# Patient Record
Sex: MALE | Race: WHITE | Employment: UNEMPLOYED | URBAN - METROPOLITAN AREA
[De-identification: names, ages, dates, MRNs, and addresses within clinical notes are randomized per-mention and may not be internally consistent; named-entity substitution may affect disease eponyms.]

---

## 2017-01-03 ENCOUNTER — TELEPHONE (OUTPATIENT)
Dept: PSYCHIATRY | Facility: CLINIC | Age: 27
End: 2017-01-03

## 2017-01-03 DIAGNOSIS — F29 PSYCHOSIS, UNSPECIFIED PSYCHOSIS TYPE (H): Primary | ICD-10-CM

## 2017-01-03 NOTE — TELEPHONE ENCOUNTER
Medication Requested: Lithium 600 mg caps  Last Written RX Date: 11-4-16  Last Pharmacy Fill Date: 12-8-16  Last RX Quantity: 60,   # refills: 1    Last Office Visit: 11-4-16  Recommended RTC: 6-8- wks  Next Scheduled Office Visit: 1-9-17    Since last Visit: # of CX 1 ,# of NOS 0    Last Visit Recommendations for:  Medications: no change  Labs: 0    Will route to provider due to cancellation.    Kathleen M Doege RN

## 2017-01-04 RX ORDER — LITHIUM CARBONATE 600 MG/1
1200 CAPSULE ORAL AT BEDTIME
Qty: 60 CAPSULE | Refills: 0 | Status: SHIPPED
Start: 2017-01-04 | End: 2017-01-09

## 2017-01-04 NOTE — TELEPHONE ENCOUNTER
FW:        Henna Voss DO       Sent: Tue January 03, 2017  8:39 PM       To: Doege, Kathleen M, EFREM              Message               Hi please just refill for 30 days, he's usually very reliable              Henna      30 day refill sent to pharmacy.    Kathleen M Doege RN

## 2017-01-09 ENCOUNTER — OFFICE VISIT (OUTPATIENT)
Dept: PSYCHIATRY | Facility: CLINIC | Age: 27
End: 2017-01-09
Attending: PSYCHIATRY & NEUROLOGY
Payer: COMMERCIAL

## 2017-01-09 VITALS
DIASTOLIC BLOOD PRESSURE: 83 MMHG | SYSTOLIC BLOOD PRESSURE: 136 MMHG | WEIGHT: 239 LBS | BODY MASS INDEX: 31.1 KG/M2 | HEART RATE: 80 BPM

## 2017-01-09 DIAGNOSIS — F29 PSYCHOSIS, UNSPECIFIED PSYCHOSIS TYPE (H): Primary | ICD-10-CM

## 2017-01-09 PROCEDURE — 99212 OFFICE O/P EST SF 10 MIN: CPT | Mod: ZF

## 2017-01-09 RX ORDER — LITHIUM CARBONATE 600 MG/1
1200 CAPSULE ORAL AT BEDTIME
Qty: 60 CAPSULE | Refills: 1 | Status: SHIPPED
Start: 2017-01-09 | End: 2017-02-27

## 2017-01-09 RX ORDER — OLANZAPINE 5 MG/1
5 TABLET ORAL AT BEDTIME
Qty: 30 TABLET | Refills: 1 | Status: SHIPPED
Start: 2017-01-09 | End: 2017-02-27

## 2017-01-09 ASSESSMENT — ABNORMAL INVOLUNTARY MOVEMENT SCALE (AIMS)
AIMS_DISAPPEAR_SLEEPING: NO
TONGUE: 0
AIMS_PATIENT_AWARENESS: NO AWARENESS

## 2017-01-09 NOTE — NURSING NOTE
Chief Complaint   Patient presents with     Recheck Medication     schizoaffective disorder     Reviewed allergies, smoking status, and pharmacy preference  Administered abuse screening questions   Obtained weight, blood pressure and heart rate

## 2017-01-09 NOTE — PROGRESS NOTES
PSYCHIATRY CLINIC PROGRESS NOTE   The initial DIAG EVAL was 7/24/2015.  Date of most recent Transfer Evaluation is 07/08/2016.    INTERIM HISTORY                                                 PSYCH CRITICAL ITEM HISTORY includes psychosis [disorganization, ?catatonia], mutiple psychotropic trials, psych hosp [x1x at [facility], spring 2015], commitment and CD: alcohol and cannabis.  Mental health issues were first experienced 2014 and he first received mental health care at that time.     Jose Francisco Sommers is a 26 year old male who was last seen in clinic on 11/4/16 at which time no changes were made.  The patient reports good treatment adherence.  History was provided by patient who was a good historian.  Since the last visit:  -He saw [facility] and went to [employer] over the holidays.  -States that since last visit he self-tapered the Buspar and denies any side effects or emerging symptoms from discontinuing.  Discussed that therapy may be able to help with anxiety as well but isn't interested in it.  -He reported that he stopped smoking for a over a month to save up money, but has resumed.  -He is still saving up for the software for his [employer] cards     RECENT SYMPTOMS:   DEPRESSION:  weight gain /loss;  DENIES- depressed mood, anhedonia, insomnia , hypersomnia, appetite change, low energy, poor concentration /memory, excessive guilt, indecisiveness, feeling worthless, suicidal ideation , overwhelmed, excessive crying and feeling trapped  PSYCHOSIS:  none;  DENIES- hallucinations, delusions and ideas of reference  KAMI/HYPOMANIA:  none;  DENIES- elevated mood, grandiose, increased goal-directed activity, increased energy, decreased need for sleep, pressured speech (per self, others), distractible, racing thoughts, flight of ideas and excessive risk taking  EATING DISORDER: none    SUBSTANCE USE:     ALCOHOL-  never       TOBACCO- 1ppd        CAFFEINE- 1-2 cups/day of coffee                        CANNABIS- none  OTHER ILLICIT DRUGS- none     Financial Support- family or friend     Living Situation- lives with parents in [place]         Children- 0.                                Feels Safe at Home- Yes.    MEDICAL ROS:  Reports none.    Denies akathisia, dystonia, apparent TD, muscle stiffness and tremor [action or resting], polydipsia, polyuria, nocturia and weight gain, headache, fatigue, sedation, dry mouth, nausea, diarrhea and wt gain.     PSYCH and CD Critical Summary Points since July 2016 7/2016- changed lithium from 600 mg BID to 1200 mg qhs for sleep and sedation  8/2016- decreased Zyprexa from 10 mg to 5 mg qhs- due to weight gain  9/2016- decreased Buspar from 10 mg to 5 mg qhs due to dizziness  1/2017- self-discontinued Buspar    PAST PSYCH MED TRIALS                                  see EMR Problem List: Hx of psychiatric care    MEDICAL / SURGICAL HISTORY                                   CARE TEAM:          PCP- none                    Therapist- none      Patient Active Problem List   Diagnosis     Schizoaffective disorder, bipolar type (H)     Hx of psychiatric care            ALLERGY                                Sulfa drugs  MEDICATIONS                             Self-discontinued Buspar  Current Outpatient Prescriptions   Medication Sig Dispense Refill     lithium 600 MG capsule Take 2 capsules (1,200 mg) by mouth At Bedtime 60 capsule 0     busPIRone (BUSPAR) 5 MG tablet Take 1 tablet (5 mg) by mouth At Bedtime 30 tablet 1     OLANZapine (ZYPREXA) 5 MG tablet Take 1 tablet (5 mg) by mouth At Bedtime 30 tablet 1        VITALS   /83 mmHg  Pulse 80  Wt 108.41 kg (239 lb)     Wt Readings from Last 4 Encounters:   01/09/17 108.41 kg (239 lb)   11/04/16 105.688 kg (233 lb)   09/30/16 104.146 kg (229 lb 9.6 oz)   08/26/16 106.777 kg (235 lb 6.4 oz)       MENTAL STATUS EXAM                                                             Alertness: alert  and  oriented  Appearance: adequately groomed and casually groomed  Behavior/Demeanor: cooperative, pleasant and calm, with good  eye contact   Speech: normal and regular rate and rhythm  Language: intact and no problems  Psychomotor: normal or unremarkable  Mood:  description consistent with euthymia  Affect: appropriate; was congruent to mood; was congruent to content  Thought Process/Associations: unremarkable  Thought Content:  Denies suicidal ideation, violent ideation and psychotic thought  Perception:  Denies hallucinations  Insight: good  Judgment: good  Cognition:  does  appear grossly intact; formal cognitive testing was not done    LABS and DATA     AIMS:  0 per today    PHQ9 TODAY = 1   PHQ-9 SCORE 8/26/2016 9/30/2016 11/4/2016   Total Score - - -   Total Score 4 4 1     LITHIUM LABS    [level, renal, SG, TSH, WBC]  q6 mo  Recent Labs   Lab Test  09/30/16   1348  02/09/16   1300  11/11/15   1208   LITHIUM  0.6  0.5*  0.5*     Recent Labs   Lab Test  09/30/16   1348  04/28/16   1615  02/09/16   1300   CR  0.90  0.95  1.16   GFRESTIMATED  >90  Non  GFR Calc    >90  Non  GFR Calc    77   NA  140  140  139   ADONAY  9.0  9.3  8.9     Recent Labs   Lab Test  02/09/16   1247   SG  1.005     Recent Labs   Lab Test  09/30/16   1348  02/09/16   1300  11/11/15   1208   TSH  3.45  3.44  7.24*     ANTIPSYCHOTIC LABS   [glu, A1C, lipids (focus LDL), liver enzymes, WBC, ANEU, Hgb, plts]  q12mo  Recent Labs   Lab Test  09/30/16   1348  04/28/16   1615   GLC  83  99   A1C  4.6   --      Recent Labs   Lab Test  02/09/16   1300  08/04/15   1140   CHOL  101  141   TRIG  264*  283*   LDL  16  45   HDL  32*  39*     No lab results found.  Recent Labs   Lab Test  09/30/16   1348  11/11/15   1208   WBC  7.6  8.7   ANEU  4.9   --    HGB  15.0  16.0   PLT  216  230         DIAGNOSIS   Schizoaffective Disorder, Bipolar Type     ASSESSMENT                                     Pertinent Background:   See most  recent Transfer Evaluation as dated above.    TODAY:   .     1) Schizoaffective Disorder, Bipolar Type- Sleep has improved since changing lithium from twice a day to bedtime dosing only and feels he is sleeping well on Zyprexa 5 mg.  He self-discontinued Buspar since last visit due to dizziness and felt he no longer needed it.    2) Obesity- reports that he gained weight over the holidays but reports that his appetite improved since decreasing his Zyprexa dose and will continue focusing on good nutrition and exercise.  HDL and triglycerides slightly abnormal upon check in 2/2016, will recheck in 2/2017 and if still abnormal will refer to primary care        PSYCHOTROPIC DRUG INTERACTIONS:   LITHIUM and OLANZAPINE may result in weakness, dyskinesias, increased extrapyramidal symptoms, encephalopathy, and brain damage..  MANAGEMENT:  Monitoring for adverse effects and patient is aware of risks     PLAN                                                                                                       1) PSYCHOTROPIC MEDICATIONS:   - no change    2) THERAPY:  none    3) LABS NEXT DUE:  Fasting lipids and lithium labs- 2/2017  Antipsychotic labs 9/2017       RATING SCALES:  none    4) REFERRALS [CD, medical, other]:  none    5) :  none    6) RTC: 1 month    7) CRISIS NUMBERS:   Provided routinely in AVS  ONLY if a ROBERT PT: [facility] [phone] (clinic), [phone] (after hours)   CRISIS TEXT LINE: Text [id number] from anywhere, anytime, any crisis 24/7;  OR SEE www.crisistextline.org      TREATMENT RISK STATEMENT:  The risks, benefits, alternatives and potential adverse effects have been discussed and are understood by the patient/ patient's guardian. The pt understands the risks of using street drugs or alcohol.  There are no medical contraindications, the pt agrees to treatment with the ability to do so.  The patient understands to call 911 or come to the nearest ED if life threatening or  urgent symptoms present.       RESIDENT:   Henna Voss, DO    Patient not staffed in clinic.  Note will be reviewed and signed by supervisor Dr. Ventura.  I did not see this pt directly. I have reviewed the documentation, and I agree with the resident's plan of care.    Yuko Ventura

## 2017-01-11 ASSESSMENT — PATIENT HEALTH QUESTIONNAIRE - PHQ9: SUM OF ALL RESPONSES TO PHQ QUESTIONS 1-9: 1

## 2017-02-27 ENCOUNTER — OFFICE VISIT (OUTPATIENT)
Dept: PSYCHIATRY | Facility: CLINIC | Age: 27
End: 2017-02-27
Attending: PSYCHIATRY & NEUROLOGY
Payer: COMMERCIAL

## 2017-02-27 ENCOUNTER — TELEPHONE (OUTPATIENT)
Dept: PSYCHIATRY | Facility: CLINIC | Age: 27
End: 2017-02-27

## 2017-02-27 VITALS
SYSTOLIC BLOOD PRESSURE: 156 MMHG | WEIGHT: 225.2 LBS | HEART RATE: 81 BPM | DIASTOLIC BLOOD PRESSURE: 95 MMHG | BODY MASS INDEX: 29.31 KG/M2

## 2017-02-27 DIAGNOSIS — F29 PSYCHOSIS, UNSPECIFIED PSYCHOSIS TYPE (H): Primary | ICD-10-CM

## 2017-02-27 DIAGNOSIS — F25.0 SCHIZOAFFECTIVE DISORDER, BIPOLAR TYPE (H): ICD-10-CM

## 2017-02-27 DIAGNOSIS — G47.00 INSOMNIA, UNSPECIFIED TYPE: ICD-10-CM

## 2017-02-27 PROCEDURE — 99212 OFFICE O/P EST SF 10 MIN: CPT | Mod: ZF

## 2017-02-27 RX ORDER — OLANZAPINE 5 MG/1
5 TABLET ORAL AT BEDTIME
Qty: 30 TABLET | Refills: 1 | Status: SHIPPED
Start: 2017-02-27 | End: 2017-02-27

## 2017-02-27 RX ORDER — TRAZODONE HYDROCHLORIDE 50 MG/1
50-100 TABLET, FILM COATED ORAL
Qty: 30 TABLET | Refills: 1 | Status: SHIPPED
Start: 2017-02-27 | End: 2017-03-13

## 2017-02-27 RX ORDER — LITHIUM CARBONATE 600 MG/1
1200 CAPSULE ORAL AT BEDTIME
Qty: 60 CAPSULE | Refills: 1 | Status: SHIPPED
Start: 2017-02-27 | End: 2017-03-13

## 2017-02-27 RX ORDER — OLANZAPINE 5 MG/1
5-10 TABLET ORAL AT BEDTIME
Qty: 60 TABLET | Refills: 1 | Status: SHIPPED
Start: 2017-02-27 | End: 2017-03-13

## 2017-02-27 NOTE — PATIENT INSTRUCTIONS
1) Continue lithium at current dose, continue Zyprexa 5-10 mg at bedtime  2) Start trazodone  mg for sleep at bedtime  3) Get labs completed before next visit, lithium level needs to be 8-10 hours after your last dose of lithium  4) Return to clinic in 2-3 weeks

## 2017-02-27 NOTE — TELEPHONE ENCOUNTER
----- Message from Renee Eaton sent at 2/27/2017  4:47 PM CST -----  Regarding: Parent Call  Contact: [phone]  Hi Jose Francisco Palencia's mother, Ember, is returning Dr. Voss's call.  She can be reached tomorrow on her cell: [phone].    Thanks,  Renee

## 2017-02-27 NOTE — PROGRESS NOTES
"  PSYCHIATRY CLINIC PROGRESS NOTE   The initial DIAG NAA was 7/24/2015.  Date of most recent Transfer Evaluation is 07/08/2016.    INTERIM HISTORY                                                 PSYCH CRITICAL ITEM HISTORY includes psychosis [disorganization, ?catatonia], mutiple psychotropic trials, psych hosp [x1x at [facility], spring 2015], commitment and CD: alcohol and cannabis. Mental health issues were first experienced 2014 and he first received mental health care at that time.     Jose Francisco Sommers is a 26 year old male who was last seen in clinic on 1/9/17  at which time no changes were made.  The patient reports good treatment adherence.  History was provided by patient who was a good historian.  Since the last visit:  -Reports that things are \"okay\", he takes melatonin and feels its been changing his schedule.  He reports that he didn't think it was helpful in the past.    -Reports he was sleeping \"differently\" and anxiety has worsened since last visit. Doesn't attribute it to stopping buspirone.  -He has been taking an extra Zyprexa tablet and feels that has been helpful but states that he wants something for sleep and anxiety too.  -He has lost 14 lbs since last appointment one month ago because he hasn't felt hungry.     I contacted patient's mother the next day as patient did not seem like himself at our visit.  She reported that things have been off for the past 3-4 weeks and that he has been working him almost \"OCD obsessively\" and has been increasing withdrawn.  She reports that his verbal processing seems to be lagging behind and that he's been pacing lately.  She is also concerned because patient normally drives himself to his appointments but asked to be driven to his appointment and it took him a long time to be able to drive independently.    RECENT SYMPTOMS:   DEPRESSION:  depressed mood, insomnia , appetite change, weight gain /loss, low energy and poor concentration /memory;  DENIES- " excessive guilt, feeling worthless, psychomotor retardation/ agitation observed by others, suicidal ideation , overwhelmed, excessive crying, feeling trapped and feeling hopeless  PSYCHOSIS:  none;  DENIES- delusions, paranoia, ideas of reference, thought withdrawal / insertion / deletion / broadcasting, disorganized speech, poverty of content and concrete content  KAMI/HYPOMANIA:  none;  DENIES- elevated mood, grandiose, increased goal-directed activity, increased energy, decreased need for sleep, pressured speech (per self, others), distractible and racing thoughts  EATING DISORDER:  none    RECENT SUBSTANCE USE:     ALCOHOL-  never       TOBACCO- 1ppd        CAFFEINE- 1-2 cups/day of coffee                       CANNABIS- none  OTHER ILLICIT DRUGS- none     CURRENT SOCIAL HISTORY:  Financial Support- family or friend     Living Situation- lives with parents in [place]   Children- 0. Feels Safe at Home- Yes.     MEDICAL ROS:  Reports none.    Denies akathisia, dystonia, apparent TD, muscle stiffness and tremor [action or resting], polydipsia, polyuria, nocturia and weight gain, headache, fatigue, sedation, dry mouth, nausea, diarrhea and wt gain.     PSYCH and CD Critical Summary Points since July 2016 7/2016- changed lithium from 600 mg BID to 1200 mg qhs for sleep and sedation  8/2016- decreased Zyprexa from 10 mg to 5 mg qhs- due to weight gain  9/2016- decreased Buspar from 10 mg to 5 mg qhs due to dizziness  1/2017- self-discontinued Buspar    PAST PSYCH MED TRIALS                                  see EMR Problem List: Hx of psychiatric care    MEDICAL / SURGICAL HISTORY                                   CARE TEAM:          PCP- none                  Therapist- none    Patient Active Problem List   Diagnosis     Schizoaffective disorder, bipolar type (H)     Hx of psychiatric care          ALLERGY                                Sulfa drugs  MEDICATIONS                               Current  Outpatient Prescriptions   Medication Sig Dispense Refill     lithium 600 MG capsule Take 2 capsules (1,200 mg) by mouth At Bedtime 60 capsule 1     OLANZapine (ZYPREXA) 5 MG tablet Take 1 tablet (5 mg) by mouth At Bedtime 30 tablet 1        VITALS   BP (!) 156/95  Pulse 81  Wt 102.2 kg (225 lb 3.2 oz)  BMI 29.31 kg/m2   Wt Readings from Last 4 Encounters:   02/27/17 102.2 kg (225 lb 3.2 oz)   01/09/17 108.4 kg (239 lb)   11/04/16 105.7 kg (233 lb)   09/30/16 104.1 kg (229 lb 9.6 oz)     MENTAL STATUS EXAM                                                             Alertness: alert  and oriented  Appearance: adequately groomed and casually groomed  Behavior/Demeanor: cooperative, pleasant and guarded, with poor eye contact   Speech: increased latency of response  Language: intact and no problems  Psychomotor: normal or unremarkable  Mood:  depressed and anxious  Affect: guarded; was congruent to mood; was congruent to content  Thought Process/Associations: unremarkable  Thought Content:  Denies suicidal ideation, violent ideation and psychotic thought;   Perception:  Denies hallucinations   , derealization and depersonalization;   Insight: adequate  Judgment: good  Cognition:  does  appear grossly intact; formal cognitive testing was not done    LABS and DATA     LITHIUM LABS    [level, renal, SG, TSH, WBC]  q6 mo  Recent Labs   Lab Test  09/30/16   1348  02/09/16   1300  11/11/15   1208   LITHIUM  0.6  0.5*  0.5*     Recent Labs   Lab Test  09/30/16   1348  04/28/16   1615  02/09/16   1300   CR  0.90  0.95  1.16   GFRESTIMATED  >90  Non  GFR Calc    >90  Non  GFR Calc    77   NA  140  140  139   ADONAY  9.0  9.3  8.9     Recent Labs   Lab Test  02/09/16   1247   SG  1.005     Recent Labs   Lab Test  09/30/16   1348  02/09/16   1300  11/11/15   1208   TSH  3.45  3.44  7.24*     ANTIPSYCHOTIC LABS   [glu, A1C, lipids (focus LDL), liver enzymes, WBC, ANEU, Hgb, plts]  q12mo  Recent Labs    Lab Test  09/30/16   1348  04/28/16   1615   GLC  83  99   A1C  4.6   --      Recent Labs   Lab Test  02/09/16   1300  08/04/15   1140   CHOL  101  141   TRIG  264*  283*   LDL  16  45   HDL  32*  39*     Recent Labs   Lab Test  09/30/16   1348  11/11/15   1208   WBC  7.6  8.7   ANEU  4.9   --    HGB  15.0  16.0   PLT  216  230     AIMS:  0 per 1/9/2017      PHQ9 TODAY = 5  PHQ-9 SCORE 9/30/2016 11/4/2016 1/9/2017   Total Score 4 1 1     Wt Readings from Last 4 Encounters:   02/27/17 102.2 kg (225 lb 3.2 oz)   01/09/17 108.4 kg (239 lb)   11/04/16 105.7 kg (233 lb)   09/30/16 104.1 kg (229 lb 9.6 oz)       DIAGNOSIS     Schizoaffective Disorder, Bipolar Type     ASSESSMENT                                     Pertinent Background:   See most recent Transfer Evaluation as dated above..       TODAY:   1) Schizoaffective Disorder, Bipolar Type- Sleep had initially improved since changing lithium from twice a day to bedtime dosing only and feels he was sleeping well on Zyprexa 5 mg.  He self-discontinued Buspar since due to dizziness and felt he no longer needed it.  Patient seemed more withdrawn and anxious today, per his mother she has been noticing a change in his behavior recently.  Patient's mother and I will encourage patient to increase Zyprexa to 10 mg qhs (has own supply), will also obtain lithium level and 6 month lithium labs. If patient is still unable to sleep after Zyprexa 10 mg, he can try taking a trazodone. Discussed possible side effects of trazodone including priapism.     2) Obesity- reports that he gained weight over the holidays and reports that his appetite improved since decreasing his Zyprexa dose and will continue focusing on good nutrition and exercise.  However, this past month he lost 14 lbs due to poor appetite, suspect it may be related to psychiatric symptoms.  HDL and triglycerides slightly abnormal upon check in 2/2016, will recheck in 2/2017 and if still abnormal will refer to primary  care     PSYCHOTROPIC DRUG INTERACTIONS:   LITHIUM and OLANZAPINE may result in weakness, dyskinesias, increased extrapyramidal symptoms, encephalopathy, and brain damage.  MANAGEMENT: Monitoring for adverse effects and patient is aware of risks     PLAN                                                                                                       1) PSYCHOTROPIC MEDICATIONS:   - Increase Zyprexa from 5 mg to 10 mg qhs   - If he still has difficulty sleeping may take trazodone  mg qhs    2) THERAPY:  none    3) LABS NEXT DUE:  6 month lithium levels and fasting lipids 2/2017 with lithium level.  Antipsychotic labs 9/2017       RATING SCALES:  none    4) REFERRALS [CD, medical, other]:  none    5) :  none    6) RTC: 2 weeks    7) CRISIS NUMBERS:   Provided routinely in AVS  ONLY if a JOSEPHINE PT: [facility] [phone] (clinic), [phone] (after hours)   CRISIS TEXT LINE: Text [id number] from anywhere, anytime, any crisis 24/7;  OR SEE www.crisistextline.org    TREATMENT RISK STATEMENT:  The risks, benefits, alternatives and potential adverse effects have been discussed and are understood by the patient/ patient's guardian. The pt understands the risks of using street drugs or alcohol.  There are no medical contraindications, the pt agrees to treatment with the ability to do so.  The patient understands to call 911 or come to the nearest ED if life threatening or urgent symptoms present.     RESIDENT:   Henna Voss, DO    Patient not staffed in clinic.  Note will be reviewed and signed by supervisor Dr. Ventura.  I did not see this pt directly. I have reviewed the documentation, and I agree with the resident's plan of care.    Yuko Ventura

## 2017-02-27 NOTE — MR AVS SNAPSHOT
After Visit Summary   2/27/2017    Jose Francisco Sommers    MRN: [id number]           Patient Information     Date Of Birth          1990        Visit Information        Provider Department      2/27/2017 3:30 PM Henna Voss DO Psychiatry Clinic        Today's Diagnoses     Psychosis, unspecified psychosis type    -  1    Insomnia, unspecified type        Schizoaffective disorder, bipolar type (H)          Care Instructions    1) Continue lithium at current dose, continue Zyprexa 5-10 mg at bedtime  2) Start trazodone  mg for sleep at bedtime  3) Get labs completed before next visit, lithium level needs to be 8-10 hours after your last dose of lithium  4) Return to clinic in 2-3 weeks        Follow-ups after your visit        Follow-up notes from your care team     Return in about 2 weeks (around 3/13/2017).      Your next 10 appointments already scheduled     Mar 13, 2017  2:00 PM CDT   First Episode Return with Henna Voss DO   Psychiatry Clinic ([facility])    [address] [facility] [address] [phone]              Who to contact     Please call your clinic at [phone] to:    Ask questions about your health    Make or cancel appointments    Discuss your medicines    Learn about your test results    Speak to your doctor   If you have compliments or concerns about an experience at your clinic, or if you wish to file a complaint, please contact [facility] Patient Relations at [phone] or email us at [email or web address]         Additional Information About Your Visit        MyChart Information     [employer] is an electronic gateway that provides easy, online access to your medical records. With [employer], you can request a clinic appointment, read your test results, renew a prescription or communicate with your care team.     To sign up for [employer] visit the website at  [email or web address]   You will be asked to enter the access code listed below, as well as some personal information. Please follow the directions to create your username and password.     Your access code is: [email or web address]  Expires: 6/3/2017 10:50 AM     Your access code will  in 90 days. If you need help or a new code, please contact your [facility] or call [phone] for assistance.        Care EveryWhere ID     This is your Care EveryWhere ID. This could be used by other organizations to access your [place] medical records  [id number]        Your Vitals Were     Pulse BMI (Body Mass Index)                81 29.31 kg/m2           Blood Pressure from Last 3 Encounters:   17 (!) 156/95   17 136/83   16 124/83    Weight from Last 3 Encounters:   17 102.2 kg (225 lb 3.2 oz)   17 108.4 kg (239 lb)   16 105.7 kg (233 lb)                 Today's Medication Changes          These changes are accurate as of: 17 11:59 PM.  If you have any questions, ask your nurse or doctor.               Start taking these medicines.        Dose/Directions    OLANZapine 5 MG tablet   Commonly known as:  zyPREXA   Used for:  Psychosis, unspecified psychosis type   Started by:  Henna Voss DO        Dose:  5-10 mg   Take 1-2 tablets (5-10 mg) by mouth At Bedtime   Quantity:  60 tablet   Refills:  1       traZODone 50 MG tablet   Commonly known as:  DESYREL   Used for:  Insomnia, unspecified type   Started by:  Henna Voss DO        Dose:   mg   Take 1-2 tablets ( mg) by mouth nightly as needed for sleep   Quantity:  30 tablet   Refills:  1            Where to get your medicines      These medications were sent to [place] [address] [place] [facility] [address] [facility]  [address] [facility] [address]     Phone:  [phone]     lithium 600 MG capsule    OLANZapine 5 MG tablet    traZODone 50 MG tablet                 Primary Care Provider    Clinic Provider       No address on file        Thank you!     Thank you for choosing PSYCHIATRY CLINIC  for your care. Our goal is always to provide you with excellent care. Hearing back from our patients is one way we can continue to improve our services. Please take a few minutes to complete the written survey that you may receive in the mail after your visit with us. Thank you!             Your Updated Medication List - Protect others around you: Learn how to safely use, store and throw away your medicines at www.disposemymeds.org.          This list is accurate as of: 2/27/17 11:59 PM.  Always use your most recent med list.                   Brand Name Dispense Instructions for use    lithium 600 MG capsule     60 capsule    Take 2 capsules (1,200 mg) by mouth At Bedtime       OLANZapine 5 MG tablet    zyPREXA    60 tablet    Take 1-2 tablets (5-10 mg) by mouth At Bedtime       traZODone 50 MG tablet    DESYREL    30 tablet    Take 1-2 tablets ( mg) by mouth nightly as needed for sleep

## 2017-02-27 NOTE — NURSING NOTE
Chief Complaint   Patient presents with     Recheck Medication       Psychosis, unspecified psychosis type     Reviewed allergies, smoking status, and pharmacy preference  Administered abuse screening questions   Obtained weight, blood pressure and heart rate

## 2017-02-28 ENCOUNTER — TELEPHONE (OUTPATIENT)
Dept: PSYCHIATRY | Facility: CLINIC | Age: 27
End: 2017-02-28

## 2017-02-28 NOTE — TELEPHONE ENCOUNTER
"Spoke to patient's mother Ember to follow up on some concerns I had regarding patient's symptoms yesterday.  Patient did not appear to be himself and patient's mother reports she has noticed that things have been off for the 3-4 weeks.  She states that he was working \"almost OCD jack obsessively\"  and hours working on the computer and increasing withdrawn in the past months.  There s a lag in his response time and had a disaster experience in risperidone.  Verbal processing was gone and it has never been back to baseline and after he stopped the risperidone.  She doubled his Zyprexa and patient is not eating.  He s pacing more and had quit smoking but recentlystarted up again.  This weekend patient requested that his mother drive him to his appointments and mother reports that the fact that patient is not driving is a huge deal because that is something he has worked hard to do.      We will encourage patient to increase Zyprexa to 10 mg qhs (has own supply), will also obtain lithium level and 6 month lithium labs.  If patient is still unable to sleep after Zyprexa 10 mg, he can try taking a trazodone.  Discussed possible side effects of trazodone including priapism.  Jose Francisco's mother is requesting an appointment in 2 weeks.  I will arrange for him to be scheduled Thursday 3/16 at 1:00 pm.  Henna Voss, DO  "

## 2017-03-01 ASSESSMENT — PATIENT HEALTH QUESTIONNAIRE - PHQ9: SUM OF ALL RESPONSES TO PHQ QUESTIONS 1-9: 5

## 2017-03-08 DIAGNOSIS — F25.0 SCHIZOAFFECTIVE DISORDER, BIPOLAR TYPE (H): ICD-10-CM

## 2017-03-08 LAB
BASOPHILS # BLD AUTO: 0.1 10E9/L (ref 0–0.2)
BASOPHILS NFR BLD AUTO: 0.7 %
DIFFERENTIAL METHOD BLD: NORMAL
EOSINOPHIL # BLD AUTO: 0.4 10E9/L (ref 0–0.7)
EOSINOPHIL NFR BLD AUTO: 4.8 %
ERYTHROCYTE [DISTWIDTH] IN BLOOD BY AUTOMATED COUNT: 13 % (ref 10–15)
HCT VFR BLD AUTO: 46.9 % (ref 40–53)
HGB BLD-MCNC: 15.7 G/DL (ref 13.3–17.7)
LYMPHOCYTES # BLD AUTO: 2.1 10E9/L (ref 0.8–5.3)
LYMPHOCYTES NFR BLD AUTO: 24.3 %
MCH RBC QN AUTO: 30.3 PG (ref 26.5–33)
MCHC RBC AUTO-ENTMCNC: 33.5 G/DL (ref 31.5–36.5)
MCV RBC AUTO: 90 FL (ref 78–100)
MONOCYTES # BLD AUTO: 0.8 10E9/L (ref 0–1.3)
MONOCYTES NFR BLD AUTO: 9.4 %
NEUTROPHILS # BLD AUTO: 5.1 10E9/L (ref 1.6–8.3)
NEUTROPHILS NFR BLD AUTO: 60.8 %
PLATELET # BLD AUTO: 229 10E9/L (ref 150–450)
RBC # BLD AUTO: 5.19 10E12/L (ref 4.4–5.9)
WBC # BLD AUTO: 8.5 10E9/L (ref 4–11)

## 2017-03-08 PROCEDURE — 80050 GENERAL HEALTH PANEL: CPT | Performed by: STUDENT IN AN ORGANIZED HEALTH CARE EDUCATION/TRAINING PROGRAM

## 2017-03-08 PROCEDURE — 36415 COLL VENOUS BLD VENIPUNCTURE: CPT | Performed by: STUDENT IN AN ORGANIZED HEALTH CARE EDUCATION/TRAINING PROGRAM

## 2017-03-08 PROCEDURE — 80178 ASSAY OF LITHIUM: CPT | Performed by: STUDENT IN AN ORGANIZED HEALTH CARE EDUCATION/TRAINING PROGRAM

## 2017-03-09 LAB
ALBUMIN SERPL-MCNC: 4.4 G/DL (ref 3.4–5)
ALP SERPL-CCNC: 67 U/L (ref 40–150)
ALT SERPL W P-5'-P-CCNC: 51 U/L (ref 0–70)
ANION GAP SERPL CALCULATED.3IONS-SCNC: 6 MMOL/L (ref 3–14)
AST SERPL W P-5'-P-CCNC: 22 U/L (ref 0–45)
BILIRUB SERPL-MCNC: 0.6 MG/DL (ref 0.2–1.3)
BUN SERPL-MCNC: 11 MG/DL (ref 7–30)
CALCIUM SERPL-MCNC: 9.5 MG/DL (ref 8.5–10.1)
CHLORIDE SERPL-SCNC: 106 MMOL/L (ref 94–109)
CO2 SERPL-SCNC: 28 MMOL/L (ref 20–32)
CREAT SERPL-MCNC: 0.94 MG/DL (ref 0.66–1.25)
GFR SERPL CREATININE-BSD FRML MDRD: NORMAL ML/MIN/1.7M2
GLUCOSE SERPL-MCNC: 72 MG/DL (ref 70–99)
LITHIUM SERPL-SCNC: 0.7 MMOL/L (ref 0.6–1.2)
POTASSIUM SERPL-SCNC: 3.7 MMOL/L (ref 3.4–5.3)
PROT SERPL-MCNC: 7.5 G/DL (ref 6.8–8.8)
SODIUM SERPL-SCNC: 140 MMOL/L (ref 133–144)
TSH SERPL DL<=0.005 MIU/L-ACNC: 3.36 MU/L (ref 0.4–4)

## 2017-03-13 ENCOUNTER — OFFICE VISIT (OUTPATIENT)
Dept: PSYCHIATRY | Facility: CLINIC | Age: 27
End: 2017-03-13
Attending: PSYCHIATRY & NEUROLOGY
Payer: COMMERCIAL

## 2017-03-13 VITALS
SYSTOLIC BLOOD PRESSURE: 142 MMHG | BODY MASS INDEX: 28.81 KG/M2 | WEIGHT: 221.4 LBS | HEART RATE: 90 BPM | DIASTOLIC BLOOD PRESSURE: 93 MMHG

## 2017-03-13 DIAGNOSIS — Z79.899 ENCOUNTER FOR LONG-TERM (CURRENT) USE OF MEDICATIONS: ICD-10-CM

## 2017-03-13 DIAGNOSIS — F29 PSYCHOSIS, UNSPECIFIED PSYCHOSIS TYPE (H): Primary | ICD-10-CM

## 2017-03-13 PROCEDURE — 99212 OFFICE O/P EST SF 10 MIN: CPT | Mod: ZF

## 2017-03-13 RX ORDER — LITHIUM CARBONATE 600 MG/1
1200 CAPSULE ORAL AT BEDTIME
Qty: 60 CAPSULE | Refills: 1 | Status: SHIPPED
Start: 2017-03-13 | End: 2017-04-17

## 2017-03-13 RX ORDER — OLANZAPINE 15 MG/1
15 TABLET ORAL AT BEDTIME
Qty: 30 TABLET | Refills: 1 | Status: SHIPPED
Start: 2017-03-13 | End: 2017-04-17

## 2017-03-13 NOTE — MR AVS SNAPSHOT
After Visit Summary   3/13/2017    Jose Francisco Sommers    MRN: [id number]           Patient Information     Date Of Birth          1990        Visit Information        Provider Department      3/13/2017 2:00 PM Henna Voss DO Psychiatry Clinic        Today's Diagnoses     Psychosis, unspecified psychosis type    -  1    Encounter for long-term (current) use of medications          Care Instructions    1) Complete fasting labs (12 hours) at any [place] prior to next appt  2) Increase Zyprexa to 15 mg  3) Return to clinic in 1 month        Follow-ups after your visit        Your next 10 appointments already scheduled     Apr 17, 2017  3:30 PM CDT   First Episode Return with Henna Voss DO   Psychiatry Clinic ([facility])    [place] [id number] [facility] [address] [phone]              Who to contact     Please call your clinic at [phone] to:    Ask questions about your health    Make or cancel appointments    Discuss your medicines    Learn about your test results    Speak to your doctor   If you have compliments or concerns about an experience at your clinic, or if you wish to file a complaint, please contact [facility] Patient Relations at [phone] or email us at [email or web address]         Additional Information About Your Visit        MyChart Information     [employer] is an electronic gateway that provides easy, online access to your medical records. With [employer], you can request a clinic appointment, read your test results, renew a prescription or communicate with your care team.     To sign up for [employer] visit the website at [email or web address]   You will be asked to enter the access code listed below, as well as some personal information. Please follow the directions to create your username and password.     Your access code is: [email or web address]  Expires: 6/3/2017  11:50 AM     Your access code will  in 90 days. If you need help or a new code, please contact your [facility] or call [phone] for assistance.        Care EveryWhere ID     This is your Care EveryWhere ID. This could be used by other organizations to access your [place] medical records  [id number]        Your Vitals Were     Pulse BMI (Body Mass Index)                90 28.81 kg/m2           Blood Pressure from Last 3 Encounters:   17 (!) 142/93   17 (!) 156/95   17 136/83    Weight from Last 3 Encounters:   17 100.4 kg (221 lb 6.4 oz)   17 102.2 kg (225 lb 3.2 oz)   17 108.4 kg (239 lb)                 Today's Medication Changes          These changes are accurate as of: 3/13/17 11:59 PM.  If you have any questions, ask your nurse or doctor.               These medicines have changed or have updated prescriptions.        Dose/Directions    OLANZapine 15 MG tablet   Commonly known as:  zyPREXA   This may have changed:    - medication strength  - how much to take   Used for:  Psychosis, unspecified psychosis type   Changed by:  Henna Voss DO        Dose:  15 mg   Take 1 tablet (15 mg) by mouth At Bedtime   Quantity:  30 tablet   Refills:  1         Stop taking these medicines if you haven't already. Please contact your care team if you have questions.     traZODone 50 MG tablet   Commonly known as:  DESYREL   Stopped by:  Henna Voss DO                Where to get your medicines      These medications were sent to [place] [address] [place] [facility] [address] [facility]  [address] [facility] [address]     Phone:  [phone]     lithium 600 MG capsule    OLANZapine 15 MG tablet                Primary Care Provider    Clinic Provider       No address on file        Thank you!     Thank you for choosing PSYCHIATRY CLINIC  for your care. Our goal is always to provide you with excellent care. Hearing back from  our patients is one way we can continue to improve our services. Please take a few minutes to complete the written survey that you may receive in the mail after your visit with us. Thank you!             Your Updated Medication List - Protect others around you: Learn how to safely use, store and throw away your medicines at www.disposemymeds.org.          This list is accurate as of: 3/13/17 11:59 PM.  Always use your most recent med list.                   Brand Name Dispense Instructions for use    lithium 600 MG capsule     60 capsule    Take 2 capsules (1,200 mg) by mouth At Bedtime       OLANZapine 15 MG tablet    zyPREXA    30 tablet    Take 1 tablet (15 mg) by mouth At Bedtime

## 2017-03-13 NOTE — PROGRESS NOTES
"  PSYCHIATRY CLINIC PROGRESS NOTE   The initial DIAG EVAL was 7/24/2015.  Date of most recent Transfer Evaluation is 07/08/2016.    INTERIM HISTORY                                                 PSYCH CRITICAL ITEM HISTORY includes psychosis [disorganization, ?catatonia], mutiple psychotropic trials, psych hosp [x1x at [facility], spring 2015], commitment and CD: alcohol and cannabis. Mental health issues were first experienced 2014 and he first received mental health care at that time      Jose Francisco Sommers is a 26 year old male who was last seen in clinic on 2/27/17 at which time patient's Zyprexa was increased to 10 mg and trazodone was started.  The patient reports good treatment adherence.  History was provided by patient who was a good historian.  Since the last visit:  -Jose Francisco reports that he \"feeling better\" and feels that it was the melatonin making him feel worse.  -He attempted to purchase the [employer] software to help sell his [employer] card but it didn't work out and he states that needs to find an alternative solution and doing [employer] in [place].  -Feels that he felt better on the Zyprexa 10 mg and requests to increase Zyprexa to 15 mg.  -During a previous phone call, his mother mentioned that he wasn't driving, which showed a decompensation from his normal behavior.  He states that his father drove him today but feels he will be able to resume driving again soon.  -Reports that he didn't like the trazodone, feels he is able to sleep well with Zyprexa  -His sister is in town for her spring break and he is enjoying spending time with her.  -His appetite is still somewhat poor but thinks it's slowly improving    RECENT SYMPTOMS:   DEPRESSION: appetite change, weight gain /loss,  DENIES- low energy and poor concentration /memory;depressed mood, insomnia , excessive guilt, feeling worthless, psychomotor retardation/ agitation observed by others, suicidal ideation , overwhelmed, excessive crying, " feeling trapped and feeling hopeless  PSYCHOSIS: none; DENIES- delusions, paranoia, ideas of reference, thought withdrawal / insertion / deletion / broadcasting, disorganized speech, poverty of content and concrete content  KAMI/HYPOMANIA: none; DENIES- elevated mood, grandiose, increased goal-directed activity, increased energy, decreased need for sleep, pressured speech (per self, others), distractible and racing thoughts  EATING DISORDER: none    RECENT SUBSTANCE USE:   ALCOHOL-  never       TOBACCO- 1ppd        CAFFEINE- 1-2 cups/day of coffee                       CANNABIS- none  OTHER ILLICIT DRUGS- none      CURRENT SOCIAL HISTORY:  Financial Support- family or friend     Living Situation- lives with parents in [place]   Children- 0. Feels Safe at Home- Yes.      MEDICAL ROS:  Reports none.    Denies akathisia, dystonia, apparent TD, muscle stiffness and tremor [action or resting], polydipsia, polyuria, nocturia and weight gain, headache, fatigue, sedation, dry mouth, nausea, diarrhea and wt gain.     PSYCH and CD Critical Summary Points since July 2016 7/2016- changed lithium from 600 mg BID to 1200 mg qhs for sleep and sedation  8/2016- decreased Zyprexa from 10 mg to 5 mg qhs- due to weight gain  9/2016- decreased Buspar from 10 mg to 5 mg qhs due to dizziness  1/2017- self-discontinued Buspar  2/2017- increased Zyprexa to 10 mg qhs from 5 mg, started trazodone for sleep.  Reports he had tried melatonin but it made him feel sick    PAST PSYCH MED TRIALS                                  see EMR Problem List: Hx of psychiatric care    MEDICAL / SURGICAL HISTORY                                 CARE TEAM: PCP- none Therapist- none    Patient Active Problem List   Diagnosis     Schizoaffective disorder, bipolar type (H)     Hx of psychiatric care            ALLERGY                                Sulfa drugs  MEDICATIONS                               Current Outpatient Prescriptions   Medication Sig  "Dispense Refill     traZODone (DESYREL) 50 MG tablet Take 1-2 tablets ( mg) by mouth nightly as needed for sleep 30 tablet 1     lithium 600 MG capsule Take 2 capsules (1,200 mg) by mouth At Bedtime 60 capsule 1     OLANZapine (ZYPREXA) 5 MG tablet Take 1-2 tablets (5-10 mg) by mouth At Bedtime 60 tablet 1        VITALS   BP (!) 142/93  Pulse 90  Wt 100.4 kg (221 lb 6.4 oz)  BMI 28.81 kg/m2   Wt Readings from Last 4 Encounters:   03/13/17 100.4 kg (221 lb 6.4 oz)   02/27/17 102.2 kg (225 lb 3.2 oz)   01/09/17 108.4 kg (239 lb)   11/04/16 105.7 kg (233 lb)       MENTAL STATUS EXAM                                                             Alertness: alert  and oriented  Appearance: adequately groomed and casually groomed  Behavior/Demeanor: cooperative, pleasant and calm, with improved eye contact   Speech: normal and regular rate and rhythm, no latency of response (improved from last visit)  Language: intact and no problems  Psychomotor: normal or unremarkable  Mood:  \"better\"  Affect: appropriate, less guarded; was congruent to mood; was congruent to content  Thought Process/Associations: unremarkable  Thought Content:  Denies suicidal ideation and violent ideation;   Perception:  Denies hallucinations   ;    Insight: good  Judgment: good  Cognition:  does  appear grossly intact; formal cognitive testing was not done    LABS and DATA     LITHIUM LABS    [level, renal, SG, TSH, WBC]  q6 mo  Recent Labs   Lab Test  03/08/17   0953  09/30/16   1348  02/09/16   1300  11/11/15   1208   LITHIUM  0.7  0.6  0.5*  0.5*     Recent Labs   Lab Test  03/08/17   0953  09/30/16   1348  04/28/16   1615   CR  0.94  0.90  0.95   GFRESTIMATED  >90  Non  GFR Calc    >90  Non  GFR Calc    >90  Non  GFR Calc     NA  140  140  140   ADONAY  9.5  9.0  9.3     Recent Labs   Lab Test  02/09/16   1247   SG  1.005     Recent Labs   Lab Test  03/08/17   0953  09/30/16   1348  02/09/16   1300 "   TSH  3.36  3.45  3.44     ANTIPSYCHOTIC LABS   [glu, A1C, lipids (focus LDL), liver enzymes, WBC, ANEU, Hgb, plts]  q12mo  Recent Labs   Lab Test  03/08/17   0953  09/30/16   1348   GLC  72  83   A1C   --   4.6     Recent Labs   Lab Test  02/09/16   1300  08/04/15   1140   CHOL  101  141   TRIG  264*  283*   LDL  16  45   HDL  32*  39*     Recent Labs   Lab Test  03/08/17   0953   AST  22   ALT  51   ALKPHOS  67     Recent Labs   Lab Test  03/08/17   0953  09/30/16   1348   WBC  8.5  7.6   ANEU  5.1  4.9   HGB  15.7  15.0   PLT  229  216       AIMS: 0 per 1/9/2017     PHQ9 TODAY = 1  PHQ-9 SCORE 11/4/2016 1/9/2017 2/27/2017   Total Score 1 1 5         DIAGNOSIS     Schizoaffective Disorder, Bipolar Type     ASSESSMENT                                     Pertinent Background:   See most recent Transfer Evaluation as dated above.    TODAY:    1) Schizoaffective Disorder, Bipolar Type- Sleep had initially improved since changing lithium from twice a day to bedtime dosing only and feels he was sleeping well on Zyprexa 5 mg.  He self-discontinued Buspar since due to dizziness and felt he no longer needed it. At last visit, patient seemed more withdrawn and anxious and  his mother she had been noticing a change in his behavior recently.  He seems improved since increasing Zyprexa to 10 mg qhs and feels his sleep has improved.  He didn't tolerate trazodone well, so will discontinue.  He has also requested to increase his Zyprexa to 15 mg.  I called his mother to get an update how he is doing at home, awaiting call back.        2) Obesity- reports that he gained weight over the holidays and reports that his appetite improved since decreasing his Zyprexa dose and will continue focusing on good nutrition and exercise.  However, this past month he lost 14 lbs due to poor appetite, suspect it may be related to psychiatric symptoms. HDL and triglycerides slightly abnormal upon check in 2/2016, will recheck in 2/2017 and if  still abnormal will refer to primary care    3) [place] labs due- reminded patient again regarding the need for fasting labs and will discuss with his mother as well     PSYCHOTROPIC DRUG INTERACTIONS:   LITHIUM and OLANZAPINE may result in weakness, dyskinesias, increased extrapyramidal symptoms, encephalopathy, and brain damage.  MANAGEMENT: Monitoring for adverse effects and patient is aware of risks     PLAN                                                                                                       1) PSYCHOTROPIC MEDICATIONS:   - Stop trazodone   - Increase Zyprexa from 10 to 15 mg qhs    2) THERAPY:  none    3) LABS NEXT DUE: 6 month lithium levels and fasting lipids 2/2017 with lithium level. Antipsychotic labs 9/2017       RATING SCALES:  none    4) REFERRALS [MICD, medical etc.]:  none    5) MTM REFERRAL [PharmD]:  none    6) :  none    7) RTC: 1 month    8)  FAMILY MEETING COMPLETED  None, but I am in regular contact with patient's mother    9) CRISIS NUMBERS:   Provided routinely in AVS  ONLY if a JOSEPHINE PT: Isai [place] Josephine [phone] (clinic), [phone] (after hours)   CRISIS TEXT LINE: Text [phone] from anywhere, anytime, any crisis 24/7;  OR SEE www.crisistextline.org    TREATMENT RISK STATEMENT:  The risks, benefits, alternatives and potential adverse effects have been discussed and are understood by the patient/ patient's guardian. The pt understands the risks of using street drugs or alcohol.  There are no medical contraindications, the pt agrees to treatment with the ability to do so.  The patient understands to call 911 or come to the nearest ED if life threatening or urgent symptoms present.       RESIDENT:   Henna Voss, DO    Patient not staffed in clinic.  Note will be reviewed and signed by supervisor Dr. Ventura.  I did not see this pt directly. I have reviewed the documentation, and I agree with the resident's plan of care.    Yuko Ventura

## 2017-03-14 ASSESSMENT — PATIENT HEALTH QUESTIONNAIRE - PHQ9: SUM OF ALL RESPONSES TO PHQ QUESTIONS 1-9: 1

## 2017-03-22 ENCOUNTER — TELEPHONE (OUTPATIENT)
Dept: PSYCHIATRY | Facility: CLINIC | Age: 27
End: 2017-03-22

## 2017-03-22 DIAGNOSIS — Z79.899 ENCOUNTER FOR LONG-TERM (CURRENT) USE OF MEDICATIONS: ICD-10-CM

## 2017-03-22 LAB
BASOPHILS # BLD AUTO: 0 10E9/L (ref 0–0.2)
BASOPHILS NFR BLD AUTO: 0.5 %
DIFFERENTIAL METHOD BLD: NORMAL
EOSINOPHIL # BLD AUTO: 0.4 10E9/L (ref 0–0.7)
EOSINOPHIL NFR BLD AUTO: 4.4 %
ERYTHROCYTE [DISTWIDTH] IN BLOOD BY AUTOMATED COUNT: 12.9 % (ref 10–15)
HCT VFR BLD AUTO: 47.8 % (ref 40–53)
HGB BLD-MCNC: 15.6 G/DL (ref 13.3–17.7)
LITHIUM SERPL-SCNC: 0.6 MMOL/L (ref 0.6–1.2)
LYMPHOCYTES # BLD AUTO: 1.9 10E9/L (ref 0.8–5.3)
LYMPHOCYTES NFR BLD AUTO: 23 %
MCH RBC QN AUTO: 30 PG (ref 26.5–33)
MCHC RBC AUTO-ENTMCNC: 32.6 G/DL (ref 31.5–36.5)
MCV RBC AUTO: 92 FL (ref 78–100)
MONOCYTES # BLD AUTO: 0.6 10E9/L (ref 0–1.3)
MONOCYTES NFR BLD AUTO: 6.6 %
NEUTROPHILS # BLD AUTO: 5.5 10E9/L (ref 1.6–8.3)
NEUTROPHILS NFR BLD AUTO: 65.5 %
PLATELET # BLD AUTO: 250 10E9/L (ref 150–450)
RBC # BLD AUTO: 5.2 10E12/L (ref 4.4–5.9)
WBC # BLD AUTO: 8.3 10E9/L (ref 4–11)

## 2017-03-22 PROCEDURE — 80061 LIPID PANEL: CPT | Performed by: STUDENT IN AN ORGANIZED HEALTH CARE EDUCATION/TRAINING PROGRAM

## 2017-03-22 PROCEDURE — 36415 COLL VENOUS BLD VENIPUNCTURE: CPT | Performed by: STUDENT IN AN ORGANIZED HEALTH CARE EDUCATION/TRAINING PROGRAM

## 2017-03-22 PROCEDURE — 83721 ASSAY OF BLOOD LIPOPROTEIN: CPT | Mod: 59 | Performed by: STUDENT IN AN ORGANIZED HEALTH CARE EDUCATION/TRAINING PROGRAM

## 2017-03-22 PROCEDURE — 80050 GENERAL HEALTH PANEL: CPT | Performed by: STUDENT IN AN ORGANIZED HEALTH CARE EDUCATION/TRAINING PROGRAM

## 2017-03-22 PROCEDURE — 80178 ASSAY OF LITHIUM: CPT | Performed by: STUDENT IN AN ORGANIZED HEALTH CARE EDUCATION/TRAINING PROGRAM

## 2017-03-22 NOTE — TELEPHONE ENCOUNTER
Contacted patient's mother to check in on how patient is doing.  Patient's mother states that patient is doing much better and feels his ideal dosing is 12.5 mg of Zyprexa.  She reports that Manuel is doing much better and his appetite has improved.

## 2017-03-23 LAB
ALBUMIN SERPL-MCNC: 4 G/DL (ref 3.4–5)
ALP SERPL-CCNC: 76 U/L (ref 40–150)
ALT SERPL W P-5'-P-CCNC: 58 U/L (ref 0–70)
ANION GAP SERPL CALCULATED.3IONS-SCNC: 5 MMOL/L (ref 3–14)
AST SERPL W P-5'-P-CCNC: 18 U/L (ref 0–45)
BILIRUB SERPL-MCNC: 0.2 MG/DL (ref 0.2–1.3)
BUN SERPL-MCNC: 8 MG/DL (ref 7–30)
CALCIUM SERPL-MCNC: 9.3 MG/DL (ref 8.5–10.1)
CHLORIDE SERPL-SCNC: 107 MMOL/L (ref 94–109)
CHOLEST SERPL-MCNC: 171 MG/DL
CO2 SERPL-SCNC: 31 MMOL/L (ref 20–32)
CREAT SERPL-MCNC: 1.09 MG/DL (ref 0.66–1.25)
GFR SERPL CREATININE-BSD FRML MDRD: 82 ML/MIN/1.7M2
GLUCOSE SERPL-MCNC: 83 MG/DL (ref 70–99)
HDLC SERPL-MCNC: 31 MG/DL
LDLC SERPL CALC-MCNC: ABNORMAL MG/DL
LDLC SERPL DIRECT ASSAY-MCNC: 59 MG/DL
NONHDLC SERPL-MCNC: 140 MG/DL
POTASSIUM SERPL-SCNC: 3.9 MMOL/L (ref 3.4–5.3)
PROT SERPL-MCNC: 7.2 G/DL (ref 6.8–8.8)
SODIUM SERPL-SCNC: 143 MMOL/L (ref 133–144)
TRIGL SERPL-MCNC: 528 MG/DL
TSH SERPL DL<=0.005 MIU/L-ACNC: 3.03 MU/L (ref 0.4–4)

## 2017-04-17 ENCOUNTER — OFFICE VISIT (OUTPATIENT)
Dept: PSYCHIATRY | Facility: CLINIC | Age: 27
End: 2017-04-17
Attending: PSYCHIATRY & NEUROLOGY
Payer: COMMERCIAL

## 2017-04-17 VITALS
DIASTOLIC BLOOD PRESSURE: 88 MMHG | SYSTOLIC BLOOD PRESSURE: 148 MMHG | HEART RATE: 96 BPM | BODY MASS INDEX: 30.64 KG/M2 | WEIGHT: 235.4 LBS

## 2017-04-17 DIAGNOSIS — F29 PSYCHOSIS, UNSPECIFIED PSYCHOSIS TYPE (H): ICD-10-CM

## 2017-04-17 PROCEDURE — 99212 OFFICE O/P EST SF 10 MIN: CPT | Mod: ZF

## 2017-04-17 RX ORDER — OLANZAPINE 15 MG/1
15 TABLET ORAL AT BEDTIME
Qty: 30 TABLET | Refills: 1 | Status: SHIPPED | OUTPATIENT
Start: 2017-04-17 | End: 2017-06-19

## 2017-04-17 RX ORDER — LITHIUM CARBONATE 600 MG/1
1200 CAPSULE ORAL AT BEDTIME
Qty: 60 CAPSULE | Refills: 1 | Status: SHIPPED | OUTPATIENT
Start: 2017-04-17 | End: 2017-06-19

## 2017-04-17 NOTE — MR AVS SNAPSHOT
After Visit Summary   2017    Jose Francisco Sommers    MRN: [id number]           Patient Information     Date Of Birth          1990        Visit Information        Provider Department      2017 3:30 PM Henna Voss DO Psychiatry Clinic        Today's Diagnoses     Psychosis, unspecified psychosis type          Care Instructions    1) No changes today  2) Return to clinic in 6-8 weeks        Follow-ups after your visit        Your next 10 appointments already scheduled     2017  3:15 PM CDT   First Episode Return with Henna Voss DO   Psychiatry Clinic ([facility])    [address]  [address] [facility] [id number] [phone]              Who to contact     Please call your clinic at [phone] to:    Ask questions about your health    Make or cancel appointments    Discuss your medicines    Learn about your test results    Speak to your doctor   If you have compliments or concerns about an experience at your clinic, or if you wish to file a complaint, please contact [facility] Patient Relations at [phone] or email us at [email or web address]         Additional Information About Your Visit        MyChart Information     [employer] is an electronic gateway that provides easy, online access to your medical records. With INNFOC[employer], you can request a clinic appointment, read your test results, renew a prescription or communicate with your care team.     To sign up for [employer] visit the website at [email or web address]   You will be asked to enter the access code listed below, as well as some personal information. Please follow the directions to create your username and password.     Your access code is: [email or web address]  Expires: 6/3/2017 11:50 AM     Your access code will  in 90 days. If you need help or a new code, please contact your [facility]  Clinic or call [phone] for assistance.        Care EveryWhere ID     This is your Care EveryWhere ID. This could be used by other organizations to access your [place] medical records  [id number]        Your Vitals Were     Pulse BMI (Body Mass Index)                96 30.64 kg/m2           Blood Pressure from Last 3 Encounters:   04/17/17 148/88   03/13/17 (!) 142/93   02/27/17 (!) 156/95    Weight from Last 3 Encounters:   04/17/17 106.8 kg (235 lb 6.4 oz)   03/13/17 100.4 kg (221 lb 6.4 oz)   02/27/17 102.2 kg (225 lb 3.2 oz)              Today, you had the following     No orders found for display         Where to get your medicines      These medications were sent to [place] [address] [place] [facility] [address] [facility]  [address] [facility] [address]     Phone:  [phone]     lithium 600 MG capsule    OLANZapine 15 MG tablet          Primary Care Provider    Clinic Provider       No address on file        Thank you!     Thank you for choosing PSYCHIATRY CLINIC  for your care. Our goal is always to provide you with excellent care. Hearing back from our patients is one way we can continue to improve our services. Please take a few minutes to complete the written survey that you may receive in the mail after your visit with us. Thank you!             Your Updated Medication List - Protect others around you: Learn how to safely use, store and throw away your medicines at www.disposemymeds.org.          This list is accurate as of: 4/17/17 11:59 PM.  Always use your most recent med list.                   Brand Name Dispense Instructions for use    lithium 600 MG capsule     60 capsule    Take 2 capsules (1,200 mg) by mouth At Bedtime       OLANZapine 15 MG tablet    zyPREXA    30 tablet    Take 1 tablet (15 mg) by mouth At Bedtime

## 2017-04-17 NOTE — PROGRESS NOTES
"  PSYCHIATRY CLINIC PROGRESS NOTE   The initial DIAG NAA was 7/24/2015.  Date of most recent Transfer Evaluation is 07/08/2016.    INTERIM HISTORY                                                 PSYCH CRITICAL ITEM HISTORY includes psychosis [disorganization, ?catatonia], mutiple psychotropic trials, psych hosp [x1x at [facility], spring 2015], commitment and CD: alcohol and cannabis. Mental health issues were first experienced 2014 and he first received mental health care at that time      Jose Francisco Sommers is a 26 year old male who was last seen in clinic on 3/13/2017 at which time trazodone was discontinued and Zyprexa was increased from 10 mg to 15 mg qhs.  The patient reports good treatment adherence.  History was provided by patient who was a good historian.  Since the last visit:  -Things are going \"super good\".  He was able to sell his cards for $7000 and made a few thousand dollars on some other cards.  -His concentration and reading have improved significantly and have read the first four Swipes[employer] books in the past month.    -Now states that his appetite has increased and hoping to start working out more and getting a gym membership.    -He is going to [place] to see his sister graduate from college this spring.  -He saw an anime film with his dad and really enjoyed seeing it.  He saw it in 3D at the [facility] and really enjoyed the experience.    -States that the increase in medication is going well and feeling a lot better, does not want to make any changes today.     RECENT SYMPTOMS:   DEPRESSION: appetite change, weight gain /loss, DENIES- low energy and poor concentration /memory;depressed mood, insomnia , excessive guilt, feeling worthless, psychomotor retardation/ agitation observed by others, suicidal ideation , overwhelmed, excessive crying, feeling trapped and feeling hopeless  PSYCHOSIS: none; DENIES- delusions, paranoia, ideas of reference, thought withdrawal / insertion / deletion / " broadcasting, disorganized speech, poverty of content and concrete content  KAMI/HYPOMANIA: none; DENIES- elevated mood, grandiose, increased goal-directed activity, increased energy, decreased need for sleep, pressured speech (per self, others), distractible and racing thoughts  EATING DISORDER: none    RECENT SUBSTANCE USE:     ALCOHOL-  never       TOBACCO- still smoking a ppd        CAFFEINE- 1-2 cups/day of coffee        OPIOIDS- none   NARCAN KIT- No    CANNABIS- none             OTHER ILLICIT DRUGS- none     CURRENT SOCIAL HISTORY:  Financial Support- family or friend     Living Situation- lives with parents in [place]   Children- 0. Feels Safe at Home- Yes.      MEDICAL ROS:  Reports none.    Denies akathisia, dystonia, apparent TD, muscle stiffness and tremor [action or resting], polydipsia, polyuria, nocturia and weight gain, headache, fatigue, sedation, dry mouth, nausea, diarrhea and wt gain.     PSYCH and CD Critical Summary Points since July 2016 7/2016- changed lithium from 600 mg BID to 1200 mg qhs for sleep and sedation  8/2016- decreased Zyprexa from 10 mg to 5 mg qhs- due to weight gain  9/2016- decreased Buspar from 10 mg to 5 mg qhs due to dizziness  1/2017- self-discontinued Buspar  2/2017- increased Zyprexa to 10 mg qhs from 5 mg, started trazodone for sleep. Reports he had tried melatonin but it made him feel sick  3/2017- increased Zyprexa from 10 mg to 15 mg qhs    PAST PSYCH MED TRIALS                                  see EMR Problem List: Hx of psychiatric care    MEDICAL / SURGICAL HISTORY                                   CARE TEAM:        PCP-  none Therapist- none    Patient Active Problem List   Diagnosis     Schizoaffective disorder, bipolar type (H)     Hx of psychiatric care          ALLERGY                                Sulfa drugs  MEDICATIONS                               Current Outpatient Prescriptions   Medication Sig Dispense Refill     OLANZapine (ZYPREXA) 15  MG tablet Take 1 tablet (15 mg) by mouth At Bedtime 30 tablet 1     lithium 600 MG capsule Take 2 capsules (1,200 mg) by mouth At Bedtime 60 capsule 1        VITALS   /88  Pulse 96  Wt 106.8 kg (235 lb 6.4 oz)  BMI 30.64 kg/m2   MENTAL STATUS EXAM                                                             Alertness: alert  and oriented  Appearance: adequately groomed  Behavior/Demeanor: cooperative, with good  eye contact   Speech: normal and regular rate and rhythm  Language: intact and no problems  Psychomotor: normal or unremarkable  Mood: anxious  Affect: appropriate; was congruent to mood; was congruent to content  Thought Process/Associations: unremarkable  Thought Content:  Reports none;  Denies suicidal ideation , violent ideation and psychotic thought  Perception:  Reports none;  Denies hallucinations     Insight: good  Judgment: good  Cognition: does  appear grossly intact; formal cognitive testing was not done    LABS and DATA     LITHIUM LABS    [level, renal, SG, TSH, WBC]  q6 mo  Recent Labs   Lab Test  03/22/17   1340  03/08/17   0953  09/30/16   1348  02/09/16   1300   LITHIUM  0.6  0.7  0.6  0.5*     Recent Labs   Lab Test  03/22/17   1340  03/08/17   0953  09/30/16   1348   CR  1.09  0.94  0.90   GFRESTIMATED  82  >90  Non  GFR Calc    >90  Non  GFR Calc     NA  143  140  140   ADONAY  9.3  9.5  9.0     Recent Labs   Lab Test  02/09/16   1247   SG  1.005     Recent Labs   Lab Test  03/22/17   1340  03/08/17   0953  09/30/16   1348   TSH  3.03  3.36  3.45     ANTIPSYCHOTIC LABS   [glu, A1C, lipids (focus LDL), liver enzymes, WBC, ANEU, Hgb, plts]  q12mo  Recent Labs   Lab Test  03/22/17   1340  03/08/17   0953  09/30/16   1348   GLC  83  72  83   A1C   --    --   4.6     Recent Labs   Lab Test  03/22/17   1340  02/09/16   1300   CHOL  171  101   TRIG  528*  264*   LDL  Cannot estimate LDL when triglyceride exceeds 400 mg/dL  59  16   HDL  31*  32*     Recent  Labs   Lab Test  03/22/17   1340  03/08/17   0953   AST  18  22   ALT  58  51   ALKPHOS  76  67     Recent Labs   Lab Test  03/22/17   1340  03/08/17   0953   WBC  8.3  8.5   ANEU  5.5  5.1   HGB  15.6  15.7   PLT  250  229       AIMS:  0 per 1/9/2017      PHQ9 TODAY = 3  PHQ-9 SCORE 1/9/2017 2/27/2017 3/13/2017   Total Score 1 5 1       DIAGNOSIS     Schizoaffective Disorder, Bipolar Type     ASSESSMENT                                     Pertinent Background:   See most recent Transfer Evaluation as dated above.      1) Schizoaffective Disorder, Bipolar Type- Sleep had initially improved since changing lithium from twice a day to bedtime dosing only. He self-discontinued Buspar since due to dizziness and felt he no longer needed it. Today, he seems significantly improved and seems less anxious than at previous visits.  Feels that the Zyprexa 15 mg at bedtime is working well, no changes today.      2) Obesity- reports that he gained weight over the holidays and reports that his appetite improved since decreasing his Zyprexa dose and will continue focusing on good nutrition and exercise. HDL and triglycerides slightly abnormal upon check in 2/2016, they continue to be abnormal and needs to establish primary care, [facility].      PSYCHOTROPIC DRUG INTERACTIONS:   LITHIUM and OLANZAPINE may result in weakness, dyskinesias, increased extrapyramidal symptoms, encephalopathy, and brain damage.  MANAGEMENT: Monitoring for adverse effects and patient is aware of risks     PLAN                                                                                                       1) PSYCHOTROPIC MEDICATIONS:   - no change    2) THERAPY:  none    3) LABS NEXT DUE:  [place] labs 9/2017 and antipsychotic labs 3/2018       RATING SCALES:  none    4) REFERRALS [MICD, medical etc.]:  yes, PCP for abnormal lipid panel    5) MTM REFERRAL [PharmD]:  none    6) :  none    7) FAMILY MEETING:   none    8) RTC: 2 months    9) CRISIS NUMBERS:   Provided routinely in AVS  ONLY if a JOSEPHINE PT: [facility] [place] [facility] [phone] (clinic), [phone] (after hours)   CRISIS TEXT LINE: Text [phone] from anywhere, anytime, any crisis 24/7;  OR SEE www.crisistextline.org    TREATMENT RISK STATEMENT:  The risks, benefits, alternatives and potential adverse effects have been discussed and are understood by the patient/ patient's guardian. The pt understands the risks of using street drugs or alcohol.  There are no medical contraindications, the pt agrees to treatment with the ability to do so.  The patient understands to call 911 or come to the nearest ED if life threatening or urgent symptoms present.     RESIDENT:   Henna Voss, DO    Patient not staffed in clinic.  Note will be reviewed and signed by supervisor Dr. Vetnura.  I did not see this pt directly. I have reviewed the documentation, and I agree with the resident's plan of care.    Yuko Ventura

## 2017-04-20 ASSESSMENT — PATIENT HEALTH QUESTIONNAIRE - PHQ9: SUM OF ALL RESPONSES TO PHQ QUESTIONS 1-9: 3

## 2017-06-19 ENCOUNTER — OFFICE VISIT (OUTPATIENT)
Dept: PSYCHIATRY | Facility: CLINIC | Age: 27
End: 2017-06-19
Attending: PSYCHIATRY & NEUROLOGY
Payer: COMMERCIAL

## 2017-06-19 VITALS
HEART RATE: 75 BPM | SYSTOLIC BLOOD PRESSURE: 137 MMHG | DIASTOLIC BLOOD PRESSURE: 85 MMHG | BODY MASS INDEX: 31.63 KG/M2 | WEIGHT: 243 LBS

## 2017-06-19 DIAGNOSIS — F29 PSYCHOSIS, UNSPECIFIED PSYCHOSIS TYPE (H): Primary | ICD-10-CM

## 2017-06-19 DIAGNOSIS — Z79.899 ENCOUNTER FOR LONG-TERM (CURRENT) USE OF MEDICATIONS: ICD-10-CM

## 2017-06-19 PROCEDURE — 99212 OFFICE O/P EST SF 10 MIN: CPT | Mod: ZF

## 2017-06-19 RX ORDER — LITHIUM CARBONATE 600 MG/1
1200 CAPSULE ORAL AT BEDTIME
Qty: 60 CAPSULE | Refills: 2 | Status: SHIPPED | OUTPATIENT
Start: 2017-06-19 | End: 2017-10-05

## 2017-06-19 RX ORDER — OLANZAPINE 15 MG/1
15 TABLET ORAL AT BEDTIME
Qty: 30 TABLET | Refills: 2 | Status: SHIPPED | OUTPATIENT
Start: 2017-06-19 | End: 2017-07-27

## 2017-06-19 NOTE — MR AVS SNAPSHOT
After Visit Summary   6/19/2017    Jose Francisco Sommers    MRN: [id number]           Patient Information     Date Of Birth          1990        Visit Information        Provider Department      6/19/2017 3:15 PM Henna Voss, DO Psychiatry Clinic        Today's Diagnoses     Encounter for long-term (current) use of medications    -  1    Psychosis, unspecified psychosis type          Care Instructions    1) The pharmacy will look into if Zyprexa IM is a possibility  2) No changes  3) Follow up in 1 month with Dr. Zbigniew Bailey    Thank you for coming to the PSYCHIATRY CLINIC.    Lab Testing:  If you had lab testing today and your results are reassuring or normal they will be mailed to you or sent through [employer] within 7 days.   If the lab tests need quick action we will call you with the results.  The phone number we will call with results is # [phone] (home) . If this is not the best number please call our clinic and change the number.    Medication Refills:  If you need any refills please call your pharmacy and they will contact us. Our fax number for refills is [phone]. Please allow three business for refill processing.   If you need to  your refill at a new pharmacy, please contact the new pharmacy directly. The new pharmacy will help you get your medications transferred.     Scheduling:  If you have any concerns about today's visit or wish to schedule another appointment please call our office during normal business hours [phone] (8-5:00 M-F)    Contact Us:  Please call [phone] during business hours (8-5:00 M-F).  If after clinic hours, or on the weekend, please call  [phone].    Financial Assistance [phone]  [facility] [employer] Billing [phone]  [place] Billing [phone]  Medical Records [phone]      MENTAL HEALTH CRISIS NUMBERS:  [facility]:   [facility] - [phone]   Crisis Residence [facility] [place] [facility] -  [phone]   Walk-In Counseling Center [facility] - [phone]   COPE 24/7 Fredi Mobile Team for Adults - [[phone]]; Child - [[phone]]     Crisis Connection - [phone]     [facility] - [phone]   Walk-in counseling [facility] - [phone]   Walk-in counseling [facility] - [phone]   Crisis Residence [facility] - [phone]   Urgent Care Adult Mental Health:   --Drop-in, 24/7 crisis line, and Eugenio Alex Mobile Team [[phone]]    CRISIS TEXT LINE: Text [phone] from anywhere, anytime, any crisis 24/7;    OR SEE www.crisistextline.org     Poison Control Center - [phone]    CHILD: [facility] needs assessment team - [phone]     [facility] Lifeline - [phone]; or [employer] Lifeline - [phone]    If you have a medical emergency please call 911or go to the nearest ER.                    _____________________________________________    Again thank you for choosing PSYCHIATRY CLINIC and please let us know how we can best partner with you to improve you and your family's health.  You may be receiving a survey in the mail regarding this appointment. We would love to have your feedback, both positive and negative, so please fill out the survey and return it using the provided envolpe. The survey is done by an external company, so your answers are anonymous.             Follow-ups after your visit        Your next 10 appointments already scheduled     Jul 27, 2017  2:55 PM CDT   Adult Med Follow UP with Zbigniew Bailey MD   Psychiatry Clinic ([facility])    [address] [facility] [address] [phone]              Future tests that were ordered for you today     Open Future Orders        Priority Expected Expires Ordered    Specific gravity urine Routine  6/19/2018 6/19/2017            Who to contact     Please call your  clinic at [phone] to:    Ask questions about your health    Make or cancel appointments    Discuss your medicines    Learn about your test results    Speak to your doctor   If you have compliments or concerns about an experience at your clinic, or if you wish to file a complaint, please contact [facility] Patient Relations at [phone] or email us at [email or web address]         Additional Information About Your Visit        [employer] An[employer] Exchange Information     [employer]Spot [employer] is an electronic gateway that provides easy, online access to your medical records. With [employer], you can request a clinic appointment, read your test results, renew a prescription or communicate with your care team.     To sign up for [employer] visit the website at [email or web address]   You will be asked to enter the access code listed below, as well as some personal information. Please follow the directions to create your username and password.     Your access code is: [email or web address]  Expires: 2017  3:40 PM     Your access code will  in 90 days. If you need help or a new code, please contact your [facility] Clinic or call [phone] for assistance.        Care EveryWhere ID     This is your Care EveryWhere ID. This could be used by other organizations to access your [place] medical records  [id number]        Your Vitals Were     Pulse BMI (Body Mass Index)                75 31.63 kg/m2           Blood Pressure from Last 3 Encounters:   17 137/85   17 148/88   17 (!) 142/93    Weight from Last 3 Encounters:   17 110.2 kg (243 lb)   17 106.8 kg (235 lb 6.4 oz)   17 100.4 kg (221 lb 6.4 oz)                 Where to get your medicines      These [facility] to [facility] [address] [place] [facility] [place] - [address]  [address], [facility] [address]     Phone:  [phone]     lithium 600 MG capsule    OLANZapine 15 MG  tablet          Primary Care Provider    Clinic Provider       No address on file        Thank you!     Thank you for choosing PSYCHIATRY CLINIC  for your care. Our goal is always to provide you with excellent care. Hearing back from our patients is one way we can continue to improve our services. Please take a few minutes to complete the written survey that you may receive in the mail after your visit with us. Thank you!             Your Updated Medication List - Protect others around you: Learn how to safely use, store and throw away your medicines at www.disposemymeds.org.          This list is accurate as of: 6/19/17  3:40 PM.  Always use your most recent med list.                   Brand Name Dispense Instructions for use    lithium 600 MG capsule     60 capsule    Take 2 capsules (1,200 mg) by mouth At Bedtime       OLANZapine 15 MG tablet    zyPREXA    30 tablet    Take 1 tablet (15 mg) by mouth At Bedtime

## 2017-06-19 NOTE — PATIENT INSTRUCTIONS
1) The pharmacy will look into if Zyprexa IM is a possibility  2) No changes  3) Follow up in 1 month with Dr. Zbigniew Bailey    Thank you for coming to the PSYCHIATRY CLINIC.    Lab Testing:  If you had lab testing today and your results are reassuring or normal they will be mailed to you or sent through [employer] within 7 days.   If the lab tests need quick action we will call you with the results.  The phone number we will call with results is # [phone] (home) . If this is not the best number please call our clinic and change the number.    Medication Refills:  If you need any refills please call your pharmacy and they will contact us. Our fax number for refills is [phone]. Please allow three business for refill processing.   If you need to  your refill at a new pharmacy, please contact the new pharmacy directly. The new pharmacy will help you get your medications transferred.     Scheduling:  If you have any concerns about today's visit or wish to schedule another appointment please call our office during normal business hours [phone] (8-5:00 M-F)    Contact Us:  Please call [phone] during business hours (8-5:00 M-F).  If after clinic hours, or on the weekend, please call  [phone].    Financial Assistance [phone]  [employer] Billing [phone]  [facility] Billing [phone]  Medical Records [phone]      MENTAL HEALTH CRISIS NUMBERS:  [facility]:   [facility] - [phone]   Crisis Residence [facility] - [phone]   Walk-In Counseling [facility] - [phone]   COPE 24/7 [place] Mobile Team for Adults - [[phone]]; Child - [[phone]]     Crisis Connection - [phone]     [facility]:   [facility] - [phone]   Walk-in counseling [facility] - [phone]   Walk-in counseling [facility] - [phone]   Crisis Residence [facility] - [phone]    Urgent Care Adult Mental Health:   --Drop-in, 24/7 crisis line, and Eugenio Alex Mobile Team [[phone]]    CRISIS TEXT LINE: Text [phone] from anywhere, anytime, any crisis 24/7;    OR SEE www.crisistextline.org     Poison Control Center - [phone]    CHILD: [facility] needs assessment team - [phone]     [facility] Lifeline - [phone]; or [facility] Lifeline - [phone]    If you have a medical emergency please call 911or go to the nearest ER.                    _____________________________________________    Again thank you for choosing PSYCHIATRY CLINIC and please let us know how we can best partner with you to improve you and your family's health.  You may be receiving a survey in the mail regarding this appointment. We would love to have your feedback, both positive and negative, so please fill out the survey and return it using the provided envolpe. The survey is done by an external company, so your answers are anonymous.

## 2017-06-19 NOTE — PROGRESS NOTES
PSYCHIATRY CLINIC PROGRESS NOTE   The initial DIAG EVAL was 7/24/2015.  Date of most recent Transfer Evaluation is 07/08/2016.    PSYCH CRITICAL ITEM HISTORY includes psychosis [disorganization, ?catatonia], mutiple psychotropic trials, psych hosp [x1x at [facility], spring 2015], commitment and CD: alcohol and cannabis. Mental health issues were first experienced 2014 and he first received mental health care at that time     INTERIM HISTORY                                                 Jose Francisco Sommers is a 26 year old male who was last seen in clinic on 4/17/17 at which time no changes were made.  The patient reports good treatment adherence.  History was provided by patient who was a good historian.  Since the last visit:  -Patient reports that he's doing well and his mom says that he is doing better than she's seen him in a long time.  -He went to [place] a couple of weeks ago to help his sister move back to [place].    -Still reports some difficulty concentration and reports that he doesn't feel rested when he sleeps.  States that when he received intramuscular injections of Zyprexa in the hospital.  He states that the injectables helped him sleep better.      RECENT SYMPTOMS:   DEPRESSION: appetite change, weight gain /loss, DENIES- low energy and poor concentration /memory;depressed mood, insomnia , excessive guilt, feeling worthless, psychomotor retardation/ agitation observed by others, suicidal ideation , overwhelmed, excessive crying, feeling trapped and feeling hopeless  PSYCHOSIS: none; DENIES- delusions, paranoia, ideas of reference, thought withdrawal / insertion / deletion / broadcasting, disorganized speech, poverty of content and concrete content  KAMI/HYPOMANIA: none; DENIES- elevated mood, grandiose, increased goal-directed activity, increased energy, decreased need for sleep, pressured speech (per self, others), distractible and racing thoughts  EATING DISORDER: none    RECENT  SUBSTANCE USE:     ALCOHOL-  never       TOBACCO- still smoking a ppd        CAFFEINE- 1-2 cups/day of coffee        OPIOIDS- none   NARCAN KIT- No    CANNABIS- none             OTHER ILLICIT DRUGS- none      CURRENT SOCIAL HISTORY:  Financial Support- family or friend     Living Situation- lives with parents in [place]   Children- 0. Feels Safe at Home- Yes.      MEDICAL ROS:  Reports none.    Denies akathisia, dystonia, apparent TD, muscle stiffness and tremor [action or resting], polydipsia, polyuria, nocturia and weight gain, headache, fatigue, sedation, dry mouth, nausea, diarrhea and wt gain    PSYCH and CD Critical Summary Points since July 2016 7/2016- changed lithium from 600 mg BID to 1200 mg qhs for sleep and sedation  8/2016- decreased Zyprexa from 10 mg to 5 mg qhs- due to weight gain  9/2016- decreased Buspar from 10 mg to 5 mg qhs due to dizziness  1/2017- self-discontinued Buspar  2/2017- increased Zyprexa to 10 mg qhs from 5 mg, started trazodone for sleep. Reports he had tried melatonin but it made him feel sick  3/2017- increased Zyprexa from 10 mg to 15 mg qhs       PAST PSYCH MED TRIALS                                  see EMR Problem List: Hx of psychiatric care    MEDICAL / SURGICAL HISTORY                                   CARE TEAM:        PCP-  none Therapist- none    Patient Active Problem List   Diagnosis     Schizoaffective disorder, bipolar type (H)     Hx of psychiatric care            ALLERGY                                Sulfa drugs  MEDICATIONS                               Current Outpatient Prescriptions   Medication Sig Dispense Refill     OLANZapine (ZYPREXA) 15 MG tablet Take 1 tablet (15 mg) by mouth At Bedtime 30 tablet 1     lithium 600 MG capsule Take 2 capsules (1,200 mg) by mouth At Bedtime 60 capsule 1        VITALS   There were no vitals taken for this visit.   MENTAL STATUS EXAM                                                                   Alertness:  alert  and oriented  Appearance: adequately groomed  Behavior/Demeanor: cooperative, with good  eye contact   Speech: normal and regular rate and rhythm  Language: intact and no problems  Psychomotor: normal or unremarkable  Mood: anxious  Affect: appropriate; was congruent to mood; was congruent to content  Thought Process/Associations: unremarkable  Thought Content:  Reports none;  Denies suicidal ideation , violent ideation and psychotic thought  Perception:  Reports none;  Denies hallucinations     Insight: good  Judgment: good  Cognition: does  appear grossly intact; formal cognitive testing was not done      LABS and DATA     LITHIUM LABS     [level, renal, SG, TSH, WBC]    q6 mo  Recent Labs   Lab Test  03/22/17   1340  03/08/17   0953  09/30/16   1348  02/09/16   1300   LITHIUM  0.6  0.7  0.6  0.5*     Recent Labs   Lab Test  03/22/17   1340  03/08/17   0953  09/30/16   1348   CR  1.09  0.94  0.90   GFRESTIMATED  82  >90  Non  GFR Calc    >90  Non  GFR Calc     NA  143  140  140   ADONAY  9.3  9.5  9.0     Recent Labs   Lab Test  02/09/16   1247   SG  1.005     Recent Labs   Lab Test  03/22/17   1340  03/08/17   0953  09/30/16   1348   TSH  3.03  3.36  3.45     Recent Labs   Lab Test  03/22/17   1340  03/08/17   0953   WBC  8.3  8.5     ANTIPSYCHOTIC LABS   [glu, A1C, lipids (focus LDL), liver enzymes, WBC, ANEU, Hgb, plts]    q12 mo  Recent Labs   Lab Test  03/22/17   1340  03/08/17   0953  09/30/16   1348   GLC  83  72  83   A1C   --    --   4.6     Recent Labs   Lab Test  03/22/17   1340  02/09/16   1300   CHOL  171  101   TRIG  528*  264*   LDL  Cannot estimate LDL when triglyceride exceeds 400 mg/dL  59  16   HDL  31*  32*     Recent Labs   Lab Test  03/22/17   1340  03/08/17   0953   AST  18  22   ALT  58  51   ALKPHOS  76  67     Recent Labs   Lab Test  03/22/17   1340  03/08/17   0953   WBC  8.3  8.5   ANEU  5.5  5.1   HGB  15.6  15.7   PLT  250  229         PHQ9 TODAY =  5  PHQ-9 SCORE 2/27/2017 3/13/2017 4/17/2017   Total Score 5 1 3          RATING SCALES:  0 per 1/9/2017      DIAGNOSIS     Schizoaffective Disorder, Bipolar Type     ASSESSMENT                                     Pertinent Background:   See most recent Transfer Evaluation as dated above.    TODAY:     1) Schizoaffective Disorder, Bipolar Type- Sleep had initially improved since changing lithium from twice a day to bedtime dosing only. He self-discontinued Buspar since due to dizziness and felt he no longer needed it. Today, he seems significantly improved and seems less anxious than at previous visits.  Feels that the Zyprexa 15 mg at bedtime is working well, no changes today.  Patient is interested in taking short term IM Zyprexa, I discussed with pharmacy and they will see if it could be covered by insurance but is likely not a feasible option.      2) Obesity- reports that he gained weight over the holidays and reports that his appetite improved since decreasing his Zyprexa dose and will continue focusing on good nutrition and exercise. HDL and triglycerides slightly abnormal upon check in 2/2016, they continue to be abnormal and needs to establish primary care, will refer to [facility] for Primary Care.      PSYCHOTROPIC DRUG INTERACTIONS:   LITHIUM and OLANZAPINE may result in weakness, dyskinesias, increased extrapyramidal symptoms, encephalopathy, and brain damage.  MANAGEMENT: Monitoring for adverse effects and patient is aware of risks     PLAN                                                                                                       1) PSYCHOTROPIC MEDICATIONS:   - Continue Zyprexa 15 mg qhs   - Continue lithium 1,200 mg qhs    2) THERAPY:  none       TREATMENT PLAN: Revised-  n/a                                            Next due- at transfer eval    3) LABS NEXT DUE:  [place] labs 9/2017 and antipsychotic labs 3/2018       RATING SCALES:  none     4) REFERRALS [MICD, medical etc.]:  yes, PCP  for abnormal lipid panel     5) MTM REFERRAL [PharmD]:  none     6) :  none     7) FAMILY MEETING:  none     8) RTC: 2 months with Dr. Zbigniew Bailey    9) CRISIS NUMBERS:   Provided routinely in AVS  ONLY if a [facility] PT: [facility] [phone] (clinic), [phone] (after hours)   CRISIS TEXT LINE: Text [phone] from anywhere, anytime, any crisis 24/7;  OR SEE www.crisistextline.org    TREATMENT RISK STATEMENT:  The risks, benefits, alternatives and potential adverse effects have been discussed and are understood by the patient/ patient's guardian. The pt understands the risks of using street drugs or alcohol.  There are no medical contraindications, the pt agrees to treatment with the ability to do so.  The patient understands to call 911 or come to the nearest ED if life threatening or urgent symptoms present.     RESIDENT:   Henna Voss,     Patient not staffed in clinic.  Note will be reviewed and signed by supervisor Dr. Ibrahim.  I did not see this patient directly. I have reviewed the documentation and I agree with the resident's plan of care.     Flaca Ibrahim MD

## 2017-07-27 ENCOUNTER — OFFICE VISIT (OUTPATIENT)
Dept: PSYCHIATRY | Facility: CLINIC | Age: 27
End: 2017-07-27
Attending: PSYCHIATRY & NEUROLOGY
Payer: COMMERCIAL

## 2017-07-27 VITALS
BODY MASS INDEX: 32.41 KG/M2 | WEIGHT: 249 LBS | SYSTOLIC BLOOD PRESSURE: 134 MMHG | DIASTOLIC BLOOD PRESSURE: 84 MMHG | HEART RATE: 89 BPM

## 2017-07-27 DIAGNOSIS — Z92.89 HX OF PSYCHIATRIC CARE: ICD-10-CM

## 2017-07-27 DIAGNOSIS — F29 PSYCHOSIS, UNSPECIFIED PSYCHOSIS TYPE (H): ICD-10-CM

## 2017-07-27 PROCEDURE — 99212 OFFICE O/P EST SF 10 MIN: CPT | Mod: ZF

## 2017-07-27 RX ORDER — GABAPENTIN 300 MG/1
600 CAPSULE ORAL AT BEDTIME
Qty: 60 CAPSULE | Refills: 0 | Status: SHIPPED | OUTPATIENT
Start: 2017-07-27 | End: 2017-09-14

## 2017-07-27 RX ORDER — OLANZAPINE 15 MG/1
15 TABLET ORAL AT BEDTIME
Qty: 30 TABLET | Refills: 2 | Status: SHIPPED | OUTPATIENT
Start: 2017-07-27 | End: 2017-08-31

## 2017-07-27 NOTE — PROGRESS NOTES
PSYCHIATRY CLINIC TRANSFER NOTE   The initial diagnostic evaluation was on 7/24/2015.  Date of the most recent transfer of care lexie is 7/27/17.    Pertinent Background:  This patient first experienced mental health issues in grade school and has received treatment for ADHD, depression, psychosis and substance use.  See transfer evaluation for detailed history.  Notably, the patient had FEP in 2015 in the setting of cannabis abuse and has been stable on olanzapine. He has never lived independently and seems to have fairly low insight into his illness and past symptoms.       Psych critical item history includes psychosis [disorganization, ?catatonia], mutiple psychotropic trials, psych hosp [x1x at [facility], spring 2015], commitment and CD: alcohol and cannabis.    INTERIM HISTORY                                                 Jose Francisco Sommers is a 26 year old male who was last seen for medication management on 6/19/2017 at which time no changes were made.  The patient reports good treatment adherence.  History was provided by the patient who was a fair historian.  Since the last visit:  -Patient reports working a lot. 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  -Reports his main concern is sleeping too much. He is not overly concerned about this however, it is mainly his parents. They want him to have a better sleep schedule. He is currently taking gabapentin 300 mg at 10pm from a prior prescription. This has helped somewhat. He goes to bed at 1am but doesn't fall asleep until 3-5am and sleeps until 3pm. Per records, this has been going on for the past year at least. He denies using screens in bed or leading up to bed. He usually spends his time reading and then going for a light walk right before bed.  -Wants to have injectable olanzapine at night to help him sleep.  -Reports he has been trying to cut down on junk food and supplementing snacks with fruits, vegetables, cottage cheese and  oatmeal.    RECENT SYMPTOMS:   DEPRESSION:  reports-hypersomnia, weight/ appetite change (increased) and poor concentration /memory;  DENIES- suicidal ideation , depressed mood, anhedonia, low energy, excessive guilt, feeling worthless, feeling hopeless and overwhelmed  KAMI/HYPOMANIA:  reports-none;  DENIES- increased energy, decreased sleep need, increased activity and grandiosity  PSYCHOSIS:  reports-none;  DENIES- delusions, auditory hallucinations, visual hallucinations and disorganized behavior  DYSREGULATION:  reports-none;  DENIES- suicidal ideation, violent ideation, SIB and aggressive  PANIC ATTACK:  none   ANXIETY:  none  TRAUMA RELATED:  none  COMPULSIVE:  none  SLEEP:  sleeping too much, difficulty falling asleep, see HPI    EATING DISORDER: none    RECENT SUBSTANCE USE:     ALCOHOL- very rarely          TOBACCO- 1-1.5 ppd               CAFFEINE- 2-3 cups/day of tea  OPIOIDS- none       NARCAN KIT- N/A       CANNABIS- none          OTHER ILLICIT DRUGS- none     CURRENT SOCIAL HISTORY:  FINANCIAL SUPPORT- working and family or friend       CHILDREN- none       LIVING SITUATION- lives with parents in [place]      SOCIAL/ SPIRITUAL SUPPORT- family, Rastafari hemalatha       FEELS SAFE AT HOME- Yes     MEDICAL ROS:  Reports gaining weight, restlessness in arms at night     Denies GI sxs [ ], sedation, dizziness, akathisia, unusual movements, polydipsia, polyphagia and excessive water intake and polydipsia, polyuria, nocturia and ice water craving, snoring, frequent morning headaches, awaking with trouble breathing    SUBSTANCE USE HISTORY                                                                             Past Use- MJ in HS, EtOH in HS  Treatment [#, most recent] - [facility] age 19 for MJ but did not complete  Medical Consequences [withdrawal, sz etc] - none  HIV/Hepatitis- none  Legal Consequences- 2x DUI for drinking at ages 20 and 21    PSYCHIATRIC HISTORY     SIB [method, most recent]-  "none  Suicidal Ideation Hx [passive, active]- none  Suicide Attempt [#, recent, method]:   #- N/A   Most Recent- N/A    Violence/Aggression Hx- none  Psychosis Hx- December 2014 when Manuel was last considered close to baseline. In the following month (while living at home), he began using alcohol more frequently, and was spending great amounts of time reorganizing his room/house/garage (and even using a  on the kitchen floor). Was disorganized.  Psych Hosp [ #, most recent, committed]- once at [facility] 2y/a  ECT [#, most recent]- none    Eating Disorder: none    Outpatient Programs [ DBT, Day Treatment, Eating Disorder Tx etc] : none     SOCIAL and FAMILY HISTORY                                          patient reported   Financial Support- working and family or friend  Living Situation/Family/Relationships- with parents;  Children- none  Trauma History (self-report)- none  Legal- In February 2014: Left for [place] to attend a \"tournament\" (unclear what kind). He ended up in [place], and was arrested for shoplifting from [facility] (had \"intended to purchase\" a pair of jeans but became panicked when security approached him). He had a brief stay in care home, and afterwards, took him 5 days to return to [place] with parents frequently wiring him money. He had difficulty adhering to travel plans, seemed confused, and was communicating with parents via \"bizarre\" text messages (parents even filed a missing persons report at one point). He finally arrived home exhausted, not sharing much of events in [place], but slightly more consistent with baseline.  Cultural/ Social/ Spiritual Support- see interim history  Early History/Education-  This patient first experienced mental health issues in elementary school with concerns for depression and attention difficulties (which improved in gifted classes) and he first received mental health care in high school for depression, falling behind in classes, and ADHD    Family " "Mental Health History-    Paternal grandfather: alcoholism  Maternal grandmother: \"long-standing mental health issues\"  Maternal Aunt: Anxiety when young  Maternal Aunt: Anxiety and depression     PAST PSYCH MED TRIALS   see EMR Problem List: Hx of psychiatric care    MEDICAL / SURGICAL HISTORY                                   CARE TEAM:   PCP- none          Therapist- none       Contraception- unclear    Neurologic Hx [head injury etc]:  None reported  Patient Active Problem List   Diagnosis     Schizoaffective disorder, bipolar type (H)     Hx of psychiatric care       ALLERGY                                Sulfa drugs  MEDICATIONS                               Current Outpatient Prescriptions   Medication Sig Dispense Refill     OLANZapine (ZYPREXA) 15 MG tablet Take 1 tablet (15 mg) by mouth At Bedtime 30 tablet 2     lithium 600 MG capsule Take 2 capsules (1,200 mg) by mouth At Bedtime 60 capsule 2     VITALS   /84  Pulse 89  Wt 112.9 kg (249 lb)  BMI 32.41 kg/m2   MENTAL STATUS EXAM                                                           Alertness: alert  and oriented  Appearance: adequately groomed  Behavior/Demeanor: cooperative and pleasant although seemed a bit suspicious of me, with fair to poor eye contact   Speech: normal and regular rate and rhythm  Language: no obvious problem  Psychomotor: normal or unremarkable  Mood: \"pretty good\"  Affect: restricted, blunted and guarded; was not congruent to mood; was congruent to content  Thought Process/Associations: unremarkable  Thought Content:  Reports none;  Denies suicidal ideation, violent ideation, delusions and paranoid ideation  Perception:  Reports none;  Denies auditory hallucinations and visual hallucinations  Insight: fair to poor  Judgment: fair  Cognition: does  appear grossly intact; formal cognitive testing was not done    LABS and DATA   RATING SCALES:  AIMS: determine next due    PHQ9 TODAY = 8, not difficult, item #9: 0  PHQ-9 " SCORE 2/27/2017 3/13/2017 4/17/2017   Total Score - - -   Total Score 5 1 3       ANTIPSYCHOTIC LABS   [glu, A1C, lipids (focus LDL), liver enzymes, WBC, ANEU, Hgb, plts]    q12 mo  Recent Labs   Lab Test  03/22/17   1340  03/08/17   0953  09/30/16   1348   GLC  83  72  83   A1C   --    --   4.6     Recent Labs   Lab Test  03/22/17   1340  02/09/16   1300   CHOL  171  101   TRIG  528*  264*   LDL  Cannot estimate LDL when triglyceride exceeds 400 mg/dL  59  16   HDL  31*  32*     Recent Labs   Lab Test  03/22/17   1340  03/08/17   0953   AST  18  22   ALT  58  51   ALKPHOS  76  67     Recent Labs   Lab Test  03/22/17   1340  03/08/17   0953   WBC  8.3  8.5   ANEU  5.5  5.1   HGB  15.6  15.7   PLT  250  229     LITHIUM LABS     [level, renal, SG, TSH, WBC]    q6 mo  Recent Labs   Lab Test  03/22/17   1340  03/08/17   0953  09/30/16   1348  02/09/16   1300   LITHIUM  0.6  0.7  0.6  0.5*     Recent Labs   Lab Test  03/22/17   1340  03/08/17   0953  09/30/16   1348   CR  1.09  0.94  0.90   GFRESTIMATED  82  >90  Non  GFR Calc    >90  Non  GFR Calc     NA  143  140  140   ADONAY  9.3  9.5  9.0     Recent Labs   Lab Test  02/09/16   1247   SG  1.005     Recent Labs   Lab Test  03/22/17   1340  03/08/17   0953  09/30/16   1348   TSH  3.03  3.36  3.45     Recent Labs   Lab Test  03/22/17   1340  03/08/17   0953   WBC  8.3  8.5         DIAGNOSIS     Psychosis, unspecified psychosis type (r/o schizoaffective disorder vs schizophrenia)    ASSESSMENT                                     TODAY     1. Schizoaffective disorder?: unclear what his true diagnosis is given history and comorbid cannabis abuse. I am not convinced his prior behavior met criteria for a manic episode and could be explained by disorganized psychosis. Will need further monitoring and history for clarity. Psychosis and mood are well managed on 15 mg of olanzapine and lithium. He is intently set on IM Zyprexa to help him sleep but  I believe this is an inappropriate use of that form of the medication and will attempt to partner with him on safer options first, including gabapentin.    2. Abnormal labs: has been instructed to get a PCP and discuss abnormal lipids. Also discussed continuing with eating healthy snack alternatives and increasing physical activity.         [place] PRESCRIPTION MONITORING PROGRAM [] was not checked today:  not using controlled substances.    PSYCHOTROPIC DRUG INTERACTIONS:   LITHIUM and OLANZAPINE may result in weakness, dyskinesias, increased extrapyramidal symptoms, encephalopathy, and brain damage.  MANAGEMENT:  Monitoring for adverse effects and patient is aware of risks     PLAN                                                                                                       1) PSYCHOTROPIC MEDICATIONS:  - increase gabapentin to 600 mg QHS for sleep  - continue olanzapine 15 mg QHS  - continue lithium 1200 mg QHS    2) THERAPY:    Continue  TREATMENT PLAN   Date revised  -  na   Date next due- na    3) NEXT DUE:    Labs- [place] labs 9/2017 and antipsychotic labs 3/2018  EKG- PRN  Rating Scales- AIMS, determine next due    4) REFERRALS:    NONE    5) RTC: one month  SAFETY PLAN reviewed: No: na        Placed in pt instructions: No: na    6) CRISIS NUMBERS:   Provided routinely in AVS.  Especially emphasized:  [facility] [phone] (clinic)    [phone] (after hours)  CRISIS TEXT LINE: Text [phone] from anywhere, anytime, any crisis 24/7;  OR SEE www.crisistextline.org  ONLY if a [facility] PT: [facility] [phone] (clinic), [phone] (after hours)     TREATMENT RISK STATEMENT:  The risks, benefits, alternatives and potential adverse effects have been discussed and are understood by the pt. The pt understands the risks of using street drugs or alcohol. There are no medical contraindications, the pt agrees to treatment with the ability to do so. The pt knows to call the clinic for any  problems or to access emergency care if needed.  Medical and substance use concerns are documented above.  Psychotropic drug interaction check was done, including changes made today.    WHODAS 2.0  07/27/17  total score = N/A; [a 12-item WHODAS 2.0 assessment has not been completed by the pt and/or recorded in EPIC].    RESIDENT:   Zbigniew Bailey MD      Patient not staffed in clinic.  Note will be reviewed and signed by supervisor Dr. Tay.    Attestation:  I did not see this patient directly. I have reviewed the documentation and I agree with the resident's plan of care.   Joel Tay MD

## 2017-07-27 NOTE — MR AVS SNAPSHOT
After Visit Summary   7/27/2017    Jose Francisco Sommers    MRN: [id number]           Patient Information     Date Of Birth          1990        Visit Information        Provider Department      7/27/2017 2:55 PM Zbigniew Bailey MD Psychiatry Clinic        Today's Diagnoses     Psychosis, unspecified psychosis type        Hx of psychiatric care          Care Instructions    1. Start taking gabapentin 600 mg at 10pm.  2. Continue taking lithium and olanzapine without change.  3. Try to wake yourself up earlier by no more than one hour change per week. For example, start by waking up at 2pm for 7 days, then try 1-1:30pm for 7 days, etc.  4. Return to clinic in one month.          Follow-ups after your visit        Follow-up notes from your care team     Return in about 1 month (around 8/27/2017) for Routine Visit.      Your next 10 appointments already scheduled     Aug 31, 2017  2:55 PM CDT   Adult Med Follow UP with Zbigniew Bailey MD   Psychiatry Clinic ([facility])    [place] [id number] [facility] [address] [phone]              Who to contact     Please call your clinic at [phone] to:    Ask questions about your health    Make or cancel appointments    Discuss your medicines    Learn about your test results    Speak to your doctor   If you have compliments or concerns about an experience at your clinic, or if you wish to file a complaint, please contact [facility] Patient Relations at [phone] or email us at [email or web address]         Additional Information About Your Visit        Tweetflowhart Information     gokit is an electronic gateway that provides easy, online access to your medical records. With gokit, you can request a clinic appointment, read your test results, renew a prescription or communicate with your care team.     To sign up for gokit visit the website at  [email or web address]   You will be asked to enter the access code listed below, as well as some personal information. Please follow the directions to create your username and password.     Your access code is: [email or web address]  Expires: 2017  3:40 PM     Your access code will  in 90 days. If you need help or a new code, please contact your [facility] or call [phone] for assistance.        Care EveryWhere ID     This is your Care EveryWhere ID. This could be used by other organizations to access your [place] medical records  [id number]        Your Vitals Were     Pulse BMI (Body Mass Index)                89 32.41 kg/m2           Blood Pressure from Last 3 Encounters:   17 134/84   17 137/85   17 148/88    Weight from Last 3 Encounters:   17 112.9 kg (249 lb)   17 110.2 kg (243 lb)   17 106.8 kg (235 lb 6.4 oz)              Today, you had the following     No orders found for display         Today's Medication Changes          These changes are accurate as of: 17 11:59 PM.  If you have any questions, ask your nurse or doctor.               Start taking these medicines.        Dose/Directions    gabapentin 300 MG capsule   Commonly known as:  NEURONTIN   Used for:  Psychosis, unspecified psychosis type   Started by:  Zbigniew Bailey MD        Dose:  600 mg   Take 2 capsules (600 mg) by mouth At Bedtime   Quantity:  60 capsule   Refills:  0            Where to get your medicines      These medications were sent to [place] [address] [place] [facility] [address]  [address] [address]     Phone:  [phone]     gabapentin 300 MG capsule    OLANZapine 15 MG tablet                Primary Care Provider    Clinic Provider       No address on file        Equal Access to Services     SHELLEY SOOD : Santana Castro, rodger jurado, qaybchandan souza  kristina flores ah. So [facility] [phone].    ATENCIÓN: Si lj mills, tiene a jacob disposición servicios gratuitos de asistencia lingüística. Marco al [phone].    We comply with applicable federal civil rights laws and [place] laws. We do not discriminate on the basis of race, color, national origin, age, disability sex, sexual orientation or gender identity.            Thank you!     Thank you for choosing PSYCHIATRY CLINIC  for your care. Our goal is always to provide you with excellent care. Hearing back from our patients is one way we can continue to improve our services. Please take a few minutes to complete the written survey that you may receive in the mail after your visit with us. Thank you!             Your Updated Medication List - Protect others around you: Learn how to safely use, store and throw away your medicines at www.disposemymeds.org.          This list is accurate as of: 7/27/17 11:59 PM.  Always use your most recent med list.                   Brand Name Dispense Instructions for use Diagnosis    gabapentin 300 MG capsule    NEURONTIN    60 capsule    Take 2 capsules (600 mg) by mouth At Bedtime    Psychosis, unspecified psychosis type       lithium 600 MG capsule     60 capsule    Take 2 capsules (1,200 mg) by mouth At Bedtime    Psychosis, unspecified psychosis type       OLANZapine 15 MG tablet    zyPREXA    30 tablet    Take 1 tablet (15 mg) by mouth At Bedtime    Psychosis, unspecified psychosis type

## 2017-07-27 NOTE — PATIENT INSTRUCTIONS
1. Start taking gabapentin 600 mg at 10pm.  2. Continue taking lithium and olanzapine without change.  3. Try to wake yourself up earlier by no more than one hour change per week. For example, start by waking up at 2pm for 7 days, then try 1-1:30pm for 7 days, etc.  4. Return to clinic in one month.

## 2017-07-27 NOTE — NURSING NOTE
Chief Complaint   Patient presents with     Recheck Medication     Psychosis     Reviewed allergies, smoking status, and pharmacy preference  Obtained weight, blood pressure and heart rate

## 2017-07-29 ASSESSMENT — PATIENT HEALTH QUESTIONNAIRE - PHQ9: SUM OF ALL RESPONSES TO PHQ QUESTIONS 1-9: 8

## 2017-08-22 ENCOUNTER — TELEPHONE (OUTPATIENT)
Dept: PSYCHIATRY | Facility: CLINIC | Age: 27
End: 2017-08-22

## 2017-08-22 NOTE — TELEPHONE ENCOUNTER
Social Work   Incoming/Outgoing Call  [facility]    Incoming From:  Emberroger Sommers, pt's mom ([phone])    Reason for Call:  Patient is turning 27 on 9/21/17.  Generally he wouldn't qualify to continue under mom's insurance as a dependent, but with a mental health diagnosis he can.  Mom needs his provider (Dr. Bailey) to complete a very simple form and sign it.  Mom needs to submit this form before 8/31/17.     Response/Plan:  Mom will drop the form off to the psychiatry clinic today/tomorrow with her contact information to coordinate returning the form to her.  SW sent a high priority EPIC message to Dr. Bailey as an FYI of this ppwk.      Mom also addressed that pt may be interested in starting Metformin, SW answered her questions about this medication.  Mom shared pt has some interest in getting back into work, and that maybe the clinic SW could meet with him to review some of his options to help with this goal.  We also discussed Social Security, given pt hasn't returned to gainful employment in over 2 years, but mom is hesitant for this because she doesn't want this to interfere with his motivation to find work.       Please call or EPIC message with any questions or concerns.    ERICKA Ferrera, SW  [phone]

## 2017-08-31 ENCOUNTER — ALLIED HEALTH/NURSE VISIT (OUTPATIENT)
Dept: PSYCHIATRY | Facility: CLINIC | Age: 27
End: 2017-08-31
Payer: COMMERCIAL

## 2017-08-31 ENCOUNTER — OFFICE VISIT (OUTPATIENT)
Dept: PSYCHIATRY | Facility: CLINIC | Age: 27
End: 2017-08-31
Attending: PSYCHIATRY & NEUROLOGY
Payer: COMMERCIAL

## 2017-08-31 VITALS
HEART RATE: 87 BPM | SYSTOLIC BLOOD PRESSURE: 140 MMHG | BODY MASS INDEX: 33.6 KG/M2 | DIASTOLIC BLOOD PRESSURE: 86 MMHG | WEIGHT: 258.2 LBS

## 2017-08-31 DIAGNOSIS — F29 PSYCHOSIS, UNSPECIFIED PSYCHOSIS TYPE (H): ICD-10-CM

## 2017-08-31 DIAGNOSIS — F25.0 SCHIZOAFFECTIVE DISORDER, BIPOLAR TYPE (H): Primary | ICD-10-CM

## 2017-08-31 DIAGNOSIS — Z71.89 COUNSELING AND COORDINATION OF CARE: Primary | ICD-10-CM

## 2017-08-31 PROCEDURE — 99212 OFFICE O/P EST SF 10 MIN: CPT | Mod: ZF

## 2017-08-31 RX ORDER — OLANZAPINE 20 MG/1
20 TABLET ORAL AT BEDTIME
Qty: 30 TABLET | Refills: 1 | Status: SHIPPED | OUTPATIENT
Start: 2017-08-31 | End: 2017-10-05

## 2017-08-31 NOTE — PATIENT INSTRUCTIONS
1. Increase olanzapine to 20 mg at bedtime.  2. Start taking metformin 500 mg with breakfast for 7 days if tolerating, then increase to 500 mg with breakfast and 500 mg with dinner.  3. Call or MyChart the clinic in two weeks to tell us how you are doing with regards to concentration and task completion.  4. Go get labs drawn within two weeks and establish primary care there.  5. Return to clinic in 4-5 weeks.    INFORMATION ON METFORMIN:    Brand Names: US    D-Care DM2;    Fortamet;    Glucophage;    Glucophage XR;    Glumetza;    Riomet  Brand Names: Go    ACT-Metformin;    Apo-Metformin;    Auro-Metformin;    Dom-Metformin;    ECL-Metformin;    Glucophage;    Glumetza;    Glycon;    JAMP-Metformin;    JAMP-Metformin Blackberry;    Mar-Metformin;    Metformin FC;    Mint-Metformin;    Mylan-Metformin;    PHL-Metformin;    PMS-Metformin;    PRO-Metformin;    RAN-Metformin;    ratio-Metformin;    Bogart-Metformin;    Sandoz-Metformin FC;    Septa-Metformin;    Teva-Metformin  Warning  .  Rarely, metformin may cause an acid health problem in the blood (lactic acidosis). The risk of lactic acidosis is higher in people with kidney problems and in people who take certain other drugs like topiramate. The risk is also higher in people with liver problems or heart failure, in older people (65 or older), or with alcohol use. If lactic acidosis happens, it can lead to other health problems and can be deadly. Lab tests to check the kidneys may be done while taking this drug. Talk with the doctor.  .  Call your doctor right away if you have signs of too much lactic acid in the blood (lactic acidosis) like fast breathing, fast or slow heartbeat, a heartbeat that does not feel normal, very bad upset stomach or throwing up, feeling very sleepy, shortness of breath, feeling very tired or weak, very bad dizziness, feeling cold, or muscle pain or cramps.  .  Do not take this drug if you have a very bad infection, low oxygen, or a  lot of fluid loss.  .  Talk with your doctor before you drink alcohol.  .  If you are having an exam or test with contrast or have had one within the past 48 hours, talk with your doctor.  .  Tell all of your health care providers that you take this drug. This includes your doctors, nurses, pharmacists, and dentists.  What is this drug used for?  .  It is used to lower blood sugar in patients with high blood sugar (diabetes).  What do I need to tell my doctor BEFORE I take this drug?  .  If you have an allergy to metformin or any other part of this drug.  .  If you are allergic to any drugs like this one, any other drugs, foods, or other substances. Tell your doctor about the allergy and what signs you had, like rash; hives; itching; shortness of breath; wheezing; cough; swelling of face, lips, tongue, or throat; or any other signs.  .  If you have any of these health problems: Acidic blood problem, kidney disease, or liver disease.  .  If you have had a recent heart attack or stroke.  .  If you are not able to eat or drink like normal, including before certain procedures or surgery.  .  This is not a list of all drugs or health problems that interact with this drug.  .  Tell your doctor and pharmacist about all of your drugs (prescription or OTC, natural products, vitamins) and health problems. You must check to make sure that it is safe for you to take this drug with all of your drugs and health problems. Do not start, stop, or change the dose of any drug without checking with your doctor.  What are some things I need to know or do while I take this drug?  .  All products:  .  Do not drive if your blood sugar has been low. There is a greater chance of you having a crash.  .  Check your blood sugar as you have been told by your doctor.  .  Have blood work checked as you have been told by the doctor. Talk with the doctor.  .  It may be harder to control your blood sugar during times of stress like when you have a  fever, an infection, an injury, or surgery. A change in level of physical activity or exercise and a change in diet may also affect your blood sugar. Talk with your doctor.  .  Follow the diet and workout plan that your doctor told you about.  .  Be careful in hot weather or while being active. Drink lots of fluids to stop fluid loss.  .  If you are 65 or older, use this drug with care. You could have more side effects.  .  Tell your doctor if you are pregnant or plan on getting pregnant. You will need to talk about the benefits and risks of using this drug while you are pregnant.  .  Tell your doctor if you are breast-feeding. You will need to talk about any risks to your baby.  .  Extended-release tablets:  .  You may see something that looks like the tablet in your stool. This is normal and not a cause for concern. If you have questions, talk with your doctor.  What are some side effects that I need to call my doctor about right away?  .  WARNING/CAUTION: Even though it may be rare, some people may have very bad and sometimes deadly side effects when taking a drug. Tell your doctor or get medical help right away if you have any of the following signs or symptoms that may be related to a very bad side effect:  .  Signs of an allergic reaction, like rash; hives; itching; red, swollen, blistered, or peeling skin with or without fever; wheezing; tightness in the chest or throat; trouble breathing or talking; unusual hoarseness; or swelling of the mouth, face, lips, tongue, or throat.  .  Very bad belly pain.  .  Fever or chills.  .  Sore throat.  .  It is common to have stomach problems like upset stomach, throwing up, or loose stools (diarrhea) when you start taking this drug. If you have stomach problems later during care, call your doctor right away. This may be a sign of an acid health problem in the blood (lactic acidosis).  .  Low blood sugar can happen. The chance of low blood sugar may be raised when this  drug is used with other drugs for high blood sugar (diabetes). Signs may be dizziness, headache, feeling sleepy, feeling weak, shaking, a fast heartbeat, confusion, hunger, or sweating. Call your doctor right away if you have any of these signs. Follow what you have been told to do if you get low blood sugar. This may include taking glucose tablets, liquid glucose, or some fruit juices.  What are some other side effects of this drug?  .  All drugs may cause side effects. However, many people have no side effects or only have minor side effects. Call your doctor or get medical help if any of these side effects or any other side effects bother you or do not go away:  .  Loose stools (diarrhea).  .  Gas.  .  Upset stomach or throwing up.  .  Feeling tired or weak.  .  These are not all of the side effects that may occur. If you have questions about side effects, call your doctor. Call your doctor for medical advice about side effects.  .  You may report side effects to your national health agency.  How is this drug best taken?  .  Use this drug as ordered by your doctor. Read all information given to you. Follow all instructions closely.  .  Extended-release tablets:  .  Take with the evening meal if taking once daily.  .  Swallow whole. Do not chew, break, or crush.  .  If you have trouble swallowing, talk with your doctor.  .  All other products:  .  Take with meals.  .  Liquid (solution):  .  Measure liquid doses carefully. Use the measuring device that comes with this drug. If there is none, ask the pharmacist for a device to measure this drug.  .  All products:  .  Take with a full glass of water.  .  This drug may be used alone or with other high blood sugar (diabetes) drugs.  .  To gain the most benefit, do not miss doses.  .  Keep taking this drug as you have been told by your doctor or other health care provider, even if you feel well.  What do I do if I miss a dose?  .  If it is close to the time for your  next dose, skip the missed dose and go back to your normal time.  .  Do not take 2 doses at the same time or extra doses.  How do I store and/or throw out this drug?  .  Store at room temperature.  .  Store in a dry place. Do not store in a bathroom.  .  Keep all drugs in a safe place. Keep all drugs out of the reach of children and pets.  .  Check with your pharmacist about how to throw out unused drugs.  General drug facts  .  If your symptoms or health problems do not get better or if they become worse, call your doctor.  .  Do not share your drugs with others and do not take anyone else's drugs.  .  Keep a list of all your drugs (prescription, natural products, vitamins, OTC) with you. Give this list to your doctor.  .  Talk with the doctor before starting any new drug, including prescription or OTC, natural products, or vitamins.  .  Some drugs may have another patient information leaflet. If you have any questions about this drug, please talk with your doctor, nurse, pharmacist, or other health care provider.  .  If you think there has been an overdose, call your poison control center or get medical care right away. Be ready to tell or show what was taken, how much, and when it happened.

## 2017-08-31 NOTE — Clinical Note
HI,  FYI only. I forgot to document on this face/face encounter (thanks to our fabulous epic/billing staff who reminded me), which is why you're getting it so late.  Jodie

## 2017-08-31 NOTE — MR AVS SNAPSHOT
After Visit Summary   2017    Jose Francisco Sommers    MRN: [id number]           Patient Information     Date Of Birth          1990        Visit Information        Provider Department      2017 4:00 PM Maria Del Rosario Gonzalez, [facility]        Today's Diagnoses     Counseling and coordination of care    -  1       Follow-ups after your visit        Your next 10 appointments already scheduled     2017  3:25 PM CDT   Adult Med Follow UP with Zbigniew Bailey MD   Psychiatry Clinic ([facility])    [address]  [address] [facility] [id number] [phone]              Who to contact     Please call your clinic at [phone] to:    Ask questions about your health    Make or cancel appointments    Discuss your medicines    Learn about your test results    Speak to your doctor   If you have compliments or concerns about an experience at your clinic, or if you wish to file a complaint, please contact [facility] Patient Relations at [phone] or email us at [email or web address]         Additional Information About Your Visit        MyChart Information     Wercker is an electronic gateway that provides easy, online access to your medical records. With Wercker, you can request a clinic appointment, read your test results, renew a prescription or communicate with your care team.     To sign up for Wercker visit the website at [email or web address]   You will be asked to enter the access code listed below, as well as some personal information. Please follow the directions to create your username and password.     Your access code is: [email or web address]  Expires: 1/3/2018  4:16 PM     Your access code will  in 90 days. If you need help or a new code, please contact your [facility] or call [phone] for assistance.        Care EveryWhere  ID     This is your Care EveryWhere ID. This could be used by other organizations to access your [place] medical records  [id number]         Blood Pressure from Last 3 Encounters:   10/05/17 (!) 148/97   08/31/17 140/86   07/27/17 134/84    Weight from Last 3 Encounters:   10/05/17 117.5 kg (259 lb)   08/31/17 117.1 kg (258 lb 3.2 oz)   07/27/17 112.9 kg (249 lb)              Today, you had the following     No orders found for display         Today's Medication Changes          These changes are accurate as of: 8/31/17 11:59 PM.  If you have any questions, ask your nurse or doctor.               Start taking these medicines.        Dose/Directions    metFORMIN 500 MG tablet   Commonly known as:  GLUCOPHAGE   Used for:  Schizoaffective disorder, bipolar type (H)   Started by:  Zbigniew Bailey MD        Take 500 mg (1 tablet) with breakfast for 7 days, then if tolerating increase to 500 mg with breakfast and 500 mg with dinner.   Quantity:  30 tablet   Refills:  0         These medicines have changed or have updated prescriptions.        Dose/Directions    OLANZapine 20 MG tablet   Commonly known as:  zyPREXA   This may have changed:    - medication strength  - how much to take   Used for:  Psychosis, unspecified psychosis type   Changed by:  Zbigniew Bailey MD        Dose:  20 mg   Take 1 tablet (20 mg) by mouth At Bedtime   Quantity:  30 tablet   Refills:  1            Where to get your medicines      These medications were sent to [place] [address] [place] [facility] [address]  [address] [facility] [address]     Phone:  [phone]     metFORMIN 500 MG tablet    OLANZapine 20 MG tablet                Primary Care Provider    Clinic Provider       No address on file        Equal Access to Services     SHELLEY SOOD AH: Santana Castro, rodger jurado, chandan rooney. So [facility] [phone].    ATENCIÓN: Lilly calhoun  español, tiene a jacob disposición servicios gratuitos de asistencia lingüística. Marco land [phone].    We comply with applicable federal civil rights laws and [place] laws. We do not discriminate on the basis of race, color, national origin, age, disability, sex, sexual orientation, or gender identity.            Thank you!     Thank you for choosing PSYCHIATRY CLINIC  for your care. Our goal is always to provide you with excellent care. Hearing back from our patients is one way we can continue to improve our services. Please take a few minutes to complete the written survey that you may receive in the mail after your visit with us. Thank you!             Your Updated Medication List - Protect others around you: Learn how to safely use, store and throw away your medicines at www.disposemymeds.org.          This list is accurate as of: 8/31/17 11:59 PM.  Always use your most recent med list.                   Brand Name Dispense Instructions for use Diagnosis    metFORMIN 500 MG tablet    GLUCOPHAGE    30 tablet    Take 500 mg (1 tablet) with breakfast for 7 days, then if tolerating increase to 500 mg with breakfast and 500 mg with dinner.    Schizoaffective disorder, bipolar type (H)       OLANZapine 20 MG tablet    zyPREXA    30 tablet    Take 1 tablet (20 mg) by mouth At Bedtime    Psychosis, unspecified psychosis type

## 2017-08-31 NOTE — MR AVS SNAPSHOT
After Visit Summary   8/31/2017    Jose Francisco Sommers    MRN: [id number]           Patient Information     Date Of Birth          1990        Visit Information        Provider Department      8/31/2017 2:55 PM [facility]igniew Bailey [facility]        Today's Diagnoses     Schizoaffective disorder, bipolar type (H)    -  1    Psychosis, unspecified psychosis type          Care Instructions    1. Increase olanzapine to 20 mg at bedtime.  2. Start taking metformin 500 mg with breakfast for 7 days if tolerating, then increase to 500 mg with breakfast and 500 mg with dinner.  3. Call or MyChart the clinic in two weeks to tell us how you are doing with regards to concentration and task completion.  4. Go get labs drawn within two weeks and establish primary care there.  5. Return to clinic in 4-5 weeks.    INFORMATION ON METFORMIN:    Brand Names: US    D-Care DM2;    Fortamet;    Glucophage;    Glucophage XR;    Glumetza;    Riomet  Brand Names: Go    ACT-Metformin;    Apo-Metformin;    Auro-Metformin;    Dom-Metformin;    ECL-Metformin;    Glucophage;    Glumetza;    Glycon;    JAMP-Metformin;    JAMP-Metformin Blackberry;    Mar-Metformin;    Metformin FC;    Mint-Metformin;    Mylan-Metformin;    PHL-Metformin;    PMS-Metformin;    PRO-Metformin;    RAN-Metformin;    ratio-Metformin;    Karishma-Metformin;    Sandoz-Metformin FC;    Septa-Metformin;    Teva-Metformin  Warning  .  Rarely, metformin may cause an acid health problem in the blood (lactic acidosis). The risk of lactic acidosis is higher in people with kidney problems and in people who take certain other drugs like topiramate. The risk is also higher in people with liver problems or heart failure, in older people (65 or older), or with alcohol use. If lactic acidosis happens, it can lead to other health problems and can be deadly. Lab tests to check the kidneys may be done while taking this drug. Talk with the doctor.  .  Call  your doctor right away if you have signs of too much lactic acid in the blood (lactic acidosis) like fast breathing, fast or slow heartbeat, a heartbeat that does not feel normal, very bad upset stomach or throwing up, feeling very sleepy, shortness of breath, feeling very tired or weak, very bad dizziness, feeling cold, or muscle pain or cramps.  .  Do not take this drug if you have a very bad infection, low oxygen, or a lot of fluid loss.  .  Talk with your doctor before you drink alcohol.  .  If you are having an exam or test with contrast or have had one within the past 48 hours, talk with your doctor.  .  Tell all of your health care providers that you take this drug. This includes your doctors, nurses, pharmacists, and dentists.  What is this drug used for?  .  It is used to lower blood sugar in patients with high blood sugar (diabetes).  What do I need to tell my doctor BEFORE I take this drug?  .  If you have an allergy to metformin or any other part of this drug.  .  If you are allergic to any drugs like this one, any other drugs, foods, or other substances. Tell your doctor about the allergy and what signs you had, like rash; hives; itching; shortness of breath; wheezing; cough; swelling of face, lips, tongue, or throat; or any other signs.  .  If you have any of these health problems: Acidic blood problem, kidney disease, or liver disease.  .  If you have had a recent heart attack or stroke.  .  If you are not able to eat or drink like normal, including before certain procedures or surgery.  .  This is not a list of all drugs or health problems that interact with this drug.  .  Tell your doctor and pharmacist about all of your drugs (prescription or OTC, natural products, vitamins) and health problems. You must check to make sure that it is safe for you to take this drug with all of your drugs and health problems. Do not start, stop, or change the dose of any drug without checking with your doctor.  What  are some things I need to know or do while I take this drug?  .  All products:  .  Do not drive if your blood sugar has been low. There is a greater chance of you having a crash.  .  Check your blood sugar as you have been told by your doctor.  .  Have blood work checked as you have been told by the doctor. Talk with the doctor.  .  It may be harder to control your blood sugar during times of stress like when you have a fever, an infection, an injury, or surgery. A change in level of physical activity or exercise and a change in diet may also affect your blood sugar. Talk with your doctor.  .  Follow the diet and workout plan that your doctor told you about.  .  Be careful in hot weather or while being active. Drink lots of fluids to stop fluid loss.  .  If you are 65 or older, use this drug with care. You could have more side effects.  .  Tell your doctor if you are pregnant or plan on getting pregnant. You will need to talk about the benefits and risks of using this drug while you are pregnant.  .  Tell your doctor if you are breast-feeding. You will need to talk about any risks to your baby.  .  Extended-release tablets:  .  You may see something that looks like the tablet in your stool. This is normal and not a cause for concern. If you have questions, talk with your doctor.  What are some side effects that I need to call my doctor about right away?  .  WARNING/CAUTION: Even though it may be rare, some people may have very bad and sometimes deadly side effects when taking a drug. Tell your doctor or get medical help right away if you have any of the following signs or symptoms that may be related to a very bad side effect:  .  Signs of an allergic reaction, like rash; hives; itching; red, swollen, blistered, or peeling skin with or without fever; wheezing; tightness in the chest or throat; trouble breathing or talking; unusual hoarseness; or swelling of the mouth, face, lips, tongue, or throat.  .  Very bad  belly pain.  .  Fever or chills.  .  Sore throat.  .  It is common to have stomach problems like upset stomach, throwing up, or loose stools (diarrhea) when you start taking this drug. If you have stomach problems later during care, call your doctor right away. This may be a sign of an acid health problem in the blood (lactic acidosis).  .  Low blood sugar can happen. The chance of low blood sugar may be raised when this drug is used with other drugs for high blood sugar (diabetes). Signs may be dizziness, headache, feeling sleepy, feeling weak, shaking, a fast heartbeat, confusion, hunger, or sweating. Call your doctor right away if you have any of these signs. Follow what you have been told to do if you get low blood sugar. This may include taking glucose tablets, liquid glucose, or some fruit juices.  What are some other side effects of this drug?  .  All drugs may cause side effects. However, many people have no side effects or only have minor side effects. Call your doctor or get medical help if any of these side effects or any other side effects bother you or do not go away:  .  Loose stools (diarrhea).  .  Gas.  .  Upset stomach or throwing up.  .  Feeling tired or weak.  .  These are not all of the side effects that may occur. If you have questions about side effects, call your doctor. Call your doctor for medical advice about side effects.  .  You may report side effects to your national health agency.  How is this drug best taken?  .  Use this drug as ordered by your doctor. Read all information given to you. Follow all instructions closely.  .  Extended-release tablets:  .  Take with the evening meal if taking once daily.  .  Swallow whole. Do not chew, break, or crush.  .  If you have trouble swallowing, talk with your doctor.  .  All other products:  .  Take with meals.  .  Liquid (solution):  .  Measure liquid doses carefully. Use the measuring device that comes with this drug. If there is none, ask  the pharmacist for a device to measure this drug.  .  All products:  .  Take with a full glass of water.  .  This drug may be used alone or with other high blood sugar (diabetes) drugs.  .  To gain the most benefit, do not miss doses.  .  Keep taking this drug as you have been told by your doctor or other health care provider, even if you feel well.  What do I do if I miss a dose?  .  If it is close to the time for your next dose, skip the missed dose and go back to your normal time.  .  Do not take 2 doses at the same time or extra doses.  How do I store and/or throw out this drug?  .  Store at room temperature.  .  Store in a dry place. Do not store in a bathroom.  .  Keep all drugs in a safe place. Keep all drugs out of the reach of children and pets.  .  Check with your pharmacist about how to throw out unused drugs.  General drug facts  .  If your symptoms or health problems do not get better or if they become worse, call your doctor.  .  Do not share your drugs with others and do not take anyone else's drugs.  .  Keep a list of all your drugs (prescription, natural products, vitamins, OTC) with you. Give this list to your doctor.  .  Talk with the doctor before starting any new drug, including prescription or OTC, natural products, or vitamins.  .  Some drugs may have another patient information leaflet. If you have any questions about this drug, please talk with your doctor, nurse, pharmacist, or other health care provider.  .  If you think there has been an overdose, call your poison control center or get medical care right away. Be ready to tell or show what was taken, how much, and when it happened.            Follow-ups after your visit        Follow-up notes from your care team     Return in about 1 month (around 9/30/2017).      Your next 10 appointments already scheduled     Oct 05, 2017  3:25 PM CDT   Adult Med Follow UP with Zbigniew Bailey MD   [facility] ([facility])    [place]  [address]  [address] [facility] [address]   [phone]              Future tests that were ordered for you today     Open Standing Orders        Priority Remaining Interval Expires Ordered    Specific gravity urine Routine 2018    Comprehensive Metabolic Panel Routine 2018    Lithium Level Routine 2018    Lipid Panel Reflex to Direct LDL Routine 2018    CBC with Platelets Differential ([facility]) Routine 2018    TSH with free T4 reflex Routine 2018            Who to contact     Please call your clinic at [phone] to:    Ask questions about your health    Make or cancel appointments    Discuss your medicines    Learn about your test results    Speak to your doctor   If you have compliments or concerns about an experience at your clinic, or if you wish to file a complaint, please contact [facility] Patient Relations at [phone] or email us at [email or web address]         Additional Information About Your Visit        [employer] Information     [employer] is an electronic gateway that provides easy, online access to your medical records. With [employer], you can request a clinic appointment, read your test results, renew a prescription or communicate with your care team.     To sign up for [employer] visit the website at [email or web address]   You will be asked to enter the access code listed below, as well as some personal information. Please follow the directions to create your username and password.     Your access code is: [email or web address]  Expires: 2017  3:40 PM     Your access code will  in 90 days. If you need help or a new code, please contact your [facility] or call [phone] for assistance.        Care EveryWhere ID     This is your Care EveryWhere ID. This could be used by  other organizations to access your [place] medical records  [id number]        Your Vitals Were     Pulse BMI (Body Mass Index)                87 33.6 kg/m2           Blood Pressure from Last 3 Encounters:   08/31/17 140/86   07/27/17 134/84   06/19/17 137/85    Weight from Last 3 Encounters:   08/31/17 117.1 kg (258 lb 3.2 oz)   07/27/17 112.9 kg (249 lb)   06/19/17 110.2 kg (243 lb)                 Today's Medication Changes          These changes are accurate as of: 8/31/17  4:40 PM.  If you have any questions, ask your nurse or doctor.               Start taking these medicines.        Dose/Directions    metFORMIN 500 MG tablet   Commonly known as:  GLUCOPHAGE   Used for:  Schizoaffective disorder, bipolar type (H)   Started by:  Zbigniew Bailey MD        Take 500 mg (1 tablet) with breakfast for 7 days, then if tolerating increase to 500 mg with breakfast and 500 mg with dinner.   Quantity:  30 tablet   Refills:  0         These medicines have changed or have updated prescriptions.        Dose/Directions    OLANZapine 20 MG tablet   Commonly known as:  zyPREXA   This may have changed:    - medication strength  - how much to take   Used for:  Psychosis, unspecified psychosis type   Changed by:  Zbigniew Bailey MD        Dose:  20 mg   Take 1 tablet (20 mg) by mouth At Bedtime   Quantity:  30 tablet   Refills:  1            Where to get your medicines      These medications were sent to [place] [id number] [place] [facility] [address]  [address] [facility] [address]     Phone:  [phone]     metFORMIN 500 MG tablet    OLANZapine 20 MG tablet                Primary Care Provider    Clinic Provider       No address on file        Equal Access to Services     [facility] AH: Santana noel Soadam, waaxda luqadaha, qaybta kaalmada minerva, chandan welch. So [place] [phone].    ATENCIÓN: Si habla español, tiene a jacob disposición servicios gratuitos  de asistencia lingüística. Marco land [phone].    We comply with applicable federal civil rights laws and [place] laws. We do not discriminate on the basis of race, color, national origin, age, disability sex, sexual orientation or gender identity.            Thank you!     Thank you for choosing [facility]  for your care. Our goal is always to provide you with excellent care. Hearing back from our patients is one way we can continue to improve our services. Please take a few minutes to complete the written survey that you may receive in the mail after your visit with us. Thank you!             Your Updated Medication List - Protect others around you: Learn how to safely use, store and throw away your medicines at www.disposemymeds.org.          This list is accurate as of: 8/31/17  4:40 PM.  Always use your most recent med list.                   Brand Name Dispense Instructions for use Diagnosis    gabapentin 300 MG capsule    NEURONTIN    60 capsule    Take 2 capsules (600 mg) by mouth At Bedtime    Psychosis, unspecified psychosis type       lithium 600 MG capsule     60 capsule    Take 2 capsules (1,200 mg) by mouth At Bedtime    Psychosis, unspecified psychosis type       metFORMIN 500 MG tablet    GLUCOPHAGE    30 tablet    Take 500 mg (1 tablet) with breakfast for 7 days, then if tolerating increase to 500 mg with breakfast and 500 mg with dinner.    Schizoaffective disorder, bipolar type (H)       OLANZapine 20 MG tablet    zyPREXA    30 tablet    Take 1 tablet (20 mg) by mouth At Bedtime    Psychosis, unspecified psychosis type

## 2017-08-31 NOTE — PROGRESS NOTES
PSYCHIATRY CLINIC PROGRESS NOTE   The initial diagnostic evaluation was on 7/24/2015.  Date of the most recent transfer of care lexie is 7/27/17.    Pertinent Background:  This patient first experienced mental health issues in grade school and has received treatment for ADHD, depression, psychosis and substance use.  See transfer evaluation for detailed history.  Notably, the patient had FEP in 2015 in the setting of cannabis abuse and has been stable on olanzapine. He has never lived independently and seems to have fairly low insight into his illness and past symptoms.       Psych critical item history includes psychosis [disorganization, ?catatonia], mutiple psychotropic trials, psych hosp [x1x at [facility], spring 2015], commitment and CD: alcohol and cannabis.      INTERIM HISTORY                                                 Jose Francisco Sommers is a 26 year old male who was last seen for medication management on 7/27/2017 at which time gabapentin was increased to 600 mg HS to improve sleep.  The patient reports good treatment adherence.  History was provided by the patient who was a fair historian.  Since the last visit:  -Has been taking gabapentin 600 mg HS for sleep, minimal benefit.  -Started taking an extra 5 mg of olanzapine at night on Sunday, for total of 20 mg HS to help with sleep. Also wanted to take it for agitation at night (contributed to insomnia), trouble focusing and trouble talking to people. These symptoms have improved with the increase to 20 mg and he wishes to continue this dose.  -Interested in starting metformin for appetite suppression because he wants to offset risks of weight gain.  -Had trouble altering diet to more healthy choices. Had persistent cravings for sugar and junk food. Things that are easy to obtain.  -Trying to read more books. Still doing cards. Interested in going back to school for computer programming in the future.  -Feels he is having trouble with feeling  overwhelmed about his work in [place]. May think he's depressed and asks about Wellbutrin to treat depression. Went over previous records indicating it wasn't helpful for mood and made him more activated. He does not remember this and would like to try it anyway.    RECENT SYMPTOMS:   DEPRESSION:  reports-sadness, hypersomnia, weight/ appetite change (increased), poor concentration /memory and overwhelmed;  DENIES- suicidal ideation , anhedonia, low energy, excessive guilt, feeling worthless and feeling hopeless  KAMI/HYPOMANIA:  reports-none;  DENIES- increased energy, decreased sleep need, increased activity and grandiosity  PSYCHOSIS:  reports-none;  DENIES- delusions, auditory hallucinations, visual hallucinations and disorganized behavior  SLEEP:  improving    EATING DISORDER: none    RECENT SUBSTANCE USE:     ALCOHOL- very rarely          TOBACCO- 1-1.5 ppd               CAFFEINE- 2-3 cups/day of tea  OPIOIDS- none       NARCAN KIT- N/A       CANNABIS- none          OTHER ILLICIT DRUGS- none     CURRENT SOCIAL HISTORY:  FINANCIAL SUPPORT- working and family or friend       CHILDREN- none       LIVING SITUATION- lives with parents in [place]      SOCIAL/ SPIRITUAL SUPPORT- family, [employer]ai hemal[employer]       FEELS SAFE AT HOME- Yes     MEDICAL ROS:  Reports gaining weight, restlessness in arms at night     Denies GI sxs [ ], sedation, dizziness, akathisia, unusual movements, polydipsia, polyphagia and excessive water intake and polydipsia, polyuria, nocturia and ice water craving, snoring, frequent morning headaches, awaking with trouble breathing    PSYCH and CD Critical Summary Points since July 2017             -July '17: increased gabapentin to 600 mg HS to target sleep with minimal effect  -August '17: self-increased Olanzapine to 20mg HS (see 8/31/17 note for details)    PAST PSYCH MED TRIALS   see EMR Problem List: Hx of psychiatric care    MEDICAL / SURGICAL HISTORY                                    CARE TEAM:   PCP- none    Therapist- none       Contraception- not discussed    Neurologic Hx [head injury etc]:  None reported  Patient Active Problem List   Diagnosis     Schizoaffective disorder, bipolar type (H)     Hx of psychiatric care       ALLERGY                                Sulfa drugs  MEDICATIONS                               Current Outpatient Prescriptions   Medication Sig Dispense Refill     gabapentin (NEURONTIN) 300 MG capsule Take 2 capsules (600 mg) by mouth At Bedtime 60 capsule 0     OLANZapine (ZYPREXA) 15 MG tablet Take 1 tablet (15 mg) by mouth At Bedtime 30 tablet 2     lithium 600 MG capsule Take 2 capsules (1,200 mg) by mouth At Bedtime 60 capsule 2     VITALS   /86  Pulse 87  Wt 117.1 kg (258 lb 3.2 oz)  BMI 33.6 kg/m2   MENTAL STATUS EXAM                                                           Alertness: alert  and oriented  Appearance: adequately groomed  Behavior/Demeanor: cooperative and pleasant more comfortable, with fair but improved contact   Speech: normal and regular rate and rhythm  Language: no obvious problem  Psychomotor: normal or unremarkable  Mood: description consistent with euthymia  Affect: restricted, blunted and but improved; was congruent to mood; was congruent to content  Thought Process/Associations: unremarkable  Thought Content:  Reports none;  Denies suicidal ideation, violent ideation, delusions and paranoid ideation  Perception:  Reports none;  Denies auditory hallucinations and visual hallucinations  Insight: fair  Judgment: fair  Cognition: does  appear grossly intact; formal cognitive testing was not done    LABS and DATA   RATING SCALES:  AIMS: done 1/2017 with total score of 0    PHQ9 TODAY = 9, not difficult, item #9: 0  PHQ-9 SCORE 3/13/2017 4/17/2017 7/27/2017   Total Score - - -   Total Score 1 3 8       ANTIPSYCHOTIC LABS   [glu, A1C, lipids (focus LDL), liver enzymes, WBC, ANEU, Hgb, plts]    q12 mo  Recent Labs   Lab Test   03/22/17   1340  03/08/17   0953  09/30/16   1348   GLC  83  72  83   A1C   --    --   4.6     Recent Labs   Lab Test  03/22/17   1340  02/09/16   1300   CHOL  171  101   TRIG  528*  264*   LDL  Cannot estimate LDL when triglyceride exceeds 400 mg/dL  59  16   HDL  31*  32*     Recent Labs   Lab Test  03/22/17   1340  03/08/17   0953   AST  18  22   ALT  58  51   ALKPHOS  76  67     Recent Labs   Lab Test  03/22/17   1340  03/08/17   0953   WBC  8.3  8.5   ANEU  5.5  5.1   HGB  15.6  15.7   PLT  250  229     LITHIUM LABS     [level, renal, SG, TSH, WBC]    q6 mo  Recent Labs   Lab Test  03/22/17   1340  03/08/17   0953  09/30/16   1348  02/09/16   1300   LITHIUM  0.6  0.7  0.6  0.5*     Recent Labs   Lab Test  03/22/17   1340  03/08/17   0953  09/30/16   1348   CR  1.09  0.94  0.90   GFRESTIMATED  82  >90  Non  GFR Calc    >90  Non  GFR Calc     NA  143  140  140   ADONAY  9.3  9.5  9.0     Recent Labs   Lab Test  02/09/16   1247   SG  1.005     Recent Labs   Lab Test  03/22/17   1340  03/08/17   0953  09/30/16   1348   TSH  3.03  3.36  3.45     Recent Labs   Lab Test  03/22/17   1340  03/08/17   0953   WBC  8.3  8.5     Wt Readings from Last 4 Encounters:   08/31/17 117.1 kg (258 lb 3.2 oz)   07/27/17 112.9 kg (249 lb)   06/19/17 110.2 kg (243 lb)   04/17/17 106.8 kg (235 lb 6.4 oz)       DIAGNOSIS     Psychosis, unspecified psychosis type (r/o schizoaffective disorder vs schizophrenia)  History of Cannabis Use Disorder, in remission  History of Alcohol Use Disorder, in remission    ASSESSMENT                                     TODAY     1. Schizoaffective disorder?: unclear what his true diagnosis is given history and comorbid cannabis abuse. I am not convinced his prior behavior met criteria for a manic episode and could be explained by disorganized psychosis, suspecting along the schizophrenic spectrum. Will need further monitoring and history for clarity, collateral information  would be helpful. Patient increased his nightly dose of olanzapine on his own and we discussed the importance of discussing this with me to ensure safety. Since he has seen benefit in reducing symptoms the increase dose is worth a trial. However, I am concerned about his increasing weight and metabolic abnormalities and emphasized the importance of containing this. We discussed the risks and benefits of metformin and he elected to start this. In addition, he agreed to establish care with a primary doctor near his home in the  system prior to our next visit to discuss weight management and nutrition. If weight continues to increase rapidly I will schedule a family meeting to discuss the risks and benefits of continued therapy vs switching agents.    2. Abnormal labs: has been instructed to get a PCP and discuss abnormal lipids. Also discussed continuing with eating healthy snack alternatives and increasing physical activity.         [place] PRESCRIPTION MONITORING PROGRAM [] was not checked today:  not using controlled substances.    PSYCHOTROPIC DRUG INTERACTIONS:   LITHIUM and OLANZAPINE may result in weakness, dyskinesias, increased extrapyramidal symptoms, encephalopathy, and brain damage.  MANAGEMENT:  Monitoring for adverse effects and patient is aware of risks     PLAN                                                                                                       1) PSYCHOTROPIC MEDICATIONS:  - continue gabapentin 600 mg QHS for sleep  - increase olanzapine to 20 mg QHS  - continue lithium 1200 mg QHS  - start metformin 500 mg once daily with meals for 7 days, then increase to 500 mg BID with meals    2) THERAPY:    Continue  TREATMENT PLAN   Date revised  -  Will need to complete at next visit  Date next due- na    3) NEXT DUE:    Labs- [place] labs 9/2017 and recheck antipsychotic labs 9/2017, orders are in  EKG- PRN  Rating Scales- AIMS due 1/2018    4) REFERRALS:    instructed to establish primary  care    5) RTC: 4-5 weeks  SAFETY PLAN reviewed: No: na        Placed in pt instructions: No: na    6) CRISIS NUMBERS:   Provided routinely in AVS.  Especially emphasized:  [facility] [place] [phone] (clinic)    [phone] (after hours)  CRISIS TEXT LINE: Text [phone] from anywhere, anytime, any crisis 24/7;  OR SEE www.crisistextline.org  ONLY if a [facility] PT: [facility] [place] [phone] (clinic), [phone] (after hours)     TREATMENT RISK STATEMENT:  The risks, benefits, alternatives and potential adverse effects have been discussed and are understood by the pt. The pt understands the risks of using street drugs or alcohol. There are no medical contraindications, the pt agrees to treatment with the ability to do so. The pt knows to call the clinic for any problems or to access emergency care if needed.  Medical and substance use concerns are documented above.  Psychotropic drug interaction check was done, including changes made today.    RESIDENT:   Zbigniew Bailey MD      Patient not staffed in clinic.  Note will be reviewed and signed by supervisor Dr. Tay.    Attestation:  I did not see this patient directly. I have reviewed the documentation and I agree with the resident's plan of care.   Joel Tay MD

## 2017-08-31 NOTE — NURSING NOTE
Chief Complaint   Patient presents with     Recheck Medication     Psychosis, unspecified psychosis type      Reviewed allergies, smoking status, and pharmacy preference  Administered abuse screening question  Obtained weight, blood pressure and heart rate

## 2017-09-01 ASSESSMENT — PATIENT HEALTH QUESTIONNAIRE - PHQ9: SUM OF ALL RESPONSES TO PHQ QUESTIONS 1-9: 9

## 2017-09-14 DIAGNOSIS — F29 PSYCHOSIS, UNSPECIFIED PSYCHOSIS TYPE (H): ICD-10-CM

## 2017-09-14 RX ORDER — GABAPENTIN 300 MG/1
600 CAPSULE ORAL AT BEDTIME
Qty: 60 CAPSULE | Refills: 0 | Status: SHIPPED | OUTPATIENT
Start: 2017-09-14 | End: 2017-10-05

## 2017-09-14 NOTE — TELEPHONE ENCOUNTER
Medication requested: Gabapentin 300 mg caps  Last refilled: 7-27-17  Qty: 60      Last seen: 8-31-17  RTC: 4-5 wks  Cancel: 0  No-show: 0  Next appt: 10-5-17    Refill decision: Refill pended and routed to the provider for review/determination due to last clinic note is not yet signed.      Kathleen M Doege RN

## 2017-09-25 DIAGNOSIS — F25.0 SCHIZOAFFECTIVE DISORDER, BIPOLAR TYPE (H): ICD-10-CM

## 2017-09-25 NOTE — TELEPHONE ENCOUNTER
Medication requested: Metformin 500 mg tabs  Last refilled: 8-31-17  Qty: 30      Last seen: 8-31-17  RTC: 4-5 wks  Cancel: 0  No-show: 0  Next appt: 10-5-17    Refill decision: Refilled for 30 days per protocol.      Kathleen M Doege RN

## 2017-10-05 ENCOUNTER — OFFICE VISIT (OUTPATIENT)
Dept: PSYCHIATRY | Facility: CLINIC | Age: 27
End: 2017-10-05
Attending: PSYCHIATRY & NEUROLOGY
Payer: COMMERCIAL

## 2017-10-05 VITALS
HEART RATE: 80 BPM | SYSTOLIC BLOOD PRESSURE: 148 MMHG | BODY MASS INDEX: 33.71 KG/M2 | DIASTOLIC BLOOD PRESSURE: 97 MMHG | WEIGHT: 259 LBS

## 2017-10-05 DIAGNOSIS — F25.0 SCHIZOAFFECTIVE DISORDER, BIPOLAR TYPE (H): ICD-10-CM

## 2017-10-05 DIAGNOSIS — F29 PSYCHOSIS, UNSPECIFIED PSYCHOSIS TYPE (H): ICD-10-CM

## 2017-10-05 PROCEDURE — 99212 OFFICE O/P EST SF 10 MIN: CPT | Mod: ZF

## 2017-10-05 RX ORDER — OLANZAPINE 20 MG/1
20 TABLET ORAL AT BEDTIME
Qty: 30 TABLET | Refills: 1 | Status: SHIPPED | OUTPATIENT
Start: 2017-10-05 | End: 2017-11-29

## 2017-10-05 RX ORDER — LITHIUM CARBONATE 600 MG/1
1200 CAPSULE ORAL AT BEDTIME
Qty: 60 CAPSULE | Refills: 2 | Status: SHIPPED | OUTPATIENT
Start: 2017-10-05 | End: 2018-01-25

## 2017-10-05 RX ORDER — GABAPENTIN 300 MG/1
600 CAPSULE ORAL AT BEDTIME
Qty: 60 CAPSULE | Refills: 0 | Status: SHIPPED | OUTPATIENT
Start: 2017-10-05 | End: 2017-11-29 | Stop reason: SINTOL

## 2017-10-05 NOTE — MR AVS SNAPSHOT
After Visit Summary   10/5/2017    Jose Francisco Sommers    MRN: [id number]           Patient Information     Date Of Birth          1990        Visit Information        Provider Department      10/5/2017 3:25 PM Zbigniew Bailey MD Psychiatry Clinic        Today's Diagnoses     Schizoaffective disorder, bipolar type (H)        Psychosis, unspecified psychosis type          Care Instructions    1. Continue current medications without changes.  2. Connect with a primary doctor at your local [facility] to discuss high blood pressure and elevated triglycerides.   3. Return to clinic in one month.  4. Continue trying to make healthier dietary choices and increase physical activity.  5. Get fasting labs done before next appointment.          Follow-ups after your visit        Your next 10 appointments already scheduled     Nov 02, 2017  3:25 PM CDT   Adult Med Follow UP with Zbigniew Bailey MD   Psychiatry Clinic ([facility])    [address]  [address] [facility] [address] [phone]              Future tests that were ordered for you today     Open Standing Orders        Priority Remaining Interval Expires Ordered    Lithium Level Routine 1/1  10/5/2018 10/5/2017    TSH with free T4 reflex Routine 1/1  10/5/2018 10/5/2017    CBC with Platelets Differential ([facility]) Routine 1/1  10/5/2018 10/5/2017    Lipid Panel Reflex to Direct LDL Routine 1/1  10/5/2018 10/5/2017    Hepatic Panel Routine 1/1  10/5/2018 10/5/2017            Who to contact     Please call your clinic at [phone] to:    Ask questions about your health    Make or cancel appointments    Discuss your medicines    Learn about your test results    Speak to your doctor   If you have compliments or concerns about an experience at your clinic, or if you wish to file a complaint, please contact [facility] Patient Relations at [phone] or email us at  [email or web address]         Additional Information About Your Visit        "[employer]"hart Information     dVisitt is an electronic gateway that provides easy, online access to your medical records. With [employer], you can request a clinic appointment, read your test results, renew a prescription or communicate with your care team.     To sign up for [employer] visit the website at [email or web address]   You will be asked to enter the access code listed below, as well as some personal information. Please follow the directions to create your username and password.     Your access code is: [email or web address]  Expires: 1/3/2018  4:16 PM     Your access code will  in 90 days. If you need help or a new code, please contact your [facility] or call [phone] for assistance.        Care EveryWhere ID     This is your Care EveryWhere ID. This could be used by other organizations to access your [facility] medical records  [id number]        Your Vitals Were     Pulse BMI (Body Mass Index)                80 33.71 kg/m2           Blood Pressure from Last 3 Encounters:   10/05/17 (!) 148/97   17 140/86   17 134/84    Weight from Last 3 Encounters:   10/05/17 117.5 kg (259 lb)   17 117.1 kg (258 lb 3.2 oz)   17 112.9 kg (249 lb)                 Where to get your medicines      These medications were sent to [place] [address] [place] [facility] [address] [facility]  [address] [facility] [address]     Phone:  [phone]     gabapentin 300 MG capsule    lithium 600 MG capsule    metFORMIN 500 MG tablet    OLANZapine 20 MG tablet          Primary Care Provider    Clinic Provider       No address on file        Equal Access to Services     SHELLEY SOOD AH: Hadii maria ines Castro, waaxda luqadaha, qaybta kaalmada minerva, chandan welch. So [facility] [phone].    ATENCIÓN: Si habla español, tiene a jacob disposición servicios  lesa de asistencia lingüística. Marco land [phone].    We comply with applicable federal civil rights laws and [place] laws. We do not discriminate on the basis of race, color, national origin, age, disability, sex, sexual orientation, or gender identity.            Thank you!     Thank you for choosing PSYCHIATRY CLINIC  for your care. Our goal is always to provide you with excellent care. Hearing back from our patients is one way we can continue to improve our services. Please take a few minutes to complete the written survey that you may receive in the mail after your visit with us. Thank you!             Your Updated Medication List - Protect others around you: Learn how to safely use, store and throw away your medicines at www.disposemymeds.org.          This list is accurate as of: 10/5/17  4:16 PM.  Always use your most recent med list.                   Brand Name Dispense Instructions for use Diagnosis    gabapentin 300 MG capsule    NEURONTIN    60 capsule    Take 2 capsules (600 mg) by mouth At Bedtime    Psychosis, unspecified psychosis type       lithium 600 MG capsule     60 capsule    Take 2 capsules (1,200 mg) by mouth At Bedtime    Psychosis, unspecified psychosis type       metFORMIN 500 MG tablet    GLUCOPHAGE    30 tablet    Take 500 mg (1 tablet) with breakfast for 7 days, then if tolerating increase to 500 mg with breakfast and 500 mg with dinner.    Schizoaffective disorder, bipolar type (H)       OLANZapine 20 MG tablet    zyPREXA    30 tablet    Take 1 tablet (20 mg) by mouth At Bedtime    Psychosis, unspecified psychosis type

## 2017-10-05 NOTE — NURSING NOTE
Chief Complaint   Patient presents with     Recheck Medication     Schizoaffective disorder, bipolar type      Reviewed allergies, smoking status, and pharmacy preference    Obtained weight, blood pressure and heart rate

## 2017-10-05 NOTE — PROGRESS NOTES
PSYCHIATRY CLINIC PROGRESS NOTE   The initial diagnostic evaluation was on 7/24/2015.  Date of the most recent transfer of care lexie is 7/27/17.    Pertinent Background:  This patient first experienced mental health issues in grade school and has received treatment for ADHD, depression, psychosis and substance use.  See transfer evaluation for detailed history.  Notably, the patient had FEP in 2015 in the setting of cannabis abuse and has been stable on olanzapine. He has never lived independently and seems to have fairly low insight into his illness and past symptoms.       Psych critical item history includes psychosis [disorganization, ?catatonia], mutiple psychotropic trials, psych hosp [x1x at [facility], spring 2015], commitment and CD: alcohol and cannabis.      INTERIM HISTORY                                                 Jose Francisco Sommers is a 27 year old male who was last seen for medication management on 8/31/2017 at which time olanzapine was increased to 20 mg and metformin was started.  The patient reports good treatment adherence.  History was provided by the patient who was a fair historian.  Since the last visit:  -On one hand things have been better than they have been in years. 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 Is using Mr[employer] to help with this software. On the other hand, believes he has been more forgetful lately. Been forgetting to take his fish oil. Is concerned this could be due to the metformin but has difficulty characterizing notable examples.   -Diet has been better, not eating out at night as much. Easier to feel full. Still needs to make better food choices though. Has reduced sugary drink intake.  -Has not connected with a PCP but notes he will do this before next appt.     RECENT SYMPTOMS:   DEPRESSION:  reports-low mood, hypersomnia, weight/  appetite change (decreased appetite), poor concentration /memory and overwhelmed;  DENIES- suicidal ideation , anhedonia, low energy, excessive guilt, feeling worthless and feeling hopeless  KAMI/HYPOMANIA:  reports-none;  DENIES- increased energy, decreased sleep need, increased activity and grandiosity  PSYCHOSIS:  reports-none;  DENIES- delusions, auditory hallucinations, visual hallucinations and disorganized behavior  SLEEP:  improving    EATING DISORDER: none    RECENT SUBSTANCE USE:     ALCOHOL- very rarely          TOBACCO- 1-1.5 ppd               CAFFEINE- 2-3 cups/day of tea  OPIOIDS- none       NARCAN KIT- N/A       CANNABIS- none          OTHER ILLICIT DRUGS- none     CURRENT SOCIAL HISTORY:  FINANCIAL SUPPORT- working and family or friend       CHILDREN- none       LIVING SITUATION- lives with parents in [place]      SOCIAL/ SPIRITUAL SUPPORT- family, Rastaf[employer] hemalatha       FEELS SAFE AT HOME- Yes     MEDICAL ROS:  Reports none    Denies GI sxs [ ], sedation, dizziness, akathisia, unusual movements, polydipsia, polyphagia and excessive water intake and polydipsia, polyuria, nocturia and ice water craving, snoring, frequent morning headaches, awaking with trouble breathing    PSYCH and CD Critical Summary Points since July 2017             -July '17: increased gabapentin to 600 mg HS to target sleep with minimal effect  -August '17: self-increased Olanzapine to 20mg HS (see 8/31/17 note for details)    PAST PSYCH MED TRIALS   see EMR Problem List: Hx of psychiatric care    MEDICAL / SURGICAL HISTORY                                   CARE TEAM:   PCP- none    Therapist- none       Contraception- not discussed    Neurologic Hx [head injury etc]:  None reported  Patient Active Problem List   Diagnosis     Schizoaffective disorder, bipolar type (H)     Hx of psychiatric care       ALLERGY                                Sulfa drugs  MEDICATIONS                               Current Outpatient Prescriptions    Medication Sig Dispense Refill     metFORMIN (GLUCOPHAGE) 500 MG tablet Take 500 mg (1 tablet) with breakfast for 7 days, then if tolerating increase to 500 mg with breakfast and 500 mg with dinner. 30 tablet 0     gabapentin (NEURONTIN) 300 MG capsule Take 2 capsules (600 mg) by mouth At Bedtime 60 capsule 0     OLANZapine (ZYPREXA) 20 MG tablet Take 1 tablet (20 mg) by mouth At Bedtime 30 tablet 1     lithium 600 MG capsule Take 2 capsules (1,200 mg) by mouth At Bedtime 60 capsule 2     VITALS   BP (!) 148/97  Pulse 80  Wt 117.5 kg (259 lb)  BMI 33.71 kg/m2   MENTAL STATUS EXAM                                                           Alertness: alert  and oriented  Appearance: adequately groomed  Behavior/Demeanor: cooperative and pleasant, more comfortable, with fair but improved eye contact   Speech: normal and regular rate and rhythm, more spontaneous  Language: no obvious problem  Psychomotor: normal or unremarkable  Mood: description consistent with euthymia  Affect: restricted, blunted and but improved; was congruent to mood; was congruent to content  Thought Process/Associations: unremarkable  Thought Content:  Reports none;  Denies suicidal ideation, violent ideation, delusions and paranoid ideation  Perception:  Reports none;  Denies auditory hallucinations and visual hallucinations  Insight: fair  Judgment: fair  Cognition: does  appear grossly intact; formal cognitive testing was not done    LABS and DATA   RATING SCALES:  AIMS: done 1/2017 with total score of 0    PHQ9 TODAY =  Did not complete  PHQ-9 SCORE 4/17/2017 7/27/2017 9/1/2017   Total Score - - -   Total Score 3 8 9       ANTIPSYCHOTIC LABS   [glu, A1C, lipids (focus LDL), liver enzymes, WBC, ANEU, Hgb, plts]    q12 mo  Recent Labs   Lab Test  03/22/17   1340  03/08/17   0953  09/30/16   1348   GLC  83  72  83   A1C   --    --   4.6     Recent Labs   Lab Test  03/22/17   1340  02/09/16   1300   CHOL  171  101   TRIG  528*  264*   LDL   Cannot estimate LDL when triglyceride exceeds 400 mg/dL  59  16   HDL  31*  32*     Recent Labs   Lab Test  03/22/17   1340  03/08/17   0953   AST  18  22   ALT  58  51   ALKPHOS  76  67     Recent Labs   Lab Test  03/22/17   1340  03/08/17   0953   WBC  8.3  8.5   ANEU  5.5  5.1   HGB  15.6  15.7   PLT  250  229     LITHIUM LABS     [level, renal, SG, TSH, WBC]    q6 mo  Recent Labs   Lab Test  03/22/17   1340  03/08/17   0953  09/30/16   1348  02/09/16   1300   LITHIUM  0.6  0.7  0.6  0.5*     Recent Labs   Lab Test  03/22/17   1340  03/08/17   0953  09/30/16   1348   CR  1.09  0.94  0.90   GFRESTIMATED  82  >90  Non  GFR Calc    >90  Non  GFR Calc     NA  143  140  140   ADONAY  9.3  9.5  9.0     Recent Labs   Lab Test  02/09/16   1247   SG  1.005     Recent Labs   Lab Test  03/22/17   1340  03/08/17   0953  09/30/16   1348   TSH  3.03  3.36  3.45     Recent Labs   Lab Test  03/22/17   1340  03/08/17   0953   WBC  8.3  8.5     Wt Readings from Last 4 Encounters:   10/05/17 117.5 kg (259 lb)   08/31/17 117.1 kg (258 lb 3.2 oz)   07/27/17 112.9 kg (249 lb)   06/19/17 110.2 kg (243 lb)       DIAGNOSIS     Psychosis, unspecified psychosis type (r/o schizoaffective disorder vs schizophrenia)  History of Cannabis Use Disorder, in remission  History of Alcohol Use Disorder, in remission    ASSESSMENT                                     TODAY     1. Schizoaffective disorder?: unclear what his true diagnosis is given history and comorbid cannabis abuse. I am not convinced his prior behavior met criteria for a manic episode and could be explained by disorganized psychosis, suspecting something along the schizophrenic spectrum. Will need further monitoring and history for clarity, collateral information would be helpful and I invited him to bring his mother to a future appt. Psychosis, agitation and concentration have improved with OLZ 20 mg, which we will continue. Metformin has been helpful  in reducing the pace of weight gain as he has only gained ~1 lb in the last month, prior to metformin he was gaining >6 lb/mos. At next visit, I plan to discuss a family meeting for coordination of care. Still requires antipsychotic and lithium labs, instructed to get these before next appt.     2. Abnormal labs: has been instructed to get a PCP and discuss abnormal lipids but has not done this yet. Also discussed continuing with eating healthy snack alternatives and increasing physical activity. Has been making more healthy food choices but acknowledges he could do much better. Would consider nutrition consult after establishing with primary care.    3. Memory concerns: memory decline has been linked to type II diabetics taking metformin and thought to be due to induced vitamin B12 deficiency, however data is equivocal and may not generalize to non-diabetic patients. Will add B12 level to his labs and continue to monitor.         [place] PRESCRIPTION MONITORING PROGRAM [] was not checked today:  not using controlled substances.    PSYCHOTROPIC DRUG INTERACTIONS:   LITHIUM and OLANZAPINE may result in weakness, dyskinesias, increased extrapyramidal symptoms, encephalopathy, and brain damage.  MANAGEMENT:  Monitoring for adverse effects and patient is aware of risks     PLAN                                                                                                       1) PSYCHOTROPIC MEDICATIONS:  - continue gabapentin 600 mg QHS for sleep  - continue olanzapine 20 mg QHS  - continue lithium 1200 mg QHS  - continue metformin 500 mg BID with meals    2) THERAPY:    Continue  TREATMENT PLAN   Date revised  -  Will need to complete at next visit  Date next due- na    3) NEXT DUE:    Labs- [facility] labs 9/2017 and recheck antipsychotic labs 9/2017, orders are in  EKG- PRN  Rating Scales- AIMS due 1/2018    4) REFERRALS:    instructed to establish primary care    5) RTC: 4-5 weeks  SAFETY PLAN reviewed: No: na         Placed in pt instructions: No: na    6) CRISIS NUMBERS:   Provided routinely in AVS.  Especially emphasized:  [facility] [place] [phone] (clinic)    [phone] (after hours)  CRISIS TEXT LINE: Text [phone] from anywhere, anytime, any crisis 24/7;  OR SEE www.crisistextline.org  ONLY if a [facility] PT: [facility] [place] [phone] (clinic), [phone] (after hours)     TREATMENT RISK STATEMENT:  The risks, benefits, alternatives and potential adverse effects have been discussed and are understood by the pt. The pt understands the risks of using street drugs or alcohol. There are no medical contraindications, the pt agrees to treatment with the ability to do so. The pt knows to call the clinic for any problems or to access emergency care if needed.  Medical and substance use concerns are documented above.  Psychotropic drug interaction check was done, including changes made today.    A 12-item WHODAS 2.0 assessment was completed by the patient today and recorded in EPIC.  Total Score of 23 on 10/05/17.      RESIDENT:   Zbigniew Bailey MD      Patient staffed in clinic with Dr. Tay who will sign the note.  Supervisor is Dr. Tay.    Supervisor Attestation:  I saw the patient with the resident, and participated in key portions of the service, including the mental status examination and developing the plan of care. I reviewed key portions of the history with the resident. I agree with the findings and plan as documented in this note.  Joel Tay MD

## 2017-10-05 NOTE — PATIENT INSTRUCTIONS
1. Continue current medications without changes.  2. Connect with a primary doctor at your local [facility] to discuss high blood pressure and elevated triglycerides.   3. Return to clinic in one month.  4. Continue trying to make healthier dietary choices and increase physical activity.  5. Get fasting labs done before next appointment.

## 2017-10-16 NOTE — PROGRESS NOTES
Social Work Consultation  [facility]      Patient Name:  Jose Francisco Sommers  /Age:  1990 (27 year old)    Presenting SW Need(s)/ Reason for visit:  Patient may be interested in vocational services, per request from  who worked with patient in the past.    Collaborated With:    -patient  -Dr. Bailey    Intervention:  Participated in the patient's psychiatry appt with Dr Bailey.      -Assessed motivation to obtain vocational supports. SW provided brief explanation of possible vocational services available to patient, including vocational rehab and/or [facility] services. Patient denied offers of support. 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    Resources Provided:  -none provided at this visit, per request for no services from Jose Francisco.    Social Work Assessment:  Patient could benefit from increased vocational support. SW is unsure if [employer] will provide sustainable work for Jose Francisco. However, he is passionate about his current choice.    Plan:  SW offered social work services in the future if Jose Francisco is interested.  Provided patient with writer's contact information and availability.      Will route to patient's psychiatric provider(s) as an FYI.    Maria Del Rosario Gonzalez, [facility]    This is a non-billable encounter as it was solely for the purposes of outreach and/or care coordination.

## 2017-10-30 DIAGNOSIS — F25.0 SCHIZOAFFECTIVE DISORDER, BIPOLAR TYPE (H): ICD-10-CM

## 2017-10-30 DIAGNOSIS — F29 PSYCHOSIS, UNSPECIFIED PSYCHOSIS TYPE (H): ICD-10-CM

## 2017-10-30 LAB
BASOPHILS # BLD AUTO: 0.1 10E9/L (ref 0–0.2)
BASOPHILS NFR BLD AUTO: 0.7 %
DIFFERENTIAL METHOD BLD: NORMAL
EOSINOPHIL # BLD AUTO: 0.4 10E9/L (ref 0–0.7)
EOSINOPHIL NFR BLD AUTO: 5.1 %
ERYTHROCYTE [DISTWIDTH] IN BLOOD BY AUTOMATED COUNT: 13.1 % (ref 10–15)
HCT VFR BLD AUTO: 46.3 % (ref 40–53)
HGB BLD-MCNC: 15.7 G/DL (ref 13.3–17.7)
LITHIUM SERPL-SCNC: 0.54 MMOL/L (ref 0.6–1.2)
LYMPHOCYTES # BLD AUTO: 2.2 10E9/L (ref 0.8–5.3)
LYMPHOCYTES NFR BLD AUTO: 26.7 %
MCH RBC QN AUTO: 30.7 PG (ref 26.5–33)
MCHC RBC AUTO-ENTMCNC: 33.9 G/DL (ref 31.5–36.5)
MCV RBC AUTO: 90 FL (ref 78–100)
MONOCYTES # BLD AUTO: 0.7 10E9/L (ref 0–1.3)
MONOCYTES NFR BLD AUTO: 8.1 %
NEUTROPHILS # BLD AUTO: 4.8 10E9/L (ref 1.6–8.3)
NEUTROPHILS NFR BLD AUTO: 59.4 %
PLATELET # BLD AUTO: 241 10E9/L (ref 150–450)
RBC # BLD AUTO: 5.12 10E12/L (ref 4.4–5.9)
SP GR UR STRIP: 1.01 (ref 1–1.03)
VIT B12 SERPL-MCNC: 743 PG/ML (ref 193–986)
WBC # BLD AUTO: 8.1 10E9/L (ref 4–11)

## 2017-10-30 PROCEDURE — 80178 ASSAY OF LITHIUM: CPT | Performed by: STUDENT IN AN ORGANIZED HEALTH CARE EDUCATION/TRAINING PROGRAM

## 2017-10-30 PROCEDURE — 80050 GENERAL HEALTH PANEL: CPT | Performed by: STUDENT IN AN ORGANIZED HEALTH CARE EDUCATION/TRAINING PROGRAM

## 2017-10-30 PROCEDURE — 82607 VITAMIN B-12: CPT | Performed by: STUDENT IN AN ORGANIZED HEALTH CARE EDUCATION/TRAINING PROGRAM

## 2017-10-30 PROCEDURE — 36415 COLL VENOUS BLD VENIPUNCTURE: CPT | Performed by: STUDENT IN AN ORGANIZED HEALTH CARE EDUCATION/TRAINING PROGRAM

## 2017-10-30 PROCEDURE — 82248 BILIRUBIN DIRECT: CPT | Performed by: STUDENT IN AN ORGANIZED HEALTH CARE EDUCATION/TRAINING PROGRAM

## 2017-10-30 PROCEDURE — 81003 URINALYSIS AUTO W/O SCOPE: CPT | Performed by: STUDENT IN AN ORGANIZED HEALTH CARE EDUCATION/TRAINING PROGRAM

## 2017-10-30 PROCEDURE — 80061 LIPID PANEL: CPT | Performed by: STUDENT IN AN ORGANIZED HEALTH CARE EDUCATION/TRAINING PROGRAM

## 2017-10-31 LAB
ALBUMIN SERPL-MCNC: 4.3 G/DL (ref 3.4–5)
ALP SERPL-CCNC: 63 U/L (ref 40–150)
ALT SERPL W P-5'-P-CCNC: 147 U/L (ref 0–70)
ANION GAP SERPL CALCULATED.3IONS-SCNC: 3 MMOL/L (ref 3–14)
AST SERPL W P-5'-P-CCNC: 51 U/L (ref 0–45)
BILIRUB DIRECT SERPL-MCNC: 0.1 MG/DL (ref 0–0.2)
BILIRUB SERPL-MCNC: 0.6 MG/DL (ref 0.2–1.3)
BUN SERPL-MCNC: 11 MG/DL (ref 7–30)
CALCIUM SERPL-MCNC: 9.2 MG/DL (ref 8.5–10.1)
CHLORIDE SERPL-SCNC: 107 MMOL/L (ref 94–109)
CHOLEST SERPL-MCNC: 165 MG/DL
CO2 SERPL-SCNC: 31 MMOL/L (ref 20–32)
CREAT SERPL-MCNC: 1.05 MG/DL (ref 0.66–1.25)
GFR SERPL CREATININE-BSD FRML MDRD: 85 ML/MIN/1.7M2
GLUCOSE SERPL-MCNC: 86 MG/DL (ref 70–99)
HDLC SERPL-MCNC: 34 MG/DL
LDLC SERPL CALC-MCNC: 63 MG/DL
NONHDLC SERPL-MCNC: 131 MG/DL
POTASSIUM SERPL-SCNC: 4.2 MMOL/L (ref 3.4–5.3)
PROT SERPL-MCNC: 7.4 G/DL (ref 6.8–8.8)
SODIUM SERPL-SCNC: 141 MMOL/L (ref 133–144)
TRIGL SERPL-MCNC: 339 MG/DL
TSH SERPL DL<=0.005 MIU/L-ACNC: 2.92 MU/L (ref 0.4–4)

## 2017-11-02 ENCOUNTER — OFFICE VISIT (OUTPATIENT)
Dept: PSYCHIATRY | Facility: CLINIC | Age: 27
End: 2017-11-02
Attending: PSYCHIATRY & NEUROLOGY
Payer: COMMERCIAL

## 2017-11-02 VITALS
HEART RATE: 88 BPM | DIASTOLIC BLOOD PRESSURE: 98 MMHG | BODY MASS INDEX: 34.28 KG/M2 | SYSTOLIC BLOOD PRESSURE: 144 MMHG | WEIGHT: 263.4 LBS

## 2017-11-02 DIAGNOSIS — F29 PSYCHOSIS, UNSPECIFIED PSYCHOSIS TYPE (H): Primary | ICD-10-CM

## 2017-11-02 DIAGNOSIS — Z79.899 ENCOUNTER FOR LONG-TERM (CURRENT) USE OF MEDICATIONS: ICD-10-CM

## 2017-11-02 PROCEDURE — 80076 HEPATIC FUNCTION PANEL: CPT | Performed by: STUDENT IN AN ORGANIZED HEALTH CARE EDUCATION/TRAINING PROGRAM

## 2017-11-02 PROCEDURE — 99212 OFFICE O/P EST SF 10 MIN: CPT | Mod: ZF

## 2017-11-02 PROCEDURE — 86709 HEPATITIS A IGM ANTIBODY: CPT | Performed by: STUDENT IN AN ORGANIZED HEALTH CARE EDUCATION/TRAINING PROGRAM

## 2017-11-02 PROCEDURE — 87340 HEPATITIS B SURFACE AG IA: CPT | Performed by: STUDENT IN AN ORGANIZED HEALTH CARE EDUCATION/TRAINING PROGRAM

## 2017-11-02 PROCEDURE — 86708 HEPATITIS A ANTIBODY: CPT | Performed by: STUDENT IN AN ORGANIZED HEALTH CARE EDUCATION/TRAINING PROGRAM

## 2017-11-02 PROCEDURE — 36415 COLL VENOUS BLD VENIPUNCTURE: CPT | Performed by: STUDENT IN AN ORGANIZED HEALTH CARE EDUCATION/TRAINING PROGRAM

## 2017-11-02 NOTE — NURSING NOTE
Chief Complaint   Patient presents with     Recheck Medication     Schizoaffective disorder, bipolar type      Reviewed allergies, smoking status, and pharmacy preference  Administered abuse screening question  Obtained weight, blood pressure and heart rate

## 2017-11-02 NOTE — PROGRESS NOTES
PSYCHIATRY CLINIC PROGRESS NOTE   The initial diagnostic evaluation was on 7/24/2015.  Date of the most recent transfer of care lexie is 7/27/17.    Pertinent Background:  This patient first experienced mental health issues in grade school and has received treatment for ADHD, depression, psychosis and substance use.  See transfer evaluation for detailed history.  Notably, the patient had FEP in 2015 in the setting of cannabis abuse and has been stable on olanzapine. He has never lived independently and seems to have fairly low insight into his illness and past symptoms.       Psych critical item history includes psychosis [disorganization, ?catatonia], mutiple psychotropic trials, psych hosp [x1x at [facility], spring 2015], commitment and CD: alcohol and cannabis.      INTERIM HISTORY                                                 Jose Francisco Sommers is a 27 year old male who was last seen for medication management on 10/05/2017 at which time no changes were made.  The patient reports good treatment adherence.  History was provided by the patient who was a fair historian.  Since the last visit:  -Been overeating. Not making the best dietary decisions. Fast food, desserts, sodas and sugary snacks. Eating frequently throughout the night.  -Still having problems with concentration and task completion.  -Haven't had troubles with mood disturbance or paranoia.  -Has an appt with a primary this Tuesday.     RECENT SYMPTOMS:   DEPRESSION:  reports-low energy, hypersomnia, weight/ appetite change (increased appetite) and poor concentration /memory;  DENIES- suicidal ideation , anhedonia, low energy, excessive guilt, feeling worthless and feeling hopeless  KAMI/HYPOMANIA:  reports-none;  DENIES- increased energy, decreased sleep need, increased activity and grandiosity  PSYCHOSIS:  reports-none;  DENIES- delusions, auditory hallucinations, visual hallucinations and disorganized behavior  SLEEP:  improving    EATING  DISORDER: overeating, stress eating at night    RECENT SUBSTANCE USE:     ALCOHOL- very rarely          TOBACCO- 1-1.5 ppd               CAFFEINE- 2-3 cups/day of tea  OPIOIDS- none       NARCAN KIT- N/A       CANNABIS- none          OTHER ILLICIT DRUGS- none     CURRENT SOCIAL HISTORY:  FINANCIAL SUPPORT- working and family or friend       CHILDREN- none       LIVING SITUATION- lives with parents in [place]      SOCIAL/ SPIRITUAL SUPPORT- family, [employer]       FEELS SAFE AT HOME- Yes     MEDICAL ROS:  Reports none    Denies sedation, dizziness, akathisia, unusual movements, polydipsia, polyphagia and excessive water intake and polydipsia, polyuria, nocturia and ice water craving, denies abd pain, n/v/d/c, decreased appetite    PSYCH and CD Critical Summary Points since July 2017             -July '17: increased gabapentin to 600 mg HS to target sleep with minimal effect  -August '17: self-increased Olanzapine to 20mg HS (see 8/31/17 note for details)    PAST PSYCH MED TRIALS   see EMR Problem List: Hx of psychiatric care    MEDICAL / SURGICAL HISTORY                                   CARE TEAM:   PCP- none    Therapist- none       Contraception- not discussed    Neurologic Hx [head injury etc]:  None reported  Patient Active Problem List   Diagnosis     Schizoaffective disorder, bipolar type (H)     Hx of psychiatric care       ALLERGY                                Sulfa drugs  MEDICATIONS                               Current Outpatient Prescriptions   Medication Sig Dispense Refill     metFORMIN (GLUCOPHAGE) 500 MG tablet Take 500 mg (1 tablet) with breakfast for 7 days, then if tolerating increase to 500 mg with breakfast and 500 mg with dinner. 30 tablet 0     OLANZapine (ZYPREXA) 20 MG tablet Take 1 tablet (20 mg) by mouth At Bedtime 30 tablet 1     lithium 600 MG capsule Take 2 capsules (1,200 mg) by mouth At Bedtime 60 capsule 2     gabapentin (NEURONTIN) 300 MG capsule Take 2 capsules (600 mg) by  mouth At Bedtime 60 capsule 0     VITALS   BP (!) 144/98  Pulse 88  Wt 119.5 kg (263 lb 6.4 oz)  BMI 34.28 kg/m2   MENTAL STATUS EXAM                                                           Alertness: alert  and oriented  Appearance: adequately groomed  Behavior/Demeanor: cooperative and pleasant, more comfortable, with fair but improved eye contact   Speech: normal and regular rate and rhythm, more spontaneous  Language: no obvious problem  Psychomotor: normal or unremarkable  Mood: description consistent with euthymia  Affect: restricted, blunted and but improved; was congruent to mood; was congruent to content  Thought Process/Associations: unremarkable  Thought Content:  Reports none;  Denies suicidal ideation, violent ideation, delusions and paranoid ideation  Perception:  Reports none;  Denies auditory hallucinations and visual hallucinations  Insight: fair  Judgment: fair  Cognition: does  appear grossly intact; formal cognitive testing was not done    LABS and DATA   RATING SCALES:  AIMS: done 1/2017 with total score of 0    PHQ9 TODAY =  9, somewhat difficult, item #9: 0  PHQ-9 SCORE 4/17/2017 7/27/2017 9/1/2017   Total Score - - -   Total Score 3 8 9       ANTIPSYCHOTIC LABS   [glu, A1C, lipids (focus LDL), liver enzymes, WBC, ANEU, Hgb, plts]    q12 mo  Recent Labs   Lab Test  10/30/17   1429  03/22/17   1340   09/30/16   1348   GLC  86  83   < >  83   A1C   --    --    --   4.6    < > = values in this interval not displayed.     Recent Labs   Lab Test  10/30/17   1429  03/22/17   1340   CHOL  165  171   TRIG  339*  528*   LDL  63  Cannot estimate LDL when triglyceride exceeds 400 mg/dL  59   HDL  34*  31*     Recent Labs   Lab Test  11/02/17   1658  10/30/17   1429   AST  49*  51*   ALT  156*  147*   ALKPHOS  68  63     Recent Labs   Lab Test  10/30/17   1429  03/22/17   1340   WBC  8.1  8.3   ANEU  4.8  5.5   HGB  15.7  15.6   PLT  241  250     LITHIUM LABS     [level, renal, SG, TSH, WBC]    q6  mo  Recent Labs   Lab Test  10/30/17   1429  03/22/17   1340  03/08/17   0953  09/30/16   1348   LITHIUM  0.54*  0.6  0.7  0.6     Recent Labs   Lab Test  10/30/17   1429  03/22/17   1340  03/08/17   0953   CR  1.05  1.09  0.94   GFRESTIMATED  85  82  >90  Non  GFR Calc     NA  141  143  140   ADONAY  9.2  9.3  9.5     Recent Labs   Lab Test  10/30/17   1429  02/09/16   1247   SG  1.015  1.005     Recent Labs   Lab Test  10/30/17   1429  03/22/17   1340  03/08/17   0953   TSH  2.92  3.03  3.36     Recent Labs   Lab Test  10/30/17   1429  03/22/17   1340   WBC  8.1  8.3     Wt Readings from Last 4 Encounters:   11/02/17 119.5 kg (263 lb 6.4 oz)   10/05/17 117.5 kg (259 lb)   08/31/17 117.1 kg (258 lb 3.2 oz)   07/27/17 112.9 kg (249 lb)       DIAGNOSIS     Psychosis, unspecified psychosis type (r/o schizoaffective disorder vs schizophrenia)  History of Cannabis Use Disorder, in remission  History of Alcohol Use Disorder, in remission    ASSESSMENT                                     TODAY     1. Schizoaffective disorder?: unclear what his true diagnosis is given history and comorbid cannabis abuse. I am not convinced his prior behavior met criteria for a manic episode and could be explained by disorganized psychosis, suspecting something along the schizophrenic spectrum. Will need further monitoring and history for clarity, collateral information would be helpful and I invited him to bring his mother to a future appt. Psychosis, agitation and concentration have improved with OLZ 20 mg, which we will continue for now. However, he has continued to gain weight due to poor dietary choices. Metformin may need to be increased if we stay on olanzapine (see below). Discussed switching to Vraylar considering past history of adverse reaction to Abilify and other 2D6 SGAs. Family meeting may be helpful going forward.    2. Abnormal labs: Will be meeting with his new PCP next week. Instructed to discuss lipid labs  and increased hepatic enzymes. Suspect drug etiology olanzapine > gabapentin due to chronology of dose increase but considering gabapentin is less crucial medicine, would prefer to decrease this first. No sx or risk factors of hepatitis, so far hepatitis workup is negative, repeat LFTs show increase in ALT. Tried reaching pt via phone, LVM vague on general answering machine. Would like pt to decrease gabapentin to 300 mg tonight and continue with current plan if he calls back after hours.    3. Memory concerns: memory decline has been linked to type II diabetics taking metformin and thought to be due to induced vitamin B12 deficiency, however data is equivocal and may not generalize to non-diabetic patients. B12 level is wnl.         [place] PRESCRIPTION MONITORING PROGRAM [] was not checked today:  not using controlled substances.    PSYCHOTROPIC DRUG INTERACTIONS:   LITHIUM and OLANZAPINE may result in increased neurotoxic effects of D2-antagonists.  MANAGEMENT:  Monitoring for adverse effects and patient is aware of risks     PLAN                                                                                                       1) PSYCHOTROPIC MEDICATIONS:  - decrease gabapentin to 300 mg QHS for sleep  - continue olanzapine 20 mg QHS  - continue lithium 1200 mg QHS  - continue metformin 500 mg BID with meals    2) THERAPY:    Continue  TREATMENT PLAN   Date revised  -  Will need to complete at next visit  Date next due- na    3) NEXT DUE:    Labs- hepatic panel, hepatitis panel (A IgM, IgG; B surAg; C screen with reflex)  EKG- PRN  Rating Scales- AIMS due 1/2018    4) REFERRALS:    f/u with primary care    5) RTC: 1 week  SAFETY PLAN reviewed: No: na        Placed in pt instructions: No: na    6) CRISIS NUMBERS:   Provided routinely in AVS.  Especially emphasized:  [facility] [phone] (clinic)    [phone] (after hours)  CRISIS TEXT LINE: Text [phone] from anywhere, anytime, any crisis 24/7;  OR  SEE www.crisistextline.org  ONLY if a [facility] PT: [facility] [phone] (clinic), [phone] (after hours)     TREATMENT RISK STATEMENT:  The risks, benefits, alternatives and potential adverse effects have been discussed and are understood by the pt. The pt understands the risks of using street drugs or alcohol. There are no medical contraindications, the pt agrees to treatment with the ability to do so. The pt knows to call the clinic for any problems or to access emergency care if needed.  Medical and substance use concerns are documented above.  Psychotropic drug interaction check was done, including changes made today.    RESIDENT:   Zbigniew Bailey MD      Patient not staffed in clinic.  Supervisor is Dr. Tay who will sign the note.    Attestation:  I did not see this patient directly. I have reviewed the documentation and I agree with the resident's plan of care.   Joel Tay MD

## 2017-11-02 NOTE — PATIENT INSTRUCTIONS
1. Continue medications without change.  2. Go to the lab for a blood draw today or tomorrow.  3. Follow up with your primary on Tuesday about cholesterol and liver labs.  4. Return to clinic on Friday Nov 10th at 1:30pm.    Here are your labs:  Results for SAEED MORALES (MRN [id number]) as of 11/2/2017 16:52   Ref. Range 3/22/2017 13:40 10/30/2017 14:29   ALT Latest Ref Range: 0 - 70 U/L 58 147 (H)   AST Latest Ref Range: 0 - 45 U/L 18 51 (H)   Bilirubin Direct Latest Ref Range: 0.0 - 0.2 mg/dL  0.1   Cholesterol Latest Ref Range: <200 mg/dL 171 165   HDL Cholesterol Latest Ref Range: >39 mg/dL 31 (L) 34 (L)   LDL Cholesterol Calculated Latest Ref Range: <100 mg/dL Cannot estimate L... 63   LDL Cholesterol Direct Latest Ref Range: <100 mg/dL 59    Non HDL Cholesterol Latest Ref Range: <130 mg/dL 140 (H) 131 (H)   Triglycerides Latest Ref Range: <150 mg/dL 528 (H) 339 (H)

## 2017-11-02 NOTE — MR AVS SNAPSHOT
After Visit Summary   11/2/2017    Saeed Morales    MRN: [id number]           Patient Information     Date Of Birth          1990        Visit Information        Provider Department      11/2/2017 3:25 PM Zbigniew Bailey MD Psychiatry Clinic        Today's Diagnoses     Psychosis, unspecified psychosis type    -  1    Encounter for long-term (current) use of medications          Care Instructions    1. Continue medications without change.  2. Go to the lab for a blood draw today or tomorrow.  3. Follow up with your primary on Tuesday about cholesterol and liver labs.  4. Return to clinic on Friday Nov 10th at 1:30pm.    Here are your labs:  Results for SAEED MORALES (MRN [id number]) as of 11/2/2017 16:52   Ref. Range 3/22/2017 13:40 10/30/2017 14:29   ALT Latest Ref Range: 0 - 70 U/L 58 147 (H)   AST Latest Ref Range: 0 - 45 U/L 18 51 (H)   Bilirubin Direct Latest Ref Range: 0.0 - 0.2 mg/dL  0.1   Cholesterol Latest Ref Range: <200 mg/dL 171 165   HDL Cholesterol Latest Ref Range: >39 mg/dL 31 (L) 34 (L)   LDL Cholesterol Calculated Latest Ref Range: <100 mg/dL Cannot estimate L... 63   LDL Cholesterol Direct Latest Ref Range: <100 mg/dL 59    Non HDL Cholesterol Latest Ref Range: <130 mg/dL 140 (H) 131 (H)   Triglycerides Latest Ref Range: <150 mg/dL 528 (H) 339 (H)             Follow-ups after your visit        Your next 10 appointments already scheduled     Nov 07, 2017  3:40 PM CST   Office Visit with Issac Watson PA-C   [facility]    [address] [address] [phone]           Bring a current list of meds and any records pertaining to this visit. For Physicals, please bring immunization records and any forms needing to be filled out. Please arrive 10 minutes early to complete paperwork.            Nov 10, 2017  1:25 PM CST   Adult Med Follow UP with Zbigniew Bailey MD    Psychiatry Clinic ([facility])    [address]  [address] [facility] [id number] [phone]              Future tests that were ordered for you today     Open Standing Orders        Priority Remaining Interval Expires Ordered    Hepatic panel (Albumin, ALT, AST, Bili, Alk Phos, TP) Routine 2018          Open Future Orders        Priority Expected Expires Ordered    **Hepatitis C Screen Reflex to RNA FUTURE anytime Routine 2017            Who to contact     Please call your clinic at [phone] to:    Ask questions about your health    Make or cancel appointments    Discuss your medicines    Learn about your test results    Speak to your doctor   If you have compliments or concerns about an experience at your clinic, or if you wish to file a complaint, please contact [facility] Patient Relations at [phone] or email us at [email or web address]         Additional Information About Your Visit        [employer] Information     [employer] is an electronic gateway that provides easy, online access to your medical records. With [employer], you can request a clinic appointment, read your test results, renew a prescription or communicate with your care team.     To sign up for [employer] visit the website at [email or web address]   You will be asked to enter the access code listed below, as well as some personal information. Please follow the directions to create your username and password.     Your access code is: [email or web address]  Expires: 1/3/2018  4:16 PM     Your access code will  in 90 days. If you need help or a new code, please contact your [facility] or call [phone] for assistance.        Care EveryWhere ID     This is your Care EveryWhere ID. This could be used by other organizations to access your [place] medical records  [id number]         Your Vitals Were     Pulse BMI (Body Mass Index)                88 34.28 kg/m2           Blood Pressure from Last 3 Encounters:   11/02/17 (!) 144/98   10/05/17 (!) 148/97   08/31/17 140/86    Weight from Last 3 Encounters:   11/02/17 119.5 kg (263 lb 6.4 oz)   10/05/17 117.5 kg (259 lb)   08/31/17 117.1 kg (258 lb 3.2 oz)              We Performed the Following     Hepatitis A antibody IgM     Hepatitis Antibody A IgG     Hepatitis B surface antigen        Primary Care Provider    Clinic Provider       No address on file        Equal Access to Services     [facility]: Hadii maria ines Castro, rodger jurado, sury palma, chandan lfores . So [facility] [phone].    ATENCIÓN: Si habla español, tiene a jacob disposición servicios gratuitos de asistencia lingüística. Llame al [phone].    We comply with applicable federal civil rights laws and [place] laws. We do not discriminate on the basis of race, color, national origin, age, disability, sex, sexual orientation, or gender identity.            Thank you!     Thank you for choosing PSYCHIATRY CLINIC  for your care. Our goal is always to provide you with excellent care. Hearing back from our patients is one way we can continue to improve our services. Please take a few minutes to complete the written survey that you may receive in the mail after your visit with us. Thank you!             Your Updated Medication List - Protect others around you: Learn how to safely use, store and throw away your medicines at www.disposemymeds.org.          This list is accurate as of: 11/2/17  4:55 PM.  Always use your most recent med list.                   Brand Name Dispense Instructions for use Diagnosis    gabapentin 300 MG capsule    NEURONTIN    60 capsule    Take 2 capsules (600 mg) by mouth At Bedtime    Psychosis, unspecified psychosis type       lithium 600 MG capsule     60 capsule    Take 2 capsules (1,200 mg) by mouth  At Bedtime    Psychosis, unspecified psychosis type       metFORMIN 500 MG tablet    GLUCOPHAGE    30 tablet    Take 500 mg (1 tablet) with breakfast for 7 days, then if tolerating increase to 500 mg with breakfast and 500 mg with dinner.    Schizoaffective disorder, bipolar type (H)       OLANZapine 20 MG tablet    zyPREXA    30 tablet    Take 1 tablet (20 mg) by mouth At Bedtime    Psychosis, unspecified psychosis type

## 2017-11-03 ENCOUNTER — TELEPHONE (OUTPATIENT)
Dept: PSYCHIATRY | Facility: CLINIC | Age: 27
End: 2017-11-03

## 2017-11-03 DIAGNOSIS — F29 PSYCHOSIS, UNSPECIFIED PSYCHOSIS TYPE (H): ICD-10-CM

## 2017-11-03 LAB
ALBUMIN SERPL-MCNC: 4.6 G/DL (ref 3.4–5)
ALP SERPL-CCNC: 68 U/L (ref 40–150)
ALT SERPL W P-5'-P-CCNC: 156 U/L (ref 0–70)
AST SERPL W P-5'-P-CCNC: 49 U/L (ref 0–45)
BILIRUB DIRECT SERPL-MCNC: 0.1 MG/DL (ref 0–0.2)
BILIRUB SERPL-MCNC: 0.4 MG/DL (ref 0.2–1.3)
HAV IGG SER QL IA: NONREACTIVE
HAV IGM SERPL QL IA: NONREACTIVE
HBV SURFACE AG SERPL QL IA: NONREACTIVE
PROT SERPL-MCNC: 7.5 G/DL (ref 6.8–8.8)

## 2017-11-03 NOTE — TELEPHONE ENCOUNTER
11/3/17 at 1634:    Message         Zbigniew Bailey MD  Flaa, Slime JOHNSON RN                   Tried calling pt but no answer. LVM vague d/t general home phone.     Got hepatic panel back, ALT is increased from prior, AST is decreased. So far hepatitis labs are negative but we need another draw for Hep C, can get this next week.     If he calls back I would like him to decrease gabapentin to 300 mg starting tonight and continue with current plan on seeing his PCP on Tuesday.

## 2017-11-03 NOTE — TELEPHONE ENCOUNTER
From: Zbigniew Bailey MD  Sent: 10/31/2017  12:57 PM  To: Slime Chavez RN, Joel Tay MD    Could you reach out to Jose Francisco today and let him know the following?    Labs came back.   -lithium level is just a bit low but I am not concerned if he is not experiencing any new symptoms of mood swings  -lipids are high but have lowered from 7 months ago  -liver is functioning well, but there is evidence of damage to liver cells (increased liver enzymes of ALT and AST), this could be due to many different things including alcohol or the olanzapine  -I would like for him to see his primary doctor to discuss lipids and liver enzymes for further workup, if he is experiencing belly pain he should be worked up at urgent care or the emergency department if severe pain or nausea/vomiting

## 2017-11-06 ASSESSMENT — PATIENT HEALTH QUESTIONNAIRE - PHQ9: SUM OF ALL RESPONSES TO PHQ QUESTIONS 1-9: 9

## 2017-11-07 ENCOUNTER — OFFICE VISIT (OUTPATIENT)
Dept: FAMILY MEDICINE | Facility: CLINIC | Age: 27
End: 2017-11-07
Payer: COMMERCIAL

## 2017-11-07 VITALS
DIASTOLIC BLOOD PRESSURE: 86 MMHG | HEIGHT: 73 IN | BODY MASS INDEX: 34.33 KG/M2 | OXYGEN SATURATION: 97 % | TEMPERATURE: 98.2 F | WEIGHT: 259 LBS | SYSTOLIC BLOOD PRESSURE: 130 MMHG | HEART RATE: 89 BPM

## 2017-11-07 DIAGNOSIS — F25.0 SCHIZOAFFECTIVE DISORDER, BIPOLAR TYPE (H): Primary | ICD-10-CM

## 2017-11-07 DIAGNOSIS — E78.1 HIGH BLOOD TRIGLYCERIDES: ICD-10-CM

## 2017-11-07 DIAGNOSIS — R03.0 ELEVATED BLOOD PRESSURE READING WITHOUT DIAGNOSIS OF HYPERTENSION: ICD-10-CM

## 2017-11-07 DIAGNOSIS — R74.8 ELEVATED LIVER ENZYMES: ICD-10-CM

## 2017-11-07 PROCEDURE — 86803 HEPATITIS C AB TEST: CPT | Performed by: PHYSICIAN ASSISTANT

## 2017-11-07 PROCEDURE — 36415 COLL VENOUS BLD VENIPUNCTURE: CPT | Performed by: PHYSICIAN ASSISTANT

## 2017-11-07 PROCEDURE — 99203 OFFICE O/P NEW LOW 30 MIN: CPT | Performed by: PHYSICIAN ASSISTANT

## 2017-11-07 NOTE — PATIENT INSTRUCTIONS
1. High Triglycerides:  --Avoid overeating carbohydrates (bread, rice, pasta)  --Avoid sugars  --Exercise helps lower triglycerides   2. Keeping triglycerides below 400 - 500 is a good idea - probably don't need medications at this point, monitor every 6-12 months  3. Could get a liver ultrasound if you wanted:  [facility] Scheduling Phone Number: [phone]  Or   [facility] Scheduling Phone number: [phone]

## 2017-11-07 NOTE — LETTER
November 13, 2017      Jose Francisco [address]  [address] [facility] [address]        Dear Jose Francisco,       The results of your recent lab tests were within normal limits. Enclosed is a copy of these results. If you have any further questions or problems, please contact our office.       Sincerely,        ROMINA CHANG PA-C    Results for orders placed or performed in visit on 11/07/17   Hepatitis C antibody   Result Value Ref Range    Hepatitis C Antibody Nonreactive NR^Nonreactive

## 2017-11-07 NOTE — MR AVS SNAPSHOT
After Visit Summary   11/7/2017    Jose Francisco Sommers    MRN: [id number]           Patient Information     Date Of Birth          1990        Visit Information        Provider Department      11/7/2017 3:40 PM Issac Watson PA-C [facility]        Today's Diagnoses     Schizoaffective disorder, bipolar type (H)    -  1    High blood triglycerides        Elevated liver enzymes          Care Instructions    1. High Triglycerides:  --Avoid overeating carbohydrates (bread, rice, pasta)  --Avoid sugars  --Exercise helps lower triglycerides   2. Keeping triglycerides below 400 - 500 is a good idea - probably don't need medications at this point, monitor every 6-12 months  3. Could get a liver ultrasound if you wanted:  [facility] Scheduling Phone Number: [phone]  Or   [facility] Scheduling Phone number: [phone]           Follow-ups after your visit        Your next 10 appointments already scheduled     Nov 10, 2017  1:25 PM CST   Adult Med Follow UP with Zbigniew Bailey MD   Psychiatry Clinic ([facility])    [address] [facility] [address] [phone]              Future tests that were ordered for you today     Open Future Orders        Priority Expected Expires Ordered    US Abdomen Limited Routine  11/7/2018 11/7/2017            Who to contact     If you have questions or need follow up information about today's clinic visit or your schedule please contact [facility] directly at [phone].  Normal or non-critical lab and imaging results will be communicated to you by MyChart, letter or phone within 4 business days after the clinic has received the results. If you do not hear from us within 7 days, please contact the clinic through MyChart or phone. If you have a critical or abnormal lab result, we will notify you by phone as soon as  "possible.  Submit refill requests through [employer] or call your pharmacy and they will forward the refill request to us. Please allow 3 business days for your refill to be completed.          Additional Information About Your Visit        Snyppit Information     [employer]it lets you send messages to your doctor, view your test results, renew your prescriptions, schedule appointments and more. To sign up, go to [email or web address] . Click on \"Log in\" on the left side of the screen, which will take you to the Welcome page. Then click on \"Sign up Now\" on the right side of the page.     You will be asked to enter the access code listed below, as well as some personal information. Please follow the directions to create your username and password.     Your access code is: [email or web address]  Expires: 1/3/2018  3:16 PM     Your access code will  in 90 days. If you need help or a new code, please call your [place] clinic or [phone].        Care EveryWhere ID     This is your Care EveryWhere ID. This could be used by other organizations to access your [place] medical records  [id number]        Your Vitals Were     Pulse Temperature Height Pulse Oximetry BMI (Body Mass Index)       89 98.2  F (36.8  C) (Oral) 6' 0.75\" (1.848 m) 97% 34.41 kg/m2        Blood Pressure from Last 3 Encounters:   17 130/86    Weight from Last 3 Encounters:   17 259 lb (117.5 kg)              We Performed the Following     Hepatitis C antibody          Today's Medication Changes          These changes are accurate as of: 17  4:45 PM.  If you have any questions, ask your nurse or doctor.               These medicines have changed or have updated prescriptions.        Dose/Directions    gabapentin 300 MG capsule   Commonly known as:  NEURONTIN   This may have changed:  how much to take   Used for:  Psychosis, unspecified psychosis type        Dose:  600 mg   Take 2 capsules (600 mg) by mouth At Bedtime   Quantity:  60 capsule "   Refills:  0                Primary Care Provider    Clinic Provider       No address on file        Equal Access to Services     JASPALZOË BARRIE : Hadii aad ku sadie uBshali, wakayleeda yesiwilmarha, sury nitaerikshreya ibanezryanshreya, waxay idiin haykaryndanie lielifevan welch. So [facility] [phone].    ATENCIÓN: Si habla español, tiene a jacob disposición servicios gratuitos de asistencia lingüística. [facility] [phone].    We comply with applicable federal civil rights laws and [place] laws. We do not discriminate on the basis of race, color, national origin, age, disability, sex, sexual orientation, or gender identity.            Thank you!     Thank you for choosing [facility]  for your care. Our goal is always to provide you with excellent care. Hearing back from our patients is one way we can continue to improve our services. Please take a few minutes to complete the written survey that you may receive in the mail after your visit with us. Thank you!             Your Updated Medication List - Protect others around you: Learn how to safely use, store and throw away your medicines at www.disposemymeds.org.          This list is accurate as of: 11/7/17  4:45 PM.  Always use your most recent med list.                   Brand Name Dispense Instructions for use Diagnosis    gabapentin 300 MG capsule    NEURONTIN    60 capsule    Take 2 capsules (600 mg) by mouth At Bedtime    Psychosis, unspecified psychosis type       lithium 600 MG capsule     60 capsule    Take 2 capsules (1,200 mg) by mouth At Bedtime    Psychosis, unspecified psychosis type       metFORMIN 500 MG tablet    GLUCOPHAGE    30 tablet    Take 500 mg (1 tablet) with breakfast for 7 days, then if tolerating increase to 500 mg with breakfast and 500 mg with dinner.    Schizoaffective disorder, bipolar type (H)       OLANZapine 20 MG tablet    zyPREXA    30 tablet    Take 1 tablet (20 mg) by mouth At Bedtime    Psychosis, unspecified psychosis type

## 2017-11-07 NOTE — PROGRESS NOTES
"  SUBJECTIVE:   Jose Francisco Sommers is a 27 year old male who presents to clinic today for the following health issues:    New Patient/Transfer of Care  Patient is concerned about high blood pressure readings he gets when he comes into the clinic. Notes that occasionally at his psychiatry appointments he has had BPs above 140/90.     Would like to discuss his high     Patient Active Problem List   Diagnosis     Schizoaffective disorder, bipolar type (H)     Hx of psychiatric care     Elevated liver enzymes      Current Outpatient Prescriptions   Medication     metFORMIN (GLUCOPHAGE) 500 MG tablet     OLANZapine (ZYPREXA) 20 MG tablet     lithium 600 MG capsule     gabapentin (NEURONTIN) 300 MG capsule     No current facility-administered medications for this visit.       Problem list and histories reviewed & adjusted, as indicated.  Additional history: as documented    Labs reviewed in EPIC    Reviewed and updated as needed this visit by clinical staff  Tobacco  Allergies  Meds  Med Hx  Surg Hx  Fam Hx  Soc Hx      Reviewed and updated as needed this visit by Provider         ROS:  Constitutional, HEENT, cardiovascular, pulmonary, gi and gu systems are negative, except as otherwise noted.      OBJECTIVE:   /86 (BP Location: Left arm, Cuff Size: Adult Large)  Pulse 89  Temp 98.2  F (36.8  C) (Oral)  Ht 6' 0.75\" (1.848 m)  Wt 259 lb (117.5 kg)  SpO2 97%  BMI 34.41 kg/m2  Body mass index is 34.41 kg/(m^2).  GENERAL: healthy, alert and no distress  NECK: no adenopathy, no asymmetry, masses, or scars and thyroid normal to palpation  RESP: lungs clear to auscultation - no rales, rhonchi or wheezes  CV: regular rate and rhythm, normal S1 S2, no S3 or S4, no murmur, click or rub, no peripheral edema and peripheral pulses strong  PSYCH: flat affect, otherwise mentation appears normal, affect normal/bright    Diagnostic Test Results:  none     ASSESSMENT/PLAN:     (F25.0) Schizoaffective disorder, bipolar " type (H)  (primary encounter diagnosis)  Comment:   Plan: Stable. Sees psychiatry. No major concerns.     (E78.1) High blood triglycerides  Comment:   Plan:   Lab Results   Component Value Date    TRIG 339 10/30/2017     Reviewed - generally below 500 - recommend diet, lifestyle, exercise     (R74.8) Elevated liver enzymes  Comment:   Plan: Hepatitis C antibody, US Abdomen Limited        Reviewed - I think this is due to weight gain. No ETOH, no acetaminophen, no other findings or concerns otherwise. Will check US liver.     (R03.0) Elevated blood pressure reading without diagnosis of hypertension  Comment:   Plan: Reassurance. BP normal today. Recommend diet, lifestyle, exercise.     ROMINA CHANG PA-C  [facility]

## 2017-11-07 NOTE — NURSING NOTE
"Chief Complaint   Patient presents with     [facility] Care     Hypertension     Results     Review lab results from 11/2       Initial /86 (BP Location: Left arm, Cuff Size: Adult Large)  Pulse 89  Temp 98.2  F (36.8  C) (Oral)  Ht 6' 0.75\" (1.848 m)  Wt 259 lb (117.5 kg)  SpO2 97%  BMI 34.41 kg/m2 Estimated body mass index is 34.41 kg/(m^2) as calculated from the following:    Height as of this encounter: 6' 0.75\" (1.848 m).    Weight as of this encounter: 259 lb (117.5 kg).  Medication Reconciliation: complete     Henna Mckenna CMA (AAMA)      "

## 2017-11-08 LAB — HCV AB SERPL QL IA: NONREACTIVE

## 2017-11-08 NOTE — PATIENT INSTRUCTIONS
1) Complete fasting labs (12 hours) at any [place] prior to next appt  2) Increase Zyprexa to 15 mg  3) Return to clinic in 1 month   I was called secondary to increased drainage around JPs and increased JPs output    This is a pleasant 77-year-old female patient to underwent a right breast mastectomy and sentinel lymph node biopsy today.    Patient is awake and alert, pain is controlled    PE:  Pt is resting in bed in no acute distress, flaps viable without evidence of a hematoma, CHRISTINA drainage is dark, I removed soaked bandage, clean bandage and ace wrap placed with IVF bag for pressure.   T 100, HR 82, RR 18, B/P 135/70    Check an H&H stat

## 2017-11-10 ENCOUNTER — OFFICE VISIT (OUTPATIENT)
Dept: PSYCHIATRY | Facility: CLINIC | Age: 27
End: 2017-11-10
Attending: PSYCHIATRY & NEUROLOGY
Payer: COMMERCIAL

## 2017-11-10 VITALS
BODY MASS INDEX: 34.65 KG/M2 | WEIGHT: 260.8 LBS | SYSTOLIC BLOOD PRESSURE: 144 MMHG | DIASTOLIC BLOOD PRESSURE: 93 MMHG | HEART RATE: 87 BPM

## 2017-11-10 DIAGNOSIS — Z79.899 ENCOUNTER FOR LONG-TERM (CURRENT) USE OF MEDICATIONS: Primary | ICD-10-CM

## 2017-11-10 DIAGNOSIS — F29 PSYCHOSIS, UNSPECIFIED PSYCHOSIS TYPE (H): ICD-10-CM

## 2017-11-10 DIAGNOSIS — F25.0 SCHIZOAFFECTIVE DISORDER, BIPOLAR TYPE (H): ICD-10-CM

## 2017-11-10 LAB
ALT SERPL W P-5'-P-CCNC: 108 U/L (ref 0–70)
AST SERPL W P-5'-P-CCNC: 35 U/L (ref 0–45)
BASOPHILS # BLD AUTO: 0.1 10E9/L (ref 0–0.2)
BASOPHILS NFR BLD AUTO: 0.8 %
DIFFERENTIAL METHOD BLD: NORMAL
EOSINOPHIL # BLD AUTO: 0.5 10E9/L (ref 0–0.7)
EOSINOPHIL NFR BLD AUTO: 5.6 %
ERYTHROCYTE [DISTWIDTH] IN BLOOD BY AUTOMATED COUNT: 12.3 % (ref 10–15)
HCT VFR BLD AUTO: 45.7 % (ref 40–53)
HGB BLD-MCNC: 15.6 G/DL (ref 13.3–17.7)
IMM GRANULOCYTES # BLD: 0.1 10E9/L (ref 0–0.4)
IMM GRANULOCYTES NFR BLD: 1.1 %
LITHIUM SERPL-SCNC: 0.49 MMOL/L (ref 0.6–1.2)
LYMPHOCYTES # BLD AUTO: 2.2 10E9/L (ref 0.8–5.3)
LYMPHOCYTES NFR BLD AUTO: 25.8 %
MCH RBC QN AUTO: 30.8 PG (ref 26.5–33)
MCHC RBC AUTO-ENTMCNC: 34.1 G/DL (ref 31.5–36.5)
MCV RBC AUTO: 90 FL (ref 78–100)
MONOCYTES # BLD AUTO: 0.7 10E9/L (ref 0–1.3)
MONOCYTES NFR BLD AUTO: 8.7 %
NEUTROPHILS # BLD AUTO: 5 10E9/L (ref 1.6–8.3)
NEUTROPHILS NFR BLD AUTO: 58 %
NRBC # BLD AUTO: 0 10*3/UL
NRBC BLD AUTO-RTO: 0 /100
PLATELET # BLD AUTO: 255 10E9/L (ref 150–450)
RBC # BLD AUTO: 5.07 10E12/L (ref 4.4–5.9)
TSH SERPL DL<=0.005 MIU/L-ACNC: 2.09 MU/L (ref 0.4–4)
WBC # BLD AUTO: 8.6 10E9/L (ref 4–11)

## 2017-11-10 PROCEDURE — 99212 OFFICE O/P EST SF 10 MIN: CPT | Mod: ZF

## 2017-11-10 PROCEDURE — 84460 ALANINE AMINO (ALT) (SGPT): CPT | Performed by: STUDENT IN AN ORGANIZED HEALTH CARE EDUCATION/TRAINING PROGRAM

## 2017-11-10 PROCEDURE — 36415 COLL VENOUS BLD VENIPUNCTURE: CPT | Performed by: STUDENT IN AN ORGANIZED HEALTH CARE EDUCATION/TRAINING PROGRAM

## 2017-11-10 PROCEDURE — 85025 COMPLETE CBC W/AUTO DIFF WBC: CPT | Performed by: STUDENT IN AN ORGANIZED HEALTH CARE EDUCATION/TRAINING PROGRAM

## 2017-11-10 PROCEDURE — 84450 TRANSFERASE (AST) (SGOT): CPT | Performed by: STUDENT IN AN ORGANIZED HEALTH CARE EDUCATION/TRAINING PROGRAM

## 2017-11-10 PROCEDURE — 86803 HEPATITIS C AB TEST: CPT | Performed by: STUDENT IN AN ORGANIZED HEALTH CARE EDUCATION/TRAINING PROGRAM

## 2017-11-10 PROCEDURE — 80178 ASSAY OF LITHIUM: CPT | Performed by: STUDENT IN AN ORGANIZED HEALTH CARE EDUCATION/TRAINING PROGRAM

## 2017-11-10 PROCEDURE — 84443 ASSAY THYROID STIM HORMONE: CPT | Performed by: STUDENT IN AN ORGANIZED HEALTH CARE EDUCATION/TRAINING PROGRAM

## 2017-11-10 NOTE — PATIENT INSTRUCTIONS
1. Increase metformin to 1000 mg with breakfast and 500 mg with dinner.  2. Get labs drawn after clinic today.  3. I will call with results and further plan.  4. Get abdominal US done per PCP.  5. Return to clinic in two weeks.

## 2017-11-10 NOTE — MR AVS SNAPSHOT
After Visit Summary   11/10/2017    Jose Francisco Sommers    MRN: [id number]           Patient Information     Date Of Birth          1990        Visit Information        Provider Department      11/10/2017 1:25 PM Zbigniew Bailey MD Psychiatry Clinic        Today's Diagnoses     Encounter for long-term (current) use of medications    -  1    Schizoaffective disorder, bipolar type (H)        Psychosis, unspecified psychosis type          Care Instructions    1. Increase metformin to 1000 mg with breakfast and 500 mg with dinner.  2. Get labs drawn after clinic today.  3. I will call with results and further plan.  4. Get abdominal US done per PCP.  5. Return to clinic in two weeks.            Follow-ups after your visit        Your next 10 appointments already scheduled     Nov 29, 2017  3:25 PM CST   Adult Med Follow UP with Zbigniew Bailey MD   Psychiatry Clinic ([facility])    [address] [facility] [address] [phone]              Who to contact     Please call your clinic at [phone] to:    Ask questions about your health    Make or cancel appointments    Discuss your medicines    Learn about your test results    Speak to your doctor   If you have compliments or concerns about an experience at your clinic, or if you wish to file a complaint, please contact [facility] Patient Relations at [phone] or email us at [email or web address]         Additional Information About Your Visit        MyChart Information     [employer] is an electronic gateway that provides easy, online access to your medical records. With [employer], you can request a clinic appointment, read your test results, renew a prescription or communicate with your care team.     To sign up for [employer] visit the website at [email or web address]   You will be asked to enter the access code listed below, as well as  some personal information. Please follow the directions to create your username and password.     Your access code is: [email or web address]  Expires: 1/3/2018  3:16 PM     Your access code will  in 90 days. If you need help or a new code, please contact your [facility] or call [phone] for assistance.        Care EveryWhere ID     This is your Care EveryWhere ID. This could be used by other organizations to access your [place] medical records  [id number]        Your Vitals Were     Pulse BMI (Body Mass Index)                87 34.65 kg/m2           Blood Pressure from Last 3 Encounters:   11/10/17 (!) 144/93   17 130/86   17 (!) 144/98    Weight from Last 3 Encounters:   11/10/17 118.3 kg (260 lb 12.8 oz)   17 117.5 kg (259 lb)   17 119.5 kg (263 lb 6.4 oz)              We Performed the Following     **Hepatitis C Screen Reflex to RNA FUTURE anytime     ALT     AST     CBC with Platelets Differential ([facility])     Presho Level     TSH with free T4 reflex          Today's Medication Changes          These changes are accurate as of: 11/10/17 11:59 PM.  If you have any questions, ask your nurse or doctor.               These medicines have changed or have updated prescriptions.        Dose/Directions    gabapentin 300 MG capsule   Commonly known as:  NEURONTIN   This may have changed:  how much to take   Used for:  Psychosis, unspecified psychosis type        Dose:  600 mg   Take 2 capsules (600 mg) by mouth At Bedtime   Quantity:  60 capsule   Refills:  0       metFORMIN 500 MG tablet   Commonly known as:  GLUCOPHAGE   This may have changed:  additional instructions   Used for:  Schizoaffective disorder, bipolar type (H)   Changed by:  Zbigniew Bailey MD        Take 1000 mg (2 tablet) with breakfast and 500 mg with dinner.   Quantity:  75 tablet   Refills:  0            Where to get your medicines      These medications were sent to [facility] [address] [facility] [employer] -  [facility] [address] [facility]  [address], [facility] [address]     Phone:  [phone]     metFORMIN 500 MG tablet                Primary Care Provider    Clinic Provider       No address on file        Equal Access to Services     SHELLEY SOOD : Hadkarrie maria ines farah lukeo Sorichardali, waaxda luqadaha, qaybta kaalmada adeholliyada, chandan acevedo laAlejandraandra welch. So [facility] [phone].    ATENCIÓN: Si habla español, tiene a jacob disposición servicios gratuitos de asistencia lingüística. Ll[facility] [phone].    We comply with applicable federal civil rights laws and [place] laws. We do not discriminate on the basis of race, color, national origin, age, disability, sex, sexual orientation, or gender identity.            Thank you!     Thank you for choosing PSYCHIATRY CLINIC  for your care. Our goal is always to provide you with excellent care. Hearing back from our patients is one way we can continue to improve our services. Please take a few minutes to complete the written survey that you may receive in the mail after your visit with us. Thank you!             Your Updated Medication List - Protect others around you: Learn how to safely use, store and throw away your medicines at www.disposemymeds.org.          This list is accurate as of: 11/10/17 11:59 PM.  Always use your most recent med list.                   Brand Name Dispense Instructions for use Diagnosis    gabapentin 300 MG capsule    NEURONTIN    60 capsule    Take 2 capsules (600 mg) by mouth At Bedtime    Psychosis, unspecified psychosis type       lithium 600 MG capsule     60 capsule    Take 2 capsules (1,200 mg) by mouth At Bedtime    Psychosis, unspecified psychosis type       metFORMIN 500 MG tablet    GLUCOPHAGE    75 tablet    Take 1000 mg (2 tablet) with breakfast and 500 mg with dinner.    Schizoaffective disorder, bipolar type (H)       OLANZapine 20 MG tablet    zyPREXA    30 tablet    Take 1 tablet (20 mg) by mouth At  Bedtime    Psychosis, unspecified psychosis type

## 2017-11-10 NOTE — PROGRESS NOTES
PSYCHIATRY CLINIC PROGRESS NOTE   The initial diagnostic evaluation was on 7/24/2015.  Date of the most recent transfer of care lexie is 7/27/17.    Pertinent Background:  This patient first experienced mental health issues in grade school and has received treatment for ADHD, depression, psychosis and substance use.  See transfer evaluation for detailed history.  Notably, the patient had FEP in 2015 in the setting of cannabis abuse and has been stable on olanzapine. He has never lived independently and seems to have fairly low insight into his illness and past symptoms.       Psych critical item history includes psychosis [disorganization, ?catatonia], mutiple psychotropic trials, psych hosp [x1x at [facility], spring 2015], commitment and CD: alcohol and cannabis.      INTERIM HISTORY                                                 Jose Francisco Sommers is a 27 year old male who was last seen for medication management on 10/05/2017 at which time no changes were made.  The patient reports good treatment adherence.  History was provided by the patient who was a fair historian. His mother accompanied him today for a family meeting.  Since the last visit:  -Saw PCP and had to get the Hep C rechecked. Plan to get RUQ abd US to look for fatty infiltration. BP was normal.  -Sister had fatty liver after prednisone treatment for vasculitis, 2/2 to weight gain. She was given a  Different type of medicine which helped.  -He decreased gabapentin to 300 mg early this week per my phone call.  -His mother reports he has been doing much, much better on olanzapine 20 mg and hopes he can stay at this dose.  -Pt and mother states metformin was helpful in decreasing appetite at first but then wore off. We discussed increasing the metformin to help curb appetite but stressed the importance of michaels dietary decisions.  -Discussed options going forward re: elevated transaminases - including decreasing olanzapine to 15 mg, decreasing  gabapentin further, switching agents. Pt and mother decided to recheck labs today and to help guide decision making.    RECENT SYMPTOMS:   DEPRESSION:  reports-low energy, hypersomnia, weight/ appetite change (increased appetite) and poor concentration /memory;  DENIES- suicidal ideation , anhedonia, low energy, excessive guilt, feeling worthless and feeling hopeless  KAMI/HYPOMANIA:  reports-none;  DENIES- increased energy, decreased sleep need, increased activity and grandiosity  PSYCHOSIS:  reports-none;  DENIES- delusions, auditory hallucinations, visual hallucinations and disorganized behavior  SLEEP:  improving    EATING DISORDER: overeating, stress eating at night    RECENT SUBSTANCE USE:     ALCOHOL- very rarely          TOBACCO- 1-1.5 ppd               CAFFEINE- 2-3 cups/day of tea  OPIOIDS- none       NARCAN KIT- N/A       CANNABIS- none          OTHER ILLICIT DRUGS- none     CURRENT SOCIAL HISTORY:  FINANCIAL SUPPORT- working and family or friend       CHILDREN- none       LIVING SITUATION- lives with parents in [place]      SOCIAL/ SPIRITUAL SUPPORT- family, [employer] hemalatha       FEELS SAFE AT HOME- Yes     MEDICAL ROS:  Reports none    Denies sedation, dizziness, akathisia, unusual movements, polydipsia, polyphagia and excessive water intake and polydipsia, polyuria, nocturia and ice water craving, denies abd pain, n/v/d/c, decreased appetite    PSYCH and CD Critical Summary Points since July 2017             -July '17: increased gabapentin to 600 mg HS to target sleep with minimal effect  -August '17: self-increased Olanzapine to 20mg HS (see 8/31/17 note for details)    PAST PSYCH MED TRIALS   see EMR Problem List: Hx of psychiatric care    MEDICAL / SURGICAL HISTORY                                   CARE TEAM:   PCP- none    Therapist- none       Contraception- not discussed    Neurologic Hx [head injury etc]:  None reported  Patient Active Problem List   Diagnosis     Schizoaffective disorder,  bipolar type (H)     Hx of psychiatric care     Elevated liver enzymes       ALLERGY                                Sulfa drugs  MEDICATIONS                               Current Outpatient Prescriptions   Medication Sig Dispense Refill     metFORMIN (GLUCOPHAGE) 500 MG tablet Take 500 mg (1 tablet) with breakfast for 7 days, then if tolerating increase to 500 mg with breakfast and 500 mg with dinner. 30 tablet 0     OLANZapine (ZYPREXA) 20 MG tablet Take 1 tablet (20 mg) by mouth At Bedtime 30 tablet 1     lithium 600 MG capsule Take 2 capsules (1,200 mg) by mouth At Bedtime 60 capsule 2     gabapentin (NEURONTIN) 300 MG capsule Take 2 capsules (600 mg) by mouth At Bedtime (Patient taking differently: Take 300 mg by mouth At Bedtime ) 60 capsule 0     VITALS   BP (!) 144/93  Pulse 87  Wt 118.3 kg (260 lb 12.8 oz)  BMI 34.65 kg/m2   MENTAL STATUS EXAM                                                           Alertness: alert  and oriented  Appearance: adequately groomed  Behavior/Demeanor: cooperative and pleasant, more comfortable, with fair but improved eye contact   Speech: normal and regular rate and rhythm, more spontaneous  Language: no obvious problem  Psychomotor: normal or unremarkable  Mood: description consistent with euthymia  Affect: blunted and less restricted; was congruent to mood; was congruent to content  Thought Process/Associations: unremarkable  Thought Content:  Reports none;  Denies suicidal ideation, violent ideation, delusions and paranoid ideation  Perception:  Reports none;  Denies auditory hallucinations and visual hallucinations  Insight: fair  Judgment: fair  Cognition: does  appear grossly intact; formal cognitive testing was not done    LABS and DATA   RATING SCALES:  AIMS: done 1/2017 with total score of 0    PHQ9 TODAY =  8, somewhat difficult, item #9: 0  PHQ-9 SCORE 7/27/2017 9/1/2017 11/2/2017   Total Score - - -   Total Score 8 9 9       ANTIPSYCHOTIC LABS   [glu, A1C, lipids  (focus LDL), liver enzymes, WBC, ANEU, Hgb, plts]    q12 mo  Recent Labs   Lab Test  10/30/17   1429  03/22/17   1340   09/30/16   1348   GLC  86  83   < >  83   A1C   --    --    --   4.6    < > = values in this interval not displayed.     Recent Labs   Lab Test  10/30/17   1429  03/22/17   1340   CHOL  165  171   TRIG  339*  528*   LDL  63  Cannot estimate LDL when triglyceride exceeds 400 mg/dL  59   HDL  34*  31*     Recent Labs   Lab Test  11/02/17   1658  10/30/17   1429   AST  49*  51*   ALT  156*  147*   ALKPHOS  68  63     Recent Labs   Lab Test  10/30/17   1429  03/22/17   1340   WBC  8.1  8.3   ANEU  4.8  5.5   HGB  15.7  15.6   PLT  241  250     LITHIUM LABS     [level, renal, SG, TSH, WBC]    q6 mo  Recent Labs   Lab Test  10/30/17   1429  03/22/17   1340  03/08/17   0953  09/30/16   1348   LITHIUM  0.54*  0.6  0.7  0.6     Recent Labs   Lab Test  10/30/17   1429  03/22/17   1340  03/08/17   0953   CR  1.05  1.09  0.94   GFRESTIMATED  85  82  >90  Non  GFR Calc     NA  141  143  140   ADONAY  9.2  9.3  9.5     Recent Labs   Lab Test  10/30/17   1429  02/09/16   1247   SG  1.015  1.005     Recent Labs   Lab Test  10/30/17   1429  03/22/17   1340  03/08/17   0953   TSH  2.92  3.03  3.36     Recent Labs   Lab Test  10/30/17   1429  03/22/17   1340   WBC  8.1  8.3     Wt Readings from Last 4 Encounters:   11/10/17 118.3 kg (260 lb 12.8 oz)   11/07/17 117.5 kg (259 lb)   11/02/17 119.5 kg (263 lb 6.4 oz)   10/05/17 117.5 kg (259 lb)       DIAGNOSIS     Psychosis, unspecified psychosis type (r/o schizoaffective disorder vs schizophrenia)  History of Cannabis Use Disorder, in remission  History of Alcohol Use Disorder, in remission    ASSESSMENT                                     TODAY     1. Schizoaffective disorder?: unclear what his true diagnosis is given history and comorbid cannabis abuse. I am not convinced his prior behavior met criteria for a manic episode and could be explained by  disorganized psychosis, suspecting something along the schizophrenic spectrum; there is some evidence of agitation and disorganization outside of a concurrent mood disturbance. Will need further monitoring and history for clarity, a more in depth interview with his mother will be helpful. Psychosis, agitation and concentration have improved with OLZ 20 mg. Lithium level is fairly low considering the dose. We will discuss this at his next visit and consider decreasing the dose.    2. Abnormal labs: Pt has family history of young adult onset fatty liver disease in first degree relative brought on by weight gain 2/2 prednisone. No sx or risk factors of hepatitis, hepatitis workup is negative, repeat LFTs show decrease in ALT; initially suspected OLZ but LFTs are getting better after decreasing gabapentin. On-call doc called pt to relay message labs were normalizing and to stop gabapentin. We will move forward with fatty liver workup nonetheless. Increased metformin to target appetite.         [place] PRESCRIPTION MONITORING PROGRAM [] was not checked today:  not using controlled substances.    PSYCHOTROPIC DRUG INTERACTIONS:   LITHIUM and OLANZAPINE may result in increased neurotoxic effects of D2-antagonists.  MANAGEMENT:  Monitoring for adverse effects and patient is aware of risks     PLAN                                                                                                       1) PSYCHOTROPIC MEDICATIONS:  - stop gabapentin  - continue olanzapine 20 mg QHS  - continue lithium 1200 mg QHS  - increase metformin to 1000 mg with breakfast and 500 mg with dinner    2) THERAPY:    Continue  TREATMENT PLAN   Date revised  -  Will need to complete at next visit  Date next due- na    3) NEXT DUE:    Labs- Antipsychotic labs due 11/2018  EKG- PRN  Rating Scales- AIMS due 1/2018    4) REFERRALS:    f/u with primary care    5) RTC: 2 weeks  SAFETY PLAN reviewed: No: na        Placed in pt instructions: No: na    6)  CRISIS NUMBERS:   Provided routinely in AVS.  Especially emphasized:  [facility] [phone] (clinic)    [phone] (after hours)  CRISIS TEXT LINE: Text [phone] from anywhere, anytime, any crisis 24/7;  OR SEE www.crisistextline.org  ONLY if a [facility] PT: [facility] [phone] (clinic), [phone] (after hours)     TREATMENT RISK STATEMENT:  The risks, benefits, alternatives and potential adverse effects have been discussed and are understood by the pt. The pt understands the risks of using street drugs or alcohol. There are no medical contraindications, the pt agrees to treatment with the ability to do so. The pt knows to call the clinic for any problems or to access emergency care if needed.  Medical and substance use concerns are documented above.  Psychotropic drug interaction check was done, including changes made today.    RESIDENT:   Zbigniew Bailey MD      Patient not staffed in clinic.  Supervisor is Dr. Tay who will sign the note.    Attestation:  I did not see this patient directly. I have reviewed the documentation and I agree with the resident's plan of care.   Joel Tay MD

## 2017-11-11 LAB — HCV AB SERPL QL IA: NONREACTIVE

## 2017-11-13 ASSESSMENT — PATIENT HEALTH QUESTIONNAIRE - PHQ9: SUM OF ALL RESPONSES TO PHQ QUESTIONS 1-9: 8

## 2017-11-29 ENCOUNTER — OFFICE VISIT (OUTPATIENT)
Dept: PSYCHIATRY | Facility: CLINIC | Age: 27
End: 2017-11-29
Attending: PSYCHIATRY & NEUROLOGY
Payer: COMMERCIAL

## 2017-11-29 VITALS
HEART RATE: 93 BPM | BODY MASS INDEX: 35.28 KG/M2 | DIASTOLIC BLOOD PRESSURE: 98 MMHG | WEIGHT: 265.6 LBS | SYSTOLIC BLOOD PRESSURE: 158 MMHG

## 2017-11-29 DIAGNOSIS — Z79.899 ENCOUNTER FOR LONG-TERM (CURRENT) USE OF MEDICATIONS: ICD-10-CM

## 2017-11-29 DIAGNOSIS — F29 PSYCHOSIS, UNSPECIFIED PSYCHOSIS TYPE (H): Primary | ICD-10-CM

## 2017-11-29 DIAGNOSIS — F25.0 SCHIZOAFFECTIVE DISORDER, BIPOLAR TYPE (H): ICD-10-CM

## 2017-11-29 PROCEDURE — 99212 OFFICE O/P EST SF 10 MIN: CPT | Mod: ZF

## 2017-11-29 RX ORDER — OLANZAPINE 20 MG/1
20 TABLET ORAL AT BEDTIME
Qty: 30 TABLET | Refills: 1 | Status: SHIPPED | OUTPATIENT
Start: 2017-11-29 | End: 2018-02-01

## 2017-11-29 NOTE — MR AVS SNAPSHOT
After Visit Summary   11/29/2017    Jose Francisco Sommers    MRN: [id number]           Patient Information     Date Of Birth          1990        Visit Information        Provider Department      11/29/2017 3:25 PM Zbigniew Bailey MD Psychiatry Clinic        Today's Diagnoses     Encounter for long-term (current) use of medications    -  1    Schizoaffective disorder, bipolar type (H)        Psychosis, unspecified psychosis type          Care Instructions    1. Continue taking medications without change.  2. Get lithium level and liver tests completed before your next appointment. Lithium level needs to be collected at least 12 hr after you take your dose.  3. Continue daily exercise, healthy eating choices, avoiding high sugary or fatty foods.  4. Practice good sleep hygiene: avoid screen time 1-2 hr prior to intended bed time, avoid strenuous exercise at least 2 hr prior to bed, avoid reading or other activities other than sleep in your bed.  5. Return to clinic in one month.          Follow-ups after your visit        Your next 10 appointments already scheduled     Dec 29, 2017  3:25 PM CST   Adult Med Follow UP with Zbigniew Bailey MD   Psychiatry Clinic ([facility])    [address] [facility] [address] [phone]              Future tests that were ordered for you today     Open Standing Orders        Priority Remaining Interval Expires Ordered    Hepatic Panel Routine 1/1 11/29/2018 11/29/2017    Lithium Level Routine 1/1 11/29/2018 11/29/2017            Who to contact     Please call your clinic at [phone] to:    Ask questions about your health    Make or cancel appointments    Discuss your medicines    Learn about your test results    Speak to your doctor   If you have compliments or concerns about an experience at your clinic, or if you wish to file a complaint, please contact [facility]  Patient Relations at [phone] or email us at [email or web address]         Additional Information About Your Visit        Shhmoozehar[employer]iall Information     [email or web address] is an electronic gateway that provides easy, online access to your medical records. With [email or web address], you can request a clinic appointment, read your test results, renew a prescription or communicate with your care team.     To sign up for [email or web address] visit the website at [email or web address]   You will be asked to enter the access code listed below, as well as some personal information. Please follow the directions to create your username and password.     Your access code is: [email or web address]  Expires: 1/3/2018  3:16 PM     Your access code will  in 90 days. If you need help or a new code, please contact your [facility] or call [phone] for assistance.        Care EveryWhere ID     This is your Care EveryWhere ID. This could be used by other organizations to access your [place] medical records  [id number]        Your Vitals Were     Pulse BMI (Body Mass Index)                93 35.28 kg/m2           Blood Pressure from Last 3 Encounters:   17 (!) 158/98   11/10/17 (!) 144/93   17 130/86    Weight from Last 3 Encounters:   17 120.5 kg (265 lb 9.6 oz)   11/10/17 118.3 kg (260 lb 12.8 oz)   17 117.5 kg (259 lb)                 Today's Medication Changes          These changes are accurate as of: 17  4:31 PM.  If you have any questions, ask your nurse or doctor.               Stop taking these medicines if you haven't already. Please contact your care team if you have questions.     gabapentin 300 MG capsule   Commonly known as:  NEURONTIN   Stopped by:  Zbigniew Bailey MD                Where to get your medicines      These medications were sent to [place] [address] [place] [facility] [address] [facility]  [address] [facility] [address]     Phone:  [phone]      metFORMIN 500 MG tablet    OLANZapine 20 MG tablet                Primary Care Provider    Clinic Provider       No address on file        Equal Access to Services     SHELLEY MCGILLASHLY : Hadii maria ines farah sadie Castro, wakayleeda lujonathanadaha, qaybta nitacatherine donemir, waxisidro anatoliyin hayaadanie ochoaholli acevedo laAlejandraandra welch. So [facility] [phone].    ATENCIÓN: Si habla español, tiene a jacob disposición servicios gratuitos de asistencia lingüística. Llame al [phone].    We comply with applicable federal civil rights laws and [place] laws. We do not discriminate on the basis of race, color, national origin, age, disability, sex, sexual orientation, or gender identity.            Thank you!     Thank you for choosing PSYCHIATRY CLINIC  for your care. Our goal is always to provide you with excellent care. Hearing back from our patients is one way we can continue to improve our services. Please take a few minutes to complete the written survey that you may receive in the mail after your visit with us. Thank you!             Your Updated Medication List - Protect others around you: Learn how to safely use, store and throw away your medicines at www.disposemymeds.org.          This list is accurate as of: 11/29/17  4:31 PM.  Always use your most recent med list.                   Brand Name Dispense Instructions for use Diagnosis    lithium 600 MG capsule     60 capsule    Take 2 capsules (1,200 mg) by mouth At Bedtime    Psychosis, unspecified psychosis type       metFORMIN 500 MG tablet    GLUCOPHAGE    75 tablet    Take 1000 mg (2 tablet) with breakfast and 500 mg with dinner.    Schizoaffective disorder, bipolar type (H)       OLANZapine 20 MG tablet    zyPREXA    30 tablet    Take 1 tablet (20 mg) by mouth At Bedtime    Psychosis, unspecified psychosis type

## 2017-11-29 NOTE — PATIENT INSTRUCTIONS
1. Continue taking medications without change.  2. Get lithium level and liver tests completed before your next appointment. Lithium level needs to be collected at least 12 hr after you take your dose.  3. Continue daily exercise, healthy eating choices, avoiding high sugary or fatty foods.  4. Practice good sleep hygiene: avoid screen time 1-2 hr prior to intended bed time, avoid strenuous exercise at least 2 hr prior to bed, avoid reading or other activities other than sleep in your bed.  5. Return to clinic in one month.

## 2017-11-29 NOTE — NURSING NOTE
Chief Complaint   Patient presents with     Recheck Medication     Psychosis     Reviewed allergies, smoking status, and pharmacy preference  Administered abuse screening questions   Obtained weight, blood pressure and heart rate

## 2017-11-29 NOTE — PROGRESS NOTES
PSYCHIATRY CLINIC PROGRESS NOTE   The initial diagnostic evaluation was on 7/24/2015.  Date of the most recent transfer of care lexie is 7/27/17.    Pertinent Background:  This patient first experienced mental health issues in grade school and has received treatment for ADHD, depression, psychosis and substance use.  See transfer evaluation for detailed history.  Notably, the patient had FEP in 2015 in the setting of cannabis abuse and has been stable on olanzapine. He has never lived independently and seems to have fairly low insight into his illness and past symptoms.       Psych critical item history includes psychosis [disorganization, ?catatonia], mutiple psychotropic trials, psych hosp [x1x at [facility], spring 2015], commitment and CD: alcohol and cannabis.      INTERIM HISTORY                                                 Jose Francisco Sommers is a 27 year old male who was last seen for medication management on 11/10/2017 at which time gabapentin was stopped.  The patient reports good treatment adherence.  History was provided by the patient who was a fair historian.   Since the last visit:  -stopped gabapentin after last appt due to my concern it was contributing to elevated transaminases.  -consistently taking his medicines, does not think he is taking less lithium or forgetting.  -finding increased productivity since quitting smoking. Using the patch with success.   -hasn't been buying junk food since last appt.   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  -becoming more organized and using reminders a lot to assist with behavior change.  -been using a fit-bit now for four days, has been taking 2 hr to fall asleep apparently; still sleeping 9 hrs  -is asking for increase in olanzapine to 25 mg to help with concentration problems, improving sleep    RECENT SYMPTOMS:   DEPRESSION:  reports-low mood, hypersomnia, weight/ appetite change  (overeating) and poor concentration /memory;  DENIES- suicidal ideation , anhedonia, low energy, excessive guilt, feeling worthless and feeling hopeless  KAMI/HYPOMANIA:  reports-none;  DENIES- increased energy, decreased sleep need, increased activity and grandiosity  PSYCHOSIS:  reports-none;  DENIES- delusions, auditory hallucinations, visual hallucinations and disorganized behavior  SLEEP:     getting 9 hrs   EATING DISORDER: overeating, stress eating at night    RECENT SUBSTANCE USE:     ALCOHOL- very rarely          TOBACCO- quit 1 week ago, using nicotine patch, going really well               CAFFEINE- 2-3 cups/day of tea  OPIOIDS- none       NARCAN KIT- N/A       CANNABIS- none          OTHER ILLICIT DRUGS- none     CURRENT SOCIAL HISTORY:  FINANCIAL SUPPORT- working and family or friend       CHILDREN- none       LIVING SITUATION- lives with parents in [place]      SOCIAL/ SPIRITUAL SUPPORT- family, Spiritism hemalatha       FEELS SAFE AT HOME- Yes     MEDICAL ROS:  Reports none    Denies sedation, dizziness, akathisia, unusual movements, polydipsia, polyphagia and excessive water intake and polydipsia, polyuria, nocturia and ice water craving, denies abd pain, n/v/d/c, decreased appetite    PSYCH and CD Critical Summary Points since July 2017             -July '17: increased gabapentin to 600 mg HS to target sleep with minimal effect  -August '17: self-increased Olanzapine to 20mg HS (see 8/31/17 note for details)    PAST PSYCH MED TRIALS   see EMR Problem List: Hx of psychiatric care    MEDICAL / SURGICAL HISTORY                                   CARE TEAM:   PCP- Issac Watson PA-C at [facility]    Therapist- none       Contraception- not sexually active    Neurologic Hx [head injury etc]:  None reported  Patient Active Problem List   Diagnosis     Schizoaffective disorder, bipolar type (H)     Hx of psychiatric care     Elevated liver enzymes       ALLERGY                                Sulfa  drugs  MEDICATIONS                               Current Outpatient Prescriptions   Medication Sig Dispense Refill     metFORMIN (GLUCOPHAGE) 500 MG tablet Take 1000 mg (2 tablet) with breakfast and 500 mg with dinner. 75 tablet 0     OLANZapine (ZYPREXA) 20 MG tablet Take 1 tablet (20 mg) by mouth At Bedtime 30 tablet 1     lithium 600 MG capsule Take 2 capsules (1,200 mg) by mouth At Bedtime 60 capsule 2     gabapentin (NEURONTIN) 300 MG capsule Take 2 capsules (600 mg) by mouth At Bedtime (Patient taking differently: Take 300 mg by mouth At Bedtime ) 60 capsule 0     VITALS   BP (!) 158/98  Pulse 93  Wt 120.5 kg (265 lb 9.6 oz)  BMI 35.28 kg/m2   MENTAL STATUS EXAM                                                           Alertness: alert  and oriented  Appearance: adequately groomed  Behavior/Demeanor: cooperative and pleasant, more comfortable, with fair but improved eye contact   Speech: normal and regular rate and rhythm, more spontaneous  Language: no obvious problem  Psychomotor: normal or unremarkable  Mood: description consistent with euthymia  Affect: blunted and less restricted; was congruent to mood; was congruent to content  Thought Process/Associations: unremarkable  Thought Content:  Reports none;  Denies suicidal ideation, violent ideation, delusions and paranoid ideation  Perception:  Reports none;  Denies auditory hallucinations and visual hallucinations  Insight: fair  Judgment: fair  Cognition: does  appear grossly intact; formal cognitive testing was not done    LABS and DATA   RATING SCALES:  AIMS: done 1/2017 with total score of 0    PHQ9 TODAY =  7, somewhat difficult, item #9: 0  PHQ-9 SCORE 9/1/2017 11/2/2017 11/10/2017   Total Score - - -   Total Score 9 9 8       ANTIPSYCHOTIC LABS   [glu, A1C, lipids (focus LDL), liver enzymes, WBC, ANEU, Hgb, plts]    q12 mo  Recent Labs   Lab Test  10/30/17   1429  03/22/17   1340   09/30/16   1348   GLC  86  83   < >  83   A1C   --    --    --    4.6    < > = values in this interval not displayed.     Recent Labs   Lab Test  10/30/17   1429  03/22/17   1340   CHOL  165  171   TRIG  339*  528*   LDL  63  Cannot estimate LDL when triglyceride exceeds 400 mg/dL  59   HDL  34*  31*     Recent Labs   Lab Test  11/10/17   1534  11/02/17   1658  10/30/17   1429   AST  35  49*  51*   ALT  108*  156*  147*   ALKPHOS   --   68  63     Recent Labs   Lab Test  11/10/17   1534  10/30/17   1429   WBC  8.6  8.1   ANEU  5.0  4.8   HGB  15.6  15.7   PLT  255  241     LITHIUM LABS     [level, renal, SG, TSH, WBC]    q6 mo  Recent Labs   Lab Test  11/10/17   1534  10/30/17   1429  03/22/17   1340  03/08/17   0953   LITHIUM  0.49*  0.54*  0.6  0.7     Recent Labs   Lab Test  10/30/17   1429  03/22/17   1340  03/08/17   0953   CR  1.05  1.09  0.94   GFRESTIMATED  85  82  >90  Non  GFR Calc     NA  141  143  140   ADONAY  9.2  9.3  9.5     Recent Labs   Lab Test  10/30/17   1429  02/09/16   1247   SG  1.015  1.005     Recent Labs   Lab Test  11/10/17   1534  10/30/17   1429  03/22/17   1340   TSH  2.09  2.92  3.03     Recent Labs   Lab Test  11/10/17   1534  10/30/17   1429   WBC  8.6  8.1     Wt Readings from Last 4 Encounters:   11/29/17 120.5 kg (265 lb 9.6 oz)   11/10/17 118.3 kg (260 lb 12.8 oz)   11/07/17 117.5 kg (259 lb)   11/02/17 119.5 kg (263 lb 6.4 oz)       DIAGNOSIS     Psychosis, unspecified psychosis type (r/o schizoaffective disorder vs schizophrenia)  History of Cannabis Use Disorder, in remission  History of Alcohol Use Disorder, in remission    ASSESSMENT                                     TODAY     1. Schizoaffective disorder?: unclear what most accurate diagnosis is given history and comorbid cannabis abuse. I am not convinced his prior behavior met criteria for a manic episode and could be explained by disorganized psychosis, suspecting something along the schizophrenic spectrum; there is some evidence of agitation and disorganization outside  of a concurrent mood disturbance. Will need further monitoring and history for clarity, a more in depth interview with his mother will be helpful. Psychosis, agitation and concentration have improved with OLZ 20 mg but he is requesting an increase. Considering the weight gain trend, we will keep at current dose and attempt more lifestyle modification and recheck in a month. Lithium level as fairly low considering the dose, however he takes the medicine at 10pm and level was 5.5 hr after 12-hr trough. Repeat lithium and LFTs.    2. Medical concerns: Pt has family history of young adult onset fatty liver disease in first degree relative brought on by weight gain 2/2 prednisone. Repeat LFTs show decrease in ALT; initially suspected OLZ but LFTs were improving after decreasing gabapentin - so gabapentin was stopped. Will continue with fatty liver workup nonetheless per PCP in addition to monitoring lipids, which are downtrending. Metformin to target appetite, will consider increasing to 1000 mg BID. Has gained 5 lbs, pt attributes to Thanksgiving. Discussed referral for nutritionist. Pt opting to keep food log and do better with healthy choices and portion control.    3. Sleep: discussed sleep hygiene best practices. Pt will start with limiting screen time 1-2 hr prior to bed, avoiding reading or other stimulating activities in bed, and moving his exercise regimen to earlier in the day/early evening.         [place] PRESCRIPTION MONITORING PROGRAM [] was not checked today:  not using controlled substances.    PSYCHOTROPIC DRUG INTERACTIONS:   LITHIUM and OLANZAPINE may result in increased neurotoxic effects of D2-antagonists.  MANAGEMENT:  Monitoring for adverse effects and patient is aware of risks     PLAN                                                                                                       1) PSYCHOTROPIC MEDICATIONS:  - continue olanzapine 20 mg QHS  - continue lithium 1200 mg QHS  - continue metformin  1000 mg with breakfast and 500 mg with dinner    2) THERAPY:    Continue  TREATMENT PLAN   Date revised  - na  Date next due- na    3) NEXT DUE:    Labs- Antipsychotic labs due 11/2018  EKG- PRN  Rating Scales- AIMS due 1/2018    4) REFERRALS:    f/u with primary care    5) RTC: 1 month    SAFETY PLAN reviewed: No: na        Placed in pt instructions: No: na    6) CRISIS NUMBERS:   Provided routinely in AVS.  Especially emphasized:  [facility] [place] [phone] (clinic)    [phone] (after hours)  CRISIS TEXT LINE: Text [phone] from anywhere, anytime, any crisis 24/7;  OR SEE www.crisistextline.org  ONLY if a [facility] PT: [facility] [place] [phone] (clinic), [phone] (after hours)     TREATMENT RISK STATEMENT:  The risks, benefits, alternatives and potential adverse effects have been discussed and are understood by the pt. The pt understands the risks of using street drugs or alcohol. There are no medical contraindications, the pt agrees to treatment with the ability to do so. The pt knows to call the clinic for any problems or to access emergency care if needed.  Medical and substance use concerns are documented above.  Psychotropic drug interaction check was done, including changes made today.    RESIDENT:   Zbigniew Bailey MD      Patient not staffed in clinic.  Supervisor is Dr. Tay who will sign the note.    Attestation:  I did not see this patient directly. I have reviewed the documentation and I agree with the resident's plan of care.   Joel Tay MD

## 2017-11-30 ASSESSMENT — PATIENT HEALTH QUESTIONNAIRE - PHQ9: SUM OF ALL RESPONSES TO PHQ QUESTIONS 1-9: 7

## 2017-12-01 DIAGNOSIS — F25.0 SCHIZOAFFECTIVE DISORDER, BIPOLAR TYPE (H): ICD-10-CM

## 2017-12-01 DIAGNOSIS — Z79.899 ENCOUNTER FOR LONG-TERM (CURRENT) USE OF MEDICATIONS: ICD-10-CM

## 2017-12-01 PROCEDURE — 80076 HEPATIC FUNCTION PANEL: CPT | Performed by: STUDENT IN AN ORGANIZED HEALTH CARE EDUCATION/TRAINING PROGRAM

## 2017-12-01 PROCEDURE — 80178 ASSAY OF LITHIUM: CPT | Performed by: STUDENT IN AN ORGANIZED HEALTH CARE EDUCATION/TRAINING PROGRAM

## 2017-12-01 PROCEDURE — 80061 LIPID PANEL: CPT | Performed by: STUDENT IN AN ORGANIZED HEALTH CARE EDUCATION/TRAINING PROGRAM

## 2017-12-01 PROCEDURE — 36415 COLL VENOUS BLD VENIPUNCTURE: CPT | Performed by: STUDENT IN AN ORGANIZED HEALTH CARE EDUCATION/TRAINING PROGRAM

## 2017-12-02 LAB
ALBUMIN SERPL-MCNC: 4.1 G/DL (ref 3.4–5)
ALP SERPL-CCNC: 63 U/L (ref 40–150)
ALT SERPL W P-5'-P-CCNC: 118 U/L (ref 0–70)
AST SERPL W P-5'-P-CCNC: 31 U/L (ref 0–45)
BILIRUB DIRECT SERPL-MCNC: 0.1 MG/DL (ref 0–0.2)
BILIRUB SERPL-MCNC: 0.5 MG/DL (ref 0.2–1.3)
CHOLEST SERPL-MCNC: 177 MG/DL
HDLC SERPL-MCNC: 36 MG/DL
LDLC SERPL CALC-MCNC: 72 MG/DL
LITHIUM SERPL-SCNC: 0.64 MMOL/L (ref 0.6–1.2)
NONHDLC SERPL-MCNC: 141 MG/DL
PROT SERPL-MCNC: 7.2 G/DL (ref 6.8–8.8)
TRIGL SERPL-MCNC: 347 MG/DL

## 2017-12-19 ENCOUNTER — RADIANT APPOINTMENT (OUTPATIENT)
Dept: ULTRASOUND IMAGING | Facility: CLINIC | Age: 27
End: 2017-12-19
Attending: PHYSICIAN ASSISTANT
Payer: COMMERCIAL

## 2017-12-19 DIAGNOSIS — R74.8 ELEVATED LIVER ENZYMES: ICD-10-CM

## 2017-12-19 PROCEDURE — 76705 ECHO EXAM OF ABDOMEN: CPT

## 2017-12-22 PROBLEM — K76.0 FATTY LIVER: Status: ACTIVE | Noted: 2017-12-22

## 2017-12-29 ENCOUNTER — OFFICE VISIT (OUTPATIENT)
Dept: PSYCHIATRY | Facility: CLINIC | Age: 27
End: 2017-12-29
Attending: PSYCHIATRY & NEUROLOGY
Payer: COMMERCIAL

## 2017-12-29 VITALS
WEIGHT: 250.6 LBS | DIASTOLIC BLOOD PRESSURE: 92 MMHG | BODY MASS INDEX: 33.29 KG/M2 | HEART RATE: 67 BPM | SYSTOLIC BLOOD PRESSURE: 152 MMHG

## 2017-12-29 DIAGNOSIS — R74.8 ELEVATED LIVER ENZYMES: ICD-10-CM

## 2017-12-29 DIAGNOSIS — K76.0 FATTY LIVER: ICD-10-CM

## 2017-12-29 DIAGNOSIS — F29 PSYCHOSIS, UNSPECIFIED PSYCHOSIS TYPE (H): Primary | ICD-10-CM

## 2017-12-29 PROCEDURE — 99212 OFFICE O/P EST SF 10 MIN: CPT | Mod: ZF

## 2017-12-29 NOTE — PATIENT INSTRUCTIONS
Continue medications without changes. Call for refills per your pharmacy request.  Come back in one month.

## 2017-12-29 NOTE — MR AVS SNAPSHOT
After Visit Summary   12/29/2017    Jose Francisco Sommers    MRN: [id number]           Patient Information     Date Of Birth          1990        Visit Information        Provider Department      12/29/2017 3:25 PM Zbigniew Bailey MD Psychiatry Clinic        Today's Diagnoses     Psychosis, unspecified psychosis type    -  1    Fatty liver        Elevated liver enzymes          Care Instructions    Continue medications without changes. Call for refills per your pharmacy request.  Come back in one month.          Follow-ups after your visit        Your next 10 appointments already scheduled     Feb 01, 2018  3:25 PM CST   Adult Med Follow UP with Zbigniew Bailey MD   Psychiatry Clinic ([facility])    [address]  [address] [facility] [address] [phone]              Who to contact     Please call your clinic at [phone] to:    Ask questions about your health    Make or cancel appointments    Discuss your medicines    Learn about your test results    Speak to your doctor   If you have compliments or concerns about an experience at your clinic, or if you wish to file a complaint, please contact [facility] Patient Relations at [phone] or email us at [email or web address]         Additional Information About Your Visit        MyChart Information     [employer] gives you secure access to your electronic health record. If you see a primary care provider, you can also send messages to your care team and make appointments. If you have questions, please call your primary care clinic.  If you do not have a primary care provider, please call [phone] and they will assist you.      [employer] is an electronic gateway that provides easy, online access to your medical records. With [employer], you can request a clinic appointment, read your test results, renew a prescription or communicate with your care  team.     To access your existing account, please contact your [facility] or call [phone] for assistance.        Care EveryWhere ID     This is your Care EveryWhere ID. This could be used by other organizations to access your [facility] medical records  [id number]        Your Vitals Were     Pulse BMI (Body Mass Index)                67 33.29 kg/m2           Blood Pressure from Last 3 Encounters:   01/11/18 132/84   12/29/17 (!) 152/92   11/29/17 (!) 158/98    Weight from Last 3 Encounters:   01/11/18 114.8 kg (253 lb)   12/29/17 113.7 kg (250 lb 9.6 oz)   11/29/17 120.5 kg (265 lb 9.6 oz)              Today, you had the following     No orders found for display       Primary Care Provider    Clinic Provider       No address on file        Equal Access to Services     SHELLEY SOOD : Hadii maria ines Castro, waaxshreya keyadaha, qaybta kaalmada minerva, chandan flores . So [facility] [phone].    ATENCIÓN: Si habla español, tiene a jacob disposición servicios gratuitos de asistencia lingüística. Marco al [phone].    We comply with applicable federal civil rights laws and [place] laws. We do not discriminate on the basis of race, color, national origin, age, disability, sex, sexual orientation, or gender identity.            Thank you!     Thank you for choosing PSYCHIATRY CLINIC  for your care. Our goal is always to provide you with excellent care. Hearing back from our patients is one way we can continue to improve our services. Please take a few minutes to complete the written survey that you may receive in the mail after your visit with us. Thank you!             Your Updated Medication List - Protect others around you: Learn how to safely use, store and throw away your medicines at www.disposemymeds.org.          This list is accurate as of: 12/29/17 11:59 PM.  Always use your most recent med list.                   Brand Name Dispense  Instructions for use Diagnosis    lithium 600 MG capsule     60 capsule    Take 2 capsules (1,200 mg) by mouth At Bedtime    Psychosis, unspecified psychosis type       OLANZapine 20 MG tablet    zyPREXA    30 tablet    Take 1 tablet (20 mg) by mouth At Bedtime    Psychosis, unspecified psychosis type

## 2017-12-29 NOTE — PROGRESS NOTES
PSYCHIATRY CLINIC PROGRESS NOTE   The initial diagnostic evaluation was on 7/24/2015.  Date of the most recent transfer of care panfiloal is 7/27/17.    Pertinent Background:  This patient first experienced mental health issues in grade school and has received treatment for ADHD, depression, psychosis and substance use.  See transfer evaluation for detailed history.  Notably, the patient had FEP in 2015 in the setting of cannabis abuse and has been stable on olanzapine. He has never lived independently and seems to have fairly low insight into his illness and past symptoms.       Psych critical item history includes psychosis [disorganization, ?catatonia], mutiple psychotropic trials, psych hosp [x1x at [facility], spring 2015], commitment and CD: alcohol and cannabis.      INTERIM HISTORY                                                 Jose Francisco Sommers is a 27 year old male who was last seen for medication management on 11/29/2017 at which time no changes were made.  The patient reports good treatment adherence.  History was provided by the patient who was a good historian.   Since the last visit:  -Has been practicing better sleep hygiene, waking up at noon instead of 2-3pm. Eating healthier and tracking his calories. Biggest thing was that he stopped eating at night. Has lost 15 lbs since last visit.  -RUQ US showed fatty infiltration with some gall bladder distention, not sure about stones. No RUQ pain, will be seeing PCP on 1/11/18.  -[employer] business is going well. 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  -Quit drinking caffeine, going well.     RECENT SYMPTOMS:   DEPRESSION:  reports-low mood, hypersomnia, weight/ appetite change (overeating) and poor concentration /memory;  DENIES- suicidal ideation , anhedonia, low energy, excessive guilt, feeling worthless and feeling hopeless  KAMI/HYPOMANIA:  reports-none;  DENIES- increased energy, decreased sleep need, increased activity and  grandiosity  PSYCHOSIS:  reports-none;  DENIES- delusions, auditory hallucinations, visual hallucinations and disorganized behavior  SLEEP:     getting 9 hrs   EATING DISORDER: overeating, stress eating at night    RECENT SUBSTANCE USE:     ALCOHOL- very rarely          TOBACCO- quit 1 week prior to our last appt, using nicotine patch, much easier              CAFFEINE- 2-3 cups/day of tea  OPIOIDS- none       NARCAN KIT- N/A       CANNABIS- none          OTHER ILLICIT DRUGS- none     CURRENT SOCIAL HISTORY:  FINANCIAL SUPPORT- working and family or friend       CHILDREN- none       LIVING SITUATION- lives with parents in [place]      SOCIAL/ SPIRITUAL SUPPORT- family, Denominational hemalatha       FEELS SAFE AT HOME- Yes     MEDICAL ROS:  Reports none    Denies sedation, dizziness, akathisia, unusual movements, polydipsia, polyphagia and excessive water intake and polydipsia, polyuria, nocturia and ice water craving, denies abd pain, n/v/d/c, decreased appetite    PSYCH and CD Critical Summary Points since July 2017             -July '17: increased gabapentin to 600 mg HS to target sleep with minimal effect  -August '17: self-increased Olanzapine to 20mg HS (see 8/31/17 note for details)    PAST PSYCH MED TRIALS   see EMR Problem List: Hx of psychiatric care    MEDICAL / SURGICAL HISTORY                                   CARE TEAM:   PCP- Issac Watson PA-C at [facility]    Therapist- none       Contraception- not sexually active    Neurologic Hx [head injury etc]:  None reported  Patient Active Problem List   Diagnosis     Schizoaffective disorder, bipolar type (H)     Hx of psychiatric care     Elevated liver enzymes     Fatty liver       ALLERGY                                Sulfa drugs  MEDICATIONS                               Current Outpatient Prescriptions   Medication Sig Dispense Refill     metFORMIN (GLUCOPHAGE) 500 MG tablet Take 1000 mg (2 tablet) with breakfast and 500 mg with dinner. 75 tablet 0      OLANZapine (ZYPREXA) 20 MG tablet Take 1 tablet (20 mg) by mouth At Bedtime 30 tablet 1     lithium 600 MG capsule Take 2 capsules (1,200 mg) by mouth At Bedtime 60 capsule 2     VITALS   BP (!) 152/92  Pulse 67  Wt 113.7 kg (250 lb 9.6 oz)  BMI 33.29 kg/m2   MENTAL STATUS EXAM                                                           Alertness: alert  and oriented  Appearance: adequately groomed  Behavior/Demeanor: cooperative and pleasant, more comfortable, with fair but improved eye contact   Speech: normal and regular rate and rhythm, more spontaneous  Language: no obvious problem  Psychomotor: normal or unremarkable  Mood: description consistent with euthymia  Affect: blunted and less restricted; was congruent to mood; was congruent to content  Thought Process/Associations: unremarkable  Thought Content:  Reports none;  Denies suicidal ideation, violent ideation, delusions and paranoid ideation  Perception:  Reports none;  Denies auditory hallucinations and visual hallucinations  Insight: fair  Judgment: fair  Cognition: does  appear grossly intact; formal cognitive testing was not done    LABS and DATA   RATING SCALES:  AIMS: done 1/2017 with total score of 0    PHQ9 TODAY =  3, somewhat difficult, item #9: 0  PHQ-9 SCORE 11/2/2017 11/10/2017 11/29/2017   Total Score - - -   Total Score 9 8 7       ANTIPSYCHOTIC LABS   [glu, A1C, lipids (focus LDL), liver enzymes, WBC, ANEU, Hgb, plts]    q12 mo  Recent Labs   Lab Test  10/30/17   1429  03/22/17   1340   09/30/16   1348   GLC  86  83   < >  83   A1C   --    --    --   4.6    < > = values in this interval not displayed.     Recent Labs   Lab Test  12/01/17   0948  10/30/17   1429   CHOL  177  165   TRIG  347*  339*   LDL  72  63   HDL  36*  34*     Recent Labs   Lab Test  12/01/17   0948  11/10/17   1534  11/02/17   1658   AST  31  35  49*   ALT  118*  108*  156*   ALKPHOS  63   --   68     Recent Labs   Lab Test  11/10/17   1534  10/30/17   1429   WBC  8.6   8.1   ANEU  5.0  4.8   HGB  15.6  15.7   PLT  255  241     LITHIUM LABS     [level, renal, SG, TSH, WBC]    q6 mo  Recent Labs   Lab Test  12/01/17   0948  11/10/17   1534  10/30/17   1429  03/22/17   1340   LITHIUM  0.64  0.49*  0.54*  0.6     Recent Labs   Lab Test  10/30/17   1429  03/22/17   1340  03/08/17   0953   CR  1.05  1.09  0.94   GFRESTIMATED  85  82  >90  Non  GFR Calc     NA  141  143  140   ADONAY  9.2  9.3  9.5     Recent Labs   Lab Test  10/30/17   1429  02/09/16   1247   SG  1.015  1.005     Recent Labs   Lab Test  11/10/17   1534  10/30/17   1429  03/22/17   1340   TSH  2.09  2.92  3.03     Recent Labs   Lab Test  11/10/17   1534  10/30/17   1429   WBC  8.6  8.1     Wt Readings from Last 4 Encounters:   12/29/17 113.7 kg (250 lb 9.6 oz)   11/29/17 120.5 kg (265 lb 9.6 oz)   11/10/17 118.3 kg (260 lb 12.8 oz)   11/07/17 117.5 kg (259 lb)       DIAGNOSIS     Psychosis, unspecified psychosis type (r/o schizoaffective disorder vs schizophrenia)  History of Cannabis Use Disorder, in remission  History of Alcohol Use Disorder, in remission    ASSESSMENT                                     TODAY     1. Schizoaffective disorder?: Psychosis has been stable, his diagnosis is unclear given history and comorbid cannabis abuse. I am not convinced his prior behavior met criteria for a manic episode and could be explained by disorganized psychosis, suspecting something along the schizophrenic spectrum; there is some evidence of agitation and disorganization outside of a concurrent mood disturbance. Will need further monitoring and history for clarity, a more in depth interview with his mother will be helpful. Lithium level remains lower than expected given dose despite taken at 12h trough. Pt is stable with no side effects however. We will revisit necessity of lithium treatment in the future and would benefit from a family meeting.    2. Medical concerns: diagnosed with fatty liver confirmed on US,  most certainly causing elevation in transaminases. Pt has made a concerted effort to change lifestyle habits and it has paid off with weight reduction. Continue to provide encouragement.    3. Sleep: improved with dedicated effort in changing sleep hygiene. Continue support and encouragement.         [place] PRESCRIPTION MONITORING PROGRAM [] was not checked today:  not using controlled substances.    PSYCHOTROPIC DRUG INTERACTIONS:   LITHIUM and OLANZAPINE may result in increased neurotoxic effects of D2-antagonists.  MANAGEMENT:  Monitoring for adverse effects and patient is aware of risks     PLAN                                                                                                       1) PSYCHOTROPIC MEDICATIONS:  - continue olanzapine 20 mg QHS  - continue lithium 1200 mg QHS  - continue metformin 1000 mg with breakfast and 500 mg with dinner    2) THERAPY:    Continue  TREATMENT PLAN   Date revised  - na  Date next due- na    3) NEXT DUE:    Labs- Antipsychotic labs due 11/2018  EKG- PRN  Rating Scales- AIMS due 1/2018    4) REFERRALS:    f/u with primary care    5) RTC: 1 month    SAFETY PLAN reviewed: No: na        Placed in pt instructions: No: na    6) CRISIS NUMBERS:   Provided routinely in AVS.  Especially emphasized:  [facility] [phone] (clinic)    [phone] (after hours)  CRISIS TEXT LINE: Text [phone] from anywhere, anytime, any crisis 24/7;  OR SEE www.crisistextline.org  ONLY if a [facility] PT: [facility] [phone] (clinic), [phone] (after hours)     TREATMENT RISK STATEMENT:  The risks, benefits, alternatives and potential adverse effects have been discussed and are understood by the pt. The pt understands the risks of using street drugs or alcohol. There are no medical contraindications, the pt agrees to treatment with the ability to do so. The pt knows to call the clinic for any problems or to access emergency care if needed.  Medical and substance use  concerns are documented above.  Psychotropic drug interaction check was done, including changes made today.    RESIDENT:   Zbigniew Bailey MD      Patient not staffed in clinic.  Supervisor is Dr. Tay who will sign the note.    Attestation:  I did not see this patient directly. I have reviewed the documentation and I agree with the resident's plan of care.   Joel Tay MD

## 2018-01-02 ASSESSMENT — PATIENT HEALTH QUESTIONNAIRE - PHQ9: SUM OF ALL RESPONSES TO PHQ QUESTIONS 1-9: 3

## 2018-01-09 DIAGNOSIS — F25.0 SCHIZOAFFECTIVE DISORDER, BIPOLAR TYPE (H): ICD-10-CM

## 2018-01-09 NOTE — TELEPHONE ENCOUNTER
Medication requested: Metformin 500 mg tabs  Last refilled: 11-29-17  Qty: 75 (25 day supply)      Last seen: 12-29-18  RTC: 1 mo  Cancel: 0  No-show: 0  Next appt: 2-1-18    Refill decision: Would refill for 30 days per protocol however will route to clinic RN fist as pt should have been out of medication 12-23-17 - over 2 weeks ago.      Kathleen M Doege RN

## 2018-01-11 ENCOUNTER — OFFICE VISIT (OUTPATIENT)
Dept: FAMILY MEDICINE | Facility: CLINIC | Age: 28
End: 2018-01-11
Payer: COMMERCIAL

## 2018-01-11 VITALS
DIASTOLIC BLOOD PRESSURE: 84 MMHG | WEIGHT: 253 LBS | HEIGHT: 73 IN | TEMPERATURE: 97.7 F | HEART RATE: 94 BPM | BODY MASS INDEX: 33.53 KG/M2 | SYSTOLIC BLOOD PRESSURE: 132 MMHG

## 2018-01-11 DIAGNOSIS — K76.0 FATTY LIVER: ICD-10-CM

## 2018-01-11 DIAGNOSIS — F25.0 SCHIZOAFFECTIVE DISORDER, BIPOLAR TYPE (H): Primary | ICD-10-CM

## 2018-01-11 DIAGNOSIS — R74.8 ELEVATED LIVER ENZYMES: ICD-10-CM

## 2018-01-11 DIAGNOSIS — E66.3 OVERWEIGHT: ICD-10-CM

## 2018-01-11 DIAGNOSIS — E78.1 HIGH BLOOD TRIGLYCERIDES: ICD-10-CM

## 2018-01-11 PROCEDURE — 99213 OFFICE O/P EST LOW 20 MIN: CPT | Performed by: PHYSICIAN ASSISTANT

## 2018-01-11 NOTE — MR AVS SNAPSHOT
After Visit Summary   1/11/2018    Jose Francisco Sommers    MRN: [id number]           Patient Information     Date Of Birth          1990        Visit Information        Provider Department      1/11/2018 2:40 PM Issac Watson PA-C [facility]        Today's Diagnoses     Schizoaffective disorder, bipolar type (H)    -  1    Fatty liver        Elevated liver enzymes        Overweight        High blood triglycerides           Follow-ups after your visit        Additional Services     NUTRITION REFERRAL       Your provider has referred you to: [facility]: [facility] [phone]   [email or web address]    Please be aware that coverage of these services is subject to the terms and limitations of your health insurance plan.  Call member services at your health plan with any benefit or coverage questions.      Please bring the following with you to your appointment:    (1) This referral request  (2) Any documents given to you regarding this referral  (3) Any specific questions you have about diet and/or food choices                  Your next 10 appointments already scheduled     Feb 01, 2018  3:25 PM CST   Adult Med Follow UP with Zbigniew Bailey MD   Psychiatry Clinic ([facility])    [address] [address] [facility] [address] [phone]              Who to contact     If you have questions or need follow up information about today's clinic visit or your schedule please contact [facility] directly at [phone].  Normal or non-critical lab and imaging results will be communicated to you by MyChart, letter or phone within 4 business days after the clinic has received the results. If you do not hear from us within 7 days, please contact the clinic through MyChart or phone. If you have a critical or abnormal lab result, we will notify you by phone as  "soon as possible.  Submit refill requests through [employer] or call your pharmacy and they will forward the refill request to us. Please allow 3 business days for your refill to be completed.          Additional Information About Your Visit        [employer] Information     [employer] gives you secure access to your electronic health record. If you see a primary care provider, you can also send messages to your care team and make appointments. If you have questions, please call your primary care clinic.  If you do not have a primary care provider, please call [phone] and they will assist you.        Care EveryWhere ID     This is your Care EveryWhere ID. This could be used by other organizations to access your [facility] medical records  [id number]        Your Vitals Were     Pulse Temperature Height BMI (Body Mass Index)          94 97.7  F (36.5  C) (Oral) 6' 0.75\" (1.848 m) 33.61 kg/m2         Blood Pressure from Last 3 Encounters:   01/11/18 132/84   11/07/17 130/86    Weight from Last 3 Encounters:   01/11/18 253 lb (114.8 kg)   11/07/17 259 lb (117.5 kg)              We Performed the Following     NUTRITION REFERRAL        Primary Care Provider    Clinic Provider       No address on file        Equal Access to Services     SHELLEY SOOD : Hadii maria ines butlero Gloria, waaxda luqadaha, qaybta kaalmada adeegyada, chandan flores . So [facility] [phone].    ATENCIÓN: Si habla español, tiene a jacob disposición servicios gratuitos de asistencia lingüística. Llame al [phone].    We comply with applicable federal civil rights laws and [place] laws. We do not discriminate on the basis of race, color, national origin, age, disability, sex, sexual orientation, or gender identity.            Thank you!     Thank you for choosing [facility]  for your care. Our goal is always to provide you with excellent care. Hearing back from our patients is one way we can continue to improve " our services. Please take a few minutes to complete the written survey that you may receive in the mail after your visit with us. Thank you!             Your Updated Medication List - Protect others around you: Learn how to safely use, store and throw away your medicines at www.disposemymeds.org.          This list is accurate as of: 1/11/18  3:19 PM.  Always use your most recent med list.                   Brand Name Dispense Instructions for use Diagnosis    lithium 600 MG capsule     60 capsule    Take 2 capsules (1,200 mg) by mouth At Bedtime    Psychosis, unspecified psychosis type       metFORMIN 500 MG tablet    GLUCOPHAGE    75 tablet    Take 1000 mg (2 tablet) with breakfast and 500 mg with dinner.    Schizoaffective disorder, bipolar type (H)       OLANZapine 20 MG tablet    zyPREXA    30 tablet    Take 1 tablet (20 mg) by mouth At Bedtime    Psychosis, unspecified psychosis type

## 2018-01-11 NOTE — PROGRESS NOTES
"  SUBJECTIVE:   Jose Francisco Sommers is a 27 year old male who presents to clinic today for the following health issues:      Patient is here to follow up on lab results and his abdominal ultrasound results from December. He has a known fatty liver and mild elevations in his liver enzymes. He is on a number of psych meds that might also contribute to elevations.    He's done an admirable job of weight loss and is feeling good.    He reports no alcohol or drug use and no other OTC meds that might contribute.    He feels fine and is otherwise doing well.    Patient Active Problem List   Diagnosis     Schizoaffective disorder, bipolar type (H)     Hx of psychiatric care     Elevated liver enzymes     Fatty liver     High blood triglycerides      Current Outpatient Prescriptions   Medication     metFORMIN (GLUCOPHAGE) 500 MG tablet     OLANZapine (ZYPREXA) 20 MG tablet     lithium 600 MG capsule     No current facility-administered medications for this visit.        Problem list and histories reviewed & adjusted, as indicated.  Additional history: as documented    Labs reviewed in EPIC    Reviewed and updated as needed this visit by clinical staff  Tobacco  Allergies  Meds  Med Hx  Surg Hx  Fam Hx  Soc Hx      Reviewed and updated as needed this visit by Provider         ROS:  Constitutional, HEENT, cardiovascular, pulmonary, gi and gu systems are negative, except as otherwise noted.      OBJECTIVE:   /84 (BP Location: Right arm, Cuff Size: Adult Large)  Pulse 94  Temp 97.7  F (36.5  C) (Oral)  Ht 6' 0.75\" (1.848 m)  Wt 253 lb (114.8 kg)  BMI 33.61 kg/m2  Body mass index is 33.61 kg/(m^2).  GENERAL: healthy, alert and no distress  Psych: Appropriate appearance.  Alert and oriented times 3; coherent speech, normal   rate and volume, able to articulate logical thoughts, able   to abstract reason, no tangential thoughts, no hallucinations   or delusions.  Normal behavior.  His affect is bright.  "     ASSESSMENT/PLAN:     (F25.0) Schizoaffective disorder, bipolar type (H)  (primary encounter diagnosis)  Comment:   Plan: Stable - managed by psychiatry.    (K76.0) Fatty liver  Comment:   Plan: NUTRITION REFERRAL        Wants to see Nutrition - referral given    (R74.8) Elevated liver enzymes  Comment:   Plan: NUTRITION REFERRAL        Improving. Continue diet / weight loss    (E66.3) Overweight  Comment:   Plan: NUTRITION REFERRAL        As noted    (E78.1) High blood triglycerides  Comment:   Plan:   Lab Results   Component Value Date    TRIG 347 12/01/2017     Improving. So long as this stays below 400-500 - will avoid medications.    Continue diet, weight loss.    ROMINA CHANG PA-C  [facility]

## 2018-01-24 ENCOUNTER — MYC MEDICAL ADVICE (OUTPATIENT)
Dept: PSYCHIATRY | Facility: CLINIC | Age: 28
End: 2018-01-24

## 2018-01-24 DIAGNOSIS — F29 PSYCHOSIS, UNSPECIFIED PSYCHOSIS TYPE (H): ICD-10-CM

## 2018-01-25 RX ORDER — LITHIUM CARBONATE 600 MG/1
1200 CAPSULE ORAL AT BEDTIME
Qty: 60 CAPSULE | Refills: 0 | Status: SHIPPED | OUTPATIENT
Start: 2018-01-25 | End: 2018-02-28

## 2018-01-25 NOTE — TELEPHONE ENCOUNTER
Appt history:  Last seen:  12/29/17  RTC:  1 month  Cancel:  none  No-show:  none  Next appt:  2/1/18     Incoming refill request from:  Pt via MyChart     Medication(s) requested:    Lihtium     Last RF per med tab:  10/5/17, 30 d/s, 2 RFs    Refilled 30 d/s.

## 2018-02-01 ENCOUNTER — OFFICE VISIT (OUTPATIENT)
Dept: PSYCHIATRY | Facility: CLINIC | Age: 28
End: 2018-02-01
Attending: PSYCHIATRY & NEUROLOGY
Payer: COMMERCIAL

## 2018-02-01 VITALS
HEART RATE: 76 BPM | WEIGHT: 251.4 LBS | SYSTOLIC BLOOD PRESSURE: 136 MMHG | BODY MASS INDEX: 33.4 KG/M2 | DIASTOLIC BLOOD PRESSURE: 87 MMHG

## 2018-02-01 DIAGNOSIS — F29 PSYCHOSIS, UNSPECIFIED PSYCHOSIS TYPE (H): ICD-10-CM

## 2018-02-01 PROCEDURE — G0463 HOSPITAL OUTPT CLINIC VISIT: HCPCS | Mod: ZF

## 2018-02-01 RX ORDER — OLANZAPINE 20 MG/1
20 TABLET ORAL AT BEDTIME
Qty: 30 TABLET | Refills: 1 | Status: SHIPPED | OUTPATIENT
Start: 2018-02-01 | End: 2018-03-29

## 2018-02-01 RX ORDER — BUPROPION HYDROCHLORIDE 100 MG/1
TABLET, EXTENDED RELEASE ORAL
Qty: 60 TABLET | Refills: 0 | Status: SHIPPED | OUTPATIENT
Start: 2018-02-01 | End: 2018-03-11 | Stop reason: SINTOL

## 2018-02-01 ASSESSMENT — PAIN SCALES - GENERAL: PAINLEVEL: NO PAIN (0)

## 2018-02-01 NOTE — PATIENT INSTRUCTIONS
Start Wellbutrin SR: take 1 tablet daily for 7 days then if tolerating, increase to 2 tablets daily. If too stimulating, can change to 1 tablet two times daily (6-8 hr between doses) if too stimulating.    Come back in 5 weeks.

## 2018-02-01 NOTE — MR AVS SNAPSHOT
After Visit Summary   2/1/2018    Jose Francisco Sommers    MRN: [id number]           Patient Information     Date Of Birth          1990        Visit Information        Provider Department      2/1/2018 3:25 PM Zbigniew Bailey MD Psychiatry Clinic        Today's Diagnoses     Psychosis, unspecified psychosis type          Care Instructions    Start Wellbutrin SR: take 1 tablet daily for 7 days then if tolerating, increase to 2 tablets daily. If too stimulating, can change to 1 tablet two times daily (6-8 hr between doses) if too stimulating.    Come back in 5 weeks.          Follow-ups after your visit        Your next 10 appointments already scheduled     Mar 08, 2018  1:55 PM CST   Adult Med Follow UP with Zbigniew Bailey MD   Psychiatry Clinic ([facility])    [place] [id number] [facility] [address] [phone]              Who to contact     Please call your clinic at [phone] to:    Ask questions about your health    Make or cancel appointments    Discuss your medicines    Learn about your test results    Speak to your doctor            Additional Information About Your Visit        [employer] Information     [employer] gives you secure access to your electronic health record. If you see a primary care provider, you can also send messages to your care team and make appointments. If you have questions, please call your primary care clinic.  If you do not have a primary care provider, please call [phone] and they will assist you.      [employer] is an electronic gateway that provides easy, online access to your medical records. With [employer], you can request a clinic appointment, read your test results, renew a prescription or communicate with your care team.     To access your existing account, please contact your [facility] or call [phone] for assistance.        Care EveryWhere ID     This is  your Care EveryWhere ID. This could be used by other organizations to access your [facility] medical records  [id number]        Your Vitals Were     Pulse BMI (Body Mass Index)                76 33.4 kg/m2           Blood Pressure from Last 3 Encounters:   02/01/18 136/87   01/11/18 132/84   12/29/17 (!) 152/92    Weight from Last 3 Encounters:   02/01/18 114 kg (251 lb 6.4 oz)   01/11/18 114.8 kg (253 lb)   12/29/17 113.7 kg (250 lb 9.6 oz)              Today, you had the following     No orders found for display         Today's Medication Changes          These changes are accurate as of 2/1/18 11:59 PM.  If you have any questions, ask your nurse or doctor.               Start taking these medicines.        Dose/Directions    buPROPion 100 MG 12 hr tablet   Commonly known as:  WELLBUTRIN SR   Started by:  Zbigniew Bailey MD        Take 1 tab daily for 7 days then if tolerating increase to 2 tab daily. Can change to 1 tablet twice daily (6-8 hr between) if side effects.   Quantity:  60 tablet   Refills:  0            Where to get your medicines      These medications were sent to [place] [address] [place] [address] [address] [facility]  [address] [facility] [address]     Phone:  [phone]     buPROPion 100 MG 12 hr tablet    metFORMIN 500 MG tablet    OLANZapine 20 MG tablet                Primary Care Provider    Clinic Provider       No address on file        Equal Access to Services     SHELLEY SOOD : Hadii maria ines butlero Soadam, waaxda luqadaha, qaybta kaalmada adeemir, chandan welch. So [facility] [phone].    ATENCIÓN: Si habla español, tiene a jacob disposición servicios gratuitos de asistencia lingüística. Llame al [phone].    We comply with applicable federal civil rights laws and [place] laws. We do not discriminate on the basis of race, color, national origin, age, disability, sex, sexual orientation, or gender identity.            Thank you!     Thank  you for choosing PSYCHIATRY CLINIC  for your care. Our goal is always to provide you with excellent care. Hearing back from our patients is one way we can continue to improve our services. Please take a few minutes to complete the written survey that you may receive in the mail after your visit with us. Thank you!             Your Updated Medication List - Protect others around you: Learn how to safely use, store and throw away your medicines at www.disposemymeds.org.          This list is accurate as of 2/1/18 11:59 PM.  Always use your most recent med list.                   Brand Name Dispense Instructions for use Diagnosis    buPROPion 100 MG 12 hr tablet    WELLBUTRIN SR    60 tablet    Take 1 tab daily for 7 days then if tolerating increase to 2 tab daily. Can change to 1 tablet twice daily (6-8 hr between) if side effects.        lithium 600 MG capsule     60 capsule    Take 2 capsules (1,200 mg) by mouth At Bedtime    Psychosis, unspecified psychosis type       metFORMIN 500 MG tablet    GLUCOPHAGE    75 tablet    Take 1000 mg (2 tablet) with breakfast and 500 mg with dinner.        OLANZapine 20 MG tablet    zyPREXA    30 tablet    Take 1 tablet (20 mg) by mouth At Bedtime    Psychosis, unspecified psychosis type

## 2018-02-01 NOTE — NURSING NOTE
Chief Complaint   Patient presents with     Recheck Medication     Psychosis, unspecified psychosis type      Obtained weight, blood pressure and heart rate, pain level, and smoking status   Reviewed allergies, medications, and pharmacy preference  Administered abuse screening questions

## 2018-02-01 NOTE — PROGRESS NOTES
PSYCHIATRY CLINIC PROGRESS NOTE   The initial diagnostic evaluation was on 7/24/2015.  Date of the most recent transfer of care panfiloal is 7/27/17.    Pertinent Background:  This patient first experienced mental health issues in grade school and has received treatment for ADHD, depression, psychosis and substance use.  See transfer evaluation for detailed history.  Notably, the patient had FEP in 2015 in the setting of cannabis abuse and has been stable on olanzapine. He has never lived independently and seems to have fairly low insight into his illness and past symptoms. GeneSight testing was completed noting pt under-metabolizes BOLTON forms of antipsychotic.     Psych critical item history includes psychosis [disorganization, ?catatonia], mutiple psychotropic trials, psych hosp [x1x at [facility], spring 2015], commitment and CD: alcohol and cannabis.      INTERIM HISTORY                                                 Jose Francisco Sommers is a 27 year old male who was last seen for medication management on 12/29/2017 at which time no changes were made.  The patient reports good treatment adherence.  History was provided by the patient who was a good historian.   Since the last visit:  -Things have been going well. Has been doing more with [employer] business and has found a formula that will help predict the price of cards. He is very excited about this.   -Reports gaining a little bit of weight over the holidays but is still encouraged and motivated to get healthy.  -Feels he has reached a plateau with inability to concentrate with olanzapine and that his psychotic symptoms are well controlled, thinking is not disorganized just distracted. Asking to consider starting a medication for this, has ADHD Dx by history.     RECENT SYMPTOMS:   DEPRESSION:  reports-poor concentration /memory and psychomotor changes [slowing];  DENIES- suicidal ideation, depressed mood, anhedonia, low energy, insomnia, hypersomnia, appetite  changes, feeling worthless, excessive guilt and feeling hopeless  KAMI/HYPOMANIA:  reports-none;  DENIES- increased energy, decreased sleep need, increased activity and grandiosity  PSYCHOSIS:  reports-none;  DENIES- delusions, auditory hallucinations, visual hallucinations and disorganized behavior  SLEEP:  somewhat improved    EATING DISORDER: less overeating and stress eating at night    RECENT SUBSTANCE USE:     ALCOHOL- very rarely          TOBACCO- quit 1 week prior to our last appt, using nicotine patch, much easier              CAFFEINE- 2-3 cups/day of tea  OPIOIDS- none       NARCAN KIT- N/A       CANNABIS- none          OTHER ILLICIT DRUGS- none     CURRENT SOCIAL HISTORY:  FINANCIAL SUPPORT- working and family or friend       CHILDREN- none       LIVING SITUATION- lives with parents in [place]      SOCIAL/ SPIRITUAL SUPPORT- family, Rastafari hemalatha       FEELS SAFE AT HOME- Yes     MEDICAL ROS:  Reports none    Denies sedation, dizziness, akathisia, unusual movements, polydipsia, polyphagia and excessive water intake and polydipsia, polyuria, nocturia and ice water craving, denies abd pain, n/v/d/c, decreased appetite    PSYCH and CD Critical Summary Points since July 2017             -July '17: increased gabapentin to 600 mg HS to target sleep with minimal effect  -August '17: self-increased Olanzapine to 20mg HS (see 8/31/17 note for details)    PAST PSYCH MED TRIALS   see EMR Problem List: Hx of psychiatric care    MEDICAL / SURGICAL HISTORY                                   CARE TEAM:   PCP- Issac Watson PA[facility]C at [facility]    Therapist- none       Contraception- not sexually active    Neurologic Hx [head injury etc]:  None reported  Patient Active Problem List   Diagnosis     Schizoaffective disorder, bipolar type (H)     Hx of psychiatric care     Elevated liver enzymes     Fatty liver     High blood triglycerides       ALLERGY                                Sulfa drugs  MEDICATIONS                                Current Outpatient Prescriptions   Medication Sig Dispense Refill     lithium 600 MG capsule Take 2 capsules (1,200 mg) by mouth At Bedtime 60 capsule 0     metFORMIN (GLUCOPHAGE) 500 MG tablet Take 1000 mg (2 tablet) with breakfast and 500 mg with dinner. 75 tablet 0     OLANZapine (ZYPREXA) 20 MG tablet Take 1 tablet (20 mg) by mouth At Bedtime 30 tablet 1     VITALS   /87  Pulse 76  Wt 114 kg (251 lb 6.4 oz)  BMI 33.4 kg/m2   MENTAL STATUS EXAM                                                           Alertness: alert  and oriented  Appearance: adequately groomed  Behavior/Demeanor: cooperative and pleasant, more comfortable, with fair but improved eye contact   Speech: normal and regular rate and rhythm, more spontaneous  Language: no obvious problem  Psychomotor: normal or unremarkable  Mood: description consistent with euthymia  Affect: baseline blunting but improved range; was congruent to mood; was congruent to content  Thought Process/Associations: unremarkable  Thought Content:  Reports none;  Denies suicidal ideation, violent ideation, delusions and paranoid ideation  Perception:  Reports none;  Denies auditory hallucinations and visual hallucinations  Insight: fair  Judgment: fair  Cognition: does  appear grossly intact; formal cognitive testing was not done    LABS and DATA   RATING SCALES:  AIMS: done 1/2017 with total score of 0    PHQ9 TODAY =  2, somewhat difficult, item #9: 0  PHQ-9 SCORE 11/10/2017 11/29/2017 12/29/2017   Total Score - - -   Total Score 8 7 3       ANTIPSYCHOTIC LABS   [glu, A1C, lipids (focus LDL), liver enzymes, WBC, ANEU, Hgb, plts]    q12 mo  Recent Labs   Lab Test  10/30/17   1429  03/22/17   1340   09/30/16   1348   GLC  86  83   < >  83   A1C   --    --    --   4.6    < > = values in this interval not displayed.     Recent Labs   Lab Test  12/01/17   0948  10/30/17   1429   CHOL  177  165   TRIG  347*  339*   LDL  72  63   HDL  36*  34*      Recent Labs   Lab Test  12/01/17   0948  11/10/17   1534  11/02/17   1658   AST  31  35  49*   ALT  118*  108*  156*   ALKPHOS  63   --   68     Recent Labs   Lab Test  11/10/17   1534  10/30/17   1429   WBC  8.6  8.1   ANEU  5.0  4.8   HGB  15.6  15.7   PLT  255  241     LITHIUM LABS     [level, renal, SG, TSH, WBC]    q6 mo  Recent Labs   Lab Test  12/01/17   0948  11/10/17   1534  10/30/17   1429  03/22/17   1340   LITHIUM  0.64  0.49*  0.54*  0.6     Recent Labs   Lab Test  10/30/17   1429  03/22/17   1340  03/08/17   0953   CR  1.05  1.09  0.94   GFRESTIMATED  85  82  >90  Non  GFR Calc     NA  141  143  140   ADONAY  9.2  9.3  9.5     Recent Labs   Lab Test  10/30/17   1429  02/09/16   1247   SG  1.015  1.005     Recent Labs   Lab Test  11/10/17   1534  10/30/17   1429  03/22/17   1340   TSH  2.09  2.92  3.03     Recent Labs   Lab Test  11/10/17   1534  10/30/17   1429   WBC  8.6  8.1     Wt Readings from Last 4 Encounters:   02/01/18 114 kg (251 lb 6.4 oz)   01/11/18 114.8 kg (253 lb)   12/29/17 113.7 kg (250 lb 9.6 oz)   11/29/17 120.5 kg (265 lb 9.6 oz)       DIAGNOSIS     Psychosis, unspecified psychosis type (r/o schizoaffective disorder vs schizophrenia)  History of Cannabis Use Disorder, in remission  History of Alcohol Use Disorder, in remission    ASSESSMENT                                     TODAY     1. Schizoaffective disorder?: Psychosis has been stable, his diagnosis is unclear given history and comorbid cannabis abuse. First Episode presentation did not appear to meet criteria for a manic episode abut rather disorganization, florid psychosis and catatonia. There is some evidence of psychotic symptoms outside of a concurrent mood disturbance. Lithium level remains lower than expected given dose despite taken at 12h trough. We will revisit necessity of lithium treatment in the future and would benefit from a family meeting. Will start Wellbutrin again to determine if helpful for  ADHD, will avoid stimulants and dose in the am.    2. Medical concerns: diagnosed with fatty liver confirmed on US, most certainly causing elevation in transaminases. Pt has made a concerted effort to change lifestyle habits and it has paid off with weight reduction. Continue to provide encouragement.    3. Sleep: improved with dedicated effort in changing sleep hygiene. Continue support and encouragement.         [place] PRESCRIPTION MONITORING PROGRAM [] was not checked today:  not using controlled substances.    PSYCHOTROPIC DRUG INTERACTIONS:   LITHIUM and OLANZAPINE may result in increased neurotoxic effects of D2-antagonists.  BUPROPION and OLANZAPINE may result in lowering of seizure threshold.   MANAGEMENT:  Monitoring for adverse effects, routine vitals and using lowest therapeutic dose of [wellbutrin]     PLAN                                                                                                       1) PSYCHOTROPIC MEDICATIONS:  - continue olanzapine 20 mg QHS  - continue lithium 1200 mg QHS  - continue metformin 1000 mg with breakfast and 500 mg with dinner  - start Wellbutrin  mg daily for 7 days then if tolerating increase to 200 mg daily. If too stimulating, split the dose to 100 mg BID.    2) THERAPY:    Continue  TREATMENT PLAN   Date revised  - na  Date next due- na    3) NEXT DUE:    Labs- Antipsychotic labs due 11/2018  EKG- PRN  Rating Scales- AIMS due next visit    4) REFERRALS:    f/u with primary care as needed    5) RTC: 5 weeks    6) CRISIS NUMBERS:   Provided routinely in AVS.  Especially emphasized:  [facility] [phone] (clinic)    [phone] (after hours)  CRISIS TEXT LINE: Text 106-298 from anywhere, anytime, any crisis 24/7;  OR SEE www.crisistextline.org  ONLY if a [facility] PT: [facility] [phone] (clinic), [phone] (after hours)     TREATMENT RISK STATEMENT:  The risks, benefits, alternatives and potential adverse effects have been discussed  and are understood by the pt. The pt understands the risks of using street drugs or alcohol. There are no medical contraindications, the pt agrees to treatment with the ability to do so. The pt knows to call the clinic for any problems or to access emergency care if needed.  Medical and substance use concerns are documented above.  Psychotropic drug interaction check was done, including changes made today.    RESIDENT:   Zbigniew Bailey MD      Patient not staffed in clinic.  Supervisor is Dr. Tay who will sign the note.    Attestation:  I did not see this patient directly. I have reviewed the documentation and I agree with the resident's plan of care.   Joel Tay MD

## 2018-02-13 ASSESSMENT — PATIENT HEALTH QUESTIONNAIRE - PHQ9: SUM OF ALL RESPONSES TO PHQ QUESTIONS 1-9: 2

## 2018-02-26 DIAGNOSIS — F25.0 SCHIZOAFFECTIVE DISORDER, BIPOLAR TYPE (H): Primary | ICD-10-CM

## 2018-02-26 NOTE — TELEPHONE ENCOUNTER
Medication requested: Metformin 500 mg tabs  Last refilled: 2-1-18  Qty: 75      Last seen: 2-1-18  RTC: 5 wks  Cancel: 0  No-show: 0  Next appt: 3-1-18    Refill decision:   Refill pended and routed to the provider for review/determination due to last clinic note is not yet signed.    Kathleen M Doege RN

## 2018-02-28 DIAGNOSIS — F29 PSYCHOSIS, UNSPECIFIED PSYCHOSIS TYPE (H): ICD-10-CM

## 2018-02-28 RX ORDER — LITHIUM CARBONATE 600 MG/1
1200 CAPSULE ORAL AT BEDTIME
Qty: 60 CAPSULE | Refills: 0 | Status: SHIPPED | OUTPATIENT
Start: 2018-02-28 | End: 2018-03-30

## 2018-02-28 NOTE — TELEPHONE ENCOUNTER
Appt history:  Last seen:  2/1/18  RTC:  5 weeks  Cancel:  none  No-show:  none  Next appt:  3/8/18     Incoming refill request from:  Pharmacy via phone call     Medication(s) requested:    Med name:  Lithium 600, 2 daily, #60  Last RF per med tab:  1/25/18, 30 d/s    E-sent a 30 d/s.

## 2018-02-28 NOTE — TELEPHONE ENCOUNTER
From: Renee Eaton   Sent: 2/28/2018   9:53 AM   To: Pam Wagner, RN   Subject: Pharmacy Call                                     Pharmacy calling and location: [facility] in [place]     Name of person calling: pharmacist     Medication: lithium 600 mg     Reason: request refill

## 2018-03-08 ENCOUNTER — OFFICE VISIT (OUTPATIENT)
Dept: PSYCHIATRY | Facility: CLINIC | Age: 28
End: 2018-03-08
Attending: PSYCHIATRY & NEUROLOGY
Payer: COMMERCIAL

## 2018-03-08 VITALS
BODY MASS INDEX: 33.4 KG/M2 | HEART RATE: 92 BPM | WEIGHT: 251.4 LBS | DIASTOLIC BLOOD PRESSURE: 83 MMHG | SYSTOLIC BLOOD PRESSURE: 134 MMHG

## 2018-03-08 DIAGNOSIS — F12.11 HISTORY OF CANNABIS ABUSE: ICD-10-CM

## 2018-03-08 DIAGNOSIS — Z71.89 COUNSELING AND COORDINATION OF CARE: ICD-10-CM

## 2018-03-08 DIAGNOSIS — G47.00 INSOMNIA, UNSPECIFIED TYPE: ICD-10-CM

## 2018-03-08 DIAGNOSIS — F14.11 HISTORY OF COCAINE ABUSE (H): ICD-10-CM

## 2018-03-08 DIAGNOSIS — F25.0 SCHIZOAFFECTIVE DISORDER, BIPOLAR TYPE (H): Primary | ICD-10-CM

## 2018-03-08 PROCEDURE — G0463 HOSPITAL OUTPT CLINIC VISIT: HCPCS | Mod: ZF

## 2018-03-08 ASSESSMENT — PAIN SCALES - GENERAL: PAINLEVEL: NO PAIN (0)
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
## 2018-03-08 NOTE — NURSING NOTE
Chief Complaint   Patient presents with     Recheck Medication     Psychosis, unspecified psychosis type     Reviewed allergies, medications, pharmacy and smoking status.  Administered abuse screening questions   Obtained weight, pain level, blood pressure and heart rate

## 2018-03-11 PROBLEM — F14.11 HISTORY OF COCAINE ABUSE (H): Status: ACTIVE | Noted: 2018-03-11

## 2018-03-11 PROBLEM — F12.11 HISTORY OF CANNABIS ABUSE: Status: ACTIVE | Noted: 2018-03-11

## 2018-03-17 ASSESSMENT — PATIENT HEALTH QUESTIONNAIRE - PHQ9: SUM OF ALL RESPONSES TO PHQ QUESTIONS 1-9: 7

## 2018-03-26 ENCOUNTER — TELEPHONE (OUTPATIENT)
Dept: PSYCHIATRY | Facility: CLINIC | Age: 28
End: 2018-03-26

## 2018-03-26 NOTE — TELEPHONE ENCOUNTER
Received records from Mike London MD via mail on 3/23/18.    Will forward to Dr. Bailey and send to scanning after he has had a chance to review.    Follow up appointment on 4/12/18.

## 2018-03-29 DIAGNOSIS — F29 PSYCHOSIS, UNSPECIFIED PSYCHOSIS TYPE (H): ICD-10-CM

## 2018-03-29 RX ORDER — OLANZAPINE 20 MG/1
20 TABLET ORAL AT BEDTIME
Qty: 30 TABLET | Refills: 0 | Status: SHIPPED | OUTPATIENT
Start: 2018-03-29 | End: 2018-04-12

## 2018-03-29 NOTE — TELEPHONE ENCOUNTER
Medication requested:  OLANZapine (ZYPREXA) 20 MG   Last refilled:  3/3/18  Qty: 30    Last seen: 3/8/18  RTC: 4 WEEKS  Cancel: 0  No-show: 0  Next appt: 4/12/18    Refill decision: Refilled for 30 days per protocol

## 2018-04-07 ENCOUNTER — MYC MEDICAL ADVICE (OUTPATIENT)
Dept: PSYCHIATRY | Facility: CLINIC | Age: 28
End: 2018-04-07

## 2018-04-09 NOTE — TELEPHONE ENCOUNTER
Received RF request from [facility] for:    Olanzapine 20 mg PO Q HS    Last RF:  3/29/18    Due for RF:  no    Appointment History:  Last appt: 3/08/18  RTC: 4 weeks  Cancel: none  No-show: none  Next appt: 4/12/18    Will ask patient whether he has picked up the Rx from 3/29, which should cover him until 4/27.

## 2018-04-12 ENCOUNTER — TELEPHONE (OUTPATIENT)
Dept: PSYCHIATRY | Facility: CLINIC | Age: 28
End: 2018-04-12

## 2018-04-12 ENCOUNTER — OFFICE VISIT (OUTPATIENT)
Dept: PSYCHIATRY | Facility: CLINIC | Age: 28
End: 2018-04-12
Attending: PSYCHIATRY & NEUROLOGY
Payer: COMMERCIAL

## 2018-04-12 VITALS
HEART RATE: 79 BPM | SYSTOLIC BLOOD PRESSURE: 125 MMHG | DIASTOLIC BLOOD PRESSURE: 88 MMHG | BODY MASS INDEX: 34.33 KG/M2 | WEIGHT: 258.4 LBS

## 2018-04-12 DIAGNOSIS — F25.0 SCHIZOAFFECTIVE DISORDER, BIPOLAR TYPE (H): ICD-10-CM

## 2018-04-12 DIAGNOSIS — F90.0 ATTENTION DEFICIT HYPERACTIVITY DISORDER (ADHD), PREDOMINANTLY INATTENTIVE TYPE: Primary | ICD-10-CM

## 2018-04-12 PROCEDURE — G0463 HOSPITAL OUTPT CLINIC VISIT: HCPCS | Mod: ZF

## 2018-04-12 RX ORDER — OLANZAPINE 20 MG/1
20 TABLET ORAL AT BEDTIME
Qty: 30 TABLET | Refills: 1 | Status: SHIPPED | OUTPATIENT
Start: 2018-04-12 | End: 2018-05-17

## 2018-04-12 RX ORDER — MODAFINIL 100 MG/1
TABLET ORAL
Qty: 30 TABLET | Refills: 0 | Status: SHIPPED | OUTPATIENT
Start: 2018-04-12 | End: 2018-05-17

## 2018-04-12 RX ORDER — LITHIUM CARBONATE 600 MG/1
1200 CAPSULE ORAL AT BEDTIME
Qty: 60 CAPSULE | Refills: 1 | Status: SHIPPED | OUTPATIENT
Start: 2018-04-12 | End: 2018-05-17

## 2018-04-12 ASSESSMENT — PAIN SCALES - GENERAL: PAINLEVEL: NO PAIN (0)

## 2018-04-12 NOTE — NURSING NOTE
Chief Complaint   Patient presents with     RECHECK     Schizoaffective disorder, bipolar type

## 2018-04-12 NOTE — MR AVS SNAPSHOT
After Visit Summary   4/12/2018    Jose Francisco Sommers    MRN: [id number]           Patient Information     Date Of Birth          1990        Visit Information        Provider Department      4/12/2018 2:25 PM Zbigniew Bailey MD [facility]        Today's Diagnoses     Attention deficit hyperactivity disorder (ADHD), predominantly inattentive type    -  1    Schizoaffective disorder, bipolar type (H)           Follow-ups after your visit        Your next 10 appointments already scheduled     May 17, 2018  2:55 PM CDT   Adult Med Follow UP with Zbigniew Bailey MD   Psychiatry Clinic ([facility])    [address]  [address] [id number] [phone]              Who to contact     Please call your clinic at [phone] to:    Ask questions about your health    Make or cancel appointments    Discuss your medicines    Learn about your test results    Speak to your doctor            Additional Information About Your Visit        [employer] Information     [employer] gives you secure access to your electronic health record. If you see a primary care provider, you can also send messages to your care team and make appointments. If you have questions, please call your primary care clinic.  If you do not have a primary care provider, please call [phone] and they will assist you.      [employer] is an electronic gateway that provides easy, online access to your medical records. With [employer], you can request a clinic appointment, read your test results, renew a prescription or communicate with your care team.     To access your existing account, please contact your [facility] or call [phone] for assistance.        Care EveryWhere ID     This is your Care EveryWhere ID. This could be used by other organizations to access your [facility] medical records  [id number]        Your Vitals Were     Pulse BMI  (Body Mass Index)                79 34.33 kg/m2           Blood Pressure from Last 3 Encounters:   04/12/18 125/88   03/08/18 134/83   02/01/18 136/87    Weight from Last 3 Encounters:   04/12/18 117.2 kg (258 lb 6.4 oz)   03/08/18 114 kg (251 lb 6.4 oz)   02/01/18 114 kg (251 lb 6.4 oz)              Today, you had the following     No orders found for display         Today's Medication Changes          These changes are accurate as of 4/12/18 11:59 PM.  If you have any questions, ask your nurse or doctor.               Start taking these medicines.        Dose/Directions    modafinil 100 MG tablet   Commonly known as:  PROVIGIL   Used for:  Attention deficit hyperactivity disorder (ADHD), predominantly inattentive type, Schizoaffective disorder, bipolar type (H)   Started by:  Zbigniew Bailey MD        Take 1/2 tablet daily for 7 days, if tolerating increase to 1 tablet daily.   Quantity:  30 tablet   Refills:  0            Where to get your medicines      These medications were sent to [place] [address] [place] [address] [place] [address]     Phone:  [phone]     lithium 600 MG capsule    metFORMIN 500 MG tablet    OLANZapine 20 MG tablet         Some of these will need a paper prescription and others can be bought over the counter.  Ask your nurse if you have questions.     Bring a paper prescription for each of these medications     modafinil 100 MG tablet                Primary Care Provider    Clinic Provider       No address on file        Equal Access to Services     SHELLEY SOOD AH: Hadii maria ines Castro, wakayleeda lujonathanadaha, qaybta kaalmada shamaryada, chandan welch. So [facility] [phone].    ATENCIÓN: Si habla español, tiene a jacob disposición servicios gratuitos de asistencia lingüística. Llame al [phone].    We comply with applicable federal civil rights laws and [place] laws. We do not discriminate on the basis of  race, color, national origin, age, disability, sex, sexual orientation, or gender identity.            Thank you!     Thank you for choosing PSYCHIATRY CLINIC  for your care. Our goal is always to provide you with excellent care. Hearing back from our patients is one way we can continue to improve our services. Please take a few minutes to complete the written survey that you may receive in the mail after your visit with us. Thank you!             Your Updated Medication List - Protect others around you: Learn how to safely use, store and throw away your medicines at www.disposemymeds.org.          This list is accurate as of 4/12/18 11:59 PM.  Always use your most recent med list.                   Brand Name Dispense Instructions for use Diagnosis    lithium 600 MG capsule     60 capsule    Take 2 capsules (1,200 mg) by mouth At Bedtime    Schizoaffective disorder, bipolar type (H)       metFORMIN 500 MG tablet    GLUCOPHAGE    90 tablet    Take 1000 mg (2 tablet) with breakfast and 500 mg with dinner.    Schizoaffective disorder, bipolar type (H)       modafinil 100 MG tablet    PROVIGIL    30 tablet    Take 1/2 tablet daily for 7 days, if tolerating increase to 1 tablet daily.    Attention deficit hyperactivity disorder (ADHD), predominantly inattentive type, Schizoaffective disorder, bipolar type (H)       OLANZapine 20 MG tablet    zyPREXA    30 tablet    Take 1 tablet (20 mg) by mouth At Bedtime    Schizoaffective disorder, bipolar type (H)

## 2018-04-12 NOTE — PROGRESS NOTES
PSYCHIATRY CLINIC PROGRESS NOTE   The initial diagnostic evaluation was on 7/24/2015.  Date of the most recent transfer of care lexie is 7/27/17.    Pertinent Background:  This patient first experienced mental health issues in grade school and has received treatment for ADHD, depression, psychosis and substance use.  See transfer evaluation for detailed history.  Notably, the patient had FEP in 2015 in the setting of cannabis abuse and has been stable on olanzapine. He has never lived independently and seems to have fairly low insight into his illness and past symptoms. GeneSight testing was completed noting pt under-metabolizes BOLTNO forms of antipsychotic.  Past records from Dr. Mike oPwers were received in 2018 to assist with diagnostic clarity, however they were hand written notes that were illegible though accompanied by typed neuropsychology consult from [facility].  This documentation was unable to support a diagnosis of bipolar disorder at that time though noted potential Asperger's diagnosis and ADHD.     Psych critical item history includes psychosis [disorganization, ?catatonia], mutiple psychotropic trials, psych hosp [x1 at [facility], spring 2015], commitment and CD: alcohol and cannabis.      INTERIM HISTORY                                                 Jose Francisco Sommers is a 27 year old male who was last seen for medication management on 3/8/2018 at which time Wellbutrin was stopped for concerns of developing hypomania.  The patient reports good treatment adherence.  History was provided by the patient who was a good historian. He was joined by his mother Ember.  Since the last visit:  -Has noticed continued benefit in reading since stopping Wellbutrin.   -Monday night had run out of olanzapine expecting to have a lot of difficulty going to sleep but actually slept really well. Wonders if he is turning the corner so to speak.  -Would like to give Provigil another chance as current records indicate  he went on alcohol binges during the last trial.  However, the patient reports he was irresponsible at that time and is not currently abusing alcohol.  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    RECENT SYMPTOMS:   Depression:  Feeling down, low energy, hypersomnia, weight changes and Feeling bad about himself  Denies suicidal ideation, self-destructive thoughts, depressed mood, anhedonia, feeling worthless, excessive guilt, feeling hopeless and excessive crying   Elevated:  distractibility   Denies increased energy, decreased sleep need, increased activity and grandiosity  Psychosis:  none  Denies  delusions, auditory hallucinations, visual hallucinations and disorganized behavior   ADHD: trouble concentrating, inattention, distractibility and procrastination    RECENT SUBSTANCE USE:     ALCOHOL- very rarely          TOBACCO- quit 1 week prior to our last appt, using nicotine patch, much easier              CAFFEINE- 2-3 cups/day of tea  OPIOIDS- none       NARCAN KIT- N/A       CANNABIS- none          OTHER ILLICIT DRUGS- none     CURRENT SOCIAL HISTORY:  FINANCIAL SUPPORT- working and family or friend       CHILDREN- none       LIVING SITUATION- lives with parents in [place]      SOCIAL/ SPIRITUAL SUPPORT- family, Jain hemalatha       FEELS SAFE AT HOME- Yes     MEDICAL ROS: A comprehensive review of systems was performed and is negative other than noted in the HPI.    PSYCH and CD Critical Summary Points since July 2017             -July '17: increased gabapentin to 600 mg HS to target sleep with minimal effect  -August '17: self-increased Olanzapine to 20mg HS (see 8/31/17 note for details)    PAST PSYCH MED TRIALS   see EMR Problem List: Hx of psychiatric care    MEDICAL / SURGICAL HISTORY                                   CARE TEAM:   PCP- Issac Watson PA[facility]C at [facility]    Therapist- none       Contraception-  not sexually active    Neurologic Hx [head injury etc]:  None reported  Patient Active Problem List   Diagnosis     Schizoaffective disorder, bipolar type (H)     Hx of psychiatric care     Elevated liver enzymes     Fatty liver     High blood triglycerides     History of cannabis abuse     History of cocaine abuse       ALLERGY                                Sulfa drugs  MEDICATIONS                               Current Outpatient Prescriptions   Medication Sig Dispense Refill     lithium 600 MG capsule Take 2 capsules (1,200 mg) by mouth At Bedtime 60 capsule 0     OLANZapine (ZYPREXA) 20 MG tablet Take 1 tablet (20 mg) by mouth At Bedtime 30 tablet 0     metFORMIN (GLUCOPHAGE) 500 MG tablet Take 1000 mg (2 tablet) with breakfast and 500 mg with dinner. 90 tablet 0     VITALS   /88  Pulse 79  Wt 117.2 kg (258 lb 6.4 oz)  BMI 34.33 kg/m2     Wt Readings from Last 4 Encounters:   04/12/18 117.2 kg (258 lb 6.4 oz)   03/08/18 114 kg (251 lb 6.4 oz)   02/01/18 114 kg (251 lb 6.4 oz)   01/11/18 114.8 kg (253 lb)     MENTAL STATUS EXAM                                                           Alertness: alert  and oriented  Appearance: adequately groomed  Behavior/Demeanor: cooperative and pleasant, more comfortable, with fair but improved eye contact   Speech: normal and regular rate and rhythm, more spontaneous  Language: no obvious problem  Psychomotor: normal or unremarkable  Mood: description consistent with euthymia  Affect: baseline blunting but improved range; was congruent to mood; was congruent to content  Thought Process/Associations: unremarkable  Thought Content:  Reports none;  Denies suicidal ideation, violent ideation, delusions and paranoid ideation  Perception:  Reports none;  Denies auditory hallucinations and visual hallucinations  Insight: fair  Judgment: fair  Cognition: does  appear grossly intact; formal cognitive testing was not done    LABS and DATA   RATING SCALES:  AIMS: done 1/2017  with total score of 0    PHQ9 TODAY =  6, somewhat difficult, item #9: 0  PHQ-9 SCORE 12/29/2017 2/1/2018 3/8/2018   Total Score - - -   Total Score 3 2 7       ANTIPSYCHOTIC LABS   [glu, A1C, lipids (focus LDL), liver enzymes, WBC, ANEU, Hgb, plts]    q12 mo  Recent Labs   Lab Test  10/30/17   1429  03/22/17   1340   09/30/16   1348   GLC  86  83   < >  83   A1C   --    --    --   4.6    < > = values in this interval not displayed.     Recent Labs   Lab Test  12/01/17   0948  10/30/17   1429   CHOL  177  165   TRIG  347*  339*   LDL  72  63   HDL  36*  34*     Recent Labs   Lab Test  12/01/17   0948  11/10/17   1534  11/02/17   1658   AST  31  35  49*   ALT  118*  108*  156*   ALKPHOS  63   --   68     Recent Labs   Lab Test  11/10/17   1534  10/30/17   1429   WBC  8.6  8.1   ANEU  5.0  4.8   HGB  15.6  15.7   PLT  255  241     LITHIUM LABS     [level, renal, SG, TSH, WBC]    q6 mo  Recent Labs   Lab Test  12/01/17   0948  11/10/17   1534  10/30/17   1429  03/22/17   1340   LITHIUM  0.64  0.49*  0.54*  0.6     Recent Labs   Lab Test  10/30/17   1429  03/22/17   1340  03/08/17   0953   CR  1.05  1.09  0.94   GFRESTIMATED  85  82  >90  Non  GFR Calc     NA  141  143  140   ADONAY  9.2  9.3  9.5     Recent Labs   Lab Test  10/30/17   1429  02/09/16   1247   SG  1.015  1.005     Recent Labs   Lab Test  11/10/17   1534  10/30/17   1429  03/22/17   1340   TSH  2.09  2.92  3.03     Recent Labs   Lab Test  11/10/17   1534  10/30/17   1429   WBC  8.6  8.1     Wt Readings from Last 4 Encounters:   04/12/18 117.2 kg (258 lb 6.4 oz)   03/08/18 114 kg (251 lb 6.4 oz)   02/01/18 114 kg (251 lb 6.4 oz)   01/11/18 114.8 kg (253 lb)       DIAGNOSIS     Psychosis, unspecified psychosis type - r/o schizoaffective disorder, bipolar type  History of Cannabis Use Disorder, in remission  History of Alcohol Use Disorder, in remission  History of cocaine abuse, resolved  History of ADHD    ASSESSMENT                                      TODAY     1. Schizoaffective disorder? Bipolar type?: Psychosis has been stable but initiation of Wellbutrin caused mood elevation and increased anxiety/tension. First Episode presentation did not appear to meet criteria for a manic episode but rather disorganization, florid psychosis and catatonia. There is some evidence of psychotic symptoms outside of a concurrent mood disturbance. Lithium level remains lower than expected given dose despite taken at 12h trough. Pierz therapy will remain given the concern for manic behavior in the past, however this was confounded by extensive drug use including cocaine.  An additional consideration comes from collateral information from his mom during the family meeting on 3/8/2018, in which we went through the diagnostic criteria for hypomania and lauren of which he appeared to meet at least partial criteria though equivocally.  Received records from Dr. Powers, though due to handwriting, was not able to extract useful information from these. 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     2. Medical concerns: diagnosed with fatty liver confirmed on US, most certainly causing elevation in transaminases. Pt has made a concerted effort to change lifestyle habits and it has paid off with weight reduction. Continue to provide encouragement and regular f/u with PCP.    3. Sleep: improved with dedicated effort in changing sleep hygiene but could still use improvement. Continue support and encouragement.         [place] PRESCRIPTION MONITORING PROGRAM [] was not checked today:  not using controlled substances.    PSYCHOTROPIC DRUG INTERACTIONS:   LITHIUM and OLANZAPINE may result in increased neurotoxic effects of D2-antagonists.  BUPROPION and OLANZAPINE may result in lowering of seizure  threshold.   MANAGEMENT:  Monitoring for adverse effects, routine vitals and using lowest therapeutic dose of [wellbutrin]     PLAN                                                                                                       1) PSYCHOTROPIC MEDICATIONS:  - continue olanzapine 20 mg QHS  - continue lithium 1200 mg QHS  - continue metformin 1000 mg with breakfast and 500 mg with dinner  - Start modafinil 50 mg daily for 7 days and if tolerating increase to 100 mg daily    2) THERAPY:    Family meetings: #1, #2 on 3/08/18  TREATMENT PLAN   Date revised  - na  Date next due- na    3) NEXT DUE:    Labs- Antipsychotic labs due 11/2018  EKG- PRN  Rating Scales- AIMS due next visit    4) REFERRALS:    f/u with primary care as needed    5) RTC: 4 weeks    6) CRISIS NUMBERS:   Provided routinely in AVS.  Especially emphasized:  [facility] [place] [phone] (clinic)    [phone] (after hours)  CRISIS TEXT LINE: Text 169-311 from anywhere, anytime, any crisis 24/7;  OR SEE www.crisistextline.org  ONLY if a [facility] PT: [facility] [place] [phone] (clinic), [phone] (after hours)     TREATMENT RISK STATEMENT:  The risks, benefits, alternatives and potential adverse effects have been discussed and are understood by the pt. The pt understands the risks of using street drugs or alcohol. There are no medical contraindications, the pt agrees to treatment with the ability to do so. The pt knows to call the clinic for any problems or to access emergency care if needed.  Medical and substance use concerns are documented above.  Psychotropic drug interaction check was done, including changes made today.    RESIDENT:   Zbigniew Bailey MD      Patient not staffed in clinic.  Supervisor is Dr. Tay who will sign the note.      Attestation:  I did not see this patient directly. I have reviewed the documentation and I agree with the resident's plan of care.   Joel Tay MD

## 2018-04-13 ASSESSMENT — PATIENT HEALTH QUESTIONNAIRE - PHQ9: SUM OF ALL RESPONSES TO PHQ QUESTIONS 1-9: 6

## 2018-04-16 ENCOUNTER — TRANSFERRED RECORDS (OUTPATIENT)
Dept: HEALTH INFORMATION MANAGEMENT | Facility: CLINIC | Age: 28
End: 2018-04-16

## 2018-04-17 ENCOUNTER — TELEPHONE (OUTPATIENT)
Dept: PSYCHIATRY | Facility: CLINIC | Age: 28
End: 2018-04-17

## 2018-04-17 DIAGNOSIS — F25.0 SCHIZOAFFECTIVE DISORDER, BIPOLAR TYPE (H): Primary | ICD-10-CM

## 2018-04-17 DIAGNOSIS — F90.0 ATTENTION DEFICIT HYPERACTIVITY DISORDER (ADHD), PREDOMINANTLY INATTENTIVE TYPE: ICD-10-CM

## 2018-04-17 NOTE — TELEPHONE ENCOUNTER
Brief Psychiatry Telephone Note    Phone call time: 0915    Patient's mother Ember called to discuss patient care.  Provigil was not covered by insurance, no plans to file PA as it was for off label use.  She reports he has been taking 50 mg of Provigil from an old prescription for the last 3 or 4 days, has appeared more depressed and sad lately.  She reports when he took the Wellbutrin did seem to really wake him up and she saw a very positive response.  We discussed  the paradox of Wellbutrin improving concentration and motivation while at the same time causing increased hypomanic symptoms.  This is often a conundrum when treating a psychotic disorder with comorbid ADHD is standards of care are often in conflict with each other. Patient has had trials with Strattera and Intuniv in the past with reported little benefit.  She also reports that he seems more motivated for change now than he ever has, but recognizes he needs a little bit more assistance and wonders if the Navigate Program at [facility] would be beneficial for him.  Ember will discuss with patient and get back to me on decisions regarding: Increasing Provigil to 100 mg versus taking Wellbutrin every other day, referral to the Navigate Program and consultation with clinic  [facility]od[facility] Hinojosa.     Zbigniew Bailey MD

## 2018-05-17 ENCOUNTER — OFFICE VISIT (OUTPATIENT)
Dept: PSYCHIATRY | Facility: CLINIC | Age: 28
End: 2018-05-17
Attending: PSYCHIATRY & NEUROLOGY
Payer: COMMERCIAL

## 2018-05-17 VITALS
HEART RATE: 91 BPM | WEIGHT: 262.8 LBS | DIASTOLIC BLOOD PRESSURE: 87 MMHG | SYSTOLIC BLOOD PRESSURE: 133 MMHG | BODY MASS INDEX: 34.91 KG/M2

## 2018-05-17 DIAGNOSIS — F25.0 SCHIZOAFFECTIVE DISORDER, BIPOLAR TYPE (H): Primary | ICD-10-CM

## 2018-05-17 DIAGNOSIS — K76.0 FATTY LIVER: ICD-10-CM

## 2018-05-17 DIAGNOSIS — F90.0 ATTENTION DEFICIT HYPERACTIVITY DISORDER (ADHD), PREDOMINANTLY INATTENTIVE TYPE: ICD-10-CM

## 2018-05-17 DIAGNOSIS — Z79.899 ENCOUNTER FOR LONG-TERM (CURRENT) USE OF MEDICATIONS: ICD-10-CM

## 2018-05-17 PROCEDURE — G0463 HOSPITAL OUTPT CLINIC VISIT: HCPCS | Mod: ZF

## 2018-05-17 RX ORDER — OLANZAPINE 20 MG/1
20 TABLET ORAL AT BEDTIME
Qty: 30 TABLET | Refills: 1 | Status: SHIPPED | OUTPATIENT
Start: 2018-05-17 | End: 2018-07-26

## 2018-05-17 RX ORDER — LITHIUM CARBONATE 600 MG/1
1200 CAPSULE ORAL AT BEDTIME
Qty: 60 CAPSULE | Refills: 1 | Status: SHIPPED | OUTPATIENT
Start: 2018-05-17 | End: 2018-07-26

## 2018-05-17 RX ORDER — MODAFINIL 100 MG/1
100 TABLET ORAL DAILY
Qty: 30 TABLET | Refills: 1 | Status: SHIPPED | OUTPATIENT
Start: 2018-05-17 | End: 2018-07-13

## 2018-05-17 ASSESSMENT — PAIN SCALES - GENERAL: PAINLEVEL: NO PAIN (0)

## 2018-05-17 NOTE — NURSING NOTE
Chief Complaint   Patient presents with     Recheck Medication     Psychosis, unspecified psychosis type

## 2018-05-17 NOTE — MR AVS SNAPSHOT
After Visit Summary   5/17/2018    Jose Francisco Sommers    MRN: [id number]           Patient Information     Date Of Birth          1990        Visit Information        Provider Department      5/17/2018 2:55 PM Zbigniew Bailey MD Psychiatry Clinic        Today's Diagnoses     Schizoaffective disorder, bipolar type (H)    -  1    Attention deficit hyperactivity disorder (ADHD), predominantly inattentive type        Fatty liver        Encounter for long-term (current) use of medications          Care Instructions    -Increase metformin to 1000 mg with dinner and 500 mg with evening meal for 7 days, if tolerating increase to 1000 mg twice daily with meals.  -Continue all other medications without changes.  -Continue to work on your sleep schedule.  -Continue working on healthy diet.  -I will make a referral to the Navigate program and someone should be contacting you shortly.  -Return to clinic before June 27th.         Thank you for coming to the PSYCHIATRY CLINIC.    Lab Testing:  If you had lab testing today and your results are reassuring or normal they will be mailed to you or sent through weendy within 7 days.   If the lab tests need quick action we will call you with the results.  The phone number we will call with results is # [phone] (home) . If this is not the best number please call our clinic and change the number.    Medication Refills:  If you need any refills please call your pharmacy and they will contact us. Our fax number for refills is [phone]. Please allow three business for refill processing.   If you need to  your refill at a new pharmacy, please contact the new pharmacy directly. The new pharmacy will help you get your medications transferred.     Scheduling:  If you have any concerns about today's visit or wish to schedule another appointment please call our office during normal business hours [phone] (8-5:00 M-F)    Contact Us:  Please call  [phone] during business hours (8-5:00 M-F).  If after clinic hours, or on the weekend, please call  [phone].    Financial Assistance [phone]  [facility] Billing [phone]  [facility] Billing [phone]  Medical Records [phone]      MENTAL HEALTH CRISIS NUMBERS:  [facility]:   [facility] - [phone]   Crisis Residence [facility] - [phone]   Walk-In Counseling Center [facility] - [phone]   COPE 24/7 [facility] Mobile Team for Adults - [[phone]]; Child - [[phone]]     Crisis Connection - [phone]     [facility] - [phone]   Walk-in counseling [facility] - [phone]   Walk-in counseling [facility] - [phone]   Crisis Residence [facility] - [phone]   Urgent Care Adult Mental Health:   --Drop-in, 24/7 crisis line, and [facility] Mobile Team [[phone]]    CRISIS TEXT LINE: Text [phone] from anywhere, anytime, any crisis 24/7;    OR SEE www.crisistextline.org     Poison Control Center - [phone]    CHILD: [facility] needs assessment team - [phone]     [facility] - [phone]; or [facility] - [phone]    If you have a medical emergency please call 911or go to the nearest ER.                    _____________________________________________    Again thank you for choosing PSYCHIATRY CLINIC and please let us know how we can best partner with you to improve you and your family's health.  You may be receiving a survey in the mail regarding this appointment. We would love to have your feedback, both positive and negative, so please fill out the survey and return it using the provided envolpe. The survey is done by an external company, so your answers are anonymous.           Follow-ups after your visit        Your next 10 appointments already scheduled     Jun 14, 2018 10:15 AM CDT   Adult Med Follow UP with  Zbigniew Bailey MD   Psychiatry Clinic ([facility])    [address]  [address] [address] [phone]              Who to contact     Please call your clinic at [phone] to:    Ask questions about your health    Make or cancel appointments    Discuss your medicines    Learn about your test results    Speak to your doctor            Additional Information About Your Visit        [email or web address] Information     [employer] gives you secure access to your electronic health record. If you see a primary care provider, you can also send messages to your care team and make appointments. If you have questions, please call your primary care clinic.  If you do not have a primary care provider, please call [phone] and they will assist you.      [employer] is an electronic gateway that provides easy, online access to your medical records. With [employer], you can request a clinic appointment, read your test results, renew a prescription or communicate with your care team.     To access your existing account, please contact your [facility] or call [phone] for assistance.        Care EveryWhere ID     This is your Care EveryWhere ID. This could be used by other organizations to access your [place] medical records  [id number]        Your Vitals Were     Pulse BMI (Body Mass Index)                91 34.91 kg/m2           Blood Pressure from Last 3 Encounters:   05/17/18 133/87   04/12/18 125/88   03/08/18 134/83    Weight from Last 3 Encounters:   05/17/18 119.2 kg (262 lb 12.8 oz)   04/12/18 117.2 kg (258 lb 6.4 oz)   03/08/18 114 kg (251 lb 6.4 oz)              Today, you had the following     No orders found for display         Today's Medication Changes          These changes are accurate as of 5/17/18 11:59 PM.  If you have any questions, ask your nurse or doctor.               These medicines have changed or have updated  prescriptions.        Dose/Directions    metFORMIN 500 MG tablet   Commonly known as:  GLUCOPHAGE   This may have changed:  additional instructions   Used for:  Schizoaffective disorder, bipolar type (H)   Changed by:  Zbigniew Bailey MD        Take 1000 mg (2 tablet) with breakfast and 500 mg with dinner for 7 days, if tolerating then increase to 1000 mg twice daily with meals.   Quantity:  120 tablet   Refills:  1       modafinil 100 MG tablet   Commonly known as:  PROVIGIL   This may have changed:    - how much to take  - how to take this  - when to take this  - additional instructions   Used for:  Attention deficit hyperactivity disorder (ADHD), predominantly inattentive type, Schizoaffective disorder, bipolar type (H)   Changed by:  Zbigniew Bailey MD        Dose:  100 mg   Take 1 tablet (100 mg) by mouth daily   Quantity:  30 tablet   Refills:  1            Where to get your medicines      These medications were sent to [place] [address] [place] [facility] [place] [id number] [facility]  [address] [facility] [address]     Phone:  [phone]     lithium 600 MG capsule    metFORMIN 500 MG tablet    OLANZapine 20 MG tablet         Some of these will need a paper prescription and others can be bought over the counter.  Ask your nurse if you have questions.     Bring a paper prescription for each of these medications     modafinil 100 MG tablet                Primary Care Provider    Clinic Provider       No address on file        Equal Access to Services     SHELLEY SOOD : Santana noel Soadam, waaxda luqadaha, qaybta kaalmada adeegyada, chandan welch. So [facility] [phone].    ATENCIÓN: Si habla español, tiene a jacob disposición servicios gratuitos de asistencia lingüística. Llame al [phone].    We comply with applicable federal civil rights laws and [place] laws. We do not discriminate on the basis of race, color, national origin, age, disability, sex, sexual  orientation, or gender identity.            Thank you!     Thank you for choosing PSYCHIATRY CLINIC  for your care. Our goal is always to provide you with excellent care. Hearing back from our patients is one way we can continue to improve our services. Please take a few minutes to complete the written survey that you may receive in the mail after your visit with us. Thank you!             Your Updated Medication List - Protect others around you: Learn how to safely use, store and throw away your medicines at www.disposemymeds.org.          This list is accurate as of 5/17/18 11:59 PM.  Always use your most recent med list.                   Brand Name Dispense Instructions for use Diagnosis    lithium 600 MG capsule     60 capsule    Take 2 capsules (1,200 mg) by mouth At Bedtime    Schizoaffective disorder, bipolar type (H)       metFORMIN 500 MG tablet    GLUCOPHAGE    120 tablet    Take 1000 mg (2 tablet) with breakfast and 500 mg with dinner for 7 days, if tolerating then increase to 1000 mg twice daily with meals.    Schizoaffective disorder, bipolar type (H)       modafinil 100 MG tablet    PROVIGIL    30 tablet    Take 1 tablet (100 mg) by mouth daily    Attention deficit hyperactivity disorder (ADHD), predominantly inattentive type, Schizoaffective disorder, bipolar type (H)       OLANZapine 20 MG tablet    zyPREXA    30 tablet    Take 1 tablet (20 mg) by mouth At Bedtime    Schizoaffective disorder, bipolar type (H)

## 2018-05-17 NOTE — PATIENT INSTRUCTIONS
-Increase metformin to 1000 mg with dinner and 500 mg with evening meal for 7 days, if tolerating increase to 1000 mg twice daily with meals.  -Continue all other medications without changes.  -Continue to work on your sleep schedule.  -Continue working on healthy diet.  -I will make a referral to the Navigate program and someone should be contacting you shortly.  -Return to clinic before June 27th.         Thank you for coming to the PSYCHIATRY CLINIC.    Lab Testing:  If you had lab testing today and your results are reassuring or normal they will be mailed to you or sent through [employer] within 7 days.   If the lab tests need quick action we will call you with the results.  The phone number we will call with results is # [phone] (home) . If this is not the best number please call our clinic and change the number.    Medication Refills:  If you need any refills please call your pharmacy and they will contact us. Our fax number for refills is [phone]. Please allow three business for refill processing.   If you need to  your refill at a new pharmacy, please contact the new pharmacy directly. The new pharmacy will help you get your medications transferred.     Scheduling:  If you have any concerns about today's visit or wish to schedule another appointment please call our office during normal business hours [phone] (8-5:00 M-F)    Contact Us:  Please call [phone] during business hours (8-5:00 M-F).  If after clinic hours, or on the weekend, please call  [phone].    Financial Assistance [phone]  [facility] Billing [phone]  [place] Billing [phone]  Medical Records [phone]      MENTAL HEALTH CRISIS NUMBERS:  [facility]:   [facility] - [phone]   Crisis Residence \[facility]\"" - [facility] - [phone]   Walk-In Counseling Center \[facility]\"" - [phone]   COPE 24/7 [place] Mobile Team for Adults - [[phone]]; Child - [[phone]]      Crisis Connection - [phone]     [facility] - [phone]   Walk-in counseling [facility] - [phone]   Walk-in counseling [facility] - [phone]   Crisis Residence [facility] - [phone]   Urgent Care Adult Mental Health:   --Drop-in, 24/7 crisis line, and [employer] Mobile Team [[phone]]    CRISIS TEXT LINE: Text [phone] from anywhere, anytime, any crisis 24/7;    OR SEE www.crisistextline.org     Poison Control Center - [phone]    CHILD: [facility] needs assessment team - [phone]     [facility] Lifeline - [phone]; or [facility] Lif[facility] - [phone]    If you have a medical emergency please call 911or go to the nearest ER.                    _____________________________________________    Again thank you for choosing PSYCHIATRY CLINIC and please let us know how we can best partner with you to improve you and your family's health.  You may be receiving a survey in the mail regarding this appointment. We would love to have your feedback, both positive and negative, so please fill out the survey and return it using the provided envolpe. The survey is done by an external company, so your answers are anonymous.

## 2018-05-17 NOTE — PROGRESS NOTES
PSYCHIATRY CLINIC PROGRESS NOTE   The initial diagnostic evaluation was on 7/24/2015.  Date of the most recent transfer of care lexie is 7/27/17.    Pertinent Background:  This patient first experienced mental health issues in grade school and has received treatment for ADHD, depression, psychosis and substance use.  See transfer evaluation for detailed history.  Notably, the patient had FEP in 2015 in the setting of cannabis abuse and has been stable on olanzapine. He has never lived independently and seems to have fairly low insight into his illness and past symptoms. GeneSight testing was completed noting pt under-metabolizes BOLTON forms of antipsychotic.  Past records from Dr. Mike Powers were received in 2018 to assist with diagnostic clarity, however they were hand written notes that were illegible though accompanied by typed neuropsychology consult from [facility].  This documentation was unable to support a diagnosis of bipolar disorder at that time though noted potential Asperger's diagnosis and ADHD.     Psych critical item history includes psychosis [disorganization, ?catatonia], mutiple psychotropic trials, psych hosp [x1 at [facility], spring 2015], commitment and CD: alcohol and cannabis.      INTERIM HISTORY                                                 The patient reports good treatment adherence.  History was provided by the patient who was a good historian.   Since the last visit:  -Was able to pay out of pocket for the Provigil.   -Has been waking up earlier every day. Has gone from waking up   -Gaining weight. He is considering a switch from olanzapine in the future if lifestyle changes don't work.   -Only taking metformin at dinner, not taking the 500 mg dose.  -Mom and pt feel he is doing well. 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  -Inquiring about sarcosine as a supplement for attention and negative symptoms psychosis.    RECENT  SYMPTOMS:   Depression:  hypersomnia, appetite changes and weight changes  Denies suicidal ideation, self-destructive thoughts, depressed mood, anhedonia, feeling worthless, excessive guilt, feeling hopeless and excessive crying   Elevated:  distractibility   Denies increased energy, decreased sleep need, increased activity and grandiosity  Psychosis:  none  Denies  delusions, auditory hallucinations, visual hallucinations and disorganized behavior   ADHD: improved concentration, productivity and motivation    RECENT SUBSTANCE USE:     ALCOHOL- very rarely          TOBACCO- quit 1 week prior to our last appt, using nicotine patch, much easier              CAFFEINE- 2-3 cups/day of tea  OPIOIDS- none       NARCAN KIT- N/A       CANNABIS- none          OTHER ILLICIT DRUGS- none     CURRENT SOCIAL HISTORY:  FINANCIAL SUPPORT- working and family or friend       CHILDREN- none       LIVING SITUATION- lives with parents in [place]      SOCIAL/ SPIRITUAL SUPPORT- family, Hindu hemalatha       FEELS SAFE AT HOME- Yes     MEDICAL ROS: A comprehensive review of systems was performed and is negative other than noted in the HPI.      MEDICAL / SURGICAL HISTORY                                   CARE TEAM:   PCP- Issac Watson PA-C at [facility]    Therapist- none       Contraception- not sexually active    Neurologic Hx [head injury etc]:  None reported  Patient Active Problem List   Diagnosis     Schizoaffective disorder, bipolar type (H)     Hx of psychiatric care     Elevated liver enzymes     Fatty liver     High blood triglycerides     History of cannabis abuse     History of cocaine abuse       ALLERGY                                Sulfa drugs  MEDICATIONS                               Current Outpatient Prescriptions   Medication Sig Dispense Refill     lithium 600 MG capsule Take 2 capsules (1,200 mg) by mouth At Bedtime 60 capsule 1     metFORMIN (GLUCOPHAGE) 500 MG tablet Take 1000 mg (2 tablet) with breakfast  "and 500 mg with dinner. 90 tablet 0     modafinil (PROVIGIL) 100 MG tablet Take 1/2 tablet daily for 7 days, if tolerating increase to 1 tablet daily. 30 tablet 0     OLANZapine (ZYPREXA) 20 MG tablet Take 1 tablet (20 mg) by mouth At Bedtime 30 tablet 1     VITALS   /87  Pulse 91  Wt 119.2 kg (262 lb 12.8 oz)  BMI 34.91 kg/m2     Wt Readings from Last 4 Encounters:   05/17/18 119.2 kg (262 lb 12.8 oz)   04/12/18 117.2 kg (258 lb 6.4 oz)   03/08/18 114 kg (251 lb 6.4 oz)   02/01/18 114 kg (251 lb 6.4 oz)     MENTAL STATUS EXAM                                                           Alertness: alert  and oriented  Appearance: adequately groomed  Behavior/Demeanor: cooperative and pleasant, more comfortable, with fair but improved eye contact   Speech: normal and regular rate and rhythm, more spontaneous  Language: no obvious problem  Psychomotor: normal or unremarkable  Mood: \"good\"  Affect: baseline blunting but improved range; was congruent to mood; was congruent to content  Thought Process/Associations: unremarkable  Thought Content:  Reports none;  Denies suicidal ideation, violent ideation, delusions and paranoid ideation  Perception:  Reports none;  Denies auditory hallucinations and visual hallucinations  Insight: fair  Judgment: fair  Cognition: does  appear grossly intact; formal cognitive testing was not done    LABS and DATA   RATING SCALES:  AIMS: done 5.17.18 with total score of 0    PHQ9 TODAY =  4, somewhat difficult, item #9: 0  PHQ-9 SCORE 2/1/2018 3/8/2018 4/12/2018   Total Score - - -   Total Score 2 7 6       ANTIPSYCHOTIC LABS   [glu, A1C, lipids (focus LDL), liver enzymes, WBC, ANEU, Hgb, plts]    q12 mo  Recent Labs   Lab Test  10/30/17   1429  03/22/17   1340   09/30/16   1348   GLC  86  83   < >  83   A1C   --    --    --   4.6    < > = values in this interval not displayed.     Recent Labs   Lab Test  12/01/17   0948  10/30/17   1429   CHOL  177  165   TRIG  347*  339*   LDL  72  " 63   HDL  36*  34*     Recent Labs   Lab Test  12/01/17   0948  11/10/17   1534  11/02/17   1658   AST  31  35  49*   ALT  118*  108*  156*   ALKPHOS  63   --   68     Recent Labs   Lab Test  11/10/17   1534  10/30/17   1429   WBC  8.6  8.1   ANEU  5.0  4.8   HGB  15.6  15.7   PLT  255  241     LITHIUM LABS     [level, renal, SG, TSH, WBC]    q6 mo  Recent Labs   Lab Test  12/01/17   0948  11/10/17   1534  10/30/17   1429  03/22/17   1340   LITHIUM  0.64  0.49*  0.54*  0.6     Recent Labs   Lab Test  10/30/17   1429  03/22/17   1340  03/08/17   0953   CR  1.05  1.09  0.94   GFRESTIMATED  85  82  >90  Non  GFR Calc     NA  141  143  140   ADONAY  9.2  9.3  9.5     Recent Labs   Lab Test  10/30/17   1429  02/09/16   1247   SG  1.015  1.005     Recent Labs   Lab Test  11/10/17   1534  10/30/17   1429  03/22/17   1340   TSH  2.09  2.92  3.03     Recent Labs   Lab Test  11/10/17   1534  10/30/17   1429   WBC  8.6  8.1         DIAGNOSIS     Schizoaffective disorder, bipolar type (H)  Attention deficit hyperactivity disorder (ADHD), predominantly inattentive type  Fatty liver  Encounter for long-term (current) use of medications    ASSESSMENT                                     Diagnostic uncertainty: First episode presentation did not appear to meet full criteria for a manic episode but description seemed more consistent with disorganized, agitated psychosis and catatonia. Family meeting with his mom revealed there have been episodes that appeared to meet criteria for lauren, though unclear how much substances were playing a role at the time as he has a significant substance abuse history. Received records from Dr. Powers, though due to handwriting, was not able to extract useful information from these. There has been evidence of psychotic symptoms outside of concurrent mood disturbance, thus making schizo-spectrum diagnosis more likely.     TODAY     1. Schizoaffective disorder, bipolar type: Psychosis and  mood symptoms have been stable since initiation of modafinil with improved focus and productivity, fewer negative symptoms. Lithium level (0.64) remains lower than expected at current dose (1200 mg) taken at 12h trough. Given stability and historical benefit, will continue at this dose with option to optimize in the future.    2. Medical concerns: diagnosed with fatty liver confirmed on US, most certainly causing elevation in transaminases. Pt has made a concerted effort to change lifestyle habits and it has paid off with weight reduction. Continue to provide encouragement and regular f/u with PCP.    3. Sleep: improved with dedicated effort in changing sleep hygiene but could still use improvement. Continue support and encouragement.         [place] PRESCRIPTION MONITORING PROGRAM [] was not checked today:  not using controlled substances.    PSYCHOTROPIC DRUG INTERACTIONS:   LITHIUM and OLANZAPINE may result in increased neurotoxic effects of D2-antagonists.  BUPROPION and OLANZAPINE may result in lowering of seizure threshold.   MANAGEMENT:  Monitoring for adverse effects, routine vitals and using lowest therapeutic dose of [wellbutrin]     PLAN                                                                                                       1) PSYCHOTROPIC MEDICATIONS:  - continue olanzapine 20 mg QHS  - continue lithium 1200 mg QHS  - restart metformin 1000 mg with breakfast and 500 mg with dinner for 7 days, then increase to 1000 mg BID with meals  - continue modafinil 100 mg daily    2) THERAPY:    Family meetings: #1, #2 on 3/08/18    3) NEXT DUE:    Labs- Antipsychotic labs due 11/2018; [place] labs as per protocol  EKG- PRN  Rating Scales- AIMS due 05/17/19    4) REFERRALS:    f/u with primary care as needed    5) RTC: 4 weeks    6) CRISIS NUMBERS:   Provided routinely in AVS.  Especially emphasized:  [facility] [phone] (clinic)    [phone] (after hours)  CRISIS TEXT LINE: Text [phone]  from anywhere, anytime, any crisis 24/7;  OR SEE www.crisistextline.org  ONLY if a [facility] PT: [facility] [phone] (clinic), [phone] (after hours)     TREATMENT RISK STATEMENT:  The risks, benefits, alternatives and potential adverse effects have been discussed and are understood by the pt. The pt understands the risks of using street drugs or alcohol. There are no medical contraindications, the pt agrees to treatment with the ability to do so. The pt knows to call the clinic for any problems or to access emergency care if needed.  Medical and substance use concerns are documented above.  Psychotropic drug interaction check was done, including changes made today.    RESIDENT:   Zbigniew Bailey MD      Patient staffed in clinic with Dr. Tay who will sign the note.  Supervisor is Dr. Tay.    Supervisor Attestation:  I saw Jose Francisco Sommers with the resident, and participated in key portions of the service, including the mental status examination and developing the plan of care. I reviewed key portions of the history with the resident. I agree with the findings and plan as documented in this note.  Joel Tay MD

## 2018-05-18 ASSESSMENT — PATIENT HEALTH QUESTIONNAIRE - PHQ9: SUM OF ALL RESPONSES TO PHQ QUESTIONS 1-9: 4

## 2018-06-06 ENCOUNTER — TELEPHONE (OUTPATIENT)
Dept: PSYCHIATRY | Facility: CLINIC | Age: 28
End: 2018-06-06

## 2018-06-06 NOTE — TELEPHONE ENCOUNTER
"  FE Referral from Family     -Caller's Name: Mom   -Relationship: Mother   -Phone: [phone], Is it okay to leave a detailed voicemail: Yes     -Patient's Name: Jose Francisco Sommers   -: 1990 (27 year old).   -Is pt 14 or under? No, if yes, refer to child psychosis clinic.   -Patient Phone: [phone]   -Does the patient know you are calling? Yes, and Dr Bailey is referring as well     -Any current or past diagnoses? Yes - Schizoaffective Disorder   -If yes, when were they diagnosed? 3 years ago   -How long has patient been taking medication to treat psychosis? (Cumulative months) 3 years   -Have they ever had a significant head injury? No   -Is there a history of a diagnosis of autism spectrum disorder? No   -Is there a history of a diagnosis of borderline personality disorder? No   -Is there a history of a developmental delay? No     -What are the patient's current symptoms, specifically odd behavior and psychotic symptoms, and how long have they been experiencing them? Thought disorder primarily, mom is unsure whether he is hearing voices, but he is more paranoid, and huge negative thought processes, short term memory issues, some lauren     -Is the patient seeing any mental health providers currently, and if so, who/where? Dr. Bailey in Psychiatry at the [facility]     -What services is are you interested in (e.g. Assessment, med management, individual therapy, family therapy, group therapy)? All of the above     -Are you interested in our First Episode Psychosis Family Education & Support Group? If so, please provide your email for event details. - no, they have done the family support group.       Mom wants him to get out of the house and to live his own life.  His world has become so small, he needs some help getting back out there, \"school, job?\", patient has great input, great insight.  He is bright. Mom is thinking Navigate, with case management and so on.     ROUTE this message/referral to the " following pool: P  ME FIRST EPISODE REFERRAL ([id number])

## 2018-06-06 NOTE — TELEPHONE ENCOUNTER
-Left voicemail with patient's mother stating patient does not qualify for Navigate due to antipsychotic use, and that Referral will be going to Strengths program.  Let her know that Renee would be calling in about 10 days to go over Strengths information.  Also gave Navigate clinic number if she were to have any questions between now and then.    -Writer notified Renee of this.

## 2018-06-14 ENCOUNTER — OFFICE VISIT (OUTPATIENT)
Dept: PSYCHIATRY | Facility: CLINIC | Age: 28
End: 2018-06-14
Attending: PSYCHIATRY & NEUROLOGY
Payer: COMMERCIAL

## 2018-06-14 VITALS
DIASTOLIC BLOOD PRESSURE: 87 MMHG | HEART RATE: 79 BPM | BODY MASS INDEX: 35.76 KG/M2 | SYSTOLIC BLOOD PRESSURE: 132 MMHG | WEIGHT: 269.2 LBS

## 2018-06-14 DIAGNOSIS — R63.5 WEIGHT GAIN DUE TO MEDICATION: ICD-10-CM

## 2018-06-14 DIAGNOSIS — F90.0 ATTENTION DEFICIT HYPERACTIVITY DISORDER (ADHD), PREDOMINANTLY INATTENTIVE TYPE: ICD-10-CM

## 2018-06-14 DIAGNOSIS — F25.0 SCHIZOAFFECTIVE DISORDER, BIPOLAR TYPE (H): Primary | ICD-10-CM

## 2018-06-14 DIAGNOSIS — G47.00 INSOMNIA, UNSPECIFIED TYPE: ICD-10-CM

## 2018-06-14 DIAGNOSIS — T50.905A WEIGHT GAIN DUE TO MEDICATION: ICD-10-CM

## 2018-06-14 PROCEDURE — G0463 HOSPITAL OUTPT CLINIC VISIT: HCPCS | Mod: ZF

## 2018-06-14 ASSESSMENT — PAIN SCALES - GENERAL: PAINLEVEL: NO PAIN (0)

## 2018-06-14 NOTE — MR AVS SNAPSHOT
After Visit Summary   6/14/2018    Jose Francisco Sommers    MRN: [id number]           Patient Information     Date Of Birth          1990        Visit Information        Provider Department      6/14/2018 10:15 AM Zbigniew Bailey MD Psychiatry Clinic        Today's Diagnoses     Schizoaffective disorder, bipolar type (H)    -  1    Weight gain due to medication          Care Instructions    Continue taking:  - continue olanzapine 20 mg at night  - continue lithium 1200 mg at night  - continue metformin 1000 mg with breakfast and 500 mg with dinner for 7 days, then increase to 1000 mg BID with meals  - continue modafinil 100 mg daily    Make an appt with Dr. Chau Dennis for one month.    Call to set up appt with nutrition. If they won't see you, ask your primary for a referral.  Your provider has referred you to: [facility]: [facility] [phone]   [email or web address]    Please be aware that coverage of these services is subject to the terms and limitations of your health insurance plan.  Call member services at your health plan with any benefit or coverage questions.      Please bring the following with you to your appointment:    (1) This referral request  (2) Any documents given to you regarding this referral  (3) Any specific questions you have about diet and/or food choices                Thank you for coming to the PSYCHIATRY CLINIC.    Lab Testing:  If you had lab testing today and your results are reassuring or normal they will be mailed to you or sent through [employer] within 7 days.   If the lab tests need quick action we will call you with the results.  The phone number we will call with results is # [phone] (home) . If this is not the best number please call our clinic and change the number.    Medication Refills:  If you need any refills please call your pharmacy and they will contact us. Our fax number for refills is [phone].  Please allow three business for refill processing.   If you need to  your refill at a new pharmacy, please contact the new pharmacy directly. The new pharmacy will help you get your medications transferred.     Scheduling:  If you have any concerns about today's visit or wish to schedule another appointment please call our office during normal business hours [phone] (8-5:00 M-F)    Contact Us:  Please call [phone] during business hours (8-5:00 M-F).  If after clinic hours, or on the weekend, please call  [phone].    Financial Assistance [phone]  [employer] Billing [phone]  [facility] Billing [phone]  Medical Records [phone]      MENTAL HEALTH CRISIS NUMBERS:  [facility]:   [facility] - [phone]   [facility] Res[facility] - [phone]   Walk-In Counseling [facility] [phone]   COPE 24/7 [place] Mobile Team for Adults - [[phone]]; Child - [[phone]]     Crisis Connection - [phone]     [facility]:   [facility] - [phone]   Walk-in counseling [facility] - [phone]   Walk-in counseling [facility] - [phone]   [facility] - [phone]   Urgent Care Adult Mental Health:   --Drop-in, 24/7 crisis line, and [facility] Mobile Team [[phone]]    CRISIS TEXT LINE: Text 104-857 from anywhere, anytime, any crisis 24/7;    OR SEE www.crisistextline.org     Poison Control Center - [phone]    CHILD: [facility] needs assessment team - [phone]     [facility] Lifeline - [phone]; or [facility] Lifeline - [phone]    If you have a medical emergency please call 911or go to the nearest ER.                    _____________________________________________    Again thank you for choosing PSYCHIATRY CLINIC and please let us know how we can best partner with you to improve you and your family's health.  You  may be receiving a survey in the mail regarding this appointment. We would love to have your feedback, both positive and negative, so please fill out the survey and return it using the provided envelope. The survey is done by an external company, so your answers are anonymous.           Follow-ups after your visit        Additional Services     NUTRITION REFERRAL       Your provider has referred you to: [place]: [facility] [phone]   [email or web address]    Please be aware that coverage of these services is subject to the terms and limitations of your health insurance plan.  Call member services at your health plan with any benefit or coverage questions.      Please bring the following with you to your appointment:    (1) This referral request  (2) Any documents given to you regarding this referral  (3) Any specific questions you have about diet and/or food choices                  Your next 10 appointments already scheduled     Jul 13, 2018  3:00 PM CDT   Adult Med Follow UP with Chau Dennis MD   Psychiatry Clinic ([facility])    [place] [id number] [address] [phone]              Who to contact     Please call your clinic at [phone] to:    Ask questions about your health    Make or cancel appointments    Discuss your medicines    Learn about your test results    Speak to your doctor            Additional Information About Your Visit        [employer] Information     [employer] gives you secure access to your electronic health record. If you see a primary care provider, you can also send messages to your care team and make appointments. If you have questions, please call your primary care clinic.  If you do not have a primary care provider, please call [phone] and they will assist you.      [employer] is an electronic gateway that provides easy, online access to your medical records. With  [employer], you can request a clinic appointment, read your test results, renew a prescription or communicate with your care team.     To access your existing account, please contact your [facility] or call [phone] for assistance.        Care EveryWhere ID     This is your Care EveryWhere ID. This could be used by other organizations to access your [place] medical records  [id number]        Your Vitals Were     Pulse BMI (Body Mass Index)                79 35.76 kg/m2           Blood Pressure from Last 3 Encounters:   06/14/18 132/87   05/17/18 133/87   04/12/18 125/88    Weight from Last 3 Encounters:   06/14/18 122.1 kg (269 lb 3.2 oz)   05/17/18 119.2 kg (262 lb 12.8 oz)   04/12/18 117.2 kg (258 lb 6.4 oz)              We Performed the Following     NUTRITION REFERRAL        Primary Care Provider    Clinic Provider       No address on file        Equal Access to Services     SHELLEY SOOD : Hadii aad ku hadasho Soadam, waaxda luqadaha, qaybta kaalmada adeegyada, chandan flores . So [facility] [phone].    ATENCIÓN: Si habla español, tiene a jacob disposición servicios gratuitos de asistencia lingüística. Marco al [phone].    We comply with applicable federal civil rights laws and [place] laws. We do not discriminate on the basis of race, color, national origin, age, disability, sex, sexual orientation, or gender identity.            Thank you!     Thank you for choosing PSYCHIATRY CLINIC  for your care. Our goal is always to provide you with excellent care. Hearing back from our patients is one way we can continue to improve our services. Please take a few minutes to complete the written survey that you may receive in the mail after your visit with us. Thank you!             Your Updated Medication List - Protect others around you: Learn how to safely use, store and throw away your medicines at www.disposemymeds.org.          This list is accurate  as of 6/14/18 11:24 AM.  Always use your most recent med list.                   Brand Name Dispense Instructions for use Diagnosis    lithium 600 MG capsule     60 capsule    Take 2 capsules (1,200 mg) by mouth At Bedtime    Schizoaffective disorder, bipolar type (H)       metFORMIN 500 MG tablet    GLUCOPHAGE    120 tablet    Take 1000 mg (2 tablet) with breakfast and 500 mg with dinner for 7 days, if tolerating then increase to 1000 mg twice daily with meals.    Schizoaffective disorder, bipolar type (H)       modafinil 100 MG tablet    PROVIGIL    30 tablet    Take 1 tablet (100 mg) by mouth daily    Attention deficit hyperactivity disorder (ADHD), predominantly inattentive type, Schizoaffective disorder, bipolar type (H)       OLANZapine 20 MG tablet    zyPREXA    30 tablet    Take 1 tablet (20 mg) by mouth At Bedtime    Schizoaffective disorder, bipolar type (H)

## 2018-06-14 NOTE — PATIENT INSTRUCTIONS
Continue taking:  - continue olanzapine 20 mg at night  - continue lithium 1200 mg at night  - continue metformin 1000 mg with breakfast and 500 mg with dinner for 7 days, then increase to 1000 mg BID with meals  - continue modafinil 100 mg daily    Make an appt with Dr. Chau Dennis for one month.    Call to set up appt with nutrition. If they won't see you, ask your primary for a referral.  Your provider has referred you to: [facility]: [facility] [phone]   [email or web address]/    Please be aware that coverage of these services is subject to the terms and limitations of your health insurance plan.  Call member services at your health plan with any benefit or coverage questions.      Please bring the following with you to your appointment:    (1) This referral request  (2) Any documents given to you regarding this referral  (3) Any specific questions you have about diet and/or food choices                Thank you for coming to the PSYCHIATRY CLINIC.    Lab Testing:  If you had lab testing today and your results are reassuring or normal they will be mailed to you or sent through [employer] within 7 days.   If the lab tests need quick action we will call you with the results.  The phone number we will call with results is # [phone] (home) . If this is not the best number please call our clinic and change the number.    Medication Refills:  If you need any refills please call your pharmacy and they will contact us. Our fax number for refills is [phone]. Please allow three business for refill processing.   If you need to  your refill at a new pharmacy, please contact the new pharmacy directly. The new pharmacy will help you get your medications transferred.     Scheduling:  If you have any concerns about today's visit or wish to schedule another appointment please call our office during normal business hours [phone] (8-5:00 M-F)    Contact Us:  Please call  [phone] during business hours (8-5:00 M-F).  If after clinic hours, or on the weekend, please call  [phone].    Financial Assistance [phone]  [facility] Billing [phone]  [facility] Billing [phone]  Medical Records [phone]      MENTAL HEALTH CRISIS NUMBERS:  [facility]:   [facility] - [phone]   Crisis Residence [facility] Residence - [phone]   Walk-In Counseling Center [facility] - [phone]   COPE 24/7 [place] Mobile Team for Adults - [[phone]]; Child - [[phone]]     Crisis Connection - [phone]     [facility] - [phone]   Walk-in counseling [facility] - [phone]   Walk-in counseling [facility] - [phone]   Crisis Residence [facility] - [phone]   Urgent Care Adult Mental Health:   --Drop-in, 24/7 crisis line, and [facility] Mobile Team [[phone]]    CRISIS TEXT LINE: Text 741-582 from anywhere, anytime, any crisis 24/7;    OR SEE www.crisistextline.org     Poison Control Center - [phone]    CHILD: [facility] needs assessment team - [phone]     [facility] - [phone]; or [facility] - [phone]    If you have a medical emergency please call 911or go to the nearest ER.                    _____________________________________________    Again thank you for choosing PSYCHIATRY CLINIC and please let us know how we can best partner with you to improve you and your family's health.  You may be receiving a survey in the mail regarding this appointment. We would love to have your feedback, both positive and negative, so please fill out the survey and return it using the provided envelope. The survey is done by an external company, so your answers are anonymous.

## 2018-06-14 NOTE — PROGRESS NOTES
PSYCHIATRY CLINIC PROGRESS NOTE   The initial diagnostic evaluation was on 7/24/2015.  Date of the most recent transfer of care lexie is 7/27/17.    Pertinent Background:  This patient first experienced mental health issues in grade school and has received treatment for ADHD, depression, psychosis and substance use.  See transfer evaluation for detailed history.  Notably, the patient had FEP in 2015 in the setting of cannabis abuse and has been stable on olanzapine. He has never lived independently and seems to have fairly low insight into his illness and past symptoms. GeneSight testing was completed noting pt is a poor CYP2D6 metabolizer.  Past records from Dr. Mike Powers were received in 2018 to assist with diagnostic clarity, however they were hand written notes that were illegible though accompanied by typed neuropsychology consult from [facility].  This documentation was unable to support a diagnosis of bipolar disorder at that time though noted potential Asperger's diagnosis and ADHD.     Psych critical item history includes psychosis [disorganization, ?catatonia], mutiple psychotropic trials, psych hosp [x1 at [facility], spring 2015], commitment and CD: alcohol and cannabis.      INTERIM HISTORY                                                 The patient reports good treatment adherence.  History was provided by the patient who was a good historian.   Since the last visit:  -Getting benefit from Provigil. Denies side effects or recurrence of mood or psychotic symptoms.  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  -Reports improvement in social anxiety and that it is much easier to speak with other people, as well as articulate his thoughts.  -He is working on waking up earlier, and this is getting better. Has a tentative target goal of 10 am.  -Reports difficulty controlling appetite, craving for sweets or burgers.  -Had trouble with BID dosing of metformin and was  only taking the am dose.  -Inquiring about switching from olanzapine to a different medication with less metabolic concerns. Would prefer to continue olanzapine however if possible.  -Discussed options including Saphris or Latuda (preferred given pt is poor 2D6 metabolizer).    RECENT SYMPTOMS:   Depression:  hypersomnia, appetite changes and weight changes  Denies suicidal ideation, self-destructive thoughts, depressed mood, anhedonia, feeling worthless, excessive guilt, feeling hopeless and excessive crying   Elevated:  distractibility   Denies increased energy, decreased sleep need, increased activity and grandiosity  Psychosis:  none  Denies  delusions, auditory hallucinations, visual hallucinations and disorganized behavior   ADHD: improved concentration, productivity and motivation    RECENT SUBSTANCE USE:     ALCOHOL- very rarely          TOBACCO- quit, using nicotine patch           CAFFEINE- 2-3 cups/day of tea  OPIOIDS- none       NARCAN KIT- N/A       CANNABIS- none          OTHER ILLICIT DRUGS- none     CURRENT SOCIAL HISTORY:  FINANCIAL SUPPORT- working and family or friend       CHILDREN- none       LIVING SITUATION- lives with parents in [place]      SOCIAL/ SPIRITUAL SUPPORT- family, [employer] hemalatha       FEELS SAFE AT HOME- Yes     MEDICAL ROS: A comprehensive review of systems was performed and is negative other than noted in the HPI.      MEDICAL / SURGICAL HISTORY                                   CARE TEAM:   PCP- Issac Watson PA-C at [facility]    Therapist- none       Contraception- not sexually active    Neurologic Hx [head injury etc]:  None reported  Patient Active Problem List   Diagnosis     Schizoaffective disorder, bipolar type (H)     Hx of psychiatric care     Elevated liver enzymes     Fatty liver     High blood triglycerides     History of cannabis abuse     History of cocaine abuse       ALLERGY                                Sulfa drugs  MEDICATIONS                         "       Current Outpatient Prescriptions   Medication Sig Dispense Refill     lithium 600 MG capsule Take 2 capsules (1,200 mg) by mouth At Bedtime 60 capsule 1     metFORMIN (GLUCOPHAGE) 500 MG tablet Take 1000 mg (2 tablet) with breakfast and 500 mg with dinner for 7 days, if tolerating then increase to 1000 mg twice daily with meals. 120 tablet 1     modafinil (PROVIGIL) 100 MG tablet Take 1 tablet (100 mg) by mouth daily 30 tablet 1     OLANZapine (ZYPREXA) 20 MG tablet Take 1 tablet (20 mg) by mouth At Bedtime 30 tablet 1     VITALS   /87  Pulse 79  Wt 122.1 kg (269 lb 3.2 oz)  BMI 35.76 kg/m2     Wt Readings from Last 4 Encounters:   06/14/18 122.1 kg (269 lb 3.2 oz)   05/17/18 119.2 kg (262 lb 12.8 oz)   04/12/18 117.2 kg (258 lb 6.4 oz)   03/08/18 114 kg (251 lb 6.4 oz)     MENTAL STATUS EXAM                                                           Alertness: alert  and oriented  Appearance: adequately groomed  Behavior/Demeanor: cooperative and pleasant, more comfortable, with fair but improved eye contact   Speech: normal and regular rate and rhythm, more spontaneous  Language: no obvious problem  Psychomotor: normal or unremarkable  Mood: \"good\"  Affect: baseline blunting but improved range; was congruent to mood; was congruent to content  Thought Process/Associations: unremarkable  Thought Content:  Reports none;  Denies suicidal ideation, violent ideation, delusions and paranoid ideation  Perception:  Reports none;  Denies auditory hallucinations and visual hallucinations  Insight: fair  Judgment: fair  Cognition: does  appear grossly intact; formal cognitive testing was not done    LABS and DATA   RATING SCALES:  AIMS: done 5.17.18 with total score of 0    PHQ9 TODAY =  5, somewhat difficult, item #9: 0  PHQ-9 SCORE 3/8/2018 4/12/2018 5/17/2018   Total Score - - -   Total Score 7 6 4       ANTIPSYCHOTIC LABS   [glu, A1C, lipids (focus LDL), liver enzymes, WBC, ANEU, Hgb, plts]    q12 " mo  Recent Labs   Lab Test  10/30/17   1429  03/22/17   1340   09/30/16   1348   GLC  86  83   < >  83   A1C   --    --    --   4.6    < > = values in this interval not displayed.     Recent Labs   Lab Test  12/01/17   0948  10/30/17   1429   CHOL  177  165   TRIG  347*  339*   LDL  72  63   HDL  36*  34*     Recent Labs   Lab Test  12/01/17   0948  11/10/17   1534  11/02/17   1658   AST  31  35  49*   ALT  118*  108*  156*   ALKPHOS  63   --   68     Recent Labs   Lab Test  11/10/17   1534  10/30/17   1429   WBC  8.6  8.1   ANEU  5.0  4.8   HGB  15.6  15.7   PLT  255  241     LITHIUM LABS     [level, renal, SG, TSH, WBC]    q6 mo  Recent Labs   Lab Test  12/01/17   0948  11/10/17   1534  10/30/17   1429  03/22/17   1340   LITHIUM  0.64  0.49*  0.54*  0.6     Recent Labs   Lab Test  10/30/17   1429  03/22/17   1340  03/08/17   0953   CR  1.05  1.09  0.94   GFRESTIMATED  85  82  >90  Non  GFR Calc     NA  141  143  140   ADONAY  9.2  9.3  9.5     Recent Labs   Lab Test  10/30/17   1429  02/09/16   1247   SG  1.015  1.005     Recent Labs   Lab Test  11/10/17   1534  10/30/17   1429  03/22/17   1340   TSH  2.09  2.92  3.03     Recent Labs   Lab Test  11/10/17   1534  10/30/17   1429   WBC  8.6  8.1         DIAGNOSIS     Schizoaffective disorder, bipolar type (H)  Attention deficit hyperactivity disorder (ADHD), predominantly inattentive type  Fatty liver  Encounter for long-term (current) use of medications    ASSESSMENT                                     Diagnostic uncertainty: First episode presentation did not appear to meet full criteria for a manic episode but description seemed more consistent with disorganized, agitated psychosis and catatonia. Family meeting with his mom revealed there have been episodes that appeared to meet criteria for lauren, though unclear how much substances were playing a role at the time as he has a significant substance abuse history. Received records from Dr. Powers,  though due to handwriting, was not able to extract useful information from these. There has been evidence of psychotic symptoms outside of concurrent mood disturbance, thus making schizo-spectrum diagnosis more likely.     TODAY     1. Schizoaffective disorder, bipolar type: Psychosis and mood symptoms have been stable since initiation of modafinil with improved focus and productivity, fewer negative symptoms. Lithium level (0.64) remains lower than expected at current dose (1200 mg) taken at 12h trough. Given stability and historical benefit, will continue at this dose with option to optimize in the future. Would consider increasing to 1500 mg if olanzapine were decreased or switched.     2. Medical concerns: diagnosed with fatty liver confirmed on US, most certainly causing elevation in transaminases. Pt has made a concerted effort to change lifestyle habits and it has paid off with weight reduction. Continue to provide encouragement and regular f/u with PCP. If metabolic concerns cannot be addressed with behavioral modification and nutrition education, would consider switching neuroleptics to Saphris or Latuda (preferred given pt is poor 2D6 metabolizer).    3. Sleep: improved with dedicated effort in changing sleep hygiene. Continue support and encouragement.         [place] PRESCRIPTION MONITORING PROGRAM [] was not checked today:  not using controlled substances.    PSYCHOTROPIC DRUG INTERACTIONS:   LITHIUM and OLANZAPINE may result in increased neurotoxic effects of D2-antagonists.  BUPROPION and OLANZAPINE may result in lowering of seizure threshold.   MANAGEMENT:  Monitoring for adverse effects, routine vitals and using lowest therapeutic dose of [wellbutrin]     PLAN                                                                                                       1) PSYCHOTROPIC MEDICATIONS:  - continue olanzapine 20 mg QHS  - continue lithium 1200 mg QHS  - restart metformin 1000 mg with breakfast and  500 mg with dinner for 7 days, then increase to 1000 mg BID with meals  - continue modafinil 100 mg daily    2) THERAPY:    Family meetings: #1, #2 on 3/08/18    3) NEXT DUE:    Labs- Antipsychotic labs due 11/2018; [facility] labs as per protocol  EKG- PRN  Rating Scales- AIMS due 05/17/19    4) REFERRALS:    -f/u with primary care as needed  -nutrition consult    5) RTC: appt with Dr. Chau Dennis in one month.    6) CRISIS NUMBERS:   Provided routinely in AVS.  Especially emphasized:  [facility] [phone] (clinic)    [phone] (after hours)  CRISIS TEXT LINE: Text [phone] from anywhere, anytime, any crisis 24/7;  OR SEE www.crisistextline.org  ONLY if a [facility] PT: [facility] [phone] (clinic), [phone] (after hours)     TREATMENT RISK STATEMENT:  The risks, benefits, alternatives and potential adverse effects have been discussed and are understood by the pt. The pt understands the risks of using street drugs or alcohol. There are no medical contraindications, the pt agrees to treatment with the ability to do so. The pt knows to call the clinic for any problems or to access emergency care if needed.  Medical and substance use concerns are documented above.  Psychotropic drug interaction check was done, including changes made today.    RESIDENT:   Zbigniew Bailey MD      Patient was not staffed in clinic.  Supervisor is Dr. Tay who will sign the note.    Attestation:  I did not see this patient directly. I have reviewed the documentation and I agree with the resident's plan of care.   Joel Tay MD

## 2018-06-19 ASSESSMENT — PATIENT HEALTH QUESTIONNAIRE - PHQ9: SUM OF ALL RESPONSES TO PHQ QUESTIONS 1-9: 5

## 2018-07-13 ENCOUNTER — OFFICE VISIT (OUTPATIENT)
Dept: PSYCHIATRY | Facility: CLINIC | Age: 28
End: 2018-07-13
Attending: PSYCHIATRY & NEUROLOGY
Payer: COMMERCIAL

## 2018-07-13 VITALS
DIASTOLIC BLOOD PRESSURE: 97 MMHG | WEIGHT: 265.2 LBS | BODY MASS INDEX: 35.23 KG/M2 | SYSTOLIC BLOOD PRESSURE: 134 MMHG | HEART RATE: 79 BPM

## 2018-07-13 DIAGNOSIS — F25.0 SCHIZOAFFECTIVE DISORDER, BIPOLAR TYPE (H): ICD-10-CM

## 2018-07-13 DIAGNOSIS — F90.0 ATTENTION DEFICIT HYPERACTIVITY DISORDER (ADHD), PREDOMINANTLY INATTENTIVE TYPE: ICD-10-CM

## 2018-07-13 PROCEDURE — G0463 HOSPITAL OUTPT CLINIC VISIT: HCPCS | Mod: ZF

## 2018-07-13 RX ORDER — MODAFINIL 100 MG/1
100 TABLET ORAL DAILY
Qty: 30 TABLET | Refills: 1 | Status: SHIPPED | OUTPATIENT
Start: 2018-07-18 | End: 2018-07-13

## 2018-07-13 RX ORDER — MODAFINIL 100 MG/1
100 TABLET ORAL DAILY
Qty: 30 TABLET | Refills: 1 | Status: SHIPPED | OUTPATIENT
Start: 2018-07-18 | End: 2018-08-29 | Stop reason: ALTCHOICE

## 2018-07-13 ASSESSMENT — PAIN SCALES - GENERAL: PAINLEVEL: NO PAIN (0)

## 2018-07-13 NOTE — PATIENT INSTRUCTIONS
PSYCHOTROPIC MEDICATIONS:  - continue olanzapine 20 mg qHS  - continue lithium 1200 mg qHS  - continue 1000 mg BID with meals  - continue modafinil 100 mg daily

## 2018-07-13 NOTE — MR AVS SNAPSHOT
After Visit Summary   7/13/2018    Jose Francisco Sommers    MRN: [id number]           Patient Information     Date Of Birth          1990        Visit Information        Provider Department      7/13/2018 3:00 PM Chau Dennis MD Psychiatry Clinic        Today's Diagnoses     Attention deficit hyperactivity disorder (ADHD), predominantly inattentive type        Schizoaffective disorder, bipolar type (H)          Care Instructions    PSYCHOTROPIC MEDICATIONS:  - continue olanzapine 20 mg qHS  - continue lithium 1200 mg qHS  - continue 1000 mg BID with meals  - continue modafinil 100 mg daily          Follow-ups after your visit        Follow-up notes from your care team     Return in about 1 month (around 8/13/2018).      Your next 10 appointments already scheduled     Aug 17, 2018  3:00 PM CDT   Adult Med Follow UP with Chau Dennis MD   Psychiatry Clinic ([facility])    [place] [id number]  [address] [address] [phone]              Who to contact     Please call your clinic at [phone] to:    Ask questions about your health    Make or cancel appointments    Discuss your medicines    Learn about your test results    Speak to your doctor            Additional Information About Your Visit        [facility] Information     [facility] gives you secure access to your electronic health record. If you see a primary care provider, you can also send messages to your care team and make appointments. If you have questions, please call your primary care clinic.  If you do not have a primary care provider, please call [phone] and they will assist you.      [employer] is an electronic gateway that provides easy, online access to your medical records. With [employer], you can request a clinic appointment, read your test results, renew a prescription or communicate with your care team.     To access your existing account, please  contact your [facility] or call [phone] for assistance.        Care EveryWhere ID     This is your Care EveryWhere ID. This could be used by other organizations to access your [place] medical records  [id number]        Your Vitals Were     Pulse BMI (Body Mass Index)                79 35.23 kg/m2           Blood Pressure from Last 3 Encounters:   07/13/18 (!) 134/97   06/14/18 132/87   05/17/18 133/87    Weight from Last 3 Encounters:   07/13/18 120.3 kg (265 lb 3.2 oz)   06/14/18 122.1 kg (269 lb 3.2 oz)   05/17/18 119.2 kg (262 lb 12.8 oz)              Today, you had the following     No orders found for display         Today's Medication Changes          These changes are accurate as of 7/13/18 11:59 PM.  If you have any questions, ask your nurse or doctor.               Start taking these medicines.        Dose/Directions    modafinil 100 MG tablet   Commonly known as:  PROVIGIL   Used for:  Attention deficit hyperactivity disorder (ADHD), predominantly inattentive type, Schizoaffective disorder, bipolar type (H)   Started by:  Chau Dennis MD        Dose:  100 mg   Take 1 tablet (100 mg) by mouth daily   Quantity:  30 tablet   Refills:  1            Where to get your medicines      Some of these will need a paper prescription and others can be bought over the counter.  Ask your nurse if you have questions.     Bring a paper prescription for each of these medications     modafinil 100 MG tablet                Primary Care Provider    Clinic Provider       No address on file        Equal Access to Services     SHELLEY SOOD : Santana noel Soadam, wakayleeda luacacia, qaybta kaalmachandan coppola. So [facility] [phone].    ATENCIÓN: Si habla español, tiene a jacob disposición servicios gratuitos de asistencia lingüística. Llame al [phone].    We comply with applicable federal civil rights laws and [place] laws. We do  not discriminate on the basis of race, color, national origin, age, disability, sex, sexual orientation, or gender identity.            Thank you!     Thank you for choosing PSYCHIATRY CLINIC  for your care. Our goal is always to provide you with excellent care. Hearing back from our patients is one way we can continue to improve our services. Please take a few minutes to complete the written survey that you may receive in the mail after your visit with us. Thank you!             Your Updated Medication List - Protect others around you: Learn how to safely use, store and throw away your medicines at www.disposemymeds.org.          This list is accurate as of 7/13/18 11:59 PM.  Always use your most recent med list.                   Brand Name Dispense Instructions for use Diagnosis    lithium 600 MG capsule     60 capsule    Take 2 capsules (1,200 mg) by mouth At Bedtime    Schizoaffective disorder, bipolar type (H)       metFORMIN 500 MG tablet    GLUCOPHAGE    120 tablet    Take 1000 mg (2 tablet) with breakfast and 500 mg with dinner for 7 days, if tolerating then increase to 1000 mg twice daily with meals.    Schizoaffective disorder, bipolar type (H)       modafinil 100 MG tablet    PROVIGIL    30 tablet    Take 1 tablet (100 mg) by mouth daily    Attention deficit hyperactivity disorder (ADHD), predominantly inattentive type, Schizoaffective disorder, bipolar type (H)       OLANZapine 20 MG tablet    zyPREXA    30 tablet    Take 1 tablet (20 mg) by mouth At Bedtime    Schizoaffective disorder, bipolar type (H)

## 2018-07-13 NOTE — PROGRESS NOTES
PSYCHIATRY CLINIC TRANSFER DIAGNOSTIC ASSESSMENT     CARE TEAM:  PCP-Issac Watson PA-C at [facility]   Specialty Providers- none   Therapist- none   Community  Team- none    Jose Francisco Sommers is a 27 year old male who prefers the name Manuel & pronouns he, him, his, himself.    The initial diagnostic evaluation was on 7/24/2015.  Date of the most recent transfer of care eval is 07/13/2018.    Pertinent Background:  Jose Francisco Sommers first experienced mental health issues in grade school and has received treatment for ADHD, depression, psychosis and substance use.  See last transfer evaluation for detailed history. Notably, this patient had FEP in 2015 in the setting of cannabis abuse and has been stable on olanzapine and lithium since that time. He has never lived independently and has only been slowly developing insight into his illness and past symptoms. GeneSight testing was completed noting patient is a poor CYP2D6 metabolizer.  Past records from Dr. Mike Powers were received in summer 2018 to assist with diagnostic clarity, however they were hand written notes that were illegible though accompanied by typed neuropsychology consult from [facility].  This documentation was unable to support a diagnosis of bipolar disorder at that time though noted potential Asperger's diagnosis and ADHD.  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  Most recently, modafinil was started in April 2018 for attention support to endorsed benefits and heretofore no concerns for exacerbation of mood or psychotic symptoms.    Psych critical item history includes psychosis [disorganization, possible catatonia], mutiple psychotropic trials, psych hosp [x1 at [facility], spring 2015], commitment and CD: alcohol and cannabis.    INTERIM HISTORY                                                                                              4, 4  "  The patient reports good treatment adherence.  History was provided by the patient who was a fair historian.  The last visit ended with the following med change: metformin restarted for weight management.    Since the last visit:   -Patient demonstrates a tense, blunted affect.  He is forthcoming with questioning but does seem nervous at times.  Becomes noticeably more animated when discussing preferred topics.  Responses are brief.  -He explains that he lives with his parents, who are his primary means of support.  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  When asked what his hobbies/interests are he says: \"Mostly [employer]!\"  He explains that he has gone to [employer] all over the country and will sometimes go to [employer] gatherings in the area, however is doing this less recently.  -When asked about his support system he states: \"Just my parents, but they support me really well - I feel like my parents are all that I really needed.\"  -When asked about psychosis he emphasizes past disorganization during his 2015 first episode.  Denies that he has had auditory hallucinations or delusions but does attest to having had paranoia and states that at that time it was \"hard to think, and hard to talk - I could hardly talk at all.\"  -Voices his feeling that olanzapine has been effective and sufficient.  \"It really helps the disorganized thinking,\" he said.  -We also discussed his ADHD symptoms, and he stated that this mostly amounts to difficulty concentrating.  He says that the modafinil has been incredibly helpful and has helped him in numerous domains, including with activities of daily living as well as with his card selling business.  -States that his mood is good, denies suicidal ideation, self-destructive thoughts, anxiety.  -Elaborates that he would like to return to college at some point however would like to meet with a vocational counselor before that so that he could have " "a goal.  States that he thinks he would do much better in his classes this time and that \"I just wasn't ready before.\"  Emphasizes that he wants to have an end goal in mind before starting classes again.  -Overall endorsed feeling like things are going well for him.  Does not elaborate concerns, complaints or requests for adjustment to his regimen.    RECENT SYMPTOMS:   DEPRESSION:  reports-hypersomnia (improving with Provigil), appetite changes and weight changes;  DENIES- suicidal ideation, self-destructive thoughts, depressed mood, anhedonia, feeling worthless, excessive guilt, feeling hopeless, feeling trapped and overwhelmed  KAMI/HYPOMANIA:  reports-none;  DENIES- increased energy, decreased sleep need, increased activity, grandiosity, racing thoughts and pressured speech  PSYCHOSIS:  reports-none;  DENIES- delusions, auditory hallucinations, visual hallucinations, disorganized behavior and disorganized speech (difficulty communicating)  DYSREGULATION:  reports-none;  DENIES- suicidal ideation, violent ideation, SIB, impulsive, aggressive, irritable and physically agitated  PANIC ATTACK:  none   ANXIETY:  none  TRAUMA RELATED:  none    RECENT SUBSTANCE USE:     ALCOHOL- rare      TOBACCO- quit using nicotine patch spring 2018     CAFFEINE- coffee/ tea [1 pot of coffee a day]  OPIOIDS- none         NARCAN KIT- N/A        CANNABIS- none            OTHER ILLICIT DRUGS- none      CURRENT SOCIAL HISTORY:  FINANCIAL SUPPORT- parents; also makes some money selling [employer]  CHILDREN- none      LIVING SITUATION- lives with parents in [place]  SOCIAL/ SPIRITUAL SUPPORT- family, [employer] card community, [employer]     FEELS SAFE AT HOME- yes     MEDICAL ROS (2,10):  Reports none      Denies unusual movements, polydipsia and unexpalined fever, polyuria and nocturia    SUBSTANCE USE HISTORY                                                                             Past Use- MJ in HS, EtOH in HS  Treatment " "[#, most recent] - [facility] age 19 for MJ but did not complete  Medical Consequences [withdrawal, sz etc] - none  HIV/Hepatitis- none  Legal Consequences- 2x DUI for drinking at ages 20 and 21    PSYCHIATRIC HISTORY     SIB [method, most recent]- none  Suicidal Ideation Hx- none  Suicide Attempt [#, recent, method]:   #- none   Most Recent- N/A    Violence/Aggression Hx- none  Psychosis Hx- Predominantly disorganization. Denies AVH but describes thought blocking and slowed cognition/difficulty speaking during prior psychotic episode.  First episode began in December 2014 - was spending great amounts of time reorganizing his room/house/garage, and even used a  on the kitchen floor.  Psych Hosp [ #, most recent, committed]- once at [facility] in April 2015  ECT [#, most recent]- none    Eating Disorder: none    Outpatient Programs [ DBT, Day Treatment, Eating Disorder Tx etc] : none     SOCIAL and FAMILY HISTORY                           patient reported                          1ea, 1ea   Financial Support- documented above  Living Situation/Family/Relationships- documented above;  Children- documented above    Trauma History (self-report)- none  Legal- In February 2014 the patient left for [place] to attend a [employer] tournament. He ended up in [place] and was arrested for shoplifting from [employer] (had \"intended to purchase\" a pair of jeans but became panicked when security approached him). Had a brief stay in longterm, and afterwards, took him 5 days to return to [place] with parents frequently wiring him money. Had difficulty adhering to travel plans, seemed confused, and was communicating with parents via \"bizarre\" text messages (parents even filed a missing persons report at one point). He finally arrived home exhausted, not sharing much of events in [place], but slightly more consistent with baseline.  Cultural/ Social/ Spiritual Support- parents, [employer] card community, [employer] hemalatha  Early " "History/Education-  This patient first experienced mental health issues in elementary school with concerns for depression and attention difficulties (which improved in gifted classes) and he first received mental health care in high school for depression, falling behind in classes, and ADHD.  Graduated HS and attempted some college classes without success, however states he feels he was not ready and that things would be different if he attempted again.    FAMILY MENTAL HEALTH HX- Paternal grandfather: alcoholism; Maternal grandmother: \"long-standing mental health issues\"; Maternal Aunt: Anxiety when young; Maternal Aunt: Anxiety and depression     PAST PSYCH MED TRIALS   see EMR Problem List: Hx of psychiatric care    MEDICAL / SURGICAL HISTORY                                   Neurologic Hx [head injury etc]: none  Patient Active Problem List   Diagnosis     Schizoaffective disorder, bipolar type (H)     Hx of psychiatric care     Elevated liver enzymes     Fatty liver     High blood triglycerides     History of cannabis abuse     History of cocaine abuse       No past surgical history on file.     ALLERGY                                Sulfa drugs  MEDICATIONS                               Current Outpatient Prescriptions   Medication Sig Dispense Refill     lithium 600 MG capsule Take 2 capsules (1,200 mg) by mouth At Bedtime 60 capsule 1     metFORMIN (GLUCOPHAGE) 500 MG tablet Take 1000 mg (2 tablet) with breakfast and 500 mg with dinner for 7 days, if tolerating then increase to 1000 mg twice daily with meals. 120 tablet 1     modafinil (PROVIGIL) 100 MG tablet Take 1 tablet (100 mg) by mouth daily 30 tablet 1     OLANZapine (ZYPREXA) 20 MG tablet Take 1 tablet (20 mg) by mouth At Bedtime 30 tablet 1     VITALS                                                                                                                                  3, 3   BP (!) 134/97  Pulse 79  Wt 120.3 kg (265 lb 3.2 oz)  BMI " "35.23 kg/m2   MENTAL STATUS EXAM                                                                                      9, 14 cog gs   Alertness: alert  and oriented  Appearance: adequately groomed  Behavior/Demeanor: cooperative and calm, with fair  eye contact   Speech: regular rate and rhythm  Language: no obvious problem  Psychomotor: normal or unremarkable  Mood: \"Really good.\"  Affect: baseline blunting; was congruent to mood; was congruent to content  Thought Process/Associations: unremarkable  Thought Content:  Reports none;  Denies suicidal ideation, violent ideation and delusions  Perception:  Reports none;  Denies auditory hallucinations and visual hallucinations  Insight: fair  Judgment: fair  Cognition: (6) does  appear grossly intact; formal cognitive testing was not done  Gait and Station: unremarkable    LABS and DATA     AIMS:  5/17/2018 with total score of 0    PHQ9 TODAY = 2  PHQ-9 SCORE 4/12/2018 5/17/2018 6/14/2018   Total Score - - -   Total Score 6 4 5     ANTIPSYCHOTIC LABS  [glu, A1C, lipids (ivania LDL), liver enzymes, WBC, ANEU, Hgb, plts]  q12 mo  Recent Labs   Lab Test  10/30/17   1429  03/22/17   1340   09/30/16   1348   GLC  86  83   < >  83   A1C   --    --    --   4.6    < > = values in this interval not displayed.     Recent Labs   Lab Test  12/01/17   0948  10/30/17   1429   CHOL  177  165   TRIG  347*  339*   LDL  72  63   HDL  36*  34*     Recent Labs   Lab Test  12/01/17   0948  11/10/17   1534  11/02/17   1658   AST  31  35  49*   ALT  118*  108*  156*   ALKPHOS  63   --   68     Recent Labs   Lab Test  11/10/17   1534  10/30/17   1429   WBC  8.6  8.1   ANEU  5.0  4.8   HGB  15.6  15.7   PLT  255  241     DIAGNOSIS     Schizoaffective disorder, bipolar type, mre in remission re: mood and psychosis  ADHD, predominantly inattentive type    ASSESSMENT                                                                                                          m2, h3     TODAY: Jose Francisco Sommers " presents to [facility] for continued medication management of schizoaffective disorder and ADHD.  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  Denies hallmarks of psychosis or lauren, denies depressed mood, anxiety, suicidal ideation, or self destructive thoughts.  Elaborates a future oriented mindset and is interested in going back to school at some point once he determines what he would want to do with his degree.  We talked about his interest in seeing a vocational counselor to discuss possible career goals.  Overall patient endorses satisfaction with present regimen and no adjustments will be pursued at this time.    [place] PRESCRIPTION MONITORING PROGRAM [] was not checked today:  will be checked next visit.    PSYCHOTROPIC DRUG INTERACTIONS:   LITHIUM and OLANZAPINE may result in increased neurotoxic effects of D2-antagonists.  BUPROPION and OLANZAPINE may result in lowering of seizure threshold.   MANAGEMENT:  Monitoring for adverse effects, routine vitals and using lowest therapeutic dose of [Wellbutrin]     PLAN                                                                                                                       m2, h3     1) PSYCHOTROPIC MEDICATIONS:  - continue olanzapine 20 mg qHS  - continue lithium 1200 mg qHS  - continue metformin 1000 mg BID with meals  - continue modafinil 100 mg daily    2) THERAPY:    none    3) NEXT DUE:    Labs- Antipsychotic due 10/2018  EKG- PRN  Rating Scales- 6/2019    4) REFERRALS:    No Referrals needed     5) RTC: 4 weeks    6) CRISIS NUMBERS:   Provided routinely in [facility].   [facility] [phone] (clinic)    [phone] (after hours)  Urgent Care Adult Mental Health: Drop-in, 24/7 crisis line and Eugenio Alex Mobile Team [[phone]]   [facility]  [facility]   [phone]  [facility]   [phone]    TREATMENT RISK STATEMENT:  The risks, benefits, alternatives and potential adverse effects have been discussed and are understood by the pt. The pt understands the risks of using street drugs or alcohol. There are no medical contraindications, the pt agrees to treatment with the ability to do so. The pt knows to call the clinic for any problems or to access emergency care if needed.  Medical and substance use concerns are documented above.  Psychotropic drug interaction check was done, including changes made today.    PROVIDER: Chau Dennis, DO    Patient not staffed in clinic.  Note will be reviewed and signed by supervisor Dr. Tay.    Attestation:  I did not see this patient directly. I have reviewed the documentation and I agree with the resident's plan of care.   Joel Tay MD

## 2018-07-17 ASSESSMENT — PATIENT HEALTH QUESTIONNAIRE - PHQ9: SUM OF ALL RESPONSES TO PHQ QUESTIONS 1-9: 2

## 2018-07-26 DIAGNOSIS — F25.0 SCHIZOAFFECTIVE DISORDER, BIPOLAR TYPE (H): ICD-10-CM

## 2018-07-26 RX ORDER — OLANZAPINE 20 MG/1
20 TABLET ORAL AT BEDTIME
Qty: 30 TABLET | Refills: 0 | Status: SHIPPED | OUTPATIENT
Start: 2018-07-26 | End: 2018-08-17

## 2018-07-26 RX ORDER — LITHIUM CARBONATE 600 MG/1
1200 CAPSULE ORAL AT BEDTIME
Qty: 60 CAPSULE | Refills: 0 | Status: SHIPPED | OUTPATIENT
Start: 2018-07-26 | End: 2018-08-17

## 2018-07-26 NOTE — TELEPHONE ENCOUNTER
Medication requested:  lithium 600 MG  Last refilled: 6/16/18  Qty: 60  Medication requested: metFORMIN (GLUCOPHAGE) 500 MG  Last refilled: 6/20/18  Qty: 120  Medication requested: OLANZapine (ZYPREXA) 20 MG   Last refilled: 6/24/18  Qty: 30    Last seen: 77/13/18  RTC: 1 MOS  Cancel:  0  No-show: 0  Next appt: 8/17/18    Refill decision:  Refilled for 30 days per protocol

## 2018-08-17 ENCOUNTER — OFFICE VISIT (OUTPATIENT)
Dept: PSYCHIATRY | Facility: CLINIC | Age: 28
End: 2018-08-17
Attending: PSYCHIATRY & NEUROLOGY
Payer: COMMERCIAL

## 2018-08-17 VITALS
HEART RATE: 85 BPM | WEIGHT: 269.4 LBS | SYSTOLIC BLOOD PRESSURE: 137 MMHG | BODY MASS INDEX: 35.79 KG/M2 | DIASTOLIC BLOOD PRESSURE: 87 MMHG

## 2018-08-17 DIAGNOSIS — F25.0 SCHIZOAFFECTIVE DISORDER, BIPOLAR TYPE (H): ICD-10-CM

## 2018-08-17 DIAGNOSIS — F90.9 ATTENTION DEFICIT HYPERACTIVITY DISORDER (ADHD), UNSPECIFIED ADHD TYPE: Primary | ICD-10-CM

## 2018-08-17 PROCEDURE — G0463 HOSPITAL OUTPT CLINIC VISIT: HCPCS | Mod: ZF

## 2018-08-17 RX ORDER — OLANZAPINE 20 MG/1
20 TABLET ORAL AT BEDTIME
Qty: 30 TABLET | Refills: 1 | Status: SHIPPED | OUTPATIENT
Start: 2018-08-23 | End: 2018-10-22

## 2018-08-17 RX ORDER — LITHIUM CARBONATE 600 MG/1
1200 CAPSULE ORAL AT BEDTIME
Qty: 60 CAPSULE | Refills: 1 | Status: SHIPPED | OUTPATIENT
Start: 2018-08-23 | End: 2018-11-07

## 2018-08-17 RX ORDER — MODAFINIL 100 MG/1
100 TABLET ORAL DAILY
Qty: 30 TABLET | Refills: 0 | Status: SHIPPED | OUTPATIENT
Start: 2018-08-17 | End: 2018-08-29

## 2018-08-17 ASSESSMENT — PAIN SCALES - GENERAL: PAINLEVEL: NO PAIN (0)

## 2018-08-17 NOTE — PROGRESS NOTES
[facility] PSYCHIATRY CLINIC PROGRESS NOTE     CARE TEAM:  PCP-Issac Watson PA-C at [facility]   Specialty Providers- none   Therapist- none   Community  Team- none     Jose Francisco Sommers is a 27 year old male who prefers the name Manuel & pronouns he, him, his, himself.  The initial diagnostic evaluation was on 7/24/2015.  Date of the most recent transfer of care eval is 07/13/2018.     Pertinent Background:  Jose Francisco Sommers first experienced mental health issues in grade school and has received treatment for ADHD, depression, psychosis and substance use.  See last transfer evaluation for detailed history. Notably, this patient had FEP in 2015 in the setting of cannabis abuse and has been stable on olanzapine and lithium since that time. He has never lived independently and has only been slowly developing insight into his illness and past symptoms. GeneSight testing was completed noting patient is a poor CYP2D6 metabolizer.  Past records from Dr. Mike Powers were received in summer 2018 to assist with diagnostic clarity, however they were hand written notes that were illegible though accompanied by typed neuropsychology consult from [facility].  This documentation was unable to support a diagnosis of bipolar disorder at that time though noted potential Asperger's diagnosis and ADHD.  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  Most recently, modafinil was started in April 2018 for attention support to endorsed benefits and heretofore no concerns for exacerbation of mood or psychotic symptoms.     Psych critical item history includes psychosis [disorganization, possible catatonia], mutiple psychotropic trials, psych hosp [x1 at [facility], spring 2015], commitment and CD: alcohol and cannabis.    INTERIM HISTORY                                                                                                                  4, 4   The patient reports good treatment adherence.  History was provided by the patient who was a good historian.  The last visit ended with no change to the med regimen.   Since the last visit:   -Today Jose Francisco seems slightly more comfortable with this writer than he did at our initial visit.  Makes eye contact more frequently.  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  -In terms of school, says he may consider this for next year.  Still would like to have a clear goal prior to beginning classes.  -We talked about his slight weight gain.  He reiterated previous statements that it is challenging for him to maintain a healthy weight while taking olanzapine.  We specifically discussed his meals, and he said that typically he will get something at [employer] or fast food in the morning, graze on stuff in the kitchen for lunch, and then will have a proper dinner with his mom and dad.  Identified that switching to a lean protein-rich morning meal with a light lunch would be healthier, and he stated that he would do this.  -Spent much of the visit talking about his concern that the initial benefits from Provigil for his attention are wearing off somewhat.  States that he has had more days recently where it has been hard to focus.  -After further discussion of the risks of using stimulant medication given his diagnosis of schizoaffective bipolar type, reiterated that the use of Provigil carries a similar caution, however that it is in a different class and that at present he is taking the lowest dose.  Agreed to try increasing his dose to 200 mg.    RECENT SYMPTOMS:   DEPRESSION:  reports-anhedonia, low energy, hypersomnia, appetite changes, weight changes and poor concentration /memory;  DENIES- suicidal ideation, self-destructive thoughts, depressed mood, feeling worthless, excessive guilt, feeling hopeless, feeling trapped, excessive crying and  overwhelmed  KAMI/HYPOMANIA:  reports-none;  DENIES- increased energy, decreased sleep need, increased activity and grandiosity  PSYCHOSIS:  reports-none;  DENIES- delusions, auditory hallucinations and visual hallucinations  DYSREGULATION:  reports-none;  DENIES- suicidal ideation, violent ideation, SIB, mood dysregulation, impulsive, aggressive, irritable and physically agitated  PANIC ATTACK:  none   ANXIETY:  none  TRAUMA RELATED:  none  COMPULSIVE:  none  SLEEP:  patient reports excessive sleep    EATING DISORDER: no    RECENT SUBSTANCE USE:     ALCOHOL- rare      TOBACCO- quit using nicotine patch spring 2018     CAFFEINE- coffee/ tea [1 pot of coffee a day]  OPIOIDS- none         NARCAN KIT- N/A        CANNABIS- none            OTHER ILLICIT DRUGS- none      CURRENT SOCIAL HISTORY:  FINANCIAL SUPPORT- parents; 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  CHILDREN- none      LIVING SITUATION- lives with parents in [place]  SOCIAL/ SPIRITUAL SUPPORT- family, [email or web address] card community, Religious hemalatha     FEELS SAFE AT HOME- yes    MEDICAL ROS (2,10):  Reports fatigue, polyphagia      Denies sedation, headache, diaphoresis, dizziness, tremor, agitation, akathisia, unusual movements, Parkinsonian-type symptoms [shuffling gait, masked facies, stooped posture, speech change, writing change], unexpalined fever and sialorrhea    PSYCH and CD Critical Summary Points since July 2018           None    PAST PSYCH MED TRIALS   see EMR Problem List: Hx of psychiatric care    MEDICAL / SURGICAL HISTORY                                   Neurologic Hx- no     Patient Active Problem List   Diagnosis     Schizoaffective disorder, bipolar type (H)     Hx of psychiatric care     Elevated liver enzymes     Fatty liver     High blood triglycerides     History of cannabis abuse     History of cocaine abuse       Major Surgery- no    ALLERGY                                Sulfa drugs  MEDICATIONS                                Current Outpatient Prescriptions   Medication Sig Dispense Refill     lithium 600 MG capsule Take 2 capsules (1,200 mg) by mouth At Bedtime 60 capsule 0     metFORMIN (GLUCOPHAGE) 500 MG tablet Take 1000 mg (2 tablet) with breakfast and 500 mg with dinner for 7 days, if tolerating then increase to 1000 mg twice daily with meals. 120 tablet 0     modafinil (PROVIGIL) 100 MG tablet Take 1 tablet (100 mg) by mouth daily 30 tablet 1     OLANZapine (ZYPREXA) 20 MG tablet Take 1 tablet (20 mg) by mouth At Bedtime 30 tablet 0     VITALS                                                                                                           3, 3   /87  Pulse 85  Wt 122.2 kg (269 lb 6.4 oz)  BMI 35.79 kg/m2   MENTAL STATUS EXAM                                                                       9, 14 cog gs     Alertness: alert  and oriented  Appearance: well groomed  Behavior/Demeanor: cooperative and calm, with fair  eye contact   Speech: regular rate and rhythm  Language: no problems  Psychomotor: normal or unremarkable  Mood: mildly depressed  Affect: blunted; was congruent to mood; was congruent to content  Thought Process/Associations: unremarkable  Thought Content:  Reports none;  Denies suicidal ideation, violent ideation and delusions  Perception:  Reports none;  Denies auditory hallucinations and visual hallucinations  Insight: fair  Judgment: fair  Cognition: (6) oriented: time, person, and place  attention span: intact  concentration: intact  recent memory: intact  remote memory: intact  fund of knowledge: appropriate  Gait and Station: unremarkable    LABS and DATA     AIMS:  5/17/2018 with total score of 0    PHQ9 TODAY = 6  PHQ-9 SCORE 5/17/2018 6/14/2018 7/13/2018   Total Score - - -   Total Score 4 5 2       ANTIPSYCHOTIC LABS  [glu, A1C, lipids (ivania LDL), liver enzymes, WBC, ANEU, Hgb, plts]  q12 mo  Recent Labs   Lab Test  10/30/17   1429  03/22/17   1340   09/30/16   1348   GLC  86  83   <  >  83   A1C   --    --    --   4.6    < > = values in this interval not displayed.     Recent Labs   Lab Test  12/01/17   0948  10/30/17   1429   CHOL  177  165   TRIG  347*  339*   LDL  72  63   HDL  36*  34*     Recent Labs   Lab Test  12/01/17   0948  11/10/17   1534  11/02/17   1658   AST  31  35  49*   ALT  118*  108*  156*   ALKPHOS  63   --   68     Recent Labs   Lab Test  11/10/17   1534  10/30/17   1429   WBC  8.6  8.1   ANEU  5.0  4.8   HGB  15.6  15.7   PLT  255  241       DIAGNOSIS     Schizoaffective disorder, bipolar type, mood/psychosis in remission  ADHD, predominantly inattentive type    ASSESSMENT                                                                                                                   m2, h3     TODAY:  Jose Francisco Sommers presents to [facility] for continued medication management of schizoaffective disorder and ADHD.  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  He does endorse feeling that it has been slightly more difficult to concentrate, and the question has been posed as to whether benefits from Provigil may have plateaued or that he may require a higher dose, given that this medication was started at the lowest dose available, 100 mg.  The risks associated with use of stimulant medication in his condition, schizoaffective bipolar type, were reviewed, and the similarity of Provigil to those agents was highlighted.  We have agreed to trial a higher dose of Provigil, 200 mg, for his ADHD symptoms of inattention and difficulty with focus.  Have counseled the patient to monitor his mood and thoughts for reemergence of elevated mood state or psychotic disorganization.  He has agreed to do so.    [place] PRESCRIPTION MONITORING PROGRAM [] was checked today:  indicates expected use in accordance with treatment plan.    PSYCHOTROPIC DRUG INTERACTIONS:    LITHIUM and OLANZAPINE may result in increased neurotoxic effects of  D2-antagonists.  BUPROPION and OLANZAPINE may result in lowering of seizure threshold.   MANAGEMENT:  Monitoring for adverse effects, routine vitals, routine labs and patient is aware of risks     PLAN                                                                                                                                m2, h3     1) PSYCHOTROPIC MEDICATIONS:  - increase modafinil to 200 mg daily  - continue olanzapine 20 mg qHS  - continue lithium 1200 mg qHS  - continue Metformin 1000 mg BID with meals    2) THERAPY:  no    3) NEXT DUE:    Labs- Full AP labs due 11/2018, A1C due next visit  EKG- PRN  Rating Scales- AIMS due 5/2019    4) REFERRALS:  nutrition     5) RTC: 4 weeks    6) CRISIS NUMBERS:   Provided routinely in [facility].   [facility] [phone] (clinic)    [phone] (after hours)  CRISIS TEXT LINE: Text [phone] from anywhere in [place], anytime, any crisis 24/7;  OR SEE www.crisistextline.org    TREATMENT RISK STATEMENT:  The risks, benefits, alternatives and potential adverse effects have been discussed and are understood by the pt. The pt understands the risks of using street drugs or alcohol. There are no medical contraindications, the pt agrees to treatment with the ability to do so. The pt knows to call the clinic for any problems or to access emergency care if needed.  Medical and substance use concerns are documented above.  Psychotropic drug interaction check was done, including changes made today.     PSYCHIATRY CLINIC INDIVIDUAL PSYCHOTHERAPY NOTE                                                [16]   Start time- N/A        End time- N/A  Date last reviewed - 08/17/18       Date next due - 11/15/18 (or 12 months if Medicare)     Subjective: This supportive psychotherapy session addressed issues related to goals of therapy, current stressors, life stressors, work, family of origin, school  and health.  Patient's reaction: Preparatory in the context of mental status appropriate for  ambulatory setting.  Progress: fair.  Plan: RTC 4 weeks  Psychotherapy services during this visit included  myself and the patient.   TREATMENT  PLAN          SYMPTOMS;PROBLEMS   MEASURABLE GOALS;    FUNCTIONAL IMPROVEMENT INTERVENTIONS;    GAINS MADE DISCHARGE CRITERIA   Nanette/Hypomania: increased activity and psychotic disorganization   stay free of drug abuse [cannabis], learn more about illness and take medications as prescribed on a daily basis Supportive and cognitive psychotherapeutic techniques marked symptom improvement and achievement of functional goals   Psychosocial: occupational / vocational stress and nutrition/exercise   exercise for 20 minutes 3-7 days a week , improve nutrition and continue to plan for future education/employment Supportive and cognitive psychotherapeutic techniques marked symptom improvement and achievement of functional goals     PROVIDER:  Chau Dennis, DO    Patient staffed in clinic with Dr. Ventura who will sign the note.  Supervisor is Dr. Tay.  I saw the patient with the resident, and participated in key portions of the service, including the mental status examination and developing the plan of care. I reviewed key portions of the history with the resident. I agree with the findings and plan as documented in this note.    Yuko Ventura

## 2018-08-17 NOTE — PATIENT INSTRUCTIONS
Thank you for coming to the PSYCHIATRY CLINIC.    Lab Testing:  If you had lab testing today and your results are reassuring or normal they will be mailed to you or sent through [facility] within 7 days.   If the lab tests need quick action we will call you with the results.  The phone number we will call with results is # [phone] (home) . If this is not the best number please call our clinic and change the number.    Medication Refills:  If you need any refills please call your pharmacy and they will contact us. Our fax number for refills is [phone]. Please allow three business for refill processing.   If you need to  your refill at a new pharmacy, please contact the new pharmacy directly. The new pharmacy will help you get your medications transferred.     Scheduling:  If you have any concerns about today's visit or wish to schedule another appointment please call our office during normal business hours [phone] (8-5:00 M-F)    Contact Us:  Please call [phone] during business hours (8-5:00 M-F).  If after clinic hours, or on the weekend, please call  [phone].    Financial Assistance [phone]  [employer] Billing [phone]  [employer] Billing [phone]  Medical Records [phone]      MENTAL HEALTH CRISIS NUMBERS:  [facility]:   [facility] - [phone]   Crisis Residence [facility] - [phone]   Walk-In Counseling [facility] [phone]   COPE 24/7 [facility] Mobile Team for Adults - [[phone]]; Child - [[phone]]     Crisis Connection - [phone]     Psychiatric:   [facility] - [phone]   Walk-in counseling [facility] - [phone]   Walk-in counseling [facility] - [phone]   Crisis Residence [facility] - [phone]   Urgent Care Adult Mental Health:   --Drop-in, 24/7 crisis line, and [place] Mobile Team [[phone]]    CRISIS TEXT  LINE: Text 741-611 from anywhere, anytime, any crisis 24/7;    OR SEE www.crisistextline.org     Poison Control Center - [phone]    CHILD: [facility] needs assessment team - [phone]     [facility] - [phone]; or [facility] - [phone]    If you have a medical emergency please call 911or go to the nearest ER.                    _____________________________________________    Again thank you for choosing PSYCHIATRY CLINIC and please let us know how we can best partner with you to improve you and your family's health.  You may be receiving a survey in the mail regarding this appointment. We would love to have your feedback, both positive and negative, so please fill out the survey and return it using the provided envelope. The survey is done by an external company, so your answers are anonymous.

## 2018-08-17 NOTE — MR AVS SNAPSHOT
After Visit Summary   8/17/2018    Jose Francisco Sommers    MRN: [id number]           Patient Information     Date Of Birth          1990        Visit Information        Provider Department      8/17/2018 3:00 PM Chau Dennis MD [facility]        Today's Diagnoses     Attention deficit hyperactivity disorder (ADHD), unspecified ADHD type    -  1      Care Instructions    Thank you for coming to the PSYCHIATRY CLINIC.    Lab Testing:  If you had lab testing today and your results are reassuring or normal they will be mailed to you or sent through [employer] within 7 days.   If the lab tests need quick action we will call you with the results.  The phone number we will call with results is # [phone] (home) . If this is not the best number please call our clinic and change the number.    Medication Refills:  If you need any refills please call your pharmacy and they will contact us. Our fax number for refills is [phone]. Please allow three business for refill processing.   If you need to  your refill at a new pharmacy, please contact the new pharmacy directly. The new pharmacy will help you get your medications transferred.     Scheduling:  If you have any concerns about today's visit or wish to schedule another appointment please call our office during normal business hours [phone] (8-5:00 M-F)    Contact Us:  Please call [phone] during business hours (8-5:00 M-F).  If after clinic hours, or on the weekend, please call  [phone].    Financial Assistance [phone]  [facility] [phone]  [facility] [phone]  Medical Records [phone]      MENTAL HEALTH CRISIS NUMBERS:  [facility]:   [facility] - [phone]   Crisis Residence [facility] - [phone]   Walk-In Counseling Center [facility] - [phone]   COPE 24/7 [place] Mobile Team for Adults - [[phone]]; Child - [[phone]]      Crisis Connection - [phone]     [facility] - [phone]   Walk-in counseling [facility] - [facility] - [phone]   Walk-in counseling [facility] - [phone]   Crisis Residence [facility] - [phone]   Urgent Care Adult Mental Health:   --Drop-in, 24/7 crisis line, and [place] Mobile Team [[phone]]    CRISIS TEXT LINE: Text 045-007 from anywhere, anytime, any crisis 24/7;    OR SEE www.crisistextline.org     Poison Control Center - [phone]    CHILD: [facility] needs assessment team - [phone]     [employer] [facility] - 1-691.788.1848; or [facility] - 1-429.695.4839    If you have a medical emergency please call 911or go to the nearest ER.                    _____________________________________________    Again thank you for choosing PSYCHIATRY CLINIC and please let us know how we can best partner with you to improve you and your family's health.  You may be receiving a survey in the mail regarding this appointment. We would love to have your feedback, both positive and negative, so please fill out the survey and return it using the provided envelope. The survey is done by an external company, so your answers are anonymous.             Follow-ups after your visit        Your next 10 appointments already scheduled     Sep 21, 2018  3:00 PM CDT   Adult Med Follow UP with Chau Dennis MD   Psychiatry Clinic ([facility])    [address] [address] [phone]              Who to contact     Please call your clinic at [phone] to:    Ask questions about your health    Make or cancel appointments    Discuss your medicines    Learn about your test results    Speak to your doctor            Additional Information About Your Visit        MyChart Information     [employer] gives you secure access to your electronic health  record. If you see a primary care provider, you can also send messages to your care team and make appointments. If you have questions, please call your primary care clinic.  If you do not have a primary care provider, please call [phone] and they will assist you.      [employer] is an electronic gateway that provides easy, online access to your medical records. With [employer], you can request a clinic appointment, read your test results, renew a prescription or communicate with your care team.     To access your existing account, please contact your [facility] or call [phone] for assistance.        Care EveryWhere ID     This is your Care EveryWhere ID. This could be used by other organizations to access your [place] medical records  [id number]        Your Vitals Were     Pulse BMI (Body Mass Index)                85 35.79 kg/m2           Blood Pressure from Last 3 Encounters:   08/17/18 137/87   07/13/18 (!) 134/97   06/14/18 132/87    Weight from Last 3 Encounters:   08/17/18 122.2 kg (269 lb 6.4 oz)   07/13/18 120.3 kg (265 lb 3.2 oz)   06/14/18 122.1 kg (269 lb 3.2 oz)              Today, you had the following     No orders found for display         Today's Medication Changes          These changes are accurate as of 8/17/18  4:19 PM.  If you have any questions, ask your nurse or doctor.               These medicines have changed or have updated prescriptions.        Dose/Directions    * modafinil 100 MG tablet   Commonly known as:  PROVIGIL   This may have changed:  Another medication with the same name was added. Make sure you understand how and when to take each.   Used for:  Attention deficit hyperactivity disorder (ADHD), predominantly inattentive type, Schizoaffective disorder, bipolar type (H)   Changed by:  Chau Dennis MD        Dose:  100 mg   Take 1 tablet (100 mg) by mouth daily   Quantity:  30 tablet   Refills:  1       * modafinil 100 MG tablet    Commonly known as:  PROVIGIL   This may have changed:  You were already taking a medication with the same name, and this prescription was added. Make sure you understand how and when to take each.   Used for:  Attention deficit hyperactivity disorder (ADHD), unspecified ADHD type   Changed by:  Chau Dennis MD        Dose:  100 mg   Take 1 tablet (100 mg) by mouth daily along with another 1 tablet (100 mg) for a total daily dose of 200 mg.   Quantity:  30 tablet   Refills:  0       * Notice:  This list has 2 medication(s) that are the same as other medications prescribed for you. Read the directions carefully, and ask your doctor or other care provider to review them with you.         Where to get your medicines      Some of these will need a paper prescription and others can be bought over the counter.  Ask your nurse if you have questions.     Bring a paper prescription for each of these medications     modafinil 100 MG tablet                Primary Care Provider    Clinic Provider       No address on file        Equal Access to Services     SHELLEY SOOD : Santana Castro, rodger jurado, sury moyaalluigi palma, chandan flores . So [facility] [phone].    ATENCIÓN: Si habla español, tiene a jacob disposición servicios gratuitos de asistencia lingüística. Llame al [phone].    We comply with applicable federal civil rights laws and [place] laws. We do not discriminate on the basis of race, color, national origin, age, disability, sex, sexual orientation, or gender identity.            Thank you!     Thank you for choosing [facility]ATRY CLINIC  for your care. Our goal is always to provide you with excellent care. Hearing back from our patients is one way we can continue to improve our services. Please take a few minutes to complete the written survey that you may receive in the mail after your visit with us. Thank you!             Your Updated Medication List -  Protect others around you: Learn how to safely use, store and throw away your medicines at www.disposemymeds.org.          This list is accurate as of 8/17/18  4:19 PM.  Always use your most recent med list.                   Brand Name Dispense Instructions for use Diagnosis    lithium 600 MG capsule     60 capsule    Take 2 capsules (1,200 mg) by mouth At Bedtime    Schizoaffective disorder, bipolar type (H)       metFORMIN 500 MG tablet    GLUCOPHAGE    120 tablet    Take 1000 mg (2 tablet) with breakfast and 500 mg with dinner for 7 days, if tolerating then increase to 1000 mg twice daily with meals.    Schizoaffective disorder, bipolar type (H)       * modafinil 100 MG tablet    PROVIGIL    30 tablet    Take 1 tablet (100 mg) by mouth daily    Attention deficit hyperactivity disorder (ADHD), predominantly inattentive type, Schizoaffective disorder, bipolar type (H)       * modafinil 100 MG tablet    PROVIGIL    30 tablet    Take 1 tablet (100 mg) by mouth daily along with another 1 tablet (100 mg) for a total daily dose of 200 mg.    Attention deficit hyperactivity disorder (ADHD), unspecified ADHD type       OLANZapine 20 MG tablet    zyPREXA    30 tablet    Take 1 tablet (20 mg) by mouth At Bedtime    Schizoaffective disorder, bipolar type (H)       * Notice:  This list has 2 medication(s) that are the same as other medications prescribed for you. Read the directions carefully, and ask your doctor or other care provider to review them with you.

## 2018-08-18 ASSESSMENT — PATIENT HEALTH QUESTIONNAIRE - PHQ9: SUM OF ALL RESPONSES TO PHQ QUESTIONS 1-9: 6

## 2018-08-20 NOTE — TELEPHONE ENCOUNTER
- Received a fax from [facility]   - Patients Modafinil 100 mg tab was denied   - Copy faxed to PA team     Call to pharmacy   - Patient has been filling the medication   - Last filled in July   - Will route to PA team

## 2018-08-21 ENCOUNTER — TELEPHONE (OUTPATIENT)
Dept: PSYCHIATRY | Facility: CLINIC | Age: 28
End: 2018-08-21

## 2018-08-21 NOTE — TELEPHONE ENCOUNTER
PRIOR AUTHORIZATION DENIED    Medication: modafinil (PROVIGIL) 100 MG tablet - denied    Denial Date: 8/21/2018    Denial Rational: script is denied because pt does not have an approved diagnosis, approved diagnosis's listed below            Appeal Information:

## 2018-08-21 NOTE — TELEPHONE ENCOUNTER
Prior Auth - Medication (modafinil (PROVIGIL) 100 MG tablet - denied)            Faviola Carrasco   You 15 minutes ago (2:50 PM)                 Hi, script is denied because pt does not have an approved diagnosis, approved diagnosis's are listed in documentation. If Dr would like to appeal, please have  submit a letter of medical necessity to PA team.     Thank you,   Faviola (Routing comment)                  - Will route to provider for further direction

## 2018-08-21 NOTE — TELEPHONE ENCOUNTER
Prior Authorization Retail Medication Request    Medication/Dose: modafinil (PROVIGIL) 100 MG tablet- total dose 200 mg daily  ICD code (if different than what is on RX):  Attention deficit hyperactivity disorder (ADHD), unspecified ADHD type [F90.9]  - Primary    Previously Tried and Failed: Wellburtrin, Buspar, Neurontin and Trazodone    Rationale: Patient has been on Provigil 200 mg daily medication for one month and is currently stable on the medication at the current dose. Since starting the medication patient reports-hypersomnia (improving with Provigil) and  as well as appetite. Patient endorses interval mood stability, and states that he feels Provigil has been of significant benefit to patient with gains made in terms of concentration and engagement with constructive activities. Patient has had less negative effects and increase helpful with symptoms without any side effects. It is vital that patient be able to continue on this medication in order for her health. A change in medication or dose would place patient at liane risk for decompensation and possible hospitalization.     Insurance Name:  [employer]  Insurance ID:  [id number]  BIN: [id number]  PCN: [id number]      Pharmacy Information (if different than what is on RX)  Name:  "[id number]  Phone:  [phone]

## 2018-08-21 NOTE — TELEPHONE ENCOUNTER
Prior Auth - Medication (modafinil (PROVIGIL) 100 MG tablet - denied)            Chau Dennis MD   You 17 minutes ago (3:14 PM)                 Kavin Madrigal,     Insurance has consistently declined to pay for this medication - Jose Francisco and his parents have/are prepared to pay out of pocket.     Chau Jimenez (Routing comment)                  - Call to patient   - Left a message, requesting to call the clinic   - Clinic number provided

## 2018-08-21 NOTE — TELEPHONE ENCOUNTER
Central Prior Authorization Team   Phone: [phone]    PA Initiation    Medication: modafinil (PROVIGIL) 100 MG tablet  Insurance Company: [employer] - Phone [phone] Fax [phone]  Pharmacy Filling the Rx: [facility] [id number] - [place], [place] - [address]  Filling Pharmacy Phone: [phone]  Filling Pharmacy Fax: [phone]  Start Date: 8/21/2018

## 2018-08-22 NOTE — TELEPHONE ENCOUNTER
- Writer attempted to call patient   - Patient notified of PA denied for Modafinil 100 mg tab   - Patient is willing to pay out of pocket for the medication and have found a pharmacy that is affordable   - PA denied- packet placed in scanning

## 2018-08-28 ENCOUNTER — TELEPHONE (OUTPATIENT)
Dept: PSYCHIATRY | Facility: CLINIC | Age: 28
End: 2018-08-28

## 2018-08-28 DIAGNOSIS — F90.9 ATTENTION DEFICIT HYPERACTIVITY DISORDER (ADHD), UNSPECIFIED ADHD TYPE: ICD-10-CM

## 2018-08-28 NOTE — TELEPHONE ENCOUNTER
- Writer received fax from [email or web address] requesting a new script for Modafinil   - Currently patient prescribed Modafinil 100 mg tab- Take 1 tablet (100 mg) by mouth daily along with another 1 tablet (100 mg) for a total daily dose of 200 mg. - Oral  - Pharmacy is not able to fill with these directions   - Will route to provider if OK to change the direction to Modafinil 200 mg daily

## 2018-08-29 RX ORDER — MODAFINIL 200 MG/1
200 TABLET ORAL DAILY
Qty: 30 TABLET | Refills: 0 | Status: SHIPPED | OUTPATIENT
Start: 2018-08-29 | End: 2018-09-21

## 2018-08-29 NOTE — TELEPHONE ENCOUNTER
Transplant Medication Clarification (Modafinil 100 mg tab )            Chau Dennis MD   You 15 hours ago (5:28 PM)                 Kavin Madrigal,     Yes, I think it will be fine to switch this to a 200 mg tab instead of two 100 mg tabs.  Dr. Ibrahim will be the attending tomorrow who will need to sign.  I assume the pharmacy will destroy the other two scripts?     Thanks for your help,   Chau (Routing comment)                  - New script printed under Dr. Ibrahim for Modafinil 200 mg tab- take one tab daily   - Script placed in providers folder to be singed   - Med tab changed to reflect this

## 2018-08-29 NOTE — TELEPHONE ENCOUNTER
- Received signed script from provider   - Call to patient at [phone]  - No answer at number on epic   - [facility], notifying the scrip is ready and need to be swapped with the old scrip   - Clinic number provided for further directions

## 2018-08-30 NOTE — TELEPHONE ENCOUNTER
- Writer received old scrip from patient   - Script VOID and placed in the shredder   - No further action needed by the writer

## 2018-09-21 ENCOUNTER — OFFICE VISIT (OUTPATIENT)
Dept: PSYCHIATRY | Facility: CLINIC | Age: 28
End: 2018-09-21
Attending: PSYCHIATRY & NEUROLOGY
Payer: COMMERCIAL

## 2018-09-21 VITALS
SYSTOLIC BLOOD PRESSURE: 128 MMHG | HEART RATE: 80 BPM | WEIGHT: 266.8 LBS | BODY MASS INDEX: 35.44 KG/M2 | DIASTOLIC BLOOD PRESSURE: 86 MMHG

## 2018-09-21 DIAGNOSIS — F90.9 ATTENTION DEFICIT HYPERACTIVITY DISORDER (ADHD), UNSPECIFIED ADHD TYPE: ICD-10-CM

## 2018-09-21 PROCEDURE — G0463 HOSPITAL OUTPT CLINIC VISIT: HCPCS | Mod: ZF

## 2018-09-21 RX ORDER — MODAFINIL 200 MG/1
200 TABLET ORAL DAILY
Qty: 30 TABLET | Refills: 0 | Status: CANCELLED | OUTPATIENT
Start: 2018-09-21

## 2018-09-21 RX ORDER — MODAFINIL 200 MG/1
200 TABLET ORAL DAILY
Qty: 30 TABLET | Refills: 0 | Status: SHIPPED | OUTPATIENT
Start: 2018-09-26 | End: 2018-11-01

## 2018-09-21 ASSESSMENT — PAIN SCALES - GENERAL: PAINLEVEL: NO PAIN (0)

## 2018-09-21 NOTE — MR AVS SNAPSHOT
After Visit Summary   9/21/2018    Jose Francisco Sommers    MRN: [id number]           Patient Information     Date Of Birth          1990        Visit Information        Provider Department      9/21/2018 3:00 PM Chau Dennis MD Psychiatry Clinic        Today's Diagnoses     Attention deficit hyperactivity disorder (ADHD), unspecified ADHD type           Follow-ups after your visit        Follow-up notes from your care team     Return in about 1 month (around 10/21/2018).      Your next 10 appointments already scheduled     Nov 07, 2018  3:30 PM [facility]   Adult Med Follow UP with Chau Dennis MD   Psychiatry Clinic ([facility])    [address]  [address] [id number] [phone]              Who to contact     Please call your clinic at [phone] to:    Ask questions about your health    Make or cancel appointments    Discuss your medicines    Learn about your test results    Speak to your doctor            Additional Information About Your Visit        MyChart Information     [employer] gives you secure access to your electronic health record. If you see a primary care provider, you can also send messages to your care team and make appointments. If you have questions, please call your primary care clinic.  If you do not have a primary care provider, please call [phone] and they will assist you.      [employer] is an electronic gateway that provides easy, online access to your medical records. With [employer], you can request a clinic appointment, read your test results, renew a prescription or communicate with your care team.     To access your existing account, please contact your [facility] or call [phone] for assistance.        Care EveryWhere ID     This is your Care EveryWhere ID. This could be used by other organizations to access your [place] medical records  [id number]         Your Vitals Were     Pulse BMI (Body Mass Index)                80 35.44 kg/m2           Blood Pressure from Last 3 Encounters:   09/21/18 128/86   08/17/18 137/87   07/13/18 (!) 134/97    Weight from Last 3 Encounters:   09/21/18 121 kg (266 lb 12.8 oz)   08/17/18 122.2 kg (269 lb 6.4 oz)   07/13/18 120.3 kg (265 lb 3.2 oz)              Today, you had the following     No orders found for display         Where to get your medicines      Some of these will need a paper prescription and others can be bought over the counter.  Ask your nurse if you have questions.     Bring a paper prescription for each of these medications     modafinil 200 MG tablet          Primary Care Provider    Clinic Provider       No address on file        Equal Access to Services     SHELLEY SOOD : Santana Castro, rodger jurado, sury palma, chandan welch. So [facility] [phone].    ATENCIÓN: Si habla español, tiene a jacob disposición servicios gratuitos de asistencia lingüística. Llame al [phone].    We comply with applicable federal civil rights laws and [place] laws. We do not discriminate on the basis of race, color, national origin, age, disability, sex, sexual orientation, or gender identity.            Thank you!     Thank you for choosing PSYCHIATRY CLINIC  for your care. Our goal is always to provide you with excellent care. Hearing back from our patients is one way we can continue to improve our services. Please take a few minutes to complete the written survey that you may receive in the mail after your visit with us. Thank you!             Your Updated Medication List - Protect others around you: Learn how to safely use, store and throw away your medicines at www.disposemymeds.org.          This list is accurate as of 9/21/18 11:59 PM.  Always use your most recent med list.                   Brand Name Dispense Instructions for use Diagnosis    lithium 600 MG  capsule     60 capsule    Take 2 capsules (1,200 mg) by mouth At Bedtime    Schizoaffective disorder, bipolar type (H)       metFORMIN 500 MG tablet    GLUCOPHAGE    120 tablet    Take 2 tablets (1,000 mg) by mouth 2 times daily (with meals)    Schizoaffective disorder, bipolar type (H)       modafinil 200 MG tablet    PROVIGIL    30 tablet    Take 1 tablet (200 mg) by mouth daily    Attention deficit hyperactivity disorder (ADHD), unspecified ADHD type       OLANZapine 20 MG tablet    zyPREXA    30 tablet    Take 1 tablet (20 mg) by mouth At Bedtime    Schizoaffective disorder, bipolar type (H)

## 2018-09-21 NOTE — NURSING NOTE
Chief Complaint   Patient presents with     Recheck Medication     Attention deficit hyperactivity disorder (ADHD), unspecified ADHD type

## 2018-09-21 NOTE — PROGRESS NOTES
[facility] PSYCHIATRY CLINIC PROGRESS NOTE     CARE TEAM:  PCP-Issac Watson PA-C at [facility]   Specialty Providers- none   Therapist- none   Community  Team- none     Jose Francisco Sommers is a 27 year old male who prefers the name Manuel & pronouns he, him, his, himself.  The initial diagnostic evaluation was on 7/24/2015.  Date of the most recent transfer of care eval is 07/13/2018.     Pertinent Background:  Jose Francisco Sommers first experienced mental health issues in grade school and has received treatment for ADHD, depression, psychosis and substance use.  See last transfer evaluation for detailed history. Notably, this patient had FEP in 2015 in the setting of cannabis abuse and has been stable on olanzapine and lithium since that time. He has never lived independently and has only been slowly developing insight into his illness and past symptoms. GeneSight testing was completed noting patient is a poor CYP2D6 metabolizer.  Past records from Dr. Mike Powers were received in summer 2018 to assist with diagnostic clarity, however they were hand written notes that were illegible though accompanied by typed neuropsychology consult from [facility].  This documentation was unable to support a diagnosis of bipolar disorder at that time though noted potential Asperger's diagnosis and ADHD.  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  Most recently, modafinil was started in April 2018 for attention support to endorsed benefits and heretofore no concerns for exacerbation of mood or psychotic symptoms.     Psych critical item history includes psychosis [disorganization, possible catatonia], mutiple psychotropic trials, psych hosp [x1 at [facility], spring 2015], commitment and CD: alcohol and cannabis.    INTERIM HISTORY                                                                                                              "    4, 4   The patient reports good treatment adherence.  History was provided by the patient who was a good historian.  The last visit ended with the following med change: increase of modafinil to 200 mg daily.   Since the last visit:   -Jose Francisco states that he has had a good response to the increased dose of Provigil.  Says that \"it feels very normal,\" and that he has been finding it easier to begin an active day earlier in the day, and that this has helped him on the back end to wake up at a decent hour the next day.  -He states that his mother has been pleased with his response to the medication and has endorsed no concerns regarding breakthrough/emergent kami.  Note, in the past Jose Francisco's mother has been a keen and critically important observer of the patient's psychiatric status.  -Jose Francisco himself also affirms no subjective elevated mood, disorganization/paranoia or other psychotic hallmarks.  -He also says that he has stopped eating out, and as a result has lost a few pounds.  -States that overall the higher dose of the medication has resulted in improved mood, sleep and attention.  -Denies SI/SIB thoughts, violent ideation or other hallmarks of dangerousness.    RECENT SYMPTOMS:   DEPRESSION:  reports-low energy, hypersomnia, appetite changes and poor concentration /memory - all improved;  DENIES- suicidal ideation, self-destructive thoughts, depressed mood, feeling worthless, excessive guilt, feeling hopeless, feeling trapped, excessive crying and overwhelmed  KAMI/HYPOMANIA:  reports-none;  DENIES- increased energy, decreased sleep need, increased activity and grandiosity  PSYCHOSIS:  reports-none;  DENIES- delusions, auditory hallucinations and visual hallucinations  DYSREGULATION:  reports-none;  DENIES- suicidal ideation, violent ideation, SIB, mood dysregulation, impulsive, aggressive, irritable and physically agitated  PANIC ATTACK:  none   ANXIETY:  none  TRAUMA RELATED:  none  COMPULSIVE:  " none  SLEEP:  improvement in excessive sleep  EATING DISORDER: no    RECENT SUBSTANCE USE:     ALCOHOL- rare      TOBACCO- quit using nicotine patch spring 2018     CAFFEINE- coffee/ tea [1 pot of coffee a day]  OPIOIDS- none         NARCAN KIT- N/A        CANNABIS- none            OTHER ILLICIT DRUGS- none      CURRENT SOCIAL HISTORY:  FINANCIAL SUPPORT- parents; 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  CHILDREN- none      LIVING SITUATION- lives with parents in [place]  SOCIAL/ SPIRITUAL SUPPORT- family, [employer] community, [employer]     FEELS SAFE AT HOME- yes    MEDICAL ROS (2,10):  Reports fatigue, polyphagia, both improved      Denies sedation, headache, diaphoresis, dizziness, tremor, agitation, akathisia, unusual movements, Parkinsonian-type symptoms [shuffling gait, masked facies, stooped posture, speech change, writing change], unexpalined fever and sialorrhea    PSYCH and CD Critical Summary Points since July 2018           None    PAST PSYCH MED TRIALS   see EMR Problem List: Hx of psychiatric care    MEDICAL / SURGICAL HISTORY                                   Neurologic Hx- no     Patient Active Problem List   Diagnosis     Schizoaffective disorder, bipolar type (H)     Hx of psychiatric care     Elevated liver enzymes     Fatty liver     High blood triglycerides     History of cannabis abuse     History of cocaine abuse       Major Surgery- no    ALLERGY                                Sulfa drugs     MEDICATIONS                               Current Outpatient Prescriptions   Medication Sig Dispense Refill     lithium 600 MG capsule Take 2 capsules (1,200 mg) by mouth At Bedtime 60 capsule 1     metFORMIN (GLUCOPHAGE) 500 MG tablet Take 2 tablets (1,000 mg) by mouth 2 times daily (with meals) 120 tablet 1     modafinil (PROVIGIL) 200 MG tablet Take 1 tablet (200 mg) by mouth daily 30 tablet 0     OLANZapine (ZYPREXA) 20 MG tablet Take 1 tablet (20 mg) by mouth At Bedtime 30 tablet  1     VITALS                                                                                                           3, 3   /86  Pulse 80  Wt 121 kg (266 lb 12.8 oz)  BMI 35.44 kg/m2     MENTAL STATUS EXAM                                                                       9, 14 cog gs     Alertness: alert  and oriented  Appearance: well groomed  Behavior/Demeanor: cooperative and calm, with fair  eye contact   Speech: regular rate and rhythm  Language: no problems  Psychomotor: normal or unremarkable  Mood: description consistent with euthymia  Affect: blunted; was congruent to mood; was congruent to content  Thought Process/Associations: unremarkable  Thought Content:  Reports none;  Denies suicidal ideation, violent ideation and delusions  Perception:  Reports none;  Denies auditory hallucinations and visual hallucinations  Insight: fair  Judgment: fair  Cognition: (6) oriented: time, person, and place  attention span: intact  concentration: intact  recent memory: intact  remote memory: intact  fund of knowledge: appropriate  Gait and Station: unremarkable    LABS and DATA     AIMS:  5/17/2018 with total score of 0    PHQ9 TODAY = 6  PHQ-9 SCORE 6/14/2018 7/13/2018 8/17/2018   Total Score - - -   Total Score 5 2 6     ANTIPSYCHOTIC LABS  [glu, A1C, lipids (ivania LDL), liver enzymes, WBC, ANEU, Hgb, plts]  q12 mo  Recent Labs   Lab Test  10/30/17   1429  03/22/17   1340   09/30/16   1348   GLC  86  83   < >  83   A1C   --    --    --   4.6    < > = values in this interval not displayed.     Recent Labs   Lab Test  12/01/17   0948  10/30/17   1429   CHOL  177  165   TRIG  347*  339*   LDL  72  63   HDL  36*  34*     Recent Labs   Lab Test  12/01/17   0948  11/10/17   1534  11/02/17   1658   AST  31  35  49*   ALT  118*  108*  156*   ALKPHOS  63   --   68     Recent Labs   Lab Test  11/10/17   1534  10/30/17   1429   WBC  8.6  8.1   ANEU  5.0  4.8   HGB  15.6  15.7   PLT  255  241     LITHIUM LABS      [level, renal, SG, TSH, WBC]    q6 mo  Recent Labs   Lab Test  12/01/17   0948  11/10/17   1534  10/30/17   1429  03/22/17   1340   LITHIUM  0.64  0.49*  0.54*  0.6     Recent Labs   Lab Test  10/30/17   1429  03/22/17   1340  03/08/17   0953  09/30/16   1348   CR  1.05  1.09  0.94  0.90   GFRESTIMATED  85  82  >90  Non  GFR Calc    >90  Non  GFR Calc     NA  141  143  140  140   POTASSIUM  4.2  3.9  3.7  3.8   ADONAY  9.2  9.3  9.5  9.0     Recent Labs   Lab Test  10/30/17   1429  02/09/16   1247   SG  1.015  1.005     Recent Labs   Lab Test  11/10/17   1534  10/30/17   1429  03/22/17   1340  03/08/17   0953   TSH  2.09  2.92  3.03  3.36     Recent Labs   Lab Test  11/10/17   1534  10/30/17   1429  03/22/17   1340  03/08/17   0953   WBC  8.6  8.1  8.3  8.5   ANEU  5.0  4.8  5.5  5.1     DIAGNOSIS     Schizoaffective disorder, bipolar type, mood/psychosis in remission  ADHD, predominantly inattentive type    ASSESSMENT                                                                                                                   m2, h3     TODAY:  Jose Francisco Sommers presents to the [facility] for continued medication management of schizoaffective disorder and ADHD.  Patient endorses improved mood, sleep pattern and attention with increase in Provigil to 200 mg from 100 mg after our last appointment.  Denies subjective elevated mood or psychotic symptoms, and states that his mother has also not observed any concerning features since increasing the dose.  Denies SI/SIB thoughts, violent ideation, or other hallmarks of dangerousness.  Also states that his diet has improved and that he has lost several pounds.  Overall, he endorses satisfaction/positive benefit from the dose increase of Provigil and we will perpetuate this along with the rest of his regimen.  In the event his improved eating/weight loss stalls, we may consider a nutrition consult.  Finally, patient will be due for  lithium and antipsychotic labs at our next visit.    [place] PRESCRIPTION MONITORING PROGRAM [] was checked today:  indicates expected use in accordance with treatment plan.    PSYCHOTROPIC DRUG INTERACTIONS:    LITHIUM and OLANZAPINE may result in increased neurotoxic effects of D2-antagonists.  BUPROPION and OLANZAPINE may result in lowering of seizure threshold.   MANAGEMENT:  Monitoring for adverse effects, routine vitals, routine labs and patient is aware of risks     PLAN                                                                                                                                m2, h3     1) PSYCHOTROPIC MEDICATIONS:  - continue modafinil 200 mg daily  - continue olanzapine 20 mg qHS  - continue lithium 1200 mg qHS  - continue Metformin 1000 mg BID with meals    2) THERAPY:  no    3) NEXT DUE:    Labs- Full AP and [place] labs next visit  EKG- PRN  Rating Scales- AIMS due 5/2019    4) REFERRALS: consider nutrition     5) RTC: 4 weeks    6) CRISIS NUMBERS:   Provided routinely in [facility].   [facility] [phone] (clinic)    [phone] (after hours)  CRISIS TEXT LINE: Text [id number] from anywhere in [place], anytime, any crisis 24/7;  OR SEE www.crisistextline.org    TREATMENT RISK STATEMENT:  The risks, benefits, alternatives and potential adverse effects have been discussed and are understood by the pt. The pt understands the risks of using street drugs or alcohol. There are no medical contraindications, the pt agrees to treatment with the ability to do so. The pt knows to call the clinic for any problems or to access emergency care if needed.  Medical and substance use concerns are documented above.  Psychotropic drug interaction check was done, including changes made today.     PSYCHIATRY CLINIC INDIVIDUAL PSYCHOTHERAPY NOTE                                                [16]   Start time- 3:04 PM        End time- 3:24 PM  Date last reviewed - 09/21/18       Date next due - 12/19/18 (or 12  months if Medicare)     Subjective: This supportive psychotherapy session addressed issues related to goals of therapy, current stressors, life stressors, work, family of origin, school  and health.  Patient's reaction: Preparatory in the context of mental status appropriate for ambulatory setting.  Progress: fair.  Plan: RTC 4 weeks  Psychotherapy services during this visit included  myself and the patient.   TREATMENT  PLAN          SYMPTOMS;PROBLEMS   MEASURABLE GOALS;    FUNCTIONAL IMPROVEMENT INTERVENTIONS;    GAINS MADE DISCHARGE CRITERIA   Nanette/Hypomania: increased activity and psychotic disorganization   stay free of drug abuse [cannabis], learn more about illness and take medications as prescribed on a daily basis Supportive and cognitive psychotherapeutic techniques marked symptom improvement and achievement of functional goals   Psychosocial: occupational / vocational stress and nutrition/exercise   exercise for 20 minutes 3-7 days a week , improve nutrition and continue to plan for future education/employment Supportive and cognitive psychotherapeutic techniques marked symptom improvement and achievement of functional goals     PROVIDER:  Chau Dennis, DO    Patient staffed in clinic with Dr. Ventura who will sign the note.  Supervisor is Dr. Tay.  I saw the patient with the resident, and participated in key portions of the service, including the mental status examination and developing the plan of care. I reviewed key portions of the history with the resident. I agree with the findings and plan as documented in this note.    Yuko Ventura

## 2018-09-25 ASSESSMENT — PATIENT HEALTH QUESTIONNAIRE - PHQ9: SUM OF ALL RESPONSES TO PHQ QUESTIONS 1-9: 4

## 2018-10-22 DIAGNOSIS — F25.0 SCHIZOAFFECTIVE DISORDER, BIPOLAR TYPE (H): ICD-10-CM

## 2018-10-22 RX ORDER — OLANZAPINE 20 MG/1
20 TABLET ORAL AT BEDTIME
Qty: 30 TABLET | Refills: 0 | Status: SHIPPED | OUTPATIENT
Start: 2018-10-22 | End: 2018-11-07

## 2018-10-22 NOTE — TELEPHONE ENCOUNTER
Medication requested: OLANZapine (ZYPREXA) 20 MG tablet  Last refilled: 9/15/18  Qty: 4 weeks      Last seen: 9/21/18  RTC: 4 weeks  Cancel: 0  No-show: 0  Next appt: 11/7/18    Refill decision:   Refilled for 30 days per protocol.

## 2018-10-24 ENCOUNTER — TELEPHONE (OUTPATIENT)
Dept: PSYCHIATRY | Facility: CLINIC | Age: 28
End: 2018-10-24

## 2018-10-24 NOTE — TELEPHONE ENCOUNTER
Social Work   Incoming/Outgoing Call  [facility]ry [facility]    Incoming call from: Jose Francisco Sommers's mother Ember.  [phone] (home)     Reason for Call:  Voicemail Message from pt's mother asking about resources for her son.  She is hoping to get him back into work or school.  We discussed how an [employer] worker could be helpful in helping Jose Francisco with his work and school goals, while also taking into consideration his mental health struggles that keep him from reaching these goals.  The barrier is that Jose Francisco does not have MA.  Although mom recognizes that MA may allow him access to more community services to aid his mental health needs.  They did not claim him as a dependent on their 2017/18 taxes, and he does not have many assets.      Also review a special needs trust. Parents will need two letters of recommendations from physicians to verify his disability to qualify for a special needs trust.  Writer encouraged mom to join the f/u appointment with Dr. Dennis on 11/7/18 to request this letter.  Also shared that Dr. Canales could be considered for the 2nd letter and to send him a message requesting his assistance.      Response/Plan:    Resources- Apply for MA through [employer] (provided website), consider [employer] worker after MA obtained- can request assistance from our general clinic  [facility]od[facility].  Future considerations for Voc Rehab, Social Security, potential CADI services.        Will route to patient's current psychiatric provider(s) as an FYI.   Please call or EPIC message with any questions or concerns.    ERICKA Ferrera, [facility]  [phone]

## 2018-11-01 ENCOUNTER — TELEPHONE (OUTPATIENT)
Dept: PSYCHIATRY | Facility: CLINIC | Age: 28
End: 2018-11-01

## 2018-11-01 DIAGNOSIS — F25.0 SCHIZOAFFECTIVE DISORDER, BIPOLAR TYPE (H): ICD-10-CM

## 2018-11-01 DIAGNOSIS — F90.9 ATTENTION DEFICIT HYPERACTIVITY DISORDER (ADHD), UNSPECIFIED ADHD TYPE: ICD-10-CM

## 2018-11-01 RX ORDER — MODAFINIL 200 MG/1
200 TABLET ORAL DAILY
Qty: 30 TABLET | Refills: 0
Start: 2018-11-01 | End: 2018-11-07

## 2018-11-01 NOTE — TELEPHONE ENCOUNTER
Last seen: 9/21/18  RTC: 4 weeks   Cancel: none   No-show: none   Next appt: 11/7/18    Incoming refill from patient via phone     Medication requested: modafinil (PROVIGIL) 200 MG tablet  Directions:Take 1 tablet (200 mg) by mouth daily - Oral   Qty: 30  Last refilled: 10/4/18, 9/4/18, 8/21/18   - [facility] it into [facility]    Medication requested:metFORMIN (GLUCOPHAGE) 500 MG tablet   Directions:Take 2 tablets (1,000 mg) by mouth 2 times daily (with meals) - Oral   Qty: 120  Last refilled: 8/23/18    Medication refill approved per refill protocol  Provigil 200 mg tab called into [facility]     Patient notified of the refill and is now requesting the Provigil to be transferred to [facility] pharmacy due to insurance purposes  Writer placed a call to [facility] pharmacy at [phone] and asked the medications to be transferred by [facility]    No further action needed by the writer

## 2018-11-07 ENCOUNTER — OFFICE VISIT (OUTPATIENT)
Dept: PSYCHIATRY | Facility: CLINIC | Age: 28
End: 2018-11-07
Attending: PSYCHIATRY & NEUROLOGY
Payer: COMMERCIAL

## 2018-11-07 VITALS
HEART RATE: 82 BPM | WEIGHT: 267 LBS | SYSTOLIC BLOOD PRESSURE: 142 MMHG | DIASTOLIC BLOOD PRESSURE: 93 MMHG | BODY MASS INDEX: 35.47 KG/M2

## 2018-11-07 DIAGNOSIS — F90.9 ATTENTION DEFICIT HYPERACTIVITY DISORDER (ADHD), UNSPECIFIED ADHD TYPE: ICD-10-CM

## 2018-11-07 DIAGNOSIS — Z79.899 ENCOUNTER FOR LONG-TERM (CURRENT) USE OF MEDICATIONS: Primary | ICD-10-CM

## 2018-11-07 DIAGNOSIS — F25.0 SCHIZOAFFECTIVE DISORDER, BIPOLAR TYPE (H): ICD-10-CM

## 2018-11-07 PROCEDURE — G0463 HOSPITAL OUTPT CLINIC VISIT: HCPCS | Mod: ZF

## 2018-11-07 RX ORDER — MODAFINIL 200 MG/1
200 TABLET ORAL DAILY
Qty: 30 TABLET | Refills: 0 | Status: SHIPPED | OUTPATIENT
Start: 2018-11-28 | End: 2019-01-04

## 2018-11-07 RX ORDER — LITHIUM CARBONATE 600 MG/1
1200 CAPSULE ORAL AT BEDTIME
Qty: 60 CAPSULE | Refills: 1 | Status: SHIPPED | OUTPATIENT
Start: 2018-11-07 | End: 2019-02-28

## 2018-11-07 RX ORDER — OLANZAPINE 2.5 MG/1
TABLET, FILM COATED ORAL
Qty: 30 TABLET | Refills: 0 | Status: SHIPPED | OUTPATIENT
Start: 2018-11-07 | End: 2018-12-06

## 2018-11-07 RX ORDER — OLANZAPINE 15 MG/1
TABLET ORAL
Qty: 30 TABLET | Refills: 0 | Status: SHIPPED | OUTPATIENT
Start: 2018-11-07 | End: 2018-12-06

## 2018-11-07 ASSESSMENT — PATIENT HEALTH QUESTIONNAIRE - PHQ9: SUM OF ALL RESPONSES TO PHQ QUESTIONS 1-9: 3

## 2018-11-07 ASSESSMENT — PAIN SCALES - GENERAL: PAINLEVEL: NO PAIN (0)

## 2018-11-07 NOTE — MR AVS SNAPSHOT
After Visit Summary   11/7/2018    Jose Francisco Sommers    MRN: [id number]           Patient Information     Date Of Birth          1990        Visit Information        Provider Department      11/7/2018 3:30 PM Chau Dennis MD Psychiatry Clinic        Today's Diagnoses     Encounter for long-term (current) use of medications    -  1    Attention deficit hyperactivity disorder (ADHD), unspecified ADHD type        Schizoaffective disorder, bipolar type (H)          Care Instructions    - reduce olanzapine to 17.5 mg qHS  - continue modafinil 200 mg daily  - continue lithium 1200 mg qHS  - continue Metformin 1000 mg BID with meals          Follow-ups after your visit        Follow-up notes from your care team     Return in about 6 weeks (around 12/19/2018).      Your next 10 appointments already scheduled     Dec 06, 2018  2:00 PM CST   Adult Med Follow UP with Chau Dennis MD   Psychiatry Clinic ([facility])    [place] [id number]  [address] [address] [phone]              Who to contact     Please call your clinic at [phone] to:    Ask questions about your health    Make or cancel appointments    Discuss your medicines    Learn about your test results    Speak to your doctor            Additional Information About Your Visit        MyChart Information     [employer] gives you secure access to your electronic health record. If you see a primary care provider, you can also send messages to your care team and make appointments. If you have questions, please call your primary care clinic.  If you do not have a primary care provider, please call [phone] and they will assist you.      [employer] is an electronic gateway that provides easy, online access to your medical records. With [employer], you can request a clinic appointment, read your test results, renew a prescription or communicate with your care team.     To  access your existing account, please contact your [facility] or call [phone] for assistance.        Care EveryWhere ID     This is your Care EveryWhere ID. This could be used by other organizations to access your [facility] medical records  [id number]        Your Vitals Were     Pulse BMI (Body Mass Index)                82 35.47 kg/m2           Blood Pressure from Last 3 Encounters:   11/07/18 (!) 142/93   09/21/18 128/86   08/17/18 137/87    Weight from Last 3 Encounters:   11/07/18 121.1 kg (267 lb)   09/21/18 121 kg (266 lb 12.8 oz)   08/17/18 122.2 kg (269 lb 6.4 oz)                 Today's Medication Changes          These changes are accurate as of 11/7/18 11:59 PM.  If you have any questions, ask your nurse or doctor.               These medicines have changed or have updated prescriptions.        Dose/Directions    * OLANZapine 15 MG tablet   Commonly known as:  zyPREXA   This may have changed:    - medication strength  - how much to take  - how to take this  - when to take this  - additional instructions   Used for:  Schizoaffective disorder, bipolar type (H)   Changed by:  Chau Dennis MD        Take one tablet (15 mg) along with one tablet (2.5 mg) for a total dose of 17.5 mg at bedtime.   Quantity:  30 tablet   Refills:  0       * OLANZapine 2.5 MG tablet   Commonly known as:  zyPREXA   This may have changed:  You were already taking a medication with the same name, and this prescription was added. Make sure you understand how and when to take each.   Used for:  Schizoaffective disorder, bipolar type (H)   Changed by:  Chau Dennis MD        Take one tablet (2.5 mg) along with one tablet (15 mg) for a total dose of 17.5 mg at bedtime.   Quantity:  30 tablet   Refills:  0       * Notice:  This list has 2 medication(s) that are the same as other medications prescribed for you. Read the directions carefully, and ask your doctor or other care  provider to review them with you.         Where to get your medicines      These medications were sent to [id number] - [place], [place] - [address]  [address], [facility] [address]     Phone:  [phone]     lithium 600 MG capsule    OLANZapine 15 MG tablet    OLANZapine 2.5 MG tablet         Some of these will need a paper prescription and others can be bought over the counter.  Ask your nurse if you have questions.     Bring a paper prescription for each of these medications     modafinil 200 MG tablet                Primary Care Provider    Clinic Provider       No address on file        Equal Access to Services     SHELLEY SOOD : Hadii maria ines noel Soadam, waaxda luqadaha, qaybta kaalmada minerva, chandan flores . So [facility] [phone].    ATENCIÓN: Si habla español, tiene a jacob disposición servicios gratuitos de asistencia lingüística. Ll[facility] Hospital [phone].    We comply with applicable federal civil rights laws and [place] laws. We do not discriminate on the basis of race, color, national origin, age, disability, sex, sexual orientation, or gender identity.            Thank you!     Thank you for choosing PSYCHIATRY CLINIC  for your care. Our goal is always to provide you with excellent care. Hearing back from our patients is one way we can continue to improve our services. Please take a few minutes to complete the written survey that you may receive in the mail after your visit with us. Thank you!             Your Updated Medication List - Protect others around you: Learn how to safely use, store and throw away your medicines at www.disposemymeds.org.          This list is accurate as of 11/7/18 11:59 PM.  Always use your most recent med list.                   Brand Name Dispense Instructions for use Diagnosis    lithium 600 MG capsule     60 capsule    Take 2 capsules (1,200 mg) by mouth At Bedtime    Schizoaffective disorder, bipolar type (H)        metFORMIN 500 MG tablet    GLUCOPHAGE    120 tablet    Take 2 tablets (1,000 mg) by mouth 2 times daily (with meals)    Schizoaffective disorder, bipolar type (H)       modafinil 200 MG tablet    PROVIGIL    30 tablet    Take 1 tablet (200 mg) by mouth daily    Attention deficit hyperactivity disorder (ADHD), unspecified ADHD type       * OLANZapine 15 MG tablet    zyPREXA    30 tablet    Take one tablet (15 mg) along with one tablet (2.5 mg) for a total dose of 17.5 mg at bedtime.    Schizoaffective disorder, bipolar type (H)       * OLANZapine 2.5 MG tablet    zyPREXA    30 tablet    Take one tablet (2.5 mg) along with one tablet (15 mg) for a total dose of 17.5 mg at bedtime.    Schizoaffective disorder, bipolar type (H)       * Notice:  This list has 2 medication(s) that are the same as other medications prescribed for you. Read the directions carefully, and ask your doctor or other care provider to review them with you.

## 2018-11-07 NOTE — PROGRESS NOTES
[facility] PSYCHIATRY CLINIC PROGRESS NOTE     CARE TEAM:  PCP-Issac Watson PA-C at [facility]   Specialty Providers- none   Therapist- none   Community  Team- none     Jose Francisco Sommers is a 28 year old male who prefers the name Manuel & pronouns he, him, his, himself.  The initial diagnostic evaluation was on 7/24/2015.  Date of the most recent transfer of care eval is 07/13/2018.     Pertinent Background:  Jose Francisco Sommers first experienced mental health issues in grade school and has received treatment for ADHD, depression, psychosis and substance use.  See last transfer evaluation for detailed history. Notably, this patient had FEP in 2015 in the setting of cannabis abuse and has been stable on olanzapine and lithium since that time. He has never lived independently and has only been slowly developing insight into his illness and past symptoms. GeneSight testing was completed noting patient is a poor CYP2D6 metabolizer.  Past records from Dr. Mike Powers were received in summer 2018 to assist with diagnostic clarity, however they were hand written notes that were illegible though accompanied by typed neuropsychology consult from [facility].  This documentation was unable to support a diagnosis of bipolar disorder at that time though noted potential Asperger's diagnosis and ADHD.  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  Most recently, modafinil was started in April 2018 for attention support to endorsed benefits and heretofore no concerns for exacerbation of mood or psychotic symptoms.     Psych critical item history includes psychosis [disorganization, possible catatonia], mutiple psychotropic trials, psych hosp [x1 at [facility], spring 2015], commitment and CD: alcohol and cannabis.    INTERIM HISTORY                                                                                                                  4, 4   The patient reports good treatment adherence.  History was provided by the patient and his mother who were good historians.  The last visit ended with no change to the med regimen.   Since the last visit:  -Jose Francisco presents today with his mother.  She explains that she wished to speak with this writer in order to emphasize some of her thoughts/observations regarding his history and treatment.  -The patient states that he has been well since our last visit - has been waking up earlier and has been completing necessary tasks.  Endorses continued improvements in focus from Provigil.  -His mother states her feeling that while Jose Francisco's elevated mood states/psychotic features have been well managed on his present regimen of Zyprexa and lithium, and she acknowledges improvements in both alertness, focus and mood since initiation of Provigil, she speaks at length regarding her concerns that he is not yielding sufficient benefit for the negative symptoms of his psychotic illness (affect/social blunting and cognitive dulling).  -Tells a story which relates how socially able and outgoing he used to be, qualities that she has seen impaired by his illness.  -Mother and patient acknowledge that some of his previous medication trials have unfortunately tipped the patient into lauren (bupropion for example), and we have reviewed the delicate balance that must be struck in order to maintain mood stability.  -Ultimately, we agreed to a slight dose reduction of Zyprexa, with a perpetuation of the rest of his regimen.  Agreed that he could go back up on this dose, in the event he should have breakthrough lauren or psychosis.    RECENT SYMPTOMS:   DEPRESSION:  reports-low energy, hypersomnia, appetite changes and poor concentration /memory - all improved;  DENIES- suicidal ideation, self-destructive thoughts, depressed mood, feeling worthless, excessive guilt, feeling hopeless, feeling trapped, excessive crying and  overwhelmed  KAMI/HYPOMANIA:  reports-none;  DENIES- increased energy, decreased sleep need, increased activity and grandiosity  PSYCHOSIS:  reports-none;  DENIES- delusions, auditory hallucinations and visual hallucinations  DYSREGULATION:  reports-none;  DENIES- suicidal ideation, violent ideation, SIB, mood dysregulation, impulsive, aggressive, irritable and physically agitated  PANIC ATTACK:  none   ANXIETY:  none  TRAUMA RELATED:  none  COMPULSIVE:  none  SLEEP:  improvement in excessive sleep  EATING DISORDER: no    RECENT SUBSTANCE USE:     ALCOHOL- rare      TOBACCO- quit using nicotine patch spring 2018     CAFFEINE- coffee/ tea [1 pot of coffee a day]  OPIOIDS- none         NARCAN KIT- N/A        CANNABIS- none            OTHER ILLICIT DRUGS- none      CURRENT SOCIAL HISTORY:  FINANCIAL SUPPORT- parents; 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    PSYCH and CD Critical Summary Points since July 2018 August 2018: Modafinil dose increased to 200 mg daily    PAST PSYCH MED TRIALS   see EMR Problem List: Hx of psychiatric care    MEDICAL / SURGICAL HISTORY                                   Neurologic Hx- no     Patient Active Problem List   Diagnosis     Schizoaffective disorder, bipolar type (H)     Hx of psychiatric care     Elevated liver enzymes     Fatty liver     High blood triglycerides     History of cannabis abuse     History of cocaine abuse       Major Surgery- no    ALLERGY                                Sulfa drugs     MEDICATIONS                               Current Outpatient Prescriptions   Medication Sig Dispense Refill     lithium 600 MG capsule Take 2 capsules (1,200 mg) by mouth At  Bedtime 60 capsule 1     metFORMIN (GLUCOPHAGE) 500 MG tablet Take 2 tablets (1,000 mg) by mouth 2 times daily (with meals) 120 tablet 1     modafinil (PROVIGIL) 200 MG tablet Take 1 tablet (200 mg) by mouth daily 30 tablet 0     OLANZapine (ZYPREXA) 20 MG tablet Take 1 tablet (20 mg) by mouth At Bedtime 30 tablet 0     VITALS                                                                                                           3, 3   BP (!) 142/93  Pulse 82  Wt 121.1 kg (267 lb)  BMI 35.47 kg/m2     MENTAL STATUS EXAM                                                                       9, 14 cog gs     Alertness: alert  and oriented  Appearance: well groomed  Behavior/Demeanor: cooperative and calm, with fair eye contact   Speech: regular rate and rhythm  Language: no problems  Psychomotor: normal or unremarkable  Mood: description consistent with euthymia  Affect: blunted; was congruent to mood; was congruent to content  Thought Process/Associations: unremarkable  Thought Content:  Reports none;  Denies suicidal ideation, violent ideation and delusions  Perception:  Reports none;  Denies auditory hallucinations and visual hallucinations  Insight: fair  Judgment: fair  Cognition: (6) oriented: time, person, and place  attention span: intact  concentration: intact  recent memory: intact  remote memory: intact  fund of knowledge: appropriate  Gait and Station: unremarkable    LABS and DATA     AIMS:  5/17/2018 with total score of 0    PHQ9 TODAY = 3  PHQ-9 SCORE 7/13/2018 8/17/2018 9/21/2018   Total Score - - -   Total Score 2 6 4     ANTIPSYCHOTIC LABS  [glu, A1C, lipids (ivania LDL), liver enzymes, WBC, ANEU, Hgb, plts]  q12 mo  Recent Labs   Lab Test  10/30/17   1429  03/22/17   1340   09/30/16   1348   GLC  86  83   < >  83   A1C   --    --    --   4.6    < > = values in this interval not displayed.     Recent Labs   Lab Test  12/01/17   0948  10/30/17   1429   CHOL  177  165   TRIG  347*  339*   LDL  72  63    HDL  36*  34*     Recent Labs   Lab Test  12/01/17   0948  11/10/17   1534  11/02/17   1658   AST  31  35  49*   ALT  118*  108*  156*   ALKPHOS  63   --   68     Recent Labs   Lab Test  11/10/17   1534  10/30/17   1429   WBC  8.6  8.1   ANEU  5.0  4.8   HGB  15.6  15.7   PLT  255  241     LITHIUM LABS     [level, renal, SG, TSH, WBC]    q6 mo  Recent Labs   Lab Test  12/01/17   0948  11/10/17   1534  10/30/17   1429  03/22/17   1340   LITHIUM  0.64  0.49*  0.54*  0.6     Recent Labs   Lab Test  10/30/17   1429  03/22/17   1340  03/08/17   0953  09/30/16   1348   CR  1.05  1.09  0.94  0.90   GFRESTIMATED  85  82  >90  Non  GFR Calc    >90  Non  GFR Calc     NA  141  143  140  140   POTASSIUM  4.2  3.9  3.7  3.8   ADONAY  9.2  9.3  9.5  9.0     Recent Labs   Lab Test  10/30/17   1429  02/09/16   1247   SG  1.015  1.005     Recent Labs   Lab Test  11/10/17   1534  10/30/17   1429  03/22/17   1340  03/08/17   0953   TSH  2.09  2.92  3.03  3.36     Recent Labs   Lab Test  11/10/17   1534  10/30/17   1429  03/22/17   1340  03/08/17   0953   WBC  8.6  8.1  8.3  8.5   ANEU  5.0  4.8  5.5  5.1     DIAGNOSIS     Schizoaffective disorder, bipolar type, mood/psychosis in remission  ADHD, predominantly inattentive type    ASSESSMENT                                                                                                                   m2, h3     TODAY:  Jose Francisco Sommers presents to the [facility] for continued medication management of schizoaffective disorder and ADHD.  Patient endorses continued improvements in alertness, organization and motivation since initiation of Provigil.  His mother attends today's appointment and advocates for a more aggressive honing of his regimen towards a goal of providing just enough medication to prevent lauren/psychosis, while avoiding any degree of overmedication which might impart affect blunting, or emotional dulling.  Briefly discussed that  clozapine might be an option to consider, if not today, then at some point in the near term future, and some of the issues related to use of this medication were introduced.  Ultimately, agreed to trial a slightly lower dose of olanzapine at 17.5 mg, with the understanding that Jose Francisco could go back up on his dose in the event he should have breakthrough lauren or psychotic symptoms.  Agreed to maintain his present psychiatric regimen, otherwise, without adjustment.  Both mother and Jose Francisco were in favor of the above plan.    [place] PRESCRIPTION MONITORING PROGRAM [] was checked today:  indicates expected use in accordance with treatment plan.    PSYCHOTROPIC DRUG INTERACTIONS:    LITHIUM and OLANZAPINE may result in increased neurotoxic effects of D2-antagonists.  BUPROPION and OLANZAPINE may result in lowering of seizure threshold.   MANAGEMENT:  Monitoring for adverse effects, routine vitals, routine labs and patient is aware of risks     PLAN                                                                                                                                m2, h3     1) PSYCHOTROPIC MEDICATIONS:  - decrease olanzapine to 17.5 mg qHS  - continue modafinil 200 mg daily  - continue lithium 1200 mg qHS  - continue Metformin 1000 mg BID with meals    2) THERAPY:  no    3) NEXT DUE:    Labs- Full AP and [place]ler labs next visit  EKG- PRN  Rating Scales- AIMS due 5/2019    4) REFERRALS: No Referrals needed     5) RTC: 4 weeks    6) CRISIS NUMBERS:   Provided routinely in [facility] [phone] (clinic)    [phone] (after hours)  CRISIS TEXT LINE: Text [phone] from anywhere in [place], anytime, any crisis 24/7;  OR SEE www.crisistextline.org    TREATMENT RISK STATEMENT:  The risks, benefits, alternatives and potential adverse effects have been discussed and are understood by the pt. The pt understands the risks of using street drugs or alcohol. There are no medical contraindications, the pt agrees to  treatment with the ability to do so. The pt knows to call the clinic for any problems or to access emergency care if needed.  Medical and substance use concerns are documented above.  Psychotropic drug interaction check was done, including changes made today.     PSYCHIATRY CLINIC INDIVIDUAL PSYCHOTHERAPY NOTE                                                [16]   Start time- 3:34 PM        End time- 3:54 PM  Date last reviewed - 09/21/18       Date next due - 12/19/18 (or 12 months if Medicare)     Subjective: This supportive psychotherapy session addressed issues related to goals of therapy, current stressors, life stressors, work, family of origin, school  and health.  Patient's reaction: Preparatory in the context of mental status appropriate for ambulatory setting.  Progress: fair.  Plan: RTC 4 weeks  Psychotherapy services during this visit included  myself and the patient.   TREATMENT  PLAN          SYMPTOMS;PROBLEMS   MEASURABLE GOALS;    FUNCTIONAL IMPROVEMENT INTERVENTIONS;    GAINS MADE DISCHARGE CRITERIA   Nanette/Hypomania: increased activity and psychotic disorganization   stay free of drug abuse [cannabis], learn more about illness and take medications as prescribed on a daily basis Supportive and cognitive psychotherapeutic techniques marked symptom improvement and achievement of functional goals   Psychosocial: occupational / vocational stress and nutrition/exercise   exercise for 20 minutes 3-7 days a week , improve nutrition and continue to plan for future education/employment Supportive and cognitive psychotherapeutic techniques marked symptom improvement and achievement of functional goals     PROVIDER:  Chau Dennis, DO    Patient not staffed in clinic.  Supervisor is Dr. Tay, who will sign the note.    Attestation:  I did not see this patient directly. I have reviewed the documentation and I agree with the resident's plan of care.   Joel Tay MD

## 2018-11-07 NOTE — PATIENT INSTRUCTIONS
- reduce olanzapine to 17.5 mg qHS  - continue modafinil 200 mg daily  - continue lithium 1200 mg qHS  - continue Metformin 1000 mg BID with meals

## 2018-11-27 ENCOUNTER — TELEPHONE (OUTPATIENT)
Dept: PSYCHIATRY | Facility: CLINIC | Age: 28
End: 2018-11-27

## 2018-11-27 NOTE — TELEPHONE ENCOUNTER
[facility] Call Center    Phone Message    May a detailed message be left on voicemail: yes    Reason for Call: Form or Letter   Type or form/letter needing completion: Needs documentation of diagnosis. She stated she discussed this with Tram Dobson. Pt's mother stated the request was put in two weeks ago.   Provider:   Date form needed: ASAP      Action Taken: Other: Kaitlin Rivas

## 2018-12-06 ENCOUNTER — DOCUMENTATION ONLY (OUTPATIENT)
Dept: PSYCHIATRY | Facility: CLINIC | Age: 28
End: 2018-12-06

## 2018-12-06 ENCOUNTER — OFFICE VISIT (OUTPATIENT)
Dept: PSYCHIATRY | Facility: CLINIC | Age: 28
End: 2018-12-06
Attending: PSYCHIATRY & NEUROLOGY
Payer: COMMERCIAL

## 2018-12-06 ENCOUNTER — TELEPHONE (OUTPATIENT)
Dept: PSYCHIATRY | Facility: CLINIC | Age: 28
End: 2018-12-06

## 2018-12-06 VITALS
WEIGHT: 265.6 LBS | SYSTOLIC BLOOD PRESSURE: 139 MMHG | BODY MASS INDEX: 35.28 KG/M2 | HEART RATE: 85 BPM | DIASTOLIC BLOOD PRESSURE: 95 MMHG

## 2018-12-06 DIAGNOSIS — Z79.899 ENCOUNTER FOR LONG-TERM (CURRENT) USE OF MEDICATIONS: ICD-10-CM

## 2018-12-06 DIAGNOSIS — F25.0 SCHIZOAFFECTIVE DISORDER, BIPOLAR TYPE (H): ICD-10-CM

## 2018-12-06 LAB
ALBUMIN SERPL-MCNC: 4.7 G/DL (ref 3.4–5)
ALP SERPL-CCNC: 74 U/L (ref 40–150)
ALT SERPL W P-5'-P-CCNC: 178 U/L (ref 0–70)
ANION GAP SERPL CALCULATED.3IONS-SCNC: 10 MMOL/L (ref 3–14)
AST SERPL W P-5'-P-CCNC: 70 U/L (ref 0–45)
BASOPHILS # BLD AUTO: 0.1 10E9/L (ref 0–0.2)
BASOPHILS NFR BLD AUTO: 0.6 %
BILIRUB SERPL-MCNC: 0.6 MG/DL (ref 0.2–1.3)
BUN SERPL-MCNC: 11 MG/DL (ref 7–30)
CALCIUM SERPL-MCNC: 9.2 MG/DL (ref 8.5–10.1)
CHLORIDE SERPL-SCNC: 103 MMOL/L (ref 94–109)
CO2 SERPL-SCNC: 26 MMOL/L (ref 20–32)
CREAT SERPL-MCNC: 1.06 MG/DL (ref 0.66–1.25)
DIFFERENTIAL METHOD BLD: NORMAL
EOSINOPHIL # BLD AUTO: 0.2 10E9/L (ref 0–0.7)
EOSINOPHIL NFR BLD AUTO: 2.6 %
ERYTHROCYTE [DISTWIDTH] IN BLOOD BY AUTOMATED COUNT: 12.5 % (ref 10–15)
GFR SERPL CREATININE-BSD FRML MDRD: 84 ML/MIN/1.7M2
GLUCOSE SERPL-MCNC: 86 MG/DL (ref 70–99)
HBA1C MFR BLD: 5 % (ref 0–5.6)
HCT VFR BLD AUTO: 47.5 % (ref 40–53)
HGB BLD-MCNC: 15.8 G/DL (ref 13.3–17.7)
IMM GRANULOCYTES # BLD: 0.1 10E9/L (ref 0–0.4)
IMM GRANULOCYTES NFR BLD: 0.7 %
LITHIUM SERPL-SCNC: 0.52 MMOL/L (ref 0.6–1.2)
LYMPHOCYTES # BLD AUTO: 2.4 10E9/L (ref 0.8–5.3)
LYMPHOCYTES NFR BLD AUTO: 26.4 %
MCH RBC QN AUTO: 29.9 PG (ref 26.5–33)
MCHC RBC AUTO-ENTMCNC: 33.3 G/DL (ref 31.5–36.5)
MCV RBC AUTO: 90 FL (ref 78–100)
MONOCYTES # BLD AUTO: 0.5 10E9/L (ref 0–1.3)
MONOCYTES NFR BLD AUTO: 5.7 %
NEUTROPHILS # BLD AUTO: 5.7 10E9/L (ref 1.6–8.3)
NEUTROPHILS NFR BLD AUTO: 64 %
NRBC # BLD AUTO: 0 10*3/UL
NRBC BLD AUTO-RTO: 0 /100
PLATELET # BLD AUTO: 270 10E9/L (ref 150–450)
POTASSIUM SERPL-SCNC: 4.1 MMOL/L (ref 3.4–5.3)
PROT SERPL-MCNC: 8.4 G/DL (ref 6.8–8.8)
RBC # BLD AUTO: 5.29 10E12/L (ref 4.4–5.9)
SODIUM SERPL-SCNC: 139 MMOL/L (ref 133–144)
SP GR UR STRIP: 1.01 (ref 1–1.03)
TSH SERPL DL<=0.005 MIU/L-ACNC: 3.01 MU/L (ref 0.4–4)
WBC # BLD AUTO: 8.9 10E9/L (ref 4–11)

## 2018-12-06 PROCEDURE — G0463 HOSPITAL OUTPT CLINIC VISIT: HCPCS | Mod: ZF

## 2018-12-06 PROCEDURE — 36415 COLL VENOUS BLD VENIPUNCTURE: CPT | Performed by: STUDENT IN AN ORGANIZED HEALTH CARE EDUCATION/TRAINING PROGRAM

## 2018-12-06 PROCEDURE — 83721 ASSAY OF BLOOD LIPOPROTEIN: CPT | Performed by: STUDENT IN AN ORGANIZED HEALTH CARE EDUCATION/TRAINING PROGRAM

## 2018-12-06 PROCEDURE — 85025 COMPLETE CBC W/AUTO DIFF WBC: CPT | Performed by: STUDENT IN AN ORGANIZED HEALTH CARE EDUCATION/TRAINING PROGRAM

## 2018-12-06 PROCEDURE — 84443 ASSAY THYROID STIM HORMONE: CPT | Performed by: STUDENT IN AN ORGANIZED HEALTH CARE EDUCATION/TRAINING PROGRAM

## 2018-12-06 PROCEDURE — 81003 URINALYSIS AUTO W/O SCOPE: CPT | Performed by: STUDENT IN AN ORGANIZED HEALTH CARE EDUCATION/TRAINING PROGRAM

## 2018-12-06 PROCEDURE — 83036 HEMOGLOBIN GLYCOSYLATED A1C: CPT | Performed by: STUDENT IN AN ORGANIZED HEALTH CARE EDUCATION/TRAINING PROGRAM

## 2018-12-06 PROCEDURE — 80053 COMPREHEN METABOLIC PANEL: CPT | Performed by: STUDENT IN AN ORGANIZED HEALTH CARE EDUCATION/TRAINING PROGRAM

## 2018-12-06 PROCEDURE — 80061 LIPID PANEL: CPT | Performed by: STUDENT IN AN ORGANIZED HEALTH CARE EDUCATION/TRAINING PROGRAM

## 2018-12-06 PROCEDURE — 80178 ASSAY OF LITHIUM: CPT | Performed by: STUDENT IN AN ORGANIZED HEALTH CARE EDUCATION/TRAINING PROGRAM

## 2018-12-06 RX ORDER — OLANZAPINE 15 MG/1
15 TABLET ORAL AT BEDTIME
Qty: 30 TABLET | Refills: 1 | Status: SHIPPED | OUTPATIENT
Start: 2018-12-06 | End: 2019-02-07

## 2018-12-06 ASSESSMENT — PAIN SCALES - GENERAL: PAINLEVEL: NO PAIN (0)

## 2018-12-06 NOTE — LETTER
December 6, 2018      TO: Jose Francisco [place]  [address] [id number]         To Whom it may Concern,     Jose Francisco was previously seen by myself, Dr. Zbigniew Bailey, at the [facility] for diagnosis of Schizoaffective disorder, bipolar type. This disease can have a significant impact on Jose Francisco's function and is likely lifelong. Please use this letter as indication for a Supplemental Needs Trust for Jose Francisco in the future if needed. Please call [phone] if you have further questions.         Sincerely,        Zbigniew Bailey MD

## 2018-12-06 NOTE — NURSING NOTE
Chief Complaint   Patient presents with     RECHECK     Attention deficit hyperactivity disorder (ADHD), unspecified ADHD type

## 2018-12-06 NOTE — TELEPHONE ENCOUNTER
- Writer received incoming call from patients MomEmber ([phone])    Ember reports needing a letter for patients frame work for supplemental needs trust if at any time patient is not able to ewa his own finances. Patient is aware of this process. Per Ember, a letter was given to both providers notifying about this letter. She is requesting this letter to be completed as soon as possible and given to the patient during his office visit today with provider. Writer agreed to pass the information to provider.

## 2018-12-06 NOTE — PATIENT INSTRUCTIONS
Decrease olanzapine to 15 mg qHS  Continue modafinil 200 mg daily  Continue lithium 1200 mg qHS  Continue Metformin 1000 mg BID with meals

## 2018-12-06 NOTE — PROGRESS NOTES
[facility] PSYCHIATRY CLINIC PROGRESS NOTE     CARE TEAM:  PCP-Issac Watson PA-C at [facility]   Specialty Providers- none   Therapist- none   Community  Team- none    Jose Francisco Sommers is a 28 year old male who prefers the name Manuel & pronouns he, him, his, himself.  The initial diagnostic evaluation was on 7/24/2015.  Date of the most recent transfer of care eval is 07/13/2018.    Pertinent Background:  Jose Francisco Sommers first experienced mental health issues in grade school and has received treatment for ADHD, depression, psychosis and substance use.  See last transfer evaluation for detailed history. Notably, this patient had FEP in 2015 in the setting of cannabis abuse and has been stable on olanzapine and lithium since that time. He has never lived independently and has only been slowly developing insight into his illness and past symptoms. GeneSight testing was completed noting patient is a poor CYP2D6 metabolizer.  Past records from Dr. Mike Powers were received in summer 2018 to assist with diagnostic clarity, however they were hand written notes that were illegible though accompanied by typed neuropsychology consult from [facility].  This documentation was unable to support a diagnosis of bipolar disorder at that time though noted potential Asperger's diagnosis and ADHD.  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  Most recently, modafinil was started in April 2018 for attention support to endorsed benefits and heretofore no concerns for exacerbation of mood or psychotic symptoms.    Psych critical item history includes psychosis [disorganization, possible catatonia], mutiple psychotropic trials, psych hosp [x1 at [facility], spring 2015], commitment and CD: alcohol and cannabis.    INTERIM HISTORY                                                                                                                 " 4, 4   The patient reports good treatment adherence.  History was provided by the patient who was a good historian.  The last visit ended with the following med change: decrease olanzapine to 17.5 mg qHS.   Since the last visit:  -Manuel reports interval positive mood/outlook, and denies onset of elevated mood or psychotic features with the reduction in olanzapine at our last visit.  -States that he has been sleeping about 9-10 hours and has realized that \"my mom has expectations of when I wake up, but she doesn't really care when I go to bed.\"  Elaborates that he has been feeling frustrated with himself for a while, due to not getting up effectively, but has been beginning to think that maybe he should just go to bed earlier.  Agreed that this makes sense, both in order to meet his mother's expectations for the hours he can keep while living at home, as well as to allow him to take advantage of his day.  -Talked about his impressions of Provigil and he reiterated previous perceptions of notable benefit.  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  -He expressed his interest in potentially lowering the olanzapine to 15 mg, given his tolerance of the drop to 17.5 mg, both to potentially minimize any degree of \"overmedication,\" leading to dulling or reduced alertness, as well as to help with weight management, as olanzapine has noticeably increased his appetite during his time on that medication.  He also expressed some interest in increasing his dose of Provigil.  Ultimately agreed to try the reduction in olanzapine dose between now and our next visit.  We will plan to reconsider the question of increasing Provigil in the event Manuel demonstrates continued stability on the lower dosed regimen of neuroleptic.    RECENT SYMPTOMS:   DEPRESSION:  " reports-low energy, hypersomnia, appetite changes and poor concentration /memory - all improved;  DENIES- suicidal ideation, self-destructive thoughts, depressed mood, feeling worthless, excessive guilt, feeling hopeless, feeling trapped, excessive crying and overwhelmed  KAMI/HYPOMANIA:  reports-none;  DENIES- increased energy, decreased sleep need, increased activity and grandiosity  PSYCHOSIS:  reports-none;  DENIES- delusions, auditory hallucinations and visual hallucinations  DYSREGULATION:  reports-none;  DENIES- suicidal ideation, violent ideation, SIB, mood dysregulation, impulsive, aggressive, irritable and physically agitated  PANIC ATTACK:  none   ANXIETY:  none  TRAUMA RELATED:  none  COMPULSIVE:  none  SLEEP:  improvement in excessive sleep  EATING DISORDER: no    RECENT SUBSTANCE USE:     ALCOHOL- rare  TOBACCO- quit using nicotine patch spring 2018  CAFFEINE- coffee/ tea [1 pot of coffee a day]  OPIOIDS- none         NARCAN KIT- N/A  CANNABIS- none  OTHER ILLICIT DRUGS- none      CURRENT SOCIAL HISTORY:  FINANCIAL SUPPORT- parents; also makes some money selling [employer]  CHILDREN- none  LIVING SITUATION- lives with parents in [place]  SOCIAL/ SPIRITUAL SUPPORT- family, [employer] card community, [employer]  FEELS SAFE AT HOME- yes    MEDICAL ROS (2,10):  A comprehensive review of systems was performed and is negative other than noted in the HPI.    PSYCH and CD Critical Summary Points since July 2018 August 2018: Modafinil dose increased to 200 mg daily  November 2018: Olanzapine decreased to 17.5 mg qHS    PAST PSYCH MED TRIALS   see EMR Problem List: Hx of psychiatric care    MEDICAL / SURGICAL HISTORY                                   Neurologic Hx- no     Patient Active Problem List   Diagnosis     Schizoaffective disorder, bipolar type (H)     Hx of psychiatric care     Elevated liver enzymes     Fatty liver     High blood triglycerides     History of cannabis abuse      History of cocaine abuse       Major Surgery- no    ALLERGY                                Sulfa drugs     MEDICATIONS                               Current Outpatient Prescriptions   Medication Sig Dispense Refill     lithium 600 MG capsule Take 2 capsules (1,200 mg) by mouth At Bedtime 60 capsule 1     metFORMIN (GLUCOPHAGE) 500 MG tablet Take 2 tablets (1,000 mg) by mouth 2 times daily (with meals) 120 tablet 1     modafinil (PROVIGIL) 200 MG tablet Take 1 tablet (200 mg) by mouth daily 30 tablet 0     OLANZapine (ZYPREXA) 15 MG tablet Take one tablet (15 mg) along with one tablet (2.5 mg) for a total dose of 17.5 mg at bedtime. 30 tablet 0     OLANZapine (ZYPREXA) 2.5 MG tablet Take one tablet (2.5 mg) along with one tablet (15 mg) for a total dose of 17.5 mg at bedtime. 30 tablet 0     VITALS                                                                                                           3, 3   BP (!) 139/95  Pulse 85  Wt 120.5 kg (265 lb 9.6 oz)  BMI 35.28 kg/m2     MENTAL STATUS EXAM                                                                       9, 14 cog gs     Alertness: alert  and oriented  Appearance: well groomed  Behavior/Demeanor: cooperative and calm, with fair eye contact   Speech: regular rate and rhythm  Language: no problems  Psychomotor: normal or unremarkable  Mood: description consistent with euthymia  Affect: blunted; was congruent to mood; was congruent to content  Thought Process/Associations: unremarkable  Thought Content:  Reports none;  Denies suicidal ideation, violent ideation and delusions  Perception:  Reports none;  Denies auditory hallucinations and visual hallucinations  Insight: fair  Judgment: fair  Cognition: (6) oriented: time, person, and place  attention span: intact  concentration: intact  recent memory: intact  remote memory: intact  fund of knowledge: appropriate  Gait and Station: unremarkable    LABS and DATA     AIMS:  5/17/2018 with total score of  0    PHQ9 TODAY = 9  PHQ-9 SCORE 8/17/2018 9/21/2018 11/7/2018   PHQ-9 Total Score - - -   PHQ-9 Total Score 6 4 3     ANTIPSYCHOTIC LABS  [glu, A1C, lipids (ivania LDL), liver enzymes, WBC, ANEU, Hgb, plts]  q12 mo  Recent Labs   Lab Test  10/30/17   1429  03/22/17   1340   09/30/16   1348   GLC  86  83   < >  83   A1C   --    --    --   4.6    < > = values in this interval not displayed.     Recent Labs   Lab Test  12/01/17   0948  10/30/17   1429   CHOL  177  165   TRIG  347*  339*   LDL  72  63   HDL  36*  34*     Recent Labs   Lab Test  12/01/17   0948  11/10/17   1534  11/02/17   1658   AST  31  35  49*   ALT  118*  108*  156*   ALKPHOS  63   --   68     Recent Labs   Lab Test  11/10/17   1534  10/30/17   1429   WBC  8.6  8.1   ANEU  5.0  4.8   HGB  15.6  15.7   PLT  255  241     LITHIUM LABS     [level, renal, SG, TSH, WBC]    q6 mo  Recent Labs   Lab Test  12/01/17   0948  11/10/17   1534  10/30/17   1429  03/22/17   1340   LITHIUM  0.64  0.49*  0.54*  0.6     Recent Labs   Lab Test  10/30/17   1429  03/22/17   1340  03/08/17   0953  09/30/16   1348   CR  1.05  1.09  0.94  0.90   GFRESTIMATED  85  82  >90  Non  GFR Calc    >90  Non  GFR Calc     NA  141  143  140  140   POTASSIUM  4.2  3.9  3.7  3.8   ADONAY  9.2  9.3  9.5  9.0     Recent Labs   Lab Test  10/30/17   1429  02/09/16   1247   SG  1.015  1.005     Recent Labs   Lab Test  11/10/17   1534  10/30/17   1429  03/22/17   1340  03/08/17   0953   TSH  2.09  2.92  3.03  3.36     Recent Labs   Lab Test  11/10/17   1534  10/30/17   1429  03/22/17   1340  03/08/17   0953   WBC  8.6  8.1  8.3  8.5   ANEU  5.0  4.8  5.5  5.1     DIAGNOSIS     Schizoaffective disorder, bipolar type, mood/psychosis in remission  ADHD, predominantly inattentive type    ASSESSMENT                                                                                                                   m2, h3     TODAY:  Jose Francisco Sommers presents to the [facility] for continued medication management of schizoaffective disorder and ADHD.  Patient endorses interval mood stability with continued absence of psychotic features (paranoia, delusions, grandiosity).  Denies SI/SIB thoughts, markedly depressed mood, elevated mood states, aggressive/violent ideation, agitation, or other hallmarks of acuity/dangerousness.  Elaborates having noticed no difference in his condition at the lower dose of olanzapine since our last visit and asks to further reduce the dose to 15 mg.  Have agreed to try this, and will plan to monitor his stability/response to this lower dose of neuroleptic prior to consideration of increases in his Provigil dose.  Moving forward, our goal with this patient will be to seek a gentle maximizing of lithium and Zyprexa's prophylactic benefits and mood/thought support, without dulling the patient, in addition to harnessing the alertness promoting effects of Provigil for his ADHD, so as to safely/effectively support Manuel in his pursuit of a stable but also meaningful and engaged life.    [place] PRESCRIPTION MONITORING PROGRAM [] was checked today:  indicates expected use in accordance with treatment plan.    PSYCHOTROPIC DRUG INTERACTIONS:    LITHIUM and OLANZAPINE may result in increased neurotoxic effects of D2-antagonists.  BUPROPION and OLANZAPINE may result in lowering of seizure threshold.    MANAGEMENT:  Monitoring for adverse effects, routine vitals, routine labs and patient is aware of risks     PLAN                                                                                                                                m2, h3     1) PSYCHOTROPIC MEDICATIONS:  - decrease olanzapine to 15 mg qHS  - continue modafinil 200 mg daily  - continue lithium 1200 mg qHS  - continue Metformin 1000 mg BID with meals    2) THERAPY:  no    3) NEXT DUE:    Labs- Full AP and [place] labs this visit  EKG- PRN  Rating Scales- AIMS due 5/2019    4)  REFERRALS: No Referrals needed     5) RTC: 4 weeks    6) CRISIS NUMBERS:   Provided routinely in [facility].   [facility] [phone] (clinic)    [phone] (after hours)  CRISIS TEXT LINE: Text [id number] from anywhere in [place], anytime, any crisis 24/7;  OR SEE www.crisistextline.org    TREATMENT RISK STATEMENT:  The risks, benefits, alternatives and potential adverse effects have been discussed and are understood by the pt. The pt understands the risks of using street drugs or alcohol. There are no medical contraindications, the pt agrees to treatment with the ability to do so. The pt knows to call the clinic for any problems or to access emergency care if needed.  Medical and substance use concerns are documented above.  Psychotropic drug interaction check was done, including changes made today.     PSYCHIATRY CLINIC INDIVIDUAL PSYCHOTHERAPY NOTE                                                [16]   Start time- 2:00 PM        End time- 2:30 PM  Date last reviewed - 09/21/18       Date next due - 12/19/18 (or 12 months if Medicare)     Subjective: This supportive psychotherapy session addressed issues related to goals of therapy, current stressors, life stressors, work, family of origin, school  and health.  Patient's reaction: Preparatory in the context of mental status appropriate for ambulatory setting.  Progress: fair.  Plan: RTC 4 weeks  Psychotherapy services during this visit included  myself and the patient.   TREATMENT  PLAN          SYMPTOMS;PROBLEMS   MEASURABLE GOALS;    FUNCTIONAL IMPROVEMENT INTERVENTIONS;    GAINS MADE DISCHARGE CRITERIA   Nanette/Hypomania: increased activity and psychotic disorganization   stay free of drug abuse [cannabis], learn more about illness and take medications as prescribed on a daily basis Supportive and cognitive psychotherapeutic techniques marked symptom improvement and achievement of functional goals   Psychosocial: occupational / vocational stress and  nutrition/exercise   exercise for 20 minutes 3-7 days a week , improve nutrition and continue to plan for future education/employment Supportive and cognitive psychotherapeutic techniques marked symptom improvement and achievement of functional goals     PROVIDER:  Chau Dennis, DO    Patient not staffed in clinic.  Supervisor is Dr. Tay, who will sign the note.    Attestation:  I did not see this patient directly. I have reviewed the documentation and I agree with the resident's plan of care.   Joel Tay MD

## 2018-12-06 NOTE — LETTER
December 6, 2018      TO: Jose Francisco Sommers  [address] [address]         To Whom it may Concern,     Jose Francisco Sommers is currently being seen by myself, Dr. Chau Dennis, at the [facility] for a diagnosis of Schizoaffective disorder, bipolar type. This condition can have a significant, lifelong impact on Jose Francisco's function. Please accept this document as indication for a Supplemental [employer]eds [employer] for Jose Francisco in the future if needed. Please call [phone] for further questions.      Sincerely,        Chau Dennis, DO

## 2018-12-06 NOTE — TELEPHONE ENCOUNTER
- Writer received a letter from provider which was given by Ember coburn   - Writer agreed to draft the letter and place in providers folder to review and sign   - Will route to provider

## 2018-12-07 LAB
CHOLEST SERPL-MCNC: 184 MG/DL
HDLC SERPL-MCNC: 34 MG/DL
LDLC SERPL CALC-MCNC: ABNORMAL MG/DL
LDLC SERPL DIRECT ASSAY-MCNC: 95 MG/DL
NONHDLC SERPL-MCNC: 150 MG/DL
TRIGL SERPL-MCNC: 535 MG/DL

## 2018-12-07 ASSESSMENT — PATIENT HEALTH QUESTIONNAIRE - PHQ9: SUM OF ALL RESPONSES TO PHQ QUESTIONS 1-9: 9

## 2018-12-12 DIAGNOSIS — Z79.899 ENCOUNTER FOR LONG-TERM (CURRENT) USE OF MEDICATIONS: Primary | ICD-10-CM

## 2018-12-12 NOTE — PROGRESS NOTES
Performed antipsychotic and lithium labs during most recent afternoon visit on 12/6/2018.  Notably, triglycerides were elevated at 535, transaminases were elevated at  and AST 70, and lithium level was 0.52.  These labs were drawn at 3:45 PM, and this was not a fasting lab draw.  Called patient back this afternoon and agreed to recheck a fasting/morning lipid panel, liver function and lithium level.  In terms of the latter, generally the patient presents in the late afternoon and previous lithium levels have typically been drawn at that time, however we will take the opportunity of a morning lab draw to re-assay a 12-hour lithium level.  Those orders have been placed and the patient has agreed to visit his local [place] office for the lab draw.    Chau Dennis, DO  Psychiatry PGY3

## 2018-12-13 DIAGNOSIS — Z79.899 ENCOUNTER FOR LONG-TERM (CURRENT) USE OF MEDICATIONS: ICD-10-CM

## 2018-12-13 LAB — LITHIUM SERPL-SCNC: 0.65 MMOL/L (ref 0.6–1.2)

## 2018-12-13 PROCEDURE — 80076 HEPATIC FUNCTION PANEL: CPT | Performed by: STUDENT IN AN ORGANIZED HEALTH CARE EDUCATION/TRAINING PROGRAM

## 2018-12-13 PROCEDURE — 36415 COLL VENOUS BLD VENIPUNCTURE: CPT | Performed by: STUDENT IN AN ORGANIZED HEALTH CARE EDUCATION/TRAINING PROGRAM

## 2018-12-13 PROCEDURE — 80061 LIPID PANEL: CPT | Performed by: STUDENT IN AN ORGANIZED HEALTH CARE EDUCATION/TRAINING PROGRAM

## 2018-12-13 PROCEDURE — 80178 ASSAY OF LITHIUM: CPT | Performed by: STUDENT IN AN ORGANIZED HEALTH CARE EDUCATION/TRAINING PROGRAM

## 2018-12-14 LAB
ALBUMIN SERPL-MCNC: 4.4 G/DL (ref 3.4–5)
ALP SERPL-CCNC: 55 U/L (ref 40–150)
ALT SERPL W P-5'-P-CCNC: 146 U/L (ref 0–70)
AST SERPL W P-5'-P-CCNC: 51 U/L (ref 0–45)
BILIRUB DIRECT SERPL-MCNC: 0.2 MG/DL (ref 0–0.2)
BILIRUB SERPL-MCNC: 0.8 MG/DL (ref 0.2–1.3)
CHOLEST SERPL-MCNC: 173 MG/DL
HDLC SERPL-MCNC: 36 MG/DL
LDLC SERPL CALC-MCNC: 81 MG/DL
NONHDLC SERPL-MCNC: 137 MG/DL
PROT SERPL-MCNC: 7.6 G/DL (ref 6.8–8.8)
TRIGL SERPL-MCNC: 278 MG/DL

## 2019-01-04 ENCOUNTER — OFFICE VISIT (OUTPATIENT)
Dept: PSYCHIATRY | Facility: CLINIC | Age: 29
End: 2019-01-04
Attending: PSYCHIATRY & NEUROLOGY
Payer: COMMERCIAL

## 2019-01-04 VITALS
DIASTOLIC BLOOD PRESSURE: 95 MMHG | HEART RATE: 94 BPM | BODY MASS INDEX: 34.62 KG/M2 | WEIGHT: 260.6 LBS | SYSTOLIC BLOOD PRESSURE: 154 MMHG

## 2019-01-04 DIAGNOSIS — F90.2 ATTENTION DEFICIT HYPERACTIVITY DISORDER (ADHD), COMBINED TYPE: Primary | ICD-10-CM

## 2019-01-04 DIAGNOSIS — F90.9 ATTENTION DEFICIT HYPERACTIVITY DISORDER (ADHD), UNSPECIFIED ADHD TYPE: ICD-10-CM

## 2019-01-04 PROCEDURE — G0463 HOSPITAL OUTPT CLINIC VISIT: HCPCS | Mod: ZF

## 2019-01-04 RX ORDER — MODAFINIL 100 MG/1
TABLET ORAL
Qty: 15 TABLET | Refills: 1 | Status: SHIPPED | OUTPATIENT
Start: 2019-01-04 | End: 2019-02-28

## 2019-01-04 RX ORDER — MODAFINIL 200 MG/1
TABLET ORAL
Qty: 30 TABLET | Refills: 1 | Status: SHIPPED | OUTPATIENT
Start: 2019-01-04 | End: 2019-02-28

## 2019-01-04 ASSESSMENT — PAIN SCALES - GENERAL: PAINLEVEL: NO PAIN (0)

## 2019-01-05 NOTE — PROGRESS NOTES
[facility] PSYCHIATRY CLINIC PROGRESS NOTE     CARE TEAM:  PCP-Issac Watson PA-C at [facility]   Specialty Providers- none   Therapist- none   Community  Team- none    Jose Francisco Sommers is a 28 year old male who prefers the name Manuel & pronouns he, him, his, himself.  The initial diagnostic evaluation was on 7/24/2015.  Date of the most recent transfer of care eval is 07/13/2018.    Pertinent Background:  Jose Francisco Sommers first experienced mental health issues in grade school and has received treatment for ADHD, depression, psychosis and substance use.  See last transfer evaluation for detailed history. Notably, this patient had FEP in 2015 in the setting of cannabis abuse and has been stable on olanzapine and lithium since that time. He has never lived independently and has only been slowly developing insight into his illness and past symptoms. GeneSight testing was completed noting patient is a poor CYP2D6 metabolizer.  Past records from Dr. Mike Powers were received in summer 2018 to assist with diagnostic clarity, however they were hand written notes that were illegible though accompanied by typed neuropsychology consult from [facility].  This documentation was unable to support a diagnosis of bipolar disorder at that time though noted potential Asperger's diagnosis and ADHD.  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  Most recently, modafinil was started in April 2018 for attention support to endorsed benefits and heretofore no concerns for exacerbation of mood or psychotic symptoms.    Psych critical item history includes psychosis [disorganization, possible catatonia], mutiple psychotropic trials, psych hosp [x1 at [facility], spring 2015], commitment and CD: alcohol and cannabis.    INTERIM HISTORY                                                                                                                 " 4, 4   The patient reports good treatment adherence.  History was provided by the patient who was a good historian.  The last visit ended with the following med change: decrease olanzapine to 15 mg qHS.   Since the last visit:  -Manuel endorses that things are \"going really well\" since the dose reduction in his olanzapine to 15 mg.  States that he's having an easier time eating less, and was happy to see that he's lost 5 pounds since our last visit.  -He denies any increase in disorganization, elevated mood states, irritability or impulsiveness since this decrease in his antipsychotic.  Endorses that his mood is slightly better, and that he's having an easier time getting up in the morning.  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  States that in general he is feeling hopeful.  -This writer did note a very slight increase in his spontaneity of communication, and a slight lessening of his affectual blunting.  Told the patient about this and he appreciated the feedback, stating that he wasn't surprised to hear that, as he does feel slightly less dulled, something which he attributed to the decrease in his antipsychotic.  -In the pursuit of a fine-tuning of his regimen, Manuel asked for a slight increase in his Provigil which we have agreed to.  He speaks strongly of the benefits he has noticed from this agent in terms of concentration, mental clarity, focusing on tasks and organization.  Strongly counseled the patient to be mindful for the emergence of any elevated mood/psychotic symptoms, and he agreed to do so.    RECENT SYMPTOMS:   DEPRESSION:  reports-low energy, hypersomnia, appetite changes and poor concentration /memory - all improved since dose reduction in olanzapine;  DENIES- suicidal ideation, self-destructive thoughts, depressed mood, feeling worthless, excessive guilt, feeling hopeless, feeling trapped, excessive crying and overwhelmed  KAMI/HYPOMANIA:  reports-none;  DENIES- " increased energy, decreased sleep need, increased activity and grandiosity  PSYCHOSIS:  reports-none;  DENIES- delusions, auditory hallucinations and visual hallucinations  DYSREGULATION:  reports-none;  DENIES- suicidal ideation, violent ideation, SIB, mood dysregulation, impulsive, aggressive, irritable and physically agitated  PANIC ATTACK:  none   ANXIETY:  none  TRAUMA RELATED:  none  COMPULSIVE:  none  SLEEP:  improvement in excessive sleep  EATING DISORDER: no    RECENT SUBSTANCE USE:     ALCOHOL- rare  TOBACCO- quit using nicotine patch spring 2018  CAFFEINE- coffee/ tea [1 pot of coffee a day]  OPIOIDS- none         NARCAN KIT- N/A  CANNABIS- none  OTHER ILLICIT DRUGS- none      CURRENT SOCIAL HISTORY:  FINANCIAL SUPPORT- parents; also makes some money selling [employer]  CHILDREN- none  LIVING SITUATION- lives with parents in [place]  SOCIAL/ SPIRITUAL SUPPORT- family, [employer] card community, [employer]  FEELS SAFE AT HOME- yes    MEDICAL ROS (2,10):  A comprehensive review of systems was performed and is negative other than noted in the HPI.    PSYCH and CD Critical Summary Points since July 2018 August 2018: Modafinil dose increased to 200 mg daily  November 2018: Olanzapine decreased to 17.5 mg at bedtime  December 2018: Olanzapine decreased to 15 mg at bedtime    PAST PSYCH MED TRIALS   see EMR Problem List: Hx of psychiatric care    MEDICAL / SURGICAL HISTORY                                   Neurologic Hx- no     Patient Active Problem List   Diagnosis     Schizoaffective disorder, bipolar type (H)     Hx of psychiatric care     Elevated liver enzymes     Fatty liver     High blood triglycerides     History of cannabis abuse     History of cocaine abuse       Major Surgery- no    ALLERGY                                Sulfa drugs     MEDICATIONS                               Current Outpatient Medications   Medication Sig Dispense Refill     lithium 600 MG capsule Take 2  capsules (1,200 mg) by mouth At Bedtime 60 capsule 1     metFORMIN (GLUCOPHAGE) 500 MG tablet Take 2 tablets (1,000 mg) by mouth 2 times daily (with meals) 120 tablet 1     modafinil (PROVIGIL) 100 MG tablet Take 1/2 tablet (50 mg) along with 1 tablet (200 mg) for a total daily dose of 250 mg. 15 tablet 1     modafinil (PROVIGIL) 200 MG tablet Take 1 tablet (200 mg) along with 1/2 tablet (50 mg) for a total daily dose of 250 mg. 30 tablet 1     OLANZapine (ZYPREXA) 15 MG tablet Take 1 tablet (15 mg) by mouth At Bedtime 30 tablet 1     VITALS                                                                                                           3, 3   BP (!) 154/95   Pulse 94   Wt 118.2 kg (260 lb 9.6 oz)   BMI 34.62 kg/m       MENTAL STATUS EXAM                                                                       9, 14 cog gs     Alertness: alert  and oriented  Appearance: well groomed  Behavior/Demeanor: cooperative and calm, with fair eye contact   Speech: regular rate and rhythm  Language: no problems  Psychomotor: normal or unremarkable  Mood: description consistent with euthymia  Affect: slightly less blunted; was congruent to mood; was congruent to content  Thought Process/Associations: unremarkable  Thought Content:  Reports none;  Denies suicidal ideation, violent ideation and delusions  Perception:  Reports none;  Denies auditory hallucinations and visual hallucinations  Insight: fair  Judgment: fair  Cognition: (6) oriented: time, person, and place  attention span: intact  concentration: intact  recent memory: intact  remote memory: intact  fund of knowledge: appropriate  Gait and Station: unremarkable    LABS and DATA     AIMS:  5/17/2018 with total score of 0    PHQ9 TODAY = 9  PHQ-9 SCORE 9/21/2018 11/7/2018 12/7/2018   PHQ-9 Total Score - - -   PHQ-9 Total Score 4 3 9     ANTIPSYCHOTIC LABS  [glu, A1C, lipids (ivania LDL), liver enzymes, WBC, ANEU, Hgb, plts]  q12 mo  Recent Labs   Lab Test  12/06/18  1442 10/30/17  1429  09/30/16  1348   GLC 86 86   < > 83   A1C 5.0  --   --  4.6    < > = values in this interval not displayed.     Recent Labs   Lab Test 12/13/18  1027 12/06/18  1442   CHOL 173 184   TRIG 278* 535*   LDL 81 Cannot estimate LDL when triglyceride exceeds 400 mg/dL  95   HDL 36* 34*     Recent Labs   Lab Test 12/13/18  1027 12/06/18  1442   AST 51* 70*   * 178*   ALKPHOS 55 74     Recent Labs   Lab Test 12/06/18  1442 11/10/17  1534   WBC 8.9 8.6   ANEU 5.7 5.0   HGB 15.8 15.6    255     LITHIUM LABS     [level, renal, SG, TSH, WBC]    q6 mo  Recent Labs   Lab Test 12/13/18  1027 12/06/18  1442 12/01/17  0948 11/10/17  1534   LITHIUM 0.65 0.52* 0.64 0.49*     Recent Labs   Lab Test 12/06/18  1442 10/30/17  1429 03/22/17  1340 03/08/17  0953   CR 1.06 1.05 1.09 0.94   GFRESTIMATED 84 85 82 >90  Non  GFR Calc      141 143 140   POTASSIUM 4.1 4.2 3.9 3.7   ADONAY 9.2 9.2 9.3 9.5     Recent Labs   Lab Test 12/06/18  1505 10/30/17  1429 02/09/16  1247   SG 1.009 1.015 1.005     Recent Labs   Lab Test 12/06/18  1442 11/10/17  1534 10/30/17  1429 03/22/17  1340   TSH 3.01 2.09 2.92 3.03     Recent Labs   Lab Test 12/06/18  1442 11/10/17  1534 10/30/17  1429 03/22/17  1340   WBC 8.9 8.6 8.1 8.3   ANEU 5.7 5.0 4.8 5.5     DIAGNOSIS     Schizoaffective disorder, bipolar type, mood and psychosis in remission  ADHD, predominantly inattentive type    ASSESSMENT                                                                                                                   m2, h3     TODAY:  Jose Francisco Simentalhop presents to the [facility] for continued medication management of schizoaffective disorder and ADHD.  Patient endorses slight interval improvement in mood, as well as ease of getting out of bed concomitant with the reduction in his olanzapine to 15 mg.  Also has lost 5 pounds, and endorses that it's been easier for him to eat less.  He denies any  resurgence of elevated mood states, psychotic symptoms, disorganization, irritability or impulsiveness.  He also avows that his mother has not noticed these concerns.  Today he has asked for consideration of a dose increase in his Provigil, an agent which he strongly endorses benefit from in terms of his ADHD symptoms, in particular noting benefits in concentration, mental clarity and motivation.  We have agreed to try a 50 mg dose increase to Provigil 250 mg along with strong  for him to carefully monitor himself for any signs of emergent lauren or psychosis.  He has agreed to do so.  We will maintain the other aspects of his regimen without adjustment.  Finally, we have also reviewed the results of his most recent CMP, which demonstrate the same mild elevation in transaminases which have been noted during previous testing.  Manuel does have a history of elevated LFT's and has been worked up/followed for fatty liver by primary care.  This concern has provided further import to weight management for this patient and it is encouraging that his weight is trending in the right direction since the decrease in his Zyprexa.  Have agreed to recheck his LFTs in 2-3 months, and have also provided the recommendation that in the event his LFTs are still elevated, would suggest he revisit primary care to maintain a dialogue/management regarding this condition.      [place] PRESCRIPTION MONITORING PROGRAM [] was checked today:  indicates expected use in accordance with treatment plan    PSYCHOTROPIC DRUG INTERACTIONS:     LITHIUM and OLANZAPINE may result in increased neurotoxic effects of D2-antagonists.    MANAGEMENT:  Monitoring for adverse effects, routine vitals, routine labs and patient is aware of risks     PLAN                                                                                                                                m2, h3     1) PSYCHOTROPIC MEDICATIONS:  - increase modafinil to 250 mg daily  -  continue olanzapine 15 mg qHS  - continue lithium 1200 mg qHS  - continue Metformin 1000 mg BID with meals    2) THERAPY:  no    3) NEXT DUE:    Labs- Will repeat LFT's in 2-3 months  EKG- PRN  Rating Scales- AIMS due 5/2019    4) REFERRALS: No Referrals needed     5) RTC: 4 weeks    6) CRISIS NUMBERS:   Provided routinely in [facility].   [facility] [phone] (clinic)    [phone] (after hours)  CRISIS TEXT LINE: Text 562182 from anywhere in [place], anytime, any crisis 24/7;  OR SEE www.crisistextline.org    TREATMENT RISK STATEMENT:  The risks, benefits, alternatives and potential adverse effects have been discussed and are understood by the pt. The pt understands the risks of using street drugs or alcohol. There are no medical contraindications, the pt agrees to treatment with the ability to do so. The pt knows to call the clinic for any problems or to access emergency care if needed.  Medical and substance use concerns are documented above.  Psychotropic drug interaction check was done, including changes made today.     PSYCHIATRY CLINIC INDIVIDUAL PSYCHOTHERAPY NOTE                                                [16]   Start time- N/A        End time- N/A  Date last reviewed - 09/21/18       Date next due - 12/19/18 (or 12 months if Medicare)    Subjective: This supportive psychotherapy session addressed issues related to goals of therapy, current stressors, life stressors, work, family of origin, school  and health.  Patient's reaction: Preparatory in the context of mental status appropriate for ambulatory setting.  Progress: fair.  Plan: RTC 4 weeks  Psychotherapy services during this visit included  myself and the patient.   TREATMENT  PLAN          SYMPTOMS;PROBLEMS   MEASURABLE GOALS;    FUNCTIONAL IMPROVEMENT INTERVENTIONS;    GAINS MADE DISCHARGE CRITERIA   Nanette/Hypomania: increased activity and psychotic disorganization   stay free of drug abuse [cannabis], learn more about illness and take  medications as prescribed on a daily basis Supportive and cognitive psychotherapeutic techniques marked symptom improvement and achievement of functional goals   Psychosocial: occupational / vocational stress and nutrition/exercise   exercise for 20 minutes 3-7 days a week , improve nutrition and continue to plan for future education/employment Supportive and cognitive psychotherapeutic techniques marked symptom improvement and achievement of functional goals     PROVIDER:  Chau Dennis, DO    Patient not staffed in clinic.  Supervisor is Dr. Tay, who will sign the note.      Attestation:  I did not see this patient directly. I have reviewed the documentation and I agree with the resident's plan of care.   Joel Tay MD

## 2019-01-22 ENCOUNTER — OFFICE VISIT (OUTPATIENT)
Dept: FAMILY MEDICINE | Facility: CLINIC | Age: 29
End: 2019-01-22
Payer: COMMERCIAL

## 2019-01-22 VITALS
DIASTOLIC BLOOD PRESSURE: 86 MMHG | HEIGHT: 73 IN | TEMPERATURE: 97.9 F | SYSTOLIC BLOOD PRESSURE: 138 MMHG | BODY MASS INDEX: 34.33 KG/M2 | WEIGHT: 259 LBS

## 2019-01-22 DIAGNOSIS — E78.1 HIGH BLOOD TRIGLYCERIDES: ICD-10-CM

## 2019-01-22 DIAGNOSIS — F25.0 SCHIZOAFFECTIVE DISORDER, BIPOLAR TYPE (H): Primary | ICD-10-CM

## 2019-01-22 DIAGNOSIS — K76.0 FATTY LIVER: ICD-10-CM

## 2019-01-22 DIAGNOSIS — R74.8 ELEVATED LIVER ENZYMES: ICD-10-CM

## 2019-01-22 PROCEDURE — 99214 OFFICE O/P EST MOD 30 MIN: CPT | Performed by: PHYSICIAN ASSISTANT

## 2019-01-22 ASSESSMENT — MIFFLIN-ST. JEOR: SCORE: 2195.76

## 2019-01-22 NOTE — PATIENT INSTRUCTIONS
Consider Seeing Dr. Latif for a weight loss visit  To Consider Weight Management Visit:  Dr. Karely Latif  [address], [place] [address]   Phone: [phone]     See me in 6 months for weight recheck and to discuss liver enzyme recheck

## 2019-01-22 NOTE — PROGRESS NOTES
"  SUBJECTIVE:   Jose Francisco Sommers is a 28 year old male who presents to clinic today for the following health issues:    Dr. Dennis sent Manuel here to follow up on blood work.Liver Enzyme are slightly high, has known Fatty liver. His liver enzymes have been up for a few years. He notes his diet is improving but could use work. He wants to see a dietician.    He had an US a year ago which did show fatty liver.     He is on provigil which is cleared hepatically - this is a new med, but he started this after the liver enzymes were elevated.     He does not drink alcohol and he does not take acetaminophen a lot.    His mood is stable and he feels very good on Provigil. He has not had issues with mood.     Lab Results   Component Value Date     12/13/2018       Patient Active Problem List   Diagnosis     Schizoaffective disorder, bipolar type (H)     Hx of psychiatric care     Elevated liver enzymes     Fatty liver     High blood triglycerides     History of cannabis abuse     History of cocaine abuse      Current Outpatient Medications   Medication     lithium 600 MG capsule     metFORMIN (GLUCOPHAGE) 500 MG tablet     modafinil (PROVIGIL) 100 MG tablet     modafinil (PROVIGIL) 200 MG tablet     OLANZapine (ZYPREXA) 15 MG tablet     No current facility-administered medications for this visit.         Problem list and histories reviewed & adjusted, as indicated.  Additional history: as documented    Labs reviewed in EPIC    Reviewed and updated as needed this visit by clinical staff  Tobacco  Allergies       Reviewed and updated as needed this visit by Provider         ROS:  Constitutional, HEENT, cardiovascular, pulmonary, gi and gu systems are negative, except as otherwise noted.    OBJECTIVE:     /86 (BP Location: Right arm, Patient Position: Sitting, Cuff Size: Adult Regular)   Temp 97.9  F (36.6  C) (Oral)   Ht 1.842 m (6' 0.5\")   Wt 117.5 kg (259 lb)   BMI 34.64 kg/m    Body mass index is " 34.64 kg/m .  GENERAL: healthy, alert and no distress  Psych: Appropriate appearance.  Alert and oriented times 3; coherent speech, normal   rate and volume, able to articulate logical thoughts, able   to abstract reason, no tangential thoughts, no hallucinations   or delusions.  Normal behavior.  His affect is somewhat flat.      ASSESSMENT/PLAN:     (F25.0) Schizoaffective disorder, bipolar type (H)  (primary encounter diagnosis)  Comment:   Plan: Stable. Well managed by psychiatry     (R74.8) Elevated liver enzymes  Comment:   Plan: Persistent but not changing. Reviewed options. He declines to see a hepatologist for now - this is likely fatty liver and possibly medication induced, but less likely. Advised weight loss, dietary changes and will refer to a nutritionist and a weight management physician.  He wants to see me in 6 months after working on diet, weight loss and if his liver enzymes are still up agrees to consider hepatology evaluation/fibroscan.     (K76.0) Fatty liver  Comment:   Plan: NUTRITION REFERRAL        As noted     (E78.1) High blood triglycerides  Comment:   Plan: Counseling on dietary methods of improvement     Follow up as needed        ROMINA CHANG PA-C  [facility]

## 2019-02-07 DIAGNOSIS — F25.0 SCHIZOAFFECTIVE DISORDER, BIPOLAR TYPE (H): ICD-10-CM

## 2019-02-07 RX ORDER — OLANZAPINE 15 MG/1
15 TABLET ORAL AT BEDTIME
Qty: 30 TABLET | Refills: 0 | Status: SHIPPED | OUTPATIENT
Start: 2019-02-07 | End: 2019-02-28

## 2019-02-07 NOTE — TELEPHONE ENCOUNTER
Last Seen 1/4/2019  RTC 4 weeks  Cancel 0  No-Show 0    Next Appt 2/28/2019    Incoming Refill From [facility] via fax    Medication Requested OLANZapine (ZYPREXA) 15 MG tablet    Directions Take 1 tablet (15 mg) by mouth At Bedtime - Oral    Qty 30    Last Refill 1/8/2019      Medication Refill Approved Per Refill Protocol

## 2019-02-08 ENCOUNTER — OFFICE VISIT (OUTPATIENT)
Dept: NUTRITION | Facility: CLINIC | Age: 29
End: 2019-02-08
Payer: COMMERCIAL

## 2019-02-08 VITALS — HEIGHT: 74 IN | BODY MASS INDEX: 33.19 KG/M2 | WEIGHT: 258.6 LBS

## 2019-02-08 DIAGNOSIS — E78.1 HIGH BLOOD TRIGLYCERIDES: Primary | ICD-10-CM

## 2019-02-08 DIAGNOSIS — K76.0 FATTY LIVER: ICD-10-CM

## 2019-02-08 PROCEDURE — 97802 MEDICAL NUTRITION INDIV IN: CPT

## 2019-02-08 ASSESSMENT — MIFFLIN-ST. JEOR: SCORE: 2217.75

## 2019-02-08 NOTE — PROGRESS NOTES
Medical Nutrition Therapy  Visit Type:Initial assessment and intervention    Jose Francisco Sommers presents today for MNT and education related to weight management and fatty liver.   He is accompanied by self.     ASSESSMENT:   Patient comments/concerns relating to nutrition: Has been trying to lose weight for the past couple of years.  Started Olanzapine and saw weight gain.  Would like to learn more about what to eat and try to help with weight loss.  Feels going down on Olanzapine, feels better and has started to see weight loss.     Tried strict diets with eating less calories.  Says tried 3 times but struggles to do this for more than 1 month.  Says likes sugar and if slips up, will fall back into the trend.  Also tried to do this twice in November and holidays got him off guard.     Says problem area is after dinner- has been felling like something sweet.      Says seeing blood work helped with weight loss and cutting back. Says tried to eat more healthy foods and getting on the treadmill a lot.  Tried keeping track of meals but had struggle with estimating dinner.     NUTRITION HISTORY:  Wakes: 10am  Breakfast: 11:30-noon: oatmeal with egg; prior was eating donuts  Lunch: 2:30pm: sandwich OR [employer] (Spicy Italian- foot long on wheat bread, veggies and corona)/chicken breast, veggies and corona/steak and cheese)  PM: none OR 5 pm: salami and pepperoni OR ham with cheese on toasted bread (sandwich)  Dinner: 6-7pm: salad, bowl fruit and (6-10oz meat tilapia, salmon, chicken) OR 1 Chandler's pie OR eating out   Snacks: sweets (mostly ice-cream but also may have cookies, ice or candies)  9-10pm: PBJ sandwich OR none OR chips  11pm: 1 3/4 cup Cottage cheese (1/2 container) OR if full will eat smaller snack such as chips   Beverages: Coffee - decaf- all day (1 pot) with 1/4 cup almond milk (3-4 cups coffee total), water 4-5 cups/day and ICE beverage 2x/day.     Misses meals? none  Eats out:  1-2 meals/per week  "([employer] OR [employer], [address] [place], [place])- Big burger with 2 patties and fries, diet root beer    Previous diet education:  No     Food allergies/intolerances: none    Dislikes: some breads, lower-fat cottage cheese    Diet is high in: calories  Diet is low in: fiber, fruits and vegetables    EXERCISE: Walking the dog 1-2 times a day for 30-35 minutes early PM and 10-15 minutes in the evening with his mom.  Noon: treadmill for 1 mile, 2.5 mph.  Planning to join [employer]- working with a  on getting a program for weights.  Trying to build muscle to lose weight.     SOCIO/ECONOMIC:   Lives with: mother and father    MEDICATIONS:  Current Outpatient Medications   Medication     lithium 600 MG capsule     metFORMIN (GLUCOPHAGE) 500 MG tablet     modafinil (PROVIGIL) 100 MG tablet     modafinil (PROVIGIL) 200 MG tablet     OLANZapine (ZYPREXA) 15 MG tablet     No current facility-administered medications for this visit.        LABS:  Last Basic Metabolic Panel:  Lab Results   Component Value Date     12/06/2018      Lab Results   Component Value Date    POTASSIUM 4.1 12/06/2018     Lab Results   Component Value Date    CHLORIDE 103 12/06/2018     Lab Results   Component Value Date    ADONAY 9.2 12/06/2018     Lab Results   Component Value Date    CO2 26 12/06/2018     Lab Results   Component Value Date    BUN 11 12/06/2018     Lab Results   Component Value Date    CR 1.06 12/06/2018     Lab Results   Component Value Date    GLC 86 12/06/2018       ANTHROPOMETRICS:  Vitals: Ht 1.88 m (6' 2\")   Wt 117.3 kg (258 lb 9.6 oz)   BMI 33.20 kg/m    Body mass index is 33.2 kg/m .      Wt Readings from Last 5 Encounters:   01/22/19 117.5 kg (259 lb)   01/11/18 114.8 kg (253 lb)   11/07/17 117.5 kg (259 lb)       Weight Change: Lost 0.4 lbs in the past 2 weeks    ESTIMATED KCAL REQUIREMENTS:  2639 kcal per day    NUTRITION DIAGNOSIS: Overweight/Obesity related to excessive energy intake as evidenced by diet discussion " and BMI >30.    NUTRITION INTERVENTION:  Nutrition Prescription: Energy Intake: 2200 kcal/day for weight loss  Education given to support: general nutrition guidelines, weight reduction, consistent meals, fat modification, exercise, fiber, behavior modification, portion control and heart healthy diet  Education Materials Provided: 1800 Calorie 5-day Sample Menus, 100 Calorie Snacks, Weight Loss Tips and Cooking Tips for Weight Management  Motivational Interviewing    Discussion: Reviewed patient's 24 hour diet recall and changes patient can make to reduce calories and/or portions.  Discussed healthy breakfast, lunch, dinner and snack ideas and suggested plate method for simplicity on balancing meals.  Informed estimated energy needs are around 2200 calories/day for weight loss, and suggested meal recording to help identify problem areas and helpful tracking Apps.  Patient has experience with FitBit in the past.  Stressed importance of portion control at meals and snacks and discussed general healthy eating tips.    For now, Manuel would like to focus on reducing portions later in the day. Heart healthy fats were encouraged in moderation, as was lean meats and heathy preparation methods to help with weight loss and improve fatty liver.  Commended him on staying active and looking at resistance training for weight loss.  To help with encouragement while increasing activity, encouraged to focus on how clothes fit since scale not reflect any muscle accumulation.  Patient seems receptive to the information provided and verbalized understanding of concepts discussed and recommendations provided.      PATIENT'S BEHAVIOR CHANGE GOALS:   See Patient Instructions for patient stated behavior change goals. AVS was printed and given to patient at today's appointment.    MONITOR / EVALUATE:  RD will monitor/evaluate:  Progress toward meeting stated nutrition-related goals  Weight change    FOLLOW-UP:  Call RD with  questions/concerns.   Follow-up appointment scheduled on 3/6/19.     Melissa Dallas RD, LD, CDE   Time spent in minutes: 60 minutes  Encounter: Individual

## 2019-02-08 NOTE — PATIENT INSTRUCTIONS
Goals:    1. Eat less meat at dinner and limit 1 plate.    2. Finish off leftovers at the first snack time (9-10pm)    3. If still hungry, peanut butter and jelly sandwich OR cottage cheese    4. Try to analyze if hungry before grabbing a snack- if you are thinking you are grabbing out of boredom, try to keep it smaller than usual and/or find activity that may take the place of eating     5. If struggling, try recording meals.    6. Continue to stay active.     FOLLOW UP: Wednesday, March 6th at 2:30pm at [facility]    Melissa Dallas RD, PAZ, CDE   [phone]

## 2019-02-14 DIAGNOSIS — F25.0 SCHIZOAFFECTIVE DISORDER, BIPOLAR TYPE (H): ICD-10-CM

## 2019-02-15 RX ORDER — OLANZAPINE 15 MG/1
15 TABLET ORAL AT BEDTIME
Qty: 30 TABLET | Refills: 0 | OUTPATIENT
Start: 2019-02-15

## 2019-02-26 DIAGNOSIS — F25.0 SCHIZOAFFECTIVE DISORDER, BIPOLAR TYPE (H): ICD-10-CM

## 2019-02-26 RX ORDER — LITHIUM CARBONATE 600 MG/1
CAPSULE ORAL
Qty: 60 CAPSULE | Refills: 0 | Status: CANCELLED | OUTPATIENT
Start: 2019-02-26

## 2019-02-28 ENCOUNTER — OFFICE VISIT (OUTPATIENT)
Dept: PSYCHIATRY | Facility: CLINIC | Age: 29
End: 2019-02-28
Attending: PSYCHIATRY & NEUROLOGY
Payer: COMMERCIAL

## 2019-02-28 VITALS
DIASTOLIC BLOOD PRESSURE: 88 MMHG | BODY MASS INDEX: 32.74 KG/M2 | SYSTOLIC BLOOD PRESSURE: 144 MMHG | WEIGHT: 255 LBS | HEART RATE: 81 BPM

## 2019-02-28 DIAGNOSIS — F90.9 ATTENTION DEFICIT HYPERACTIVITY DISORDER (ADHD), UNSPECIFIED ADHD TYPE: ICD-10-CM

## 2019-02-28 DIAGNOSIS — F25.0 SCHIZOAFFECTIVE DISORDER, BIPOLAR TYPE (H): ICD-10-CM

## 2019-02-28 DIAGNOSIS — Z79.899 ENCOUNTER FOR LONG-TERM (CURRENT) USE OF MEDICATIONS: Primary | ICD-10-CM

## 2019-02-28 PROCEDURE — G0463 HOSPITAL OUTPT CLINIC VISIT: HCPCS | Mod: ZF

## 2019-02-28 RX ORDER — MODAFINIL 200 MG/1
TABLET ORAL
Qty: 30 TABLET | Refills: 1 | Status: SHIPPED | OUTPATIENT
Start: 2019-02-28 | End: 2019-05-02

## 2019-02-28 RX ORDER — LITHIUM CARBONATE 600 MG/1
1200 CAPSULE ORAL AT BEDTIME
Qty: 60 CAPSULE | Refills: 2 | Status: SHIPPED | OUTPATIENT
Start: 2019-02-28 | End: 2019-05-21

## 2019-02-28 RX ORDER — MODAFINIL 100 MG/1
TABLET ORAL
Qty: 30 TABLET | Refills: 1 | Status: SHIPPED | OUTPATIENT
Start: 2019-02-28 | End: 2019-05-02

## 2019-02-28 RX ORDER — OLANZAPINE 15 MG/1
15 TABLET ORAL AT BEDTIME
Qty: 30 TABLET | Refills: 1 | Status: SHIPPED | OUTPATIENT
Start: 2019-02-28 | End: 2019-05-06

## 2019-02-28 ASSESSMENT — PAIN SCALES - GENERAL: PAINLEVEL: NO PAIN (0)

## 2019-02-28 NOTE — NURSING NOTE
Chief Complaint   Patient presents with     Recheck Medication     Attention deficit hyperactivity disorder

## 2019-02-28 NOTE — PROGRESS NOTES
[facility] PSYCHIATRY CLINIC PROGRESS NOTE     CARE TEAM:  PCP-Issac Watson PA-C at [facility]   Specialty Providers- none   Therapist- none   Community  Team- none    Jose Francisco Sommers is a 28 year old male who prefers the name Manuel & pronouns he, him, his, himself.  The initial diagnostic evaluation was on 7/24/2015.  Date of the most recent transfer of care eval is 07/13/2018.    Pertinent Background:  Jose Francisco Sommers first experienced mental health issues in grade school and has received treatment for ADHD, depression, psychosis and substance use.  See last transfer evaluation for detailed history. Notably, this patient had FEP in 2015 in the setting of cannabis abuse and has been stable on olanzapine and lithium since that time. He has never lived independently and has only been slowly developing insight into his illness and past symptoms. GeneSight testing was completed noting patient is a poor CYP2D6 metabolizer.  Past records from Dr. Mike Powers were received in summer 2018 to assist with diagnostic clarity, however they were hand written notes that were illegible though accompanied by typed neuropsychology consult from [facility].  This documentation was unable to support a diagnosis of bipolar disorder at that time though noted potential Asperger's diagnosis and ADHD.  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  Most recently, modafinil was started in April 2018 for attention support to endorsed benefits and heretofore no concerns for exacerbation of mood or psychotic symptoms.    Psych critical item history includes psychosis [disorganization, possible catatonia], mutiple psychotropic trials, psych hosp [x1 at [facility], spring 2015], commitment and CD: alcohol and cannabis.    INTERIM HISTORY                                                                                                                  4, 4   The patient reports good treatment adherence.  History was provided by the patient and his mother who were good historians.  The last visit ended with the following med change: increase modafinil to 250 mg daily.   Since the last visit:  Manuel endorses that his mood and concentration have both been better since the increase in his modafinil at our last visit. States that he has been having less difficulty getting out of bed in the morning, and is enjoying increased motivation.  Has been eating better and going to the gym, and has lost some weight.  His mother, who is present for the visit, confirms her observation that he seems more energetic and focused, and states that she has noted a slight brightening of his affect and improvement in his spontaneity.  Both Manuel and his mother deny concerns related to emergent hypo-/manic symptoms, stating that there has been no irritability, agitation, impulsivity or concerns for paranoia/delusional thinking.  The patient denies SI/SIB thoughts, violent/aggressive ideation, depressed mood, panic/anxious acuity, psychotic features or other hallmarks of psychiatric acuity/dangerousness.  He did state that he feels as if his short-term memory has not been as good since the emergence of his schizoaffective disorder, and we touched base on his remote neuropsych testing (approximately ten years ago).  Agreed that it might be reasonable to repeat this.  Also agreed to another dose increase in modafinil, given the patient's endorsement of further benefits found with previous upward dose adjustments as corroborated by his mother.    RECENT SYMPTOMS:   DEPRESSION:  reports-low energy, hypersomnia and poor concentration /memory - all improved with recent medication changes;  DENIES- suicidal ideation, self-destructive thoughts, depressed mood, feeling worthless, excessive guilt, feeling hopeless, feeling trapped, excessive crying and overwhelmed  KAMI/HYPOMANIA:  reports-none;   DENIES- increased energy, decreased sleep need, increased activity and grandiosity  PSYCHOSIS:  reports-none;  DENIES- delusions, auditory hallucinations and visual hallucinations  DYSREGULATION:  reports-none;  DENIES- suicidal ideation, violent ideation, SIB, mood dysregulation, impulsive, aggressive, irritable and physically agitated  PANIC ATTACK:  none   ANXIETY:  none  TRAUMA RELATED:  none  COMPULSIVE:  none  SLEEP:  improvement in excessive sleep  EATING DISORDER: no    RECENT SUBSTANCE USE:     ALCOHOL- rare  TOBACCO- none  CAFFEINE- coffee/ tea [1 pot of coffee a day]  OPIOIDS- none         NARCAN KIT- N/A  CANNABIS- none  OTHER ILLICIT DRUGS- none      CURRENT SOCIAL HISTORY:  FINANCIAL SUPPORT- parents; also makes some money selling [employer]  CHILDREN- none  LIVING SITUATION- lives with parents in [place]  SOCIAL/ SPIRITUAL SUPPORT- family, [employer] card community, Spiritism hemalatha  FEELS SAFE AT HOME- yes    MEDICAL ROS (2,10):  A comprehensive review of systems was performed and is negative other than noted in the HPI.    PSYCH and CD Critical Summary Points since July 2018 August 2018: Modafinil dose increased to 200 mg daily  November 2018: Olanzapine decreased to 17.5 mg at bedtime  December 2018: Olanzapine decreased to 15 mg at bedtime  January 2019: Modafinil dose increased to 250 mg daily    PAST PSYCH MED TRIALS   see EMR Problem List: Hx of psychiatric care    MEDICAL / SURGICAL HISTORY                                   Neurologic Hx- no     Patient Active Problem List   Diagnosis     Schizoaffective disorder, bipolar type (H)     Hx of psychiatric care     Elevated liver enzymes     Fatty liver     High blood triglycerides     History of cannabis abuse     History of cocaine abuse       Major Surgery- no    ALLERGY                                Sulfa drugs     MEDICATIONS                               Current Outpatient Medications   Medication Sig Dispense Refill      lithium 600 MG capsule Take 2 capsules (1,200 mg) by mouth At Bedtime 60 capsule 1     metFORMIN (GLUCOPHAGE) 500 MG tablet Take 2 tablets (1,000 mg) by mouth 2 times daily (with meals) 120 tablet 1     modafinil (PROVIGIL) 100 MG tablet Take 1/2 tablet (50 mg) along with 1 tablet (200 mg) for a total daily dose of 250 mg. 15 tablet 1     modafinil (PROVIGIL) 200 MG tablet Take 1 tablet (200 mg) along with 1/2 tablet (50 mg) for a total daily dose of 250 mg. 30 tablet 1     OLANZapine (ZYPREXA) 15 MG tablet Take 1 tablet (15 mg) by mouth At Bedtime 30 tablet 0     VITALS                                                                                                           3, 3   /88   Pulse 81   Wt 115.7 kg (255 lb)   BMI 32.74 kg/m       MENTAL STATUS EXAM                                                                       9, 14 cog gs     Alertness: alert  and oriented  Appearance: well groomed  Behavior/Demeanor: cooperative and calm, with fair eye contact   Speech: regular rate and rhythm  Language: no problems  Psychomotor: normal or unremarkable  Mood: description consistent with euthymia  Affect: slightly less blunted; was congruent to mood; was congruent to content  Thought Process/Associations: unremarkable  Thought Content:  Reports none;  Denies suicidal ideation, violent ideation and delusions  Perception:  Reports none;  Denies auditory hallucinations and visual hallucinations  Insight: fair  Judgment: fair  Cognition: (6) oriented: time, person, and place  attention span: intact  concentration: intact  recent memory: intact  remote memory: intact  fund of knowledge: appropriate  Gait and Station: unremarkable    LABS and DATA     AIMS:  5/17/2018 with total score of 0    PHQ9 TODAY = 0  PHQ-9 SCORE 9/21/2018 11/7/2018 12/7/2018   PHQ-9 Total Score - - -   PHQ-9 Total Score 4 3 9     ANTIPSYCHOTIC LABS  [glu, A1C, lipids (ivania LDL), liver enzymes, WBC, ANEU, Hgb, plts]  q12 mo  Recent Labs    Lab Test 12/06/18  1442 10/30/17  1429  09/30/16  1348   GLC 86 86   < > 83   A1C 5.0  --   --  4.6    < > = values in this interval not displayed.     Recent Labs   Lab Test 12/13/18  1027 12/06/18  1442   CHOL 173 184   TRIG 278* 535*   LDL 81 Cannot estimate LDL when triglyceride exceeds 400 mg/dL  95   HDL 36* 34*     Recent Labs   Lab Test 12/13/18  1027 12/06/18  1442   AST 51* 70*   * 178*   ALKPHOS 55 74     Recent Labs   Lab Test 12/06/18  1442 11/10/17  1534   WBC 8.9 8.6   ANEU 5.7 5.0   HGB 15.8 15.6    255     LITHIUM LABS     [level, renal, SG, TSH, WBC]    q6 mo  Recent Labs   Lab Test 12/13/18  1027 12/06/18  1442 12/01/17  0948 11/10/17  1534   LITHIUM 0.65 0.52* 0.64 0.49*     Recent Labs   Lab Test 12/06/18  1442 10/30/17  1429 03/22/17  1340 03/08/17  0953   CR 1.06 1.05 1.09 0.94   GFRESTIMATED 84 85 82 >90  Non  GFR Calc      141 143 140   POTASSIUM 4.1 4.2 3.9 3.7   ADONAY 9.2 9.2 9.3 9.5     Recent Labs   Lab Test 12/06/18  1505 10/30/17  1429 02/09/16  1247   SG 1.009 1.015 1.005     Recent Labs   Lab Test 12/06/18  1442 11/10/17  1534 10/30/17  1429 03/22/17  1340   TSH 3.01 2.09 2.92 3.03     Recent Labs   Lab Test 12/06/18  1442 11/10/17  1534 10/30/17  1429 03/22/17  1340   WBC 8.9 8.6 8.1 8.3   ANEU 5.7 5.0 4.8 5.5     DIAGNOSIS     Schizoaffective disorder, bipolar type, mood and psychosis in remission  ADHD, predominantly inattentive type    ASSESSMENT                                                                                                                   m2, h3     TODAY:  Manuel Simentalhop presents to the [email or web address] for continued medication management of schizoaffective disorder and ADHD.  Patient endorses continued interval improvements in mood, concentration and motivation with recent dose increase of modafinil.  Over the past 6 months, we have endeavored a fine tuning of his regimen with a slight decrease in olanzapine and  careful upward titration of modafinil - with the goal of providing sufficient management of his psychotic illness without excessive mental dulling or other antipsychotic side effects (such as increased appetite) - concomitant with treating his cognitive concerns/ADHD as safely as possible, using modafinil, an agent which has been much more well tolerated in this patient than stimulants or bupropion.  Today in addition to the improvements highlighted above, Manuel denies emergent hypo-/manic symptoms, such as irritability, impulsivity, or agitation, and denies development of paranoia, delusional thinking or other psychotic features.  Also denies SI/SIB thoughts, violent/aggressive ideation, depressed mood, panic/anxious acuity or other hallmarks of psychiatric acuity/dangerousness.  He has requested the consideration of another dose increase in his modafinil, and we have agreed to trial this, given its heretofore tolerability/efficacy, and with the understanding that this patient is carefully monitored by his parents, who he lives with.  One additional item discussed today was the patient's endorsement that his short-term memory has never fully recovered since the first development of his schizoaffective disorder, and we have agreed to consider repeat neuropsych testing for this concern.  Today we will plan to increase modafinil to 300 mg and will continue his other medications without adjustment.  On a final note, this patient has had a history of elevated LFTs in the setting of fatty liver, and as a mild transaminitis was noted during yearly antipsychotic monitoring labs in December 2018, we will plan to recheck this at our next clinic appointment, with the expectation that if once again LFTs are noted to be elevated we will  the patient to schedule a visit with his PCP to revisit this concern.    [place] PRESCRIPTION MONITORING PROGRAM [] was checked today:  indicates expected use in accordance with treatment  plan    PSYCHOTROPIC DRUG INTERACTIONS:     LITHIUM and OLANZAPINE may result in increased neurotoxic effects of D2-antagonists.    MANAGEMENT:  Monitoring for adverse effects, routine vitals, routine labs and patient is aware of risks     PLAN                                                                                                                                m2, h3     1) PSYCHOTROPIC MEDICATIONS:  - increase modafinil to 300 mg daily  - continue olanzapine 15 mg qHS  - continue lithium 1200 mg qHS  - continue Metformin 1000 mg BID with meals    2) THERAPY:  no    3) NEXT DUE:    Labs- will repeat LFT's at next visit  EKG- PRN  Rating Scales- AIMS due 5/2019    4) REFERRALS: to consider repeat neuropsych testing     5) RTC: 4-6 weeks    6) CRISIS NUMBERS:   Provided routinely in [facility].   [facility] [phone] (clinic)    [phone] (after hours)  CRISIS TEXT LINE: Text [id number] from anywhere in [place], anytime, any crisis 24/7;  OR SEE www.crisistextline.org    TREATMENT RISK STATEMENT:  The risks, benefits, alternatives and potential adverse effects have been discussed and are understood by the pt. The pt understands the risks of using street drugs or alcohol. There are no medical contraindications, the pt agrees to treatment with the ability to do so. The pt knows to call the clinic for any problems or to access emergency care if needed.  Medical and substance use concerns are documented above.  Psychotropic drug interaction check was done, including changes made today.     PSYCHIATRY CLINIC INDIVIDUAL PSYCHOTHERAPY NOTE                                                [16]   Start time- N/A        End time- N/A  Date last reviewed - 09/21/18       Date next due - 12/19/18 (or 12 months if Medicare)    Subjective: This supportive psychotherapy session addressed issues related to goals of therapy, current stressors, life stressors, work, family of origin, school  and health.  Patient's reaction:  Preparatory in the context of mental status appropriate for ambulatory setting.  Progress: fair.  Plan: RTC 4 weeks  Psychotherapy services during this visit included  myself and the patient.   TREATMENT  PLAN          SYMPTOMS;PROBLEMS   MEASURABLE GOALS;    FUNCTIONAL IMPROVEMENT INTERVENTIONS;    GAINS MADE DISCHARGE CRITERIA   Nanette/Hypomania: increased activity and psychotic disorganization   stay free of drug abuse [cannabis], learn more about illness and take medications as prescribed on a daily basis Supportive and cognitive psychotherapeutic techniques marked symptom improvement and achievement of functional goals   Psychosocial: occupational / vocational stress and nutrition/exercise   exercise for 20 minutes 3-7 days a week , improve nutrition and continue to plan for future education/employment Supportive and cognitive psychotherapeutic techniques marked symptom improvement and achievement of functional goals     PROVIDER:  Chau Dennis, DO    Patient staffed in clinic with Dr. Tay who will sign the note.  Supervisor is Dr. Tay.    Supervisor Attestation:  I met with Jose Francisco Sommers along with the resident physician, Chau Dennis MD. I participated in key portions of the service, including the mental status examination and developing the plan of care. I reviewed key portions of the history with the resident. I agree with the findings and plan as documented in this note.  Joel Tay MD

## 2019-03-04 ASSESSMENT — PATIENT HEALTH QUESTIONNAIRE - PHQ9: SUM OF ALL RESPONSES TO PHQ QUESTIONS 1-9: 0

## 2019-03-06 ENCOUNTER — OFFICE VISIT (OUTPATIENT)
Dept: NUTRITION | Facility: CLINIC | Age: 29
End: 2019-03-06
Payer: COMMERCIAL

## 2019-03-06 VITALS — WEIGHT: 253.6 LBS | BODY MASS INDEX: 33.61 KG/M2 | HEIGHT: 73 IN

## 2019-03-06 DIAGNOSIS — E78.1 HIGH BLOOD TRIGLYCERIDES: Primary | ICD-10-CM

## 2019-03-06 DIAGNOSIS — K76.0 FATTY LIVER: ICD-10-CM

## 2019-03-06 PROCEDURE — 97803 MED NUTRITION INDIV SUBSEQ: CPT

## 2019-03-06 ASSESSMENT — MIFFLIN-ST. JEOR: SCORE: 2175.23

## 2019-03-06 NOTE — PROGRESS NOTES
Medical Nutrition Therapy  Visit Type:Reassessment and intervention    Jose Francisco Sommers presents today for MNT and education related to weight management, hypertriglyceridemia and fatty liver.   He is accompanied by self.     ASSESSMENT:   Patient comments/concerns relating to nutrition: Says has been taking protein powder.      Finds limiting to dinner 1 plate helpful- finds needs both snacks regardless so this is an easy way to cut back on calories.  Says stopped picking up bags of candy.     Mom commented that upper part of stomach looked more lean.     NUTRITION HISTORY:    Breakfast: 10am: Protein shake (20 gm)  AM: none  Lunch: noon: Oatmeal OR Subway (wrap/wheat, veggies, corona, chipotle sauce - steak, oven roasted chicken, chicken)  PM: Protein shake (20 gm) and [employer]way   Dinner: 6-7pm: salad, fruit, meat - limiting food to 1 plate   9pm: sometimes sandwich but usually something smaller - pumpkin biscuit or fruit (cut out chips)  11pm: cottage cheese     Beverages: Pop (Diet) large 1x/day, Coffee pot decaf/day, 2 ICE beverage/day and Water all day    Misses meals? none  Eats out:  5-6 meals/per week (mostly Subway and better foods OR Chipotle)    Previous diet education:  Yes     Food allergies/intolerances: none    Diet is high in: carbs at some sittings  Diet is low in: calcium and fruits    EXERCISE: Started going to the gym (1-2 pm on weekdays)- 5 days. Will do 15 minutes of cardio and then will do 30-45 minutes on weights.  Treadmill walking on occasion. Working with a  at [employer] so not working same muscle group each time.     SOCIO/ECONOMIC:   Lives with: mother and father    MEDICATIONS:  Current Outpatient Medications   Medication     lithium (ESKALITH) 600 MG capsule     metFORMIN (GLUCOPHAGE) 500 MG tablet     modafinil (PROVIGIL) 100 MG tablet     modafinil (PROVIGIL) 200 MG tablet     OLANZapine (ZYPREXA) 15 MG tablet     No current facility-administered medications  "for this visit.        LABS:  Last Basic Metabolic Panel:  Lab Results   Component Value Date     12/06/2018      Lab Results   Component Value Date    POTASSIUM 4.1 12/06/2018     Lab Results   Component Value Date    CHLORIDE 103 12/06/2018     Lab Results   Component Value Date    ADONAY 9.2 12/06/2018     Lab Results   Component Value Date    CO2 26 12/06/2018     Lab Results   Component Value Date    BUN 11 12/06/2018     Lab Results   Component Value Date    CR 1.06 12/06/2018     Lab Results   Component Value Date    GLC 86 12/06/2018       ANTHROPOMETRICS:  Vitals: Ht 1.848 m (6' 0.75\")   Wt 115 kg (253 lb 9.6 oz)   BMI 33.69 kg/m    Body mass index is 33.69 kg/m .      Wt Readings from Last 5 Encounters:   02/08/19 117.3 kg (258 lb 9.6 oz)   01/22/19 117.5 kg (259 lb)   01/11/18 114.8 kg (253 lb)   11/07/17 117.5 kg (259 lb)       Weight Change: Lost 5 lbs in the past month    ESTIMATED KCAL REQUIREMENTS:  2829 kcal per day (increased with adding weights to maintain weight)    NUTRITION DIAGNOSIS: Overweight/Obesity related to prior excessive energy intake as evidenced by BMI >30    NUTRITION INTERVENTION:  Nutrition Prescription: Energy Intake: 2300 kcal/day for weight loss  Protein Intake:  grams/day (0.8-1.0 gm/kg CBW)  Education given to support: general nutrition guidelines, weight reduction, consistent meals, fat modification, dining out/special occasions, fiber, behavior modification, portion control and heart healthy diet  Motivational Interviewing    Discussion: Commended Manuel on his goal achievement to limit dinner to 1 plate and reduce portions for 9pm snack.  He has been working to cut back and not purchase sweets, make healthier food choices when eating out and has started working with a  and weight lifting 5 days week.  He did not need to meal record to help with changes.  Suspect increased activity and reduced portions at dinner are helping with weight loss. He also " made changes to try to eat more chicken when dining out.    He had questions on protein needs so he eats enough but not too much.  Says added protein shake 2x/day and each shake provides 20 gm protein and he does not think any carbs.  Informed Manuel his estimated protein needs are  gm/day and calculated current protein intake.  He is likely exceeding this with eating foot long sub or Chipotle burrito, since estimated that will easily provide 3-4 oz protein, and having another shake.  Discussed doing either or after a workout but did recommend when drinking protein shake after recovery, to ensure include some carbs- can eat oatmeal or fruit with it or make with milk.  He is agreeable to this suggestion.  No other food changes recommended today since weight loss is at a good rate between 1-2 lbs a week, plus he is likely gaining muscle.  Pt verbalized understanding of concepts discussed and recommendations provided.       PATIENT'S BEHAVIOR CHANGE GOALS:   See Patient Instructions for patient stated behavior change goals. AVS was printed and given to patient at today's appointment.    MONITOR / EVALUATE:  RD will monitor/evaluate:  Progress toward meeting stated nutrition-related goals  Weight change    FOLLOW-UP:  Call RD with questions/concerns.   Follow-up appointment scheduled on 5/8/19.     Melissa Dallas RD, LD, CDE   Time spent in minutes: 35 minutes  Encounter: Individual

## 2019-05-02 ENCOUNTER — OFFICE VISIT (OUTPATIENT)
Dept: PSYCHIATRY | Facility: CLINIC | Age: 29
End: 2019-05-02
Attending: PSYCHIATRY & NEUROLOGY
Payer: COMMERCIAL

## 2019-05-02 VITALS
WEIGHT: 250 LBS | SYSTOLIC BLOOD PRESSURE: 136 MMHG | DIASTOLIC BLOOD PRESSURE: 82 MMHG | HEART RATE: 71 BPM | BODY MASS INDEX: 33.21 KG/M2

## 2019-05-02 DIAGNOSIS — F25.0 SCHIZOAFFECTIVE DISORDER, BIPOLAR TYPE (H): ICD-10-CM

## 2019-05-02 DIAGNOSIS — Z79.899 ENCOUNTER FOR LONG-TERM (CURRENT) USE OF MEDICATIONS: Primary | ICD-10-CM

## 2019-05-02 DIAGNOSIS — F90.9 ATTENTION DEFICIT HYPERACTIVITY DISORDER (ADHD), UNSPECIFIED ADHD TYPE: ICD-10-CM

## 2019-05-02 PROCEDURE — G0463 HOSPITAL OUTPT CLINIC VISIT: HCPCS | Mod: ZF

## 2019-05-02 RX ORDER — MODAFINIL 200 MG/1
TABLET ORAL
Qty: 30 TABLET | Refills: 2 | Status: SHIPPED | OUTPATIENT
Start: 2019-05-02 | End: 2019-06-14

## 2019-05-02 RX ORDER — MODAFINIL 100 MG/1
TABLET ORAL
Qty: 30 TABLET | Refills: 2 | Status: SHIPPED | OUTPATIENT
Start: 2019-05-02 | End: 2019-06-14

## 2019-05-02 ASSESSMENT — PAIN SCALES - GENERAL: PAINLEVEL: NO PAIN (0)

## 2019-05-02 NOTE — PATIENT INSTRUCTIONS
Thank you for coming to the PSYCHIATRY CLINIC.    Lab Testing:  If you had lab testing today and your results are reassuring or normal they will be mailed to you or sent through [employer] within 7 days.   If the lab tests need quick action we will call you with the results.  The phone number we will call with results is # [phone] (home) . If this is not the best number please call our clinic and change the number.    Medication Refills:  If you need any refills please call your pharmacy and they will contact us. Our fax number for refills is [phone]. Please allow three business for refill processing.   If you need to  your refill at a new pharmacy, please contact the new pharmacy directly. The new pharmacy will help you get your medications transferred.     Scheduling:  If you have any concerns about today's visit or wish to schedule another appointment please call our office during normal business hours [phone] (8-5:00 M-F)    Contact Us:  Please call [phone] during business hours (8-5:00 M-F).  If after clinic hours, or on the weekend, please call  [phone].    Financial Assistance [phone]  [facility] Billing [phone]  [facility] Billing [phone]  Medical Records [phone]      MENTAL HEALTH CRISIS NUMBERS:  [facility]:   [facility] - [phone]   Crisis Residence [facility] - [phone]   Walk-In Counseling [facility] [phone]   COPE 24/7 [place] Mobile Team for Adults - [[phone]]; Child - [[phone]]        [facility]:   [facility] - [phone]   Walk-in counseling [facility] - [phone]   Walk-in counseling [facility] - [phone]   Crisis Residence [facility] - [phone]   Urgent Care Adult Mental Health:   --Drop-in, 24/7 crisis line, and [place] Mobile Team [[phone]]    CRISIS TEXT LINE: Text [phone] from anywhere,  anytime, any crisis 24/7;    OR SEE www.crisistextline.org     Poison Control Center - [phone]    CHILD: [facility] needs assessment team - [phone]     [facility] - [phone]; or [facility] - [phone]    If you have a medical emergency please call 911or go to the nearest ER.                    _____________________________________________    Again thank you for choosing PSYCHIATRY CLINIC and please let us know how we can best partner with you to improve you and your family's health.  You may be receiving a survey in the mail regarding this appointment. We would love to have your feedback, both positive and negative, so please fill out the survey and return it using the provided envelope. The survey is done by an external company, so your answers are anonymous.

## 2019-05-02 NOTE — PROGRESS NOTES
[facility] PSYCHIATRY CLINIC PROGRESS NOTE     CARE TEAM:  PCP-Issac Watson PA-C at [facility]   Specialty Providers- none   Therapist- none   Community  Team- none    Jose Francisco Sommers is a 28 year old male who prefers the name Manuel & pronouns he, him, his, himself.  The initial diagnostic evaluation was on 7/24/2015.  Date of the most recent transfer of care eval is 07/13/2018.    Pertinent Background:  Jose Francisco Sommers first experienced mental health issues in grade school and has received treatment for ADHD, depression, psychosis and substance use.  See last transfer evaluation for detailed history. Notably, this patient had FEP in 2015 in the setting of cannabis abuse and has been stable on olanzapine and lithium since that time. He has never lived independently and has only been slowly developing insight into his illness and past symptoms. GeneSight testing was completed noting patient is a poor CYP2D6 metabolizer.  Past records from Dr. Mike Powers were received in summer 2018 to assist with diagnostic clarity, however they were hand written notes that were illegible though accompanied by typed neuropsychology consult from [facility].  This documentation was unable to support a diagnosis of bipolar disorder at that time though noted potential Asperger's diagnosis and ADHD.  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  Most recently, modafinil was started in April 2018 for attention support to endorsed benefits and heretofore no concerns for exacerbation of mood or psychotic symptoms.    Psych critical item history includes psychosis [disorganization, possible catatonia], mutiple psychotropic trials, psych hosp [x1 at [facility], spring 2015], commitment and CD: alcohol and cannabis.    INTERIM HISTORY                                                                                                                 " 4, 4   The patient reports good treatment adherence.  History was provided by the patient and his mother who were good historians.  The last visit ended with the following med change: increase modafinil to 300 mg daily.   Since the last visit:  Manuel reports that overall \"things are good.\"  Elaborates that mood is relatively stable/good, with occasional days of feeling frustrated with wishing that he could get more done with his day.  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  Also has been going to [employer] and takes advantage of the  service there.  Denies concerns for inappropriately elevated mood, SI/SIB thoughts, violent/aggressive ideation, panic/anxious acuity, psychotic symptoms or other hallmarks of decompensation with dangerousness.  Elaborates that he continues to have some concerns with sleep initiation, which has been a long-standing concern.  Avows his perception that this is not due to his Provigil, and states that this concern predated that medication and has not been notably affected by it's upward titration.  Also reiterates his perception that that has been a very valuable medication for attention and productivity.  Shares his consideration of potentially taking some classes this fall, probably at [employer]  Is considering college algebra and computer science.  Provided the patient with positive feedback/reinforcement for considering this step.  Agreed to consider repeat neuropsych testing prior to the fall, something which we had talked about in the past.  Agreed that it might be helpful for clarifying areas for support.    RECENT SYMPTOMS:   DEPRESSION:  reports-low energy and hypersomnia - both improved with recent medication changes;  DENIES- suicidal ideation, self-destructive thoughts, depressed mood, feeling worthless, excessive guilt, feeling hopeless, feeling trapped, " excessive crying and overwhelmed  KAMI/HYPOMANIA:  reports-none;  DENIES- increased energy, decreased sleep need, increased activity and grandiosity  PSYCHOSIS:  reports-none;  DENIES- delusions, auditory hallucinations and visual hallucinations  DYSREGULATION:  reports-none;  DENIES- suicidal ideation, violent ideation, SIB, mood dysregulation, impulsive, aggressive, irritable and physically agitated  PANIC ATTACK:  none   ANXIETY:  none  TRAUMA RELATED:  none  COMPULSIVE:  none  SLEEP:  improvement in excessive sleep  EATING DISORDER: no    RECENT SUBSTANCE USE:     ALCOHOL- rare  TOBACCO- none  CAFFEINE- coffee/ tea [1 pot of coffee a day]  OPIOIDS- none         NARCAN KIT- N/A  CANNABIS- none  OTHER ILLICIT DRUGS- none      CURRENT SOCIAL HISTORY:  FINANCIAL SUPPORT- parents; also makes some money selling [employer]  CHILDREN- none  LIVING SITUATION- lives with parents in [place]  SOCIAL/ SPIRITUAL SUPPORT- family, [employer] card community, Buddhism hemalatha  FEELS SAFE AT HOME- yes    MEDICAL ROS (2,10):  A comprehensive review of systems was performed and is negative other than noted in the HPI.    PSYCH and CD Critical Summary Points since July 2018 August 2018:  Modafinil dose increased to 200 mg daily.  November 2018:  Olanzapine decreased to 17.5 mg at bedtime.  December 2018:  Olanzapine decreased to 15 mg at bedtime.  January 2019:  Modafinil dose increased to 250 mg daily.  February 2019:  Modafinil dose increased to 300 mg daily.    PAST PSYCH MED TRIALS   see EMR Problem List: Hx of psychiatric care    MEDICAL / SURGICAL HISTORY                                   Neurologic Hx- no     Patient Active Problem List   Diagnosis     Schizoaffective disorder, bipolar type (H)     Hx of psychiatric care     Elevated liver enzymes     Fatty liver     High blood triglycerides     History of cannabis abuse     History of cocaine abuse       Major Surgery- no    ALLERGY                                 Sulfa drugs     MEDICATIONS                               Current Outpatient Medications   Medication Sig Dispense Refill     lithium (ESKALITH) 600 MG capsule Take 2 capsules (1,200 mg) by mouth At Bedtime 60 capsule 2     metFORMIN (GLUCOPHAGE) 500 MG tablet Take 2 tablets (1,000 mg) by mouth 2 times daily (with meals) 120 tablet 2     modafinil (PROVIGIL) 100 MG tablet Take 1 tablet (100 mg) along with 1 tablet (200 mg) for a total daily dose of 300 mg. 30 tablet 2     modafinil (PROVIGIL) 200 MG tablet Take 1 tablet (200 mg) along with 1 tablet (100 mg) for a total daily dose of 300 mg. 30 tablet 2     OLANZapine (ZYPREXA) 15 MG tablet Take 1 tablet (15 mg) by mouth At Bedtime 30 tablet 1     VITALS                                                                                                           3, 3   /82   Pulse 71   Wt 113.4 kg (250 lb)   BMI 33.21 kg/m       MENTAL STATUS EXAM                                                                       9, 14 cog gs     Alertness: alert  and oriented  Appearance: well groomed  Behavior/Demeanor: cooperative and calm, with fair eye contact   Speech: regular rate and rhythm  Language: no problems  Psychomotor: normal or unremarkable  Mood: description consistent with euthymia  Affect: less blunted; was congruent to mood; was congruent to content  Thought Process/Associations: unremarkable  Thought Content:  Reports none;  Denies suicidal ideation, violent ideation and delusions  Perception:  Reports none;  Denies auditory hallucinations and visual hallucinations  Insight: fair  Judgment: fair  Cognition: (6) oriented: time, person, and place  attention span: intact  concentration: intact  recent memory: intact  remote memory: intact  fund of knowledge: appropriate  Gait and Station: unremarkable    LABS and DATA     AIMS:  5/17/2018 with total score of 0    PHQ9 TODAY = 7  PHQ-9 SCORE 11/7/2018 12/7/2018 2/28/2019   PHQ-9 Total Score - - -   PHQ-9  Total Score 3 9 0     ANTIPSYCHOTIC LABS  [glu, A1C, lipids (ivania LDL), liver enzymes, WBC, ANEU, Hgb, plts]  q12 mo  Recent Labs   Lab Test 12/06/18  1442 10/30/17  1429  09/30/16  1348   GLC 86 86   < > 83   A1C 5.0  --   --  4.6    < > = values in this interval not displayed.     Recent Labs   Lab Test 12/13/18  1027 12/06/18  1442   CHOL 173 184   TRIG 278* 535*   LDL 81 Cannot estimate LDL when triglyceride exceeds 400 mg/dL  95   HDL 36* 34*     Recent Labs   Lab Test 12/13/18  1027 12/06/18  1442   AST 51* 70*   * 178*   ALKPHOS 55 74     Recent Labs   Lab Test 12/06/18  1442 11/10/17  1534   WBC 8.9 8.6   ANEU 5.7 5.0   HGB 15.8 15.6    255     LITHIUM LABS     [level, renal, SG, TSH, WBC]    q6 mo  Recent Labs   Lab Test 12/13/18  1027 12/06/18  1442 12/01/17  0948 11/10/17  1534   LITHIUM 0.65 0.52* 0.64 0.49*     Recent Labs   Lab Test 12/06/18  1442 10/30/17  1429 03/22/17  1340 03/08/17  0953   CR 1.06 1.05 1.09 0.94   GFRESTIMATED 84 85 82 >90  Non  GFR Calc      141 143 140   POTASSIUM 4.1 4.2 3.9 3.7   ADONAY 9.2 9.2 9.3 9.5     Recent Labs   Lab Test 12/06/18  1505 10/30/17  1429 02/09/16  1247   SG 1.009 1.015 1.005     Recent Labs   Lab Test 12/06/18  1442 11/10/17  1534 10/30/17  1429 03/22/17  1340   TSH 3.01 2.09 2.92 3.03     Recent Labs   Lab Test 12/06/18  1442 11/10/17  1534 10/30/17  1429 03/22/17  1340   WBC 8.9 8.6 8.1 8.3   ANEU 5.7 5.0 4.8 5.5     DIAGNOSIS     Schizoaffective disorder, bipolar type, mood and psychosis in remission  ADHD, predominantly inattentive type    ASSESSMENT                                                                                                                   m2, h3     TODAY:  Manuel Sommers presents to the [facility] for continued medication management of schizoaffective disorder and ADHD.  Patient endorses overall perception of interval mood stability, denying emergent hypo-/manic symptoms, such as  irritability, impulsivity, or agitation, and denies development of paranoia, delusional thinking or other psychotic features.  Also denies SI/SIB thoughts, violent/aggressive ideation, depressed mood, panic/anxious acuity or other hallmarks of psychiatric acuity/dangerousness.  Allows some occasional low moods associated with frustration that he attributes to wishing he could be more productive.  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  Endorses his perception of the efficacy and tolerability of his current regimen and we have agreed to continue same without adjustment.  Also have agreed to pursue repeat neuropsych testing, as he is considering starting college this fall and feel that this might be beneficial for elucidating possible areas for additional support.    [place] PRESCRIPTION MONITORING PROGRAM [] was checked today:  indicates expected use in accordance with treatment plan    PSYCHOTROPIC DRUG INTERACTIONS:   LITHIUM and OLANZAPINE may result in increased neurotoxic effects of D2-antagonists.    MANAGEMENT:  Monitoring for adverse effects, routine vitals, routine labs and patient is aware of risks     PLAN                                                                                                                                m2, h3     1) PSYCHOTROPIC MEDICATIONS:  Continue olanzapine 15 mg at bedtime.  Continue lithium 1200 mg at bedtime.  Continue modafinil 300 mg daily.  Continue Metformin 1000 mg BID with meals.    2) THERAPY:  Recommended, however the patient was definitively uninterested.    3) NEXT DUE:    Labs- repeat AP and lithium labs  EKG- PRN  Rating Scales- AIMS due 5/2019    4) REFERRALS: to consider repeat neuropsych testing     5) RTC: 4-6 weeks    6) CRISIS NUMBERS:   Provided routinely in [facility].   [facility] [phone] (clinic)     [phone] (after hours)  CRISIS TEXT LINE: Text [id number] from anywhere in [place], anytime, any crisis 24/7;  OR SEE www.crisistextline.org    TREATMENT RISK STATEMENT:  The risks, benefits, alternatives and potential adverse effects have been discussed and are understood by the pt. The pt understands the risks of using street drugs or alcohol. There are no medical contraindications, the pt agrees to treatment with the ability to do so. The pt knows to call the clinic for any problems or to access emergency care if needed.  Medical and substance use concerns are documented above.  Psychotropic drug interaction check was done, including changes made today.     PSYCHIATRY CLINIC INDIVIDUAL PSYCHOTHERAPY NOTE                                                [16]   Start time- N/A        End time- N/A  Date last reviewed - 09/21/18       Date next due - 12/19/18 (or 12 months if Medicare)    Subjective: This supportive psychotherapy session addressed issues related to goals of therapy, current stressors, life stressors, work, family of origin, school  and health.  Patient's reaction: Preparatory in the context of mental status appropriate for ambulatory setting.  Progress: fair.  Plan: RTC 4 weeks  Psychotherapy services during this visit included  myself and the patient.   TREATMENT  PLAN          SYMPTOMS;PROBLEMS   MEASURABLE GOALS;    FUNCTIONAL IMPROVEMENT INTERVENTIONS;    GAINS MADE DISCHARGE CRITERIA   Nanette/Hypomania: increased activity and psychotic disorganization   stay free of drug abuse [cannabis], learn more about illness and take medications as prescribed on a daily basis Supportive and cognitive psychotherapeutic techniques marked symptom improvement and achievement of functional goals   Psychosocial: occupational / vocational stress and nutrition/exercise   exercise for 20 minutes 3-7 days a week , improve nutrition and continue to plan for future education/employment Supportive and cognitive  psychotherapeutic techniques marked symptom improvement and achievement of functional goals     PROVIDER:  Chau Dennis, DO    Patient staffed in clinic with Dr. Ibrahim who will sign the note.  Supervisor is Dr. Tay.  I saw the patient with the resident, and participated in key portions of the service, including the mental status examination and developing the plan of care. I reviewed key portions of the history with the resident. I agree with the findings and plan as documented in this note.    Flaca Ibrahim MD

## 2019-05-06 DIAGNOSIS — F25.0 SCHIZOAFFECTIVE DISORDER, BIPOLAR TYPE (H): ICD-10-CM

## 2019-05-06 ASSESSMENT — PATIENT HEALTH QUESTIONNAIRE - PHQ9: SUM OF ALL RESPONSES TO PHQ QUESTIONS 1-9: 7

## 2019-05-07 DIAGNOSIS — Z79.899 ENCOUNTER FOR LONG-TERM (CURRENT) USE OF MEDICATIONS: ICD-10-CM

## 2019-05-07 LAB
ALBUMIN SERPL-MCNC: 4.3 G/DL (ref 3.4–5)
ALP SERPL-CCNC: 69 U/L (ref 40–150)
ALT SERPL W P-5'-P-CCNC: 52 U/L (ref 0–70)
ANION GAP SERPL CALCULATED.3IONS-SCNC: 5 MMOL/L (ref 3–14)
AST SERPL W P-5'-P-CCNC: 26 U/L (ref 0–45)
BASOPHILS # BLD AUTO: 0 10E9/L (ref 0–0.2)
BASOPHILS NFR BLD AUTO: 0.5 %
BILIRUB DIRECT SERPL-MCNC: <0.1 MG/DL (ref 0–0.2)
BILIRUB SERPL-MCNC: 0.4 MG/DL (ref 0.2–1.3)
BUN SERPL-MCNC: 19 MG/DL (ref 7–30)
CALCIUM SERPL-MCNC: 9.3 MG/DL (ref 8.5–10.1)
CHLORIDE SERPL-SCNC: 109 MMOL/L (ref 94–109)
CHOLEST SERPL-MCNC: 141 MG/DL
CO2 SERPL-SCNC: 26 MMOL/L (ref 20–32)
CREAT SERPL-MCNC: 1.14 MG/DL (ref 0.66–1.25)
DIFFERENTIAL METHOD BLD: NORMAL
EOSINOPHIL # BLD AUTO: 0.2 10E9/L (ref 0–0.7)
EOSINOPHIL NFR BLD AUTO: 3.2 %
ERYTHROCYTE [DISTWIDTH] IN BLOOD BY AUTOMATED COUNT: 13.2 % (ref 10–15)
GFR SERPL CREATININE-BSD FRML MDRD: 87 ML/MIN/{1.73_M2}
GLUCOSE SERPL-MCNC: 88 MG/DL (ref 70–99)
HBA1C MFR BLD: 4.9 % (ref 0–5.6)
HCT VFR BLD AUTO: 45.2 % (ref 40–53)
HDLC SERPL-MCNC: 39 MG/DL
HGB BLD-MCNC: 15 G/DL (ref 13.3–17.7)
LDLC SERPL CALC-MCNC: 63 MG/DL
LITHIUM SERPL-SCNC: 0.61 MMOL/L (ref 0.6–1.2)
LYMPHOCYTES # BLD AUTO: 2 10E9/L (ref 0.8–5.3)
LYMPHOCYTES NFR BLD AUTO: 27.4 %
MCH RBC QN AUTO: 29.9 PG (ref 26.5–33)
MCHC RBC AUTO-ENTMCNC: 33.2 G/DL (ref 31.5–36.5)
MCV RBC AUTO: 90 FL (ref 78–100)
MONOCYTES # BLD AUTO: 0.6 10E9/L (ref 0–1.3)
MONOCYTES NFR BLD AUTO: 7.7 %
NEUTROPHILS # BLD AUTO: 4.5 10E9/L (ref 1.6–8.3)
NEUTROPHILS NFR BLD AUTO: 61.2 %
NONHDLC SERPL-MCNC: 102 MG/DL
PLATELET # BLD AUTO: 247 10E9/L (ref 150–450)
POTASSIUM SERPL-SCNC: 4 MMOL/L (ref 3.4–5.3)
PROT SERPL-MCNC: 7.6 G/DL (ref 6.8–8.8)
RBC # BLD AUTO: 5.01 10E12/L (ref 4.4–5.9)
SODIUM SERPL-SCNC: 139 MMOL/L (ref 133–144)
SP GR UR STRIP: 1.01 (ref 1–1.03)
TRIGL SERPL-MCNC: 195 MG/DL
TSH SERPL DL<=0.005 MIU/L-ACNC: 3.09 MU/L (ref 0.4–4)
WBC # BLD AUTO: 7.3 10E9/L (ref 4–11)

## 2019-05-07 PROCEDURE — 81003 URINALYSIS AUTO W/O SCOPE: CPT | Performed by: STUDENT IN AN ORGANIZED HEALTH CARE EDUCATION/TRAINING PROGRAM

## 2019-05-07 PROCEDURE — 36415 COLL VENOUS BLD VENIPUNCTURE: CPT | Performed by: STUDENT IN AN ORGANIZED HEALTH CARE EDUCATION/TRAINING PROGRAM

## 2019-05-07 PROCEDURE — 84443 ASSAY THYROID STIM HORMONE: CPT | Performed by: STUDENT IN AN ORGANIZED HEALTH CARE EDUCATION/TRAINING PROGRAM

## 2019-05-07 PROCEDURE — 80053 COMPREHEN METABOLIC PANEL: CPT | Performed by: STUDENT IN AN ORGANIZED HEALTH CARE EDUCATION/TRAINING PROGRAM

## 2019-05-07 PROCEDURE — 80178 ASSAY OF LITHIUM: CPT | Performed by: STUDENT IN AN ORGANIZED HEALTH CARE EDUCATION/TRAINING PROGRAM

## 2019-05-07 PROCEDURE — 85025 COMPLETE CBC W/AUTO DIFF WBC: CPT | Performed by: STUDENT IN AN ORGANIZED HEALTH CARE EDUCATION/TRAINING PROGRAM

## 2019-05-07 PROCEDURE — 82248 BILIRUBIN DIRECT: CPT | Performed by: STUDENT IN AN ORGANIZED HEALTH CARE EDUCATION/TRAINING PROGRAM

## 2019-05-07 PROCEDURE — 83036 HEMOGLOBIN GLYCOSYLATED A1C: CPT | Performed by: STUDENT IN AN ORGANIZED HEALTH CARE EDUCATION/TRAINING PROGRAM

## 2019-05-07 PROCEDURE — 80061 LIPID PANEL: CPT | Performed by: STUDENT IN AN ORGANIZED HEALTH CARE EDUCATION/TRAINING PROGRAM

## 2019-05-08 RX ORDER — OLANZAPINE 15 MG/1
15 TABLET ORAL AT BEDTIME
Qty: 30 TABLET | Refills: 2 | Status: SHIPPED | OUTPATIENT
Start: 2019-05-08 | End: 2019-08-13

## 2019-05-08 NOTE — TELEPHONE ENCOUNTER
Medication requested: OLANZapine (ZYPREXA) 15 MG tablet  Last refilled: 3/31/19  Qty: 30      Last seen: 5/2/19  RTC: 4-6 weeks  Cancel: 0  No-show: 0  Next appt: 6/14/19    Refill decision:   Refill pended and routed to the provider for review/determination due to   Last note from 5/2/19 is not signed

## 2019-05-21 RX ORDER — LITHIUM CARBONATE 600 MG/1
1200 CAPSULE ORAL AT BEDTIME
Qty: 60 CAPSULE | Refills: 2 | Status: SHIPPED | OUTPATIENT
Start: 2019-05-21 | End: 2019-08-23

## 2019-06-14 ENCOUNTER — OFFICE VISIT (OUTPATIENT)
Dept: PSYCHIATRY | Facility: CLINIC | Age: 29
End: 2019-06-14
Attending: PSYCHIATRY & NEUROLOGY
Payer: COMMERCIAL

## 2019-06-14 VITALS
DIASTOLIC BLOOD PRESSURE: 72 MMHG | HEART RATE: 67 BPM | WEIGHT: 248 LBS | SYSTOLIC BLOOD PRESSURE: 120 MMHG | BODY MASS INDEX: 32.94 KG/M2

## 2019-06-14 DIAGNOSIS — F90.9 ATTENTION DEFICIT HYPERACTIVITY DISORDER (ADHD), UNSPECIFIED ADHD TYPE: ICD-10-CM

## 2019-06-14 DIAGNOSIS — F29 PSYCHOSIS, UNSPECIFIED PSYCHOSIS TYPE (H): Primary | ICD-10-CM

## 2019-06-14 DIAGNOSIS — F25.0 SCHIZOAFFECTIVE DISORDER, BIPOLAR TYPE (H): ICD-10-CM

## 2019-06-14 PROCEDURE — G0463 HOSPITAL OUTPT CLINIC VISIT: HCPCS | Mod: ZF

## 2019-06-14 RX ORDER — MODAFINIL 100 MG/1
TABLET ORAL
Qty: 30 TABLET | Refills: 1 | Status: SHIPPED | OUTPATIENT
Start: 2019-07-25 | End: 2019-06-14

## 2019-06-14 RX ORDER — LITHIUM CARBONATE 600 MG/1
1200 CAPSULE ORAL AT BEDTIME
Qty: 60 CAPSULE | Refills: 2 | Status: CANCELLED | OUTPATIENT
Start: 2019-06-14

## 2019-06-14 RX ORDER — MODAFINIL 100 MG/1
TABLET ORAL
Qty: 30 TABLET | Refills: 2 | COMMUNITY
Start: 2019-05-02 | End: 2019-07-26

## 2019-06-14 RX ORDER — OLANZAPINE 15 MG/1
15 TABLET ORAL AT BEDTIME
Qty: 30 TABLET | Refills: 2 | Status: CANCELLED | OUTPATIENT
Start: 2019-06-14

## 2019-06-14 RX ORDER — MODAFINIL 200 MG/1
TABLET ORAL
Qty: 30 TABLET | Refills: 2 | COMMUNITY
Start: 2019-05-02 | End: 2019-07-26

## 2019-06-14 RX ORDER — MODAFINIL 200 MG/1
TABLET ORAL
Qty: 30 TABLET | Refills: 1 | Status: SHIPPED | OUTPATIENT
Start: 2019-07-25 | End: 2019-06-14

## 2019-06-14 ASSESSMENT — PAIN SCALES - GENERAL: PAINLEVEL: NO PAIN (0)

## 2019-06-14 NOTE — NURSING NOTE
Chief Complaint   Patient presents with     Recheck Medication     Attention deficit hyperactivity disorder (ADHD), combined type

## 2019-06-14 NOTE — PATIENT INSTRUCTIONS
Thank you for coming to the PSYCHIATRY CLINIC.    Lab Testing:  If you had lab testing today and your results are reassuring or normal they will be mailed to you or sent through [employer] within 7 days.   If the lab tests need quick action we will call you with the results.  The phone number we will call with results is # [phone] (home) . If this is not the best number please call our clinic and change the number.    Medication Refills:  If you need any refills please call your pharmacy and they will contact us. Our fax number for refills is [phone]. Please allow three business for refill processing.   If you need to  your refill at a new pharmacy, please contact the new pharmacy directly. The new pharmacy will help you get your medications transferred.     Scheduling:  If you have any concerns about today's visit or wish to schedule another appointment please call our office during normal business hours [phone] (8-5:00 M-F)    Contact Us:  Please call [phone] during business hours (8-5:00 M-F).  If after clinic hours, or on the weekend, please call  [phone].    Financial Assistance [phone]  [email or web address] Billing [phone]  [employer] Billing [phone]  Medical Records [phone]      MENTAL HEALTH CRISIS NUMBERS:  [facility]:   [facility] - [phone]   Crisis Residence [facility] - [phone]   Walk-In Counseling [facility] [phone]   COPE 24/7 [place] Mobile Team for Adults - [[phone]]; Child - [[phone]]        [facility]:   [facility] - [phone]   Walk-in counseling [facility] - [phone]   Walk-in counseling [facility] - [phone]   Crisis Residence [facility] - [phone]   Urgent Care Adult Mental Health:   --Drop-in, 24/7 crisis line, and [place] Mobile Team [[phone]]    CRISIS TEXT LINE: Text [phone] from anywhere,  anytime, any crisis 24/7;    OR SEE www.crisistextline.org     Poison Control Center - [phone]    CHILD: [facility] needs assessment team - [phone]     [facility] - [phone]; or [employer] [facility] - [phone]    If you have a medical emergency please call 911or go to the nearest ER.                    _____________________________________________    Again thank you for choosing PSYCHIATRY CLINIC and please let us know how we can best partner with you to improve you and your family's health.  You may be receiving a survey in the mail regarding this appointment. We would love to have your feedback, both positive and negative, so please fill out the survey and return it using the provided envelope. The survey is done by an external company, so your answers are anonymous.

## 2019-06-14 NOTE — PROGRESS NOTES
[facility] PSYCHIATRY CLINIC PROGRESS NOTE     CARE TEAM:  PCP-Issac Watson PA-C at [facility]   Specialty Providers- none   Therapist- none   Community  Team- none    Jose Francisco Sommers is a 28 year old male who prefers the name Manuel & pronouns he, him, his, himself.  The initial diagnostic evaluation was on 7/24/2015.  Date of the most recent transfer of care eval is 07/13/2018.    Pertinent Background:  Jose Francisco Sommers first experienced mental health issues in grade school and has received treatment for ADHD, depression, psychosis and substance use.  See last transfer evaluation for detailed history. Notably, this patient had FEP in 2015 in the setting of cannabis abuse and has been stable on olanzapine and lithium since that time. He has never lived independently and has only been slowly developing insight into his illness and past symptoms. GeneSight testing was completed noting patient is a poor CYP2D6 metabolizer.  Past records from Dr. Mike Powers were received in summer 2018 to assist with diagnostic clarity, however they were hand written notes that were illegible though accompanied by typed neuropsychology consult from [facility].  This documentation was unable to support a diagnosis of bipolar disorder at that time though noted potential Asperger's diagnosis and ADHD.  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  Most recently, modafinil was started in April 2018 for attention support to endorsed benefits and heretofore no concerns for exacerbation of mood or psychotic symptoms.    Psych critical item history includes psychosis [disorganization, possible catatonia], mutiple psychotropic trials, psych hosp [x1 at [facility], spring 2015], commitment and CD: alcohol and cannabis.    INTERIM HISTORY                                                                                                                 " 4, 4   The patient reports good treatment adherence.  History was provided by the patient who was a good historian.  The last visit ended with no change to the med regimen.  Since the last visit:  -States that his mood has been good.  Sometimes has a sense of frustration with his day, feeling like he hasn't done enough.  Elaborates however that he has been much more productive over the past year with Provigil, and is very grateful for this addition to his regimen.  In terms of that medication, he denies feeling any side effects, including a \"wearing off\" effect, and elaborates \"I don't get any sense of being on a medication, it feels very normal.\"  -Touched base on our discussion of pursuing neuropsych testing.  Told Manuel that this referral has been placed, and have heard from one of the psychologists that it would be communicated to scheduling.  -Still planning to start classes next fall, including college algebra and introduction to computer programming.  -Relates that he is still eating better, and going to the gym.  -Denies any concerns for emergent kami or psychosis, and voices his assessment that his medications are both efficacious and well tolerated.  -States that he feels he is in a better place than he was one year ago, and adds: \"I think things are going to just continue to get better.\"    RECENT SYMPTOMS:   DEPRESSION:  reports-low energy and hypersomnia - both improved with recent medication changes;  DENIES- suicidal ideation, self-destructive thoughts, depressed mood, feeling worthless, excessive guilt, feeling hopeless, feeling trapped, excessive crying and overwhelmed  KAMI/HYPOMANIA:  reports-none;  DENIES- increased energy, decreased sleep need, increased activity and grandiosity  PSYCHOSIS:  reports-none;  DENIES- delusions, auditory hallucinations and visual hallucinations  DYSREGULATION:  reports-none;  DENIES- suicidal ideation, violent ideation, SIB, mood dysregulation, impulsive, " aggressive, irritable and physically agitated  PANIC ATTACK:  none   ANXIETY:  none  TRAUMA RELATED:  none  COMPULSIVE:  none  SLEEP:  improvement in excessive sleep  EATING DISORDER: no    RECENT SUBSTANCE USE:     ALCOHOL- rare  TOBACCO- none  CAFFEINE- coffee/ tea [1 pot of coffee a day]  OPIOIDS- none         NARCAN KIT- N/A  CANNABIS- none  OTHER ILLICIT DRUGS- none      CURRENT SOCIAL HISTORY:  FINANCIAL SUPPORT- parents; also makes some money selling [employer]  CHILDREN- none  LIVING SITUATION- lives with parents in [place]  SOCIAL/ SPIRITUAL SUPPORT- family, [employer] card community, [employer]  FEELS SAFE AT HOME- yes    MEDICAL ROS (2,10):  A comprehensive review of systems was performed and is negative other than noted in the HPI.    PSYCH and CD Critical Summary Points since July 2018 August 2018:  Modafinil dose increased to 200 mg daily.  November 2018:  Olanzapine decreased to 17.5 mg at bedtime.  December 2018:  Olanzapine decreased to 15 mg at bedtime.  January 2019:  Modafinil dose increased to 250 mg daily.  February 2019:  Modafinil dose increased to 300 mg daily.    PAST PSYCH MED TRIALS   see EMR Problem List: Hx of psychiatric care    MEDICAL / SURGICAL HISTORY                                   Neurologic Hx- no     Patient Active Problem List   Diagnosis     Schizoaffective disorder, bipolar type (H)     Hx of psychiatric care     Elevated liver enzymes     Fatty liver     High blood triglycerides     History of cannabis abuse     History of cocaine abuse       Major Surgery- no    ALLERGY                                Sulfa drugs     MEDICATIONS                               Current Outpatient Medications   Medication Sig Dispense Refill     lithium (ESKALITH) 600 MG capsule Take 2 capsules (1,200 mg) by mouth At Bedtime 60 capsule 2     metFORMIN (GLUCOPHAGE) 500 MG tablet Take 2 tablets (1,000 mg) by mouth 2 times daily (with meals) 120 tablet 2     modafinil  (PROVIGIL) 100 MG tablet Take 1 tablet (100 mg) along with 1 tablet (200 mg) for a total daily dose of 300 mg. 30 tablet 2     modafinil (PROVIGIL) 200 MG tablet Take 1 tablet (200 mg) along with 1 tablet (100 mg) for a total daily dose of 300 mg. 30 tablet 2     OLANZapine (ZYPREXA) 15 MG tablet Take 1 tablet (15 mg) by mouth At Bedtime 30 tablet 2     VITALS                                                                                                           3, 3   /72   Pulse 67   Wt 112.5 kg (248 lb)   BMI 32.94 kg/m       MENTAL STATUS EXAM                                                                       9, 14 cog gs     Alertness: alert  and oriented  Appearance: well groomed  Behavior/Demeanor: cooperative and calm, with fair eye contact  Speech: regular rate and rhythm  Language: no problems  Psychomotor: normal or unremarkable  Mood: description consistent with euthymia  Affect: less blunted; was congruent to mood; was congruent to content  Thought Process/Associations: unremarkable  Thought Content:  Reports none;  Denies suicidal ideation, violent ideation and delusions  Perception:  Reports none;  Denies auditory hallucinations and visual hallucinations  Insight: fair  Judgment: fair  Cognition: (6) oriented: time, person, and place  attention span: intact  concentration: intact  recent memory: intact  remote memory: intact  fund of knowledge: appropriate  Gait and Station: unremarkable    LABS and DATA     AIMS:  5/17/2018 with total score of 0    PHQ9 TODAY = 4  PHQ-9 SCORE 12/7/2018 2/28/2019 5/2/2019   PHQ-9 Total Score - - -   PHQ-9 Total Score 9 0 7     ANTIPSYCHOTIC LABS  [glu, A1C, lipids (ivania LDL), liver enzymes, WBC, ANEU, Hgb, plts]  q12 mo  Recent Labs   Lab Test 05/07/19  1101 12/06/18  1442   GLC 88 86   A1C 4.9 5.0     Recent Labs   Lab Test 05/07/19  1101 12/13/18  1027   CHOL 141 173   TRIG 195* 278*   LDL 63 81   HDL 39* 36*     Recent Labs   Lab Test 05/07/19  1101  12/13/18  1027   AST 26 51*   ALT 52 146*   ALKPHOS 69 55     Recent Labs   Lab Test 05/07/19  1101 12/06/18  1442   WBC 7.3 8.9   ANEU 4.5 5.7   HGB 15.0 15.8    270     LITHIUM LABS     [level, renal, SG, TSH, WBC]    q6 mo  Recent Labs   Lab Test 05/07/19  1101 12/13/18  1027 12/06/18  1442 12/01/17  0948   LITHIUM 0.61 0.65 0.52* 0.64     Recent Labs   Lab Test 05/07/19  1101 12/06/18  1442 10/30/17  1429 03/22/17  1340   CR 1.14 1.06 1.05 1.09   GFRESTIMATED 87 84 85 82    139 141 143   POTASSIUM 4.0 4.1 4.2 3.9   ADONAY 9.3 9.2 9.2 9.3     Recent Labs   Lab Test 05/07/19  1101 12/06/18  1505 10/30/17  1429 02/09/16  1247   SG 1.015 1.009 1.015 1.005     Recent Labs   Lab Test 05/07/19  1101 12/06/18  1442 11/10/17  1534 10/30/17  1429   TSH 3.09 3.01 2.09 2.92     Recent Labs   Lab Test 05/07/19  1101 12/06/18  1442 11/10/17  1534 10/30/17  1429   WBC 7.3 8.9 8.6 8.1   ANEU 4.5 5.7 5.0 4.8     DIAGNOSIS     Schizoaffective disorder, bipolar type, mood and psychosis in remission  ADHD, predominantly inattentive type    ASSESSMENT                                                                                                                   m2, h3     TODAY:  Manuel Sommers presents to the [facility] for continued medication management of schizoaffective disorder and ADHD.  Patient endorses overall perception of interval mood stability, denying emergent hypo-/manic symptoms, such as irritability, impulsivity, or agitation, and denies development of paranoia, delusional thinking or other psychotic features. Also denies SI/SIB thoughts, violent/aggressive ideation, depressed mood, panic/anxious acuity or other hallmarks of psychiatric acuity/dangerousness.  States that overall mood is good, with some occasional frustration as to the amount he feels he is accomplishing in his day - however, at the same time feels that he is much more productive than he was prior to starting Provigil, and  stresses his evaluation of the importance/value of this medication to his regimen.  Currently, he is planning to start school again next fall.  Have agreed to pursue neuropsych testing this summer, and explained this writer has placed that referral and has exchanged messages with one of the neuropsychologist's, Dr. Benavides, regarding his case.  Overall, Manuel endorses feeling that his mood and functioning is improved over the last year or so, and states he is optimistic for the future.  Have agreed to maintain his present regimen.  Next appointment will be with Dr. Cadet, after the resident transition.    [place] PRESCRIPTION MONITORING PROGRAM [] was checked today:  indicates expected use in accordance with treatment plan    PSYCHOTROPIC DRUG INTERACTIONS:   LITHIUM and OLANZAPINE may result in increased neurotoxic effects of D2-antagonists.    MANAGEMENT:  Monitoring for adverse effects, routine vitals, routine labs and patient is aware of risks     PLAN                                                                                                                                m2, h3     1) PSYCHOTROPIC MEDICATIONS:  Continue olanzapine 15 mg at bedtime.  Continue lithium 1200 mg at bedtime.  Continue modafinil 300 mg daily.  Continue Metformin 1000 mg BID with meals.    2) THERAPY:  Has been recommended, patient markedly disinterested.    3) NEXT DUE:    Labs- AP and lithium labs per protocol.  EKG- PRN  Rating Scales- AIMS due next appointment.    4) REFERRALS: Repeat neuropsych testing - order has been placed.    5) RTC: 4-6 weeks    6) CRISIS NUMBERS:   Provided routinely in [facility].  [facility] [phone] (clinic)    [phone] (after hours)  CRISIS TEXT LINE: Text [phone] from anywhere in [place], anytime, any crisis 24/7;  OR SEE www.crisistextline.org    TREATMENT RISK STATEMENT:  The risks, benefits, alternatives and potential adverse effects have been discussed and are understood by the pt. The pt  understands the risks of using street drugs or alcohol. There are no medical contraindications, the pt agrees to treatment with the ability to do so. The pt knows to call the clinic for any problems or to access emergency care if needed.  Medical and substance use concerns are documented above.  Psychotropic drug interaction check was done, including changes made today.     PSYCHIATRY CLINIC INDIVIDUAL PSYCHOTHERAPY NOTE                                                [16]   Start time- N/A        End time- N/A  Date last reviewed - 09/21/18       Date next due - 12/19/18 (or 12 months if Medicare)    Subjective: This supportive psychotherapy session addressed issues related to goals of therapy, current stressors, life stressors, work, family of origin, school  and health.  Patient's reaction: Preparatory in the context of mental status appropriate for ambulatory setting.  Progress: fair.  Plan: RTC 4 weeks  Psychotherapy services during this visit included  myself and the patient.   TREATMENT  PLAN          SYMPTOMS;PROBLEMS   MEASURABLE GOALS;    FUNCTIONAL IMPROVEMENT INTERVENTIONS;    GAINS MADE DISCHARGE CRITERIA   Nanette/Hypomania: increased activity and psychotic disorganization   stay free of drug abuse [cannabis], learn more about illness and take medications as prescribed on a daily basis Supportive and cognitive psychotherapeutic techniques marked symptom improvement and achievement of functional goals   Psychosocial: occupational / vocational stress and nutrition/exercise   exercise for 20 minutes 3-7 days a week , improve nutrition and continue to plan for future education/employment Supportive and cognitive psychotherapeutic techniques marked symptom improvement and achievement of functional goals     PROVIDER:  Chau Dennis, DO    Patient staffed in clinic with Dr. Tay who will sign the note.  Supervisor is Dr. Tay.    Supervisor Attestation:  I met with Jose Francisco Sommers along with the  resident physician, Chau Dennis MD. I participated in key portions of the service, including the mental status examination and developing the plan of care. I reviewed key portions of the history with the resident. I agree with the findings and plan as documented in this note.  Joel Tay MD

## 2019-06-18 ASSESSMENT — PATIENT HEALTH QUESTIONNAIRE - PHQ9: SUM OF ALL RESPONSES TO PHQ QUESTIONS 1-9: 4

## 2019-07-26 ENCOUNTER — OFFICE VISIT (OUTPATIENT)
Dept: PSYCHIATRY | Facility: CLINIC | Age: 29
End: 2019-07-26
Attending: PSYCHIATRY & NEUROLOGY
Payer: COMMERCIAL

## 2019-07-26 VITALS
SYSTOLIC BLOOD PRESSURE: 133 MMHG | WEIGHT: 245 LBS | HEART RATE: 72 BPM | BODY MASS INDEX: 32.55 KG/M2 | DIASTOLIC BLOOD PRESSURE: 79 MMHG

## 2019-07-26 DIAGNOSIS — F90.9 ATTENTION DEFICIT HYPERACTIVITY DISORDER (ADHD), UNSPECIFIED ADHD TYPE: ICD-10-CM

## 2019-07-26 DIAGNOSIS — F25.0 SCHIZOAFFECTIVE DISORDER, BIPOLAR TYPE (H): ICD-10-CM

## 2019-07-26 PROCEDURE — G0463 HOSPITAL OUTPT CLINIC VISIT: HCPCS | Mod: ZF

## 2019-07-26 RX ORDER — MODAFINIL 100 MG/1
100 TABLET ORAL DAILY
Status: CANCELLED | OUTPATIENT
Start: 2019-07-26

## 2019-07-26 RX ORDER — MODAFINIL 200 MG/1
TABLET ORAL
Qty: 30 TABLET | Refills: 2 | Status: SHIPPED | OUTPATIENT
Start: 2019-07-26 | End: 2019-10-31

## 2019-07-26 RX ORDER — MODAFINIL 100 MG/1
TABLET ORAL
Qty: 30 TABLET | Refills: 2 | Status: SHIPPED | OUTPATIENT
Start: 2019-07-26 | End: 2019-10-31

## 2019-07-26 ASSESSMENT — PAIN SCALES - GENERAL: PAINLEVEL: NO PAIN (0)

## 2019-07-26 NOTE — PATIENT INSTRUCTIONS
No medication changes today.  Keep up the good work with exercising!  Please call to make the appointment for the Neuropsych testing.   Please come back in 4-6 weeks      Thank you for coming to the PSYCHIATRY CLINIC.    Lab Testing:  If you had lab testing today and your results are reassuring or normal they will be mailed to you or sent through [employer] within 7 days.   If the lab tests need quick action we will call you with the results.  The phone number we will call with results is # [phone] (home) . If this is not the best number please call our clinic and change the number.    Medication Refills:  If you need any refills please call your pharmacy and they will contact us. Our fax number for refills is [phone]. Please allow three business for refill processing.   If you need to  your refill at a new pharmacy, please contact the new pharmacy directly. The new pharmacy will help you get your medications transferred.     Scheduling:  If you have any concerns about today's visit or wish to schedule another appointment please call our office during normal business hours [phone] (8-5:00 M-F)    Contact Us:  Please call [phone] during business hours (8-5:00 M-F).  If after clinic hours, or on the weekend, please call  [phone].    Financial Assistance [phone]  [facility] Billing [phone]  Central Billing Office, [facility]: [phone]  [facility] Billing [phone]  Medical Records [phone]      MENTAL HEALTH CRISIS NUMBERS:  [facility]:   [facility] - [phone]   Crisis Residence [facility] - [phone]   Walk-In Counseling Center [facility] - [phone]   COPE 24/7 [place] Mobile Team for Adults - [[phone]]; Child - [[phone]]        [facility]:   [facility] - [phone]   Walk-in counseling [facility] - [phone]   Walk-in counseling [facility] - [phone]   Crisis  Residence [facility] - [phone]   Urgent Care Adult Mental Health:   --Drop-in, 24/7 crisis line, and [facility] Mobile Team [[phone]]    CRISIS TEXT LINE: Text [phone] from anywhere, anytime, any crisis 24/7;    OR SEE www.crisistextline.org     Poison Control Center - [phone]    CHILD: [facility] needs assessment team - [phone]     [facility] Lifeline - [phone]; or [place] [facility] - [phone]    If you have a medical emergency please call 911or go to the nearest ER.                    _____________________________________________    Again thank you for choosing PSYCHIATRY CLINIC and please let us know how we can best partner with you to improve you and your family's health.  You may be receiving a survey in the mail regarding this appointment. We would love to have your feedback, both positive and negative, so please fill out the survey and return it using the provided envelope. The survey is done by an external company, so your answers are anonymous.

## 2019-07-26 NOTE — PROGRESS NOTES
[facility]  Psychiatry Clinic  TRANSFER of CARE DIAGNOSTIC ASSESSMENT     Date of initial Diagnostic Assessment (DA) is 7/24/15 and most recent Transfer of Care DA is 7/26/19.    CARE TEAM:  PCP- Provider, Clinic   Therapist- None   Community  Team- none.              Jose Francisco Sommers is a 28 year old male who prefers the name 7/24/15 & pronouns he, him, his, himself.      Pertinent Background: Jose Francisco Sommers first experienced mental health issues in grade school and has received treatment for ADHD, depression, psychosis and substance use.  See last transfer evaluation for detailed history. Notably, this patient had FEP in 2015 in the setting of cannabis abuse and has been stable on olanzapine and lithium since that time. He has never lived independently and has only been slowly developing insight into his illness and past symptoms. GeneSight testing was completed noting patient is a poor CYP2D6 metabolizer.  Past records from Dr. Mike Powers were received in summer 2018 to assist with diagnostic clarity, however they were hand written notes that were illegible though accompanied by typed neuropsychology consult from [facility].  This documentation was unable to support a diagnosis of bipolar disorder at that time though noted potential Asperger's diagnosis and ADHD. 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 Most recently, modafinil was started in April 2018 for attention support to endorsed benefits and heretofore no concerns for exacerbation of mood or psychotic symptoms.    Psych critical item history includes psychosis [sxs include disorganization, possible catatonia], mutiple psychotropic trials, psych hosp (<3) and SUBSTANCE USE: alcohol and cannabis.    INTERIM HISTORY      [4, 4]   The patient reports good treatment adherence.  History was provided by the patient who was  "a good historian.  The last visit ended with no change to the med regimen.   Since the last visit:   Manuel states that overall \"things are going pretty well,\" he has been doing a lot of reading, and going to the gym every day. He notes that earlier in the summer he was devoting much of his time to buying and selling or trading cards online.  However, because he plans to enroll in al[employer] and PC hardware/software system classes at [employer] in the fall, he wanted to work on his reading proficiency, and has thus changed to reading long novels.  He is currently in the middle of the Lord of the Rings books, and feels that his reading speed, and the ease with which he can do it, have improved since he started the series.    He is looking forward to returning to school in the fall.  He states that he has already tried college at [employer] and [employer] for semester each, but in the past he signed up for difficult classes.  He feels that the classes he has signed up for this year are better choices for his current level of function.  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    He denies any recent suicidal ideation, AH, VH, paranoia.  He states that his sleep is \"decent and getting better.\"  He is better able to stay awake during the day, compared to before the modafinil increased.  He does note that he drinks diet soda throughout the day, having changed from coffee.  He estimates he drinks about 6 cans or a 2 L bottle of diet pop. (Using Diet Coke as a prototype, I calculated caffeine content online and estimate this would be about 260 mg of caffeine per day.)  Denies any tremor, shakiness, anxiety, nausea, " headache.  Does have a caffeine headache on days when he does not consume caffeine.    RECENT PSYCH ROS:   Depression:  low energy and hypersomnia  Elevated:  none  Psychosis:  none  Anxiety:  denies  Panic Attack:  none  Trauma Related:  none  Dysregulation:  none  Eating Disorder: no     Pertinent Negative Symptoms:  NO self-injurious behavior/urges, suicidal and violent ideation, aggression, hallucinations, delusions, lauren and hypomania    RECENT SUBSTANCE USE:     Alcohol- rare  Tobacco- none; nicotine patch  Caffeine- about 6 cans/2L of diet soda per day (about 260 mg of caffeine or equivalent to 14oz of brewed coffee)  Opioids- none           Narcan Kit- N/A   Cannabis- none     Other illicit drugs- none    CURRENT SOCIAL HISTORY:  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 Denies AVH but describes thought blocking and slowed cognition/difficulty speaking during prior psychotic episode.  First episode began in December 2014 - was spending great amounts of time reorganizing his room/house/garage, and even used a  on the kitchen floor.  Psych Hosp [ #, most recent, committed]- once at [facility] in April 2015  ECT [#, most recent]- none     Eating Disorder: none     Outpatient Programs [ DBT, Day Treatment, Eating Disorder Tx etc] : none    Past Psychotropic Med Trials- see next section    PAST MED TRIALS                                                          Adderall 10-20 mg - improved concentration,  "but \"cold personality\" and perseveration  Prozac 20 mg - limited response, less irritable, first episode of \"rage\"  Seroquel 25-75 mg - helped with sleep and irritability  Concerta 18 mg - improved focus, no improvement in motivation  Provigil 150 mg - Stimulants \"almost killed him\" triggered alcohol binges  Abilify ?25 mg - OK when QOD, but irritable and agitated on QD  Clonazepam 0.5-1.5 mg - calming, helped with sleep and \"catatonia\"  Risperdal up to 5 mg - some confusion, slow processing, withdrawn, ?\"catatonia\", panic attacks  Zyprexa 5 mg less anxious overall improvement  Lithium - calmer, overall better  Latuda 20 mg - severe fatigue and depression  Synthroid 75 mcg - added to help with mood.  Brief trials with Strattera, Intuniv, Lamictal, Xanax, Zoloft    SUBSTANCE USE HISTORY                                               Past Use- Alcohol- in high school  and Cannabis- in high school   Treatment- #, most recent- [facility] at age 19 for cannabis, did not complete  Medical Consequences- no  HIV/Hepatitis- no  Legal Consequences- DUI for drinking at ages 20 and 21    SOCIAL and FAMILY HISTORY      [1ea, 1ea]       patient reported                  Financial Support- if known, documented above  Family/ Children/ Relationships- if known, documented above  Living Situation- if known, documented above  Cultural/ Social/ Spiritual Support- if known, documented above  Trauma History (self-report)- no  Legal- DUIs. Also In February 2014 the patient left for [place] to attend a [employer] tournament. He ended up in [place] and was arrested for shoplifting from [facility] (had \"intended to purchase\" a pair of jeans but became panicked when security approached him). Had a brief stay in nursing home, and afterwards, took him 5 days to return to [place] with parents frequently wiring him money. Had difficulty adhering to travel plans, seemed confused, and was communicating with parents via \"bizarre\" text messages (parents even filed " "a missing persons report at one point). He finally arrived home exhausted, not sharing much of events in [place], but slightly more consistent with baseline.  Early History/Education-  This patient first experienced mental health issues in elementary school with concerns for depression and attention difficulties (which improved in gifted classes) and he first received mental health care in high school for depression, falling behind in classes, and ADHD.  Graduated HS and attempted some college classes without success, however states he feels he was not ready and that things would be different if he attempted again.  FAMILY MENTAL HEALTH HX- Paternal grandfather: alcoholism; Maternal grandmother: \"long-standing mental health issues\"; Maternal Aunt: Anxiety when young; Maternal Aunt: Anxiety and depression   MEDICAL / SURGICAL HISTORY          Patient Active Problem List   Diagnosis     Schizoaffective disorder, bipolar type (H)     Hx of psychiatric care     Elevated liver enzymes     Fatty liver     High blood triglycerides     History of cannabis abuse     History of cocaine abuse       Major Surgery- No past surgical history on file.    MEDICAL REVIEW OF SYSTEMS    [2, 10]     A comprehensive review of systems was performed and is negative other than noted in the HPI.    ALLERGY       Sulfa drugs  MEDICATIONS        Current Outpatient Medications   Medication Sig Dispense Refill     lithium (ESKALITH) 600 MG capsule Take 2 capsules (1,200 mg) by mouth At Bedtime 60 capsule 2     metFORMIN (GLUCOPHAGE) 500 MG tablet Take 2 tablets (1,000 mg) by mouth 2 times daily (with meals) 120 tablet 2     modafinil (PROVIGIL) 100 MG tablet Take 1 tablet (100 mg) along with 1 tablet (200 mg) for a total daily dose of 300 mg. 30 tablet 2     modafinil (PROVIGIL) 200 MG tablet Take 1 tablet (200 mg) along with 1 tablet (100 mg) for a total daily dose of 300 mg. 30 tablet 2     OLANZapine (ZYPREXA) 15 MG tablet Take 1 tablet (15 " "mg) by mouth At Bedtime 30 tablet 2     VITALS      [3, 3]   /79   Pulse 72   Wt 111.1 kg (245 lb)   BMI 32.55 kg/m     MENTAL STATUS EXAM      [9, 14 cog gs]     Alertness: alert  and oriented  Appearance: casually groomed and dressed  Behavior/Demeanor: cooperative, pleasant and calm, with good  eye contact   Speech: normal and regular rate and rhythm  Language: intact and no obvious problem  Psychomotor: normal or unremarkable  Mood: \"good\"  Affect: restricted and blunted; was congruent to mood; was congruent to content  Thought Process/Associations: unremarkable  Thought Content:  Reports none;  Denies suicidal and violent ideation, obsessions  and paranoid ideation  Perception:  Reports none;  Denies auditory hallucinations and visual hallucinations  Insight: fair  Judgment: fair and adequate for safety  Cognition: (6) does  appear grossly intact; formal cognitive testing was not done  Gait and Station: unremarkable    LABS and DATA     AIMS:  last done 5/17/18 with score(s): 0    PHQ9 TODAY = 5  PHQ-9 SCORE 5/2/2019 6/14/2019 7/26/2019   PHQ-9 Total Score - - -   PHQ-9 Total Score 7 4 5     ANTIPSYCHOTIC LABS  [glu, A1C, lipids (ivania LDL), liver enzymes, WBC, ANEU, Hgb, plts]  q12 mo  Recent Labs   Lab Test 05/07/19  1101 12/06/18  1442 10/30/17  1429 03/22/17  1340  09/30/16  1348   GLC 88 86 86 83   < > 83   A1C 4.9 5.0  --   --   --  4.6    < > = values in this interval not displayed.     Recent Labs   Lab Test 05/07/19  1101 12/13/18  1027 12/06/18  1442 12/01/17  0948   CHOL 141 173 184 177   TRIG 195* 278* 535* 347*   LDL 63 81 Cannot estimate LDL when triglyceride exceeds 400 mg/dL  95 72   HDL 39* 36* 34* 36*     Recent Labs   Lab Test 05/07/19  1101 12/13/18  1027 12/06/18  1442 12/01/17  0948   AST 26 51* 70* 31   ALT 52 146* 178* 118*   ALKPHOS 69 55 74 63     Recent Labs   Lab Test 05/07/19  1101 12/06/18  1442 11/10/17  1534 10/30/17  1429   WBC 7.3 8.9 8.6 8.1   ANEU 4.5 5.7 5.0 4.8 "   HGB 15.0 15.8 15.6 15.7    270 255 241     LITHIUM LABS     [level, renal, SG, TSH, WBC]    q6 mo  Recent Labs   Lab Test 05/07/19  1101 12/13/18  1027 12/06/18  1442 12/01/17  0948   LITHIUM 0.61 0.65 0.52* 0.64     Recent Labs   Lab Test 05/07/19  1101 12/06/18  1442 10/30/17  1429 03/22/17  1340   CR 1.14 1.06 1.05 1.09   GFRESTIMATED 87 84 85 82    139 141 143   POTASSIUM 4.0 4.1 4.2 3.9   ADONAY 9.3 9.2 9.2 9.3     Recent Labs   Lab Test 05/07/19  1101 12/06/18  1505 10/30/17  1429 02/09/16  1247   SG 1.015 1.009 1.015 1.005     Recent Labs   Lab Test 05/07/19  1101 12/06/18  1442 11/10/17  1534 10/30/17  1429   TSH 3.09 3.01 2.09 2.92     Recent Labs   Lab Test 05/07/19  1101 12/06/18  1442 11/10/17  1534 10/30/17  1429   WBC 7.3 8.9 8.6 8.1   ANEU 4.5 5.7 5.0 4.8       PSYCHOTROPIC DRUG INTERACTIONS     LITHIUM and OLANZAPINE may result in increased neurotoxic effects of D2-antagonists.     MANAGEMENT:  Monitoring for adverse effects, routine vitals, routine labs and patient is aware of risks    RISK STATEMENT for SAFETY     Jose Francisco Sommers did not appear to be an imminent safety risk to self or others.    PSYCHIATRIC DIAGNOSIS     Schizoaffective disorder, bipolar type, mood and psychosis in remission  ADHD, predominantly inattentive type     ASSESSMENT      [m2, h3]     TODAY Manuel returns to clinic for ongoing management of his schizoaffective disorder, bipolar type, and ADHD.  Mood is stable, he is not in a major mood episode, and has had no psychotic symptoms.  Focus and alertness have improved, albeit with the consumption of a sizable amount of caffeinated diet soda throughout the day, since the modafinil increase.  He is able to focus on reading lengthy novels, and is working on improving his academic skills in preparation for community college in the fall.  Feels his medication regimen is appropriately addressing his symptoms, and is not interested in any changes at this point in  time.  Slow, steady weight loss over time since metformin was added.  Encouraged him to continue his healthy habits; he has also not yet scheduled his neuropsych testing but is planning to schedule that today.  No medication changes recommended today.     PLAN      [m2, h3]     1) PSYCHOTROPIC MEDICATIONS:  Continue olanzapine 15 mg at bedtime.  Continue lithium 1200 mg at bedtime.  Continue modafinil 300 mg daily.  Continue metformin 1000 mg BID with meals.    2) [place]  [place] today:  will be checked next visit.    3) THERAPY:    not currently interested    4) NEXT DUE:    Labs- [facility] labs due in 11/2019, [facility] labs due 5/2020  EKG- PRN  Rating Scales- AIMS next appt    5) REFERRALS:    Neuropsych referral placed, pending appt     6) RTC: 4-6 weeks    7) CRISIS NUMBERS:   Provided routinely in AVS   ONLY if a ROBERT PT: [facility] [place] [facility] [phone] (clinic), [phone] (after hours)     TREATMENT RISK STATEMENT:  The risks, benefits, alternatives and potential adverse effects have been discussed and are understood by the pt. The pt understands the risks of using street drugs or alcohol. There are no medical contraindications, the pt agrees to treatment with the ability to do so. The pt knows to call the clinic for any problems or to access emergency care if needed.  Medical and substance use concerns are documented above.  Psychotropic drug interaction check was done, including changes made today.    PROVIDER: Cristy Carrasquillo MD    Patient staffed in clinic with Dr. Ventura who will sign the note.  Supervisor is Dr. Tay.  I saw the patient with the resident, and participated in key portions of the service, including the mental status examination and developing the plan of care. I reviewed key portions of the history with the resident. I agree with the findings and plan as documented in this note.    Yuko Ventura

## 2019-08-06 ASSESSMENT — PATIENT HEALTH QUESTIONNAIRE - PHQ9: SUM OF ALL RESPONSES TO PHQ QUESTIONS 1-9: 5

## 2019-08-13 DIAGNOSIS — F25.0 SCHIZOAFFECTIVE DISORDER, BIPOLAR TYPE (H): ICD-10-CM

## 2019-08-16 RX ORDER — OLANZAPINE 15 MG/1
TABLET ORAL
Qty: 30 TABLET | Refills: 0 | Status: SHIPPED | OUTPATIENT
Start: 2019-08-16 | End: 2019-09-20

## 2019-08-16 NOTE — TELEPHONE ENCOUNTER
Medication requested:  OLANZapine (ZYPREXA) 15 MG   Last refilled:  5/8/19   Qty: 30 : 2  *  Continue olanzapine 15 mg at bedtime.    Last seen:  7/26/19  RTC: 4-6 SUSAN   Cancel: 0  No-show: 0  Next appt: 9/20/19  Refill decision:  UNSIGNED  NOTE    30 DAY RF PENDING    TERRI

## 2019-08-23 DIAGNOSIS — F25.0 SCHIZOAFFECTIVE DISORDER, BIPOLAR TYPE (H): ICD-10-CM

## 2019-08-26 ENCOUNTER — MYC MEDICAL ADVICE (OUTPATIENT)
Dept: PSYCHIATRY | Facility: CLINIC | Age: 29
End: 2019-08-26

## 2019-08-26 ENCOUNTER — MYC REFILL (OUTPATIENT)
Dept: PSYCHIATRY | Facility: CLINIC | Age: 29
End: 2019-08-26

## 2019-08-26 DIAGNOSIS — F25.0 SCHIZOAFFECTIVE DISORDER, BIPOLAR TYPE (H): ICD-10-CM

## 2019-08-26 RX ORDER — LITHIUM CARBONATE 600 MG/1
1200 CAPSULE ORAL AT BEDTIME
Qty: 60 CAPSULE | Refills: 0 | Status: SHIPPED | OUTPATIENT
Start: 2019-08-26 | End: 2019-09-20

## 2019-08-26 RX ORDER — LITHIUM CARBONATE 600 MG/1
1200 CAPSULE ORAL AT BEDTIME
Qty: 60 CAPSULE | Refills: 0 | Status: CANCELLED | OUTPATIENT
Start: 2019-08-26

## 2019-08-26 NOTE — TELEPHONE ENCOUNTER
Medication requested: lithium (ESKALITH) 600 MG   Last refilled: 5/21/19  Qty: 60 : 2  *Continue lithium 1200 mg at bedtime.    Last seen: 7/26/19  RTC: 4-6 WEEKS  Cancel: 0  No-show: 0  Next appt: 9/20/19  Refill decision:   Refilled for 30 days per protocol

## 2019-08-27 ENCOUNTER — TELEPHONE (OUTPATIENT)
Dept: PSYCHIATRY | Facility: CLINIC | Age: 29
End: 2019-08-27

## 2019-08-27 NOTE — TELEPHONE ENCOUNTER
Lesiar received an Advance Member Notice of Noncovered Prescription from [facility]. Writer to have Dr. Ventura sign.

## 2019-08-28 NOTE — TELEPHONE ENCOUNTER
Writer received signed forms from provider for patients Modafinil 100 mg tab and 200 mg tabs not covered under insurance. Forms faxed to [facility] at [phone]. Patient notified in encounter started on 8/26/19. Original sent to scanning and copy placed in the psychiatry clinic until scanning is complete.     No further action needed by this writer

## 2019-09-20 ENCOUNTER — OFFICE VISIT (OUTPATIENT)
Dept: PSYCHIATRY | Facility: CLINIC | Age: 29
End: 2019-09-20
Attending: PSYCHIATRY & NEUROLOGY
Payer: COMMERCIAL

## 2019-09-20 VITALS
HEART RATE: 66 BPM | SYSTOLIC BLOOD PRESSURE: 144 MMHG | DIASTOLIC BLOOD PRESSURE: 85 MMHG | WEIGHT: 247.8 LBS | BODY MASS INDEX: 32.92 KG/M2

## 2019-09-20 DIAGNOSIS — F25.0 SCHIZOAFFECTIVE DISORDER, BIPOLAR TYPE (H): ICD-10-CM

## 2019-09-20 PROCEDURE — G0463 HOSPITAL OUTPT CLINIC VISIT: HCPCS | Mod: ZF

## 2019-09-20 RX ORDER — LITHIUM CARBONATE 600 MG/1
1200 CAPSULE ORAL AT BEDTIME
Qty: 60 CAPSULE | Refills: 1 | Status: SHIPPED | OUTPATIENT
Start: 2019-09-20 | End: 2019-10-31

## 2019-09-20 RX ORDER — OLANZAPINE 5 MG/1
TABLET ORAL
Qty: 70 TABLET | Refills: 1 | Status: SHIPPED | OUTPATIENT
Start: 2019-09-20 | End: 2019-09-23

## 2019-09-20 ASSESSMENT — PAIN SCALES - GENERAL: PAINLEVEL: NO PAIN (0)

## 2019-09-20 ASSESSMENT — PATIENT HEALTH QUESTIONNAIRE - PHQ9: SUM OF ALL RESPONSES TO PHQ QUESTIONS 1-9: 6

## 2019-09-20 NOTE — PROGRESS NOTES
[facility]  Psychiatry Clinic  PSYCHIATRIC PROGRESS NOTE     Date of initial Diagnostic Assessment (DA) is 7/24/15 and most recent Transfer of Care DA is 7/26/19.    CARE TEAM:  PCP- Provider, Clinic   Therapist- None   Community  Team- none.      Jose Francisco Sommers is a 28 year old male who prefers the name Manuel & pronouns he, him, his, himself.      Pertinent Background:  Jose Francisco Sommers first experienced mental health issues in grade school and has received treatment for ADHD, depression, psychosis and substance use.  See last transfer evaluation for detailed history. Notably, this patient had FEP in 2015 in the setting of cannabis abuse and has been stable on olanzapine and lithium since that time. He has never lived independently and has only been slowly developing insight into his illness and past symptoms. GeneSight testing was completed noting patient is a poor CYP2D6 metabolizer.  Past records from Dr. Mike Powers were received in summer 2018 to assist with diagnostic clarity, however they were hand written notes that were illegible though accompanied by typed neuropsychology consult from [facility].  This documentation was unable to support a diagnosis of bipolar disorder at that time though noted potential Asperger's diagnosis and ADHD. 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 Most recently, modafinil was started in April 2018 for attention support to endorsed benefits and heretofore no concerns for exacerbation of mood or psychotic symptoms.    Psych critical item history includes psychosis [sxs include disorganization, possible catatonia], mutiple psychotropic trials, psych hosp (<3) and SUBSTANCE USE: alcohol and cannabis.    INTERIM HISTORY      [4, 4]     The patient reports good treatment adherence.  History was provided by the patient who was a good historian.   The last visit ended with no change to the med regimen.    Since the last visit:   Started wearing a higher dose of nicotine, just feel better, focus/concentration  Started college, taking 2 classes, college algebra 1, homeowrk Fri/Sat/Sun, taking breaks  Going to the gym, similar, not such a big change  PC hardware and software systems    Eating is tough. Try to do diets, getting bored with food  Sleeping too much, usually 12:30, takes some time to fall asleep, usually 11-12 hours  Sleeping on couch,     RECENT PSYCH ROS:   Depression:  low energy, hypersomnia, appetite changes and poor concentration /memory  Elevated:  none  Psychosis:  none  Anxiety:  none  Panic Attack:  none  Trauma Related:  none  Dysregulation:  none  Eating Disorder: no     Pertinent Negative Symptoms:  NO suicidal and violent ideation, aggression, psychosis, lauren and hypomania    RECENT SUBSTANCE USE:     Alcohol- no  Tobacco- increased nicotine patch, feeling better  Caffeine- Switched to green tea  Opioids- none           Narcan Kit- N/A   Cannabis- none     Other illicit drugs- none    CURRENT SOCIAL HISTORY:  Financial/ Work- community college student       Partner/ - single  Children- no      Living situation- with parents in basement      Social/ Spiritual Support- parents, [employer] hemalatha     FEELS SAFE AT HOME- yes     PSYCH and CD Critical Summary Points since July 2019 July 2019: Resident transition    PAST MED TRIALS          See last Diagnostic Assessment    MEDICAL / SURGICAL HISTORY                                     Patient Active Problem List   Diagnosis     Schizoaffective disorder, bipolar type (H)     Hx of psychiatric care     Elevated liver enzymes     Fatty liver     High blood triglycerides     History of cannabis abuse     History of cocaine abuse       Major Surgery- No past surgical history on file.    MEDICAL REVIEW OF SYSTEMS    [2, 10]     A comprehensive review of systems was performed and  "is negative other than noted in the HPI.    ALLERGY       Sulfa drugs  MEDICATIONS        Current Outpatient Medications   Medication Sig Dispense Refill     lithium (ESKALITH) 600 MG capsule Take 2 capsules (1,200 mg) by mouth At Bedtime 60 capsule 0     metFORMIN (GLUCOPHAGE) 500 MG tablet Take 2 tablets (1,000 mg) by mouth 2 times daily (with meals) 120 tablet 2     modafinil (PROVIGIL) 100 MG tablet Take 1 tablet (100 mg) along with 1 tablet (200 mg) for a total daily dose of 300 mg. 30 tablet 2     modafinil (PROVIGIL) 200 MG tablet Take 1 tablet (200 mg) along with 1 tablet (100 mg) for a total daily dose of 300 mg. 30 tablet 2     OLANZapine (ZYPREXA) 15 MG tablet Take 1 tablet by mouth at bedtime 30 tablet 0     VITALS      [3, 3]   There were no vitals taken for this visit.   MENTAL STATUS EXAM      [9, 14 cog gs]     Alertness: alert  and oriented  Appearance: well groomed and casually dressed  Behavior/Demeanor: cooperative and pleasant, with fair  eye contact   Speech: normal and regular rate and rhythm  Language: intact and no obvious problem  Psychomotor: normal or unremarkable  Mood: \"good\"  Affect: restricted and blunted; was congruent to mood; was congruent to content  Thought Process/Associations: unremarkable  Thought Content:  Reports none;  Denies suicidal and violent ideation and obsessions   Perception:  Reports none;  Denies auditory hallucinations and visual hallucinations  Insight: fair  Judgment: fair and adequate for safety  Cognition: (6) does  appear grossly intact; formal cognitive testing was not done  Gait and Station: unremarkable    LABS and DATA     AIMS:  done today, score - 0    PHQ9 TODAY = 4    PHQ-9 SCORE 5/2/2019 6/14/2019 7/26/2019   PHQ-9 Total Score - - -   PHQ-9 Total Score 7 4 5       Blow in labs    PSYCHOTROPIC DRUG INTERACTIONS     ***    MANAGEMENT:  {di:382473}    RISK STATEMENT for SAFETY     Jose Francisco Sommers {safe:512642}    DIAGNOSIS     ***   ASSESSMENT   "    [m2, h3]     TODAY ***      PLAN      [m2, h3]     1) PSYCHOTROPIC MEDICATIONS:  - Continue olanzapine 15 mg for now  - Continue Provigil and lithium at the same doses.    2) MN  {was:360429} checked today:  {P:645347}.    3) THERAPY:    {n:073237}    4) NEXT DUE:    {d:021103}    5) REFERRALS:    {REF:539476}     6) RTC: ***    7) CRISIS NUMBERS:   Provided routinely in AVS   CRISIS TEXT LINE: Text [id number] from anywhere in [place], anytime, any crisis 24/7;  OR SEE www.crisistextline.org  ONLY if a [facility] PT: Isai MCNEAL [place] [phone] (clinic), [phone] (after hours)     TREATMENT RISK STATEMENT:  The risks, benefits, alternatives and potential adverse effects have been discussed and are understood by the pt. The pt understands the risks of using street drugs or alcohol. There are no medical contraindications, the pt agrees to treatment with the ability to do so. The pt knows to call the clinic for any problems or to access emergency care if needed.  Medical and substance use concerns are documented above.  Psychotropic drug interaction check was done, including changes made today.    PSYCHIATRY CLINIC INDIVIDUAL PSYCHOTHERAPY NOTE     [16]   Start time- {psyN/A:719337}        End time- {psyN/A:792484}  Date last reviewed - 09/20/19       Date next due - 12/19/19 (or 12 months if Medicare)     Subjective: This supportive psychotherapy session addressed issues related to {Psych Therapy issues:431769}.  Patient's reaction: {STAGES OF CHANGE:406094} in the context of mental status appropriate for ambulatory setting.  Progress: {good,fair,poor:871825}  Plan: RTC ***  Psychotherapy services during this visit included  myself and the patient.   TREATMENT  PLAN          SYMPTOMS;PROBLEMS   MEASURABLE GOALS;    FUNCTIONAL IMPROVEMENT INTERVENTIONS;    GAINS MADE DISCHARGE CRITERIA   {Psy ROS TX Plan:318417}   {Measurable Goals:877924} {INT:493654} {PSYDC:571473}   {Psy ROS TX Plan:140549}   {Measurable  [facility]:369090} {INT:298055} {[facility]368[facility]}     PROVIDER:  Cristy Carrasquillo MD    Patient staffed in clinic with Dr. Ventura who will sign the note.  Supervisor is Dr. Tay.

## 2019-09-20 NOTE — PATIENT INSTRUCTIONS
-Continue olanzapine 15 mg for the time being until we check with your mom.  -Continue lithium and modafinil at the same doses  -Please call if any symptoms recur with decrease in olanzapine  -Please come back in 4-6 months

## 2019-09-21 NOTE — PROGRESS NOTES
[facility]  Psychiatry Clinic  PSYCHIATRIC PROGRESS NOTE     Date of initial Diagnostic Assessment (DA) is 7/24/15 and most recent Transfer of Care DA is 7/26/19.    CARE TEAM:  PCP- Provider, Clinic   Therapist- None   Community  Team- none.      Jose Francisco Sommers is a 29 year old male who prefers the name Manuel & pronouns he, him, his, himself.      Pertinent Background:  Jose Francisco Sommers first experienced mental health issues in grade school and has received treatment for ADHD, depression, psychosis and substance use.  See last transfer evaluation for detailed history. Notably, this patient had FEP in 2015 in the setting of cannabis abuse and has been stable on olanzapine and lithium since that time. He has never lived independently and has only been slowly developing insight into his illness and past symptoms. GeneSight testing was completed noting patient is a poor CYP2D6 metabolizer.  Past records from Dr. Mike Powers were received in summer 2018 to assist with diagnostic clarity, however they were hand written notes that were illegible though accompanied by typed neuropsychology consult from [facility].  This documentation was unable to support a diagnosis of bipolar disorder at that time though noted potential Asperger's diagnosis and ADHD. 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 Most recently, modafinil was started in April 2018 for attention support to endorsed benefits and heretofore no concerns for exacerbation of mood or psychotic symptoms.    Psych critical item history includes psychosis [sxs include disorganization, possible catatonia], mutiple psychotropic trials, psych hosp (<3) and SUBSTANCE USE: alcohol and cannabis.    INTERIM HISTORY      [4, 4]     The patient reports good treatment adherence.  History was provided by the patient who was a good historian.   "The last visit ended with no change to the med regimen.    Since the last visit:   Manuel reports that he is feeling well overall, and updates me on his progress in community college.  He has been taking 2 classes, college algebra 1 and PC hardware and software systems.  He has noticed that he has difficulty learning simply reading from the textbooks, and has found it somewhat helpful to listen to the text being read aloud by computer program while he follows along.  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    He continues to use nicotine replacement therapy for chewing tobacco, and states he started wearing a higher dose of nicotine patch which helps him \"just feel better\" particularly with focus and concentration.  However, he continues to be concerned by hypersomnia.  He usually goes to bed around 12:30 AM, take some time for sleep, and then, if not awakened by his parents, will sleep well to the afternoon.  These days they typically wake him around noon or 1 PM.  He is also continuing to go to the gym and try to eat healthy, but continues to crave unhealthy foods at night.  He expresses his concern that the olanzapine is contributing significantly both to the increase in appetite and hypersomnia.  He notes that when he decrease the dose from 20-15 mg, he did not notice any improvement in appetite.  He is uncertain how much the olanzapine does help him fall asleep, noting that he feels it is likely the lithium also contributes to sleep.     Manuel notes that his mother was not able to come to the appointment today but does express her preference for not decreasing the dose of olanzapine too quickly, stating that she had concerns when he was at a dose of 10 mg in the past.  Manuel states that she would be open to me calling her on the phone on Monday.    -I spoke with the patient's mother via phone on 9/23/19. She states that years ago when he decreased Zyprexa down " "to 5 mg he completely \"went off the rails\" and decompensated. He seems to do much better on Provigil than off of it, and also the nicotine patches help with cognitive enhancement. She feels he is much more stable than in the past and thinks he will do well with this medication decrease, but she will monitor for increasing organization or decreased interaction. She asks about what can be done to help him focus on schoolwork/learn more easily; reviewed plans for Neuropsych testing.    RECENT PSYCH ROS:   Depression:  low energy, hypersomnia, appetite changes and poor concentration /memory  Elevated:  none  Psychosis:  none  Anxiety:  none  Panic Attack:  none  Trauma Related:  none  Dysregulation:  none  Eating Disorder: no     Pertinent Negative Symptoms:  NO suicidal and violent ideation, aggression, psychosis, lauren and hypomania    RECENT SUBSTANCE USE:     Alcohol- no  Tobacco- former smoker increased nicotine patch dose, feeling better  Caffeine- Switched to green tea  Opioids- none           Narcan Kit- N/A   Cannabis- none     Other illicit drugs- none    CURRENT SOCIAL HISTORY:  Financial/ Work- community college student       Partner/ - single  Children- no      Living situation- with parents in basement      Social/ Spiritual Support- parents, [employer] hemalatha     FEELS SAFE AT HOME- yes     PSYCH and CD Critical Summary Points since July 2019 July 2019: Resident transition  September 2019: Olanzapine decreased to 12.5 mg    PAST MED TRIALS          See last Diagnostic Assessment    MEDICAL / SURGICAL HISTORY                                     Patient Active Problem List   Diagnosis     Schizoaffective disorder, bipolar type (H)     Hx of psychiatric care     Elevated liver enzymes     Fatty liver     High blood triglycerides     History of cannabis abuse     History of cocaine abuse       Major Surgery- No past surgical history on file.    MEDICAL REVIEW OF SYSTEMS    [2, 10]     A " "comprehensive review of systems was performed and is negative other than noted in the HPI.    ALLERGY       Sulfa drugs  MEDICATIONS        Current Outpatient Medications   Medication Sig Dispense Refill     lithium (ESKALITH) 600 MG capsule Take 2 capsules (1,200 mg) by mouth At Bedtime 60 capsule 1     metFORMIN (GLUCOPHAGE) 500 MG tablet Take 2 tablets (1,000 mg) by mouth 2 times daily (with meals) 120 tablet 2     modafinil (PROVIGIL) 100 MG tablet Take 1 tablet (100 mg) along with 1 tablet (200 mg) for a total daily dose of 300 mg. 30 tablet 2     modafinil (PROVIGIL) 200 MG tablet Take 1 tablet (200 mg) along with 1 tablet (100 mg) for a total daily dose of 300 mg. 30 tablet 2     OLANZapine (ZYPREXA) 5 MG tablet Take 2.5 tablets (12.5 mg) by mouth At Bedtime 70 tablet 1     VITALS      [3, 3]   BP (!) 144/85   Pulse 66   Wt 112.4 kg (247 lb 12.8 oz)   BMI 32.92 kg/m     MENTAL STATUS EXAM      [9, 14 cog gs]   Alertness: alert  and oriented  Appearance: casually groomed and dressed  Behavior/Demeanor: cooperative, pleasant and calm, with good  eye contact   Speech: normal and regular rate and rhythm  Language: intact and no obvious problem  Psychomotor: normal or unremarkable  Mood: \"good\"  Affect: restricted and blunted; was congruent to mood; was congruent to content  Thought Process/Associations: unremarkable  Thought Content:  Reports none;  Denies suicidal and violent ideation, obsessions  and paranoid ideation  Perception:  Reports none;  Denies auditory hallucinations and visual hallucinations  Insight: fair  Judgment: fair and adequate for safety  Cognition: (6) does  appear grossly intact; formal cognitive testing was not done  Gait and Station: unremarkable    LABS and DATA     AIMS:  done today, score = 0    PHQ9 TODAY = 4  PHQ-9 SCORE 6/14/2019 7/26/2019 9/20/2019   PHQ-9 Total Score - - -   PHQ-9 Total Score 4 5 6     ANTIPSYCHOTIC LABS  [glu, A1C, lipids (ivania LDL), liver enzymes, WBC, ANEU, " Hgb, plts]  q12 mo  Recent Labs   Lab Test 05/07/19  1101 12/06/18  1442 10/30/17  1429 03/22/17  1340  09/30/16  1348   GLC 88 86 86 83   < > 83   A1C 4.9 5.0  --   --   --  4.6    < > = values in this interval not displayed.     Recent Labs   Lab Test 05/07/19  1101 12/13/18  1027 12/06/18  1442 12/01/17  0948   CHOL 141 173 184 177   TRIG 195* 278* 535* 347*   LDL 63 81 Cannot estimate LDL when triglyceride exceeds 400 mg/dL  95 72   HDL 39* 36* 34* 36*     Recent Labs   Lab Test 05/07/19  1101 12/13/18  1027 12/06/18  1442 12/01/17  0948   AST 26 51* 70* 31   ALT 52 146* 178* 118*   ALKPHOS 69 55 74 63     Recent Labs   Lab Test 05/07/19  1101 12/06/18  1442 11/10/17  1534 10/30/17  1429   WBC 7.3 8.9 8.6 8.1   ANEU 4.5 5.7 5.0 4.8   HGB 15.0 15.8 15.6 15.7    270 255 241     LITHIUM LABS     [level, renal, SG, TSH, WBC]    q6 mo  Recent Labs   Lab Test 05/07/19  1101 12/13/18  1027 12/06/18  1442 12/01/17  0948   LITHIUM 0.61 0.65 0.52* 0.64     Recent Labs   Lab Test 05/07/19  1101 12/06/18  1442 10/30/17  1429 03/22/17  1340   CR 1.14 1.06 1.05 1.09   GFRESTIMATED 87 84 85 82    139 141 143   POTASSIUM 4.0 4.1 4.2 3.9   ADONAY 9.3 9.2 9.2 9.3     Recent Labs   Lab Test 05/07/19  1101 12/06/18  1505 10/30/17  1429 02/09/16  1247   SG 1.015 1.009 1.015 1.005     Recent Labs   Lab Test 05/07/19  1101 12/06/18  1442 11/10/17  1534 10/30/17  1429   TSH 3.09 3.01 2.09 2.92     Recent Labs   Lab Test 05/07/19  1101 12/06/18  1442 11/10/17  1534 10/30/17  1429   WBC 7.3 8.9 8.6 8.1   ANEU 4.5 5.7 5.0 4.8         PSYCHOTROPIC DRUG INTERACTIONS     LITHIUM and OLANZAPINE may result in increased neurotoxic effects of D2-antagonists.     MANAGEMENT:  Monitoring for adverse effects, routine vitals, routine labs and patient is aware of risks    RISK STATEMENT for SAFETY     Jose Francisco Sommers did not appear to be an imminent safety risk to self or others.    DIAGNOSIS     Schizoaffective disorder, bipolar  type, mood and psychosis in remission, vs. Bipolar I disorder  ADHD, predominantly inattentive type     ASSESSMENT      [m2, h3]     TODAY Manuel returns to clinic for ongoing management of his schizoaffective disorder, bipolar type, and ADHD.  Mood is stable, he is not in a major mood episode, and has had no psychotic symptoms.  Focus and alertness are stable, particularly on modafinil and nicotine patches; however, he does to have to re-read some material for his college courses multiple times before he feels he has adequately learned it. Due to appetite interfering with his efforts to lose weight, he requests a further decrease in his olanzapine dose from 15 mg. We preferred to wait and discuss this with his mother first, as he mentioned she had concerns. I spoke with her on the phone and she agreed to a decrease to 12.5 mg. Also discussed potential lithium increase at the next appointment, as current level has hovered around 0.6.      PLAN      [m2, h3]     1) PSYCHOTROPIC MEDICATIONS:  - Decrease olanzapine to 12.5 mg PO at bedtime.  - Continue lithium 1500 mg PO at bedtime  - Continue modafinil 300 mg PO daily  - Continue metformin 1000 mg PO BID with meals    2) MN  was checked today: indicates expected use in accordance with chart review/patient report.    3) THERAPY:    not currently doing therapy    4) NEXT DUE:    Labs- [place] labs due in 11/2019, [facility] labs due 5/2020  EKG- PRN  Rating Scales- AIMS done today, score = 0    5) REFERRALS:    none needed, has Neuropsych appointment scheduled next month     6) RTC: 4-6 weeks    7) CRISIS NUMBERS:   Provided routinely in AVS   CRISIS TEXT LINE: Text [id number] from anywhere in [place], anytime, any crisis 24/7;  OR SEE www.crisistextline.org  ONLY if a [facility] PT: [facility] [phone] (clinic), [phone] (after hours)     TREATMENT RISK STATEMENT:  The risks, benefits, alternatives and potential adverse effects have been discussed and are understood by  the pt. The pt understands the risks of using street drugs or alcohol. There are no medical contraindications, the pt agrees to treatment with the ability to do so. The pt knows to call the clinic for any problems or to access emergency care if needed.  Medical and substance use concerns are documented above.  Psychotropic drug interaction check was done, including changes made today.    PSYCHIATRY CLINIC INDIVIDUAL PSYCHOTHERAPY NOTE     [16]   Start time- 1430        End time- 1500  Date last reviewed - 09/20/19       Date next due - 12/19/19 (or 12 months if Medicare)     Subjective: This supportive psychotherapy session addressed issues related to goals of therapy, current stressors, life stressors, work, family of origin, school  and health.  Patient's reaction: Preparatory in the context of mental status appropriate for ambulatory setting.  Progress: fair.  Plan: RTC 4 weeks  Psychotherapy services during this visit included  myself and the patient.   TREATMENT  PLAN            SYMPTOMS;PROBLEMS    MEASURABLE GOALS;    FUNCTIONAL IMPROVEMENT INTERVENTIONS;    GAINS MADE DISCHARGE CRITERIA   Nanette/Hypomania: increased activity and psychotic disorganization    stay free of drug abuse [cannabis], learn more about illness and take medications as prescribed on a daily basis Supportive and cognitive psychotherapeutic techniques marked symptom improvement and achievement of functional goals   Psychosocial: occupational / vocational stress and nutrition/exercise    exercise for 20 minutes 3-7 days a week , improve nutrition and continue to plan for future education/employment Supportive and cognitive psychotherapeutic techniques marked symptom improvement and achievement of functional goals     PROVIDER:  Cristy Carrasquillo MD    Patient staffed in clinic with Dr. Ventura who will sign the note.  Supervisor is Dr. Tay.  I saw the patient with the resident, and participated in key portions of the service, including the  mental status examination and developing the plan of care. I reviewed key portions of the history with the resident. I agree with the findings and plan as documented in this note.    Yuko Ventura MD

## 2019-09-23 RX ORDER — OLANZAPINE 5 MG/1
12.5 TABLET ORAL AT BEDTIME
Qty: 70 TABLET | Refills: 1
Start: 2019-09-23 | End: 2019-10-31

## 2019-10-18 ENCOUNTER — OFFICE VISIT (OUTPATIENT)
Dept: NEUROPSYCHOLOGY | Facility: CLINIC | Age: 29
End: 2019-10-18
Payer: COMMERCIAL

## 2019-10-18 DIAGNOSIS — F25.9 SCHIZOAFFECTIVE DISORDER, UNSPECIFIED TYPE (H): Primary | ICD-10-CM

## 2019-10-18 DIAGNOSIS — F09 MENTAL DISORDER DUE TO GENERAL MEDICAL CONDITION: ICD-10-CM

## 2019-10-18 NOTE — PROGRESS NOTES
The patient was seen for neuropsychological evaluation for the purposes of diagnostic clarification and treatment planning. 120 minutes of test administration and scoring were provided by this writer, Suad Harman.  Please see Dr. Tulio Benavides's report for a full interpretation of the findings.

## 2019-10-22 NOTE — PROGRESS NOTES
Name:Jose Francisco Sommers  MRN: [id number]  : 1990   NAVARRO: 10/18/2019  Staff: CONNIE Tech: PETRA Age: 29  Sex: Male Hand: Right Educ: 12  Vision: 20/20 ?without correction    ORIENTATION     Time  -0     Place       Personal info          Presidents     WAIS-IV     WMI: 131          Raw SSa     Similarities  25 10     Block Design  57 13     Digit Span  41 17 RDS= 16     Arithmetic  19 14     Coding  56 7    MARCIANO-O    Raw %ile     Copy    33.5 11-16     Time to Copy  144        WRAT4   SS %ile Grade Equiv.     Word Reading  114 82 >12.9    COWAT (FAS)   Raw: 64  T: 68  Animals   Raw:  21 T-score:  47   BOSTON NAMING TEST   Raw: 50  T: 36  COMPLEX IDEATIONAL MATERIAL   Raw:  T: 46    TRAILS  Raw  Err T    A 31  0 40   B 81  0 39  STROOP Raw T   Word 96 44   Color  65 40       Color/Word  40 45         CLARISSE   Raw: 30  %ile: >74    GROOVED PEGBOARD    Raw  T Drops   RH  61  50 1   LH 68  45 0    BDI-II  Raw:  11 Interpretation: minimal  JAQUI  Raw:  2  Interpretation: minimal    HVLT-R     Trial 1 2 3 Total      7 10 9  26  Raw T     Total Learning (1-3) 26 44     Delayed Recall  8 38     Percent Retention 80 39     Recognition Hits/FP 12/0 55    BVMT-R     Trial 1 2 3 Total      8 11 12  31  Raw T / %ile     Total Learning (1-3) 31 58     Delayed Recall  11 54     Percent Retention 92 >16th     Recognition Hits/FP 6/0 >16th    DCT E-score: 4

## 2019-10-22 NOTE — PROGRESS NOTES
Name: Jose Francisco Sommers  MR#: [id number]  YOB: 1990  Date of Exam: 10/18/2019    Neuropsychology Laboratory  [address]  [phone]    NEUROPSYCHOLOGICAL EVALUATION    IDENTIFYING INFORMATION  Jose Francisco Sommers is a 29 year old, right handed, student, with 12 years of formal education. He was unaccompanied to the evaluation.    BACKGROUND INFORMATION / INTERVIEW FINDINGS    Records indicate that Mr. Sommers s medical history includes schizoaffective disorder, bipolar type, history of cannabis abuse, history of cocaine abuse, high blood triglycerides, fatty liver, and elevated liver enzymes. Records indicate that he first experienced mental health issues in grade school, and has had treatment for ADHD, depression, psychosis, and substance abuse. Concerns have been expressed about his cognition. The current evaluation was requested by Dr. Chau Dennis, in this context.    Of note, Mr. Sommers has completed at least three psychology/neuropsychology evaluations in the past. The first evaluation was completed on August 22, 2007 through the [facility] at the [facility]. This evaluation documented above average cognitive skills, with a relative weakness in processing speed. Relative weaknesses were also identified in cognitive flexibility, shifting and category formation. Academic problems were diagnosed. The second evaluation was completed in April, 2008 by Dr. Chriss Diallo. I do not have a copy of this report, but the results are summarized in a subsequent evaluation. The diagnosis of attention deficit hyperactivity disorder, predominantly inattentive type was made. The third evaluation was completed by Dr. Andrew Jimenez on 08/03 and 08/04/2009. This evaluation noted that the patient is bright, with a full scale IQ measured to be in the superior range. A relative weakness was noted on this exam in the processing speed  domain. Similar scores were noted on this exam relative to prior evaluations. The report notes that the patient showed a preference for visual and hands-on information. The results from this evaluation were again felt to be consistent with attention deficit hyperactivity disorder, predominantly inattentive type. An auditory processing disorder and mild dysgraphia were also diagnosed. Additionally, symptoms of depression were noted, and he was felt to show underlying symptomatology of Asperger's disorder.    On interview, Mr. Sommers reported that his birth and early development were normal. He reported that he progressed through elementary school and middle school without problems, and stated that he was in honors classes. He reported that at the beginning of his high school career, his parents had concerns. He noted that he had difficulties staying focused. He started seeing a psychiatrist at age 16 or 17. He reported that he was having trouble getting out of bed at that time. He also noted that he was diagnosed with ADHD at that time. He was prescribed Ritalin and Adderall. He also tried anxiety medications. The patient had a difficult time describing his symptoms. He indicated that he has had mental health care on and off since then, with more regular care in the last five years. He stated that he has had drastic improvements in his mental health status in the last couple of years. He reported that he had one psychiatric hospitalization about five years ago. He saw therapist in the past, but is not currently seeing a therapist. He stated that he has problems falling asleep if he forgets to take his medications. He denied major psychiatric symptoms at this time. He reported that his mood is fine, and denied feeling overly down or anxious. He denied suicidal ideation or past suicide attempts.    Regarding cognition, Mr. Sommers again stated that he was diagnosed with ADHD in the past. He stated that he now struggles  to pay attention. He indicated that he is easily distracted. He reported that he used to have good memory. He indicated that he feels groggy a lot. He otherwise denied major concerns with his thinking.    With regard to other medical background, Mr. Sommers denied prior TBI, stroke, or seizure. He stated that his sleep is not great. He noted that his sleep has been better, however, since he stopped using a nicotine patch in the last week. He stated that he gets about 10 hours of sleep per night on average, and slept normally the night before the exam. He denied pain. Per records, his current medications include lithium, metformin, modafinil, and olanzapine. He denied alcohol, tobacco, or illicit drug use. He reported that he smoked cigarettes for seven years in the past, but quit two years ago. He stated that he used marijuana on a weekly basis when he was in high school, and also used alcohol when he was in high school. He stated that he used cocaine on one occasion. He stated that on this one occasion, he was arrested with the cocaine in his possession. He indicated that he was forced to complete a chemical dependency evaluation at that time. He denied having any issues with chemical abuse since then.    Mr. Sommers lives at home with his parents. He manages his own basic and instrumental daily activities. He drives. By way of background, he has never been  and does not have children. His parents remain . He has a younger sister. Regarding educational background, he stated that he graduated from high school with good grades. He did not have academic accommodations in high school. He reported that he completed a semester at [employer], and also a semester at [employer]. He did not earn a degree. He reported that he has returned to school on a part-time basis at [employer]. He is currently taking two classes. 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 He does not receive disability benefits.    BEHAVIORAL OBSERVATIONS  Mr. Sommers was polite and cooperative with the exam. Had limited eye contact with the examiner. He engaged in limited spontaneous conversation. His speech was notable for mild difficulties with word finding, flattened prosody, and a few paraphasic errors on one measure. Comprehension was normal. His thought processes were notable for perfectionistic tendencies, which slowed him down on some measures. His mood was depressed and mildly anxious with flat affect. His effort was good. The current results are felt to be an accurate representation of his cognitive functioning.    RESULTS OF EXAM  His performances on measures of neuropsychological functioning were as follows:      He was fully oriented to time, place, and various aspects of personal information. He was able to state the names of the five most recent presidents in order. Performance on a measure of single word reading was high average. He obtained passing scores on stand-alone and embedded metrics of cognitive performance validity. Auditory attention for digits was superior. Mental calculations were superior. Learning of words in a list format was average. Delayed recall of list words was low average. Percent retention of the list words was low average. Delayed recognition of list words, however, was average, and performed without error. Learning of simple geometric shapes and their spatial locations was high average. Delayed recall of the shapes and their locations was average. Percent retention of the shapes was normal. Delayed recognition of the shapes was normal, and performed without error. Visual-spatial judgments for variably oriented lines were intact, and performed without error. His drawing of a complicated geometric figure was performed mildly below expectation,  and was notable for a hurried approach with mild inattention to the figure s details. Visual problem-solving with blocks was high average. Comprehension of phrases and short stories was average. Verbal associative fluency was superior. Semantic verbal fluency was average. Naming to confrontation was borderline impaired. Verbal abstract reasoning was average. Speeded visual sequencing under focused attention was low average. A similar measure with a divided attention component was low average. Speeded word reading was average. Speeded color naming was low average. Speeded inhibition of an over-learned response was average. Speeded visual motor coding was low average. Speeded fine motor dexterity was average, bilaterally.    He endorsed items consistent with minimal symptoms of depression, and minimal symptoms of anxiety on self-report measures.    IMPRESSIONS  Mr. Sommers demonstrated a pattern of weaknesses that is consistent with mild relative dysfunction of subcortical brain regions. This is a pattern that can be seen in individuals with chronic and severe mental illness. To the extent that comparisons are possible, there may be a subtle decline in certain aspects of his thinking relative to his 2007 and 2009 exams. He has relative weaknesses in cognitive speed, psychomotor speed, naming, and some aspects of verbal reasoning. Focused attention is actually a strength, although he does struggle to some extent with divided attention. His memory and most other cognitive abilities were normal, and performing keeping with his above average range cognitive baseline. Memory is intact. He is not reporting elevated symptoms of depression or anxiety on self-report measures.    With respect to the question of academic accommodations, I do think that Mr. Sommers should qualify for assistance in his courses. Specifically, he should be given extra time to complete assignments, quizzes, and tests. Additionally, preferential seating  and being able to complete quizzes and tests in a quiet environment would be beneficial for the patient.    RECOMMENDATIONS  Preliminary results and feedback were provided to the patient over the telephone on 10/22/2019, and all questions were answered.    1. Continued psychiatric care is strongly recommended. It appears that he is being well cared for at the current time.    2. As noted above, I think that Mr. Sommers should be given academic accommodations in his courses. Specifically, he will benefit from time and a half being given for assignments, quizzes, and tests. Additionally, she should be allowed to take quizzes and tests in a quiet environment. Preferential seating and notetaking services could also be beneficial for the patient.    3. Given the suggestion of possible mild decline in his thinking relative to prior valuations, follow neuropsychological evaluation is recommended in two years in order to assess and obtain recommendations as appropriate. The current results can be used as a baseline at that time.    Tulio Benavides, Ph.D., L.P., ABPP-CN   / Licensed Psychologist [id number]  Department of Rehabilitation Medicine  Division of Adult Neuropsychology  [facility]    Time spent: One unit (50 minutes) neurobehavioral status exam including interview, clinical assessment of thinking, reasoning, and judgment by licensed and board-certified neuropsychologist (CPT 98877). One unit (60 minutes) neuropsychological testing evaluation by licensed and board-certified neuropsychologist, including integration of patient data, interpretation of standardized test results and clinical data, clinical decision-making, treatment planning, report, and interactive feedback to the patient, first hour (CPT 23142). Two units (110 minutes) of neuropsychological testing evaluation by licensed and board-certified neuropsychologist, including integration of patient data, interpretation of  standardized test results and clinical data, clinical decision-making, treatment planning, report, and interactive feedback to the patient, subsequent hours (CPT 72535). One unit (30 minutes) of psychological and neuropsychological test administration and scoring by technician, first 30 minutes (CPT 32079). Three units (90 minutes) psychological or neuropsychological test administration and scoring by technician, subsequent 30 minutes (CPT 86161). Diagnoses: F25.9, F06.8.

## 2019-10-31 ENCOUNTER — OFFICE VISIT (OUTPATIENT)
Dept: PSYCHIATRY | Facility: CLINIC | Age: 29
End: 2019-10-31
Attending: PSYCHIATRY & NEUROLOGY
Payer: COMMERCIAL

## 2019-10-31 VITALS
SYSTOLIC BLOOD PRESSURE: 145 MMHG | HEART RATE: 72 BPM | BODY MASS INDEX: 32.81 KG/M2 | WEIGHT: 247 LBS | DIASTOLIC BLOOD PRESSURE: 83 MMHG

## 2019-10-31 DIAGNOSIS — F90.9 ATTENTION DEFICIT HYPERACTIVITY DISORDER (ADHD), UNSPECIFIED ADHD TYPE: ICD-10-CM

## 2019-10-31 DIAGNOSIS — Z79.899 ENCOUNTER FOR LONG-TERM (CURRENT) USE OF MEDICATIONS: Primary | ICD-10-CM

## 2019-10-31 DIAGNOSIS — F25.0 SCHIZOAFFECTIVE DISORDER, BIPOLAR TYPE (H): ICD-10-CM

## 2019-10-31 PROCEDURE — G0463 HOSPITAL OUTPT CLINIC VISIT: HCPCS | Mod: ZF

## 2019-10-31 RX ORDER — MODAFINIL 100 MG/1
TABLET ORAL
Qty: 30 TABLET | Refills: 2 | Status: SHIPPED | OUTPATIENT
Start: 2019-10-31 | End: 2020-01-31

## 2019-10-31 RX ORDER — OLANZAPINE 5 MG/1
10 TABLET ORAL AT BEDTIME
Qty: 70 TABLET | Refills: 1 | Status: SHIPPED | OUTPATIENT
Start: 2019-10-31 | End: 2019-11-07

## 2019-10-31 RX ORDER — MODAFINIL 200 MG/1
TABLET ORAL
Qty: 30 TABLET | Refills: 2 | Status: SHIPPED | OUTPATIENT
Start: 2019-10-31 | End: 2020-01-31

## 2019-10-31 RX ORDER — LITHIUM CARBONATE 300 MG/1
1500 CAPSULE ORAL AT BEDTIME
Qty: 150 CAPSULE | Refills: 1 | Status: SHIPPED | OUTPATIENT
Start: 2019-10-31 | End: 2019-12-26

## 2019-10-31 ASSESSMENT — PAIN SCALES - GENERAL: PAINLEVEL: NO PAIN (0)

## 2019-10-31 NOTE — PROGRESS NOTES
[facility]  Psychiatry Clinic  PSYCHIATRIC PROGRESS NOTE     Date of initial Diagnostic Assessment (DA) is 7/24/15 and most recent Transfer of Care DA is 7/26/19.    CARE TEAM:  PCP- Provider, Clinic   Therapist- None   Community  Team- none.      Jose Francisco Sommers is a 29 year old male who prefers the name Manuel & pronouns he, him, his, himself.      Pertinent Background:  Jose Francisco Sommers first experienced mental health issues in grade school and has received treatment for ADHD, depression, psychosis and substance use.  See last transfer evaluation for detailed history. Notably, this patient had FEP in 2015 in the setting of cannabis abuse and has been stable on olanzapine and lithium since that time. He has never lived independently and has only been slowly developing insight into his illness and past symptoms. GeneSight testing was completed noting patient is a poor CYP2D6 metabolizer.  Past records from Dr. Mike Powers were received in summer 2018 to assist with diagnostic clarity, however they were hand written notes that were illegible though accompanied by typed neuropsychology consult from [facility].  This documentation was unable to support a diagnosis of bipolar disorder at that time though noted potential Asperger's diagnosis and ADHD. 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 Most recently, modafinil was started in April 2018 for attention support to endorsed benefits and heretofore no concerns for exacerbation of mood or psychotic symptoms.    Psych critical item history includes psychosis [sxs include disorganization, possible catatonia], mutiple psychotropic trials, psych hosp (<3) and SUBSTANCE USE: alcohol and cannabis.    INTERIM HISTORY      [4, 4]     The patient reports good treatment adherence.  History was provided by the patient who was a good historian.   The last visit ended with the following med change: olanzapine decreased from 15 to 12.5 mg.    Since the last visit:   Manuel reports that since the decrease in olanzapine dose from 15 to 12.5 mg, his appetite is somewhat improved, and he feels well. Feels modafinil has been extremely helpful overall for sleepiness, but still has difficulty focusing. [place] like the Neuropsych testing was somewhat artificial compared to if he were in class with a large group of people. He had some issues with other metrics. He wanted to clarify some of the results and we discussed them.     Two weeks he ago stopped wearing nicotine patch and stopped drinking caffeine. Sleep schedule has improved dramatically since then and he is able to get up early if he wants to, which is very helpful for success in his classes. He is enjoying classes but continues to have difficulty focusing particularly on reading assignments. He is on track to do well in terms of his grades.     RECENT PSYCH ROS:   Depression:  low energy, hypersomnia, appetite changes and poor concentration /memory  Elevated:  none  Psychosis:  none  Anxiety:  none  Panic Attack:  none  Trauma Related:  none  Dysregulation:  none  Eating Disorder: no     Pertinent Negative Symptoms:  NO suicidal and violent ideation, aggression, psychosis, lauren and hypomania    RECENT SUBSTANCE USE:     Alcohol- no  Tobacco- former smoker increased nicotine patch dose, feeling better  Caffeine- Switched to green tea  Opioids- none           Narcan Kit- N/A   Cannabis- none     Other illicit drugs- none    CURRENT SOCIAL HISTORY:  Financial/ Work- community college student       Partner/ - single  Children- no      Living situation- with parents in basement      Social/ Spiritual Support- parents, [employer]regational hemalatha     FEELS SAFE AT HOME- yes     PSYCH and CD Critical Summary Points since July 2019 July 2019: Resident transition  September 2019: Olanzapine decreased to 12.5  "mg    PAST MED TRIALS          See last Diagnostic Assessment    MEDICAL / SURGICAL HISTORY                                     Patient Active Problem List   Diagnosis     Schizoaffective disorder, bipolar type (H)     Hx of psychiatric care     Elevated liver enzymes     Fatty liver     High blood triglycerides     History of cannabis abuse     History of cocaine abuse (H)       Major Surgery- No past surgical history on file.    MEDICAL REVIEW OF SYSTEMS    [2, 10]     A comprehensive review of systems was performed and is negative other than noted in the HPI.    ALLERGY       Sulfa drugs  MEDICATIONS        Current Outpatient Medications   Medication Sig Dispense Refill     lithium 300 MG capsule Take 5 capsules (1,500 mg) by mouth At Bedtime 150 capsule 1     metFORMIN (GLUCOPHAGE) 500 MG tablet Take 2 tablets (1,000 mg) by mouth 2 times daily (with meals) 120 tablet 2     modafinil (PROVIGIL) 100 MG tablet Take 1 tablet (100 mg) along with 1 tablet (200 mg) for a total daily dose of 300 mg. 30 tablet 2     modafinil (PROVIGIL) 200 MG tablet Take 1 tablet (200 mg) along with 1 tablet (100 mg) for a total daily dose of 300 mg. 30 tablet 2     OLANZapine (ZYPREXA) 10 MG tablet Take 1 tablet (10 mg) by mouth At Bedtime 30 tablet 1     VITALS      [3, 3]   BP (!) 145/83   Pulse 72   Wt 112 kg (247 lb)   BMI 32.81 kg/m     MENTAL STATUS EXAM      [9, 14 cog gs]   Alertness: alert  and oriented  Appearance: casually groomed and dressed  Behavior/Demeanor: cooperative, pleasant and calm, with good  eye contact   Speech: normal and regular rate and rhythm  Language: intact and no obvious problem  Psychomotor: normal or unremarkable  Mood: \"good\"  Affect: restricted and blunted; was congruent to mood; was congruent to content  Thought Process/Associations: unremarkable  Thought Content:  Reports none;  Denies suicidal and violent ideation, obsessions  and paranoid ideation  Perception:  Reports none;  Denies auditory " hallucinations and visual hallucinations  Insight: fair  Judgment: fair and adequate for safety  Cognition: (6) does  appear grossly intact; formal cognitive testing was not done  Gait and Station: unremarkable    LABS and DATA     AIMS:  done today, score = 0    PHQ9 TODAY = 3  PHQ-9 SCORE 9/20/2019 11/1/2019 11/27/2019   PHQ-9 Total Score - - -   PHQ-9 Total Score 6 3 3     ANTIPSYCHOTIC LABS  [glu, A1C, lipids (ivania LDL), liver enzymes, WBC, ANEU, Hgb, plts]  q12 mo  Recent Labs   Lab Test 11/08/19  1020 05/07/19  1101 12/06/18  1442 10/30/17  1429  09/30/16  1348   GLC 93 88 86 86   < > 83   A1C  --  4.9 5.0  --   --  4.6    < > = values in this interval not displayed.     Recent Labs   Lab Test 05/07/19  1101 12/13/18  1027 12/06/18  1442 12/01/17  0948   CHOL 141 173 184 177   TRIG 195* 278* 535* 347*   LDL 63 81 Cannot estimate LDL when triglyceride exceeds 400 mg/dL  95 72   HDL 39* 36* 34* 36*     Recent Labs   Lab Test 05/07/19  1101 12/13/18  1027 12/06/18  1442 12/01/17  0948   AST 26 51* 70* 31   ALT 52 146* 178* 118*   ALKPHOS 69 55 74 63     Recent Labs   Lab Test 05/07/19  1101 12/06/18  1442 11/10/17  1534 10/30/17  1429   WBC 7.3 8.9 8.6 8.1   ANEU 4.5 5.7 5.0 4.8   HGB 15.0 15.8 15.6 15.7    270 255 241     LITHIUM LABS     [level, renal, SG, TSH, WBC]    q6 mo  Recent Labs   Lab Test 11/08/19  1020 05/07/19  1101 12/13/18  1027 12/06/18  1442   LITHIUM 0.84 0.61 0.65 0.52*     Recent Labs   Lab Test 11/08/19  1020 05/07/19  1101 12/06/18  1442 10/30/17  1429   CR 1.12 1.14 1.06 1.05   GFRESTIMATED 89 87 84 85    139 139 141   POTASSIUM 3.5 4.0 4.1 4.2   ADONAY 9.2 9.3 9.2 9.2     Recent Labs   Lab Test 11/08/19  1024 05/07/19  1101 12/06/18  1505 10/30/17  1429   SG 1.015 1.015 1.009 1.015     Recent Labs   Lab Test 11/08/19  1020 05/07/19  1101 12/06/18  1442 11/10/17  1534   TSH 5.29* 3.09 3.01 2.09     Recent Labs   Lab Test 05/07/19  1101 12/06/18  1442 11/10/17  1534 10/30/17  1429    WBC 7.3 8.9 8.6 8.1   ANEU 4.5 5.7 5.0 4.8         PSYCHOTROPIC DRUG INTERACTIONS     LITHIUM and OLANZAPINE may result in increased neurotoxic effects of D2-antagonists.     MANAGEMENT:  Monitoring for adverse effects, routine vitals, routine labs and patient is aware of risks    RISK STATEMENT for SAFETY     Jose Francisco Sommers did not appear to be an imminent safety risk to self or others.    DIAGNOSIS     Schizoaffective disorder, bipolar type, mood and psychosis in remission, vs. Bipolar I disorder  ADHD, predominantly inattentive type     ASSESSMENT      [m2, h3]     TODAY Manuel returns to clinic for ongoing management of his schizoaffective disorder, bipolar type, and ADHD.  Mood is stable, he is not in a major mood episode, and has had no psychotic symptoms.  Focus and alertness continue to be somewhat of a problem, particularly when trying to study for community college classes, but since stopping caffeine and nicotine he actually feels more awake. Due to appetite interfering with his efforts to lose weight, he requests a further decrease in his olanzapine dose from 12.5 to 10 mg. He has not had psychosis in quite some time, but we did recommend increasing lithium to better protect against lauren as olanzapine is downtitrated and he agreed. Last level was 0.6 on 1200 mg / day. Discussed emergent symptoms to be aware of as neuroleptic is downtitrated, and discussed signs/sx of lithium toxicity. Manuel will have lithium labs drawn in at least one week to ensure serum levels have stabilized.     PLAN      [m2, h3]     1) PSYCHOTROPIC MEDICATIONS:  - Decrease olanzapine to 10 mg PO at bedtime.  - Incrase lithium to 1500 mg PO at bedtime  - Continue modafinil 300 mg PO daily  - Continue metformin 1000 mg PO BID with meals    2) MN  was checked today: indicates expected use in accordance with chart review/patient report.    3) THERAPY:    not currently doing therapy    4) NEXT DUE:    Labs- [place] labs due  now, AP labs due 5/2020  EKG- PRN  Rating Scales- AIMS due 10/2020    5) REFERRALS:    none needed, has Neuropsych appointment scheduled next month     6) RTC: 4-6 weeks    7) CRISIS NUMBERS:   Provided routinely in AVS   CRISIS TEXT LINE: Text [id number] from anywhere in [place], anytime, any crisis 24/7;  OR SEE www.crisistextline.org  ONLY if a ROBERT PT: [facility] [phone] (clinic), [phone] (after hours)     TREATMENT RISK STATEMENT:  The risks, benefits, alternatives and potential adverse effects have been discussed and are understood by the pt. The pt understands the risks of using street drugs or alcohol. There are no medical contraindications, the pt agrees to treatment with the ability to do so. The pt knows to call the clinic for any problems or to access emergency care if needed.  Medical and substance use concerns are documented above.  Psychotropic drug interaction check was done, including changes made today.    PROVIDER:  Cristy Carrasquillo MD    Patient staffed in clinic with Dr. Ventura who will sign the note.  Supervisor is Dr. Tay.    I saw the patient with the resident, and participated in key portions of the service, including the mental status examination and developing the plan of care. I reviewed key portions of the history with the resident. I agree with the findings and plan as documented in this note.    Yuko Ventura MD

## 2019-10-31 NOTE — PATIENT INSTRUCTIONS
-Decrease olanzapine to 10 mg  -Increase lithium to 1500 mg  -Sometime after next Wednesday, have lithium labs drawn (about 12 hours after your bedtime dose). These do not have to be fasting.  -Please come back in one month.    Thank you for coming to the PSYCHIATRY CLINIC.    Lab Testing:  If you had lab testing today and your results are reassuring or normal they will be mailed to you or sent through [employer] within 7 days.   If the lab tests need quick action we will call you with the results.  The phone number we will call with results is # [phone] (home) . If this is not the best number please call our clinic and change the number.    Medication Refills:  If you need any refills please call your pharmacy and they will contact us. Our fax number for refills is [phone]. Please allow three business for refill processing.   If you need to  your refill at a new pharmacy, please contact the new pharmacy directly. The new pharmacy will help you get your medications transferred.     Scheduling:  If you have any concerns about today's visit or wish to schedule another appointment please call our office during normal business hours [phone] (8-5:00 M-F)    Contact Us:  Please call [phone] during business hours (8-5:00 M-F).  If after clinic hours, or on the weekend, please call  [phone].    Financial Assistance [phone]  [facility] Billing [phone]  [facility]illing [facility], [facility]: [phone]  [facility]illing [phone]  Medical Records [phone]      MENTAL HEALTH CRISIS NUMBERS:  [facility]:   [facility] - [phone]   Crisis Residence [facility] - [phone]   Walk-In Counseling Center [facility] - [phone]   COPE 24/7 [place] Mobile Team for Adults - [[phone]]; Child - [[phone]]        [facility]:   [facility] - [phone]   Walk-in counseling [facility] - [phone]   Walk-in  counseling [facility] - [phone]   Crisis Residence  [facility] - [phone]   Urgent Care Adult Mental Health:   --Drop-in, 24/7 crisis line, and [facility] Mobile Team [[phone]]    CRISIS TEXT LINE: Text 165-990 from anywhere, anytime, any crisis 24/7;    OR SEE www.crisistextline.org     Poison Control Center - [phone]    CHILD: [facility] needs assessment team - [phone]     [facility] Lifeline - [phone]; or [employer] Lifeline - 1-548.750.6779    If you have a medical emergency please call 911or go to the nearest ER.                    _____________________________________________    Again thank you for choosing PSYCHIATRY CLINIC and please let us know how we can best partner with you to improve you and your family's health.  You may be receiving a survey in the mail regarding this appointment. We would love to have your feedback, both positive and negative, so please fill out the survey and return it using the provided envelope. The survey is done by an external company, so your answers are anonymous.

## 2019-11-01 ENCOUNTER — TELEPHONE (OUTPATIENT)
Dept: PSYCHIATRY | Facility: CLINIC | Age: 29
End: 2019-11-01

## 2019-11-01 ASSESSMENT — PATIENT HEALTH QUESTIONNAIRE - PHQ9: SUM OF ALL RESPONSES TO PHQ QUESTIONS 1-9: 3

## 2019-11-01 NOTE — TELEPHONE ENCOUNTER
Central Prior Authorization Team   Phone: [phone]      PA Initiation    Medication: modafinil (PROVIGIL) 100 MG tablet- Take 1 tablet (100 mg) along with 1 tablet (200 mg) for a total daily dose of 300 mg  Insurance Company: [employer] - Phone [phone] Fax [phone]  Pharmacy Filling the Rx: [facility][id number] [place], [place] - [address] [place] [address]  Filling Pharmacy Phone: [phone]  Filling Pharmacy Fax:    Start Date: 11/1/2019

## 2019-11-01 NOTE — TELEPHONE ENCOUNTER
"Prior Authorization Retail Medication Request    Medication/Dose: modafinil (PROVIGIL) 200 MG tablet-  Take 1 tablet (200 mg) along with 1 tablet (100 mg) for a total daily dose of 300 mg.  ICD code (if different than what is on RX): Attention deficit hyperactivity disorder (ADHD), unspecified ADHD type [F90.9]      Previously Tried and Failed:  Adderall 10-20 mg - improved concentration, but \"cold personality\" and perseveration  Prozac 20 mg - limited response, less irritable, first episode of \"rage\"  Seroquel 25-75 mg - helped with sleep and irritability  Concerta 18 mg - improved focus, no improvement in motivation  Provigil 150 mg - Stimulants \"almost killed him\" triggered alcohol binges  Abilify ?25 mg - OK when QOD, but irritable and agitated on QD  Clonazepam 0.5-1.5 mg - calming, helped with sleep and \"catatonia\"  Risperdal up to 5 mg - some confusion, slow processing, withdrawn, ?\"catatonia\", panic attacks  Zyprexa 5 mg less anxious overall improvement  Lithium - calmer, overall better  Latuda 20 mg - severe fatigue and depression  Synthroid 75 mcg - added to help with mood.  Brief trials with Strattera, Intuniv, Lamictal, Xanax, Zoloft    Rationale:  Patient has been on Provigil 200 mg daily medication since 04/2018 and seen improvement in symptoms and therefore the dose is increased to 300 mg daily. Since starting the medication patient reports-hypersomnia (improving with Provigil) and  as well as appetite. Patient endorses interval mood stability, and states that he feels Provigil has been of significant benefit to patient with gains made in terms of concentration and engagement with constructive activities. Patient has had less negative effects and increase helpful with symptoms without any side effects. It is vital that patient be able to continue on this medication in order for his health. A change in medication or dose would place patient at liane risk for decompensation and possible hospitalization. "     Insurance Name:  Not provided   Insurance ID:  Not provided       Pharmacy Information (if different than what is on RX)  Name:  Same   Phone:  Same

## 2019-11-01 NOTE — TELEPHONE ENCOUNTER
Central Prior Authorization Team   Phone: [phone]      PA Initiation    Medication: modafinil (PROVIGIL) 200 MG tablet-  Take 1 tablet (200 mg) along with 1 tablet (100 mg) for a total daily dose of 300 mg.  Insurance Company: [employer] - Phone [phone] Fax [phone]  Pharmacy Filling the Rx: [facility][id number] [place], [place] - [address] [place] [address]  Filling Pharmacy Phone: [phone]  Filling Pharmacy Fax:    Start Date: 11/1/2019

## 2019-11-01 NOTE — TELEPHONE ENCOUNTER
"Prior Authorization Retail Medication Request    Medication/Dose: modafinil (PROVIGIL) 100 MG tablet- Take 1 tablet (100 mg) along with 1 tablet (200 mg) for a total daily dose of 300 mg  ICD code (if different than what is on RX): Attention deficit hyperactivity disorder (ADHD), unspecified ADHD type [F90.9]      Previously Tried and Failed:  Adderall 10-20 mg - improved concentration, but \"cold personality\" and perseveration  Prozac 20 mg - limited response, less irritable, first episode of \"rage\"  Seroquel 25-75 mg - helped with sleep and irritability  Concerta 18 mg - improved focus, no improvement in motivation  Provigil 150 mg - Stimulants \"almost killed him\" triggered alcohol binges  Abilify ?25 mg - OK when QOD, but irritable and agitated on QD  Clonazepam 0.5-1.5 mg - calming, helped with sleep and \"catatonia\"  Risperdal up to 5 mg - some confusion, slow processing, withdrawn, ?\"catatonia\", panic attacks  Zyprexa 5 mg less anxious overall improvement  Lithium - calmer, overall better  Latuda 20 mg - severe fatigue and depression  Synthroid 75 mcg - added to help with mood.  Brief trials with Strattera, Intuniv, Lamictal, Xanax, Zoloft    Rationale:  Patient has been on Provigil 200 mg daily medication since 04/2018 and seen improvement in symptoms and therefore the dose is increased to 300 mg daily. Since starting the medication patient reports-hypersomnia (improving with Provigil) and  as well as appetite. Patient endorses interval mood stability, and states that he feels Provigil has been of significant benefit to patient with gains made in terms of concentration and engagement with constructive activities. Patient has had less negative effects and increase helpful with symptoms without any side effects. It is vital that patient be able to continue on this medication in order for his health. A change in medication or dose would place patient at liane risk for decompensation and possible hospitalization. "     Insurance Name:  Not provided   Insurance ID:  Not provided       Pharmacy Information (if different than what is on RX)  Name:  Same   Phone:  Same

## 2019-11-04 ENCOUNTER — HEALTH MAINTENANCE LETTER (OUTPATIENT)
Age: 29
End: 2019-11-04

## 2019-11-04 NOTE — TELEPHONE ENCOUNTER
Prior Authorization Approval    Authorization Effective Date: 8/4/2019  Authorization Expiration Date: 11/4/2020  Medication: modafinil (PROVIGIL) 100 MG tablet- APPROVED  Approved Dose/Quantity:   Reference #:     Insurance Company: [employer] - Phone [phone] Fax [phone]  Expected CoPay:       CoPay Card Available:      Foundation Assistance Needed:    Which Pharmacy is filling the prescription (Not needed for infusion/clinic administered): [facility][id number] - [place], [place] - [address] [place] [address]  Pharmacy Notified: Yes-Pharmacy will notify patient when ready.  Patient Notified: No

## 2019-11-04 NOTE — TELEPHONE ENCOUNTER
PRIOR AUTHORIZATION DENIED    Medication: modafinil (PROVIGIL) 100 MG tablet--DENIED    Denial Date: 11/2/2019    Denial Rational:                   Appeal Information:

## 2019-11-04 NOTE — TELEPHONE ENCOUNTER
Prior Authorization Approval    Authorization Effective Date: 8/4/2019  Authorization Expiration Date: 11/4/2020  Medication: modafinil (PROVIGIL) 200 MG tablet-APPROVED  Approved Dose/Quantity:   Reference #:     Insurance Company: [employer] - Phone [phone] Fax [phone]  Expected CoPay:       CoPay Card Available:      Foundation Assistance Needed:    Which Pharmacy is filling the prescription (Not needed for infusion/clinic administered): [facility] #[id number] - [place], [place] - [address] [place] [address]  Pharmacy Notified: Yes-Pharmacy will notify patient when ready.  Patient Notified: No

## 2019-11-06 ENCOUNTER — TELEPHONE (OUTPATIENT)
Dept: PSYCHIATRY | Facility: CLINIC | Age: 29
End: 2019-11-06

## 2019-11-06 DIAGNOSIS — F25.0 SCHIZOAFFECTIVE DISORDER, BIPOLAR TYPE (H): ICD-10-CM

## 2019-11-06 NOTE — TELEPHONE ENCOUNTER
Lesiar received a fax from [facility] pharmacy requesting to complete a PA for Olanzapine 5 mg tabs. Writer placed a call to pharmacy and confirmed Olanzapine 10 mg tab- take 1 tab daily will go through insurance. Writer agreed with the plan.     Routed to provider

## 2019-11-06 NOTE — TELEPHONE ENCOUNTER
Prior Auth - Medication (OLANZapine (ZYPREXA) 5 MG tablet- Take 2 tablets (10 mg) by mouth At Bedtime - Oral)     Cristy Carrasquillo MD  You 5 minutes ago (12:21 PM)      Kaitlin-- thank you for helping out with this! Yes, I approve the change to 10 mg tablets, 10 mg PO daily.   Thanks!   Cristy    Routing comment

## 2019-11-07 RX ORDER — OLANZAPINE 10 MG/1
10 TABLET ORAL AT BEDTIME
Qty: 30 TABLET | Refills: 1 | Status: SHIPPED | OUTPATIENT
Start: 2019-11-07 | End: 2019-12-26

## 2019-11-07 NOTE — TELEPHONE ENCOUNTER
Received a request for a prior authorization from the pharmacy for 5 mg tabs (take 2 tabs at bedtime).  Sent an updated Rx with a note that Rx for olanzapine 5 mg tabs should be discontinued and replaced with 10 mg tabs (take 1 tab at bedtime).    Routed to provider for FYI.

## 2019-11-08 DIAGNOSIS — Z79.899 ENCOUNTER FOR LONG-TERM (CURRENT) USE OF MEDICATIONS: ICD-10-CM

## 2019-11-08 LAB
ANION GAP SERPL CALCULATED.3IONS-SCNC: 8 MMOL/L (ref 3–14)
BUN SERPL-MCNC: 13 MG/DL (ref 7–30)
CALCIUM SERPL-MCNC: 9.2 MG/DL (ref 8.5–10.1)
CHLORIDE SERPL-SCNC: 106 MMOL/L (ref 94–109)
CO2 SERPL-SCNC: 24 MMOL/L (ref 20–32)
CREAT SERPL-MCNC: 1.12 MG/DL (ref 0.66–1.25)
GFR SERPL CREATININE-BSD FRML MDRD: 89 ML/MIN/{1.73_M2}
GLUCOSE SERPL-MCNC: 93 MG/DL (ref 70–99)
POTASSIUM SERPL-SCNC: 3.5 MMOL/L (ref 3.4–5.3)
SODIUM SERPL-SCNC: 138 MMOL/L (ref 133–144)
SP GR UR STRIP: 1.01 (ref 1–1.03)
T4 FREE SERPL-MCNC: 1.06 NG/DL (ref 0.76–1.46)
TSH SERPL DL<=0.005 MIU/L-ACNC: 5.29 MU/L (ref 0.4–4)

## 2019-11-08 PROCEDURE — 84443 ASSAY THYROID STIM HORMONE: CPT | Performed by: STUDENT IN AN ORGANIZED HEALTH CARE EDUCATION/TRAINING PROGRAM

## 2019-11-08 PROCEDURE — 84439 ASSAY OF FREE THYROXINE: CPT | Performed by: STUDENT IN AN ORGANIZED HEALTH CARE EDUCATION/TRAINING PROGRAM

## 2019-11-08 PROCEDURE — 80178 ASSAY OF LITHIUM: CPT | Performed by: STUDENT IN AN ORGANIZED HEALTH CARE EDUCATION/TRAINING PROGRAM

## 2019-11-08 PROCEDURE — 81003 URINALYSIS AUTO W/O SCOPE: CPT | Performed by: STUDENT IN AN ORGANIZED HEALTH CARE EDUCATION/TRAINING PROGRAM

## 2019-11-08 PROCEDURE — 36415 COLL VENOUS BLD VENIPUNCTURE: CPT | Performed by: STUDENT IN AN ORGANIZED HEALTH CARE EDUCATION/TRAINING PROGRAM

## 2019-11-08 PROCEDURE — 80048 BASIC METABOLIC PNL TOTAL CA: CPT | Performed by: STUDENT IN AN ORGANIZED HEALTH CARE EDUCATION/TRAINING PROGRAM

## 2019-11-09 LAB — LITHIUM SERPL-SCNC: 0.84 MMOL/L (ref 0.6–1.2)

## 2019-11-27 ENCOUNTER — OFFICE VISIT (OUTPATIENT)
Dept: PSYCHIATRY | Facility: CLINIC | Age: 29
End: 2019-11-27
Attending: PSYCHIATRY & NEUROLOGY
Payer: COMMERCIAL

## 2019-11-27 VITALS
DIASTOLIC BLOOD PRESSURE: 82 MMHG | WEIGHT: 252 LBS | HEART RATE: 66 BPM | BODY MASS INDEX: 33.48 KG/M2 | SYSTOLIC BLOOD PRESSURE: 137 MMHG

## 2019-11-27 DIAGNOSIS — F25.0 SCHIZOAFFECTIVE DISORDER, BIPOLAR TYPE (H): Primary | ICD-10-CM

## 2019-11-27 DIAGNOSIS — F90.9 ATTENTION DEFICIT HYPERACTIVITY DISORDER (ADHD), UNSPECIFIED ADHD TYPE: ICD-10-CM

## 2019-11-27 PROCEDURE — G0463 HOSPITAL OUTPT CLINIC VISIT: HCPCS | Mod: ZF

## 2019-11-27 ASSESSMENT — PATIENT HEALTH QUESTIONNAIRE - PHQ9: SUM OF ALL RESPONSES TO PHQ QUESTIONS 1-9: 3

## 2019-11-27 ASSESSMENT — PAIN SCALES - GENERAL: PAINLEVEL: NO PAIN (0)

## 2019-11-27 NOTE — PROGRESS NOTES
[facility]  Psychiatry Clinic  PSYCHIATRIC PROGRESS NOTE     Date of initial Diagnostic Assessment (DA) is 7/24/15 and most recent Transfer of Care DA is 7/26/19.    CARE TEAM:  PCP- Provider, Clinic   Therapist- None   Community  Team- none.      Jose Francisco Sommers is a 29 year old male who prefers the name Manuel & pronouns he, him, his, himself.      Pertinent Background:  Jose Francisco Sommers first experienced mental health issues in grade school and has received treatment for ADHD, depression, psychosis and substance use.  See last transfer evaluation for detailed history. Notably, this patient had FEP in 2015 in the setting of cannabis abuse and has been stable on olanzapine and lithium since that time. He has never lived independently and has only been slowly developing insight into his illness and past symptoms. GeneSight testing was completed noting patient is a poor CYP2D6 metabolizer. Past records from Dr. Mike Powers were received in summer 2018 to assist with diagnostic clarity, however they were hand written notes that were illegible though accompanied by typed neuropsychology consult from [facility].  This documentation was unable to support a diagnosis of bipolar disorder at that time though noted potential Asperger's diagnosis and ADHD. 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 Most recently, modafinil was started in April 2018 for attention support to endorsed benefits and heretofore no concerns for exacerbation of mood or psychotic symptoms.    Psych critical item history includes psychosis [sxs include disorganization, possible catatonia], mutiple psychotropic trials, psych hosp (<3) and SUBSTANCE USE: alcohol and cannabis.    INTERIM HISTORY      [4, 4]     The patient reports good treatment adherence.  History was provided by the patient who was a good historian,  "accompanied by his mother who was also a good historian. The last visit ended with the following med change: olanzapine decreased to 10 mg. and lithium increased to 1500 mg.    Since the last visit:   Manuel reports that things are going well for him. His sleep is about the same as before; he has not noticed any change with the higher dose of lithium. He continues to crave sweets, describing himself as \"a bit of a sweet tooth.\" He has noticed a significant decrease in appetite, however, with the decrease in olanzapine from 20 to 10 mg, His mother spoke about his tendency to sleep in late, stating that his \"natural sleep cycle is not a healthy one,\" and that he has had difficulty getting up and going since at least high school. Manuel corroborates this saying that he struggles to get up by noon. He reports, \"when I started Provigil was when I started to make improvements, it was the best I had done with sleep since high school.\" He reports he currently wakes up 1/3 of the days at 6 AM, and he does not feel more lethargic in the morning on those days.    He reports school is \"going pretty well,\" in his computer hardware and software classes. He states he wastes a lot of time \"sitting around, thinking about what I need to be doing,\" he is not sure if he is particularly distracted or just deliberate, preferring to \"think about stuff before I do it,\" but not worrying. 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 (this is how he supplements his income.)    RECENT PSYCH ROS:   Depression:  low energy, hypersomnia, appetite changes and poor concentration /memory  Elevated:  none  Psychosis:  none  Anxiety:  none  Panic Attack:  none  Trauma Related:  none  Dysregulation:  none  Eating Disorder: no     Pertinent Negative Symptoms:  NO suicidal and violent ideation, " aggression, psychosis, lauren and hypomania    RECENT SUBSTANCE USE:     Alcohol- no  Tobacco-quit smoking, no recent nicotine use  Caffeine- none  Opioids- none           Narcan Kit- N/A   Cannabis- none     Other illicit drugs- none    CURRENT SOCIAL HISTORY:  Financial/ Work- community college student       Partner/ - single  Children- no      Living situation- with parents in basement      Social/ Spiritual Support- parents, Baptism hemalatha     FEELS SAFE AT HOME- yes     PSYCH and CD Critical Summary Points since July 2019 July 2019: Resident transition  September 2019: Olanzapine decreased to 12.5 mg    PAST MED TRIALS          See last Diagnostic Assessment    MEDICAL / SURGICAL HISTORY                                     Patient Active Problem List   Diagnosis     Schizoaffective disorder, bipolar type (H)     Hx of psychiatric care     Elevated liver enzymes     Fatty liver     High blood triglycerides     History of cannabis abuse     History of cocaine abuse (H)       Major Surgery- No past surgical history on file.    MEDICAL REVIEW OF SYSTEMS    [2, 10]     A comprehensive review of systems was performed and is negative other than noted in the HPI.    ALLERGY       Sulfa drugs  MEDICATIONS        Current Outpatient Medications   Medication Sig Dispense Refill     lithium 300 MG capsule Take 5 capsules (1,500 mg) by mouth At Bedtime 150 capsule 1     metFORMIN (GLUCOPHAGE) 500 MG tablet Take 2 tablets (1,000 mg) by mouth 2 times daily (with meals) 120 tablet 2     modafinil (PROVIGIL) 100 MG tablet Take 1 tablet (100 mg) along with 1 tablet (200 mg) for a total daily dose of 300 mg. 30 tablet 2     modafinil (PROVIGIL) 200 MG tablet Take 1 tablet (200 mg) along with 1 tablet (100 mg) for a total daily dose of 300 mg. 30 tablet 2     OLANZapine (ZYPREXA) 10 MG tablet Take 1 tablet (10 mg) by mouth At Bedtime 30 tablet 1     VITALS      [3, 3]   /82   Pulse 66   Wt 114.3 kg (252  "lb)   BMI 33.48 kg/m     MENTAL STATUS EXAM      [9, 14 cog gs]   Alertness: alert  and oriented  Appearance: casually groomed and dressed  Behavior/Demeanor: cooperative, pleasant and calm, with good  eye contact   Speech: normal and regular rate and rhythm  Language: intact and no obvious problem  Psychomotor: normal or unremarkable  Mood: \"good\"  Affect: restricted and blunted; was congruent to mood; was congruent to content  Thought Process/Associations: unremarkable  Thought Content:  Reports none;  Denies suicidal and violent ideation, obsessions  and paranoid ideation  Perception:  Reports none;  Denies auditory hallucinations and visual hallucinations  Insight: fair  Judgment: fair and adequate for safety  Cognition: (6) does  appear grossly intact; formal cognitive testing was not done  Gait and Station: unremarkable    LABS and DATA     AIMS:  done today, score = 0    PHQ9 TODAY = 4  PHQ-9 SCORE 9/20/2019 11/1/2019 11/27/2019   PHQ-9 Total Score - - -   PHQ-9 Total Score 6 3 3     ANTIPSYCHOTIC LABS  [glu, A1C, lipids (ivania LDL), liver enzymes, WBC, ANEU, Hgb, plts]  q12 mo  Recent Labs   Lab Test 11/08/19  1020 05/07/19  1101 12/06/18  1442 10/30/17  1429  09/30/16  1348   GLC 93 88 86 86   < > 83   A1C  --  4.9 5.0  --   --  4.6    < > = values in this interval not displayed.     Recent Labs   Lab Test 05/07/19  1101 12/13/18  1027 12/06/18  1442 12/01/17  0948   CHOL 141 173 184 177   TRIG 195* 278* 535* 347*   LDL 63 81 Cannot estimate LDL when triglyceride exceeds 400 mg/dL  95 72   HDL 39* 36* 34* 36*     Recent Labs   Lab Test 05/07/19  1101 12/13/18  1027 12/06/18  1442 12/01/17  0948   AST 26 51* 70* 31   ALT 52 146* 178* 118*   ALKPHOS 69 55 74 63     Recent Labs   Lab Test 05/07/19  1101 12/06/18  1442 11/10/17  1534 10/30/17  1429   WBC 7.3 8.9 8.6 8.1   ANEU 4.5 5.7 5.0 4.8   HGB 15.0 15.8 15.6 15.7    270 255 241     LITHIUM LABS     [level, renal, SG, TSH, WBC]    q6 mo  Recent Labs "   Lab Test 11/08/19  1020 05/07/19  1101 12/13/18  1027 12/06/18  1442   LITHIUM 0.84 0.61 0.65 0.52*     Recent Labs   Lab Test 11/08/19  1020 05/07/19  1101 12/06/18  1442 10/30/17  1429   CR 1.12 1.14 1.06 1.05   GFRESTIMATED 89 87 84 85    139 139 141   POTASSIUM 3.5 4.0 4.1 4.2   ADONAY 9.2 9.3 9.2 9.2     Recent Labs   Lab Test 11/08/19  1024 05/07/19  1101 12/06/18  1505 10/30/17  1429   SG 1.015 1.015 1.009 1.015     Recent Labs   Lab Test 11/08/19  1020 05/07/19  1101 12/06/18  1442 11/10/17  1534   TSH 5.29* 3.09 3.01 2.09     Recent Labs   Lab Test 05/07/19  1101 12/06/18  1442 11/10/17  1534 10/30/17  1429   WBC 7.3 8.9 8.6 8.1   ANEU 4.5 5.7 5.0 4.8         PSYCHOTROPIC DRUG INTERACTIONS     LITHIUM and OLANZAPINE may result in increased neurotoxic effects of D2-antagonists.     MANAGEMENT:  Monitoring for adverse effects, routine vitals, routine labs and patient is aware of risks    RISK STATEMENT for SAFETY     Jose Francisco Sommers did not appear to be an imminent safety risk to self or others.    DIAGNOSIS     Schizoaffective disorder, bipolar type, mood and psychosis in remission, vs. Bipolar I disorder  ADHD, predominantly inattentive type     ASSESSMENT      [m2, h3]     TODAY Manuel returns to clinic for ongoing management of his schizoaffective disorder, bipolar type, and ADHD. Mood is stable, he is not in a major mood episode, and has had no psychotic symptoms, and he continues to struggle with hypersomnia. Though he has difficulty focusing on his schoolwork at times, he does report that he is preferentially thinking about other things before he wants to sit down and work, rather than feeling distractible or having difficulty concentrating. He feels conflicted about decreasing olanzapine further due to potential for recurrence of psychotic symptoms, but does continue to experience cravings for carbohydrates. He and his mother would prefer to keep current doses where they are through the end  of the semester. No safety concerns today. Further considerations could include maximizing Provigil dose.     PLAN      [m2, h3]     1) PSYCHOTROPIC MEDICATIONS:  - Continue olanzapine to 10 mg PO at bedtime.  - Continue lithium 1500 mg PO at bedtime  - Continue modafinil 300 mg PO daily  - Continue metformin 1000 mg PO BID with meals    2) [place]  was checked today: indicates expected use in accordance with chart review/patient report.    3) THERAPY:    not currently doing therapy    4) NEXT DUE:    Labs- [place] labs due in 11/2019, [facility] labs due 5/2020  EKG- PRN  Rating Scales- AIMS done today, score = 0    5) REFERRALS:    none     6) RTC: 4-6 weeks    7) CRISIS NUMBERS:   Provided routinely in AVS   CRISIS TEXT LINE: Text [id number] from anywhere in [place], anytime, any crisis 24/7;  OR SEE www.crisistextline.org  ONLY if a [facility] PT: [facility] [phone] (clinic), [phone] (after hours)     TREATMENT RISK STATEMENT:  The risks, benefits, alternatives and potential adverse effects have been discussed and are understood by the pt. The pt understands the risks of using street drugs or alcohol. There are no medical contraindications, the pt agrees to treatment with the ability to do so. The pt knows to call the clinic for any problems or to access emergency care if needed.  Medical and substance use concerns are documented above.  Psychotropic drug interaction check was done, including changes made today.      Patient staffed in clinic with Dr. Ibrahim who will sign the note.  Supervisor is Dr. Tay.  I saw the patient with the resident, and participated in key portions of the service, including the mental status examination and developing the plan of care. I reviewed key portions of the history with the resident. I agree with the findings and plan as documented in this note.    Flaca Ibrahim MD

## 2019-11-27 NOTE — PATIENT INSTRUCTIONS
-No medication changes today.  -See you on 12/26. Good luck on finals!    For the neuropsychological testing results:  Contact information for release of information:  [facility], [facility]  Release of Information,  [place] [id number]  Phone: [phone]  Fax: [phone]

## 2019-12-26 ENCOUNTER — OFFICE VISIT (OUTPATIENT)
Dept: PSYCHIATRY | Facility: CLINIC | Age: 29
End: 2019-12-26
Attending: PSYCHIATRY & NEUROLOGY
Payer: COMMERCIAL

## 2019-12-26 VITALS
HEART RATE: 69 BPM | SYSTOLIC BLOOD PRESSURE: 145 MMHG | WEIGHT: 255.4 LBS | DIASTOLIC BLOOD PRESSURE: 82 MMHG | BODY MASS INDEX: 33.93 KG/M2

## 2019-12-26 DIAGNOSIS — Z79.899 ENCOUNTER FOR LONG-TERM (CURRENT) USE OF MEDICATIONS: Primary | ICD-10-CM

## 2019-12-26 DIAGNOSIS — F25.0 SCHIZOAFFECTIVE DISORDER, BIPOLAR TYPE (H): ICD-10-CM

## 2019-12-26 DIAGNOSIS — F90.9 ATTENTION DEFICIT HYPERACTIVITY DISORDER (ADHD), UNSPECIFIED ADHD TYPE: ICD-10-CM

## 2019-12-26 PROCEDURE — G0463 HOSPITAL OUTPT CLINIC VISIT: HCPCS | Mod: ZF

## 2019-12-26 RX ORDER — LITHIUM CARBONATE 300 MG/1
1500 CAPSULE ORAL AT BEDTIME
Qty: 150 CAPSULE | Refills: 1 | Status: SHIPPED | OUTPATIENT
Start: 2019-12-26 | End: 2020-02-28

## 2019-12-26 RX ORDER — MODAFINIL 200 MG/1
400 TABLET ORAL DAILY
Qty: 60 TABLET | Refills: 1 | Status: SHIPPED | OUTPATIENT
Start: 2019-12-26 | End: 2020-02-28

## 2019-12-26 RX ORDER — OLANZAPINE 10 MG/1
10 TABLET ORAL AT BEDTIME
Qty: 30 TABLET | Refills: 1 | Status: SHIPPED | OUTPATIENT
Start: 2019-12-26 | End: 2020-01-31

## 2019-12-26 ASSESSMENT — PAIN SCALES - GENERAL: PAINLEVEL: NO PAIN (0)

## 2019-12-26 NOTE — NURSING NOTE
Chief Complaint   Patient presents with     Recheck Medication     Schizoaffective disorder, bipolar type

## 2019-12-26 NOTE — PATIENT INSTRUCTIONS
-Increase Provigil to 400 mg.  -No other changes today.  -Please come back in one month.  -Also please have the thyroid labs drawn in about one month. You can do this on the way into my appointment.

## 2019-12-26 NOTE — PROGRESS NOTES
[facility]  Psychiatry Clinic  PSYCHIATRIC PROGRESS NOTE     Date of initial Diagnostic Assessment (DA) is 7/24/15 and most recent Transfer of Care DA is 7/26/19.    CARE TEAM:  PCP- Provider, Clinic   Therapist- None   Community  Team- none.      Jose Francisco Sommers is a 29 year old male who prefers the name Manuel & pronouns he, him, his, himself.      Pertinent Background:  Jose Francisco Sommers first experienced mental health issues in grade school and has received treatment for ADHD, depression, psychosis and substance use.  See last transfer evaluation for detailed history. Notably, this patient had FEP in 2015 in the setting of cannabis abuse and has been stable on olanzapine and lithium since that time. He has never lived independently and has only been slowly developing insight into his illness and past symptoms. GeneSight testing was completed noting patient is a poor CYP2D6 metabolizer. Past records from Dr. Mike Powers were received in summer 2018 to assist with diagnostic clarity, however they were hand written notes that were illegible though accompanied by typed neuropsychology consult from [facility]. This documentation was unable to support a diagnosis of bipolar disorder at that time though noted potential Asperger's diagnosis and ADHD. 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 Most recently, modafinil was started in April 2018 for attention support to endorsed benefits and heretofore no concerns for exacerbation of mood or psychotic symptoms.    Psych critical item history includes psychosis [sxs include disorganization, possible catatonia], mutiple psychotropic trials, psych hosp (<3) and SUBSTANCE USE: alcohol and cannabis.    INTERIM HISTORY      [4, 4]     The patient reports good treatment adherence.  History was provided by the patient who was a good historian,  "accompanied by his mother who was also a good historian. The last visit ended with no change to the med regimen.    Since the last visit:   Manuel reports that he has been going to bed earlier (10 PM instead of 12-1), woke up 9 or earlier most days last week. He is very happy with this change in his sleep schedule and notes that his mother likely is as well. 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 Also reading Tolkien. His goal is to finish [employer] and reading \"and then switch gears\" into his next semester of classes. He continues to have difficulty \"absorbing the things I am reading\" and wonders if there are any medication adjustments that can be made.     RECENT PSYCH ROS:   Depression:  low energy and poor concentration /memory  Elevated:  none  Psychosis:  none  Anxiety:  none  Panic Attack:  none  Trauma Related:  none  Dysregulation:  none  Eating Disorder: no     Pertinent Negative Symptoms:  NO suicidal and violent ideation, aggression, psychosis, lauren and hypomania    RECENT SUBSTANCE USE:     Alcohol- no  Tobacco-quit smoking, no recent nicotine use  Caffeine- none  Opioids- none           Narcan Kit- N/A   Cannabis- none     Other illicit drugs- none    CURRENT SOCIAL HISTORY:  Financial/ Work- community college student       Partner/ - single  Children- no      Living situation- with parents in basement      Social/ Spiritual Support- parents, [employer] hemalatha     FEELS SAFE AT HOME- yes     PSYCH and CD Critical Summary Points since July 2019 July 2019: Resident transition  September 2019: Olanzapine decreased to 12.5 mg, lithium increased to 1500 mg  October 2019: Olanzapine decreased to 10 mg.  December 2019: Modafinil increased to 400 mg.    PAST MED TRIALS          See last Diagnostic Assessment    MEDICAL / SURGICAL HISTORY                                     Patient Active Problem List   Diagnosis     Schizoaffective disorder, " "bipolar type (H)     Hx of psychiatric care     Elevated liver enzymes     Fatty liver     High blood triglycerides     History of cannabis abuse     History of cocaine abuse (H)       Major Surgery- No past surgical history on file.    MEDICAL REVIEW OF SYSTEMS    [2, 10]     A comprehensive review of systems was performed and is negative other than noted in the HPI.    ALLERGY       Sulfa drugs  MEDICATIONS        Current Outpatient Medications   Medication Sig Dispense Refill     lithium 300 MG capsule Take 5 capsules (1,500 mg) by mouth At Bedtime 150 capsule 1     metFORMIN (GLUCOPHAGE) 500 MG tablet Take 2 tablets (1,000 mg) by mouth 2 times daily (with meals) 120 tablet 2     modafinil (PROVIGIL) 100 MG tablet Take 1 tablet (100 mg) along with 1 tablet (200 mg) for a total daily dose of 300 mg. 30 tablet 2     modafinil (PROVIGIL) 200 MG tablet Take 2 tablets (400 mg) by mouth daily 60 tablet 1     modafinil (PROVIGIL) 200 MG tablet Take 1 tablet (200 mg) along with 1 tablet (100 mg) for a total daily dose of 300 mg. 30 tablet 2     OLANZapine (ZYPREXA) 10 MG tablet Take 1 tablet (10 mg) by mouth At Bedtime 30 tablet 1     VITALS      [3, 3]   BP (!) 145/82   Pulse 69   Wt 115.8 kg (255 lb 6.4 oz)   BMI 33.93 kg/m     MENTAL STATUS EXAM      [9, 14 cog gs]   Alertness: alert  and oriented  Appearance: casually groomed and dressed  Behavior/Demeanor: cooperative, pleasant and calm, with good  eye contact   Speech: normal and regular rate and rhythm  Language: intact and no obvious problem  Psychomotor: normal or unremarkable  Mood: \"good\"  Affect: restricted and blunted; was congruent to mood; was congruent to content  Thought Process/Associations: unremarkable  Thought Content:  Reports none;  Denies suicidal and violent ideation, obsessions  and paranoid ideation  Perception:  Reports none;  Denies auditory hallucinations and visual hallucinations  Insight: fair  Judgment: fair and adequate for " safety  Cognition: (6) does  appear grossly intact; formal cognitive testing was not done  Gait and Station: unremarkable    LABS and DATA     AIMS:  done today, score = 0    PHQ9 TODAY = 4  PHQ-9 SCORE 9/20/2019 11/1/2019 11/27/2019   PHQ-9 Total Score - - -   PHQ-9 Total Score 6 3 3     ANTIPSYCHOTIC LABS  [glu, A1C, lipids (ivania LDL), liver enzymes, WBC, ANEU, Hgb, plts]  q12 mo  Recent Labs   Lab Test 11/08/19  1020 05/07/19  1101 12/06/18  1442 10/30/17  1429  09/30/16  1348   GLC 93 88 86 86   < > 83   A1C  --  4.9 5.0  --   --  4.6    < > = values in this interval not displayed.     Recent Labs   Lab Test 05/07/19  1101 12/13/18  1027 12/06/18  1442 12/01/17  0948   CHOL 141 173 184 177   TRIG 195* 278* 535* 347*   LDL 63 81 Cannot estimate LDL when triglyceride exceeds 400 mg/dL  95 72   HDL 39* 36* 34* 36*     Recent Labs   Lab Test 05/07/19  1101 12/13/18  1027 12/06/18  1442 12/01/17  0948   AST 26 51* 70* 31   ALT 52 146* 178* 118*   ALKPHOS 69 55 74 63     Recent Labs   Lab Test 05/07/19  1101 12/06/18  1442 11/10/17  1534 10/30/17  1429   WBC 7.3 8.9 8.6 8.1   ANEU 4.5 5.7 5.0 4.8   HGB 15.0 15.8 15.6 15.7    270 255 241     LITHIUM LABS     [level, renal, SG, TSH, WBC]    q6 mo  Recent Labs   Lab Test 11/08/19  1020 05/07/19  1101 12/13/18  1027 12/06/18  1442   LITHIUM 0.84 0.61 0.65 0.52*     Recent Labs   Lab Test 11/08/19  1020 05/07/19  1101 12/06/18  1442 10/30/17  1429   CR 1.12 1.14 1.06 1.05   GFRESTIMATED 89 87 84 85    139 139 141   POTASSIUM 3.5 4.0 4.1 4.2   ADONAY 9.2 9.3 9.2 9.2     Recent Labs   Lab Test 11/08/19  1024 05/07/19  1101 12/06/18  1505 10/30/17  1429   SG 1.015 1.015 1.009 1.015     Recent Labs   Lab Test 11/08/19  1020 05/07/19  1101 12/06/18  1442 11/10/17  1534   TSH 5.29* 3.09 3.01 2.09     Recent Labs   Lab Test 05/07/19  1101 12/06/18  1442 11/10/17  1534 10/30/17  1429   WBC 7.3 8.9 8.6 8.1   ANEU 4.5 5.7 5.0 4.8         PSYCHOTROPIC DRUG INTERACTIONS      LITHIUM and OLANZAPINE may result in increased neurotoxic effects of D2-antagonists.     MANAGEMENT:  Monitoring for adverse effects, routine vitals, routine labs and patient is aware of risks    RISK STATEMENT for SAFETY     Jose Francisco Sommers did not appear to be an imminent safety risk to self or others.    DIAGNOSIS     Schizoaffective disorder, bipolar type, mood and psychosis in remission      vs/rule out Bipolar I disorder    ADHD, predominantly inattentive type     ASSESSMENT      [m2, h3]     TODAY Manuel returns to clinic for ongoing management of his schizoaffective disorder, bipolar type, and ADHD. Mood is stable, he is not in a major mood episode, and has had no psychotic symptoms, and hypersomnia has improved somewhat since decreasing olanzapine and increasing lithium, though carbohydrate cravings remain. He is interested in targeting attention symptoms today instead of continuing the olanzapine taper. We had previously discussed maximizing modafinil dose and he agrees to that today with an eye to starting the next semester of [employer] college on a good foot. No safety concerns today.      PLAN      [m2, h3]     1) PSYCHOTROPIC MEDICATIONS:  - Continue olanzapine 10 mg PO at bedtime.  - Continue lithium 1500 mg PO at bedtime  - Increase modafinil to 400 mg PO daily  - Continue metformin 1000 mg PO BID with meals    2) MN  was checked today: indicates expected use in accordance with chart review/patient report.    3) THERAPY:    not currently doing therapy    4) NEXT DUE:    Labs- [place] labs due in 11/2019, AP labs due 5/2020  EKG- PRN  Rating Scales- AIMS done today, score = 0    5) REFERRALS:    none     6) RTC: 4-6 weeks    7) CRISIS NUMBERS:   Provided routinely in AVS   CRISIS TEXT LINE: Text [id number] from anywhere in [place], anytime, any crisis 24/7;  OR SEE www.crisistextline.org  ONLY if a [facility] PT: [facility] [phone] (clinic), [phone] (after hours)     TREATMENT RISK  STATEMENT:  The risks, benefits, alternatives and potential adverse effects have been discussed and are understood by the pt. The pt understands the risks of using street drugs or alcohol. There are no medical contraindications, the pt agrees to treatment with the ability to do so. The pt knows to call the clinic for any problems or to access emergency care if needed.  Medical and substance use concerns are documented above.  Psychotropic drug interaction check was done, including changes made today.      Patient staffed in clinic with Dr. Tay who will sign the note.  Supervisor is Dr. Tay.    Supervisor Attestation:  I met with Jose Francisco Sommers along with the resident physician, Cristy Carrasquillo MD. I participated in key portions of the service, including the mental status examination and developing the plan of care. I reviewed key portions of the history with the resident. I agree with the findings and plan as documented in this note.  Joel Tay MD

## 2019-12-27 ENCOUNTER — TELEPHONE (OUTPATIENT)
Dept: PSYCHIATRY | Facility: CLINIC | Age: 29
End: 2019-12-27

## 2019-12-27 ASSESSMENT — PATIENT HEALTH QUESTIONNAIRE - PHQ9: SUM OF ALL RESPONSES TO PHQ QUESTIONS 1-9: 3

## 2019-12-27 NOTE — TELEPHONE ENCOUNTER
Writer received notification via fax of approval for modafinil (PROVIGIL) 200 MG tablet for a quantity of 60 tabs/30 days.     Case Number: [id number]  Effective Dates: 9/27/2019-12/27/2020    Copy of approval letter sent to scanning.     Pharmacy notified of approval via phone. Writer was told the pt picked up 30 tablets of modafinil 200mg with the old instructions today. Therefore, insurance won't cover an additional fill with new instructions until after 1/9/2020.     Writer attempted to contact pt via phone with an update. No answer and VM inbox full.

## 2019-12-27 NOTE — TELEPHONE ENCOUNTER
"Writer received notice via fax that a PA is required for modafinil (PROVIGIL) 200 MG tablet. Per chart review, the pt's modafinil dose was increased on 12/26/19 to 400mg daily.     Previous stimulant trials per chart review:  Adderall 10-20 mg - improved concentration, but \"cold personality\" and perseveration  Concerta 18 mg - improved focus, no improvement in motivation  Brief trial with Strattera, Intuniv  Stimulants \"almost killed him\" triggered alcohol binges    Rationale: Pt's diagnoses includeschizoaffective disorder, bipolar type, and ADHD. Modafinil was started in 4/2018 for attention support. Since then, the dose has been titrated up to 300mg daily. Most recently, mood is stable, he is not in a major mood episode, and has had no psychotic symptoms, and he continues to struggle with hypersomnia. He also reports ongoing issues with focus. Therefore, modafinil dose has been maximized to 400mg daily in order to target these symptoms. Stimulants are not appripriate for this pt due to his diagnoses and they have triggered alcohol binges in the past.    PA submitted via CoverMyMeds: Key [id number] on 12/27/19. Determination pending.  "

## 2020-01-31 ENCOUNTER — OFFICE VISIT (OUTPATIENT)
Dept: PSYCHIATRY | Facility: CLINIC | Age: 30
End: 2020-01-31
Attending: PSYCHIATRY & NEUROLOGY
Payer: COMMERCIAL

## 2020-01-31 VITALS
SYSTOLIC BLOOD PRESSURE: 145 MMHG | HEART RATE: 86 BPM | BODY MASS INDEX: 34.11 KG/M2 | WEIGHT: 256.8 LBS | DIASTOLIC BLOOD PRESSURE: 85 MMHG

## 2020-01-31 DIAGNOSIS — F25.0 SCHIZOAFFECTIVE DISORDER, BIPOLAR TYPE (H): ICD-10-CM

## 2020-01-31 DIAGNOSIS — Z79.899 ENCOUNTER FOR LONG-TERM (CURRENT) USE OF MEDICATIONS: Primary | ICD-10-CM

## 2020-01-31 PROCEDURE — G0463 HOSPITAL OUTPT CLINIC VISIT: HCPCS | Mod: ZF

## 2020-01-31 RX ORDER — OLANZAPINE 7.5 MG/1
7.5 TABLET, FILM COATED ORAL AT BEDTIME
Qty: 30 TABLET | Refills: 1 | Status: SHIPPED | OUTPATIENT
Start: 2020-01-31 | End: 2020-01-31

## 2020-01-31 ASSESSMENT — PAIN SCALES - GENERAL: PAINLEVEL: NO PAIN (0)

## 2020-01-31 NOTE — PROGRESS NOTES
[facility]  Psychiatry Clinic  PSYCHIATRIC PROGRESS NOTE     Date of initial Diagnostic Assessment (DA) is 7/24/15 and most recent Transfer of Care DA is 7/26/19.    CARE TEAM:  PCP- Provider, Clinic   Therapist- None   Community  Team- none.      Jose Francisco Sommers is a 29 year old male who prefers the name Manuel & pronouns he, him, his, himself.      Pertinent Background:  Jose Francisco Sommers first experienced mental health issues in grade school and has received treatment for ADHD, depression, psychosis and substance use.  See last transfer evaluation for detailed history. Notably, this patient had FEP in 2015 in the setting of cannabis abuse and has been stable on olanzapine and lithium since that time. He has never lived independently and has only been slowly developing insight into his illness and past symptoms. GeneSight testing was completed noting patient is a poor CYP2D6 metabolizer. Past records from Dr. Mike Powers were received in summer 2018 to assist with diagnostic clarity, however they were hand written notes that were illegible though accompanied by typed neuropsychology consult from [facility]. This documentation was unable to support a diagnosis of bipolar disorder at that time though noted potential Asperger's diagnosis and ADHD. 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 Most recently, modafinil was started in April 2018 for attention support to endorsed benefits and heretofore no concerns for exacerbation of mood or psychotic symptoms.    Psych critical item history includes psychosis [sxs include disorganization, possible catatonia], mutiple psychotropic trials, psych hosp (<3) and SUBSTANCE USE: alcohol and cannabis.    INTERIM HISTORY      [4, 4]     The patient reports good treatment adherence.  History was provided by the patient who was a good historian,  "accompanied by his mother who was also a good historian. The last visit ended with no change to the med regimen.    Since the last visit:   Manuel reports he is taking classes including computer processing and pre-calculus, noticing that things are getting better, doing better in class. Sleep \"could be better.\" He continues to fall asleep around 1-2, and wake up around noon. Last month he was going to bed earlier, but since then \"I sort of feel kind of busy, when I'm awake I have things that I want to do. I have pretty poor time management.\" He finished [employer] for [employer], bought some [employer] cards, trying to get back into it. He is still overeating due to craving carbohydrates. He typically takes metformin before breakfast and before bedtime. He has not had as much opportunity to exercise as he has before.    RECENT PSYCH ROS:   Depression:  low energy, hypersomnia, appetite changes and poor concentration /memory  Elevated:  none  Psychosis:  none  Anxiety:  none  Panic Attack:  none  Trauma Related:  none  Dysregulation:  none  Eating Disorder: no     Pertinent Negative Symptoms:  NO suicidal and violent ideation, aggression, psychosis, lauren and hypomania    RECENT SUBSTANCE USE:     Alcohol- no  Tobacco-quit smoking, no recent nicotine use  Caffeine- none  Opioids- none           Narcan Kit- N/A   Cannabis- none     Other illicit drugs- none    CURRENT SOCIAL HISTORY:  Financial/ Work- community college student       Partner/ - single  Children- no      Living situation- with parents in basement      Social/ Spiritual Support- parents, Zoroastrian hemalatha     FEELS SAFE AT HOME- yes     PSYCH and CD Critical Summary Points since July 2019 July 2019: Resident transition  September 2019: Olanzapine decreased to 12.5 mg, lithium increased to 1500 mg  October 2019: Olanzapine decreased to 10 mg.  December 2019: Modafinil increased to 400 mg.  January 2020: No change.    PAST MED TRIALS      " "    See last Diagnostic Assessment    MEDICAL / SURGICAL HISTORY                                   Patient Active Problem List   Diagnosis     Schizoaffective disorder, bipolar type (H)     Hx of psychiatric care     Elevated liver enzymes     Fatty liver     High blood triglycerides     History of cannabis abuse     History of cocaine abuse (H)       Major Surgery- No past surgical history on file.    MEDICAL REVIEW OF SYSTEMS    [2, 10]     A comprehensive review of systems was performed and is negative other than noted in the HPI.    ALLERGY       Sulfa drugs  MEDICATIONS        Current Outpatient Medications   Medication Sig Dispense Refill     lithium 300 MG capsule Take 5 capsules (1,500 mg) by mouth At Bedtime 150 capsule 1     metFORMIN (GLUCOPHAGE) 500 MG tablet Take 2 tablets (1,000 mg) by mouth 2 times daily (with meals) 120 tablet 2     modafinil (PROVIGIL) 200 MG tablet Take 2 tablets (400 mg) by mouth daily 60 tablet 1     VITALS      [3, 3]   BP (!) 145/85   Pulse 86   Wt 116.5 kg (256 lb 12.8 oz)   BMI 34.11 kg/m     MENTAL STATUS EXAM      [9, 14 cog gs]   Alertness: alert  and oriented  Appearance: casually groomed and dressed  Behavior/Demeanor: cooperative, pleasant and calm, with good  eye contact   Speech: normal and regular rate and rhythm  Language: intact and no obvious problem  Psychomotor: normal or unremarkable  Mood: \"good\"  Affect: restricted and blunted; was congruent to mood; was congruent to content  Thought Process/Associations: unremarkable  Thought Content:  Reports none;  Denies suicidal and violent ideation, obsessions  and paranoid ideation  Perception:  Reports none;  Denies auditory hallucinations and visual hallucinations  Insight: fair  Judgment: fair and adequate for safety  Cognition: (6) does  appear grossly intact; formal cognitive testing was not done  Gait and Station: unremarkable    LABS and DATA     AIMS:  done in 9/2020, score = 0    PHQ9 TODAY = 4  PHQ-9 SCORE " 11/1/2019 11/27/2019 12/26/2019   PHQ-9 Total Score - - -   PHQ-9 Total Score 3 3 3     ANTIPSYCHOTIC LABS  [glu, A1C, lipids (ivania LDL), liver enzymes, WBC, ANEU, Hgb, plts]  q12 mo  Recent Labs   Lab Test 11/08/19  1020 05/07/19  1101 12/06/18  1442 10/30/17  1429  09/30/16  1348   GLC 93 88 86 86   < > 83   A1C  --  4.9 5.0  --   --  4.6    < > = values in this interval not displayed.     Recent Labs   Lab Test 05/07/19  1101 12/13/18  1027 12/06/18  1442 12/01/17  0948   CHOL 141 173 184 177   TRIG 195* 278* 535* 347*   LDL 63 81 Cannot estimate LDL when triglyceride exceeds 400 mg/dL  95 72   HDL 39* 36* 34* 36*     Recent Labs   Lab Test 05/07/19  1101 12/13/18  1027 12/06/18  1442 12/01/17  0948   AST 26 51* 70* 31   ALT 52 146* 178* 118*   ALKPHOS 69 55 74 63     Recent Labs   Lab Test 05/07/19  1101 12/06/18  1442 11/10/17  1534 10/30/17  1429   WBC 7.3 8.9 8.6 8.1   ANEU 4.5 5.7 5.0 4.8   HGB 15.0 15.8 15.6 15.7    270 255 241     LITHIUM LABS     [level, renal, SG, TSH, WBC]    q6 mo  Recent Labs   Lab Test 11/08/19  1020 05/07/19  1101 12/13/18  1027 12/06/18  1442   LITHIUM 0.84 0.61 0.65 0.52*     Recent Labs   Lab Test 11/08/19  1020 05/07/19  1101 12/06/18  1442 10/30/17  1429   CR 1.12 1.14 1.06 1.05   GFRESTIMATED 89 87 84 85    139 139 141   POTASSIUM 3.5 4.0 4.1 4.2   ADONAY 9.2 9.3 9.2 9.2     Recent Labs   Lab Test 11/08/19  1024 05/07/19  1101 12/06/18  1505 10/30/17  1429   SG 1.015 1.015 1.009 1.015     Recent Labs   Lab Test 11/08/19  1020 05/07/19  1101 12/06/18  1442 11/10/17  1534   TSH 5.29* 3.09 3.01 2.09     Recent Labs   Lab Test 05/07/19  1101 12/06/18  1442 11/10/17  1534 10/30/17  1429   WBC 7.3 8.9 8.6 8.1   ANEU 4.5 5.7 5.0 4.8       PSYCHOTROPIC DRUG INTERACTIONS     LITHIUM and OLANZAPINE may result in increased neurotoxic effects of D2-antagonists.     MANAGEMENT:  Monitoring for adverse effects, routine vitals, routine labs and patient is aware of risks    RISK  STATEMENT for SAFETY     Jose Francisco Sommers did not appear to be an imminent safety risk to self or others.    DIAGNOSIS     Schizoaffective disorder, bipolar type, mood and psychosis in remission      vs/rule out Bipolar I disorder    ADHD, predominantly inattentive type     ASSESSMENT      [m2, h3]     TODAY Manuel returns to clinic for ongoing management of his schizoaffective disorder, bipolar type, and ADHD. Mood is stable, he is not in a major mood episode, and has had no psychotic symptoms; hypersomnia has improved somewhat since decreasing olanzapine and increasing lithium, though carbohydrate cravings remain. Attention symptoms persist on 400 mg of modafinil. He would be open to switching antipsychotics, noting that he recalls taking Abilify in the past, caused increased agitation/irritability but is not certain exactly how to explain it, would like to bring his mother to the next appointment to discuss switching. Latuda worsened depression. No safety concerns today.      PLAN      [m2, h3]     1) PSYCHOTROPIC MEDICATIONS:  - Continue olanzapine 10 mg bedtime.  - Continue lithium 1500 mg PO at bedtime  - Continue modafinil to 400 mg PO daily  - Continue metformin 1000 mg PO BID, take 30 minutes before meals  - Will bring mother to next appointment.    2) MN  was checked today: indicates expected use in accordance with chart review/patient report.    3) THERAPY:    not currently doing therapy    4) NEXT DUE:    Labs- Lithium labs due, including thyroid--order and pending, AP labs due 5/2020  EKG- PRN  Rating Scales- AIMS due 9/2020     5) REFERRALS:    none     6) RTC: next appointment on 2/28    7) CRISIS NUMBERS:   Provided routinely in AVS   CRISIS TEXT LINE: Text [id number] from anywhere in [place], anytime, any crisis 24/7;  OR SEE www.crisistextline.org  ONLY if a [facility] PT: [facility] [place] [phone] (clinic), [phone] (after hours)     TREATMENT RISK STATEMENT:  The risks, benefits,  alternatives and potential adverse effects have been discussed and are understood by the pt. The pt understands the risks of using street drugs or alcohol. There are no medical contraindications, the pt agrees to treatment with the ability to do so. The pt knows to call the clinic for any problems or to access emergency care if needed.  Medical and substance use concerns are documented above.  Psychotropic drug interaction check was done, including changes made today.      Patient staffed in clinic with Dr. Ventura who will sign the note.  Supervisor is Dr. Tay.  I saw the patient with the resident, and participated in key portions of the service, including the mental status examination and developing the plan of care. I reviewed key portions of the history with the resident. I agree with the findings and plan as documented in this note.  No evidence of psychosis linked to use of modafinil.     Yuko Ventura MD

## 2020-01-31 NOTE — PATIENT INSTRUCTIONS
-Please have labs drawn 12 hours after your bedtime lithium dose.  -Continue olanzapine 10 mg PO daily.  -Work on eating protein at every meal.  -Take the metformin 30 minutes before meals.  -Please come back in 1 month--ask the  if your appointment is supposed to be at 2:45. This should be an hour-long appointment    Thank you for coming to the PSYCHIATRY CLINIC.    Lab Testing:  If you had lab testing today and your results are reassuring or normal they will be mailed to you or sent through [employer] within 7 days.   If the lab tests need quick action we will call you with the results.  The phone number we will call with results is # [phone] (home) . If this is not the best number please call our clinic and change the number.    Medication Refills:  If you need any refills please call your pharmacy and they will contact us. Our fax number for refills is [phone]. Please allow three business for refill processing.   If you need to  your refill at a new pharmacy, please contact the new pharmacy directly. The new pharmacy will help you get your medications transferred.     Scheduling:  If you have any concerns about today's visit or wish to schedule another appointment please call our office during normal business hours [phone] (8-5:00 M-F)    Contact Us:  Please call [phone] during business hours (8-5:00 M-F).  If after clinic hours, or on the weekend, please call  [phone].    Financial Assistance [phone]  [facility] Billing [phone]  Central Billing Office, [facility]: [phone]  [facility] Billing [phone]  Medical Records [phone]      MENTAL HEALTH CRISIS NUMBERS:  [facility]:   [facility] - [phone]   Crisis Residence hospitals - [facility] - [phone]   Walk-In Counseling Center hospitals - [phone]   COPE 24/7 [place] Mobile Team for Adults - [[phone]]; Child - [[phone]]        [facility]:   [facility]  [place] - [phone]   Walk-in counseling [facility] - [phone]   Walk-in counseling [facility] - [phone]   Crisis Residence [facility] - [phone]   Urgent Care Adult Mental Health:   --Drop-in, 24/7 crisis line, and [facility] Mobile Team [[phone]]    CRISIS TEXT LINE: Text 786-092 from anywhere, anytime, any crisis 24/7;    OR SEE www.crisistextline.org     National Suicide Prevention Lifeline: 459-605-QDTS (756-427-0329)    Poison Control Center - [phone]    CHILD: [facility] needs assessment team - [phone]     [facility] Lifeline - 1-960.467.6667; or [facility] Lifeline - 1-136.152.8174    If you have a medical emergency please call 911or go to the nearest ER.                    _____________________________________________    Again thank you for choosing PSYCHIATRY CLINIC and please let us know how we can best partner with you to improve you and your family's health.  You may be receiving a survey regarding this appointment. We would love to have your feedback, both positive and negative. The survey is done by an external company, so your answers are anonymous.

## 2020-02-01 NOTE — PROGRESS NOTES
[facility]  Psychiatry Clinic  PSYCHIATRIC PROGRESS NOTE     Date of initial Diagnostic Assessment (DA) is 7/24/15 and most recent Transfer of Care DA is 7/26/19.    CARE TEAM:  PCP- Provider, Clinic   Therapist- None   Community  Team- none.      Jose Francisco Sommers is a 29 year old male who prefers the name Manuel & pronouns he, him, his, himself.      Pertinent Background:  Jose Francisco Sommers first experienced mental health issues in grade school and has received treatment for ADHD, depression, psychosis and substance use.  See last transfer evaluation for detailed history. Notably, this patient had FEP in 2015 in the setting of cannabis abuse and has been stable on olanzapine and lithium since that time. He has never lived independently and has only been slowly developing insight into his illness and past symptoms. GeneSight testing was completed noting patient is a poor CYP2D6 metabolizer. Past records from Dr. Mike Powers were received in summer 2018 to assist with diagnostic clarity, however they were hand written notes that were illegible though accompanied by typed neuropsychology consult from [facility]. This documentation was unable to support a diagnosis of bipolar disorder at that time though noted potential Asperger's diagnosis and ADHD. Given that there has been evidence for psychotic symptoms outside the context of mood episodes, a schizo-spectrum diagnosis seems probable. Most recently, modafinil was started in April 2018 for attention support to endorsed benefits and heretofore no concerns for exacerbation of mood or psychotic symptoms.    Psych critical item history includes psychosis [sxs include disorganization, possible catatonia], mutiple psychotropic trials, psych hosp (<3) and SUBSTANCE USE: alcohol and cannabis.    INTERIM HISTORY      [4, 4]     The patient reports good treatment adherence.  History was provided by the patient who was a good historian,  "accompanied by his mother who was also a good historian. The last visit ended with no change to the med regimen.    Since the last visit:   Manuel reports that he has been going to bed earlier (10 PM instead of 12-1), woke up 9 or earlier most days last week. He is very happy with this change in his sleep schedule and notes that his mother likely is as well.  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 Also reading Tolkien. His goal is to finish [employer] and reading \"and then switch gears\" into his next semester of classes. He continues to have difficulty \"absorbing the things I am reading\" and wonders if there are any medication adjustments that can be made.     RECENT PSYCH ROS:   Depression:  low energy and poor concentration /memory  Elevated:  none  Psychosis:  none  Anxiety:  none  Panic Attack:  none  Trauma Related:  none  Dysregulation:  none  Eating Disorder: no     Pertinent Negative Symptoms:  NO suicidal and violent ideation, aggression, psychosis, lauren and hypomania    RECENT SUBSTANCE USE:     Alcohol- no  Tobacco-quit smoking, no recent nicotine use  Caffeine- none  Opioids- none           Narcan Kit- N/A   Cannabis- none     Other illicit drugs- none    CURRENT SOCIAL HISTORY:  Financial/ Work- community college student       Partner/ - single  Children- no      Living situation- with parents in basement      Social/ Spiritual Support- parents, [employer] hemalatha     FEELS SAFE AT HOME- yes     PSYCH and CD Critical Summary Points since July 2019 July 2019: Resident transition  September 2019: Olanzapine decreased to 12.5 mg, lithium increased to 1500 mg  October 2019: Olanzapine decreased to 10 mg.  December 2019: Modafinil increased to 400 mg.    PAST MED TRIALS          See last Diagnostic Assessment    MEDICAL / SURGICAL HISTORY                                     Patient Active Problem List   Diagnosis     Schizoaffective disorder, " "bipolar type (H)     Hx of psychiatric care     Elevated liver enzymes     Fatty liver     High blood triglycerides     History of cannabis abuse     History of cocaine abuse (H)       Major Surgery- No past surgical history on file.    MEDICAL REVIEW OF SYSTEMS    [2, 10]     A comprehensive review of systems was performed and is negative other than noted in the HPI.    ALLERGY       Sulfa drugs  MEDICATIONS        Current Outpatient Medications   Medication Sig Dispense Refill     lithium 300 MG capsule Take 5 capsules (1,500 mg) by mouth At Bedtime 150 capsule 1     metFORMIN (GLUCOPHAGE) 500 MG tablet Take 2 tablets (1,000 mg) by mouth 2 times daily (with meals) 120 tablet 2     modafinil (PROVIGIL) 100 MG tablet Take 1 tablet (100 mg) along with 1 tablet (200 mg) for a total daily dose of 300 mg. 30 tablet 2     modafinil (PROVIGIL) 200 MG tablet Take 2 tablets (400 mg) by mouth daily 60 tablet 1     modafinil (PROVIGIL) 200 MG tablet Take 1 tablet (200 mg) along with 1 tablet (100 mg) for a total daily dose of 300 mg. 30 tablet 2     OLANZapine (ZYPREXA) 10 MG tablet Take 1 tablet (10 mg) by mouth At Bedtime 30 tablet 1     VITALS      [3, 3]   BP (!) 145/82   Pulse 69   Wt 115.8 kg (255 lb 6.4 oz)   BMI 33.93 kg/m     MENTAL STATUS EXAM      [9, 14 cog gs]   Alertness: alert  and oriented  Appearance: casually groomed and dressed  Behavior/Demeanor: cooperative, pleasant and calm, with good  eye contact   Speech: normal and regular rate and rhythm  Language: intact and no obvious problem  Psychomotor: normal or unremarkable  Mood: \"good\"  Affect: restricted and blunted; was congruent to mood; was congruent to content  Thought Process/Associations: unremarkable  Thought Content:  Reports none;  Denies suicidal and violent ideation, obsessions  and paranoid ideation  Perception:  Reports none;  Denies auditory hallucinations and visual hallucinations  Insight: fair  Judgment: fair and adequate for " safety  Cognition: (6) does  appear grossly intact; formal cognitive testing was not done  Gait and Station: unremarkable    LABS and DATA     AIMS:  done today, score = 0    PHQ9 TODAY = 4  PHQ-9 SCORE 9/20/2019 11/1/2019 11/27/2019   PHQ-9 Total Score - - -   PHQ-9 Total Score 6 3 3     ANTIPSYCHOTIC LABS  [glu, A1C, lipids (ivania LDL), liver enzymes, WBC, ANEU, Hgb, plts]  q12 mo  Recent Labs   Lab Test 11/08/19  1020 05/07/19  1101 12/06/18  1442 10/30/17  1429  09/30/16  1348   GLC 93 88 86 86   < > 83   A1C  --  4.9 5.0  --   --  4.6    < > = values in this interval not displayed.     Recent Labs   Lab Test 05/07/19  1101 12/13/18  1027 12/06/18  1442 12/01/17  0948   CHOL 141 173 184 177   TRIG 195* 278* 535* 347*   LDL 63 81 Cannot estimate LDL when triglyceride exceeds 400 mg/dL  95 72   HDL 39* 36* 34* 36*     Recent Labs   Lab Test 05/07/19  1101 12/13/18  1027 12/06/18  1442 12/01/17  0948   AST 26 51* 70* 31   ALT 52 146* 178* 118*   ALKPHOS 69 55 74 63     Recent Labs   Lab Test 05/07/19  1101 12/06/18  1442 11/10/17  1534 10/30/17  1429   WBC 7.3 8.9 8.6 8.1   ANEU 4.5 5.7 5.0 4.8   HGB 15.0 15.8 15.6 15.7    270 255 241     LITHIUM LABS     [level, renal, SG, TSH, WBC]    q6 mo  Recent Labs   Lab Test 11/08/19  1020 05/07/19  1101 12/13/18  1027 12/06/18  1442   LITHIUM 0.84 0.61 0.65 0.52*     Recent Labs   Lab Test 11/08/19  1020 05/07/19  1101 12/06/18  1442 10/30/17  1429   CR 1.12 1.14 1.06 1.05   GFRESTIMATED 89 87 84 85    139 139 141   POTASSIUM 3.5 4.0 4.1 4.2   ADONAY 9.2 9.3 9.2 9.2     Recent Labs   Lab Test 11/08/19  1024 05/07/19  1101 12/06/18  1505 10/30/17  1429   SG 1.015 1.015 1.009 1.015     Recent Labs   Lab Test 11/08/19  1020 05/07/19  1101 12/06/18  1442 11/10/17  1534   TSH 5.29* 3.09 3.01 2.09           PSYCHOTROPIC DRUG INTERACTIONS     LITHIUM and OLANZAPINE may result in increased neurotoxic effects of D2-antagonists.     MANAGEMENT:  Monitoring for adverse  effects, routine vitals, routine labs and patient is aware of risks    RISK STATEMENT for SAFETY     Jose Francisco Sommers did not appear to be an imminent safety risk to self or others.    DIAGNOSIS     Schizoaffective disorder, bipolar type, mood and psychosis in remission      vs/rule out Bipolar I disorder    ADHD, predominantly inattentive type     ASSESSMENT      [m2, h3]     TODAY Manuel returns to clinic for ongoing management of his schizoaffective disorder, bipolar type, and ADHD. Mood is stable, he is not in a major mood episode, has had no psychotic symptoms, and hypersomnia has improved somewhat since decreasing olanzapine and increasing lithium, though carbohydrate cravings remain. He is interested in targeting attention symptoms today instead of continuing the olanzapine taper. We had previously discussed maximizing modafinil dose and he agrees to that today with an eye to starting the next semester of [employer] on a good foot. No safety concerns today.      PLAN      [m2, h3]     1) PSYCHOTROPIC MEDICATIONS:  - Continue olanzapine 10 mg PO at bedtime.  - Continue lithium 1500 mg PO at bedtime  - Increase modafinil to 400 mg PO daily  - Continue metformin 1000 mg PO BID with meals    2) [place]  was checked today: indicates expected use in accordance with chart review/patient report.    3) THERAPY:    not currently doing therapy    4) NEXT DUE:    Labs- [place] labs due in 11/2019, [facility] labs due 5/2020  EKG- PRN  Rating Scales- AIMS done today, score = 0    5) REFERRALS:    none     6) RTC: 4-6 weeks    7) CRISIS NUMBERS:   Provided routinely in AVS   CRISIS TEXT LINE: Text [id number] from anywhere in [place], anytime, any crisis 24/7;  OR SEE www.crisistextline.org  ONLY if a [facility] PT: [facility] [phone] (clinic), [phone] (after hours)     TREATMENT RISK STATEMENT:  The risks, benefits, alternatives and potential adverse effects have been discussed and are understood by the pt. The pt  understands the risks of using street drugs or alcohol. There are no medical contraindications, the pt agrees to treatment with the ability to do so. The pt knows to call the clinic for any problems or to access emergency care if needed.  Medical and substance use concerns are documented above.  Psychotropic drug interaction check was done, including changes made today.      Patient staffed in clinic with Dr. Tay who will sign the note.  Supervisor is Dr. Tay.    Supervisor Attestation:  I met with Jose Francisco Sommers along with the resident physician, Cristy Carrasquillo MD. I participated in key portions of the service, including the mental status examination and developing the plan of care. I reviewed key portions of the history with the resident. I agree with the findings and plan as documented in this note.  Joel Tay MD

## 2020-02-28 ENCOUNTER — OFFICE VISIT (OUTPATIENT)
Dept: PSYCHIATRY | Facility: CLINIC | Age: 30
End: 2020-02-28
Attending: PSYCHIATRY & NEUROLOGY
Payer: COMMERCIAL

## 2020-02-28 VITALS
SYSTOLIC BLOOD PRESSURE: 137 MMHG | BODY MASS INDEX: 33.16 KG/M2 | DIASTOLIC BLOOD PRESSURE: 84 MMHG | WEIGHT: 249.6 LBS | HEART RATE: 66 BPM

## 2020-02-28 DIAGNOSIS — F25.0 SCHIZOAFFECTIVE DISORDER, BIPOLAR TYPE (H): ICD-10-CM

## 2020-02-28 DIAGNOSIS — F90.9 ATTENTION DEFICIT HYPERACTIVITY DISORDER (ADHD), UNSPECIFIED ADHD TYPE: ICD-10-CM

## 2020-02-28 DIAGNOSIS — G47.00 INSOMNIA, UNSPECIFIED TYPE: Primary | ICD-10-CM

## 2020-02-28 PROCEDURE — G0463 HOSPITAL OUTPT CLINIC VISIT: HCPCS | Mod: ZF

## 2020-02-28 RX ORDER — MODAFINIL 200 MG/1
400 TABLET ORAL DAILY
Qty: 60 TABLET | Refills: 1 | Status: SHIPPED | OUTPATIENT
Start: 2020-02-28 | End: 2020-04-21

## 2020-02-28 RX ORDER — OLANZAPINE 7.5 MG/1
7.5 TABLET, FILM COATED ORAL AT BEDTIME
Qty: 30 TABLET | Refills: 1 | Status: SHIPPED | OUTPATIENT
Start: 2020-02-28 | End: 2020-04-21

## 2020-02-28 RX ORDER — LITHIUM CARBONATE 300 MG/1
1500 CAPSULE ORAL AT BEDTIME
Qty: 150 CAPSULE | Refills: 1 | Status: SHIPPED | OUTPATIENT
Start: 2020-02-28 | End: 2020-04-21

## 2020-02-28 ASSESSMENT — PAIN SCALES - GENERAL: PAINLEVEL: NO PAIN (0)

## 2020-02-28 NOTE — PROGRESS NOTES
[facility]  Psychiatry Clinic  PSYCHIATRIC PROGRESS NOTE     Date of initial Diagnostic Assessment (DA) is 7/24/15 and most recent Transfer of Care DA is 7/26/19.    CARE TEAM:  PCP- Provider, Clinic   Therapist- None   Community  Team- none.      Jose Francisco Sommers is a 29 year old male who prefers the name Manuel & pronouns he, him, his, himself.      Pertinent Background:  Jose Francisco Sommers first experienced mental health issues in grade school and has received treatment for ADHD, depression, psychosis and substance use.  See last transfer evaluation for detailed history. Notably, this patient had FEP in 2015 in the setting of cannabis abuse and has been stable on olanzapine and lithium since that time. He has never lived independently and has only been slowly developing insight into his illness and past symptoms. GeneSight testing was completed noting patient is a poor CYP2D6 metabolizer. Past records from Dr. Mike Powers were received in summer 2018 to assist with diagnostic clarity, however they were hand written notes that were illegible though accompanied by typed neuropsychology consult from [facility]. This documentation was unable to support a diagnosis of bipolar disorder at that time though noted potential Asperger's diagnosis and ADHD. 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 Most recently, modafinil was started in April 2018 for attention support to endorsed benefits and heretofore no concerns for exacerbation of mood or psychotic symptoms.    Psych critical item history includes psychosis [sxs include disorganization, possible catatonia], mutiple psychotropic trials, psych hosp (<3) and SUBSTANCE USE: alcohol and cannabis.    INTERIM HISTORY      [4, 4]     The patient reports good treatment adherence.  History was provided by the patient who was a fair historian,  "accompanied by his mother who was also a good historian. The last visit ended with the following med change: olanzapine decreased to 7.5 mg.    Since the last visit:   Manuel reports that he has actually lost 6 pounds in the past month, \"just eating less,\" not even going to the gym. He believes this is because he decreased olanzapine to 7.5 mg. [At the last visit I had initially prescribed 7.5 mg, then decided to keep it at 10 mg, but he picked up a prescription for 7.5 and took that for one month.] He has not had any emergent psychotic symptoms and denies any recent increased energy or irritability, or sustained low mood or SI. He continues to have difficulty waking up on time, usually gets in bed around 12, falls asleep within 2 hours.     His mother accompanied him to provide additional history regarding previous diagnoses and medication trials, but they did express different viewpoints regarding his symptoms, especially around psychosis. Manuel did endorse difficulty with disorganized thinking in the past, as well as vague paranoia, but denied delusions. He reported that as a child, he did well in school, and did not begin to have trouble focusing until about middle school. He had anxiety and depression in high school, started Prozac, at which time he began to experience \"reckless and irresponsible behavior,\" which in retrospect started shortly after he started Prozac, but he felt his doctors did not listen when he expressed his concerns about this.. Adderall was helpful, but caused irritability; he then tried Concerta, and switched to Provigil, which has been the most helpful for hypersomnia, but he continues to struggle with attention at times. He states the medications he has not tried for ADHD are \"Vyvanse and Desoxyn.\" He reports he began smoking cannabis around ages [age], but was also having conflict with his parents at that time, so he indicates he thinks their perception of him experiencing irritability was " "perhaps influenced by that conflict. He was diagnosed with bipolar disorder first, at one point was on Latuda which \"gave him terrifying nightmares.\" He was hospitalized for lauren with psychosis in 2015 at [facility] after being non-adherent to medications and using cannabis heavily, stabilized on lithium and olanzapine but had persistent psychosis when olanzapine taper was attempted (paranoia and disorganization, which he denies but does allow irritability). He recalls risperidone was briefly tried but caused catatonia. He and his mother have different recollections about his experience with aripiprazole, as he reports he merely felt more motivated but she reports he was disinhibited.     RECENT PSYCH ROS:   Depression:  low energy, hypersomnia, appetite changes and poor concentration /memory  Elevated:  none  Psychosis:  none  Anxiety:  none  Panic Attack:  none  Trauma Related:  none  Dysregulation:  none  Eating Disorder: no     Pertinent Negative Symptoms:  NO suicidal and violent ideation, aggression, psychosis, lauren and hypomania    RECENT SUBSTANCE USE:     Alcohol- no  Tobacco-quit smoking, no recent nicotine use  Caffeine- none  Opioids- none           Narcan Kit- N/A   Cannabis- none     Other illicit drugs- none    CURRENT SOCIAL HISTORY:  Financial/ Work- community college student       Partner/ - single  Children- no      Living situation- with parents in basement      Social/ Spiritual Support- parents, Catholic hemalatha     FEELS SAFE AT HOME- yes     PSYCH and CD Critical Summary Points since July 2019 July 2019: Resident transition  September 2019: Olanzapine decreased to 12.5 mg, lithium increased to 1500 mg  October 2019: Olanzapine decreased to 10 mg.  December 2019: Modafinil increased to 400 mg.  January 2020: Olanzapine decreased to 7.5 mg.  February 2020: Family meeting with mother. No changes.    PAST MED TRIALS          See last Diagnostic Assessment    MEDICAL / SURGICAL " "HISTORY                                   Patient Active Problem List   Diagnosis     Schizoaffective disorder, bipolar type (H)     Hx of psychiatric care     Elevated liver enzymes     Fatty liver     High blood triglycerides     History of cannabis abuse     History of cocaine abuse (H)       Major Surgery- No past surgical history on file.    MEDICAL REVIEW OF SYSTEMS    [2, 10]     A comprehensive review of systems was performed and is negative other than noted in the HPI.    ALLERGY       Sulfa drugs  MEDICATIONS        Current Outpatient Medications   Medication Sig Dispense Refill     lithium 300 MG capsule Take 5 capsules (1,500 mg) by mouth At Bedtime 150 capsule 1     metFORMIN (GLUCOPHAGE) 500 MG tablet Take 2 tablets (1,000 mg) by mouth 2 times daily (with meals) 120 tablet 2     modafinil (PROVIGIL) 200 MG tablet Take 2 tablets (400 mg) by mouth daily 60 tablet 1     OLANZapine (ZYPREXA) 7.5 MG tablet Take 1 tablet (7.5 mg) by mouth At Bedtime 30 tablet 1     VITALS      [3, 3]   /84   Pulse 66   Wt 113.2 kg (249 lb 9.6 oz)   BMI 33.16 kg/m     MENTAL STATUS EXAM      [9, 14 cog gs]   Alertness: alert  and oriented  Appearance: casually groomed and dressed  Behavior/Demeanor: cooperative, pleasant and calm, with good  eye contact   Speech: normal and regular rate and rhythm  Language: intact and no obvious problem  Psychomotor: normal or unremarkable  Mood: \"good\"  Affect: restricted and blunted; was congruent to mood; was congruent to content  Thought Process/Associations: unremarkable  Thought Content:  Reports none;  Denies suicidal and violent ideation, obsessions  and paranoid ideation  Perception:  Reports none;  Denies auditory hallucinations and visual hallucinations  Insight: fair  Judgment: fair and adequate for safety  Cognition: (6) does  appear grossly intact; formal cognitive testing was not done  Gait and Station: unremarkable    LABS and DATA     AIMS:  done in 9/2019, score = " 0    PHQ9 TODAY = 4  PHQ-9 SCORE 11/1/2019 11/27/2019 12/26/2019   PHQ-9 Total Score - - -   PHQ-9 Total Score 3 3 3     ANTIPSYCHOTIC LABS  [glu, A1C, lipids (ivania LDL), liver enzymes, WBC, ANEU, Hgb, plts]  q12 mo  Recent Labs   Lab Test 03/04/20  1001 11/08/19  1020 05/07/19  1101 12/06/18  1442  09/30/16  1348   GLC 75 93 88 86   < > 83   A1C  --   --  4.9 5.0  --  4.6    < > = values in this interval not displayed.     Recent Labs   Lab Test 05/07/19  1101 12/13/18  1027 12/06/18  1442 12/01/17  0948   CHOL 141 173 184 177   TRIG 195* 278* 535* 347*   LDL 63 81 Cannot estimate LDL when triglyceride exceeds 400 mg/dL  95 72   HDL 39* 36* 34* 36*     Recent Labs   Lab Test 05/07/19  1101 12/13/18  1027 12/06/18  1442 12/01/17  0948   AST 26 51* 70* 31   ALT 52 146* 178* 118*   ALKPHOS 69 55 74 63     Recent Labs   Lab Test 05/07/19  1101 12/06/18  1442 11/10/17  1534 10/30/17  1429   WBC 7.3 8.9 8.6 8.1   ANEU 4.5 5.7 5.0 4.8   HGB 15.0 15.8 15.6 15.7    270 255 241     LITHIUM LABS     [level, renal, SG, TSH, WBC]    q6 mo  Recent Labs   Lab Test 03/04/20  1001 11/08/19  1020 05/07/19  1101 12/13/18  1027   LITHIUM 0.84 0.84 0.61 0.65     Recent Labs   Lab Test 03/04/20  1001 11/08/19  1020 05/07/19  1101 12/06/18  1442   CR 1.09 1.12 1.14 1.06   GFRESTIMATED >90 89 87 84    138 139 139   POTASSIUM 3.8 3.5 4.0 4.1   ADONAY 10.1 9.2 9.3 9.2     Recent Labs   Lab Test 11/08/19  1024 05/07/19  1101 12/06/18  1505 10/30/17  1429   SG 1.015 1.015 1.009 1.015     Recent Labs   Lab Test 03/04/20  1001 11/08/19  1020 05/07/19  1101 12/06/18  1442   TSH 5.82* 5.29* 3.09 3.01     Recent Labs   Lab Test 05/07/19  1101 12/06/18  1442 11/10/17  1534 10/30/17  1429   WBC 7.3 8.9 8.6 8.1   ANEU 4.5 5.7 5.0 4.8       PSYCHOTROPIC DRUG INTERACTIONS     LITHIUM and OLANZAPINE may result in increased neurotoxic effects of D2-antagonists.     MANAGEMENT:  Monitoring for adverse effects, routine vitals, routine labs and  patient is aware of risks    RISK STATEMENT for SAFETY     Jose Francisco Sommers did not appear to be an imminent safety risk to self or others.    DIAGNOSIS     Schizoaffective disorder, bipolar type, mood and psychosis in remission      vs/rule out Bipolar I disorder    ADHD, predominantly inattentive type     ASSESSMENT      [m2, h3]     TODAY Manuel returns to clinic for ongoing management of his schizoaffective disorder, bipolar type, and ADHD. Mood is stable, he is not in a major mood episode, and has had no psychotic symptoms; hypersomnia has improved somewhat since decreasing olanzapine and increasing lithium, and since most recent olanzapine dose decrease, appetite has decreased and he has been able to lose weight as he wishes. Attention symptoms persist on 400 mg of modafinil. He would be open to switching antipsychotics, but is happy with appetite and has not noticed emergent psychosis. He voices his preference to further decrease olanzapine, as he would prefer lithium monotherapy, but also demonstrates poor insight into history of psychosis. Ultimately there is some diagnostic uncertainty as his first episode was in the context of heavy cannabis use, but attempts to taper olanzapine (down to 5 mg at the lowest) since then resulted in disorganization and paranoia. Given that our intent at the last visit was to remain at 10 mg of olanzapine, and the effects of this most recent dose decrease may emerge months in the future, we would not recommend further dose decrease at this time. Patient voices his agreement to this, given that his appetite is decreased and that was his main problem with the olanzapine. No safety concerns today. After the visit, TSH returned elevated with normal free T4 for the second time in a row, will repeat in 3 months with antipsychotic labs     PLAN      [m2, h3]     1) PSYCHOTROPIC MEDICATIONS:  - Continue olanzapine 7.5 mg bedtime.  - Continue lithium 1500 mg PO at bedtime  -  Continue modafinil to 400 mg PO daily  - Continue metformin 1000 mg PO BID, take 30 minutes before meals  - Will bring mother to next appointment.    2) [place]  was checked today: indicates expected use in accordance with chart review/patient report.    3) THERAPY:    not currently doing therapy    4) NEXT DUE:    Labs- Lithium labs due, including thyroid--ordered and pending, repeat TSH/free T4 and AP labs due 5/2020  EKG- PRN  Rating Scales- AIMS due 9/2020     5) REFERRALS:    none     6) RTC: one month    7) CRISIS NUMBERS:   Provided routinely in AVS   CRISIS TEXT LINE: Text [id number] from anywhere in [place], anytime, any crisis 24/7;  OR SEE www.crisistextline.org  ONLY if a [facility] PT: [facility] [place] [phone] (clinic), [phone] (after hours)     TREATMENT RISK STATEMENT:  The risks, benefits, alternatives and potential adverse effects have been discussed and are understood by the pt. The pt understands the risks of using street drugs or alcohol. There are no medical contraindications, the pt agrees to treatment with the ability to do so. The pt knows to call the clinic for any problems or to access emergency care if needed.  Medical and substance use concerns are documented above.  Psychotropic drug interaction check was done, including changes made today.      Patient staffed in clinic with Dr. Ventura who will sign the note.  Supervisor is Dr. Tay.  I saw the patient with the resident, and participated in key portions of the service, including the mental status examination and developing the plan of care. I reviewed key portions of the history with the resident. I agree with the findings and plan as documented in this note.    Yuko Ventura MD

## 2020-02-28 NOTE — PATIENT INSTRUCTIONS
-Continue current medication doses  -Please come back in 1 month  -Please call if you have any worsening symptoms.    Thank you for coming to the PSYCHIATRY CLINIC.    Lab Testing:  If you had lab testing today and your results are reassuring or normal they will be mailed to you or sent through [employer] within 7 days.   If the lab tests need quick action we will call you with the results.  The phone number we will call with results is # [phone] (home) . If this is not the best number please call our clinic and change the number.    Medication Refills:  If you need any refills please call your pharmacy and they will contact us. Our fax number for refills is [phone]. Please allow three business for refill processing.   If you need to  your refill at a new pharmacy, please contact the new pharmacy directly. The new pharmacy will help you get your medications transferred.     Scheduling:  If you have any concerns about today's visit or wish to schedule another appointment please call our office during normal business hours [phone] (8-5:00 M-F)    Contact Us:  Please call [phone] during business hours (8-5:00 M-F).  If after clinic hours, or on the weekend, please call  [phone].    Financial Assistance [phone]  [facility] Billing [phone]  [facility] Billing Office, [facility]: [phone]  [place] [facility] Billing [phone]  Medical Records [phone]      MENTAL HEALTH CRISIS NUMBERS:  [facility]:   [facility] - [phone]   Crisis Residence [facility] - [phone]   Walk-In Counseling [facility] - [phone]   COPE 24/7 [place] Mobile Team for Adults - [[phone]]; Child - [[phone]]        [facility]:   [facility] - [phone]   Walk-in counseling [facility] - [phone]   Walk-in counseling [facility] - [phone]   Crisis Residence [facility] -  [phone]   Urgent Care Adult Mental Health:   --Drop-in, 24/7 crisis line, and [facility] Mobile Team [[phone]]    CRISIS TEXT LINE: Text 399-218 from anywhere, anytime, any crisis 24/7;    OR SEE www.crisistextline.org     National Suicide Prevention Lifeline: 310-733-CNWV (979-504-5395)    Poison Control Center - 1-406.699.9607    CHILD: [facility] needs assessment team - [phone]     [facility] Lifeline - 1-460.855.5685; or [facility] Lifeline - 1-455.519.5151    If you have a medical emergency please call 911or go to the nearest ER.                    _____________________________________________    Again thank you for choosing PSYCHIATRY CLINIC and please let us know how we can best partner with you to improve you and your family's health.  You may be receiving a survey regarding this appointment. We would love to have your feedback, both positive and negative. The survey is done by an external company, so your answers are anonymous.

## 2020-03-04 DIAGNOSIS — Z79.899 ENCOUNTER FOR LONG-TERM (CURRENT) USE OF MEDICATIONS: ICD-10-CM

## 2020-03-04 LAB — LITHIUM SERPL-SCNC: 0.84 MMOL/L (ref 0.6–1.2)

## 2020-03-04 PROCEDURE — 80048 BASIC METABOLIC PNL TOTAL CA: CPT | Performed by: PSYCHIATRY & NEUROLOGY

## 2020-03-04 PROCEDURE — 84443 ASSAY THYROID STIM HORMONE: CPT | Performed by: PSYCHIATRY & NEUROLOGY

## 2020-03-04 PROCEDURE — 80178 ASSAY OF LITHIUM: CPT | Performed by: PSYCHIATRY & NEUROLOGY

## 2020-03-04 PROCEDURE — 84439 ASSAY OF FREE THYROXINE: CPT | Performed by: PSYCHIATRY & NEUROLOGY

## 2020-03-04 PROCEDURE — 36415 COLL VENOUS BLD VENIPUNCTURE: CPT | Performed by: PSYCHIATRY & NEUROLOGY

## 2020-03-05 LAB
ANION GAP SERPL CALCULATED.3IONS-SCNC: 9 MMOL/L (ref 3–14)
BUN SERPL-MCNC: 13 MG/DL (ref 7–30)
CALCIUM SERPL-MCNC: 10.1 MG/DL (ref 8.5–10.1)
CHLORIDE SERPL-SCNC: 106 MMOL/L (ref 94–109)
CO2 SERPL-SCNC: 24 MMOL/L (ref 20–32)
CREAT SERPL-MCNC: 1.09 MG/DL (ref 0.66–1.25)
GFR SERPL CREATININE-BSD FRML MDRD: >90 ML/MIN/{1.73_M2}
GLUCOSE SERPL-MCNC: 75 MG/DL (ref 70–99)
POTASSIUM SERPL-SCNC: 3.8 MMOL/L (ref 3.4–5.3)
SODIUM SERPL-SCNC: 139 MMOL/L (ref 133–144)
T4 FREE SERPL-MCNC: 1.26 NG/DL (ref 0.76–1.46)
TSH SERPL DL<=0.005 MIU/L-ACNC: 5.82 MU/L (ref 0.4–4)

## 2020-04-17 ENCOUNTER — VIRTUAL VISIT (OUTPATIENT)
Dept: PSYCHIATRY | Facility: CLINIC | Age: 30
End: 2020-04-17
Attending: PSYCHIATRY & NEUROLOGY
Payer: COMMERCIAL

## 2020-04-17 DIAGNOSIS — F25.0 SCHIZOAFFECTIVE DISORDER, BIPOLAR TYPE (H): ICD-10-CM

## 2020-04-17 DIAGNOSIS — F90.9 ATTENTION DEFICIT HYPERACTIVITY DISORDER (ADHD), UNSPECIFIED ADHD TYPE: ICD-10-CM

## 2020-04-17 DIAGNOSIS — Z79.899 ENCOUNTER FOR LONG-TERM (CURRENT) USE OF MEDICATIONS: Primary | ICD-10-CM

## 2020-04-17 NOTE — PATIENT INSTRUCTIONS
Thank you for coming to the PSYCHIATRY CLINIC.    Lab Testing:  If you had lab testing today and your results are reassuring or normal they will be mailed to you or sent through [employer] within 7 days.   If the lab tests need quick action we will call you with the results.  The phone number we will call with results is # [phone] (home) . If this is not the best number please call our clinic and change the number.    Medication Refills:  If you need any refills please call your pharmacy and they will contact us. Our fax number for refills is [phone]. Please allow three business for refill processing.   If you need to  your refill at a new pharmacy, please contact the new pharmacy directly. The new pharmacy will help you get your medications transferred.     Scheduling:  If you have any concerns about today's visit or wish to schedule another appointment please call our office during normal business hours [phone] (8-5:00 M-F)    Contact Us:  Please call [phone] during business hours (8-5:00 M-F).  If after clinic hours, or on the weekend, please call  [phone].    Financial Assistance [phone]  [facility] Billing [phone]  [facility], [facility]: [phone]  [facility] Billing [phone]  Medical Records [phone]      MENTAL HEALTH CRISIS NUMBERS:  [facility]:   [facility] - [phone]   Crisis Residence [facility] - [phone]   Walk-In Counseling [facility] [phone]   COPE 24/7 [place] Mobile Team for Adults - [[phone]]; Child - [[phone]]        [facility]:   [facility] - [phone]   Walk-in counseling [facility] - [phone]   Walk-in counseling [facility] - [phone]   Crisis Residence [facility] - [phone]   Urgent Care Adult Mental Health:   --Drop-in, 24/7 crisis line, and [place] Mobile Team  [[phone]]    CRISIS TEXT LINE: Text 906-217 from anywhere, anytime, any crisis 24/7;    OR SEE www.crisistextline.org     National Suicide Prevention Lifeline: 973-113-RYZM (470-725-5649)    Poison Control Center - 2-449-624-5481    CHILD: [facility] needs assessment team - [phone]     [facility] Lifeline - 1-167.396.4810; or [facility] Lifeline - 1-524.285.5005    If you have a medical emergency please call 911or go to the nearest ER.                    _____________________________________________    Again thank you for choosing PSYCHIATRY CLINIC and please let us know how we can best partner with you to improve you and your family's health.  You may be receiving a survey regarding this appointment. We would love to have your feedback, both positive and negative. The survey is done by an external company, so your answers are anonymous.

## 2020-04-17 NOTE — PROGRESS NOTES
"TELEPHONE VISIT  Jose Francisco Sommers is a 29 year old pt. who is being evaluated via a billable telephone visit.  He was unable to access the video telehealth portal so the appointment was converted to telephone.    The patient has been notified of the following:    We have found that certain health care needs can be provided without the need for a physical exam. This service lets us provide the care you need with a short phone conversation. If a prescription is necessary we can send it directly to your pharmacy. If lab work is needed we can place an order for that and you can then stop by our lab to have the test done at a later time.     Telephone visits are billed at different rates depending on your insurance coverage. During this emergency period, for some insurers they may be billed the same as an in-person visit. Please reach out to your insurance provider with any questions.   If during the course of the call the it appears that a telephone visit is not appropriate, you will not be charged for this service.\"    Patient has given verbal consent for a telephone visit?  yes   How would the pt like to obtain the AVS? [employer]  AVS SmartPhrase [PsychAVS] has been placed in 'Patient Instructions':  yes     Start Time:  3:15 PM        End Time:  3:45 PM         [facility]  Psychiatry Clinic  PSYCHIATRIC PROGRESS NOTE     Date of initial Diagnostic Assessment (DA) is 7/24/15 and most recent Transfer of Care DA is 7/26/19.    CARE TEAM:  PCP- Provider, Clinic   Therapist- None   Community MH Team- none.      Jose Francisco Sommers is a 29 year old male who prefers the name Manuel & pronouns he, him, his, himself.      Pertinent Background:  Jose Francisco Sommers first experienced mental health issues in grade school and has received treatment for ADHD, depression, psychosis and substance use.  See last transfer evaluation for detailed history. Notably, this patient had FEP in 2015 in the setting " "of cannabis abuse and has been stable on olanzapine and lithium since that time. He has never lived independently and has only been slowly developing insight into his illness and past symptoms. GeneSight testing was completed noting patient is a poor CYP2D6 metabolizer. Past records from Dr. Mike Powers were received in summer 2018 to assist with diagnostic clarity, however they were hand written notes that were illegible though accompanied by typed neuropsychology consult from [facility]. This documentation was unable to support a diagnosis of bipolar disorder at that time though noted potential Asperger's diagnosis and ADHD. 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 Most recently, modafinil was started in April 2018 for attention support to endorsed benefits and heretofore no concerns for exacerbation of mood or psychotic symptoms.    Psych critical item history includes psychosis [sxs include disorganization, possible catatonia], mutiple psychotropic trials, psych hosp (<3) and SUBSTANCE USE: alcohol and cannabis.    INTERIM HISTORY      [4, 4]     The patient reports good treatment adherence.  History was provided by the patient who was a fair historian, accompanied by his mother who was also a good historian. The last visit ended with the following med change: olanzapine decreased to 7.5 mg.    Since the last visit:   Manuel reports that things have been \"pretty good, I think.\" He has been staying home, his mom or day leave to go to the grocery store but he mainly stays home. He doesn't mind it, has been kind of helpful in some ways to not spend money on junk food for example. He is working on his classes online, almost done with a final project for one class, and had an online test in his pre-calculus class that went very well. He is happy with this.    He reports that sleep has been \"not very good.\" He continues " "to sleep for a long time, takes him a while to fall asleep, no anxiety keeping him awake, continues with delayed sleep phase. Denies symptoms of hypothyroidism including cold intolerance, decreased appetite, easy weight gain, fatigue, dry skin/decreased sweating. \"It's a little different going from interacting with a number of people to interacting with just my parents.\" He thinks that it is \"maybe a little boring for them.\" Talking with friends and family is \"a little different, no one's themselves on the phone.\" His mom has been sewing facemasks, apparently made 40 of them.     RECENT PSYCH ROS:   Depression:  low energy and hypersomnia  Elevated:  none  Psychosis:  none  Anxiety:  none  Panic Attack:  none  Trauma Related:  none  Dysregulation:  none  Eating Disorder: no     Pertinent Negative Symptoms:  NO suicidal and violent ideation, aggression, psychosis, lauren and hypomania    RECENT SUBSTANCE USE:     Alcohol- no  Tobacco-quit smoking, no recent nicotine use  Caffeine- none  Opioids- none           Narcan Kit- N/A   Cannabis- none     Other illicit drugs- none    CURRENT SOCIAL HISTORY:  Financial/ Work- community college student       Partner/ - single  Children- no      Living situation- with parents in basement      Social/ Spiritual Support- parents, Sabianism hemalatha     FEELS SAFE AT HOME- yes     PSYCH and CD Critical Summary Points since July 2019 July 2019: Resident transition  September 2019: Olanzapine decreased to 12.5 mg, lithium increased to 1500 mg  October 2019: Olanzapine decreased to 10 mg.  December 2019: Modafinil increased to 400 mg.  January 2020: Olanzapine decreased to 7.5 mg.  February 2020: Family meeting with mother. No changes.    PAST MED TRIALS          See last Diagnostic Assessment    MEDICAL / SURGICAL HISTORY                                   Patient Active Problem List   Diagnosis     Schizoaffective disorder, bipolar type (H)     Hx of psychiatric care " "    Elevated liver enzymes     Fatty liver     High blood triglycerides     History of cannabis abuse     History of cocaine abuse (H)       Major Surgery- No past surgical history on file.    MEDICAL REVIEW OF SYSTEMS    [2, 10]     A comprehensive review of systems was performed and is negative other than noted in the HPI.    ALLERGY       Sulfa drugs  MEDICATIONS        Current Outpatient Medications   Medication Sig Dispense Refill     lithium 300 MG capsule Take 5 capsules (1,500 mg) by mouth At Bedtime 150 capsule 1     metFORMIN (GLUCOPHAGE) 500 MG tablet Take 2 tablets (1,000 mg) by mouth 2 times daily (with meals) 120 tablet 2     modafinil (PROVIGIL) 200 MG tablet Take 2 tablets (400 mg) by mouth daily 60 tablet 1     OLANZapine (ZYPREXA) 7.5 MG tablet Take 1 tablet (7.5 mg) by mouth At Bedtime 30 tablet 1     VITALS      [3, 3]   There were no vitals taken for this visit.   MENTAL STATUS EXAM      [9, 14 cog gs]   Alertness: alert  and oriented  Appearance: casually groomed and dressed  Behavior/Demeanor: cooperative, pleasant and calm, with good  eye contact   Speech: normal and regular rate and rhythm  Language: intact and no obvious problem  Psychomotor: normal or unremarkable  Mood: \"good\"  Affect: restricted and blunted; was congruent to mood; was congruent to content  Thought Process/Associations: unremarkable  Thought Content:  Reports none;  Denies suicidal and violent ideation, obsessions  and paranoid ideation  Perception:  Reports none;  Denies auditory hallucinations and visual hallucinations  Insight: fair  Judgment: fair and adequate for safety  Cognition: (6) does  appear grossly intact; formal cognitive testing was not done  Gait and Station: unremarkable    LABS and DATA     AIMS:  done in 9/2019, score = 0    PHQ9 TODAY = 4  PHQ-9 SCORE 11/1/2019 11/27/2019 12/26/2019   PHQ-9 Total Score - - -   PHQ-9 Total Score 3 3 3     ANTIPSYCHOTIC LABS  [glu, A1C, lipids (ivania LDL), liver enzymes, " WBC, ANEU, Hgb, plts]  q12 mo  Recent Labs   Lab Test 03/04/20  1001 11/08/19  1020 05/07/19  1101 12/06/18  1442  09/30/16  1348   GLC 75 93 88 86   < > 83   A1C  --   --  4.9 5.0  --  4.6    < > = values in this interval not displayed.     Recent Labs   Lab Test 05/07/19  1101 12/13/18  1027 12/06/18  1442 12/01/17  0948   CHOL 141 173 184 177   TRIG 195* 278* 535* 347*   LDL 63 81 Cannot estimate LDL when triglyceride exceeds 400 mg/dL  95 72   HDL 39* 36* 34* 36*     Recent Labs   Lab Test 05/07/19  1101 12/13/18  1027 12/06/18  1442 12/01/17  0948   AST 26 51* 70* 31   ALT 52 146* 178* 118*   ALKPHOS 69 55 74 63     Recent Labs   Lab Test 05/07/19  1101 12/06/18  1442 11/10/17  1534 10/30/17  1429   WBC 7.3 8.9 8.6 8.1   ANEU 4.5 5.7 5.0 4.8   HGB 15.0 15.8 15.6 15.7    270 255 241     LITHIUM LABS     [level, renal, SG, TSH, WBC]    q6 mo  Recent Labs   Lab Test 03/04/20  1001 11/08/19  1020 05/07/19  1101 12/13/18  1027   LITHIUM 0.84 0.84 0.61 0.65     Recent Labs   Lab Test 03/04/20  1001 11/08/19  1020 05/07/19  1101 12/06/18  1442   CR 1.09 1.12 1.14 1.06   GFRESTIMATED >90 89 87 84    138 139 139   POTASSIUM 3.8 3.5 4.0 4.1   ADONAY 10.1 9.2 9.3 9.2     Recent Labs   Lab Test 11/08/19  1024 05/07/19  1101 12/06/18  1505 10/30/17  1429   SG 1.015 1.015 1.009 1.015     Recent Labs   Lab Test 03/04/20  1001 11/08/19  1020 05/07/19  1101 12/06/18  1442   TSH 5.82* 5.29* 3.09 3.01     Recent Labs   Lab Test 05/07/19  1101 12/06/18  1442 11/10/17  1534 10/30/17  1429   WBC 7.3 8.9 8.6 8.1   ANEU 4.5 5.7 5.0 4.8       PSYCHOTROPIC DRUG INTERACTIONS     LITHIUM and OLANZAPINE may result in increased neurotoxic effects of D2-antagonists.     MANAGEMENT:  Monitoring for adverse effects, routine vitals, routine labs and patient is aware of risks    RISK STATEMENT for SAFETY     Jose Francisco Sommers did not appear to be an imminent safety risk to self or others.    DIAGNOSIS     Schizoaffective  disorder, bipolar type, mood and psychosis in remission      vs/rule out Bipolar I disorder    ADHD, predominantly inattentive type     ASSESSMENT      [m2, h3]     TODAY Manuel returns to clinic for ongoing management of his schizoaffective disorder, bipolar type, and ADHD. Mood is stable, he is not in a major mood episode, and has had no psychotic symptoms; hypersomnia has improved somewhat since decreasing olanzapine and increasing lithium, and since most recent olanzapine dose decrease in January, appetite has decreased and he has been able to lose weight as he wishes. Attention symptoms persist on 400 mg of modafinil but are manageable and he is happy with his grades this semster. No safety concerns today. After last visit TSH returned elevated with normal free T4 for the second time in a row, will repeat in 3 months with antipsychotic labs and coordinate with PCP regarding next steps after that. Manuel views lithium as an important medication and currently has no clinical signs or symptoms of hypothyroidism so no changes will be made to that dose.  In fact, he feels quite comfortable with his current regimen and is not interested in making dose changes at this time.      PLAN      [m2, h3]     1) PSYCHOTROPIC MEDICATIONS:  - Continue olanzapine 7.5 mg at bedtime.  - Continue lithium 1500 mg PO at bedtime.  - Continue modafinil to 400 mg PO daily  - Continue metformin 1000 mg PO BID, take 30 minutes before meals  - Will bring mother to next appointment.    2) MN  was checked today: indicates expected use in accordance with chart review/patient report.    3) THERAPY:    not currently doing therapy    4) NEXT DUE:    Labs- Repeat TSH/free T4 and AP labs due 5/2020; ordered and pending  EKG- PRN  Rating Scales- AIMS due 9/2020     5) REFERRALS:    none   Will message primary care about thyroid labs    6) RTC: one month    7) CRISIS NUMBERS:   Provided routinely in AVS   CRISIS TEXT LINE: Text [id number] from anywhere  in [place], anytime, any crisis 24/7;  OR SEE www.crisistextline.org  ONLY if a ROBERT PT: [facility] [phone] (clinic), [phone] (after hours)     TREATMENT RISK STATEMENT:  The risks, benefits, alternatives and potential adverse effects have been discussed and are understood by the pt. The pt understands the risks of using street drugs or alcohol. There are no medical contraindications, the pt agrees to treatment with the ability to do so. The pt knows to call the clinic for any problems or to access emergency care if needed.  Medical and substance use concerns are documented above.  Psychotropic drug interaction check was done, including changes made today.      Patient staffed on the phone with Dr. Tay who will sign the note.  Supervisor is Dr. Tay.    TELEHEALTH ATTENDING ATTESTATION  Following the ACGME guidelines on telemedicine and direct supervision due to COVID-19, I was concurrently participating in and/or monitoring the patient care through appropriate telecommunication technology.  I discussed the key portions of the service with the resident, including the mental status examination and developing the plan of care. I reviewed key portions of the history with the resident. I agree with the findings and plan as documented in this note.   Joel Tay MD

## 2020-04-21 RX ORDER — OLANZAPINE 7.5 MG/1
7.5 TABLET, FILM COATED ORAL AT BEDTIME
Qty: 30 TABLET | Refills: 1 | Status: SHIPPED | OUTPATIENT
Start: 2020-04-21 | End: 2020-05-22

## 2020-04-21 RX ORDER — MODAFINIL 200 MG/1
400 TABLET ORAL DAILY
Qty: 60 TABLET | Refills: 1 | Status: SHIPPED | OUTPATIENT
Start: 2020-04-21 | End: 2020-05-22

## 2020-04-21 RX ORDER — LITHIUM CARBONATE 300 MG/1
1500 CAPSULE ORAL AT BEDTIME
Qty: 150 CAPSULE | Refills: 1 | Status: SHIPPED | OUTPATIENT
Start: 2020-04-21 | End: 2020-05-22

## 2020-05-22 ENCOUNTER — VIRTUAL VISIT (OUTPATIENT)
Dept: PSYCHIATRY | Facility: CLINIC | Age: 30
End: 2020-05-22
Attending: PSYCHIATRY & NEUROLOGY
Payer: COMMERCIAL

## 2020-05-22 DIAGNOSIS — F90.9 ATTENTION DEFICIT HYPERACTIVITY DISORDER (ADHD), UNSPECIFIED ADHD TYPE: ICD-10-CM

## 2020-05-22 DIAGNOSIS — F25.0 SCHIZOAFFECTIVE DISORDER, BIPOLAR TYPE (H): ICD-10-CM

## 2020-05-22 RX ORDER — MODAFINIL 200 MG/1
400 TABLET ORAL DAILY
Qty: 60 TABLET | Refills: 1 | Status: SHIPPED | OUTPATIENT
Start: 2020-05-22 | End: 2020-08-19

## 2020-05-22 RX ORDER — OLANZAPINE 7.5 MG/1
7.5 TABLET, FILM COATED ORAL AT BEDTIME
Qty: 30 TABLET | Refills: 1 | Status: SHIPPED | OUTPATIENT
Start: 2020-05-22 | End: 2020-08-03

## 2020-05-22 RX ORDER — LITHIUM CARBONATE 300 MG/1
1500 CAPSULE ORAL AT BEDTIME
Qty: 150 CAPSULE | Refills: 1 | Status: SHIPPED | OUTPATIENT
Start: 2020-05-22 | End: 2020-08-19

## 2020-05-22 NOTE — PROGRESS NOTES
"TELEPHONE VISIT  Jose Francisco Sommers is a 29 year old pt. who is being evaluated via a billable telephone visit.  He was unable to access the video telehealth portal so the appointment was converted to telephone.    The patient has been notified of the following:    We have found that certain health care needs can be provided without the need for a physical exam. This service lets us provide the care you need with a short phone conversation. If a prescription is necessary we can send it directly to your pharmacy. If lab work is needed we can place an order for that and you can then stop by our lab to have the test done at a later time.     Telephone visits are billed at different rates depending on your insurance coverage. During this emergency period, for some insurers they may be billed the same as an in-person visit. Please reach out to your insurance provider with any questions.   If during the course of the call the it appears that a telephone visit is not appropriate, you will not be charged for this service.\"    Patient has given verbal consent for a telephone visit?  yes   How would the pt like to obtain the AVS? Safend  AVS SmartPhrase [PsychAVS] has been placed in 'Patient Instructions':  yes     Start Time:  3:45 PM        End Time:  4:15 PM         [facility]  Psychiatry Clinic  PSYCHIATRIC PROGRESS NOTE     Date of initial Diagnostic Assessment (DA) is 7/24/15 and most recent Transfer of Care DA is 7/26/19.    CARE TEAM:  PCP- Provider, Clinic   Therapist- None   Community MH Team- none.      Jose Francisco Sommers is a 29 year old male who prefers the name Manuel & pronouns he, him, his, himself.      Pertinent Background:  Jose Francisco Sommers first experienced mental health issues in grade school and has received treatment for ADHD, depression, psychosis and substance use.  See last transfer evaluation for detailed history. Notably, this patient had FEP in 2015 in the setting " "of cannabis abuse and has been stable on olanzapine and lithium since that time. He has never lived independently and has only been slowly developing insight into his illness and past symptoms. GeneSight testing was completed noting patient is a poor CYP2D6 metabolizer. Past records from Dr. Mike Powers were received in summer 2018 to assist with diagnostic clarity, however they were hand written notes that were illegible though accompanied by typed neuropsychology consult from [facility]. This documentation was unable to support a diagnosis of bipolar disorder at that time though noted potential Asperger's diagnosis and ADHD. 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 Most recently, modafinil was started in April 2018 for attention support to endorsed benefits and heretofore no concerns for exacerbation of mood or psychotic symptoms.    Psych critical item history includes psychosis [sxs include disorganization, possible catatonia], mutiple psychotropic trials, psych hosp (<3) and SUBSTANCE USE: alcohol and cannabis.    INTERIM HISTORY      [4, 4]     The patient reports good treatment adherence.  History was provided by the patient who was a fair historian, accompanied by his mother who was also a good historian. The last visit ended with no change to the med regimen.    Since the last visit:   Manuel reports that things are \"going pretty well.\" He finished his two college classes with As. He is going to read a lot of books this summer. His favorite book is \"Dune\" and he may reread it  He also likes \"Ready Player One.\"     RECENT PSYCH ROS:   Depression:  low energy and hypersomnia  Elevated:  none  Psychosis:  none  Anxiety:  none  Panic Attack:  none  Trauma Related:  none  Dysregulation:  none  Eating Disorder: no     Pertinent Negative Symptoms:  NO suicidal and violent ideation, aggression, psychosis, lauren and " hypomania    RECENT SUBSTANCE USE:     Alcohol- no  Tobacco-quit smoking, no recent nicotine use  Caffeine- none  Opioids- none           Narcan Kit- N/A   Cannabis- none     Other illicit drugs- none    CURRENT SOCIAL HISTORY:  Financial/ Work- community college student       Partner/ - single  Children- no      Living situation- with parents in basement      Social/ Spiritual Support- parents, Taoism hemalatha     FEELS SAFE AT HOME- yes     PSYCH and CD Critical Summary Points since July 2019 July 2019: Resident transition  September 2019: Olanzapine decreased to 12.5 mg, lithium increased to 1500 mg  October 2019: Olanzapine decreased to 10 mg.  December 2019: Modafinil increased to 400 mg.  January 2020: Olanzapine decreased to 7.5 mg.  February 2020: Family meeting with mother. No changes.    PAST MED TRIALS          See last Diagnostic Assessment    MEDICAL / SURGICAL HISTORY                                   Patient Active Problem List   Diagnosis     Schizoaffective disorder, bipolar type (H)     Hx of psychiatric care     Elevated liver enzymes     Fatty liver     High blood triglycerides     History of cannabis abuse     History of cocaine abuse (H)       Major Surgery- No past surgical history on file.    MEDICAL REVIEW OF SYSTEMS    [2, 10]     A comprehensive review of systems was performed and is negative other than noted in the HPI.    ALLERGY       Sulfa drugs  MEDICATIONS        Current Outpatient Medications   Medication Sig Dispense Refill     lithium 300 MG capsule Take 5 capsules (1,500 mg) by mouth At Bedtime 150 capsule 1     metFORMIN (GLUCOPHAGE) 500 MG tablet Take 2 tablets (1,000 mg) by mouth 2 times daily (with meals) 120 tablet 2     modafinil (PROVIGIL) 200 MG tablet Take 2 tablets (400 mg) by mouth daily 60 tablet 1     OLANZapine (ZYPREXA) 7.5 MG tablet Take 1 tablet (7.5 mg) by mouth At Bedtime 30 tablet 1     VITALS      [3, 3]   There were no vitals taken for this  "visit.   MENTAL STATUS EXAM      [9, 14 cog gs]   Alertness: alert  and oriented  Appearance: casually groomed and dressed  Behavior/Demeanor: cooperative, pleasant and calm, with good  eye contact   Speech: normal and regular rate and rhythm  Language: intact and no obvious problem  Psychomotor: normal or unremarkable  Mood: \"good\"  Affect: restricted and blunted; was congruent to mood; was congruent to content  Thought Process/Associations: unremarkable  Thought Content:  Reports none;  Denies suicidal and violent ideation, obsessions  and paranoid ideation  Perception:  Reports none;  Denies auditory hallucinations and visual hallucinations  Insight: fair  Judgment: fair and adequate for safety  Cognition: (6) does  appear grossly intact; formal cognitive testing was not done  Gait and Station: unremarkable    LABS and DATA     AIMS:  done in 9/2019, score = 0    PHQ9 TODAY = 4  PHQ-9 SCORE 11/1/2019 11/27/2019 12/26/2019   PHQ-9 Total Score - - -   PHQ-9 Total Score 3 3 3     ANTIPSYCHOTIC LABS  [glu, A1C, lipids (ivania LDL), liver enzymes, WBC, ANEU, Hgb, plts]  q12 mo  Recent Labs   Lab Test 03/04/20  1001 11/08/19  1020 05/07/19  1101 12/06/18  1442  09/30/16  1348   GLC 75 93 88 86   < > 83   A1C  --   --  4.9 5.0  --  4.6    < > = values in this interval not displayed.     Recent Labs   Lab Test 05/07/19  1101 12/13/18  1027 12/06/18  1442 12/01/17  0948   CHOL 141 173 184 177   TRIG 195* 278* 535* 347*   LDL 63 81 Cannot estimate LDL when triglyceride exceeds 400 mg/dL  95 72   HDL 39* 36* 34* 36*     Recent Labs   Lab Test 05/07/19  1101 12/13/18  1027 12/06/18  1442 12/01/17  0948   AST 26 51* 70* 31   ALT 52 146* 178* 118*   ALKPHOS 69 55 74 63     Recent Labs   Lab Test 05/07/19  1101 12/06/18  1442 11/10/17  1534 10/30/17  1429   WBC 7.3 8.9 8.6 8.1   ANEU 4.5 5.7 5.0 4.8   HGB 15.0 15.8 15.6 15.7    270 255 241     LITHIUM LABS     [level, renal, SG, TSH, WBC]    q6 mo  Recent Labs   Lab Test " 03/04/20  1001 11/08/19  1020 05/07/19  1101 12/13/18  1027   LITHIUM 0.84 0.84 0.61 0.65     Recent Labs   Lab Test 03/04/20  1001 11/08/19  1020 05/07/19  1101 12/06/18  1442   CR 1.09 1.12 1.14 1.06   GFRESTIMATED >90 89 87 84    138 139 139   POTASSIUM 3.8 3.5 4.0 4.1   ADONAY 10.1 9.2 9.3 9.2     Recent Labs   Lab Test 11/08/19  1024 05/07/19  1101 12/06/18  1505 10/30/17  1429   SG 1.015 1.015 1.009 1.015     Recent Labs   Lab Test 03/04/20  1001 11/08/19  1020 05/07/19  1101 12/06/18  1442   TSH 5.82* 5.29* 3.09 3.01     Recent Labs   Lab Test 05/07/19  1101 12/06/18  1442 11/10/17  1534 10/30/17  1429   WBC 7.3 8.9 8.6 8.1   ANEU 4.5 5.7 5.0 4.8       PSYCHOTROPIC DRUG INTERACTIONS     LITHIUM and OLANZAPINE may result in increased neurotoxic effects of D2-antagonists.     MANAGEMENT:  Monitoring for adverse effects, routine vitals, routine labs and patient is aware of risks    RISK STATEMENT for SAFETY     Jose Francisco Sommers did not appear to be an imminent safety risk to self or others.    DIAGNOSIS     Schizoaffective disorder, bipolar type, mood and psychosis in remission      vs/rule out Bipolar I disorder    ADHD, predominantly inattentive type     ASSESSMENT      [m2, h3]     TODAY Manuel returns to clinic for ongoing management of his schizoaffective disorder, bipolar type, and ADHD. Mood is stable, he is not in a major mood episode, and has had no psychotic symptoms; hypersomnia has improved somewhat since decreasing olanzapine and increasing lithium, and since most recent olanzapine dose decrease in January, appetite has decreased and he has been able to lose weight as he wishes. Attention symptoms persist on 400 mg of modafinil but are manageable and he is happy with his grades this semster. No safety concerns today. After last visit TSH returned elevated with normal free T4 for the second time in a row, will repeat in 3 months with antipsychotic labs and coordinate with PCP regarding next  steps after that. Manuel views lithium as an important medication and currently has no clinical signs or symptoms of hypothyroidism so no changes will be made to that dose.  In fact, he feels quite comfortable with his current regimen and is not interested in making dose changes at this time.      PLAN      [m2, h3]     1) PSYCHOTROPIC MEDICATIONS:  - Continue olanzapine 7.5 mg at bedtime.  - Continue lithium 1500 mg PO at bedtime.  - Continue modafinil to 400 mg PO daily  - Continue metformin 1000 mg PO BID, take 30 minutes before meals  - Will bring mother to next appointment.    2) MN  was checked today: indicates expected use in accordance with chart review/patient report.    3) THERAPY:    not currently doing therapy    4) NEXT DUE:    Labs- Repeat TSH/free T4 and AP labs due 5/2020; ordered and pending  EKG- PRN  Rating Scales- AIMS due 9/2020     5) REFERRALS:    none   Will message primary care about thyroid labs    6) RTC: one month    7) CRISIS NUMBERS:   Provided routinely in AVS   CRISIS TEXT LINE: Text [id number] from anywhere in [place], anytime, any crisis 24/7;  OR SEE www.crisistextline.org  ONLY if a [facility] PT: [facility] [phone] (clinic), [phone] (after hours)     TREATMENT RISK STATEMENT:  The risks, benefits, alternatives and potential adverse effects have been discussed and are understood by the pt. The pt understands the risks of using street drugs or alcohol. There are no medical contraindications, the pt agrees to treatment with the ability to do so. The pt knows to call the clinic for any problems or to access emergency care if needed.  Medical and substance use concerns are documented above.  Psychotropic drug interaction check was done, including changes made today.      Patient staffed on the phone with Dr. Tay who will sign the note.  Supervisor is Dr. Tay.    TELEHEALTH ATTENDING ATTESTATION  Following the ACGME guidelines on telemedicine and direct supervision due to  COVID-19, I was concurrently participating in and/or monitoring the patient care through appropriate telecommunication technology.  I discussed the key portions of the service with the resident, including the mental status examination and developing the plan of care. I reviewed key portions of the history with the resident. I agree with the findings and plan as documented in this note.   Joel Tay MD

## 2020-06-01 DIAGNOSIS — Z79.899 ENCOUNTER FOR LONG-TERM (CURRENT) USE OF MEDICATIONS: ICD-10-CM

## 2020-06-01 LAB
ALBUMIN SERPL-MCNC: 4.3 G/DL (ref 3.4–5)
ALP SERPL-CCNC: 73 U/L (ref 40–150)
ALT SERPL W P-5'-P-CCNC: 42 U/L (ref 0–70)
AST SERPL W P-5'-P-CCNC: 26 U/L (ref 0–45)
BASOPHILS # BLD AUTO: 0 10E9/L (ref 0–0.2)
BASOPHILS NFR BLD AUTO: 0.5 %
BILIRUB DIRECT SERPL-MCNC: <0.1 MG/DL (ref 0–0.2)
BILIRUB SERPL-MCNC: 0.7 MG/DL (ref 0.2–1.3)
CHOLEST SERPL-MCNC: 136 MG/DL
DIFFERENTIAL METHOD BLD: NORMAL
EOSINOPHIL # BLD AUTO: 0.2 10E9/L (ref 0–0.7)
EOSINOPHIL NFR BLD AUTO: 3.1 %
ERYTHROCYTE [DISTWIDTH] IN BLOOD BY AUTOMATED COUNT: 12.7 % (ref 10–15)
HBA1C MFR BLD: 4.7 % (ref 0–5.6)
HCT VFR BLD AUTO: 45.2 % (ref 40–53)
HDLC SERPL-MCNC: 41 MG/DL
HGB BLD-MCNC: 15.1 G/DL (ref 13.3–17.7)
IMM GRANULOCYTES # BLD: 0 10E9/L (ref 0–0.4)
IMM GRANULOCYTES NFR BLD: 0.4 %
LDLC SERPL CALC-MCNC: 43 MG/DL
LYMPHOCYTES # BLD AUTO: 1.9 10E9/L (ref 0.8–5.3)
LYMPHOCYTES NFR BLD AUTO: 25.4 %
MCH RBC QN AUTO: 30.9 PG (ref 26.5–33)
MCHC RBC AUTO-ENTMCNC: 33.4 G/DL (ref 31.5–36.5)
MCV RBC AUTO: 93 FL (ref 78–100)
MONOCYTES # BLD AUTO: 0.5 10E9/L (ref 0–1.3)
MONOCYTES NFR BLD AUTO: 6.1 %
NEUTROPHILS # BLD AUTO: 4.8 10E9/L (ref 1.6–8.3)
NEUTROPHILS NFR BLD AUTO: 64.5 %
NONHDLC SERPL-MCNC: 95 MG/DL
NRBC # BLD AUTO: 0 10*3/UL
NRBC BLD AUTO-RTO: 0 /100
PLATELET # BLD AUTO: 265 10E9/L (ref 150–450)
PROT SERPL-MCNC: 7.6 G/DL (ref 6.8–8.8)
RBC # BLD AUTO: 4.88 10E12/L (ref 4.4–5.9)
TRIGL SERPL-MCNC: 260 MG/DL
TSH SERPL DL<=0.005 MIU/L-ACNC: 2.51 MU/L (ref 0.4–4)
WBC # BLD AUTO: 7.5 10E9/L (ref 4–11)

## 2020-06-01 PROCEDURE — 85025 COMPLETE CBC W/AUTO DIFF WBC: CPT | Performed by: STUDENT IN AN ORGANIZED HEALTH CARE EDUCATION/TRAINING PROGRAM

## 2020-06-01 PROCEDURE — 83036 HEMOGLOBIN GLYCOSYLATED A1C: CPT | Performed by: STUDENT IN AN ORGANIZED HEALTH CARE EDUCATION/TRAINING PROGRAM

## 2020-06-01 PROCEDURE — 80061 LIPID PANEL: CPT | Performed by: STUDENT IN AN ORGANIZED HEALTH CARE EDUCATION/TRAINING PROGRAM

## 2020-06-01 PROCEDURE — 36415 COLL VENOUS BLD VENIPUNCTURE: CPT | Performed by: STUDENT IN AN ORGANIZED HEALTH CARE EDUCATION/TRAINING PROGRAM

## 2020-06-01 PROCEDURE — 84443 ASSAY THYROID STIM HORMONE: CPT | Performed by: STUDENT IN AN ORGANIZED HEALTH CARE EDUCATION/TRAINING PROGRAM

## 2020-06-01 PROCEDURE — 80076 HEPATIC FUNCTION PANEL: CPT | Performed by: STUDENT IN AN ORGANIZED HEALTH CARE EDUCATION/TRAINING PROGRAM

## 2020-08-03 DIAGNOSIS — F25.0 SCHIZOAFFECTIVE DISORDER, BIPOLAR TYPE (H): ICD-10-CM

## 2020-08-03 RX ORDER — OLANZAPINE 7.5 MG/1
7.5 TABLET, FILM COATED ORAL AT BEDTIME
Qty: 30 TABLET | Refills: 1 | Status: SHIPPED | OUTPATIENT
Start: 2020-08-03 | End: 2020-08-24

## 2020-08-03 NOTE — TELEPHONE ENCOUNTER
Last seen: 5/22  RTC: 1 month   Cancel: none   No-show: 6/30  Next appt: 9/30    Incoming refill from patient via phone     Medication requested: OLANZapine (ZYPREXA) 7.5 MG tablet   Directions: Take 1 tablet (7.5 mg) by mouth At Bedtime - Oral   Qty: 30  Last refilled: 5/22    Will route to provider for approval

## 2020-08-03 NOTE — TELEPHONE ENCOUNTER
[facility] Call Center    Phone Message    May a detailed message be left on voicemail: yes     Reason for Call: Medication Refill Request    Has the patient contacted the pharmacy for the refill? Yes   Name of medication being requested: olanzapine  Provider who prescribed the medication: Cristy Carrasquillo MD  Pharmacy: [id number] [address] [facility] [address]  Date medication is needed: ASAP - pt ran out of medication today        Action Taken: Message routed to:  Other: Nursing pool    Travel Screening: Not Applicable

## 2020-08-18 DIAGNOSIS — F90.9 ATTENTION DEFICIT HYPERACTIVITY DISORDER (ADHD), UNSPECIFIED ADHD TYPE: ICD-10-CM

## 2020-08-18 DIAGNOSIS — F25.0 SCHIZOAFFECTIVE DISORDER, BIPOLAR TYPE (H): ICD-10-CM

## 2020-08-19 NOTE — TELEPHONE ENCOUNTER
modafinil (PROVIGIL) 200 MG    Last refilled:  5/22/20  Qty: 60 : 1    lithium 300 MG   Last refilled: 5/22/20  Qty: 150 : 1    Last seen: 5/22/20  RTC: 1 MOS  Cancel: 0  No-show: X1  6/30  Next appt: 8/24/20  Refill pended and routed to the provider for review/determination due to   Pt outside of RTC timeframe  May 2020    WITH NOSHOW 6/30/20

## 2020-08-21 ENCOUNTER — TELEPHONE (OUTPATIENT)
Dept: PSYCHIATRY | Facility: CLINIC | Age: 30
End: 2020-08-21

## 2020-08-21 RX ORDER — LITHIUM CARBONATE 300 MG/1
1500 CAPSULE ORAL AT BEDTIME
Qty: 150 CAPSULE | Refills: 0 | Status: SHIPPED | OUTPATIENT
Start: 2020-08-21 | End: 2020-08-24

## 2020-08-21 RX ORDER — MODAFINIL 200 MG/1
400 TABLET ORAL DAILY
Qty: 60 TABLET | Refills: 0 | Status: SHIPPED | OUTPATIENT
Start: 2020-08-21 | End: 2020-08-24

## 2020-08-21 NOTE — TELEPHONE ENCOUNTER
Last seen: 5/22  RTC: 1 month   Cancel: none   No-show: 6/30  Next appt: 8/24    Incoming refill from patient via phone    Medication requested: lithium 300 MG capsule   Directions: Take 5 capsules (1,500 mg) by mouth At Bedtime - Oral  Qty: 150  Last refilled: 8/21    Per chart review, meds refilled by provider on 8/21. Patient notified.

## 2020-08-21 NOTE — TELEPHONE ENCOUNTER
[facility] Call Center    Phone Message    May a detailed message be left on voicemail: yes     Reason for Call: Medication Refill Request    Has the patient contacted the pharmacy for the refill? Yes   Name of medication being requested: lithium  Provider who prescribed the medication: Dr. Cristy Carrasquillo  Pharmacy: [facility]  Date medication is needed: asap (pt ran out yesterday)         Action Taken: Message routed to:  Other: [facility]    Travel Screening: Negative

## 2020-08-24 ENCOUNTER — VIRTUAL VISIT (OUTPATIENT)
Dept: PSYCHIATRY | Facility: CLINIC | Age: 30
End: 2020-08-24
Attending: PSYCHIATRY & NEUROLOGY
Payer: COMMERCIAL

## 2020-08-24 DIAGNOSIS — F90.9 ATTENTION DEFICIT HYPERACTIVITY DISORDER (ADHD), UNSPECIFIED ADHD TYPE: ICD-10-CM

## 2020-08-24 DIAGNOSIS — F25.0 SCHIZOAFFECTIVE DISORDER, BIPOLAR TYPE (H): ICD-10-CM

## 2020-08-24 RX ORDER — LITHIUM CARBONATE 300 MG/1
1500 CAPSULE ORAL AT BEDTIME
Qty: 150 CAPSULE | Refills: 3 | Status: SHIPPED | OUTPATIENT
Start: 2020-08-24 | End: 2020-11-04

## 2020-08-24 RX ORDER — OLANZAPINE 5 MG/1
5 TABLET ORAL AT BEDTIME
Qty: 30 TABLET | Refills: 3 | Status: SHIPPED | OUTPATIENT
Start: 2020-08-24 | End: 2020-09-30

## 2020-08-24 RX ORDER — MODAFINIL 200 MG/1
400 TABLET ORAL DAILY
Qty: 60 TABLET | Refills: 0 | Status: SHIPPED | OUTPATIENT
Start: 2020-08-24 | End: 2020-11-04

## 2020-08-24 ASSESSMENT — PAIN SCALES - GENERAL: PAINLEVEL: NO PAIN (0)

## 2020-08-24 NOTE — PATIENT INSTRUCTIONS
Thank you for coming to the PSYCHIATRY CLINIC.    Lab Testing:  If you had lab testing today and your results are reassuring or normal they will be mailed to you or sent through [facility] within 7 days. If the lab tests need quick action we will call you with the results. The phone number we will call with results is # [phone] (home) . If this is not the best number please call our clinic and change the number.    Medication Refills:  If you need any refills please call your pharmacy and they will contact us. Our fax number for refills is [phone]. Please allow three business for refill processing. If you need to  your refill at a new pharmacy, please contact the new pharmacy directly. The new pharmacy will help you get your medications transferred.     Scheduling:  If you have any concerns about today's visit or wish to schedule another appointment please call our office during normal business hours [phone] (8-5:00 M-F)    Contact Us:  Please call [phone] during business hours (8-5:00 M-F).  If after clinic hours, or on the weekend, please call  [phone].    Financial Assistance [phone]  [facility] Billing [phone]  Central Billing Office, [facility]: [phone]  [facility] Billing [phone]  Medical Records [phone]      MENTAL HEALTH CRISIS NUMBERS:  For a medical emergency please call  911 or go to the nearest ER.     [facility]:   [facility] -[phone]   Crisis Residence [facility] Residence -[phone]   Walk-In Counseling [facility] -[phone]   COPE 24/7 [facility] Mobile Team -[phone] (adults)/[phone] (child)  CHILD: [facility] needs assessment team - [phone]      [facility]:   [facility] - [phone]   Walk-in counseling [facility] - [phone]   Walk-in counseling [facility] - [phone]   Crisis Residence [facility] - [phone]  Urgent  [facility] Adult Mental Vpjqpk-[phone] mobile unit/ 24/7 crisis line    National Crisis Numbers:   National Suicide Prevention Lifeline: 4-778-289-TALK (756-325-2566)  Poison Control Center - 2-415-825-9375  KlickEx/resources for a list of additional resources (SOS)  Trans Lifeline a hotline for transgender people 3-030-860-2279  The [employer]usi Project a hotline for LGBT youth 1-422.774.9480  Crisis Text Line: For any crisis 24/7   To: 338763  see www.crisistextline.org  - IF MAKING A CALL FEELS TOO HARD, send a text!         Again thank you for choosing PSYCHIATRY CLINIC and please let us know how we can best partner with you to improve you and your family's health.    You may be receiving a survey regarding this appointment. We would love to have your feedback, both positive and negative. The survey is done by an external company, so your answers are anonymous.

## 2020-08-24 NOTE — PROGRESS NOTES
"VIDEO VISIT  Jose Francisco Sommers is a 29 year old patient who is being evaluated via a billable video visit.      The patient has been notified of following:   \"This video visit will be conducted via a call between you and your physician/provider. We have found that certain health care needs can be provided without the need for an in-person physical exam. This service lets us provide the care you need with a video conversation. If a prescription is necessary we can send it directly to your pharmacy. If lab work is needed we can place an order for that and you can then stop by our lab to have the test done at a later time. Insurers are generally covering virtual visits as they would in-office visits so billing should not be different than normal.  If for some reason you do get billed incorrectly, you should contact the billing office to correct it and that number is in the AVS .    Video Conference to be completed via:  Elizabeth    Patient has given verbal consent for video visit?:  Yes    Patient would prefer that any video invitations be sent by: Send to e-mail at: [email or web address]      How would patient like to obtain AVS?:  RandolphZova    AVS SmartPhrase [PsychAVS] has been placed in 'Patient Instructions':  Yes    "

## 2020-08-24 NOTE — PROGRESS NOTES
"Video- Visit Details  Type of service:  video visit for medication management  Time of service:    Date:  2020    Video Start Time:  11:03 AM        Video End Time:  12:00pm     Reason for video visit:  {rn:679782}  Originating Site (patient location):  State- {st:466579}   Location- {pt:716909}  Distant Site (provider location):  {pv:377706}  Mode of Communication:  Video Conference via {Virtual Visit Platforms:069374::\"AmWell\"}  Consent:  Patient has given verbal consent for video visit?: {Y:195700}         [facility]  Psychiatry Clinic  TRANSFER of CARE DIAGNOSTIC ASSESSMENT     CARE TEAM:  PCP- Provider, Clinic   {prov:069232}.  Jose Francisco Sommers is a 29 year old patient who prefers the name *** and uses pronouns {p:834266}.     PERTINENT BACKGROUND                   [most recent eval 20]     ***    Psych critical item history includes {cr:246320}    INTERIM HISTORY                                   [4, 4]   History was provided by the patient who was a good historian. Treatment adherence is good.  Since the last visit:   - Since May, things have been going \"good\"  - Exercise has been jogging   - Missed dose of Lithium of         RECENT PSYCH ROS:   Depression:  {DEPR:928647}  Elevated:  {MANIC:063126}  Psychosis:  {PSYCHOSIS :234105}  Anxiety:  {ANX:808817}  Trauma Related:  {TRAUMA:086290}  Dysregulation:  {DYSRE}  Eating Disorder: {n:673775}   Other: {n:370275}    Adverse Effects:  Weight gain, feeling sluggish,   Pertinent Negative Symptoms: No suicidal ideation, self-injurious behavior/urges, aggression, psychosis and hallucinations    RECENT SUBSTANCE USE:     {sub:112778}    FAMILY and SOCIAL HISTORY             [1ea, 1ea]       patient reported                    Family Hx:  ***    Social Hx:  Financial/ Work- [employer] [place] 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, part-time college student, classes are currently online due to COVID-19   Partner/ " - single   Children- no     Living situation- live at home with parents     Social/ Spiritual Support- parents, [employer] hemalatha   Legal- {n:619019}  FEELS SAFE AT HOME- {n:679902}     Trauma History (self-report)- {n:286132}  Early History/Education-  {n:940062}  Other- {n:121187}     PSYCHIATRIC HISTORY                                                            SIB- {n:908333}  Suicidal Ideation Hx- {n:566436}  Suicide Attempt- #- {n:283568}, most recent- {n:518453}    Violence/Aggression Hx- {n:311361}  Psychosis Hx- {n:979350}  Eating Disorder Hx- {n:650807}  Other- {n:551174}    Psych Hosp- #- {n:838543}, most recent- {n:278506}   Commitment- {n:495050}  ECT- {n:492484}  Outpatient Programs - {n:324605}  Other - {n:718915}    PAST MED TRIALS                                                              Medication  Dose (mg) Effect  Dates of Use                       SUBSTANCE USE HISTORY     Past Use- {SUB:199476}  Treatment- #, most recent- {n:506889}  Medical Consequences- {n:485244}  HIV/Hepatitis- {n:778926}  Legal Consequences- {n:071522}  Other- {n:721845}    MEDICAL HISTORY and ALLERGY     ALLERGIES: Sulfa drugs     Patient Active Problem List   Diagnosis     Schizoaffective disorder, bipolar type (H)     Hx of psychiatric care     Elevated liver enzymes     Fatty liver     High blood triglycerides     History of cannabis abuse     History of cocaine abuse (H)         MEDICAL REVIEW OF SYSTEMS         [2, 10]   Pregnant or breastfeeding- {n:642999}      Contraception- ***    {MEDROS:199390}    MEDICATIONS        Current Outpatient Medications   Medication Sig Dispense Refill     lithium 300 MG capsule Take 5 capsules (1,500 mg) by mouth At Bedtime 150 capsule 0     metFORMIN (GLUCOPHAGE) 500 MG tablet Take 2 tablets (1,000 mg) by mouth 2 times daily (with meals) 120 tablet 2     modafinil (PROVIGIL) 200 MG tablet Take 2 tablets (400 mg) by mouth daily 60 tablet 0     OLANZapine (ZYPREXA) 7.5 MG  tablet Take 1 tablet (7.5 mg) by mouth At Bedtime 30 tablet 1     VITALS                                                                [3, 3]   There were no vitals taken for this visit.   MENTAL STATUS EXAM                       [9, 14 cog gs]     Alertness: {a:630440}  Appearance: {a:575425}  Behavior/Demeanor: {b:401152}, with {d:646642} eye contact   Speech: {s:008968}  Language: {l:538080}  Psychomotor: {p:061375}  Mood: {m:310961}  Affect: {a:562282}; congruent to: mood- {YES/NO:075397}, content- {YES/NO:033199}  Thought Process/Associations: {t:855906}  Thought Content:  Reports {t:969273};  Denies {t:321095}  Perception:  Reports {p:261159};  Denies {p:611186}  Insight: {i:677134}  Judgment: {j:426859}  Cognition: (6) oriented: { :216673}  attention span: { :376856}  concentration: { :459733}  recent memory: { :582280}  remote memory: { :150147}  fund of knowledge: { :239801}  Gait and Station: {}    LABS and DATA     PHQ9 TODAY = ***  PHQ 2019   PHQ-9 Total Score 3 3 3   Q9: Thoughts of better off dead/self-harm past 2 weeks Not at all Not at all Not at all       Recent Labs   Lab Test 20  1001 19  1020 19  1101   CR 1.09 1.12 1.14   GFRESTIMATED >90 89 87     Recent Labs   Lab Test 20  1339 19  1101 18  1027   AST 26 26 51*   ALT 42 52 146*   ALKPHOS 73 69 55       PSYCHOTROPIC DRUG INTERACTIONS     ***    MANAGEMENT:  {di:266744}    RISK STATEMENT for SAFETY     Jose Francisco Elizondovarinder Sommers {safe:194852}    DIAGNOSES                                           [m2, h3]    ***    ASSESSMENT                                        [m2, h3]        ***    {:712790}    PLAN                                                            [m2, h3]                                               1) Meds  -     2) Therapy-  ***    3) Next Due   Labs- ***  EKG- ***  Rating scales- {s:740814}    4) Referrals  {REF:740388}     5) RTC: ***    6) Crisis  Numbers:   Provided routinely in AVS     After hours:  [phone]      TREATMENT RISK STATEMENT:  The risks, benefits, alternatives and potential adverse effects have been discussed and are understood by the pt. The pt understands the risks of using street drugs or alcohol. There are no medical contraindications, the pt agrees to treatment with the ability to do so. The pt knows to call the clinic for any problems or to access emergency care if needed.  Medical and substance use concerns are documented above.  Psychotropic drug interaction check was done, including changes made today.    PROVIDER: Jayda Ball MD    Patient staffed in clinic with  *** who will sign the note.  Supervisor is  ***.  OR  Patient not staffed in clinic.  Note will be reviewed and signed by supervisor  ***.

## 2020-08-31 NOTE — PROGRESS NOTES
Video- Visit Details  Type of service:  video visit for medication management  Time of service:    Date:  08/24/2020    Video Start Time:  11:03 AM        Video End Time:  12:00pm     Reason for video visit:  Services only offered telehealth  Originating Site (patient location):  [facility]   Location- Patient's home  Distant Site (provider location):  Remote location  Mode of Communication:  Video Conference via Doxy.me  Consent:  Patient has given verbal consent for video visit?: Yes         [facility]  Psychiatry Clinic  TRANSFER of CARE DIAGNOSTIC ASSESSMENT     CARE TEAM: PCP- Provider, Clinic   Therapist- None   Community  Team- none.        Jose Francisco Sommers is a 29 year old patient who prefers the name Manuel and uses pronouns he, him, his, himself.     PERTINENT BACKGROUND                   [most recent eval 08/24/20]    Jose Francisco Sommers first experienced mental health issues in grade school and has received treatment for ADHD, depression, psychosis and substance use.  See last transfer evaluation for detailed history. Notably, this patient had FEP in 2015 in the setting of cannabis abuse and has been stable on olanzapine and lithium since that time. He has never lived independently and has only been slowly developing insight into his illness and past symptoms. GeneSight testing was completed noting patient is a poor CYP2D6 metabolizer. Past records from Dr. Mike Powers were received in summer 2018 to assist with diagnostic clarity, however they were hand written notes that were illegible though accompanied by typed neuropsychology consult from [facility]. This documentation was unable to support a diagnosis of bipolar disorder at that time though noted potential Asperger's diagnosis and ADHD. 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 Most recently, modafinil was started in April  "2018 for attention support to endorsed benefits and heretofore no concerns for exacerbation of mood or psychotic symptoms.      Psych critical item history includes psychosis [sxs include disorganization, possible catatonia], mutiple psychotropic trials, psych hosp (<3) and SUBSTANCE USE: alcohol and cannabis.    INTERIM HISTORY                                   [4, 4]   History was provided by the patient who was a good historian. Treatment adherence is good.    Since the last visit:   - Patient reports that things have been going \"pretty well\" since his last appointment in May.  He denies any worsening or changing of his mental health symptoms.   - He has been trying to stay busy and social given the pandemic.  He bought a bike and has been trying to exercise regularly.   - He has some concerns about weight gain on his medication regimen.  Over the past year he has lowered his Olanzapine dose from 12.5 to 7.5 with the long term plan of tapering off this medication.  He has not noticed any emergence of symptoms with this taper.   - He feels his ADHD symptoms are well controlled with his current dose of Modafinil.   - He denies SI, HI, SIB    RECENT PSYCH ROS:   Depression:  low energy  Elevated:  none  Psychosis:  none  Anxiety:  none  Trauma Related:  none  Dysregulation:  none  Eating Disorder: no   Other: no    Adverse Effects:  Weight gain, feeling sluggish,   Pertinent Negative Symptoms: No suicidal ideation, self-injurious behavior/urges, aggression, psychosis and hallucinations    RECENT SUBSTANCE USE:     Denies all current substance use    FAMILY and SOCIAL HISTORY             [1ea, 1ea]       patient reported                    Family Hx:   Paternal grandfather: alcoholism; Maternal grandmother: \"long-standing mental health issues\"; Maternal Aunt: Anxiety when young; Maternal Aunt: Anxiety and depression     CURRENT SOCIAL HISTORY:  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  Graduated HS and attempted some college classes without success, however states he feels he was not ready and that things would be different if he attempted again.    Other-  DUIs. Also In February 2014 the patient left for [place] to attend a [employer]! tournament. He ended up in [place] and was arrested for shoplifting from [facility] (had \"intended to purchase\" a pair of jeans but became panicked when security approached him). Had a brief stay in assisted, and afterwards, took him 5 days to return to [place] with parents frequently wiring him money. Had difficulty adhering to travel plans, seemed confused, and was communicating with parents via \"bizarre\" text messages (parents even filed a missing persons report at one point). He finally arrived home exhausted, not sharing much of events in [place], but slightly more consistent with baseline.     PSYCHIATRIC HISTORY                                                            SIB [method, most recent]- none  Suicidal Ideation Hx- none  Suicide Attempt [#, recent, method]:   #- none   Most Recent- N/A     Violence/Aggression Hx- none  Psychosis Hx- Predominantly disorganization. Denies AVH but describes thought blocking and slowed cognition/difficulty speaking during prior psychotic episode.  First episode began in December 2014 - was spending great amounts of time reorganizing his room/house/garage, and even used a  on the kitchen " "floor.  Psych Hosp [ #, most recent, committed]- once at [facility] in April 2015  ECT [#, most recent]- none     Eating Disorder: none     Outpatient Programs [ DBT, Day Treatment, Eating Disorder Tx etc] : none     Past Psychotropic Med Trials- see next section    PAST MED TRIALS                                                          Adderall 10-20 mg - improved concentration, but \"cold personality\" and perseveration  Prozac 20 mg - limited response, less irritable, first episode of \"rage\"  Seroquel 25-75 mg - helped with sleep and irritability  Concerta 18 mg - improved focus, no improvement in motivation  Provigil 150 mg - Stimulants \"almost killed him\" triggered alcohol binges  Abilify ?25 mg - OK when QOD, but irritable and agitated on QD  Clonazepam 0.5-1.5 mg - calming, helped with sleep and \"catatonia\"  Risperdal up to 5 mg - some confusion, slow processing, withdrawn, ?\"catatonia\", panic attacks  Zyprexa 5 mg less anxious overall improvement  Lithium - calmer, overall better  Latuda 20 mg - severe fatigue and depression  Synthroid 75 mcg - added to help with mood.  Brief trials with Strattera, Intuniv, Lamictal, Xanax, Zoloft      SUBSTANCE USE HISTORY     Past Use- Alcohol- in high school  and Cannabis- in high school   Treatment- #, most recent- [facility] at age 19 for cannabis, did not complete  Medical Consequences- no  HIV/Hepatitis- no  Legal Consequences- DUI for drinking at ages 20 and 21    MEDICAL HISTORY and ALLERGY     ALLERGIES: Sulfa drugs     Patient Active Problem List   Diagnosis     Schizoaffective disorder, bipolar type (H)     Hx of psychiatric care     Elevated liver enzymes     Fatty liver     High blood triglycerides     History of cannabis abuse     History of cocaine abuse (H)         MEDICAL REVIEW OF SYSTEMS         [2, 10]   Contraception- unkown    A comprehensive review of systems was performed and is negative other than noted in the HPI.    MEDICATIONS        Current Outpatient " "Medications   Medication Sig Dispense Refill     lithium 300 MG capsule Take 5 capsules (1,500 mg) by mouth At Bedtime 150 capsule 0     metFORMIN (GLUCOPHAGE) 500 MG tablet Take 2 tablets (1,000 mg) by mouth 2 times daily (with meals) 120 tablet 2     modafinil (PROVIGIL) 200 MG tablet Take 2 tablets (400 mg) by mouth daily 60 tablet 0     OLANZapine (ZYPREXA) 7.5 MG tablet Take 1 tablet (7.5 mg) by mouth At Bedtime 30 tablet 1     VITALS                                                                [3, 3]   There were no vitals taken for this visit.   MENTAL STATUS EXAM                       [9, 14 cog gs]     Alertness: alert  and oriented  Appearance: well groomed  Behavior/Demeanor: cooperative, pleasant and calm, with good  eye contact   Speech: normal and regular rate and rhythm  Language: intact  Psychomotor: normal or unremarkable  Mood: \"pretty good\"  Affect: full range; congruent to: mood- yes, content- yes  Thought Process/Associations: unremarkable  Thought Content:  Reports none;  Denies suicidal & violent ideation and delusions  Perception:  Reports none;  Denies auditory hallucinations and visual hallucinations  Insight: good  Judgment: good  Cognition: (6) oriented: time, person, and place  attention span: intact  concentration: intact  recent memory: intact  remote memory: intact  fund of knowledge: appropriate  Gait and Station: N/A (telehealth)    LABS and DATA     PHQ9 TODAY = N/A  PHQ 11/1/2019 11/27/2019 12/26/2019   PHQ-9 Total Score 3 3 3   Q9: Thoughts of better off dead/self-harm past 2 weeks Not at all Not at all Not at all           ANTIPSYCHOTIC LABS  [glu, A1C, lipids (ivania LDL), liver enzymes, WBC, ANEU, Hgb, plts]  q12 mo  Recent Labs   Lab Test 06/01/20  1339 03/04/20  1001 11/08/19  1020 05/07/19  1101 12/06/18  1442  09/30/16  1348   GLC  --  75 93 88 86   < > 83   A1C 4.7  --   --  4.9 5.0  --  4.6    < > = values in this interval not displayed.     Recent Labs   Lab Test " 06/01/20  1339 05/07/19  1101 12/13/18  1027 12/06/18  1442   CHOL 136 141 173 184   TRIG 260* 195* 278* 535*   LDL 43 63 81 Cannot estimate LDL when triglyceride exceeds 400 mg/dL  95   HDL 41 39* 36* 34*     Recent Labs   Lab Test 06/01/20  1339 05/07/19  1101 12/13/18  1027 12/06/18  1442   AST 26 26 51* 70*   ALT 42 52 146* 178*   ALKPHOS 73 69 55 74     Recent Labs   Lab Test 06/01/20  1339 05/07/19  1101 12/06/18  1442 11/10/17  1534   WBC 7.5 7.3 8.9 8.6   ANEU 4.8 4.5 5.7 5.0   HGB 15.1 15.0 15.8 15.6    247 270 255     LITHIUM LABS     [level, renal, SG, TSH, WBC]    q6 mo  Recent Labs   Lab Test 03/04/20  1001 11/08/19  1020 05/07/19  1101 12/13/18  1027   LITHIUM 0.84 0.84 0.61 0.65     Recent Labs   Lab Test 03/04/20  1001 11/08/19  1020 05/07/19  1101 12/06/18  1442   CR 1.09 1.12 1.14 1.06   GFRESTIMATED >90 89 87 84    138 139 139   POTASSIUM 3.8 3.5 4.0 4.1   ADONAY 10.1 9.2 9.3 9.2     Recent Labs   Lab Test 11/08/19  1024 05/07/19  1101 12/06/18  1505 10/30/17  1429   SG 1.015 1.015 1.009 1.015     Recent Labs   Lab Test 06/01/20  1339 03/04/20  1001 11/08/19  1020 05/07/19  1101   TSH 2.51 5.82* 5.29* 3.09     Recent Labs   Lab Test 06/01/20  1339 05/07/19  1101 12/06/18  1442 11/10/17  1534   WBC 7.5 7.3 8.9 8.6   ANEU 4.8 4.5 5.7 5.0       PSYCHOTROPIC DRUG INTERACTIONS     LITHIUM and OLANZAPINE may result in increased neurotoxic effects of D2-antagonists.     MANAGEMENT:  Monitoring for adverse effects, routine vitals, routine labs and patient is aware of risks       RISK STATEMENT for SAFETY     Jose Francisco Harvey  did not appear to be an imminent safety risk to self or others.    DIAGNOSES                                           [m2, h3]    Schizoaffective disorder, bipolar type, mood and psychosis in remission  ADHD, predominantly inattentive type     ASSESSMENT                                        [m2, h3]        Today, Manuel returns for ongoing management of his  schizoaffective disorder, bipolar type, and ADHD.  He reports his mood is stable, and he has not had a significant mood episode or psychotic symptoms over the interim.  His ADHD symptoms are well controlled.  He feels his medication regimen is appropriately addressing all of his symptoms at this time.  He does have concerns about weight gain and is interested in ultimately tapering off of Olanzapine.  However, he is hesitant due to concerns his symptoms may worsen.  We will continue a slow taper and decrease from 7.5mg to 5mg today.     MNPMP review was not needed today.    PLAN                                                            [m2, h3]                                               1) Meds  - Decrease Olanzapine to 5mg at bedtime   - Continue Lithium 1,500mg at bedtime  - Continue Modafinil 400mg daily     2) Therapy-  Not currently interested in therapy    3) Next Due   Labs- [place] labs at next appointment  EKG- PRN  Rating scales- N/A    4) Referrals  No Referrals needed     5) RTC: 2 months    6) Crisis Numbers:   Provided routinely in AVS     After hours:  [phone]      TREATMENT RISK STATEMENT:  The risks, benefits, alternatives and potential adverse effects have been discussed and are understood by the pt. The pt understands the risks of using street drugs or alcohol. There are no medical contraindications, the pt agrees to treatment with the ability to do so. The pt knows to call the clinic for any problems or to access emergency care if needed.  Medical and substance use concerns are documented above.  Psychotropic drug interaction check was done, including changes made today.    PROVIDER: Jayda Ball, DO    Patient staffed via video with Dr. Tay who will sign the note.  Supervisor is Dr. Tay.    TELEHEALTH ATTENDING ATTESTATION  Following the ACGME guidelines on telemedicine and direct supervision due to COVID-19, I was concurrently participating in and/or monitoring the patient care  through appropriate telecommunication technology.  I discussed the key portions of the service with the resident, including presentation, the mental status examination and developing the plan of care. I reviewed key portions of the history with the resident. I agree with the findings and plan as documented in this note.   Joel Tay MD

## 2020-09-30 ENCOUNTER — VIRTUAL VISIT (OUTPATIENT)
Dept: PSYCHIATRY | Facility: CLINIC | Age: 30
End: 2020-09-30
Attending: PSYCHIATRY & NEUROLOGY
Payer: COMMERCIAL

## 2020-09-30 DIAGNOSIS — F39 MOOD DISORDER (H): Primary | ICD-10-CM

## 2020-09-30 DIAGNOSIS — Z79.899 ENCOUNTER FOR LONG-TERM (CURRENT) USE OF MEDICATIONS: ICD-10-CM

## 2020-09-30 PROCEDURE — 80053 COMPREHEN METABOLIC PANEL: CPT | Mod: GT | Performed by: STUDENT IN AN ORGANIZED HEALTH CARE EDUCATION/TRAINING PROGRAM

## 2020-09-30 RX ORDER — OLANZAPINE 5 MG/1
2.5 TABLET ORAL AT BEDTIME
Qty: 30 TABLET | Refills: 3 | Status: ON HOLD | OUTPATIENT
Start: 2020-09-30 | End: 2020-11-25

## 2020-09-30 ASSESSMENT — PAIN SCALES - GENERAL: PAINLEVEL: NO PAIN (0)

## 2020-09-30 NOTE — PATIENT INSTRUCTIONS
Thank you for coming to the PSYCHIATRY CLINIC.    Lab Testing:  If you had lab testing today and your results are reassuring or normal they will be mailed to you or sent through [employer] within 7 days. If the lab tests need quick action we will call you with the results. The phone number we will call with results is # [phone] (home) . If this is not the best number please call our clinic and change the number.    Medication Refills:  If you need any refills please call your pharmacy and they will contact us. Our fax number for refills is [phone]. Please allow three business for refill processing. If you need to  your refill at a new pharmacy, please contact the new pharmacy directly. The new pharmacy will help you get your medications transferred.     Scheduling:  If you have any concerns about today's visit or wish to schedule another appointment please call our office during normal business hours [phone] (8-5:00 M-F)    Contact Us:  Please call [phone] during business hours (8-5:00 M-F).  If after clinic hours, or on the weekend, please call  [phone].    Financial Assistance [phone]  [facility] Billing [phone]  Central Billing Office, [facility]: [phone]  [facility] Billing [phone]  Medical Records [phone]      MENTAL HEALTH CRISIS NUMBERS:  For a medical emergency please call  911 or go to the nearest ER.     [facility]:   [facility] -[phone]   Crisis Residence [facility] -[phone]   Walk-In Counseling [facility] -[phone]   COPE 24/7 [place] Mobile Team -[phone] (adults)/[phone] (child)  CHILD: [facility] needs assessment team - [phone]      [facility]:   [facility] - [phone]   Walk-in counseling [facility] - [phone]   Walk-in counseling [facility] - [phone]   Crisis Residence [facility] - [phone]  Urgent  [facility] Adult Mental Gl[facility]g-[phone] mobile unit/ 24/7 crisis line    National Crisis Numbers:   National Suicide Prevention Lifeline: 1-066-159-TALK (507-774-1778)  Poison Control Center - 9-533-686-6693  [employer]/resources for a list of additional resources (SOS)  Trans Lifeline a hotline for transgender people 9-793-717-7436  The [employer] a hotline for LGBT youth 1-815.499.6850  Crisis Text Line: For any crisis 24/7   To: 070600  see www.crisistextline.org  - IF MAKING A CALL FEELS TOO HARD, send a text!         Again thank you for choosing PSYCHIATRY CLINIC and please let us know how we can best partner with you to improve you and your family's health.    You may be receiving a survey regarding this appointment. We would love to have your feedback, both positive and negative. The survey is done by an external company, so your answers are anonymous.

## 2020-09-30 NOTE — PROGRESS NOTES
Video- Visit Details  Type of service:  video visit for medication management  Time of service:    Date:  09/30/2020    Video Start Time:  2:53 PM        Video End Time:  3:30pm    Reason for video visit:  Services only offered telehealth  Originating Site (patient location):  [facility]   Location- Patient's home  Distant Site (provider location):  [facility]  Mode of Communication:  Video Conference via [email or web address]  Consent:  Patient has given verbal consent for video visit?: Yes         [facility]  Psychiatry Clinic  PSYCHIATRY PROGRESS NOTE     CARE TEAM: PCP- Provider, Clinic   Therapist- None   Community  Team- none.         Jose Francisco Sommers is a 29 year old patient who prefers the name Manuel and uses pronouns he, him, his, himself.     PERTINENT BACKGROUND                              [most recent eval 08/24/2020]      Jose Francisco Sommers first experienced mental health issues in grade school and has received treatment for ADHD, depression, psychosis and substance use.  See last transfer evaluation for detailed history. Notably, this patient had FEP in 2015 in the setting of cannabis abuse and has been stable on olanzapine and lithium since that time. He has never lived independently and has only been slowly developing insight into his illness and past symptoms. GeneSight testing was completed noting patient is a poor CYP2D6 metabolizer. Past records from Dr. Mike Powers were received in summer 2018 to assist with diagnostic clarity, however they were hand written notes that were illegible though accompanied by typed neuropsychology consult from [facility]. This documentation was unable to support a diagnosis of bipolar disorder at that time though noted potential Asperger's diagnosis and ADHD. 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 Most recently, modafinil was started  "in April 2018 for attention support to endorsed benefits and heretofore no concerns for exacerbation of mood or psychotic symptoms.        Psych critical item history includes psychosis [sxs include disorganization, possible catatonia], mutiple psychotropic trials, psych hosp (<3) and SUBSTANCE USE: alcohol and cannabis.    INTERIM HISTORY                                                                   [4, 4]   History was provided by the patient who was a good historian. Treatment adherence is good.  Since the last visit:   - Manuel reports he has been doing \"fine\" since his last appointment.  - He felt comfortable with the decrease in Olanzapine and feels he no longer needs it.  He would like to continue with taper today.  He denies having any psychotic symptoms for the past five years.  He has decreased his Olanzapine from 15mg to 5mg over the past 1.5 years with no worsening of symptoms.   - His sister visited over the past month and that was nice.  He doesn't leave the house very often because he lives with his parents and doesn't want to get his parents sick.  He used to go to [employer] every day to get a soda and doughnut and since the pandemic he has lost weight since he stopped doing this.   - He has been exercising with his boxing bag and biking and he enjoys that.   - He is continuing with school, he is currently enrolled in an Excel class and programing class.  He finds the Excel class easy but is struggling with the programming class and wants to withdraw because it is pretty hard.  He might see how he feels in a couple months regarding taking more classes.   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  - He feels safe at home and denies SI, SIB, HI      RECENT PSYCH ROS:   Depression:  none  Elevated:  none  Psychosis:  none  Anxiety:  none  Trauma Related:  none  Dysregulation:  none  Eating Disorder: no     Adverse Effects:  Denies side effects of medications  Pertinent Negative Symptoms: No suicidal " "ideation, self-injurious behavior/urges, violent ideation, psychosis and hallucinations       RECENT SUBSTANCE USE:     - None reported    FAMILY and SOCIAL HISTORY             [1ea, 1ea]       patient reported                    Family Hx:   Paternal grandfather: alcoholism; Maternal grandmother: \"long-standing mental health issues\"; Maternal Aunt: Anxiety when young; Maternal Aunt: Anxiety and depression      CURRENT SOCIAL HISTORY:  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  Graduated HS and attempted some college classes without success, however states he feels he was not ready and that things would be different if he attempted again.     Other-  DUIs. Also In February 2014 the patient left for [place] to attend a [employer] tournament. He ended up in [place] and was arrested for shoplifting from [employer] (had \"intended to purchase\" a pair of jeans but became panicked when security approached him). Had a brief stay in custodial, and afterwards, took him 5 days to return to [place] with parents frequently wiring him money. Had difficulty adhering to travel plans, seemed confused, and was communicating with parents via \"bizarre\" text messages (parents even filed a missing persons report at one point). He finally arrived home exhausted, not sharing much of events in [place], but slightly more consistent with baseline.       PSYCH and SUBSTANCE USE Critical Summary " "Points since July 2020 08/24/2020: Resident transfer visit, Olanzapine decreased from 7.5mg to 5mg    PAST MED TRIALS                                                          Adderall 10-20 mg - improved concentration, but \"cold personality\" and perseveration  Prozac 20 mg - limited response, less irritable, first episode of \"rage\"  Seroquel 25-75 mg - helped with sleep and irritability  Concerta 18 mg - improved focus, no improvement in motivation  Provigil 150 mg - Stimulants \"almost killed him\" triggered alcohol binges  Abilify ?25 mg - OK when QOD, but irritable and agitated on QD  Clonazepam 0.5-1.5 mg - calming, helped with sleep and \"catatonia\"  Risperdal up to 5 mg - some confusion, slow processing, withdrawn, ?\"catatonia\", panic attacks  Zyprexa 5 mg less anxious overall improvement  Lithium - calmer, overall better  Latuda 20 mg - severe fatigue and depression  Synthroid 75 mcg - added to help with mood.  Brief trials with Strattera, Intuniv, Lamictal, Xanax, Zoloft    MEDICAL HISTORY and ALLERGY     ALLERGIES: Sulfa drugs     Patient Active Problem List   Diagnosis     Schizoaffective disorder, bipolar type (H)     Hx of psychiatric care     Elevated liver enzymes     Fatty liver     High blood triglycerides     History of cannabis abuse     History of cocaine abuse (H)         MEDICAL REVIEW OF SYSTEMS                                            [2, 10]   Pregnant or breastfeeding- no      Contraception- unkown    A comprehensive review of systems was performed and is negative other than noted in the HPI.    MEDICATIONS        Current Outpatient Medications   Medication Sig Dispense Refill     lithium 300 MG capsule Take 5 capsules (1,500 mg) by mouth At Bedtime 150 capsule 3     modafinil (PROVIGIL) 200 MG tablet Take 2 tablets (400 mg) by mouth daily 60 tablet 0     OLANZapine (ZYPREXA) 5 MG tablet Take 1 tablet (5 mg) by mouth At Bedtime 30 tablet 3     VITALS                                    " "                                                                        [3, 3]   There were no vitals taken for this visit.   MENTAL STATUS EXAM                                              [9, 14 cog gs]     Alertness: alert  and oriented  Appearance: well groomed  Behavior/Demeanor: cooperative, pleasant and calm, with good  eye contact   Speech: normal and regular rate and rhythm  Language: intact and no problems  Psychomotor: normal or unremarkable  Mood: \"pretty good\"  Affect: full range; congruent to: mood- yes, content- yes  Thought Process/Associations: unremarkable  Thought Content:  Reports none;  Denies suicidal & violent ideation and delusions  Perception:  Reports none;  Denies hallucinations  Insight: good  Judgment: good  Cognition: (6) oriented: time, person, and place  attention span: intact  concentration: intact  recent memory: intact  remote memory: intact  fund of knowledge: appropriate  Gait and Station: N/A (telehealth)    LABS and DATA     PHQ9 TODAY = N/A  PHQ 11/1/2019 11/27/2019 12/26/2019   PHQ-9 Total Score 3 3 3   Q9: Thoughts of better off dead/self-harm past 2 weeks Not at all Not at all Not at all       Recent Labs   Lab Test 03/04/20  1001 11/08/19  1020 05/07/19  1101   CR 1.09 1.12 1.14   GFRESTIMATED >90 89 87     Recent Labs   Lab Test 06/01/20  1339 05/07/19  1101 12/13/18  1027   AST 26 26 51*   ALT 42 52 146*   ALKPHOS 73 69 55     ANTIPSYCHOTIC LABS  [glu, A1C, lipids (ivania LDL), liver enzymes, WBC, ANEU, Hgb, plts]  q12 mo  Recent Labs   Lab Test 06/01/20  1339 03/04/20  1001 11/08/19  1020 05/07/19  1101 12/06/18  1442  09/30/16  1348   GLC  --  75 93 88 86   < > 83   A1C 4.7  --   --  4.9 5.0  --  4.6    < > = values in this interval not displayed.     Recent Labs   Lab Test 06/01/20  1339 05/07/19  1101 12/13/18  1027 12/06/18  1442   CHOL 136 141 173 184   TRIG 260* 195* 278* 535*   LDL 43 63 81 Cannot estimate LDL when triglyceride exceeds 400 mg/dL  95   HDL 41 39* " 36* 34*     Recent Labs   Lab Test 06/01/20  1339 05/07/19  1101 12/13/18  1027 12/06/18  1442   AST 26 26 51* 70*   ALT 42 52 146* 178*   ALKPHOS 73 69 55 74     Recent Labs   Lab Test 06/01/20  1339 05/07/19  1101 12/06/18  1442 11/10/17  1534   WBC 7.5 7.3 8.9 8.6   ANEU 4.8 4.5 5.7 5.0   HGB 15.1 15.0 15.8 15.6    247 270 255       LITHIUM LABS     [level, renal, SG, TSH, WBC]    q6 mo  Recent Labs   Lab Test 03/04/20  1001 11/08/19  1020 05/07/19  1101 12/13/18  1027   LITHIUM 0.84 0.84 0.61 0.65     Recent Labs   Lab Test 03/04/20  1001 11/08/19  1020 05/07/19  1101 12/06/18  1442   CR 1.09 1.12 1.14 1.06   GFRESTIMATED >90 89 87 84    138 139 139   POTASSIUM 3.8 3.5 4.0 4.1   ADONAY 10.1 9.2 9.3 9.2     Recent Labs   Lab Test 11/08/19  1024 05/07/19  1101 12/06/18  1505 10/30/17  1429   SG 1.015 1.015 1.009 1.015     Recent Labs   Lab Test 06/01/20  1339 03/04/20  1001 11/08/19  1020 05/07/19  1101   TSH 2.51 5.82* 5.29* 3.09     Recent Labs   Lab Test 06/01/20  1339 05/07/19  1101 12/06/18  1442 11/10/17  1534   WBC 7.5 7.3 8.9 8.6   ANEU 4.8 4.5 5.7 5.0     PSYCHOTROPIC DRUG INTERACTIONS     LITHIUM and OLANZAPINE may result in increased neurotoxic effects of D2-antagonists.     MANAGEMENT:  Monitoring for adverse effects, routine vitals, routine labs and patient is aware of risks       RISK STATEMENT for SAFETY     Jose Francisco Elizondoord Sommers did not appear to be an imminent safety risk to self or others.    DIAGNOSES                                                                        [m2, h3]      Mood disorder, unspecified   ADHD, predominantly inattentive type     ASSESSMENT                                                                     [m2, h3]        Today, Manuel returns for continued medication management of his Schizoaffective Disorder and ADHD.  Manuel reports continued mood stability, denies psychotic symptoms, and feels his ADHD symptoms are well controlled.  He requests continued taper  of his Olanzapine in order to simplify his medication regimen and decrease risk of weight gain.   Although Manuel has been diagnosed with schizoaffective disorder in the past, he has not had psychotic symptoms within the last 5 years and per chart review his psychotic symptoms were present in the context of mood episodes.  Therefore, it is unlikely he has a primary thought disorder and his diagnosis will be changed from Schizoaffective disorder to mood disorder, unspecified with differential of bipolar disorder or MDD with psychotic features, in remission.  We will continue taper of Olanzapine today and Manuel was counseled on risks of symptom emergence.  He agreed to contact this clinic if he noticed worsening symptoms.     [place]NPMP review was not needed today.    PLAN                                                                                            [m2, h3]             1) Meds  - Decrease Olanzapine to 2.5mg at bedtime for two weeks, then discontinue   - Continue Lithium 1,500mg at bedtime  - Continue Modafinil 400mg daily     2) Therapy-  Recommended, patient declines at this time    3) Next Due   Labs- [place] labs ordered for any [place] lab   EKG- PRN  Rating scales- N/A    4) Referrals  No Referrals needed     5) RTC: 2 months    6) Crisis Numbers:   Provided routinely in AVS     After hours:  [phone]      TREATMENT RISK STATEMENT:  The risks, benefits, alternatives and potential adverse effects have been discussed and are understood by the pt. The pt understands the risks of using street drugs or alcohol. There are no medical contraindications, the pt agrees to treatment with the ability to do so. The pt knows to call the clinic for any problems or to access emergency care if needed.  Medical and substance use concerns are documented above.  Psychotropic drug interaction check was done, including changes made today.    PROVIDER:  Jayda Ball DO    Patient staffed in clinic with Dr. Burt  who  will sign the note.  Supervisor is Dr. Tay.  TELEHEALTH ATTENDING ATTESTATION  Following the ACGME guidelines on telemedicine and direct supervision due to COVID-19, I was concurrently participating in and/or monitoring the patient care through appropriate telecommunication technology.  I discussed the key portions of the service with the resident, including the mental status examination and developing the plan of care. I reviewed key portions of the history with the resident. I agree with the findings and plan as documented in this note.   Nahid Burt MD

## 2020-10-01 ENCOUNTER — TELEPHONE (OUTPATIENT)
Dept: PSYCHIATRY | Facility: CLINIC | Age: 30
End: 2020-10-01

## 2020-10-01 NOTE — TELEPHONE ENCOUNTER
Message  Received: Today  Message Contents   Jayda Ball MD Patel, Radhika, RN   Cc: P Psychiatry Scheduling-[facility]   Caller: Unspecified (Today,  9:53 AM)             Kavin Madrigal,     Could you also please call the mother back and let her know you conveyed the information to me.  I appreciate the communication and concerns regarding her son. I have a 30 min opening on 10/08 at 8:30-9:00am.  I am happy to meet with patient and a parent to discuss their concerns and come up with a plan everyone is comfortable with.  However, patient will need to agree to have parents in the meeting.       I have also copied scheduling here- can you please hold the 8:30am appointment on 10/08 for this patient until it is verified he wants to have a family meeting.     Thank you,   Jayda      Writer placed a call to patient to relay the above information. Patient agreed to be seen on 10/8 at 8:30 am. Scheduling notified.     Writer placed a call to momEmber to relay the above information. No answer at number provided. LVM, requesting a call back. Clinic number provided.

## 2020-10-01 NOTE — TELEPHONE ENCOUNTER
Call Back, Patient Request     Jayda Ball MD  You 37 minutes ago (11:25 AM)     Hi Kaitlin,     Agreed that patient will have to sign a IVA for his parents if he would like them involved in his care.  Can you please reach out to patient as ask if he would like to fill one out for his mother.  I cannot communicate with her without the IVA on file.       Thank you,   Jayda     Message text      Writer placed a call to patient to obtain a IVA for parents. Patient asked the IVA be sent via email at [email or web address]. Writer agreed with the plan and IVA emailed to the patient.     Will wait for completed IVA to be returned

## 2020-10-01 NOTE — TELEPHONE ENCOUNTER
"[facility] Call Center    Phone Message    May a detailed message be left on voicemail: yes     Reason for Call: Symptoms or Concerns     Patient mother would like a call back from either her son's provider, a nurse, or the attending provider. She is very concerned with some of the things she heard yesterday and feels that they are not being addressed. She would like to speak to someone ASAP, before she has to \"take the damage control route\"    Action Taken: Message routed to:  Other: [facility]    Travel Screening: Not Applicable                                                                        "

## 2020-10-01 NOTE — TELEPHONE ENCOUNTER
Patient's mother returned Kaitlin's phone call. She expressed frustration because the phone number she should be contacted on is her cell phone, [phone]. She will be available around 2:10pm today.

## 2020-10-01 NOTE — TELEPHONE ENCOUNTER
"Writer placed a call to momEmber to obtain additional information. Updated her that a IVA is needed to share any information. Writer agreed to collect her thoughts and relay to provider. Mom is concerned for the patient at this time. She shared \" Manuel's dad and I were listening in the room of his appt yesterday and it did not go well.\" She reports that patient was not asked open ended questions.\" Per mom, patient has not been doing well on decrease dose of Olanzapine. Patient was taking 10 mg and per mom was doing very well. \" He was himself, would talk to us and participated in activities around the house.\" Patient has been wanting to get off Olanzapine due to weight gain. Per mom, he had gained 60 pounds since starting Olanzapine. Since the decrease, he has lost 40 pounds. Patient spends a lot of time walking on the treadmill and walking around the house with his headphones on. Mom has noticed that patient was on edge, irritable but still able to preform tasks when taking Olanzapine 7.5 mg. Since he was dropped down to Olanzapine 5 mg, mom has noticed major changes in his mood. She reports that he does not communicate with them, very on edge, very irritable. Per last office visit on 9/30, patient was recommended to decrease Olanzapine to 2.5 mg for 2 weeks then discontinue. Mom does not feel that it is a good idea to taper patient off his Olanzapine. She expressed concerned and changes in school and studying pattern. Patient has dropped some classes due to difficulty studying. Mom mentioned \" that should have been a big clue that patient is not doing well and decreasing the Olanzapine is not appropriate.\" She also shared that patient got upset at the parents when he was told that they were able to hear the office visit. Mom also expressed to him that it was not a good idea to get off Olanzapine. Mom is unsure what next steps can be taken at this time. Shared \" damage has been done.\" Mom is hoping that patient " "can be seen within the 2 weeks to discuss concerns further. She continues to say \" Manuel knows what is the right thing to say but if you ask him open ended questions, he will share how he is really doing.\" Writer was on the phone for 30 min and when told that her concerns would be passed along to the provider, mom disconnected the phone.     Routed to provider   "

## 2020-10-01 NOTE — TELEPHONE ENCOUNTER
Writer placed a call to mom Ember and updated her of providers recommendations. Mom again shared her frustration with the care patient is receiving at this time. Writer updated mom that a sooner appt was offered to the patient. She shared if it was a early opening that patient is not able to wake. She continued to express how frustrated she has been with the clinic and the care patient is receiving. Mom shared that patient does not have much insight and is not able to function as he use to be. Writer agreed to pass this along to the provider.

## 2020-10-07 NOTE — TELEPHONE ENCOUNTER
Kavin Madrigal,     Thank you for helping me manage this situation.  I looped in my supervisor, Dr. Tay.  Could you please relay the following the the patient/mom.     - Ultimately, we can only make treatment recommendations based on the information that we have and since mom did not participate in the appointment and Jose Francisco is his own guardian and not under any sort of legal commitment, we based our recommendations on his report.   It is always helpful to have more information to make decisions, but we were not aware of the information she is now sharing during the appointment yesterday.  There are many different medication options we can try and I am confident that we can come up with a plan that takes into account her concerns and Jose Francisco's weight concerns.     - Having an opening within two weeks is fortunate and rare.  It sounds like Jose Francisco agreed to this appointment, however mom did not.  If they do not want to keep this appointment, the next available afternoon appointment is 11/04.       - We are working on obtaining a release of information from Jose Farncisco.  If mom wishes to participate in future appointments, that is always welcome as long as Jose Francisco agrees.     - When you are able to talk with mom, can you please ask if it is okay if we share everything she has told us with Jose Francisco?  That way we can relay the concerns to him which may be helpful in his decision making process.     Thank you so much for all your help!   - Jayda     Follow up:    Writer placed a call to parents to relay the above information without releasing details due to no IVA on file. Mom is ok with us sharing that she had called with concerns with the patient. Mom has plans to ask the patient if she would be able to attend the appt for the first 5 min tomorrow. If patient disagrees, mom is wanting the provider to discuss worsening in school and dropping classes, not engaging in activities at home and walking around with headphones on,  weight gain concerns and lastly drinking alcohol. Per mom, patient has asked to drink at night to help him sleep several times but parents have continued to refuse. Patient has not had any alcohol since the past 5 years. Mom feel that patient is not accepting his mental health diagnosis and is finding a social acceptable way to deal with his anxiety at night. Writer again updated mom that information will not be shared if patient refused but she is able to call the clinic to provide an update if necessary. Mom verbalized understanding.      Writer placed a call to patient to remind him of the appt on 10/8 and also to obtain an IVA. Patient verbalized understanding and shared that he does not recall getting an email with blank IVA. Writer agreed to resend the IVA at [email or web address]. IVA sent.

## 2020-10-08 ENCOUNTER — TELEPHONE (OUTPATIENT)
Dept: PSYCHIATRY | Facility: CLINIC | Age: 30
End: 2020-10-08

## 2020-10-08 ENCOUNTER — VIRTUAL VISIT (OUTPATIENT)
Dept: PSYCHIATRY | Facility: CLINIC | Age: 30
End: 2020-10-08
Attending: PSYCHIATRY & NEUROLOGY
Payer: COMMERCIAL

## 2020-10-08 DIAGNOSIS — F90.9 ATTENTION DEFICIT HYPERACTIVITY DISORDER (ADHD), UNSPECIFIED ADHD TYPE: ICD-10-CM

## 2020-10-08 PROCEDURE — 99214 OFFICE O/P EST MOD 30 MIN: CPT | Mod: GC | Performed by: STUDENT IN AN ORGANIZED HEALTH CARE EDUCATION/TRAINING PROGRAM

## 2020-10-08 RX ORDER — MODAFINIL 100 MG/1
100 TABLET ORAL DAILY
Qty: 30 TABLET | Refills: 1 | Status: CANCELLED | OUTPATIENT
Start: 2020-10-08

## 2020-10-08 NOTE — TELEPHONE ENCOUNTER
On 10/8/2020, at 0802, writer called patient at mobile to confirm Virtual Visit. Writer unable to make contact with patient. Writer left detailed voice message for callback. [phone], left as call back number. CRISTI Dorantes, EMT

## 2020-11-04 ENCOUNTER — VIRTUAL VISIT (OUTPATIENT)
Dept: PSYCHIATRY | Facility: CLINIC | Age: 30
End: 2020-11-04
Attending: PSYCHIATRY & NEUROLOGY
Payer: COMMERCIAL

## 2020-11-04 DIAGNOSIS — F90.9 ATTENTION DEFICIT HYPERACTIVITY DISORDER (ADHD), UNSPECIFIED ADHD TYPE: ICD-10-CM

## 2020-11-04 DIAGNOSIS — F25.0 SCHIZOAFFECTIVE DISORDER, BIPOLAR TYPE (H): ICD-10-CM

## 2020-11-04 PROCEDURE — 99215 OFFICE O/P EST HI 40 MIN: CPT | Mod: GC | Performed by: STUDENT IN AN ORGANIZED HEALTH CARE EDUCATION/TRAINING PROGRAM

## 2020-11-04 RX ORDER — MODAFINIL 200 MG/1
400 TABLET ORAL DAILY
Qty: 60 TABLET | Refills: 0 | Status: ON HOLD | OUTPATIENT
Start: 2020-11-04 | End: 2020-11-25

## 2020-11-04 RX ORDER — LITHIUM CARBONATE 300 MG/1
1500 CAPSULE ORAL AT BEDTIME
Qty: 150 CAPSULE | Refills: 3 | Status: ON HOLD | OUTPATIENT
Start: 2020-11-04 | End: 2020-11-25

## 2020-11-04 ASSESSMENT — PATIENT HEALTH QUESTIONNAIRE - PHQ9
SUM OF ALL RESPONSES TO PHQ QUESTIONS 1-9: 12
SUM OF ALL RESPONSES TO PHQ QUESTIONS 1-9: 12
10. IF YOU CHECKED OFF ANY PROBLEMS, HOW DIFFICULT HAVE THESE PROBLEMS MADE IT FOR YOU TO DO YOUR WORK, TAKE CARE OF THINGS AT HOME, OR GET ALONG WITH OTHER PEOPLE: EXTREMELY DIFFICULT

## 2020-11-04 ASSESSMENT — PAIN SCALES - GENERAL: PAINLEVEL: NO PAIN (0)

## 2020-11-04 NOTE — PATIENT INSTRUCTIONS
Thank you for coming to the [facility] HEALTH & ADDICTION [place] [facility].    Lab Testing:  If you had lab testing today and your results are reassuring or normal they will be mailed to you or sent through [employer] within 7 days. If the lab tests need quick action we will call you with the results. The phone number we will call with results is # [phone] (home) . If this is not the best number please call our clinic and change the number.    Medication Refills:  If you need any refills please call your pharmacy and they will contact us. Our fax number for refills is [phone]. Please allow three business for refill processing. If you need to  your refill at a new pharmacy, please contact the new pharmacy directly. The new pharmacy will help you get your medications transferred.     Scheduling:  If you have any concerns about today's visit or wish to schedule another appointment please call our office during normal business hours [phone] (8-5:00 M-F)    Contact Us:  Please call [phone] during business hours (8-5:00 M-F).  If after clinic hours, or on the weekend, please call  [phone].    Financial Assistance [phone]  [facility] Billing [phone]  Central Billing Office, [facility]: [phone]  [facility] Billing [phone]  Medical Records [phone]  [facility] Patient Bill of Rights [email or web address]       MENTAL HEALTH CRISIS NUMBERS:  For a medical emergency please call  911 or go to the nearest ER.     [facility]:   [facility] -[phone]   Crisis Residence [facility] Page Residence -[phone]   Walk-In [facility] [place] -[phone]   COPE 24/7 [place] Mobile Team -[phone] (adults)/[phone] (child)  CHILD: [facility] needs assessment team - [phone]      [facility]:   [facility] - [phone]   Walk-in counseling [facility]  [facility] - [phone]   Walk-in counseling [facility] - [phone]   Crisis Residence [facility] - [phone]  Urgent Care Adult Mental [facility]-[phone] mobile unit/ 24/7 crisis line    National Crisis Numbers:   National Suicide Prevention Lifeline: 8-448-082-TALK (693-638-2907)  Poison Control Center - 1-600.797.6646  [employer]/resources for a list of additional resources (SOS)  Trans Lifeline a hotline for transgender people 3-833-867-1569  The [employer] a hotline for LGBT youth 1-858.445.9112  Crisis Text Line: For any crisis 24/7   To: 671314  see www.crisistextline.org  - IF MAKING A CALL FEELS TOO HARD, send a text!         Again thank you for choosing [facility] MENTAL HEALTH & ADDICTION [facility] and please let us know how we can best partner with you to improve you and your family's health.    You may be receiving a survey regarding this appointment. We would love to have your feedback, both positive and negative. The survey is done by an external company, so your answers are anonymous.

## 2020-11-04 NOTE — PROGRESS NOTES
"VIDEO VISIT  Jose Francisco Sommers is a 30 year old patient who is being evaluated via a billable video visit.      The patient has been notified of following:   \"This video visit will be conducted via a call between you and your physician/provider. We have found that certain health care needs can be provided without the need for an in-person physical exam. This service lets us provide the care you need with a video conversation. If a prescription is necessary we can send it directly to your pharmacy. If lab work is needed we can place an order for that and you can then stop by our lab to have the test done at a later time. Insurers are generally covering virtual visits as they would in-office visits so billing should not be different than normal.  If for some reason you do get billed incorrectly, you should contact the billing office to correct it and that number is in the AVS .    Video Conference to be completed via:  Elizabeth    Patient has given verbal consent for video visit?:  Yes    Patient would prefer that any video invitations be sent by: Text to cell phone: [phone]      How would patient like to obtain AVS?:  import2    AVS SmartPhrase [PsychAVS] has been placed in 'Patient Instructions':  Yes    "

## 2020-11-05 ASSESSMENT — PATIENT HEALTH QUESTIONNAIRE - PHQ9: SUM OF ALL RESPONSES TO PHQ QUESTIONS 1-9: 12

## 2020-11-06 ENCOUNTER — TELEPHONE (OUTPATIENT)
Dept: PSYCHIATRY | Facility: CLINIC | Age: 30
End: 2020-11-06

## 2020-11-06 NOTE — TELEPHONE ENCOUNTER
MTM referral from: [facility] [place] visit (referral by provider)    MTM referral outreach attempt #2 on November 6, 2020 at 1:24 PM      Outcome: Patient not reachable after several attempts, will route to MTM Pharmacist/Provider as an FYI. Thank you for the referral.    Skyler Cha, MTM coordinator

## 2020-11-11 NOTE — PROGRESS NOTES
Video- Visit Details  Type of service:  video visit for medication management  Time of service:    Date: 11/04/2020    Video Start Time:  3:45pm      Video End Time:  4:30pm    Reason for video visit:  Services only offered telehealth  Originating Site (patient location):  [facility]   Location- Patient's home  Distant Site (provider location):  [facility]  Mode of Communication:  Video Conference via Doxy[employer]me  Consent:  Patient has given verbal consent for video visit?: Yes         [facility]  Psychiatry Clinic  PSYCHIATRY PROGRESS NOTE     CARE TEAM:  PCP- Provider, Clinic.  Therapist: None. Community  Team: None     Jose Francisco Sommers is a 30 year old   patient who prefers the name Manuel and uses pronouns he, him, his, himself.     PERTINENT BACKGROUND                                               [most recent eval 08/24/2020]     Psych pertinent item history includes psychosis [sxs include disorganization, possible catatonia], mutiple psychotropic trials, psych hosp (<3) and SUBSTANCE USE: alcohol and cannabis    INTERIM HISTORY                                                                                    [4, 4]   History was provided by the patient and and his mom, Ember, who was a good historian. Treatment adherence is good.  Since the last visit:   - Patient has dropped both of his school courses (programming and Excel).  He notes difficulty with focus and feeling the coursework got more difficult but his abilities remained the same.    - Patient notes difficulty with reading.  He used to be an avid reader and this is a definite change. He also struggles with getting things done around the house.  Mom notes this appears to be more than just a focus issue but also a processing issue.   - Patient feels Modafinil no longer is helpful.   - He and his mom feel mood has improved since last visit, less irritability, possibly due to less stress due to dropping  "school courses.   - Patient is interested in medication changes, however hesitant due to side effects on previous trials and he is a poor 2D6 metabolizer.   - He denies acute safety concerns including SI, SIB, HI and he feels safe at home.     RECENT PSYCH ROS:   Depression:  poor concentration /memory  Elevated:  none  Psychosis:  none  Anxiety:  nervous/overwhelmed  Trauma Related:  none  Eating Disorder: no     Adverse Effects:  Patient denies adverse effects of medications   Pertinent Negative Symptoms: No suicidal ideation, self-injurious behavior/urges, violent ideation, aggression, psychosis and lauren  Recent Substance Use:     None reported    FAMILY and SOCIAL HISTORY                                    [1ea, 1ea]       pt reported         Family Hx:   Paternal grandfather: alcoholism; Maternal grandmother: \"long-standing mental health issues\"; Maternal Aunt: Anxiety when young; Maternal Aunt: Anxiety and depression      CURRENT SOCIAL HISTORY:  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          PSYCH and SUBSTANCE USE Critical Summary Points since July 2020 08/24/2020: Resident transfer visit, Olanzapine decreased from 7.5mg to 5mg  09/30/2020: Olanzapine decreased to 2.5mg then discontinued  10/08/2020: Family meeting with mom, no medication changes  11/04/2020: No medication changes, referred to MTM    PAST MED TRIALS                              " "                              Adderall 10-20 mg - improved concentration, but \"cold personality\" and perseveration  Prozac 20 mg - limited response, less irritable, first episode of \"rage\"  Seroquel 25-75 mg - helped with sleep and irritability  Concerta 18 mg - improved focus, no improvement in motivation  Provigil 150 mg - Stimulants \"almost killed him\" triggered alcohol binges  Abilify ?25 mg - OK when QOD, but irritable and agitated on QD  Clonazepam 0.5-1.5 mg - calming, helped with sleep and \"catatonia\"  Risperdal up to 5 mg - some confusion, slow processing, withdrawn, ?\"catatonia\", panic attacks  Zyprexa 5 mg less anxious overall improvement  Lithium - calmer, overall better  Latuda 20 mg - severe fatigue and depression  Synthroid 75 mcg - added to help with mood.  Brief trials with Strattera, Intuniv, Lamictal, Xanax, Zoloft      MEDICAL HISTORY and ALLERGY     ALLERGIES: Sulfa drugs     Patient Active Problem List   Diagnosis     Schizoaffective disorder, bipolar type (H)     Hx of psychiatric care     Elevated liver enzymes     Fatty liver     High blood triglycerides     History of cannabis abuse     History of cocaine abuse (H)         MEDICAL REVIEW OF SYSTEMS                                                           [2, 10]    Contraception- Unkown    A comprehensive review of systems was performed and is negative other than noted in the HPI.    MEDICATIONS        Current Outpatient Medications   Medication Sig Dispense Refill     lithium 300 MG capsule Take 5 capsules (1,500 mg) by mouth At Bedtime 150 capsule 3     modafinil (PROVIGIL) 200 MG tablet Take 2 tablets (400 mg) by mouth daily 60 tablet 0     OLANZapine (ZYPREXA) 5 MG tablet Take 0.5 tablets (2.5 mg) by mouth At Bedtime For two weeks, then discontinue 30 tablet 3     VITALS                                                                                                                               [3, 3]   There were no vitals taken for " "this visit.   MENTAL STATUS EXAM                                                              [9, 14 cog gs]     Alertness: alert  and oriented  Appearance: well groomed  Behavior/Demeanor: cooperative, pleasant and calm, with good  eye contact   Speech: normal and regular rate and rhythm  Language: intact and no problems  Psychomotor: normal or unremarkable  Mood: \"okay\"  Affect: full range; congruent to: mood- yes, content- yes  Thought Process/Associations: unremarkable  Thought Content:  Reports none;  Denies suicidal & violent ideation and delusions  Perception:  Reports none;  Denies hallucinations  Insight: good  Judgment: good  Cognition: (6) oriented: time, person, and place  attention span: intact  concentration: intact  recent memory: intact  remote memory: intact  fund of knowledge: appropriate  Gait and Station: N/A (telehealth)    LABS and DATA     PHQ9 TODAY = N/A  PHQ 11/27/2019 12/26/2019 11/4/2020   PHQ-9 Total Score 3 3 12   Q9: Thoughts of better off dead/self-harm past 2 weeks Not at all Not at all Not at all       Recent Labs   Lab Test 03/04/20  1001 11/08/19  1020 05/07/19  1101   CR 1.09 1.12 1.14   GFRESTIMATED >90 89 87     Recent Labs   Lab Test 06/01/20  1339 05/07/19  1101 12/13/18  1027   AST 26 26 51*   ALT 42 52 146*   ALKPHOS 73 69 55     ANTIPSYCHOTIC LABS  [glu, A1C, lipids (ivania LDL), liver enzymes, WBC, ANEU, Hgb, plts]  q12 mo  Recent Labs   Lab Test 06/01/20  1339 03/04/20  1001 11/08/19  1020 05/07/19  1101 12/06/18  1442 09/30/16  1348 09/30/16  1348   GLC  --  75 93 88 86   < > 83   A1C 4.7  --   --  4.9 5.0  --  4.6    < > = values in this interval not displayed.     Recent Labs   Lab Test 06/01/20  1339 05/07/19  1101 12/13/18  1027 12/06/18  1442   CHOL 136 141 173 184   TRIG 260* 195* 278* 535*   LDL 43 63 81 Cannot estimate LDL when triglyceride exceeds 400 mg/dL  95   HDL 41 39* 36* 34*     Recent Labs   Lab Test 06/01/20  1339 05/07/19  1101 12/13/18  1027 " 12/06/18  1442   AST 26 26 51* 70*   ALT 42 52 146* 178*   ALKPHOS 73 69 55 74     Recent Labs   Lab Test 06/01/20  1339 05/07/19  1101 12/06/18  1442 11/10/17  1534   WBC 7.5 7.3 8.9 8.6   ANEU 4.8 4.5 5.7 5.0   HGB 15.1 15.0 15.8 15.6    247 270 255     LITHIUM LABS     [level, renal, SG, TSH, WBC]    q6 mo  Recent Labs   Lab Test 03/04/20  1001 11/08/19  1020 05/07/19  1101 12/13/18  1027   LITHIUM 0.84 0.84 0.61 0.65     Recent Labs   Lab Test 03/04/20  1001 11/08/19  1020 05/07/19  1101 12/06/18  1442   CR 1.09 1.12 1.14 1.06   GFRESTIMATED >90 89 87 84    138 139 139   POTASSIUM 3.8 3.5 4.0 4.1   ADONAY 10.1 9.2 9.3 9.2     Recent Labs   Lab Test 11/08/19  1024 05/07/19  1101 12/06/18  1505 10/30/17  1429   SG 1.015 1.015 1.009 1.015     Recent Labs   Lab Test 06/01/20  1339 03/04/20  1001 11/08/19  1020 05/07/19  1101   TSH 2.51 5.82* 5.29* 3.09     Recent Labs   Lab Test 06/01/20  1339 05/07/19  1101 12/06/18  1442 11/10/17  1534   WBC 7.5 7.3 8.9 8.6   ANEU 4.8 4.5 5.7 5.0     PSYCHOTROPIC DRUG INTERACTIONS     None    MANAGEMENT:  Monitoring for adverse effects and routine labs    RISK STATEMENT for SAFETY     Jose Francisco Sommers did not appear to be an imminent safety risk to self or others.    DIAGNOSES                                                                                         [m2, h3]    Mood disorder, unspecified   ADHD, predominantly inattentive type     ASSESSMENT                                                                                      [m2, h3]        Today Manuel returned accompanied by his mom, Ember, for continued medication management.  He reports improved mood and less irritability since our last appointment.  However, he expresses concerns related to focus, concentration, and processing.  He may benefit from a change in medication to target these symptoms.  Given he has tried numerous medications in the past and is sensitive to side effects and is a poor 2D6  metabolizer, we will refer to MTM for PharmD assistance in selecting a medication to target processing.  We will wait until after MTM visit to make medication changes.     Future considerations:  Can consider repeat neuropsych testing for further clarity related to processing difficulties.     MNPMP was checked today:  Indicates no medication misuse .    PLAN                                                                                                                [m2, h3]      1) Meds  - Continue Lithium 1,500mg at bedtime   - Continue Modafinil 400mg daily     2) Therapy-  recommended    3) Next Due   Labs- Lithium labs due, pending for draw at any [facility], patient encouraged to have drawn at earliest convienence  EKG- PRN  Rating scales- N/A    4) Referrals  MTM- Patient referred to PharmD team to consult related to medications to help with focuss, processing, concentration.     5) RTC: 6 weeks    6) Crisis Numbers:   Provided routinely in AVS     After hours:  [phone]      TREATMENT RISK STATEMENT:  The risks, benefits, alternatives and potential adverse effects have been discussed and are understood by the pt. The pt understands the risks of using street drugs or alcohol. There are no medical contraindications, the pt agrees to treatment with the ability to do so. The pt knows to call the clinic for any problems or to access emergency care if needed.  Medical and substance use concerns are documented above.  Psychotropic drug interaction check was done, including changes made today.      PROVIDER:  Jayda Ball,     Patient staffed in clinic with Dr. Baer who will sign the note.  Supervisor is Dr. Tay.    TELEHEALTH ATTENDING ATTESTATION  Following the ACGME guidelines on telemedicine and direct supervision due to COVID-19, I was concurrently participating in and/or monitoring the patient care through appropriate telecommunication technology.  I discussed the key portions of the service  with the resident, including the mental status examination and developing the plan of care. I reviewed key portions of the history with the resident. I agree with the findings and plan as documented in this note.   Marta Baer MD

## 2020-11-12 ENCOUNTER — HOSPITAL ENCOUNTER (INPATIENT)
Facility: CLINIC | Age: 30
LOS: 14 days | Discharge: HOME | End: 2020-11-26
Attending: EMERGENCY MEDICINE | Admitting: PSYCHIATRY & NEUROLOGY
Payer: COMMERCIAL

## 2020-11-12 DIAGNOSIS — F30.10 MANIC BEHAVIOR (H): ICD-10-CM

## 2020-11-12 DIAGNOSIS — F25.0 SCHIZOAFFECTIVE DISORDER, BIPOLAR TYPE (H): ICD-10-CM

## 2020-11-12 DIAGNOSIS — I10 BENIGN ESSENTIAL HYPERTENSION: Primary | ICD-10-CM

## 2020-11-12 DIAGNOSIS — R63.5 WEIGHT GAIN: ICD-10-CM

## 2020-11-12 DIAGNOSIS — Z20.828 CONTACT WITH AND (SUSPECTED) EXPOSURE TO OTHER VIRAL COMMUNICABLE DISEASES: ICD-10-CM

## 2020-11-12 DIAGNOSIS — F29 PSYCHOSIS, UNSPECIFIED PSYCHOSIS TYPE (H): ICD-10-CM

## 2020-11-12 PROCEDURE — 250N000013 HC RX MED GY IP 250 OP 250 PS 637: Performed by: EMERGENCY MEDICINE

## 2020-11-12 PROCEDURE — 99284 EMERGENCY DEPT VISIT MOD MDM: CPT | Performed by: EMERGENCY MEDICINE

## 2020-11-12 PROCEDURE — 90791 PSYCH DIAGNOSTIC EVALUATION: CPT

## 2020-11-12 PROCEDURE — 124N000002 HC R&B MH UMMC

## 2020-11-12 PROCEDURE — 36415 COLL VENOUS BLD VENIPUNCTURE: CPT | Performed by: STUDENT IN AN ORGANIZED HEALTH CARE EDUCATION/TRAINING PROGRAM

## 2020-11-12 PROCEDURE — U0003 INFECTIOUS AGENT DETECTION BY NUCLEIC ACID (DNA OR RNA); SEVERE ACUTE RESPIRATORY SYNDROME CORONAVIRUS 2 (SARS-COV-2) (CORONAVIRUS DISEASE [COVID-19]), AMPLIFIED PROBE TECHNIQUE, MAKING USE OF HIGH THROUGHPUT TECHNOLOGIES AS DESCRIBED BY CMS-2020-01-R: HCPCS | Performed by: EMERGENCY MEDICINE

## 2020-11-12 PROCEDURE — 99285 EMERGENCY DEPT VISIT HI MDM: CPT | Mod: 25 | Performed by: EMERGENCY MEDICINE

## 2020-11-12 PROCEDURE — C9803 HOPD COVID-19 SPEC COLLECT: HCPCS | Performed by: EMERGENCY MEDICINE

## 2020-11-12 PROCEDURE — 80178 ASSAY OF LITHIUM: CPT | Performed by: STUDENT IN AN ORGANIZED HEALTH CARE EDUCATION/TRAINING PROGRAM

## 2020-11-12 RX ORDER — OLANZAPINE 10 MG/1
10 TABLET, ORALLY DISINTEGRATING ORAL ONCE
Status: COMPLETED | OUTPATIENT
Start: 2020-11-12 | End: 2020-11-12

## 2020-11-12 RX ORDER — OLANZAPINE 10 MG/2ML
10 INJECTION, POWDER, FOR SOLUTION INTRAMUSCULAR 3 TIMES DAILY PRN
Status: DISCONTINUED | OUTPATIENT
Start: 2020-11-12 | End: 2020-11-16

## 2020-11-12 RX ORDER — MAGNESIUM HYDROXIDE/ALUMINUM HYDROXICE/SIMETHICONE 120; 1200; 1200 MG/30ML; MG/30ML; MG/30ML
30 SUSPENSION ORAL EVERY 4 HOURS PRN
Status: DISCONTINUED | OUTPATIENT
Start: 2020-11-12 | End: 2020-11-26 | Stop reason: HOSPADM

## 2020-11-12 RX ORDER — ACETAMINOPHEN 325 MG/1
650 TABLET ORAL EVERY 4 HOURS PRN
Status: DISCONTINUED | OUTPATIENT
Start: 2020-11-12 | End: 2020-11-26 | Stop reason: HOSPADM

## 2020-11-12 RX ORDER — OLANZAPINE 10 MG/1
10 TABLET ORAL 3 TIMES DAILY PRN
Status: DISCONTINUED | OUTPATIENT
Start: 2020-11-12 | End: 2020-11-26 | Stop reason: HOSPADM

## 2020-11-12 RX ORDER — ALPRAZOLAM 0.5 MG
0.5 TABLET ORAL ONCE
Status: COMPLETED | OUTPATIENT
Start: 2020-11-12 | End: 2020-11-12

## 2020-11-12 RX ORDER — OLANZAPINE 10 MG/1
10 TABLET ORAL AT BEDTIME
Status: DISCONTINUED | OUTPATIENT
Start: 2020-11-13 | End: 2020-11-16

## 2020-11-12 RX ORDER — OLANZAPINE 10 MG/1
10 TABLET, ORALLY DISINTEGRATING ORAL ONCE
Status: DISCONTINUED | OUTPATIENT
Start: 2020-11-12 | End: 2020-11-12

## 2020-11-12 RX ORDER — LANOLIN ALCOHOL/MO/W.PET/CERES
3 CREAM (GRAM) TOPICAL
Status: DISCONTINUED | OUTPATIENT
Start: 2020-11-12 | End: 2020-11-13

## 2020-11-12 RX ORDER — HYDROXYZINE HYDROCHLORIDE 25 MG/1
25 TABLET, FILM COATED ORAL EVERY 4 HOURS PRN
Status: DISCONTINUED | OUTPATIENT
Start: 2020-11-12 | End: 2020-11-26 | Stop reason: HOSPADM

## 2020-11-12 RX ADMIN — OLANZAPINE 10 MG: 10 TABLET, ORALLY DISINTEGRATING ORAL at 16:48

## 2020-11-12 RX ADMIN — ALPRAZOLAM 0.5 MG: 0.5 TABLET ORAL at 18:23

## 2020-11-12 ASSESSMENT — ACTIVITIES OF DAILY LIVING (ADL)
ORAL_HYGIENE: INDEPENDENT
HYGIENE/GROOMING: INDEPENDENT
DRESS: INDEPENDENT
LAUNDRY: WITH SUPERVISION

## 2020-11-12 NOTE — ED TRIAGE NOTES
"Pt hasn't slept well in a while, including not at all the last 2 days. Feeling manic but reports he seems normal to himself but might be \"a bit much\" to others. Hoping for a medication intervention to calm him down.  "

## 2020-11-12 NOTE — ED NOTES
Patient reports he cannot talk to anyone right now because he needs a Xanex. MD notified, ordered Zyprexa which was given.

## 2020-11-13 LAB
ALBUMIN SERPL-MCNC: 3.6 G/DL (ref 3.4–5)
ALP SERPL-CCNC: 71 U/L (ref 40–150)
ALT SERPL W P-5'-P-CCNC: 39 U/L (ref 0–70)
AMPHETAMINES UR QL SCN: NEGATIVE
ANION GAP SERPL CALCULATED.3IONS-SCNC: 4 MMOL/L (ref 3–14)
AST SERPL W P-5'-P-CCNC: 26 U/L (ref 0–45)
BARBITURATES UR QL: NEGATIVE
BENZODIAZ UR QL: NEGATIVE
BILIRUB SERPL-MCNC: 0.7 MG/DL (ref 0.2–1.3)
BUN SERPL-MCNC: 17 MG/DL (ref 7–30)
CALCIUM SERPL-MCNC: 9.4 MG/DL (ref 8.5–10.1)
CANNABINOIDS UR QL SCN: NEGATIVE
CHLORIDE SERPL-SCNC: 108 MMOL/L (ref 94–109)
CHOLEST SERPL-MCNC: 111 MG/DL
CO2 SERPL-SCNC: 30 MMOL/L (ref 20–32)
COCAINE UR QL: NEGATIVE
CREAT SERPL-MCNC: 1.1 MG/DL (ref 0.66–1.25)
ERYTHROCYTE [DISTWIDTH] IN BLOOD BY AUTOMATED COUNT: 12.8 % (ref 10–15)
ETHANOL UR QL SCN: NEGATIVE
GFR SERPL CREATININE-BSD FRML MDRD: >90 ML/MIN/{1.73_M2}
GLUCOSE SERPL-MCNC: 82 MG/DL (ref 70–99)
HCT VFR BLD AUTO: 46.3 % (ref 40–53)
HDLC SERPL-MCNC: 49 MG/DL
HGB BLD-MCNC: 15.1 G/DL (ref 13.3–17.7)
LABORATORY COMMENT REPORT: NORMAL
LDLC SERPL CALC-MCNC: 20 MG/DL
LITHIUM SERPL-SCNC: 0.44 MMOL/L (ref 0.6–1.2)
MCH RBC QN AUTO: 30.1 PG (ref 26.5–33)
MCHC RBC AUTO-ENTMCNC: 32.6 G/DL (ref 31.5–36.5)
MCV RBC AUTO: 92 FL (ref 78–100)
NONHDLC SERPL-MCNC: 62 MG/DL
OPIATES UR QL SCN: NEGATIVE
PLATELET # BLD AUTO: 254 10E9/L (ref 150–450)
POTASSIUM SERPL-SCNC: 4.4 MMOL/L (ref 3.4–5.3)
PROT SERPL-MCNC: 6.8 G/DL (ref 6.8–8.8)
RBC # BLD AUTO: 5.01 10E12/L (ref 4.4–5.9)
SARS-COV-2 RNA SPEC QL NAA+PROBE: NEGATIVE
SARS-COV-2 RNA SPEC QL NAA+PROBE: NORMAL
SODIUM SERPL-SCNC: 142 MMOL/L (ref 133–144)
SPECIMEN SOURCE: NORMAL
SPECIMEN SOURCE: NORMAL
TRIGL SERPL-MCNC: 211 MG/DL
TSH SERPL DL<=0.005 MIU/L-ACNC: 1.21 MU/L (ref 0.4–4)
WBC # BLD AUTO: 12.9 10E9/L (ref 4–11)

## 2020-11-13 PROCEDURE — 250N000013 HC RX MED GY IP 250 OP 250 PS 637: Performed by: STUDENT IN AN ORGANIZED HEALTH CARE EDUCATION/TRAINING PROGRAM

## 2020-11-13 PROCEDURE — 99223 1ST HOSP IP/OBS HIGH 75: CPT | Mod: AI | Performed by: PSYCHIATRY & NEUROLOGY

## 2020-11-13 PROCEDURE — 85027 COMPLETE CBC AUTOMATED: CPT | Performed by: STUDENT IN AN ORGANIZED HEALTH CARE EDUCATION/TRAINING PROGRAM

## 2020-11-13 PROCEDURE — 84443 ASSAY THYROID STIM HORMONE: CPT | Performed by: STUDENT IN AN ORGANIZED HEALTH CARE EDUCATION/TRAINING PROGRAM

## 2020-11-13 PROCEDURE — 80053 COMPREHEN METABOLIC PANEL: CPT | Performed by: STUDENT IN AN ORGANIZED HEALTH CARE EDUCATION/TRAINING PROGRAM

## 2020-11-13 PROCEDURE — 80320 DRUG SCREEN QUANTALCOHOLS: CPT | Performed by: STUDENT IN AN ORGANIZED HEALTH CARE EDUCATION/TRAINING PROGRAM

## 2020-11-13 PROCEDURE — 124N000002 HC R&B MH UMMC

## 2020-11-13 PROCEDURE — 80307 DRUG TEST PRSMV CHEM ANLYZR: CPT | Performed by: STUDENT IN AN ORGANIZED HEALTH CARE EDUCATION/TRAINING PROGRAM

## 2020-11-13 PROCEDURE — 36415 COLL VENOUS BLD VENIPUNCTURE: CPT | Performed by: STUDENT IN AN ORGANIZED HEALTH CARE EDUCATION/TRAINING PROGRAM

## 2020-11-13 PROCEDURE — 250N000011 HC RX IP 250 OP 636: Performed by: STUDENT IN AN ORGANIZED HEALTH CARE EDUCATION/TRAINING PROGRAM

## 2020-11-13 PROCEDURE — 80061 LIPID PANEL: CPT | Performed by: STUDENT IN AN ORGANIZED HEALTH CARE EDUCATION/TRAINING PROGRAM

## 2020-11-13 RX ORDER — CLONIDINE HYDROCHLORIDE 0.1 MG/1
0.1 TABLET ORAL 2 TIMES DAILY PRN
Status: DISCONTINUED | OUTPATIENT
Start: 2020-11-13 | End: 2020-11-26 | Stop reason: HOSPADM

## 2020-11-13 RX ORDER — OLANZAPINE 10 MG/2ML
2.5 INJECTION, POWDER, FOR SOLUTION INTRAMUSCULAR DAILY PRN
Status: DISCONTINUED | OUTPATIENT
Start: 2020-11-13 | End: 2020-11-15

## 2020-11-13 RX ORDER — LITHIUM CARBONATE 600 MG/1
1200 CAPSULE ORAL AT BEDTIME
Status: DISCONTINUED | OUTPATIENT
Start: 2020-11-13 | End: 2020-11-19

## 2020-11-13 RX ORDER — LANOLIN ALCOHOL/MO/W.PET/CERES
3 CREAM (GRAM) TOPICAL
Status: DISCONTINUED | OUTPATIENT
Start: 2020-11-13 | End: 2020-11-26 | Stop reason: HOSPADM

## 2020-11-13 RX ADMIN — HYDROXYZINE HYDROCHLORIDE 25 MG: 25 TABLET, FILM COATED ORAL at 14:04

## 2020-11-13 RX ADMIN — LITHIUM CARBONATE 1200 MG: 600 CAPSULE ORAL at 19:12

## 2020-11-13 RX ADMIN — CLONIDINE HYDROCHLORIDE 0.1 MG: 0.1 TABLET ORAL at 19:12

## 2020-11-13 RX ADMIN — MELATONIN TAB 3 MG 3 MG: 3 TAB at 19:13

## 2020-11-13 RX ADMIN — OLANZAPINE 2.5 MG: 10 INJECTION, POWDER, FOR SOLUTION INTRAMUSCULAR at 19:12

## 2020-11-13 RX ADMIN — HYDROXYZINE HYDROCHLORIDE 25 MG: 25 TABLET, FILM COATED ORAL at 16:55

## 2020-11-13 RX ADMIN — LITHIUM CARBONATE 1500 MG: 600 CAPSULE ORAL at 00:09

## 2020-11-13 RX ADMIN — HYDROXYZINE HYDROCHLORIDE 25 MG: 25 TABLET, FILM COATED ORAL at 10:07

## 2020-11-13 ASSESSMENT — ACTIVITIES OF DAILY LIVING (ADL)
ORAL_HYGIENE: INDEPENDENT
DRESS: SCRUBS (BEHAVIORAL HEALTH)
LAUNDRY: WITH SUPERVISION
HYGIENE/GROOMING: INDEPENDENT

## 2020-11-13 NOTE — PROGRESS NOTES
"Pt's B/P 182/110. He has complaints of anxiety 10/10. He was given Atarax 25 mg  For anxiety and Clonidine for increase B/P. He told writer the only thing that ever helps him is a shot of Zyprexa. He was verbally made non- direct threats regarding the Dr's at the hospital and said \"I also need my own room or the roommate is going to be sorry.\" \"I mean the roommate just wouldn't do well with me.\" Pt became verbally irritable regarding his medications, he did talk to the Dr.and did calm afterwards.   "

## 2020-11-13 NOTE — PLAN OF CARE
Problem: General Plan of Care (Inpatient Behavioral)  Goal: Individualization/Patient Specific Goal (IP Behavioral)  Description: The patient and/or their representative will achieve their patient-specific goals related to the plan of care.    The patient-specific goals include:  Flowsheets (Taken 11/13/2020 1130)  Patient Strengths:   Involved in community   Stable and supportive family   Stable housing   Financial stability   Utilized support systems  Note: Patient's goals:  Unable to participate due to ongoing symptoms    Plan for admission:  1. Stabilization of mood disorder symptoms  2. Safe with self  3. Medication management per MD's  4. Coordination of care with outpatient providers, family  5. Psychiatric follow up care in place

## 2020-11-13 NOTE — PROGRESS NOTES
Pt appeared slept for 5.5 hrs this shift. During safety rounding body movement and respiration was noted. Remained in bed through the shift.

## 2020-11-13 NOTE — PROGRESS NOTES
INITIAL PSYCHOSOCIAL ASSESSMENT   I have reviewed the chart met with the patient, and developed Care Plan.  Information for assessment was obtained from: Patient, patient chart, family    Presenting Problem:   Patient is a 30 year old male previously diagnosed with schizoaffective disorder, bipolar subtype who presented to the ED on 11/13/2020 with lauren- irritability and agitation, no sleep for several days, tangential speech, disorganized thought, speech, and behavior, and inappropriate comments towards family member in the context of recent med changes with his primary psychiatrist secondary to weight gain.  Patient believes that he needs a full body MRI for unknown reasons.  He says he considered suicide but is a constant because of his Orthodoxy Restorationist beliefs.  He has frequent obsessions and focus thoughts on the movie 7 with Benjamin Mckeon especially regarding ending when the main character finds his significant others head in a box.   The following areas have been assessed:  History of Mental Health and Chemical Dependency:  This is patient's 3rd psychiatric admission with 2 prior in 2011, 2015 at [facility]. Depression symptoms began at age 7 per Mom.  Patient was civilly committed in 2015.  He has no history of suicide attempts/SIB    Patient has a h/o alcohol use disorder and polysubstance use.  He has been sober for several years.  Underwent CD treatment in 2015.    Family Description (Constellation, Family Psychiatric History):   Patient was born/raised in [place] in an intact family.      Patient is single- never , no children    Family history is significant for alcohol use disorder in PGF, mental illness in MGM.    Significant Life Events (Illness, Abuse, Trauma, Death):  Unknown at this time    Living Situation:     Patient has been living with his parents in [place]    Educational Background:   Patient had been attending [employer] studying Math and Computer Science until  recent leave d/t mental health issues    Occupational History:   Patient is not currently employed    Financial Status:    No immediate concerns    Legal Issues:    Patient has significant legal issues including DWI x4, Disorderly conduct, possession.    Ethnic/Cultural Issues:  No concerns identified    Spiritual Orientation:    Confucianism Claribel                       Service History:   N/A    Social Functioning (organization, interests):  Patient enjoys reading                                                        Current Treatment Providers are:  [facility]    Social Service Assessment/Plan:  Patient will have ongoing psychiatric assessment.  Medications will be reviewed and adjusted per MD as indicated.  Outpatient providers will be contacted for care coordination.  Hospital staff will provide a safe environment and a therapeutic milieu. Patient will be encouraged to participate in unit groups and activities.   CTC will continue to assess needs and  ensure appropriate follow up care is in place.

## 2020-11-13 NOTE — PROGRESS NOTES
11/12/20 2237   Valuables   Patient Belongings locker;sent to security per site process   Patient Belongings Put in Hospital Secure Location (Security or Locker, etc.) other (see comments)   Did you bring any home meds/supplements to the hospital?  No     Pt belongings in locker:  Box of 300 matches  Face mask with blood on it  Winter coat  2x tshirts  Jeans with string belt  Beats by gulshan headphones  Iphone in case  Ipad in case  Earphones  Phone   Assorted headphone cord/  $1.07 change    A               Admission:  I am responsible for any personal items that are not sent to the safe or pharmacy.  [facility] is not responsible for loss, theft or damage of any property in my possession.    Signature:  _________________________________ Date: _______  Time: _____                                              Staff Signature:  ____________________________ Date: ________  Time: _____      2nd Staff person, if patient is unable/unwilling to sign:    Signature: ________________________________ Date: ________  Time: _____     Discharge:  [facility] has returned all of my personal belongings:    Signature: _________________________________ Date: ________  Time: _____                                          Staff Signature:  ____________________________ Date: ________  Time: _____

## 2020-11-13 NOTE — PLAN OF CARE
"Problem: Adult Inpatient Plan of Care  Goal: Plan of Care Review  Outcome: No Change     Problem: Manic Symptoms  Goal: Manic Symptoms  Description: Pt will sleep, will be less pressured and tangential, more coherent and calmer. Pt will restart effective Rx and participate in milieu.  Outcome: No Change  Flowsheets (Taken 11/13/2020 1126)  Manic Symptoms Present:   anxiety   thought process   sleep   affect   mood   insight     Pt spent most of shift pacing the hallways and interacts appropriately with peers. He said his goal for today is to \"go to groups\" and \"see how it goes.\" Pt is a poor historian and has disorganized thinking. Reported that depression is a 2/10 and anxiety 10/10. Pt took PRN hydroxyzine at 1007 for anxiety; writer checked back in with pt. 30 min later and pt said that it helped. Denies SI, SIB, AH/VH. Has poor insight to situation, stated that he's here because his \"parents need to back off.\" Pt is only interested in taking IM zyprexa as he thinks that's the only thing that will be effective for him. He said that he needs a high dosage because he has \"the problems of a whale and the doctors think they are the problems of a mouse.\" When meeting with doctors, he said that \"it's frustrating to talk to that many people about what's happening and I would prefer to talk to just one person.\" Pt has poor concentration and tangential speech patterns. He hadn't been sleeping much PTA but said that he got better sleep last night in the hospital. Appetite is good. BP was elevated upon admission and continues to be slightly elevated (147/103 sitting and 141/79 standing) but is trending downwards.     "

## 2020-11-13 NOTE — SAFE
".DECSAFENOTE.  Recommendation for inpatient admission.  Voluntary or on a hold.  Psychosis and decompensation in the context of medication reduction.  Pt states that he is \"bad\" and that who he comes into contact with is contaminated.  No current history of violence or aggression yet pt is highly irritable and easily agitated.  No known self harm. No inappropriate sexual behaviors.  No other directed violence, yet appears volatile.              "

## 2020-11-13 NOTE — PROGRESS NOTES
BEHAVIORAL TEAM DISCUSSION    Participants:  Dr. Finn, Dr. Pretty, Adriana Terrell RN, Tamanna Hardy MA.LP, Med students    Anticipated length of stay:  7-10 days    Continued Stay Criteria/Rationale:   Nanette, inability to care for self    Medical/Physical:   None    Precautions:   Behavioral Orders   Procedures     Code 1 - Restrict to Unit     Routine Programming     As clinically indicated     Status 15     Every 15 minutes.     Plan:  Patient will have ongoing psychiatric assessment.  Medications will be reviewed and adjusted per MD as indicated.  Outpatient providers/family will be contacted for care coordination.  Hospital staff will provide a safe environment and a therapeutic milieu. Patient will be encouraged to participate in unit groups and activities.   CTC will continue to assess needs and  ensure appropriate follow up care is in place.       Rationale for change in precautions or plan:   No change in plan/precautions at this time

## 2020-11-13 NOTE — PHARMACY-ADMISSION MEDICATION HISTORY
Admission medication history interview status for the 11/12/2020 admission is complete. See Epic admission navigator for allergy information, pharmacy, prior to admission medications and immunization status.     Medication history interview sources: [facility] Epic Note (Virtual Visit 11/4/2020)    Changes made to PTA medication list (reason)  Added: N/A  Deleted: N/A  Changed: N/A    Additional medication history information (including reliability of information, actions taken by pharmacist):  1.) Patient was unable to participate in medication reconciliation conversation (unavailable).   2.) [facility] has a consistent fill history for the above medications and they match the medications outlined in the 11/4 virtual visit Epic note.         Prior to Admission medications    Medication Sig Last Dose Taking? Auth Provider   lithium 300 MG capsule Take 5 capsules (1,500 mg) by mouth At Bedtime Unknown  Jayda Ball MD   modafinil (PROVIGIL) 200 MG tablet Take 2 tablets (400 mg) by mouth daily Unknown  Marta Baer MD   OLANZapine (ZYPREXA) 5 MG tablet Take 0.5 tablets (2.5 mg) by mouth At Bedtime For two weeks, then discontinue Unknown  Jayda Ball MD         Medication history completed by: DARREN Malone3 Pharmacy Intern

## 2020-11-13 NOTE — ED NOTES
ED to Behavioral Floor Handoff    SITUATION  Jose Francisco Sommers is a 30 year old male who speaks English and lives in a home unknown The patient arrived in the ED by private car from home with a complaint of Manic Behavior (pt says he has not slept in 2 days, taking zyprexa and lithium but it is not helping; took modafinil 2 days ago)  .The patient's current symptoms started/worsened 2 week(s) ago and during this time the symptoms have increased.   In the ED, pt was diagnosed with   Final diagnoses:   Manic behavior (H)   Schizoaffective disorder, bipolar type (H)        Initial vitals were: BP: (!) 205/145  Pulse: 87  Temp: 98  F (36.7  C)  Resp: 14  SpO2: 98 %   --------  Is the patient diabetic? No   If yes, last blood glucose? --     If yes, was this treated in the ED? --  --------  Is the patient inebriated (ETOH) No or Impaired on other substances? No  MSSA done? No  Last MSSA score: --    Were withdrawal symptoms treated? N/A  Does the patient have a seizure history? No. If yes, date of most recent seizure--  --------  Is the patient patient experiencing suicidal ideation? denies current or recent suicidal ideation     Homicidal ideation? denies current or recent homicidal ideation or behaviors.    Self-injurious behavior/urges? denies current or recent self injurious behavior or ideation.  ------  Was pt aggressive in the ED No  Was a code called No  Is the pt now cooperative? Yes  -------  Meds given in ED:   Medications   OLANZapine zydis (zyPREXA) ODT tab 10 mg (has no administration in time range)   OLANZapine zydis (zyPREXA) ODT tab 10 mg (10 mg Oral Given 11/12/20 1648)   ALPRAZolam (XANAX) tablet 0.5 mg (0.5 mg Oral Given 11/12/20 1823)      Family present during ED course? No  Family currently present? No    BACKGROUND  Does the patient have a cognitive impairment or developmental disability? No  Allergies:   Allergies   Allergen Reactions     Sulfa Drugs    .   Social demographics are   Social History      Socioeconomic History     Marital status: Single     Spouse name: None     Number of children: None     Years of education: None     Highest education level: None   Occupational History     None   Social Needs     Financial resource strain: None     Food insecurity     Worry: None     Inability: None     Transportation needs     Medical: None     Non-medical: None   Tobacco Use     Smoking status: Former Smoker     Packs/day: 2.00     Types: Cigarettes     Start date: 11/22/2017     Smokeless tobacco: Never Used   Substance and Sexual Activity     Alcohol use: No     Alcohol/week: 0.0 standard drinks     Drug use: No     Sexual activity: Not Currently   Lifestyle     Physical activity     Days per week: None     Minutes per session: None     Stress: None   Relationships     Social connections     Talks on phone: None     Gets together: None     Attends Congregation service: None     Active member of club or organization: None     Attends meetings of clubs or organizations: None     Relationship status: None     Intimate partner violence     Fear of current or ex partner: None     Emotionally abused: None     Physically abused: None     Forced sexual activity: None   Other Topics Concern     Parent/sibling w/ CABG, MI or angioplasty before 65F 55M? Not Asked   Social History Narrative     None        ASSESSMENT  Labs results Labs Ordered and Resulted from Time of ED Arrival Up to the Time of Departure from the ED - No data to display   Imaging Studies: No results found for this or any previous visit (from the past 24 hour(s)).   Most recent vital signs BP (!) 159/91   Pulse 82   Temp 97.6  F (36.4  C) (Oral)   Resp 14   SpO2 100%    Abnormal labs/tests/findings requiring intervention:---   Pain control: pt had none  Nausea control: pt had none    RECOMMENDATION  Are any infection precautions needed (MRSA, VRE, etc.)? No If yes, what infection? --  ---  Does the patient have mobility issues? independently. If  yes, what device does the pt use? ---  ---  Is patient on 72 hour hold or commitment? No If on 72 hour hold, have hold and rights been given to patient? No  Are admitting orders written if after 10 p.m. ?N/A  Tasks needing to be completed: UTox - patient currently very sleepy from medications and not sleeping for 2 days.    Missy Ribeiro, RN    [phone] [place] [facility]   [phone] [facility] ED

## 2020-11-13 NOTE — PLAN OF CARE
"Spoke with pt's mother, Ember, 11/13/20 at 1300.    She says he functioned the best when on 20 mg olanzapine (went back to school on this dose). He gradually reduced the dose over months due to weight gain mitigation, but every time this happened, he would be a little less able to focus/concentrate/reason/process.     She says he stopped Provigil, which seems to have helped with his depressive symptoms.     PTA she says he was hyper-Taoism as well as talking about an imaginary friend; making inappropriate sexual and offensive comments to his parents. Prior to his initial diagnosis, he had been arrested multiple times for possession.     Her goals: stabilize on Zyprexa and transition to Latuda or Vraylar.   She says do not use Risperdal as it made him \"unable to process things.\" He was on latuda for a year, but she says he got worsening nightmares and was more anxious.      -  Mk Pretty, DO on 11/13/2020 at 1:23 PM    "

## 2020-11-13 NOTE — ED PROVIDER NOTES
ED Provider Note  [facility]      History     Chief Complaint   Patient presents with     Manic Behavior     pt says he has not slept in 2 days, taking zyprexa and lithium but it is not helping; took modafinil 2 days ago     HPI  Jose Francisco Sommers has a history os schizoaffective disorder.  His Zyprexa dose has been decreased from 12.5 to 5mg over the past few months.  Manuel wanted to to decrease Zyprexa due to his 35 lb weight gain.  Over the past several weeks, he has been irritable, agitated and inapropriate.  He dropped out of school recently.  He has also been making nonsensical and inappropriate statements, including sexual statements to his mother.  He has not slept at all over the last 2 days.  He does not use any drugs or alcohol.      Past Medical History  History reviewed. No pertinent past medical history.  History reviewed. No pertinent surgical history.       lithium 300 MG capsule       modafinil (PROVIGIL) 200 MG tablet       OLANZapine (ZYPREXA) 5 MG tablet      Allergies   Allergen Reactions     Sulfa Drugs      Family History  Family History   Problem Relation Age of Onset     Diabetes Maternal Grandfather      Social History   Social History     Tobacco Use     Smoking status: Former Smoker     Packs/day: 2.00     Types: Cigarettes     Start date: 11/22/2017     Smokeless tobacco: Never Used   Substance Use Topics     Alcohol use: No     Alcohol/week: 0.0 standard drinks     Drug use: No      Past medical history, past surgical history, medications, allergies, family history, and social history were reviewed with the patient. No additional pertinent items.       Review of Systems  A complete review of systems was performed with pertinent positives and negatives noted in the HPI, and all other systems negative.    Physical Exam   BP: (!) 205/145  Pulse: 87  Temp: 98  F (36.7  C)  Resp: 14  SpO2: 98 %  Physical Exam  Vitals signs and nursing note reviewed.   Constitutional:        General: He is not in acute distress.     Appearance: He is not ill-appearing, toxic-appearing or diaphoretic.   HENT:      Head: Normocephalic and atraumatic.   Neck:      Musculoskeletal: Normal range of motion and neck supple.   Pulmonary:      Effort: Pulmonary effort is normal. No respiratory distress.   Musculoskeletal: Normal range of motion.         General: No signs of injury.   Skin:     General: Skin is warm and dry.   Neurological:      General: No focal deficit present.      Mental Status: He is alert and oriented to person, place, and time.      Gait: Gait normal.   Psychiatric:      Comments: Anxiously pacing in the room,  He appears somewhat agitated. He is directable.        ED Course      Procedures                         No results found for any visits on 11/12/20.  Medications   OLANZapine zydis (zyPREXA) ODT tab 10 mg (has no administration in time range)   ALPRAZolam (XANAX) tablet 0.5 mg (has no administration in time range)   OLANZapine zydis (zyPREXA) ODT tab 10 mg (10 mg Oral Given 11/12/20 1648)        Assessments & Plan (with Medical Decision Making)   Manuel has a history of schizoaffective disorder and has been struggling since his Zyprexa was decreased from 12.5mg to 5mg, which was decreased due to weight gain.  He is anxious and somewhat agitated but directable.  He was given Zyprexa 20mg p.o. with Xanax 0.5mg p.o.  Manuel will be admitted to psychiatry for further evaluation.  He is cooperative at this point, but may need a 72 hour hold should he escalate or try to leave.     I have reviewed the nursing notes. I have reviewed the findings, diagnosis, plan and need for follow up with the patient.    New Prescriptions    No medications on file       Final diagnoses:   Manic behavior (H)   Schizoaffective disorder, bipolar type (H)       --  Chris Mckeon MD  [facility] EMERGENCY DEPARTMENT  11/12/2020     Chris Mckeon MD  11/12/20 [id number]

## 2020-11-13 NOTE — PROGRESS NOTES
Pt presents at 2215 with decompensated schizoaffective disorder and lauren after having titrated down on his neuroleptic Rx due to weight gain. Pt has not been sleeping for two to three days, and presents with some pressured, rambling and tangential speech. His thought process is disorganized and he apparently has been acting inappropriately with his family, with whom he lives. Upon interview pt denies SI and hallucinations, but is clearly overwhelmed, disorganized and anxious. He is a poor historian. He cried spontaneously during interview, but was able to articulate that he wants help, and signed in voluntarily. He requested an IM of 2.5 mg Zyprexa. Pt did not present with the same ruminations or fixations that he apparently had earlier in [facility]- the movie [employer], medical or Catholic preoccupations. Pt was simply too disorganized to carry these ruminations forward during interview. Pt was ultimately grateful to be here and for the help, and he was otherwise cooperative.     Pt Covid swabbed, test pending, is asymptomatic. Pt was apparently unable to process enough in [facility] to agree to blood labs and Utox. Those are reordered. Pt presents with notable HTN of 168/125. Psychiatry team aware and thought to be related to increased anxiety. Other VSS. Pt denies other medical issues. 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 Pt is apparently well known to  of  Psychiatry clinic.

## 2020-11-14 PROCEDURE — 250N000011 HC RX IP 250 OP 636: Performed by: STUDENT IN AN ORGANIZED HEALTH CARE EDUCATION/TRAINING PROGRAM

## 2020-11-14 PROCEDURE — 124N000002 HC R&B MH UMMC

## 2020-11-14 PROCEDURE — 250N000013 HC RX MED GY IP 250 OP 250 PS 637: Performed by: STUDENT IN AN ORGANIZED HEALTH CARE EDUCATION/TRAINING PROGRAM

## 2020-11-14 RX ADMIN — MELATONIN TAB 3 MG 3 MG: 3 TAB at 21:31

## 2020-11-14 RX ADMIN — HYDROXYZINE HYDROCHLORIDE 25 MG: 25 TABLET, FILM COATED ORAL at 09:50

## 2020-11-14 RX ADMIN — LITHIUM CARBONATE 1200 MG: 600 CAPSULE ORAL at 21:31

## 2020-11-14 RX ADMIN — HYDROXYZINE HYDROCHLORIDE 25 MG: 25 TABLET, FILM COATED ORAL at 13:41

## 2020-11-14 RX ADMIN — HYDROXYZINE HYDROCHLORIDE 25 MG: 25 TABLET, FILM COATED ORAL at 17:05

## 2020-11-14 RX ADMIN — OLANZAPINE 10 MG: 10 INJECTION, POWDER, FOR SOLUTION INTRAMUSCULAR at 21:31

## 2020-11-14 RX ADMIN — OLANZAPINE 2.5 MG: 10 INJECTION, POWDER, FOR SOLUTION INTRAMUSCULAR at 18:25

## 2020-11-14 RX ADMIN — CLONIDINE HYDROCHLORIDE 0.1 MG: 0.1 TABLET ORAL at 02:33

## 2020-11-14 RX ADMIN — HYDROXYZINE HYDROCHLORIDE 25 MG: 25 TABLET, FILM COATED ORAL at 21:31

## 2020-11-14 ASSESSMENT — ACTIVITIES OF DAILY LIVING (ADL)
HYGIENE/GROOMING: INDEPENDENT
DRESS: INDEPENDENT
LAUNDRY: WITH SUPERVISION
ORAL_HYGIENE: INDEPENDENT

## 2020-11-14 NOTE — PROGRESS NOTES
"Video- Visit Details  Type of service:  video visit for medication management  Time of service:    Date: 10/08/2020    Video Start Time:  8:30am       Video End Time:  9:15am    Reason for video visit:  Services only offered telehealth  Originating Site (patient location):  [facility]   Location- Patient's home  Distant Site (provider location):  [facility]  Mode of Communication:  Video Conference via [email or web address]  Consent:  Patient has given verbal consent for video visit?: Yes         [facility]  Psychiatry Clinic  PSYCHIATRY PROGRESS NOTE     CARE TEAM: PCP- Provider, Clinic   Therapist- None   Community  Team- none.         Jose Francisco Sommers is a 29 year old patient who prefers the name Manuel and uses pronouns he, him, his, himself.     PERTINENT BACKGROUND                              [most recent eval 08/24/2020]   Details in most recent eval.  Psych critical item history includes psychosis [sxs include disorganization, possible catatonia], mutiple psychotropic trials, psych hosp (<3) and SUBSTANCE USE: alcohol and cannabis.    INTERIM HISTORY                                                                   [4, 4]   History was provided by the patient and his mother, Ember, who was a good historian. Treatment adherence is good.  Since the last visit:   - Today Manuel presents for first half of visit alone.  We discuss concerns brought up by his mom in the interim between our last visit on 09/30 and this visit.  Manuel reports his mom did express concerns to him regarding Manuel discontinuing his Olanzapine.  Manuel maintains he does not want to take Olanzapine.  He states he \"does not understand\" his mom's concerns.    - We express to Manuel that it put us as his provider in a difficult position if we are unable to communicate with his mom and it is often helpful to get family member's perspectives.  Manuel ultimately agrees for his mom to join.    - Ember explains her " "concerns related to Manuel's irritability and Manuel dropping one of school courses.  She is understandable concerned that he has stopped one of his medications because he was doing well on the Olanzapine.   - We discussed other medication options with Manuel that could be more weight neutral.  Manuel stated he does not want to start another medication and does not want to take Olanzapine.   - He denied acute safety concerns including SI, SIB, HI.   - Manuel did agree to allow his mom to participate in appointments going forward, however he stated he did not want providers to communicate with his mom outside of appointments.    RECENT PSYCH ROS:   Depression:  none  Elevated:  none  Psychosis:  none  Anxiety:  none  Trauma Related:  none  Dysregulation:  none  Eating Disorder: no     Adverse Effects:  Denies side effects of medications currently.  He reports weight gain related to Olanzapine.  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  Graduated HS and attempted some college classes without success, however states he feels he was not ready and that things would be different if he attempted again.     Other-  DUIs. Also In February 2014 the patient left for [place] to attend a [employer] tournament. He ended up in [place] and was arrested for shoplifting from [employer] (had \"intended to purchase\" a pair of jeans but became panicked when security approached him). Had a brief stay in senior care, and afterwards, took him 5 days to return to [place] with parents frequently wiring him money. Had difficulty adhering to travel plans, seemed confused, and was communicating with parents via \"bizarre\" text messages (parents even filed a missing persons report at one point). He finally arrived home exhausted, not sharing much of events in [place], but slightly more consistent with baseline.       PSYCH and SUBSTANCE USE Critical Summary Points since July 2020 08/24/2020: Resident transfer visit, Olanzapine decreased from 7.5mg to 5mg  09/30/2020: Decrease Olanzapine from 5mg to 2.5mg for 2 weeks then discontinue  10/08/2020: No med changes    PAST MED TRIALS                                                          Adderall 10-20 mg - improved concentration, but \"cold personality\" and perseveration  Prozac 20 mg - limited response, less irritable, first episode of \"rage\"  Seroquel 25-75 mg - helped with sleep and irritability  Concerta 18 mg - improved focus, no improvement in motivation  Provigil 150 mg - Stimulants \"almost killed him\" triggered alcohol binges  Abilify ?25 mg - OK when QOD, but irritable and agitated on QD  Clonazepam 0.5-1.5 mg - calming, helped with sleep and \"catatonia\"  Risperdal up to 5 mg - some confusion, slow processing, withdrawn, ?\"catatonia\", panic attacks  Zyprexa 5 mg less anxious overall improvement  Lithium - calmer, overall better  Latuda 20 mg - severe fatigue and depression  Synthroid 75 " "mcg - added to help with mood.  Brief trials with Strattera, Intuniv, Lamictal, Xanax, Zoloft    MEDICAL HISTORY and ALLERGY     ALLERGIES: Sulfa drugs     Patient Active Problem List   Diagnosis     Schizoaffective disorder, bipolar type (H)     Hx of psychiatric care     Elevated liver enzymes     Fatty liver     High blood triglycerides     History of cannabis abuse     History of cocaine abuse (H)     Psychosis (H)         MEDICAL REVIEW OF SYSTEMS                                            [2, 10]   Pregnant or breastfeeding- no      Contraception- unkown    A comprehensive review of systems was performed and is negative other than noted in the HPI.    MEDICATIONS        No current outpatient medications on file.     VITALS                                                                                                            [3, 3]   There were no vitals taken for this visit.   MENTAL STATUS EXAM                                              [9, 14 cog gs]     Alertness: alert  and oriented  Appearance: well groomed  Behavior/Demeanor: cooperative, pleasant and calm, with good  eye contact   Speech: normal and regular rate and rhythm  Language: intact and no problems  Psychomotor: normal or unremarkable  Mood: \"pretty good\"  Affect: full range; congruent to: mood- yes, content- yes  Thought Process/Associations: unremarkable  Thought Content:  Reports none;  Denies suicidal & violent ideation and delusions  Perception:  Reports none;  Denies hallucinations  Insight: good  Judgment: good  Cognition: (6) oriented: time, person, and place  attention span: intact  concentration: intact  recent memory: intact  remote memory: intact  fund of knowledge: appropriate  Gait and Station: N/A (telehealth)    LABS and DATA     PHQ9 TODAY = N/A  PHQ 11/27/2019 12/26/2019 11/4/2020   PHQ-9 Total Score 3 3 12   Q9: Thoughts of better off dead/self-harm past 2 weeks Not at all Not at all Not at all       Recent Labs   Lab " Test 11/13/20  0758 03/04/20  1001 11/08/19  1020   CR 1.10 1.09 1.12   GFRESTIMATED >90 >90 89     Recent Labs   Lab Test 11/13/20 0758 06/01/20  1339 05/07/19  1101   AST 26 26 26   ALT 39 42 52   ALKPHOS 71 73 69     ANTIPSYCHOTIC LABS  [glu, A1C, lipids (ivania LDL), liver enzymes, WBC, ANEU, Hgb, plts]  q12 mo  Recent Labs   Lab Test 11/13/20  0758 06/01/20  1339 03/04/20  1001 11/08/19  1020 05/07/19  1101 12/06/18  1442 09/30/16  1348 09/30/16  1348   GLC 82  --  75 93 88 86   < > 83   A1C  --  4.7  --   --  4.9 5.0  --  4.6    < > = values in this interval not displayed.     Recent Labs   Lab Test 11/13/20 0758 06/01/20  1339 05/07/19  1101 12/13/18  1027   CHOL 111 136 141 173   TRIG 211* 260* 195* 278*   LDL 20 43 63 81   HDL 49 41 39* 36*     Recent Labs   Lab Test 11/13/20 0758 06/01/20  1339 05/07/19  1101 12/13/18  1027   AST 26 26 26 51*   ALT 39 42 52 146*   ALKPHOS 71 73 69 55     Recent Labs   Lab Test 11/13/20 0758 06/01/20  1339 05/07/19  1101 12/06/18  1442 11/10/17  1534   WBC 12.9* 7.5 7.3 8.9 8.6   ANEU  --  4.8 4.5 5.7 5.0   HGB 15.1 15.1 15.0 15.8 15.6    265 247 270 255       LITHIUM LABS     [level, renal, SG, TSH, WBC]    q6 mo  Recent Labs   Lab Test 11/12/20  2327 03/04/20  1001 11/08/19  1020 05/07/19  1101   LITHIUM 0.44* 0.84 0.84 0.61     Recent Labs   Lab Test 11/13/20  0758 03/04/20  1001 11/08/19  1020 05/07/19  1101   CR 1.10 1.09 1.12 1.14   GFRESTIMATED >90 >90 89 87    139 138 139   POTASSIUM 4.4 3.8 3.5 4.0   ADONAY 9.4 10.1 9.2 9.3     Recent Labs   Lab Test 11/08/19  1024 05/07/19  1101 12/06/18  1505 10/30/17  1429   SG 1.015 1.015 1.009 1.015     Recent Labs   Lab Test 11/13/20  0758 06/01/20  1339 03/04/20  1001 11/08/19  1020   TSH 1.21 2.51 5.82* 5.29*       PSYCHOTROPIC DRUG INTERACTIONS     none     MANAGEMENT:  Monitoring for adverse effects       RISK STATEMENT for SAFETY     Jose Francisco Sommers did not appear to be an imminent safety risk to self  or others.    DIAGNOSES                                                                        [m2, h3]      Mood disorder, unspecified   ADHD, predominantly inattentive type     ASSESSMENT                                                                     [m2, h3]        Today, Manuel returns for continued medication management.  He is joined by his mom for a portion of today's appointments to discuss medications.  There is some disagreement between Manuel and his mom related to medications.  Ultimately because Manuel is his own guardian and is not under commitment, we must defer to his judgement and he declines to take Olanzapine or any other antipsychotic medication at this time.  Therefore we will continue Lithium and Modafinil and make no other medication changes today.  Manuel has been counseled on risks of symptom emergence and encouraged to reach out if symptoms return.  Future considerations include starting an antipsychotic with lower risk of weight gain.     MNPMP review was not needed today.    PLAN                                                                                            [m2, h3]             1) Meds  - Continue Lithium 1,500mg at bedtime  - Continue Modafinil 400mg daily     2) Therapy-  Recommended, patient declines at this time    3) Next Due   Labs- [place] labs ordered for any [place] lab   EKG- PRN  Rating scales- N/A    4) Referrals  No Referrals needed     5) RTC: 1 month    6) Crisis Numbers:   Provided routinely in AVS     After hours:  [phone]      TREATMENT RISK STATEMENT:  The risks, benefits, alternatives and potential adverse effects have been discussed and are understood by the pt. The pt understands the risks of using street drugs or alcohol. There are no medical contraindications, the pt agrees to treatment with the ability to do so. The pt knows to call the clinic for any problems or to access emergency care if needed.  Medical and substance use concerns are documented  above.  Psychotropic drug interaction check was done, including changes made today.    PROVIDER:  Jayda Ball, DO    Patient staffed via video with Dr. Tay  who will sign the note.  Supervisor is Dr. Tay.    TELEHEALTH ATTENDING ATTESTATION  Following the ACGME guidelines on telemedicine and direct supervision due to COVID-19, I was concurrently participating in and/or monitoring the patient care through appropriate telecommunication technology.  I discussed the key portions of the service with the resident, including the mental status examination and developing the plan of care. I reviewed key portions of the history with the resident. I agree with the findings and plan as documented in this note.   Joel Tay MD

## 2020-11-14 NOTE — PROGRESS NOTES
"Received collateral from RN regarding a number of concerns from Manuel. He reported that he was highly anxious, BP reading ~180/110. Requested 2.5mg IM zyprexa (as this is the \"only\" thing that helps him). Later, he reported that he did not want to take 1500mg lithium this evening. This writer spoke with Manuel on the phone regarding his lithium dose, and he said that \"1200mg is the safe dose for me, I'm worried that I'm going to be poisoned or killed if I take any more.\" This writer attempted to reassure him that the dose of lithium he is on (1500mg) is what he has been on for a long time, and his lithium level is within a safe range. He disagreed with this writer, and promptly hung up on the phone.     Told RN he would not take 1500mg lithium tonight, only 1200mg. RN reports he is highly agitated, on edge, quite rigid in thinking.     Objective  BP (!) 182/110   Pulse 90   Temp 97.8  F (36.6  C) (Oral)   Resp 16   SpO2 100%      Plan:  -add clonidine 0.1mg BID PRN for anxiety, hypertension associated with anxiety  -add zyprexa 2.5mg IM daily PRN for anxiety/agitation  -It is preferable for patient to take 1200mg daily rather than no lithium at all, therefore, I will decrease dose to 1200mg for tonight. Unfortunately I was not able to reassure patient that taking 1500mg of lithium is safe, and he reported he would not take this dose. Primary team can re-address dose Monday AM with patient, consider increasing back to 1500mg at that time vs checking lithium level at that dose.   -if patient continues to have elevated blood pressure, may benefit from adding scheduled antihypertensive to medication regimen. Again, primary team to address on Monday    Josie Mtz MD  PGY-2  "

## 2020-11-14 NOTE — PROGRESS NOTES
Manuel has been sleeping intermittently so far this night shift.  He received Clonodine prn earlier this night shift for insomnia and HTN.  States that works very well for him.  He has now been awake in his room and in the lounge for the past several rounds.  Frequent snack requests.    Of note also, patient states he takes Zyprexa 25 mg IM - NOT- 2.5 mg. States he will speak with the MD about this.

## 2020-11-14 NOTE — PROGRESS NOTES
Pt reported that Atarax was helpful and did decrease his anxiety from 10/10 to 5/10. He denied any SI/SIB. He did receive Zprexa 2.5 mg also for anxiety. Writer noted pt becomes easily frustrated when his needs are not met exatcly how he is requesting.

## 2020-11-14 NOTE — PROGRESS NOTES
"The pt was willing to check in with this writer but quickly became vague with his answers. He denies S/SIB urges, HI and hallucinations. He says that coming in \"my thoughts didn't feel like my thoughts.\" He endorses \"worry over my physical health, my blood pressure and my well being.\" When this writer asked him to talk more about that, he said \"I can't describe it right now. I;ll just wait for a couple of days and tell someone then.\" He reports having broken but restful sleep and that he eating well. [facility], has a blunted/flat affect but brightens upon approach.     11/14/20 1248   Behavioral Health   Hallucinations denies / not responding to hallucinations   Thinking confused   Orientation person: oriented;place: oriented;date: oriented;time: oriented   Memory baseline memory   Insight poor   Judgement impaired   Eye Contact at examiner   Affect blunted, flat;other (see comments)  (brightens slight;ly upon approach)   Mood mood is calm;anxious   Physical Appearance/Attire attire appropriate to age and situation   Hygiene well groomed   Suicidality other (see comments)  (pt denies)   1. Wish to be Dead (Recent) No   2. Non-Specific Active Suicidal Thoughts (Recent) No   Self Injury other (see comment)  (pt denies)   Elopement   (nothing to report)   Activity other (see comment);withdrawn  (pacing at times)   Speech clear;coherent   Medication Sensitivity no stated side effects   Psychomotor / Gait balanced     "

## 2020-11-15 PROCEDURE — 90853 GROUP PSYCHOTHERAPY: CPT

## 2020-11-15 PROCEDURE — 250N000013 HC RX MED GY IP 250 OP 250 PS 637: Performed by: STUDENT IN AN ORGANIZED HEALTH CARE EDUCATION/TRAINING PROGRAM

## 2020-11-15 PROCEDURE — 250N000011 HC RX IP 250 OP 636: Performed by: STUDENT IN AN ORGANIZED HEALTH CARE EDUCATION/TRAINING PROGRAM

## 2020-11-15 PROCEDURE — 124N000002 HC R&B MH UMMC

## 2020-11-15 RX ORDER — OLANZAPINE 10 MG/2ML
5 INJECTION, POWDER, FOR SOLUTION INTRAMUSCULAR DAILY PRN
Status: DISCONTINUED | OUTPATIENT
Start: 2020-11-15 | End: 2020-11-16

## 2020-11-15 RX ADMIN — HYDROXYZINE HYDROCHLORIDE 25 MG: 25 TABLET, FILM COATED ORAL at 13:19

## 2020-11-15 RX ADMIN — OLANZAPINE 10 MG: 10 INJECTION, POWDER, FOR SOLUTION INTRAMUSCULAR at 22:16

## 2020-11-15 RX ADMIN — HYDROXYZINE HYDROCHLORIDE 25 MG: 25 TABLET, FILM COATED ORAL at 09:18

## 2020-11-15 RX ADMIN — OLANZAPINE 5 MG: 10 INJECTION, POWDER, LYOPHILIZED, FOR SOLUTION INTRAMUSCULAR at 20:24

## 2020-11-15 RX ADMIN — LITHIUM CARBONATE 1200 MG: 600 CAPSULE ORAL at 20:24

## 2020-11-15 RX ADMIN — HYDROXYZINE HYDROCHLORIDE 25 MG: 25 TABLET, FILM COATED ORAL at 19:08

## 2020-11-15 RX ADMIN — CLONIDINE HYDROCHLORIDE 0.1 MG: 0.1 TABLET ORAL at 08:17

## 2020-11-15 RX ADMIN — MELATONIN TAB 3 MG 3 MG: 3 TAB at 20:24

## 2020-11-15 ASSESSMENT — ACTIVITIES OF DAILY LIVING (ADL)
DRESS: INDEPENDENT
LAUNDRY: WITH SUPERVISION
ORAL_HYGIENE: INDEPENDENT
HYGIENE/GROOMING: INDEPENDENT

## 2020-11-15 NOTE — PROGRESS NOTES
Pt has been out in the milieu on and off through out the shift. He has been pacing the halls, and minimal contact with other pt's. He reported his anxiety 9/10 and requested atarax and Zyprexa 2.5mg IM. Pt's anxiety did decrease to 5/10. He reports  to writer that IM Zyprexa is the only way it is effective for him. He also request his hs dose of Zyprexa to be given IM also. Pt feels his thoughts are more clear this evening.

## 2020-11-15 NOTE — PROGRESS NOTES
"\"Manuel\" appeared to sleep approximately 6.5 hours this night shift.  He came out to the desk a few times for a snack during the night.  No prns given.  Will continue to monitor and support patient.  "

## 2020-11-15 NOTE — PLAN OF CARE
RN Note:    Pt presented with full range affect. Pt was anxious but cooperative while interacting with the writer. Pt was alert and oriented x 4. Pt did not endorse SI, HI, thoughts of SIB, and hallucinations. Pt did not endorse having a wish to be dead. Pt denied having physical pain. Pt denied having medical concerns. Pt stated that he slept well last evening. Pt feels no therapeutic effects from the current ordered medications. No medication side effects endorsed by the pt or observed by the writer. Pt was intermittently present in the milieu. Continue to monitor for safety and changes in medical condition.     PRN medications passed this shift:    Clonidine 0.1 mg PO at 0818 for anxiety rated at 9/10. Pt stated this medication was effective, but feels that it could work better at a higher dose. Pt requested another medication for his anxiety.    Hydroxyzine 25 mg PO at 0918 for anxiety rated at 6/10. Pt stated this medication was effective at reducing his anxiety, but he stated the Xanax he received earlier in his hospital stay worked better.     Hydroxyzine 25 mg PO at 1319 for anxiety rated at 8/10. Pt stated this medication was effective at reducing his anxiety.

## 2020-11-16 ENCOUNTER — HEALTH MAINTENANCE LETTER (OUTPATIENT)
Age: 30
End: 2020-11-16

## 2020-11-16 LAB
HBV SURFACE AB SERPL IA-ACNC: 611.67 M[IU]/ML
HBV SURFACE AG SERPL QL IA: NONREACTIVE
HCV AB SERPL QL IA: NONREACTIVE
T PALLIDUM AB SER QL: NONREACTIVE

## 2020-11-16 PROCEDURE — 36415 COLL VENOUS BLD VENIPUNCTURE: CPT | Performed by: STUDENT IN AN ORGANIZED HEALTH CARE EDUCATION/TRAINING PROGRAM

## 2020-11-16 PROCEDURE — 250N000013 HC RX MED GY IP 250 OP 250 PS 637: Performed by: PHYSICIAN ASSISTANT

## 2020-11-16 PROCEDURE — 86706 HEP B SURFACE ANTIBODY: CPT | Performed by: STUDENT IN AN ORGANIZED HEALTH CARE EDUCATION/TRAINING PROGRAM

## 2020-11-16 PROCEDURE — 86780 TREPONEMA PALLIDUM: CPT | Performed by: STUDENT IN AN ORGANIZED HEALTH CARE EDUCATION/TRAINING PROGRAM

## 2020-11-16 PROCEDURE — 99231 SBSQ HOSP IP/OBS SF/LOW 25: CPT | Performed by: PHYSICIAN ASSISTANT

## 2020-11-16 PROCEDURE — 250N000011 HC RX IP 250 OP 636: Performed by: STUDENT IN AN ORGANIZED HEALTH CARE EDUCATION/TRAINING PROGRAM

## 2020-11-16 PROCEDURE — 87591 N.GONORRHOEAE DNA AMP PROB: CPT | Performed by: STUDENT IN AN ORGANIZED HEALTH CARE EDUCATION/TRAINING PROGRAM

## 2020-11-16 PROCEDURE — 250N000013 HC RX MED GY IP 250 OP 250 PS 637: Performed by: STUDENT IN AN ORGANIZED HEALTH CARE EDUCATION/TRAINING PROGRAM

## 2020-11-16 PROCEDURE — 87340 HEPATITIS B SURFACE AG IA: CPT | Performed by: STUDENT IN AN ORGANIZED HEALTH CARE EDUCATION/TRAINING PROGRAM

## 2020-11-16 PROCEDURE — 124N000002 HC R&B MH UMMC

## 2020-11-16 PROCEDURE — 86803 HEPATITIS C AB TEST: CPT | Performed by: STUDENT IN AN ORGANIZED HEALTH CARE EDUCATION/TRAINING PROGRAM

## 2020-11-16 PROCEDURE — 99232 SBSQ HOSP IP/OBS MODERATE 35: CPT | Mod: GC | Performed by: PSYCHIATRY & NEUROLOGY

## 2020-11-16 PROCEDURE — 99207 PR CONSULT E&M CHANGED TO SUBSEQUENT LEVEL: CPT | Performed by: PHYSICIAN ASSISTANT

## 2020-11-16 PROCEDURE — 87491 CHLMYD TRACH DNA AMP PROBE: CPT | Performed by: STUDENT IN AN ORGANIZED HEALTH CARE EDUCATION/TRAINING PROGRAM

## 2020-11-16 RX ORDER — LORAZEPAM 0.5 MG/1
0.5 TABLET ORAL 3 TIMES DAILY
Status: DISCONTINUED | OUTPATIENT
Start: 2020-11-16 | End: 2020-11-26 | Stop reason: HOSPADM

## 2020-11-16 RX ORDER — OLANZAPINE 10 MG/1
10 TABLET ORAL 2 TIMES DAILY
Status: DISCONTINUED | OUTPATIENT
Start: 2020-11-16 | End: 2020-11-20

## 2020-11-16 RX ORDER — OLANZAPINE 10 MG/2ML
10 INJECTION, POWDER, FOR SOLUTION INTRAMUSCULAR 2 TIMES DAILY
Status: DISCONTINUED | OUTPATIENT
Start: 2020-11-16 | End: 2020-11-20

## 2020-11-16 RX ADMIN — LORAZEPAM 0.5 MG: 0.5 TABLET ORAL at 12:09

## 2020-11-16 RX ADMIN — HYDROXYZINE HYDROCHLORIDE 25 MG: 25 TABLET, FILM COATED ORAL at 08:58

## 2020-11-16 RX ADMIN — Medication 25 MG: at 15:03

## 2020-11-16 RX ADMIN — OLANZAPINE 10 MG: 10 INJECTION, POWDER, FOR SOLUTION INTRAMUSCULAR at 08:53

## 2020-11-16 RX ADMIN — LORAZEPAM 0.5 MG: 0.5 TABLET ORAL at 20:05

## 2020-11-16 RX ADMIN — LORAZEPAM 0.5 MG: 0.5 TABLET ORAL at 14:21

## 2020-11-16 RX ADMIN — LITHIUM CARBONATE 1200 MG: 600 CAPSULE ORAL at 20:05

## 2020-11-16 RX ADMIN — MELATONIN TAB 3 MG 3 MG: 3 TAB at 20:05

## 2020-11-16 RX ADMIN — OLANZAPINE 10 MG: 10 INJECTION, POWDER, FOR SOLUTION INTRAMUSCULAR at 20:06

## 2020-11-16 ASSESSMENT — ACTIVITIES OF DAILY LIVING (ADL)
DRESS: INDEPENDENT
ORAL_HYGIENE: INDEPENDENT
HYGIENE/GROOMING: INDEPENDENT

## 2020-11-16 NOTE — PROGRESS NOTES
Psychiatry Cross Cover Note    S: Notified by staff that Manuel has had persistently high BP readings and he has never had high BP before. I reviewed his med list and he did not start any new medications this hospitalization that can increase blood pressure. Clonidine 0.1mg was started on Friday night to try to target high blood pressure and anxiety. Since it was started, his systolic BP decreased from 160s-180s to 140s. Diastolic decreased from 110s, to 80s-90s.    Patient's other vital signs are within normal limits- pulse 50-67, afebrile. He has received 12.5mg of olanzapine in the last day.    O: BP (!) 147/93 (BP Location: Left arm)   Pulse (!) 49   Temp 98.2  F (36.8  C) (Oral)   Resp 16   Wt 94.4 kg (208 lb 1.6 oz)   SpO2 100%   BMI 27.64 kg/m      BP Readings from Last 6 Encounters:   11/15/20 (!) 147/93   02/28/20 137/84   01/31/20 (!) 145/85   12/26/19 (!) 145/82   11/27/19 137/82   10/31/19 (!) 145/83     A/P: Unclear cause of hypertension, although review of his past BP readings over the last year show baseline of 140s/80s. Considered NMS given his antipsychotics and elevated BPto 200s on admission, but his other vital signs are wnl and he has no stiffness or rigidity. Clonidine that was started 2 days ago appears to have helped bring his BP down.   - it is too soon to increase clonidine dose. Since it appears to have helped bring SBPs down from 180-200s to 140s, will continue for now.  - would consider starting propanolol  for its effects on both BP and anxiety, but will defer to primary team to decide this tomorrow      Avis Mcmullen MD  On-call PGY-2 Psychiatry Resident

## 2020-11-16 NOTE — PLAN OF CARE
"Daily CTC Note:    Work Completed:   The patient's care was discussed with the treatment team and chart notes were reviewed.   Patient met with team.  He reports feeling better since admission with resumption of zyprexa. Patient very somatic with requesting \"full body\" MRI due to multiple issues since childhood , dermatologist for rash on neck (none visible)   Speech s/w fast, teary eyed at times.  Spoke to patient's mom who requested care conference, however patient declined.  Will inform Mom.  Will continue to coordinate care with family/outpatient providers    Discharge plan or goal:   Home vs IRTS    Barriers to discharge:   Severity of symptoms, Inability to care for self      Addendum:  Met with patient to discuss outpatient options to increase support/stability when he discharges.  He was not interested in IRTS placement, Day Treatment or PHP.  Will address/offer again when symptoms improve.  "

## 2020-11-16 NOTE — PLAN OF CARE
Pt denies SI, Pt very anxious.  Denies hallucinations.  Pt stays to self. Seen pacing in his room.
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
## 2020-11-16 NOTE — CONSULTS
Consult Date:  11/16/2020      HISTORY OF PRESENT ILLNESS:  The patient is a 30-year-old male with reported history of schizoaffective disorder, admitted to station [facility] due to psychiatric decompensation.  Reportedly not sleeping and is religiously and medically preoccupied.  The patient was admitted on 11/12.  An Internal Medicine consultation was ordered by Dr. Finn's team today to assess medical problems including elevated blood pressure.  At this time, Jose Francisco's only acute physical concern is a few day history of stuffy nose.  Denies fever, chills, sore throat and cough.  Denies history of primary hypertension.  Denies other physical issues at this time.      PAST MEDICAL HISTORY:   1.  History of schizoaffective disorder and other psychiatric history per Dr. Finn's team.   2.  Denies history of major medical problems including cardiopulmonary disease, hypertension and diabetes.      PAST SURGICAL HISTORY:  None.      CURRENT MEDICATIONS:   1.  Lithium 1200 mg p.o. at bedtime.   2.  Lorazepam 0.5 mg p.o. t.i.d.   3.  Zyprexa 10 mg p.o. b.i.d.   4.  Zyprexa 10 mg IM b.i.d.   5.  Clonidine 0.1 mg p.o. b.i.d. p.r.n. anxiety and high blood pressure.   6.  Hydroxyzine 25 mg p.o. q.4 hours p.r.n. anxiety.      ALLERGIES:  SULFA.      SOCIAL HISTORY:  Single.  No children.  Lives in [place].      FAMILY HISTORY:  Reviewed and noncontributory.      REVIEW OF SYSTEMS:  Ten-point review of systems negative except as stated above in history of present illness.      PHYSICAL EXAMINATION:   GENERAL:  Otherwise nontoxic healthy-appearing young man in no acute distress.   VITAL SIGNS:  Stable, temperature afebrile, pulse in the 70s, blood pressure 140s/80s sitting and 161/101 standing.   HEENT:  Negative.   NECK:  Supple.  No cervical lymphadenopathy or thyromegaly.   LUNGS:  Clear.   CARDIOVASCULAR:  Regular rate and rhythm.   ABDOMEN:  Soft, nontender.   EXTREMITIES:  No edema.   SKIN:  No rash on exposed areas.    NEUROLOGIC:  He is awake, alert and oriented x 3.  Cranial nerves grossly intact.  No focal deficits noted.      LABORATORY DATA:  Lithium level 0.44.  White blood cell count 12.9, triglyceride level 2.  Otherwise, her CBC, comprehensive metabolic panel, lipid panel and TSH normal.  Urine drug screen negative.  COVID testing negative.      ASSESSMENT:   1.  Psychiatric decline in patient with history of reported schizoaffective disorder per Dr. Finn's team.   2.  Elevated blood pressure, stable, most likely secondary to increased adrenergic state related to his psychiatric decline.  The patient is asymptomatic.  Modest improvement with as needed use of clonidine.      PLAN:  Discontinue clonidine to avoid a potential rebound hypertensive effect.  Will start oral hydralazine 25 mg p.o. q.6 hours p.r.n. systolic blood pressure greater than 160 or diastolic blood pressure greater than 110.  Medicine will continue to follow with daily chart check to review his blood pressures.  Please feel free to call with questions.      Thank you for this consultation.         VERITO GAFFNEY PA-C             D: 2020   T: 2020   MT: WENCESLAO      Name:     SAEED MORALES   MRN:      [id number]        Account:       [id number]   :      1990           Consult Date:  2020      Document: [id number]

## 2020-11-16 NOTE — PROGRESS NOTES
PT reported that his anxiety is 8/10. He is requesting that his Zyprexa be increased to 20 mg IM.  because he used to take that last time he was in the hospital and that was the only medication that helped him. Pt did talk with Md. Pt was given atarax 25mg at this time.

## 2020-11-17 LAB
C TRACH DNA SPEC QL NAA+PROBE: NEGATIVE
HIV 1+2 AB+HIV1 P24 AG SERPL QL IA: NONREACTIVE
N GONORRHOEA DNA SPEC QL NAA+PROBE: NEGATIVE
SPECIMEN SOURCE: NORMAL
SPECIMEN SOURCE: NORMAL

## 2020-11-17 PROCEDURE — G0177 OPPS/PHP; TRAIN & EDUC SERV: HCPCS

## 2020-11-17 PROCEDURE — 250N000011 HC RX IP 250 OP 636: Performed by: STUDENT IN AN ORGANIZED HEALTH CARE EDUCATION/TRAINING PROGRAM

## 2020-11-17 PROCEDURE — 250N000013 HC RX MED GY IP 250 OP 250 PS 637: Performed by: STUDENT IN AN ORGANIZED HEALTH CARE EDUCATION/TRAINING PROGRAM

## 2020-11-17 PROCEDURE — 124N000002 HC R&B MH UMMC

## 2020-11-17 PROCEDURE — 99232 SBSQ HOSP IP/OBS MODERATE 35: CPT | Mod: GC | Performed by: PSYCHIATRY & NEUROLOGY

## 2020-11-17 PROCEDURE — 36415 COLL VENOUS BLD VENIPUNCTURE: CPT | Performed by: STUDENT IN AN ORGANIZED HEALTH CARE EDUCATION/TRAINING PROGRAM

## 2020-11-17 PROCEDURE — 87389 HIV-1 AG W/HIV-1&-2 AB AG IA: CPT | Performed by: STUDENT IN AN ORGANIZED HEALTH CARE EDUCATION/TRAINING PROGRAM

## 2020-11-17 RX ORDER — LURASIDONE HYDROCHLORIDE 20 MG/1
20 TABLET, FILM COATED ORAL DAILY
Status: DISCONTINUED | OUTPATIENT
Start: 2020-11-17 | End: 2020-11-18

## 2020-11-17 RX ADMIN — MELATONIN TAB 3 MG 3 MG: 3 TAB at 19:57

## 2020-11-17 RX ADMIN — LITHIUM CARBONATE 1200 MG: 600 CAPSULE ORAL at 19:54

## 2020-11-17 RX ADMIN — OLANZAPINE 10 MG: 10 INJECTION, POWDER, FOR SOLUTION INTRAMUSCULAR at 19:54

## 2020-11-17 RX ADMIN — LORAZEPAM 0.5 MG: 0.5 TABLET ORAL at 07:56

## 2020-11-17 RX ADMIN — LORAZEPAM 0.5 MG: 0.5 TABLET ORAL at 14:28

## 2020-11-17 RX ADMIN — LURASIDONE HYDROCHLORIDE 20 MG: 20 TABLET, FILM COATED ORAL at 10:14

## 2020-11-17 RX ADMIN — OLANZAPINE 10 MG: 10 INJECTION, POWDER, FOR SOLUTION INTRAMUSCULAR at 07:56

## 2020-11-17 RX ADMIN — LORAZEPAM 0.5 MG: 0.5 TABLET ORAL at 19:54

## 2020-11-17 ASSESSMENT — ACTIVITIES OF DAILY LIVING (ADL)
HYGIENE/GROOMING: INDEPENDENT
DRESS: INDEPENDENT
ORAL_HYGIENE: INDEPENDENT
LAUNDRY: WITH SUPERVISION
ORAL_HYGIENE: INDEPENDENT
HYGIENE/GROOMING: INDEPENDENT
DRESS: INDEPENDENT

## 2020-11-17 NOTE — PROGRESS NOTES
Patient attended mental health education group. He remained attentive throughout and participated in discussion, but his comments/feedback were not directly related to topic.

## 2020-11-17 NOTE — PLAN OF CARE
"Daily CTC Note:     Work Completed:   The patient's care was discussed with the treatment team and chart notes were reviewed.   Patient met with team.  He reports feeling better since admission with resumption of zyprexa.  Anxiety is improving.  Patient continues to request \"full body\" MRI due to multiple somatic concerns ( none current).   Speech s/w fast, teary eyed at times.    Will continue to coordinate care with /outpatient providers     Discharge plan or goal:   Home when stable     Barriers to discharge:   Severity of symptoms, Inability to care for self     "

## 2020-11-17 NOTE — PROGRESS NOTES
"Pt paced the nur's the majority of the day. When out in the milieu pt was appropriate with staff and peers. Pt reported feeling content and pt stated that \" I feel like the medications are working\". Pt denies Si and SIB.   "

## 2020-11-17 NOTE — PROGRESS NOTES
Manuel appeared to have slept a total of 6.25 hours this night shift.  Appears comfortable.  No prns requested or given.  He was given a few cereals with milk.  Up in the lounge now.  Will conitnue to monitor and support patient.

## 2020-11-17 NOTE — PROGRESS NOTES
"  ----------------------------------------------------------------------------------------------------------  [facility], [place]   Psychiatric Progress Note  Hospital Day #5     Interim History:   The patient's care was discussed with the treatment team and chart notes were reviewed.    Sleep 6.25 hours (11/17/20 0600)  Scheduled Medications: took all scheduled medications as prescribed   PRN medications: Hydroxyzine 25 mg x 1, clonidine 0.1 mg x1, hydralazine 25 mg x 1, melatonin 3 mg x 1    Staff Report:   Medicine consulted; switched clonidine to hydralazine 25 mg q6h prn   The pt is intermittently visible, pacing in the hallway. He has many somatic complaints he is perseverating on; blood pressure (which is getting better), feelings in his head - which he didn't want to elaborate on with this writer. He seems to be med seeking, as he is seen at the desk quite frequently asking for medications (benzos mostly) for anxiety even though he doesn't outwardly exhibit many anxiety symptoms. He was vague and gave one word responses of \"no\" when asked about SI/SIB urges and hallucinations. He has body odor and this writer will continue to urge showering. He is eating well but not social with peers. He has a blunted/flat affect which does not brighten upon approach.    Patient Interview:   Jose Francisco Sommers was interviewed remotely via teleconference. He states that his mood is pretty good and he \"isn't particularly annoyed with how he's doing which is good.\" He states that he slept better and felt like when he got up \"he wanted to do things.\" Manuel states that he thinks listening to music would help him relax, but \"wasn't able to get the hospital music player to work.\" He says that he tried using ice to help with anxiety, and that was \"pretty good,\" for his anxiety. He reports that he \"feels okay,\" about his care and the care team is \"great, and good people,\" but isn't sure he can \"do everything " "he wants while he is here,\" specifically a \"full-body MRI.\" He says that he is most concerned about his back and spine. He says that his appetite is doing well, and reports no headache. He states that he is \"just sort of depressed,\" and a major concern for him is that \"he doesn't know what is going to go on later in his life.\" Manuel's personal preference is to not change his dose of lithium, \"because it has been working for me,\" and he would rather add a new drug instead of changing his dose. He reports occasionally missing doses of lithium in the past. When Latuda was discussed, he stated that was an \"interesting\" option and \"wanted to read about it.\"    Review of systems:     ROS was negative unless noted above.          Allergies:     Allergies   Allergen Reactions     Sulfa Drugs             Psychiatric Examination:   BP (!) 160/89 (BP Location: Right arm)   Pulse 104   Temp 97.9  F (36.6  C) (Oral)   Resp 16   Wt 93.2 kg (205 lb 6.4 oz)   SpO2 100%   BMI 27.29 kg/m    Weight is 205 lbs 6.4 oz  Body mass index is 27.29 kg/m .    MENTAL STATUS EXAM    Appearance:  normal posture, normal gait, well-developed, well-nourished, appears stated age and mild distress   Attitude:  engaged, guarded and defiant towards getting what he wants; a full body MRI   Psychomotor:  no evidence of tics, dystonia, or tardive dyskinesia  Eye Contact: appropriate  Speech:  normal tone, increased rate and talkative  Mood: \"good...anxious\"  Affect:  congruent, appropriate, labile and reactive  Thought Content: denies suicidal ideation  Thought Process: linear, goal directed, perseverative and rambling  Sensorium: awake, alert and denies auditory hallucinations  Cognition: memory grossly intact and average intelligence  Impulse control: good  Insight: fair aware of mood lability but unable to trace to primary mental illness and believes there are other things going on; delusions re: nerve damage, unspecified origin  Judgment: " "fair         Labs:     No results found for this or any previous visit (from the past 24 hour(s)).     Assessment    Principal Diagnosis:   #Schizoaffective disorder, bipolar subtype, current manic features     Diagnostic Impression:   Jose Francisco Sommers is a 30 year old single White male previously diagnosed with schizoaffective disorder, bipolar subtype, ADHD, sensory processing d/o who presented on 11/13/2020 with irritability and agitation, no sleep for several days, tangential speech, disorganized thought, speech, and behavior, and inappropriate comments towards family member in the context of being titrated off of olanzapine by primary psychiatrist secondary to weight gain.  Current psychosocial stressors include body image (weight gain dissatisfaction), chronic mental health issues, history of legal issues (difficulty \"getting out\" of the incarceration system as a person with mental illness) and school issues which he has been coping with by Weaning off of his olanzapine.  Patient's support system includes family and outpatient team.  Substance use does not appear to be playing a contributing role in the patient's presentation.  Biological contributions to mental health presentation include chronic mental illness and weight gain.                 The patient's presentation is consistent with schizoaffective disorder, bipolar subtype, currently with manic features.     Psychiatric Hospital course:   Jose Francisco Sommers was [facility] to [facility] as a voluntary patient. His PTA lithium & olanzapine were continued. PTA olanzapine was increased to 10 mg nightly, a previous dose that worked well to manage the patient's psychotic features and keep the patient in a balanced, euthymic state.  Collateral obtained from mother and outpatient medication manager, , revealed the patient had been titrating down on olanzapine despite the medication keeping the patient in a euthymic state in order to reduce the medications " "effects on his increasing weight.  The most recent reduction was to 2.5 mg, however a higher dose will be expected to manage the patient's symptoms at this time. On 11/17, the patient demonstrated behavior indicating approach to a euthymic state (with some minor manic features such as increased rate of speech and verbosity as well as disorganized thought process) so Lurasidone was added 20 mg at that time with the goal of transitioning to Lurasidone from olanzapine as it has a lower risk for weight gain with chronic use compares to olanzapine.      Discontinued Medications (& Rationale):  None     Medical course   The patient was medically cleared for admission. He continued to have hypertension sustained at 190-200/ for several days, after which time the patient was started on clonidine 0.1 mg twice daily as needed, which helped with hypertension. It is unclear if this hypertension is essential HTN or related to neuroleptic malignant syndrome, but NMS is unlikely as the patient did not demonstrate other features, such as altered mental status, hyperthermia, tachycardia. The patient expressed concern for STDs on 11/16/20, so these labs were drawn at this time. At this time he also vocalized desire for a \"whole body MRI scan\" to address generalized paranoid belief that something in his body is broken, which we mentioned would be addressed at a later time after ruling out recent MSK injury/indications for MRI.   The patient began having bradycardia in the setting of ineffective hypertension management, so hydralazine was added and clonidine was discontinued on 11/17.      Data:   Triglycerides upon admission were 211, 11/13/20.      Consults:   Medicine, 11/17/20 for HTN management     Plan     Today's Changes:  - added lurasidone 20 mg daily    Scheduled Psych Meds:  -Lithium 1200 mg nightly  -Zyprexa 10 mg BID p.o. / IM   -Lorazepam 1 mg TID p.o.    - Lurasidone 20 mg p.o. daily     PRN Medications:  - " acetaminophen, alum & mag hydroxide-simethicone, cloNIDine, hydrALAZINE, hydrOXYzine, melatonin, nicotine, OLANZapine **OR** [DISCONTINUED] OLANZapine    Patient will be treated in therapeutic milieu with appropriate individual and group therapies as described.    Medical diagnoses to be addressed this admission:    #Hypertension: Initially treated with PRN clonidine 0.1 mg twice daily, but bradycardia occurred on this dose, so medication changed to hydralazine 25 mg q6h prn   -Hydralazine 25 mg q6h prn      #Nicotine dependence  -Nicotine gum 2 mg every hour as needed    Legal Status:   Orders Placed This Encounter      Voluntary      Safety Assessment:   Behavioral Orders   Procedures     Code 1 - Restrict to Unit     Routine Programming     As clinically indicated     Status 15     Every 15 minutes.       Disposition: No imminent plan for discharge; uptitrating/modifying medication regimen and Pending stabilization & development of a safe discharge plan.     _________________________________________________________________    This patient was seen and discussed with my attending physician.  Mk Pretty DO   Psychiatry Resident   _________________________________________________________________  *This note was written with the assistance of an approved dictation software; please excuse any typos that might have been missed.     Psychiatry Attending Attestation:   I, Mihai Finn, saw and evaluated the patient with the resident physician.  I agree with the findings and plan of care as documented in the resident note.  I have reviewed all labs and vital signs.

## 2020-11-17 NOTE — PROGRESS NOTES
11/16/20 1871   Significant Event   Significant Event Other (see comments)  (shift summary)     Pt was visible in the hallway, usual stays on one side of the hallway and paces, withdrawn drawn from peers, ate dinner, independent with ADLS, showered.  Calm, cooperative, flat/blunted affect, depressed, no stated SI, SIB or hallucinations..

## 2020-11-17 NOTE — PLAN OF CARE
Pt reported he feels mood and anxiety have improved. Noted pt pacing in his room does not come down to lounge much stays down the nur in or around his room.  Pt encouraged to go to [facility] group pt said he was but when was group time didn't go.

## 2020-11-18 LAB — LITHIUM SERPL-SCNC: 0.72 MMOL/L (ref 0.6–1.2)

## 2020-11-18 PROCEDURE — 36415 COLL VENOUS BLD VENIPUNCTURE: CPT | Performed by: STUDENT IN AN ORGANIZED HEALTH CARE EDUCATION/TRAINING PROGRAM

## 2020-11-18 PROCEDURE — 80178 ASSAY OF LITHIUM: CPT | Performed by: STUDENT IN AN ORGANIZED HEALTH CARE EDUCATION/TRAINING PROGRAM

## 2020-11-18 PROCEDURE — 250N000013 HC RX MED GY IP 250 OP 250 PS 637: Performed by: STUDENT IN AN ORGANIZED HEALTH CARE EDUCATION/TRAINING PROGRAM

## 2020-11-18 PROCEDURE — 124N000002 HC R&B MH UMMC

## 2020-11-18 PROCEDURE — 99232 SBSQ HOSP IP/OBS MODERATE 35: CPT | Mod: GC | Performed by: PSYCHIATRY & NEUROLOGY

## 2020-11-18 PROCEDURE — 250N000011 HC RX IP 250 OP 636: Performed by: STUDENT IN AN ORGANIZED HEALTH CARE EDUCATION/TRAINING PROGRAM

## 2020-11-18 RX ORDER — LURASIDONE HYDROCHLORIDE 20 MG/1
40 TABLET, FILM COATED ORAL DAILY
Status: DISCONTINUED | OUTPATIENT
Start: 2020-11-19 | End: 2020-11-19

## 2020-11-18 RX ADMIN — LURASIDONE HYDROCHLORIDE 20 MG: 20 TABLET, FILM COATED ORAL at 08:48

## 2020-11-18 RX ADMIN — OLANZAPINE 10 MG: 10 INJECTION, POWDER, FOR SOLUTION INTRAMUSCULAR at 08:48

## 2020-11-18 RX ADMIN — LORAZEPAM 0.5 MG: 0.5 TABLET ORAL at 20:46

## 2020-11-18 RX ADMIN — LORAZEPAM 0.5 MG: 0.5 TABLET ORAL at 08:48

## 2020-11-18 RX ADMIN — LORAZEPAM 0.5 MG: 0.5 TABLET ORAL at 14:11

## 2020-11-18 RX ADMIN — LITHIUM CARBONATE 1200 MG: 600 CAPSULE ORAL at 20:46

## 2020-11-18 RX ADMIN — OLANZAPINE 10 MG: 10 INJECTION, POWDER, FOR SOLUTION INTRAMUSCULAR at 20:52

## 2020-11-18 RX ADMIN — MELATONIN TAB 3 MG 3 MG: 3 TAB at 20:46

## 2020-11-18 ASSESSMENT — ACTIVITIES OF DAILY LIVING (ADL)
ORAL_HYGIENE: INDEPENDENT
HYGIENE/GROOMING: INDEPENDENT
LAUNDRY: WITH SUPERVISION
DRESS: INDEPENDENT
LAUNDRY: WITH SUPERVISION
HYGIENE/GROOMING: INDEPENDENT
ORAL_HYGIENE: INDEPENDENT
DRESS: SCRUBS (BEHAVIORAL HEALTH);INDEPENDENT

## 2020-11-18 NOTE — PROGRESS NOTES
Patient anxiously paced hallway. Patient kept to himself. Attended community meeting but declined to be apart of the discussion. Reported that he feels much better then when he came in. States depression is improving and meds are working. Expressed some guilt about the behavior that brought him in the hospital. Denied SI/SIB.

## 2020-11-18 NOTE — PROGRESS NOTES
"  ----------------------------------------------------------------------------------------------------------  [facility], [place]   Psychiatric Progress Note  Hospital Day #6     Interim History:   The patient's care was discussed with the treatment team and chart notes were reviewed.    Sleep: 7 hrs  Scheduled Medications: took all scheduled medications.  PRN medications: melatonin     Staff Report: No acute events overnight. Pt was appropriate with staff and peers. Pt reported feeling content and pt stated that \" I feel like the medications are working.\" He feels mood and anxiety have improved. Pt encouraged to go to [employer] group pt said he was but when was group time didn't go.  He stated he had no suicidal ideation or self-harm thoughts. He had no auditory or visual hallucination. Took shower last night,     Patient Interview:   Jose Francisco Sommers was interviewed remotely via teleconference.    Patient reports that he is \"doing pretty well\" overall. He has been getting a good amount of sleep at night. He feels that  getting better sleep has made his mood quite a bit better than it were a couple days ago. He started latuda 20 mg yesterday. The plan is to switch from zyprexa to latuda gradually. He says he has not noticed any side effects of the medication at this point. He states the staff have been really nice and he does not mind being here, although does feel like he is getting a little homesick. He feels somewhat  he encroaches on his parents'  space and thinks that him being in the hospital is a nice break for them. He is still agreeable to the plan of increasing the latuda and tapering his zyprexa. No other questions or concerns at this point.     Review of systems:     ROS was negative unless noted above.          Allergies:     Allergies   Allergen Reactions     Sulfa Drugs             Psychiatric Examination:   BP (!) 158/95   Pulse 52   Temp 97.8  F (36.6  C) (Oral)   Resp 16 " "  Wt 93.2 kg (205 lb 6.4 oz)   SpO2 100%   BMI 27.29 kg/m      MENTAL STATUS EXAM     Appearance:  normal posture, normal gait, well-developed, well-nourished, appears stated age  Attitude:   engaged, slightly guarded, cooperative  Psychomotor:  no evidence of tics, dystonia, or tardive dyskinesia  Eye Contact: appropriate  Speech:  normal tone, increased rate and talkative  Mood: \"pretty well\"  Affect:  congruent, appropriate, labile and reactive  Thought Content: denies suicidal ideation  Thought Process: linear, goal directed, perseverative and slightly distracted   Sensorium: awake, alert and denies auditory hallucinations  Cognition: memory grossly intact and average intelligence  Impulse control: good  Insight: fair   Judgment: fair         Labs:     Results for orders placed or performed during the hospital encounter of 11/12/20 (from the past 24 hour(s))   HIV Antigen Antibody Combo   Result Value Ref Range    HIV Antigen Antibody Combo Nonreactive NR^Nonreactive            Assessment    Diagnostic Impression:   Jose Francisco Sommers is a 30 year old single White male previously diagnosed with schizoaffective disorder, bipolar subtype, ADHD, sensory processing d/o who presented on 11/13/2020 with irritability and agitation, no sleep for several days, tangential speech, disorganized thought, speech, and behavior, and inappropriate comments towards family member in the context of being titrated off of olanzapine by primary psychiatrist secondary to weight gain.  Current psychosocial stressors include body image (weight gain dissatisfaction), chronic mental health issues, history of legal issues (difficulty \"getting out\" of the incarceration system as a person with mental illness) and school issues which he has been coping with by Weaning off of his olanzapine.  Patient's support system includes family and outpatient team.  Substance use does not appear to be playing a contributing role in the patient's " presentation.  Biological contributions to mental health presentation include chronic mental illness and weight gain.      The patient's presentation is consistent with schizoaffective disorder, bipolar subtype, currently with manic features.    Principal Diagnosis:   #Schizoaffective disorder, bipolar subtype, current manic features     Psychiatric Hospital course:   Jose Francisco Sommers was [facility] to [facility] as a voluntary patient. His PTA lithium & olanzapine were continued. PTA olanzapine was increased to 10 mg nightly, a previous dose that worked well to manage the patient's psychotic features and keep the patient in a balanced, euthymic state.  Collateral obtained from mother and outpatient medication manager, , revealed that the patient had been titrating down on olanzapine despite the medication keeping the patient in a euthymic state in order to reduce the medications effects on his increasing weight.  The most recent reduction was to 2.5 mg, however a higher dose will be expected to manage the patient's symptoms at this time. On 11/17, the patient demonstrated behavior indicating approach to a euthymic state (with some minor manic features such as increased rate of speech and verbosity as well as disorganized thought process) so lurasidone was added 20 mg at that time with the goal of transitioning to lurasidone from olanzapine as it has a lower risk for weight gain with chronic use compares to olanzapine. Lurasidone does increased to 40 mg on 11/18.      Discontinued Medications (& Rationale):  None     Medical course:   The patient was medically cleared for admission. He continued to have hypertension sustained at 190-200/ for several days, after which time the patient was started on clonidine 0.1 mg twice daily as needed, which helped with hypertension. It is unclear if this hypertension is essential HTN or related to neuroleptic malignant syndrome, but NMS is unlikely as the patient did  "not demonstrate other features, such as altered mental status, hyperthermia, tachycardia. The patient expressed concern for STDs on 11/16/20, so these labs were drawn at this time (all negative). At this time he also vocalized desire for a \"whole body MRI scan\" to address generalized paranoid belief that something in his body is broken, which we mentioned would be addressed at a later time after ruling out recent MSK injury/indications for MRI.     The patient began having bradycardia in the setting of ineffective hypertension management, so hydralazine was added and clonidine was discontinued on 11/17. BP is being monitored.      Data:   CBC and CMP unremarkable, except triglycerides upon admission were 211, 11/13/20.      11/12/2020 23:27 11/18/2020 07:40   Lithium Level 0.44 (L) 0.72     Consults:   Medicine, 11/17/20 for HTN management     Plan     Today's Changes:  - Increase Lurasidone to 40 mg p.o. daily     Scheduled Psych Meds:  -Lithium 1200 mg nightly  -Zyprexa 10 mg BID p.o. / IM   -Lorazepam 1 mg TID p.o.    -Lurasidone 40 mg p.o. daily     PRN Medications:  - acetaminophen, alum & mag hydroxide-simethicone, cloNIDine, hydrALAZINE, hydrOXYzine, melatonin, nicotine, OLANZapine **OR** [DISCONTINUED] OLANZapine    Patient will be treated in therapeutic milieu with appropriate individual and group therapies as described.    Medical diagnoses to be addressed this admission:    #Hypertension: Initially treated with PRN clonidine 0.1 mg twice daily, but bradycardia occurred on this dose, so medication changed to hydralazine 25 mg q6h prn   -Hydralazine 25 mg q6h prn (to be given if SBP >160 or DBP >110 mmHg)     #Nicotine dependence  -Nicotine gum 2 mg every hour as needed      Legal Status: Voluntary    Safety Assessment:   Behavioral Orders   Procedures     Code 1 - Restrict to Unit     Routine Programming     As clinically indicated     Status 15     Every 15 minutes.       Disposition: No imminent plan for " discharge; uptitrating/modifying medication regimen and Pending stabilization & development of a safe discharge plan.     Patient seen and discussed with my attending physician, Mihai Finn MD.     New Shoemaker MD  PGY-1 Psychiatry Resident    Psychiatry Attending Attestation:  I, Mihai Finn, saw and evaluated the patient with the resident physician.  I agree with the findings and plan of care as documented in the resident note.  I have reviewed all labs and vital signs.

## 2020-11-18 NOTE — PLAN OF CARE
Pt reports decrease in anxiety and feel little more like his baseline self.  Pt continues to stay in hallway towards his room and walks/paces. Pt does seem some less anxious.  Pt reports his anxiety medication has increase and hopes this will help decrease his anxiety even more.  Pt continues to states wants ordered IM Zyprexa and not the PO.  Pt did brighten more when talking about basketball he stated this only sport he really likes.

## 2020-11-18 NOTE — PROGRESS NOTES
11/18/20 1423   General Information   Has Not Attended OT as of: 11/18/20     Writer approached Pt 1:1 to provide TIP[facility] resources. Pt was accepting of material. Plan to follow-up as needed and encourage group attendance.     Sheila Dobson, OT on 11/18/2020 at 2:24 PM

## 2020-11-18 NOTE — PROGRESS NOTES
Manuel appears to have slept comfortably for 7 hours this night shift.  Remained in his room the entire night shift.  No prns or snack given. Lithium level to be done this morning.

## 2020-11-18 NOTE — PLAN OF CARE
Daily CTC Note:     Work Completed:   The patient's care was discussed with the treatment team and chart notes were reviewed.   Patient met with team.  He reports feeling better -feels meds are helping. Patient reports he slept well.  Patient continues to pace, is mostly isolated but has attended some groups.  Patient does not appear to be tracking - answers questions s/w irrelevantly. Thinking is clearly not at baseline.  Will continue to coordinate care with outpatient providers     Discharge plan or goal:   Home with family when stable     Barriers to discharge:   Severity of symptoms, Inability to care for self

## 2020-11-18 NOTE — PROGRESS NOTES
Pt's mood was calm but had flat affect. He was isolative in his room in bed most of the shift. Staffs went to remind pt to come out  for dinner. He took a shower this evening. Pt stated his depression and anxiety  had improved. He stated he had no suicidal ideation or self-harm thoughts. He had no auditory or visual hallucination.

## 2020-11-19 PROCEDURE — 250N000011 HC RX IP 250 OP 636: Performed by: STUDENT IN AN ORGANIZED HEALTH CARE EDUCATION/TRAINING PROGRAM

## 2020-11-19 PROCEDURE — 250N000013 HC RX MED GY IP 250 OP 250 PS 637: Performed by: STUDENT IN AN ORGANIZED HEALTH CARE EDUCATION/TRAINING PROGRAM

## 2020-11-19 PROCEDURE — 99232 SBSQ HOSP IP/OBS MODERATE 35: CPT | Mod: GC | Performed by: PSYCHIATRY & NEUROLOGY

## 2020-11-19 PROCEDURE — G0177 OPPS/PHP; TRAIN & EDUC SERV: HCPCS

## 2020-11-19 PROCEDURE — 124N000002 HC R&B MH UMMC

## 2020-11-19 PROCEDURE — 250N000013 HC RX MED GY IP 250 OP 250 PS 637: Performed by: PHYSICIAN ASSISTANT

## 2020-11-19 RX ORDER — LURASIDONE HYDROCHLORIDE 20 MG/1
20 TABLET, FILM COATED ORAL ONCE
Status: CANCELLED | OUTPATIENT
Start: 2020-11-19

## 2020-11-19 RX ORDER — LITHIUM CARBONATE 600 MG/1
1200 CAPSULE ORAL AT BEDTIME
Status: CANCELLED | OUTPATIENT
Start: 2020-11-19

## 2020-11-19 RX ORDER — LURASIDONE HYDROCHLORIDE 20 MG/1
60 TABLET, FILM COATED ORAL DAILY
Status: DISCONTINUED | OUTPATIENT
Start: 2020-11-20 | End: 2020-11-20

## 2020-11-19 RX ORDER — METFORMIN HCL 500 MG
500 TABLET, EXTENDED RELEASE 24 HR ORAL
Status: DISCONTINUED | OUTPATIENT
Start: 2020-11-19 | End: 2020-11-26 | Stop reason: HOSPADM

## 2020-11-19 RX ORDER — LURASIDONE HYDROCHLORIDE 20 MG/1
20 TABLET, FILM COATED ORAL ONCE
Status: COMPLETED | OUTPATIENT
Start: 2020-11-19 | End: 2020-11-19

## 2020-11-19 RX ADMIN — OLANZAPINE 10 MG: 10 INJECTION, POWDER, FOR SOLUTION INTRAMUSCULAR at 21:38

## 2020-11-19 RX ADMIN — LITHIUM CARBONATE 1200 MG: 300 TABLET, EXTENDED RELEASE ORAL at 21:38

## 2020-11-19 RX ADMIN — LURASIDONE HYDROCHLORIDE 40 MG: 20 TABLET, FILM COATED ORAL at 08:39

## 2020-11-19 RX ADMIN — OLANZAPINE 10 MG: 10 INJECTION, POWDER, FOR SOLUTION INTRAMUSCULAR at 08:39

## 2020-11-19 RX ADMIN — LURASIDONE HYDROCHLORIDE 20 MG: 20 TABLET, FILM COATED ORAL at 09:29

## 2020-11-19 RX ADMIN — Medication 25 MG: at 16:16

## 2020-11-19 RX ADMIN — METFORMIN HYDROCHLORIDE 500 MG: 500 TABLET, EXTENDED RELEASE ORAL at 18:39

## 2020-11-19 RX ADMIN — LORAZEPAM 0.5 MG: 0.5 TABLET ORAL at 21:38

## 2020-11-19 RX ADMIN — LORAZEPAM 0.5 MG: 0.5 TABLET ORAL at 08:39

## 2020-11-19 RX ADMIN — LORAZEPAM 0.5 MG: 0.5 TABLET ORAL at 14:19

## 2020-11-19 ASSESSMENT — ACTIVITIES OF DAILY LIVING (ADL)
ORAL_HYGIENE: INDEPENDENT
ORAL_HYGIENE: INDEPENDENT
LAUNDRY: WITH SUPERVISION
DRESS: SCRUBS (BEHAVIORAL HEALTH);INDEPENDENT
LAUNDRY: WITH SUPERVISION
HYGIENE/GROOMING: INDEPENDENT
HYGIENE/GROOMING: INDEPENDENT
DRESS: INDEPENDENT;SCRUBS (BEHAVIORAL HEALTH)

## 2020-11-19 NOTE — PROGRESS NOTES
Pt appeared to have slept comfortably for 6.25 hours. Pt woke up at about 1200 MN and requested for snack and remained in his room the entire shift. No concerns reported.

## 2020-11-19 NOTE — PLAN OF CARE
Problem: Adult Inpatient Plan of Care  Goal: Plan of Care Review  Outcome: No Change  Flowsheets (Taken 11/19/2020 1400)  Plan of Care Reviewed With: patient    Pt's latuda has increased to 60 mg from 20 mg daily. And has been started on Metformin 500 mg with supper only.

## 2020-11-19 NOTE — PROGRESS NOTES
"  ----------------------------------------------------------------------------------------------------------  [facility], [place]   Psychiatric Progress Note  Hospital Day #7     Interim History:   The patient's care was discussed with the treatment team and chart notes were reviewed.    Sleep: 6.25 hrs  Scheduled Medications: took all scheduled medications.  PRN medications: melatonin     Per Staff Notes:  \"He states that he is feeling better.  Speech is s/w rapid. Patient continues to pace during the day.  Has many med change  requests which is what caused his decompensation resulting in admission. Will continue to coordinate care with outpatient providers. Spoke to patient's mother re: resources.  She has spoken to patient and does not feel he is near his baseline.   Pt appeared to have slept comfortably for 6.25 hours. Pt woke up at about 1200 MN and requested for snack and remained in his room the entire shift. No concerns reported.\"     Per Interview:   Patient reports that he is doing  okay  and feels like his head is doing a lot better. He has been doing a lot of thinking about his medication regimen and thinks it would be a good idea to increase his Latuda dose. He feels like it is working well and has noticed a significant improvement, but believes it could work even better at a higher dose. He prefers the IM dose of Olanzapine, however knows that won t be an option outside of the hospital and is requesting Seroquel instead for discharge. He describes his improvements as feeling like his whole life is  less pressured . He is somewhat concerned about weight gain when he leaves the hospital, however notes that when he has been on Metformin for this in the past he has been able to control his weight fairly well. Overall, he does not mind being here, however is a little bored and would like to listen to music. He reports sleeping well, especially since being put on the IM " "olanzapine. At this time, he has no other questions or concerns outside of increasing his Latuda dose.      The risks, benefits, alternatives, and side effects of treatments including no treatment have been discussed and are understood by the patient and other caregivers.    Review of systems:     ROS was negative unless noted above.          Allergies:     Allergies   Allergen Reactions     Sulfa Drugs             Psychiatric Examination:   BP (!) 152/92   Pulse 56   Temp 98.2  F (36.8  C) (Oral)   Resp 16   Wt 92.6 kg (204 lb 1.6 oz)   SpO2 99%   BMI 27.11 kg/m      MENTAL STATUS EXAM     Appearance:  normal posture, normal gait, well-developed, well-nourished, appears stated age  Attitude:   engaged, slightly guarded, cooperative  Psychomotor:  no evidence of tics, dystonia, or tardive dyskinesia  Eye Contact: appropriate  Speech:  normal tone, increased rate and talkative  Mood: \"pretty well\"  Affect:  congruent, appropriate  Thought Content: denies suicidal ideation  Thought Process: linear, goal directed, perseverative and slightly distracted   Sensorium: awake, alert and denies auditory hallucinations  Cognition: memory grossly intact and average intelligence  Impulse control: good  Insight: fair   Judgment: fair         Labs:     No results found for this or any previous visit (from the past 24 hour(s)).     Assessment    Diagnostic Impression:   Jose Francisco Sommers is a 30 year old single White male previously diagnosed with schizoaffective disorder, bipolar subtype, ADHD, sensory processing d/o who presented on 11/13/2020 with irritability and agitation, no sleep for several days, tangential speech, disorganized thought, speech, and behavior, and inappropriate comments towards family member in the context of being titrated off of olanzapine by primary psychiatrist secondary to weight gain.  Current psychosocial stressors include body image (weight gain dissatisfaction), chronic mental health issues, " "history of legal issues (difficulty \"getting out\" of the incarceration system as a person with mental illness) and school issues which he has been coping with by Weaning off of his olanzapine.  Patient's support system includes family and outpatient team.  Substance use does not appear to be playing a contributing role in the patient's presentation.  Biological contributions to mental health presentation include chronic mental illness and weight gain.      The patient's presentation is consistent with schizoaffective disorder, bipolar subtype, currently with manic features.    Principal Diagnosis:   #Schizoaffective disorder, bipolar subtype, current manic features     Psychiatric Hospital course:   Jose Francisco Sommers was admitted to [facility] as a voluntary patient. His PTA lithium & olanzapine were continued. PTA olanzapine was increased to 10 mg nightly, a previous dose that worked well to manage the patient's psychotic features and keep the patient in a balanced, euthymic state.  Collateral obtained from mother and outpatient medication manager, , revealed that the patient had been titrating down on olanzapine despite the medication keeping the patient in a euthymic state in order to reduce the medications effects on his increasing weight.  The most recent reduction was to 2.5 mg, however a higher dose will be expected to manage the patient's symptoms at this time. On 11/17, the patient demonstrated behavior indicating approach to a euthymic state (with some minor manic features such as increased rate of speech and verbosity as well as disorganized thought process) so lurasidone was added 20 mg at that time with the goal of transitioning to lurasidone from olanzapine as it has a lower risk for weight gain with chronic use compares to olanzapine. Lurasidone does increased to 40 mg on 11/18.      Discontinued Medications (& Rationale):  None     Medical course:   The patient was medically cleared for " "admission. He continued to have hypertension sustained at 190-200/ for several days, after which time the patient was started on clonidine 0.1 mg twice daily as needed, which helped with hypertension. It is unclear if this hypertension is essential HTN or related to neuroleptic malignant syndrome, but NMS is unlikely as the patient did not demonstrate other features, such as altered mental status, hyperthermia, tachycardia. The patient expressed concern for STDs on 11/16/20, so these labs were drawn at this time (all negative). At this time he also vocalized desire for a \"whole body MRI scan\" to address generalized paranoid belief that something in his body is broken, which we mentioned would be addressed at a later time after ruling out recent MSK injury/indications for MRI.     The patient began having bradycardia in the setting of ineffective hypertension management, so hydralazine was added and clonidine was discontinued on 11/17. BP is being monitored.      Data:   CBC and CMP unremarkable, except triglycerides upon admission were 211, 11/13/20.    11/12/2020 23:27 11/18/2020 07:40   Lithium Level 0.44 (L) 0.72     Consults:   Medicine, 11/17/20 for HTN management - continue Hydralazine 25 mg q6h prn (to be given if SBP >160 or DBP >110 mmHg)    Plan     Today's Changes:  - Increase Lurasidone to 60 mg p.o. daily   - Lithium 1200 mg IR switched to Lithium 1200 mg CR (as extended release is preferred)  - Start metformin 500 mg XR with supper (for antipsychotic-induced weight gain)    Scheduled Psych Meds:  -Lithium CR tablets 1200 mg nightly  -Zyprexa 10 mg BID p.o. / IM   -Lorazepam 1 mg TID p.o.    -Lurasidone 60 mg p.o. daily     PRN Medications:  - acetaminophen, alum & mag hydroxide-simethicone, cloNIDine, hydrALAZINE, hydrOXYzine, melatonin, nicotine, OLANZapine **OR** [DISCONTINUED] OLANZapine    Patient will be treated in therapeutic milieu with appropriate individual and group therapies as " described.    Medical diagnoses to be addressed this admission:    #Hypertension: Initially treated with PRN clonidine 0.1 mg twice daily, but bradycardia occurred on this dose, so medication changed to hydralazine 25 mg q6h prn   -Hydralazine 25 mg q6h prn (to be given if SBP >160 or DBP >110 mmHg)     #Nicotine dependence  -Nicotine gum 2 mg every hour as needed    Legal Status: Voluntary    Safety Assessment:   Behavioral Orders   Procedures     Code 1 - Restrict to Unit     Routine Programming     As clinically indicated     Status 15     Every 15 minutes.       Disposition: No imminent plan for discharge; uptitrating/modifying medication regimen and Pending stabilization & development of a safe discharge plan.     Patient seen and discussed with my attending physician, Mihai Finn MD.     New Shoemaker MD  PGY-1 Psychiatry Resident    Psychiatry Attending Attestation:  I, Mihai Finn, saw and evaluated the patient with the resident physician.  I agree with the findings and plan of care as documented in the resident note.  I have reviewed all labs and vital signs.

## 2020-11-19 NOTE — PLAN OF CARE
Problem: Manic Symptoms  Goal: Manic Symptoms  Description: Pt will sleep, will be less pressured and tangential, more coherent and calmer. Pt will restart effective Rx and participate in milieu.  Outcome: No Change  48 hour nursing assessment:  Pt evaluation continues. Assessed mood, anxiety, thoughts, and behavior. Is progressing towards goals.some pacing, quiet. Encourage participation in groups and developing healthy coping skills. Pt denies auditory or visual  hallucinations. Refer to daily team meeting notes for individualized plan of care. Will continue to assess.

## 2020-11-19 NOTE — PLAN OF CARE
Daily CTC Note:     Work Completed:   The patient's care was discussed with the treatment team and chart notes were reviewed.   Patient met with team.  He states that he is feeling better.  Speech is s/w rapid.  Patient continues to pace during the day.  Has many med change  requests which is what caused his decompensation resulting in admission.  Will continue to coordinate care with outpatient providers.  Spoke to patient's mother re: resources.  She has spoken to patient and does not feel he is near his baseline.     Discharge plan or goal:   Home with family when stable     Barriers to discharge:   Severity of symptoms, Inability to care for self

## 2020-11-19 NOTE — PROGRESS NOTES
Behavioral Health  Note   Behavioral Health  Spirituality Group Note     [facility]    Name: Jose Francisco Sommers    YOB: 1990   MRN: [id number]    Age: 30 year old     Patient attended -led group, which included discussion of spirituality, coping with illness and building resilience.   Patient attended group for 1.0 hrs.   The patient actively participated in group discussion     Priscillamsashley [facility]  Staff    Page [phone]

## 2020-11-20 PROCEDURE — 250N000013 HC RX MED GY IP 250 OP 250 PS 637: Performed by: STUDENT IN AN ORGANIZED HEALTH CARE EDUCATION/TRAINING PROGRAM

## 2020-11-20 PROCEDURE — 250N000013 HC RX MED GY IP 250 OP 250 PS 637: Performed by: PHYSICIAN ASSISTANT

## 2020-11-20 PROCEDURE — 99232 SBSQ HOSP IP/OBS MODERATE 35: CPT | Mod: GC | Performed by: PSYCHIATRY & NEUROLOGY

## 2020-11-20 PROCEDURE — 250N000011 HC RX IP 250 OP 636: Performed by: STUDENT IN AN ORGANIZED HEALTH CARE EDUCATION/TRAINING PROGRAM

## 2020-11-20 PROCEDURE — 124N000002 HC R&B MH UMMC

## 2020-11-20 RX ORDER — LURASIDONE HYDROCHLORIDE 20 MG/1
80 TABLET, FILM COATED ORAL DAILY
Status: DISCONTINUED | OUTPATIENT
Start: 2020-11-21 | End: 2020-11-26 | Stop reason: HOSPADM

## 2020-11-20 RX ORDER — OLANZAPINE 10 MG/2ML
5 INJECTION, POWDER, FOR SOLUTION INTRAMUSCULAR 2 TIMES DAILY
Status: DISCONTINUED | OUTPATIENT
Start: 2020-11-21 | End: 2020-11-24

## 2020-11-20 RX ORDER — OLANZAPINE 5 MG/1
5 TABLET ORAL 2 TIMES DAILY
Status: DISCONTINUED | OUTPATIENT
Start: 2020-11-21 | End: 2020-11-24

## 2020-11-20 RX ADMIN — OLANZAPINE 10 MG: 10 INJECTION, POWDER, FOR SOLUTION INTRAMUSCULAR at 19:51

## 2020-11-20 RX ADMIN — LORAZEPAM 0.5 MG: 0.5 TABLET ORAL at 19:50

## 2020-11-20 RX ADMIN — Medication 25 MG: at 16:31

## 2020-11-20 RX ADMIN — OLANZAPINE 10 MG: 10 INJECTION, POWDER, FOR SOLUTION INTRAMUSCULAR at 08:37

## 2020-11-20 RX ADMIN — LITHIUM CARBONATE 1200 MG: 300 TABLET, EXTENDED RELEASE ORAL at 21:12

## 2020-11-20 RX ADMIN — LURASIDONE HYDROCHLORIDE 60 MG: 20 TABLET, FILM COATED ORAL at 08:36

## 2020-11-20 RX ADMIN — LORAZEPAM 0.5 MG: 0.5 TABLET ORAL at 14:15

## 2020-11-20 RX ADMIN — LORAZEPAM 0.5 MG: 0.5 TABLET ORAL at 08:36

## 2020-11-20 RX ADMIN — METFORMIN HYDROCHLORIDE 500 MG: 500 TABLET, EXTENDED RELEASE ORAL at 16:26

## 2020-11-20 RX ADMIN — CLONIDINE HYDROCHLORIDE 0.1 MG: 0.1 TABLET ORAL at 19:50

## 2020-11-20 ASSESSMENT — ACTIVITIES OF DAILY LIVING (ADL)
LAUNDRY: WITH SUPERVISION
HYGIENE/GROOMING: INDEPENDENT
DRESS: SCRUBS (BEHAVIORAL HEALTH)
ORAL_HYGIENE: INDEPENDENT

## 2020-11-20 NOTE — PLAN OF CARE
"Pt asked for handouts on Seroquel and Ativan.  Printed out patient handouts on these medications.  Some time later pt came to staff wanting dose ranges.  Enc. To talk to MDs in the morning about this.  Pt has these and other papers very neatly arranged in his room.  Asked pt if he has been thinking a lot about the meds.  Pt said first \"yeah.\"  Then quickly said \"just here and there.\"  Pt observed pacing in his room.  No interaction with other patients.    "

## 2020-11-20 NOTE — PROGRESS NOTES
"  ----------------------------------------------------------------------------------------------------------  [facility], [place]   Psychiatric Progress Note  Hospital Day #8     Interim History:   The patient's care was discussed with the treatment team and chart notes were reviewed.    Sleep: 7 hrs  Scheduled Medications: took all scheduled medications.  PRN medications: melatonin, hydralazine prn for BP    Per Staff Notes:     Per Interview:   Patient reports that he is doing \"pretty well\" today. He says taking metformin is an improvement and is glad to have started it. He states that he feels like the higher dose of Latuda seems to be noticeably helpful for his mood and wellbeing overall. He mentions being curious about whether or not his Ativan dose should be increased, as he feels like it is not having as much of an effect as he thought it would. The team reminded the goal of him not leaving the hospital with Ativan, and explained that a dose/frequency would not help the plan to discontinue at discharge. His sleep is much better and he describes being able to fall asleep much quicker and easier at night. He denies having any restless legs, although does state that he \"enjoys pacing\" and feels like his feet are sore from walking so much. No further questions or concerns.      The risks, benefits, alternatives, and side effects of treatments including no treatment have been discussed and are understood by the patient and other caregivers.    Review of systems:     ROS was negative unless noted above.          Allergies:     Allergies   Allergen Reactions     Sulfa Drugs             Psychiatric Examination:   BP (!) 149/93   Pulse 65   Temp 98.1  F (36.7  C) (Oral)   Resp 16   Wt 92.6 kg (204 lb 1.6 oz)   SpO2 99%   BMI 27.11 kg/m      MENTAL STATUS EXAM:  Appearance:  normal posture, normal gait, well-developed, well-nourished, appears stated age  Attitude:   engaged, slightly " "guarded, cooperative  Psychomotor:  no evidence of tics, dystonia, or tardive dyskinesia  Eye Contact: appropriate  Speech:  normal tone, increased rate and talkative  Mood: \"pretty well\"  Affect:  congruent, appropriate  Thought Content: denies suicidal ideation  Thought Process: linear, goal directed, perseverative and slightly distracted   Sensorium: awake, alert and denies auditory hallucinations  Cognition: memory grossly intact and average intelligence  Impulse control: good  Insight: fair   Judgment: fair         Labs:     No results found for this or any previous visit (from the past 24 hour(s)).     Assessment    Diagnostic Impression:   Jose Francisco Sommers is a 30 year old single White male previously diagnosed with schizoaffective disorder, bipolar subtype, ADHD, sensory processing d/o who presented on 11/13/2020 with irritability and agitation, no sleep for several days, tangential speech, disorganized thought, speech, and behavior, and inappropriate comments towards family member in the context of being titrated off of olanzapine by primary psychiatrist secondary to weight gain.  Current psychosocial stressors include body image (weight gain dissatisfaction), chronic mental health issues, history of legal issues (difficulty \"getting out\" of the incarceration system as a person with mental illness) and school issues which he has been coping with by Weaning off of his olanzapine.  Patient's support system includes family and outpatient team.  Substance use does not appear to be playing a contributing role in the patient's presentation. Biological contributions to mental health presentation include chronic mental illness and weight gain.      The patient's presentation is consistent with schizoaffective disorder, bipolar subtype, currently with manic features.    Principal Diagnosis:   #Schizoaffective disorder, bipolar subtype, current manic features     Psychiatric Hospital course:   Jose Francisco Sommers was " "admitted to [facility] as a voluntary patient. His PTA lithium & olanzapine were continued. PTA olanzapine was increased to 10 mg nightly, a previous dose that worked well to manage the patient's psychotic features and keep the patient in a balanced, euthymic state.  Collateral obtained from mother and outpatient medication manager, , revealed that the patient had been titrating down on olanzapine despite the medication keeping the patient in a euthymic state in order to reduce the medications effects on his increasing weight.  The most recent reduction was to 2.5 mg, however a higher dose will be expected to manage the patient's symptoms at this time. On 11/17, the patient demonstrated behavior indicating approach to a euthymic state (with some minor manic features such as increased rate of speech and verbosity as well as disorganized thought process) so lurasidone was added 20 mg at that time with the goal of transitioning to lurasidone from olanzapine as it has a lower risk for weight gain with chronic use compares to olanzapine. Lurasidone does increased to 40 mg on 11/18.      Discontinued Medications (& Rationale):  None     Medical course:   The patient was medically cleared for admission. He continued to have hypertension sustained at 190-200/ for several days, after which time the patient was started on clonidine 0.1 mg twice daily as needed, which helped with hypertension. It is unclear if this hypertension is essential HTN or related to neuroleptic malignant syndrome, but NMS is unlikely as the patient did not demonstrate other features, such as altered mental status, hyperthermia, tachycardia. The patient expressed concern for STDs on 11/16/20, so these labs were drawn at this time (all negative). At this time he also vocalized desire for a \"whole body MRI scan\" to address generalized paranoid belief that something in his body is broken, which we mentioned would be addressed at a later time " after ruling out recent MSK injury/indications for MRI.     The patient began having bradycardia in the setting of ineffective hypertension management, so hydralazine was added and clonidine was discontinued on 11/17. BP is being monitored.      Data:   CBC and CMP unremarkable, except triglycerides upon admission were 211, 11/13/20.    11/12/2020 23:27 11/18/2020 07:40   Lithium Level 0.44 (L) 0.72     Consults:   Medicine, 11/17/20 for HTN management - continue Hydralazine 25 mg q6h prn (to be given if SBP >160 or DBP >110 mmHg)    Plan     Today's Changes:  - Increase Lurasidone to 80 mg p.o. daily   - Decrease olanzapine to 5 mg tomorrow (plan is to get the patient off of olanzapine at discharge)     Scheduled Psych Meds:  -Lithium CR tablets 1200 mg nightly  -Zyprexa 10 mg BID p.o. / IM   -Lorazepam 1 mg TID p.o.    -Lurasidone 80 mg p.o. daily   -Metformin 500 mg XR with supper (for antipsychotic-induced weight gain)    PRN Medications:  - acetaminophen, alum & mag hydroxide-simethicone, cloNIDine, hydrALAZINE, hydrOXYzine, melatonin, nicotine, OLANZapine **OR** [DISCONTINUED] OLANZapine    Patient will be treated in therapeutic milieu with appropriate individual and group therapies as described.    Medical diagnoses to be addressed this admission:    #Hypertension: Initially treated with PRN clonidine 0.1 mg twice daily, but bradycardia occurred on this dose, so medication changed to hydralazine 25 mg q6h prn   -Hydralazine 25 mg q6h prn (to be given if SBP >160 or DBP >110 mmHg)     #Nicotine dependence  -Nicotine gum 2 mg every hour as needed    Legal Status: Voluntary    Safety Assessment:   Behavioral Orders   Procedures     Code 1 - Restrict to Unit     Routine Programming     As clinically indicated     Status 15     Every 15 minutes.       Disposition: No imminent plan for discharge; uptitrating/modifying medication regimen and Pending stabilization & development of a safe discharge plan.     Patient  seen and discussed with my attending physician, Mihai Finn MD.     New Shoemaker MD  PGY-1 Psychiatry Resident    Psychiatry Attending Attestation:  I, Mihai Finn, saw and evaluated the patient with the resident physician.  I agree with the findings and plan of care as documented in the resident note.  I have reviewed all labs and vital signs.

## 2020-11-20 NOTE — PLAN OF CARE
Pt has a calm, pleasant demeanor when interacting with this writer.  Pt continues to request the IM route for his olanzapine; a request which is readily honored.  Pt has a bit of a quirky affect but is always polite and considerate with others.  Pt's appetite and fluid intake are appropriate. Pt's overall demeanor has improved since admission.

## 2020-11-20 NOTE — PROGRESS NOTES
/106 @ 1635. PRN hydralozine 25 mg given. /103 at 1950. PRN Catapres 0.1 mg given. /88 at 2120.    Patient received schedule Zyprexa 10 mg IM at his request.

## 2020-11-20 NOTE — PLAN OF CARE
BEHAVIORAL TEAM DISCUSSION    Participants:   Dr. Finn, Dr. Shoemaker, Morro Bustos RN, Tamanna Hardy MA.LP, Med students    Progress:  Patient reports that he is doing better- pleased with the med changes. He has been less focused on somatic concerns - has not asked about MRI in several days.  Patient is not social with other peers- keeps to himself but has attended some unit groups and has participated when called upon    Anticipated length of stay:  5-7 days    Continued Stay Criteria/Rationale:   Nan[place]/psychosis, medication changes per MD's    Medical/Physical:   None    Precautions:   Behavioral Orders   Procedures     Code 1 - Restrict to Unit     Routine Programming     As clinically indicated     Status 15     Every 15 minutes.     Plan:   Meds continue to be adjusted.monitored per MD's.  Patient will return home with parents when stable.    Rationale for change in precautions or plan:   No change in plan/precautions

## 2020-11-20 NOTE — PROGRESS NOTES
Patient appeared to have comfortably slept for 7 hours. No prn or snacks given. Pt was in his room the entire shift. No concerns noted. Will continue to monitor.

## 2020-11-21 PROCEDURE — 124N000002 HC R&B MH UMMC

## 2020-11-21 PROCEDURE — 250N000013 HC RX MED GY IP 250 OP 250 PS 637: Performed by: STUDENT IN AN ORGANIZED HEALTH CARE EDUCATION/TRAINING PROGRAM

## 2020-11-21 PROCEDURE — 250N000011 HC RX IP 250 OP 636: Performed by: STUDENT IN AN ORGANIZED HEALTH CARE EDUCATION/TRAINING PROGRAM

## 2020-11-21 RX ADMIN — LORAZEPAM 0.5 MG: 0.5 TABLET ORAL at 19:07

## 2020-11-21 RX ADMIN — MELATONIN TAB 3 MG 3 MG: 3 TAB at 20:55

## 2020-11-21 RX ADMIN — OLANZAPINE 5 MG: 10 INJECTION, POWDER, FOR SOLUTION INTRAMUSCULAR at 19:09

## 2020-11-21 RX ADMIN — LITHIUM CARBONATE 1200 MG: 300 TABLET, EXTENDED RELEASE ORAL at 20:54

## 2020-11-21 RX ADMIN — LORAZEPAM 0.5 MG: 0.5 TABLET ORAL at 07:37

## 2020-11-21 RX ADMIN — OLANZAPINE 5 MG: 10 INJECTION, POWDER, FOR SOLUTION INTRAMUSCULAR at 07:37

## 2020-11-21 RX ADMIN — LORAZEPAM 0.5 MG: 0.5 TABLET ORAL at 14:07

## 2020-11-21 RX ADMIN — METFORMIN HYDROCHLORIDE 500 MG: 500 TABLET, EXTENDED RELEASE ORAL at 16:46

## 2020-11-21 RX ADMIN — LURASIDONE HYDROCHLORIDE 80 MG: 20 TABLET, FILM COATED ORAL at 07:37

## 2020-11-21 NOTE — PROGRESS NOTES
Pt appears to be improving.  His anxiety seemslower and his pacing has decreased somewhat.  The patient states the increase in his Latuda is showing benefits already.  Pt states that he feels more relaxed and that a weight is being lifted off of him.  Pt continues to request IM Zyprexa and was accepting of the lowered dose of that medication, especially when this writer explained that his Latuda dose had increase.  Pt does not endorse any side effects from the medication changes.   Pt's blood pressure did not require any medication interventions during this shift.

## 2020-11-21 NOTE — PROGRESS NOTES
Pt was isolative in his room for most of the evening. Pt was approachable by staff and peers. Pt denied any any SI/SIB or hallucinations. Pt denied any thoughts of self harm. Pt reported that his anxiety was 'okay' this evening rating it a 6/10. Pt shower this evening and took care of ADL's.

## 2020-11-21 NOTE — PLAN OF CARE
"  Problem: Behavioral Health Plan of Care  Goal: Absence of New-Onset Illness or Injury  Outcome: Improving  Note: No new illness or injury. Patient states he is \"feeling a lot better\" since admission.  Goal: Develops/Participates in Therapeutic [place] to Support Successful Transition  Outcome: Improving  Note: Patient checked in briefly with writer about medications and progress of stay. Stated he preferred IM Zyprexa to PO version. Patient stated \"it just feels like it works better this way\". Patient stated mother in [place] is a source of support and \"very kind\" and that he lives at home. States sometimes he has problems getting up in the morning and getting going and it gets in the way of him being independent. Stated he hoped he was \"not a burden\" at home because \"mental illness is kind of hard\". Writer reinforced that patient had stated that mother was supportive and that his environment was a positive situation for all parties.     Problem: Suicidal Behavior  Goal: Suicidal Behavior is Absent or Managed  Outcome: Improving     "

## 2020-11-21 NOTE — PROGRESS NOTES
Pt appeared to have slept comfortably. Pt remained in his room the entire shift. No PRN or snacks given.Total sleep hours : 6.5 hours.

## 2020-11-22 PROCEDURE — 250N000013 HC RX MED GY IP 250 OP 250 PS 637: Performed by: STUDENT IN AN ORGANIZED HEALTH CARE EDUCATION/TRAINING PROGRAM

## 2020-11-22 PROCEDURE — 250N000011 HC RX IP 250 OP 636: Performed by: STUDENT IN AN ORGANIZED HEALTH CARE EDUCATION/TRAINING PROGRAM

## 2020-11-22 PROCEDURE — 124N000002 HC R&B MH UMMC

## 2020-11-22 RX ADMIN — MELATONIN TAB 3 MG 3 MG: 3 TAB at 21:29

## 2020-11-22 RX ADMIN — LITHIUM CARBONATE 1200 MG: 300 TABLET, EXTENDED RELEASE ORAL at 21:26

## 2020-11-22 RX ADMIN — LORAZEPAM 0.5 MG: 0.5 TABLET ORAL at 07:43

## 2020-11-22 RX ADMIN — LORAZEPAM 0.5 MG: 0.5 TABLET ORAL at 13:57

## 2020-11-22 RX ADMIN — METFORMIN HYDROCHLORIDE 500 MG: 500 TABLET, EXTENDED RELEASE ORAL at 16:05

## 2020-11-22 RX ADMIN — LORAZEPAM 0.5 MG: 0.5 TABLET ORAL at 19:40

## 2020-11-22 RX ADMIN — LURASIDONE HYDROCHLORIDE 80 MG: 20 TABLET, FILM COATED ORAL at 07:43

## 2020-11-22 RX ADMIN — OLANZAPINE 5 MG: 10 INJECTION, POWDER, FOR SOLUTION INTRAMUSCULAR at 19:43

## 2020-11-22 RX ADMIN — OLANZAPINE 5 MG: 10 INJECTION, POWDER, FOR SOLUTION INTRAMUSCULAR at 07:43

## 2020-11-22 NOTE — PROGRESS NOTES
Pt was isolative and withdrawn this evening. Pt was pacing in his room for majority of the shift. Pt denied any SI/SIB/AH/VH. Pt denied any thoughts of self harm. Pt reported that his evening was 'Good'. Pt didn't shower this evening or take care of any ADL's. No concerns were observed or reported.

## 2020-11-22 NOTE — PROGRESS NOTES
"Pt was up at around 0515 noted sited calmly in his room; Pt denied SI/SIB , denied anxiety. Staff offered prn meds which he stated \" I really don't need any medication now, I feel ok\". No behavior concerns noted. Slept a total  of 5.25 hours. Will continue to monitor and offer support.   "

## 2020-11-22 NOTE — PLAN OF CARE
Pt's behavior is moving towards baseline. His pacing on the unit is not a frenetic as it once was.  While pacing, the patient appears calm and does not have any other anxious movements. Pt continues to be very pleased with the increased Latuda dosing.  Pt discussed exercise regimens with this writer; pt states he plans to continue working out as it has benefits to both his physical and emotional health.

## 2020-11-23 PROCEDURE — 250N000013 HC RX MED GY IP 250 OP 250 PS 637: Performed by: STUDENT IN AN ORGANIZED HEALTH CARE EDUCATION/TRAINING PROGRAM

## 2020-11-23 PROCEDURE — 99232 SBSQ HOSP IP/OBS MODERATE 35: CPT | Mod: GC | Performed by: PSYCHIATRY & NEUROLOGY

## 2020-11-23 PROCEDURE — 124N000002 HC R&B MH UMMC

## 2020-11-23 RX ORDER — DOXEPIN HYDROCHLORIDE 10 MG/1
10 CAPSULE ORAL AT BEDTIME
Status: CANCELLED | OUTPATIENT
Start: 2020-11-23

## 2020-11-23 RX ORDER — DOXEPIN HYDROCHLORIDE 10 MG/ML
10 SOLUTION ORAL AT BEDTIME
Status: CANCELLED | OUTPATIENT
Start: 2020-11-23

## 2020-11-23 RX ORDER — DOXEPIN HYDROCHLORIDE 10 MG/1
10 CAPSULE ORAL AT BEDTIME
Status: DISCONTINUED | OUTPATIENT
Start: 2020-11-23 | End: 2020-11-26 | Stop reason: HOSPADM

## 2020-11-23 RX ADMIN — DOXEPIN HYDROCHLORIDE 10 MG: 10 CAPSULE ORAL at 21:25

## 2020-11-23 RX ADMIN — MELATONIN TAB 3 MG 3 MG: 3 TAB at 21:25

## 2020-11-23 RX ADMIN — LITHIUM CARBONATE 1200 MG: 300 TABLET, EXTENDED RELEASE ORAL at 21:25

## 2020-11-23 RX ADMIN — LORAZEPAM 0.5 MG: 0.5 TABLET ORAL at 21:26

## 2020-11-23 RX ADMIN — LURASIDONE HYDROCHLORIDE 80 MG: 20 TABLET, FILM COATED ORAL at 07:58

## 2020-11-23 RX ADMIN — LORAZEPAM 0.5 MG: 0.5 TABLET ORAL at 07:58

## 2020-11-23 NOTE — PROGRESS NOTES
Pt appeared to have slept comfortably  for 6 hours. Pt remained in his room the entire shift. No PRNs or snack given.

## 2020-11-23 NOTE — PROGRESS NOTES
"  ----------------------------------------------------------------------------------------------------------  [facility], [place]   Psychiatric Progress Note  Hospital Day #11     Interim History:   The patient's care was discussed with the treatment team and chart notes were reviewed.    Sleep: 6 hrs  Scheduled Medications: took all scheduled medications.  PRN medications: melatonin, did not need hydralazine prn for BP    Per Staff Notes: No acute events over the weekend. \"Pt's behavior is moving towards baseline. His pacing on the unit is not a frenetic as it once was.  While pacing, the patient appears calm and does not have any other anxious movements. Pt continues to be very pleased with the increased Latuda dosing.  Pt discussed exercise regimens with this writer; pt states he plans to continue working out as it has benefits to both his physical and emotional health. Pt states that he feels more relaxed and that a weight is being lifted off of him.  Pt continues to request IM Zyprexa and was accepting of the lowered dose of that medication, especially when this writer explained that his Latuda dose had increase.\"     Per Interview:   Pt reports he is doing fine. He reports that his sleep is getting worse and he is waking up more since decreasing his Zyprexa. He says the quality of his sleep is still good he just waking up more frequently. He reports feeling less depressed today which he attributes to Latuda. He thinks he is tolerating his meds well. He reports good mood, and is still inquiring about starting Seroquel. The team explained to the patient that starting Seroquel is a possibility, and he needs to be off of Zyprexa first. Patient was offered another medication to support his sleep (Doxepin 10 mg ) and he agreed to stay on 5 mg Zyprexa and discontinue before discharge.        The risks, benefits, alternatives, and side effects of treatments including no treatment have been " "discussed and are understood by the patient and other caregivers.    Review of systems:     ROS was negative unless noted above.          Allergies:     Allergies   Allergen Reactions     Sulfa Drugs             Psychiatric Examination:   /78   Pulse 69   Temp 97.6  F (36.4  C) (Oral)   Resp 16   Wt 93.8 kg (206 lb 14.4 oz)   SpO2 98%   BMI 27.49 kg/m      MENTAL STATUS EXAM:   Appearance:  normal posture, normal gait, well-developed,  appears stated age  Attitude:   engaged, cooperative  Psychomotor:  no evidence of tics, dystonia, or tardive dyskinesia  Eye Contact: appropriate  Speech:  normal tone, increased rate and talkative  Mood: \"doing fine\"  Affect:  congruent, appropriate  Thought Content: denies suicidal ideation  Thought Process: linear, goal directed, perseverative and slightly distracted   Sensorium: awake, alert and denies auditory hallucinations  Cognition: memory grossly intact and average intelligence  Impulse control: good  Insight: fair   Judgment: fair         Labs:     No results found for this or any previous visit (from the past 24 hour(s)).     Assessment    Diagnostic Impression:   Jose Francisco Sommers is a 30 year old single White male previously diagnosed with schizoaffective disorder, bipolar subtype, ADHD, sensory processing d/o who presented on 11/13/2020 with irritability and agitation, no sleep for several days, tangential speech, disorganized thought, speech, and behavior, and inappropriate comments towards family member in the context of being titrated off of olanzapine by primary psychiatrist secondary to weight gain.  Current psychosocial stressors include body image (weight gain dissatisfaction), chronic mental health issues, history of legal issues (difficulty \"getting out\" of the incarceration system as a person with mental illness) and school issues which he has been coping with by Weaning off of his olanzapine.  Patient's support system includes family and outpatient " team.  Substance use does not appear to be playing a contributing role in the patient's presentation. Biological contributions to mental health presentation include chronic mental illness and weight gain.      The patient's presentation is consistent with schizoaffective disorder, bipolar subtype, currently with manic features.    Principal Diagnosis:   #Schizoaffective disorder, bipolar subtype, current manic features     Psychiatric Hospital course:   Jose Francisco Sommers was admitted to [facility] as a voluntary patient. His PTA lithium & olanzapine were continued. PTA olanzapine was increased to 10 mg nightly, a previous dose that worked well to manage the patient's psychotic features and keep the patient in a balanced, euthymic state.  Collateral obtained from mother and outpatient medication manager, , revealed that the patient had been titrating down on olanzapine despite the medication keeping the patient in a euthymic state in order to reduce the medications effects on his increasing weight.  The most recent reduction was to 2.5 mg, however a higher dose will be expected to manage the patient's symptoms at this time. On 11/17, the patient demonstrated behavior indicating approach to a euthymic state (with some minor manic features such as increased rate of speech and verbosity as well as disorganized thought process) so lurasidone was added 20 mg at that time with the goal of transitioning to lurasidone from olanzapine as it has a lower risk for weight gain with chronic use compares to olanzapine. Lurasidone does has been gradually increased to 80 mg and patient has benefited from the dose increase, been feeling less depressed and had no side effects. On 11/23 doxepin 10 mg started at bed time to aid his sleep. Olanzapine evening dose was hold.      Discontinued Medications (& Rationale):  None     Medical course:   The patient was medically cleared for admission. He continued to have hypertension  "sustained at 190-200/ for several days, after which time the patient was started on clonidine 0.1 mg twice daily as needed, which helped with hypertension. It is unclear if this hypertension is essential HTN or related to neuroleptic malignant syndrome, but NMS is unlikely as the patient did not demonstrate other features, such as altered mental status, hyperthermia, tachycardia. The patient expressed concern for STDs on 11/16/20, so these labs were drawn at this time (all negative). At this time he also vocalized desire for a \"whole body MRI scan\" to address generalized paranoid belief that something in his body is broken, which we mentioned would be addressed at a later time after ruling out recent MSK injury/indications for MRI. BP has been stabilized, does not need prn hydralazine most of the time.     The patient began having bradycardia in the setting of ineffective hypertension management, so hydralazine was added and clonidine was discontinued on 11/17. BP is being monitored.      Data:   CBC and CMP unremarkable, except triglycerides upon admission were 211, 11/13/20.    11/12/2020 23:27 11/18/2020 07:40   Lithium Level 0.44 (L) 0.72     Consults:   Medicine, 11/17/20 for HTN management - continue Hydralazine 25 mg q6h prn (to be given if SBP >160 or DBP >110 mmHg)    Plan     Today's Changes:   -Started  Doxepin 10 mg at bedtime (to improve sleep, reduce frequent awakening)  -Hold Olanzapine (Zyprexa) 5 mg evening dose after starting doxepin    Scheduled Psych Meds:  -Lithium CR tablets 1200 mg nightly  -Olanzapine (Zyprexa) 5 mg BID p.o. / IM (plan is to get the patient off of olanzapine at discharge, the evening dose on 11/23 hold)  -Lorazepam 1 mg TID p.o.    -Lurasidone 80 mg p.o. daily   -Metformin 500 mg XR with supper (for antipsychotic-induced weight gain)    PRN Medications:  - acetaminophen, alum & mag hydroxide-simethicone, cloNIDine, hydrALAZINE, hydrOXYzine, melatonin, nicotine, " OLANZapine **OR** [DISCONTINUED] OLANZapine    Patient will be treated in therapeutic milieu with appropriate individual and group therapies as described.    Medical diagnoses to be addressed this admission:    #Hypertension: Initially treated with PRN clonidine 0.1 mg twice daily, but bradycardia occurred on this dose, so medication changed to hydralazine 25 mg q6h prn   -Hydralazine 25 mg q6h prn (to be given if SBP >160 or DBP >110 mmHg)     #Nicotine dependence  -Nicotine gum 2 mg every hour as needed    Legal Status: Voluntary    Safety Assessment:   Behavioral Orders   Procedures     Code 1 - Restrict to Unit     Routine Programming     As clinically indicated     Status 15     Every 15 minutes.       Disposition: No imminent plan for discharge; uptitrating/modifying medication regimen and Pending stabilization & development of a safe discharge plan.     Patient seen and discussed with my attending physician, Mihai Finn MD.     New Shoemaker MD  PGY-1 Psychiatry Resident    Psychiatry Attending Attestation:  I, Mihai Finn, saw and evaluated the patient with the resident physician.  I agree with the findings and plan of care as documented in the resident note.  I have reviewed all labs and vital signs.

## 2020-11-23 NOTE — PROGRESS NOTES
Pt was visible in the milieu this evening. Pt was seen pacing in his room calmly. Pt denied any SI/SIB or hallucinations. Pt denied any thoughts of self harm.pt took a shower this evening and took care of ADL's.

## 2020-11-23 NOTE — PLAN OF CARE
Daily CTC Note:     Work Completed:   The patient's care was discussed with the treatment team and chart notes were reviewed.   Patient met with team.  He states that he is feeling better.    Patient continues to pace during the day but has also spent more time in the milieu  He continues to be  Very focused on meds./med changes but reports he is feeling better,  Will continue to coordinate care with outpatient providers.       Discharge plan or goal:   Home with family when stable     Barriers to discharge:   Severity of symptoms, Inability to care for self

## 2020-11-23 NOTE — PLAN OF CARE
Pt continues to show improvement.  Pt paces the halls for exercise rather than for anxiety management.  Pt is accepting of the medication changes being made and continues to feel the benefit of the Latuda.  Pt feels his improvement is steady but is not feeling the need to be discharged yet.  Pt feels like he would rather stay a little longer in order to help set himself up for success.

## 2020-11-24 PROCEDURE — 99232 SBSQ HOSP IP/OBS MODERATE 35: CPT | Mod: GC | Performed by: PSYCHIATRY & NEUROLOGY

## 2020-11-24 PROCEDURE — 250N000013 HC RX MED GY IP 250 OP 250 PS 637: Performed by: STUDENT IN AN ORGANIZED HEALTH CARE EDUCATION/TRAINING PROGRAM

## 2020-11-24 PROCEDURE — 124N000002 HC R&B MH UMMC

## 2020-11-24 PROCEDURE — 93010 ELECTROCARDIOGRAM REPORT: CPT | Performed by: INTERNAL MEDICINE

## 2020-11-24 PROCEDURE — 93005 ELECTROCARDIOGRAM TRACING: CPT

## 2020-11-24 RX ORDER — QUETIAPINE FUMARATE 25 MG/1
25 TABLET, FILM COATED ORAL AT BEDTIME
Status: DISCONTINUED | OUTPATIENT
Start: 2020-11-24 | End: 2020-11-25

## 2020-11-24 RX ADMIN — LORAZEPAM 0.5 MG: 0.5 TABLET ORAL at 15:10

## 2020-11-24 RX ADMIN — MELATONIN TAB 3 MG 3 MG: 3 TAB at 20:34

## 2020-11-24 RX ADMIN — LITHIUM CARBONATE 1200 MG: 300 TABLET, EXTENDED RELEASE ORAL at 20:31

## 2020-11-24 RX ADMIN — LORAZEPAM 0.5 MG: 0.5 TABLET ORAL at 20:32

## 2020-11-24 RX ADMIN — LURASIDONE HYDROCHLORIDE 80 MG: 20 TABLET, FILM COATED ORAL at 09:47

## 2020-11-24 RX ADMIN — LORAZEPAM 0.5 MG: 0.5 TABLET ORAL at 09:47

## 2020-11-24 RX ADMIN — DOXEPIN HYDROCHLORIDE 10 MG: 10 CAPSULE ORAL at 20:31

## 2020-11-24 RX ADMIN — METFORMIN HYDROCHLORIDE 500 MG: 500 TABLET, EXTENDED RELEASE ORAL at 18:21

## 2020-11-24 RX ADMIN — QUETIAPINE FUMARATE 25 MG: 25 TABLET ORAL at 20:33

## 2020-11-24 ASSESSMENT — ACTIVITIES OF DAILY LIVING (ADL)
DRESS: INDEPENDENT
HYGIENE/GROOMING: INDEPENDENT
HYGIENE/GROOMING: INDEPENDENT
DRESS: INDEPENDENT
ORAL_HYGIENE: INDEPENDENT
ORAL_HYGIENE: INDEPENDENT

## 2020-11-24 NOTE — PLAN OF CARE
Daily CTC Note:     Work Completed:   The patient's care was discussed with the treatment team and chart notes were reviewed.   Patient met with team.  He states that he continues to feel better.    Patient continues to pace during the day with little interaction with peers.  Reported to team he thinks he'll be ready to discharge in the next few days.  Continues to be reluctant for us to speak with his mother.  Will continue to coordinate care with outpatient providers.        Discharge plan or goal:   Home with family when stable     Barriers to discharge:   Ongoing medication changes per MD's.

## 2020-11-24 NOTE — PROGRESS NOTES
Pt appeared to have slept comfortably for 6 hours; no prn or snacks given. Remained in his room the entire shift. Will continue to monitor.

## 2020-11-24 NOTE — PROGRESS NOTES
Pt was visible in the milieu this evening. Pt denied any SI/SIB or hallucinations. Pt denied any thoughts of self harm. Pt was pleasant and approachable this morning. Pt reported that he is looking forward to discharging soon and hopefully before thanksgiving. Pt didn't shower this evening or take care of ADL's.

## 2020-11-24 NOTE — PROGRESS NOTES
Pt was generally isolative/withdrawn, but was intermittently observed in the milieu either pacing or eating meals. He endorsed to his nurse that he feels 'the medications are working.' This writer was not able to formally able to check-in w/ Pt, but he appeared restless, pacing in his bedroom. He was independent in ADLs and did not specifically endorse any pressing questions or concerns to staff on the unit.

## 2020-11-24 NOTE — PROGRESS NOTES
"  ----------------------------------------------------------------------------------------------------------  [facility], [place]   Psychiatric Progress Note  Hospital Day #12     Interim History:   The patient's care was discussed with the treatment team and chart notes were reviewed.    Sleep: 6 hrs  Scheduled Medications: took all scheduled medications except metformin not given.  PRN medications: Took melatonin, did not need hydralazine prn for BP    Per Staff Notes: No acute events overnight.  \"He endorsed to his nurse that he feels 'the medications are working. Pt continues to show improvement.\"    Per Interview:   Manuel reports that he he has been doing better, had a good night sleep last night.  He acknowledged that his Zyprexa was on hold.his restarted a new medication (doxepin 10 mg).  He reports no side effects.  The plan is to start Seroquel 25 mg at bedtime today, and he agreed with the plan.  He brought up a his ADHD diagnosis, says he was on several different medications including Ritalin and Concerta.  He said they were helping him, and he was getting A's in college, and when he was taken off these medications his grades dropped.  He states that it is still difficult for him to concentrate on tasks and stay on them.  The team suggested that once he is stabilized on his current medicatiosn and discharged he can discuss his treatment options for ADHD with his outpatient psychiatrist (Dr. Ball).  He reports that Latuda is working well with no side effects.  He ask when he could be discharged, and if it is possible for him to go home for the by the end of this week.  [facility] will call mom to discuss the possibility of discharge today.     The risks, benefits, alternatives, and side effects of treatments including no treatment have been discussed and are understood by the patient and other caregivers.    Review of systems:     ROS was negative unless noted above.          " "Allergies:     Allergies   Allergen Reactions     Sulfa Drugs             Psychiatric Examination:   BP (!) 160/79   Pulse 66   Temp 98  F (36.7  C) (Oral)   Resp 16   Wt 93.8 kg (206 lb 14.4 oz)   SpO2 96%   BMI 27.49 kg/m      MENTAL STATUS EXAM:   Appearance:  normal posture, normal gait, well-developed,  appears stated age  Attitude:   engaged, cooperative  Psychomotor:  no evidence of tics, dystonia, or tardive dyskinesia  Eye Contact: appropriate  Speech:  normal tone, increased rate and talkative  Mood: \"doing better\"  Affect:  congruent, appropriate  Thought Content: denies suicidal ideation  Thought Process: linear, goal directed, perseverative and slightly distracted   Sensorium: awake, alert and denies auditory hallucinations  Cognition: memory grossly intact and average intelligence  Impulse control: good  Insight: fair   Judgment: fair         Labs:     No results found for this or any previous visit (from the past 24 hour(s)).     Assessment    Diagnostic Impression:   Jose Francisco Sommers is a 30 year old single White male previously diagnosed with schizoaffective disorder, bipolar subtype, ADHD, sensory processing d/o who presented on 11/13/2020 with irritability and agitation, no sleep for several days, tangential speech, disorganized thought, speech, and behavior, and inappropriate comments towards family member in the context of being titrated off of olanzapine by primary psychiatrist secondary to weight gain.  Current psychosocial stressors include body image (weight gain dissatisfaction), chronic mental health issues, history of legal issues (difficulty \"getting out\" of the incarceration system as a person with mental illness) and school issues which he has been coping with by Weaning off of his olanzapine.  Patient's support system includes family and outpatient team.  Substance use does not appear to be playing a contributing role in the patient's presentation. Biological contributions to " mental health presentation include chronic mental illness and weight gain.      The patient's presentation is consistent with schizoaffective disorder, bipolar subtype, currently with manic features.    Principal Diagnosis:   #Schizoaffective disorder, bipolar subtype, current manic features     Psychiatric Hospital course:   Jose Francisco Sommers was admitted to [facility] as a voluntary patient. His PTA lithium & olanzapine were continued. PTA olanzapine was increased to 10 mg nightly, a previous dose that worked well to manage the patient's psychotic features and keep the patient in a balanced, euthymic state.  Collateral obtained from mother and outpatient medication manager, , revealed that the patient had been titrating down on olanzapine despite the medication keeping the patient in a euthymic state in order to reduce the medications effects on his increasing weight.  The most recent reduction was to 2.5 mg, however a higher dose will be expected to manage the patient's symptoms at this time. On 11/17, the patient demonstrated behavior indicating approach to a euthymic state (with some minor manic features such as increased rate of speech and verbosity as well as disorganized thought process) so lurasidone was added 20 mg at that time with the goal of transitioning to lurasidone from olanzapine as it has a lower risk for weight gain with chronic use compares to olanzapine. Lurasidone does has been gradually increased to 80 mg and patient has benefited from the dose increase, been feeling less depressed and had no side effects. On 11/23 doxepin 10 mg started at bed time to aid his sleep. Olanzapine discontinued on 11/24 and Seroquel 25 mg at bedtime started. Possible discharge at the end of this week once the CTC speaks with mom.      Discontinued Medications (& Rationale):  Olanzapine (tapered off after starting Latuda)     Medical course:   The patient was medically cleared for admission. He continued to  "have hypertension sustained at 190-200/ for several days, after which time the patient was started on clonidine 0.1 mg twice daily as needed, which helped with hypertension. It is unclear if this hypertension is essential HTN or related to neuroleptic malignant syndrome, but NMS is unlikely as the patient did not demonstrate other features, such as altered mental status, hyperthermia, tachycardia. The patient expressed concern for STDs on 11/16/20, so these labs were drawn at this time (all negative). At this time he also vocalized desire for a \"whole body MRI scan\" to address generalized paranoid belief that something in his body is broken, which we mentioned would be addressed at a later time after ruling out recent MSK injury/indications for MRI. BP has been stabilized, does not need prn hydralazine most of the time.     The patient began having bradycardia in the setting of ineffective hypertension management, so hydralazine was added and clonidine was discontinued on 11/17. BP is being monitored.      Data:   CBC and CMP unremarkable, except triglycerides upon admission were 211, 11/13/20.      11/12/2020 23:27 11/18/2020 07:40   Lithium Level 0.44 (L) 0.72     Consults:   Medicine, 11/17/20 for HTN management - continue Hydralazine 25 mg q6h prn (to be given if SBP >160 or DBP >110 mmHg)    Plan     Today's Changes:   - Discontinue Olanzapine (tapered off after starting doxepin)  - Started Seroquel 25 mg at bedtime  - Lithium level scheduled for 11/25  - EKG ordered - follow up, pt is on neuroleptics     Scheduled Psych Meds:   - Lithium CR tablets 1200 mg nightly  - Lorazepam 1 mg TID p.o.    - Lurasidone 80 mg p.o. daily   - Doxepin 10 mg at bedtime   - Seroquel 15 mg at bedtime   - Metformin 500 mg XR with supper (for antipsychotic-induced weight gain)    PRN Medications:  - acetaminophen, alum & mag hydroxide-simethicone, cloNIDine, hydrALAZINE, hydrOXYzine, melatonin, nicotine, OLANZapine **OR** " [DISCONTINUED] OLANZapine    Patient will be treated in therapeutic milieu with appropriate individual and group therapies as described.    Medical diagnoses to be addressed this admission:    #Hypertension: Initially treated with PRN clonidine 0.1 mg twice daily, but bradycardia occurred on this dose, so medication changed to hydralazine 25 mg q6h prn   -Hydralazine 25 mg q6h prn (to be given if SBP >160 or DBP >110 mmHg)     #Nicotine dependence  -Nicotine gum 2 mg every hour as needed    Legal Status: Voluntary    Safety Assessment:   Behavioral Orders   Procedures     Code 1 - Restrict to Unit     Routine Programming     As clinically indicated     Status 15     Every 15 minutes.       Disposition: No imminent plan for discharge; uptitrating/modifying medication regimen and Pending stabilization & development of a safe discharge plan.     Patient seen and discussed with my attending physician, Mihai Finn MD.     New Shoemaker MD  PGY-1 Psychiatry Resident    Psychiatry Attending Attestation:  I, Mihai Finn, saw and evaluated the patient with the resident physician.  I agree with the findings and plan of care as documented in the resident note.  I have reviewed all labs and vital signs.

## 2020-11-25 LAB
INTERPRETATION ECG - MUSE: NORMAL
LITHIUM SERPL-SCNC: 0.79 MMOL/L (ref 0.6–1.2)

## 2020-11-25 PROCEDURE — 99232 SBSQ HOSP IP/OBS MODERATE 35: CPT | Mod: GC | Performed by: PSYCHIATRY & NEUROLOGY

## 2020-11-25 PROCEDURE — 36415 COLL VENOUS BLD VENIPUNCTURE: CPT | Performed by: STUDENT IN AN ORGANIZED HEALTH CARE EDUCATION/TRAINING PROGRAM

## 2020-11-25 PROCEDURE — 80178 ASSAY OF LITHIUM: CPT | Performed by: STUDENT IN AN ORGANIZED HEALTH CARE EDUCATION/TRAINING PROGRAM

## 2020-11-25 PROCEDURE — 250N000013 HC RX MED GY IP 250 OP 250 PS 637: Performed by: STUDENT IN AN ORGANIZED HEALTH CARE EDUCATION/TRAINING PROGRAM

## 2020-11-25 PROCEDURE — 124N000002 HC R&B MH UMMC

## 2020-11-25 RX ORDER — CLONIDINE HYDROCHLORIDE 0.1 MG/1
0.1 TABLET ORAL 2 TIMES DAILY PRN
Qty: 15 TABLET | Refills: 0 | Status: ON HOLD | OUTPATIENT
Start: 2020-11-25 | End: 2020-12-21

## 2020-11-25 RX ORDER — LITHIUM CARBONATE 300 MG/1
1200 TABLET, FILM COATED, EXTENDED RELEASE ORAL AT BEDTIME
Qty: 120 TABLET | Refills: 0 | Status: ON HOLD | OUTPATIENT
Start: 2020-11-25 | End: 2020-12-21

## 2020-11-25 RX ORDER — LURASIDONE HYDROCHLORIDE 80 MG/1
80 TABLET, FILM COATED ORAL DAILY
Qty: 30 TABLET | Refills: 0 | Status: ON HOLD | OUTPATIENT
Start: 2020-11-26 | End: 2020-12-21

## 2020-11-25 RX ORDER — QUETIAPINE FUMARATE 50 MG/1
50 TABLET, FILM COATED ORAL AT BEDTIME
Qty: 30 TABLET | Refills: 0 | Status: ON HOLD | OUTPATIENT
Start: 2020-11-25 | End: 2020-12-21

## 2020-11-25 RX ORDER — METFORMIN HCL 500 MG
500 TABLET, EXTENDED RELEASE 24 HR ORAL
Qty: 30 TABLET | Refills: 0 | Status: SHIPPED | OUTPATIENT
Start: 2020-11-25 | End: 2021-02-22

## 2020-11-25 RX ORDER — DOXEPIN HYDROCHLORIDE 10 MG/1
10 CAPSULE ORAL AT BEDTIME
Qty: 30 CAPSULE | Refills: 0 | Status: ON HOLD | OUTPATIENT
Start: 2020-11-25 | End: 2020-12-21

## 2020-11-25 RX ORDER — QUETIAPINE FUMARATE 50 MG/1
50 TABLET, FILM COATED ORAL AT BEDTIME
Status: DISCONTINUED | OUTPATIENT
Start: 2020-11-25 | End: 2020-11-26 | Stop reason: HOSPADM

## 2020-11-25 RX ADMIN — QUETIAPINE FUMARATE 50 MG: 50 TABLET ORAL at 20:46

## 2020-11-25 RX ADMIN — LORAZEPAM 0.5 MG: 0.5 TABLET ORAL at 13:57

## 2020-11-25 RX ADMIN — LORAZEPAM 0.5 MG: 0.5 TABLET ORAL at 20:41

## 2020-11-25 RX ADMIN — LURASIDONE HYDROCHLORIDE 80 MG: 20 TABLET, FILM COATED ORAL at 08:59

## 2020-11-25 RX ADMIN — LORAZEPAM 0.5 MG: 0.5 TABLET ORAL at 08:59

## 2020-11-25 RX ADMIN — MELATONIN TAB 3 MG 3 MG: 3 TAB at 20:40

## 2020-11-25 RX ADMIN — LITHIUM CARBONATE 1200 MG: 300 TABLET, EXTENDED RELEASE ORAL at 20:40

## 2020-11-25 RX ADMIN — METFORMIN HYDROCHLORIDE 500 MG: 500 TABLET, EXTENDED RELEASE ORAL at 18:02

## 2020-11-25 RX ADMIN — DOXEPIN HYDROCHLORIDE 10 MG: 10 CAPSULE ORAL at 20:40

## 2020-11-25 ASSESSMENT — ACTIVITIES OF DAILY LIVING (ADL)
ORAL_HYGIENE: INDEPENDENT
HYGIENE/GROOMING: INDEPENDENT
DRESS: INDEPENDENT

## 2020-11-25 NOTE — PROGRESS NOTES
Pt appeared to have slept comfortably for 7 hours this shift. No prn given; was in his room the entire shift. Will continue to monitor.

## 2020-11-25 NOTE — PROGRESS NOTES
Pt denied SI/SIB and hallucinations. Pt reports doing well and is looking forward to discharge. Pt spent the evening pacing the halls. Pt's affect still appears tense at times but he reports being calm.

## 2020-11-25 NOTE — PLAN OF CARE
"Patient mostly isolative to room. Paces room for long periods of time. Patient states his mood is \"good.\" Bright on approach. Calm and cooperative. States sleep has improved over the past two nights d/t addition and subsequent increased dose of Seroquel. States improved sleep has had a positive impact on his mood. Requested PRN Melatonin 3 mg for sleep. Denies depression and anxiety. Denies SI, SIB, HI and psychotic symptoms. States appetite is \"good.\"   "

## 2020-11-25 NOTE — PLAN OF CARE
Pt stays to self, paces in his room and in the hallway but in front of his room.  Talked about hobbies he he talked about some kind of card game and how to sells cards.  Pt reports his mood is better.  Denies SI.

## 2020-11-25 NOTE — PROGRESS NOTES
----------------------------------------------------------------------------------------------------------  [facility], [place]   Psychiatric Progress Note  Hospital Day #13     Interim History:   The patient's care was discussed with the treatment team and chart notes were reviewed.    Sleep: 7 hrs  Scheduled Medications: took all scheduled medications.  PRN medications: Took melatonin, did not need hydralazine prn for BP    Per Staff Notes: No acute events overnight.  Pt denied SI/SIB and hallucinations. Pt reports doing well and is looking forward to discharge. Pt spent the evening pacing the halls. Pt's affect still appears tense at times but he reports being calm.    Per Interview: Reports he is feeling well, his medications are working.  He was happy with the addition of Seroquel and did not want to increase the dose.  When explained that since he was on a higher dose of olanzapine previously, it is preferable that his Seroquel dose be increased to 50 mg while he is in the hospital and such increase would help with his anxiety also.  Patient agreed with the plan.  He is hoping to be discharged on Saturday after the team calls his mom and confirm the discharge plans.  He says his sleep is good, his appetite is good, his mood is fine and denies any bothering thoughts on his mind.  He reports no side effects from his medications.    The risks, benefits, alternatives, and side effects of treatments including no treatment have been discussed and are understood by the patient and other caregivers.    Review of systems:     ROS was negative unless noted above.          Allergies:     Allergies   Allergen Reactions     Sulfa Drugs             Psychiatric Examination:   /89 (BP Location: Left arm)   Pulse 61   Temp 97.8  F (36.6  C) (Oral)   Resp 16   Wt 93.5 kg (206 lb 3.2 oz)   SpO2 99%   BMI 27.39 kg/m      MENTAL STATUS EXAM:   Appearance:  normal posture, normal gait,  "well-developed,  appears stated age  Attitude:   engaged, cooperative  Psychomotor:  no evidence of tics, dystonia, or tardive dyskinesia  Eye Contact: appropriate  Speech:  normal tone, increased rate and talkative  Mood: \"doing better\"  Affect:  congruent, appropriate  Thought Content: denies suicidal ideation  Thought Process: linear, goal directed, perseverative and slightly distracted   Sensorium: awake, alert and denies auditory hallucinations  Cognition: memory grossly intact and average intelligence  Impulse control: good  Insight: fair   Judgment: fair         Labs:     Results for orders placed or performed during the hospital encounter of 11/12/20 (from the past 24 hour(s))   EKG 12-lead, complete   Result Value Ref Range    Interpretation ECG Click View Image link to view waveform and result         Assessment    Diagnostic Impression:   Jose Francisco Sommers is a 30 year old single White male previously diagnosed with schizoaffective disorder, bipolar subtype, ADHD, sensory processing d/o who presented on 11/13/2020 with irritability and agitation, no sleep for several days, tangential speech, disorganized thought, speech, and behavior, and inappropriate comments towards family member in the context of being titrated off of olanzapine by primary psychiatrist secondary to weight gain.  Current psychosocial stressors include body image (weight gain dissatisfaction), chronic mental health issues, history of legal issues (difficulty \"getting out\" of the incarceration system as a person with mental illness) and school issues which he has been coping with by Weaning off of his olanzapine.  Patient's support system includes family and outpatient team.  Substance use does not appear to be playing a contributing role in the patient's presentation. Biological contributions to mental health presentation include chronic mental illness and weight gain.      The patient's presentation is consistent with schizoaffective " disorder, bipolar subtype, currently with manic features.    Principal Diagnosis:   #Schizoaffective disorder, bipolar subtype, current manic features     Psychiatric Hospital course:   Jose Francisco Sommers was admitted to [facility] as a voluntary patient. His PTA lithium & olanzapine were continued. PTA olanzapine was increased to 10 mg nightly, a previous dose that worked well to manage the patient's psychotic features and keep the patient in a balanced, euthymic state.  Collateral obtained from mother and outpatient medication manager, , revealed that the patient had been titrating down on olanzapine despite the medication keeping the patient in a euthymic state in order to reduce the medications effects on his increasing weight.  The most recent reduction was to 2.5 mg, however a higher dose will be expected to manage the patient's symptoms at this time. On 11/17, the patient demonstrated behavior indicating approach to a euthymic state (with some minor manic features such as increased rate of speech and verbosity as well as disorganized thought process) so lurasidone was added 20 mg at that time with the goal of transitioning to lurasidone from olanzapine as it has a lower risk for weight gain with chronic use compares to olanzapine. Lurasidone does has been gradually increased to 80 mg and patient has benefited from the dose increase, been feeling less depressed and had no side effects. On 11/23 doxepin 10 mg started at bed time to aid his sleep. Olanzapine discontinued on 11/24. Seroquel 25 mg started  and increased to 50 mg. Discharge on 11/28.     Discontinued Medications (& Rationale):  Olanzapine (tapered off after starting Latuda)     Medical course:   The patient was medically cleared for admission. He continued to have hypertension sustained at 190-200/ for several days, after which time the patient was started on clonidine 0.1 mg twice daily as needed, which helped with hypertension. It is  "unclear if this hypertension is essential HTN or related to neuroleptic malignant syndrome, but NMS is unlikely as the patient did not demonstrate other features, such as altered mental status, hyperthermia, tachycardia. The patient expressed concern for STDs on 11/16/20, so these labs were drawn at this time (all negative). At this time he also vocalized desire for a \"whole body MRI scan\" to address generalized paranoid belief that something in his body is broken, which we mentioned would be addressed at a later time after ruling out recent MSK injury/indications for MRI. BP has been stabilized, does not need prn hydralazine most of the time.     The patient began having bradycardia in the setting of ineffective hypertension management, so hydralazine was added and clonidine was discontinued on 11/17. BP is being monitored.      Data:   CBC and CMP unremarkable, except triglycerides upon admission were 211, 11/13/20.      11/12/2020 23:27 11/18/2020 07:40 11/25/2020 07:51   Lithium Level 0.44 (L) 0.72 0.79     EKG 11/24:  Sinus rhythm with sinus arrhythmia  Incomplete right bundle branch block  Borderline ECG  Vent. rate 68 BPM  QT/QTc 402/427 ms    Consults:   Medicine, 11/17/20 for HTN management - continue Hydralazine 25 mg q6h prn (to be given if SBP >160 or DBP >110 mmHg)    Plan     Today's Changes:   - Increase Seroquel 50 mg at bedtime   - Called mom - OK to discharge on Saturday     Scheduled Psych Meds:  - Lithium CR tablets 1200 mg nightly  - Lorazepam 1 mg TID p.o.    - Lurasidone 80 mg p.o. daily   - Doxepin 10 mg at bedtime   - Seroquel 50 mg at bedtime   - Metformin 500 mg XR with supper (for antipsychotic-induced weight gain)    PRN Medications:  - acetaminophen, alum & mag hydroxide-simethicone, cloNIDine, hydrALAZINE, hydrOXYzine, melatonin, nicotine, OLANZapine **OR** [DISCONTINUED] OLANZapine    Patient will be treated in therapeutic milieu with appropriate individual and group therapies as " described.    Medical diagnoses to be addressed this admission:    #Hypertension: Initially treated with PRN clonidine 0.1 mg twice daily, but bradycardia occurred on this dose, so medication changed to hydralazine 25 mg q6h prn   -Hydralazine 25 mg q6h prn (to be given if SBP >160 or DBP >110 mmHg)     #Nicotine dependence  -Nicotine gum 2 mg every hour as needed    Legal Status: Voluntary    Safety Assessment:   Behavioral Orders   Procedures     Code 1 - Restrict to Unit     Routine Programming     As clinically indicated     Status 15     Every 15 minutes.       Disposition: No imminent plan for discharge; uptitrating/modifying medication regimen and Pending stabilization & development of a safe discharge plan.     Patient seen and discussed with my attending physician, Mihai Finn MD.     [place] MD  PGY-1 Psychiatry Resident    Psychiatry Attending Attestation:  I, Mihai Finn, saw and evaluated the patient with the resident physician.  I agree with the findings and plan of care as documented in the resident note.  I have reviewed all labs and vital signs.

## 2020-11-25 NOTE — PLAN OF CARE
Daily CTC Note:     Work Completed:   The patient's care was discussed with the treatment team and chart notes were reviewed.   Patient met with team.  He states that he continues to feel better.    Patient continues to pace during the day with little interaction with peers.    Medications continue to be adjusted per MD's.  He is now off Zyprexa and Seroquel is being titrated up.  Will continue to coordinate care with outpatient providers, family.     Discharge plan or goal:   Home with family when stable- possibly in the next few days  Patient will resume care with outpatient provider     Barriers to discharge:   Ongoing medication changes per MD's.

## 2020-11-26 VITALS
SYSTOLIC BLOOD PRESSURE: 143 MMHG | DIASTOLIC BLOOD PRESSURE: 88 MMHG | OXYGEN SATURATION: 99 % | HEART RATE: 58 BPM | BODY MASS INDEX: 27.41 KG/M2 | RESPIRATION RATE: 16 BRPM | WEIGHT: 206.3 LBS | TEMPERATURE: 97.5 F

## 2020-11-26 PROCEDURE — 250N000013 HC RX MED GY IP 250 OP 250 PS 637: Performed by: STUDENT IN AN ORGANIZED HEALTH CARE EDUCATION/TRAINING PROGRAM

## 2020-11-26 PROCEDURE — 99238 HOSP IP/OBS DSCHRG MGMT 30/<: CPT | Mod: GC | Performed by: PSYCHIATRY & NEUROLOGY

## 2020-11-26 RX ADMIN — LORAZEPAM 0.5 MG: 0.5 TABLET ORAL at 07:50

## 2020-11-26 RX ADMIN — LURASIDONE HYDROCHLORIDE 80 MG: 20 TABLET, FILM COATED ORAL at 07:50

## 2020-11-26 ASSESSMENT — ACTIVITIES OF DAILY LIVING (ADL)
ORAL_HYGIENE: INDEPENDENT
HYGIENE/GROOMING: INDEPENDENT
LAUNDRY: WITH SUPERVISION
DRESS: INDEPENDENT;SCRUBS (BEHAVIORAL HEALTH)

## 2020-11-26 NOTE — DISCHARGE INSTRUCTIONS
Behavioral Discharge Planning and Instructions    Summary:   You were admitted to [facility] on 11/12/20  with worsening depression, manic symptoms and decrease in functioning under the care of Dr. Fnin.  You met with Dr. Finn and his team daily for ongoing psychiatric assessment and medication management.  You had opportunities to participate in therapeutic groups on the unit.   At this time you report your mood is stabilizing and you report you are not having thoughts or intent to harm yourself or others. You will be discharged home and will resume care with your outpatient providers.    Disposition:  Home with family    Main Diagnosis:   Schizoaffective Disorder  Alcohol Use Disorder- in remission      Major Treatments, Procedures and Findings:   Medications were  managed throughout your stay. An internal medicine consult was completed during your stay. You had the opportunity to participate in treatment programming while on the unit including occupational therapy, mental health support and education and spiritual services.     Symptoms to Report:   Please report if you are experiencing increased aggression and/or confusion, problematic loss of sleep, worsening mood, or thoughts of suicide to your treatment team or notify your primary provider.   IF THE SYMPTOMS YOU ARE EXPERIENCING ARE A MEDICAL EMERGENCY, CALL 911 IMMEDIATELY    Lifestyle Adjustment:   1. Adjust your lifestyle to get enough sleep, relaxation, exercise and good nutrition.  Continue to develop healthy coping skills to decrease stress and promote a healthy and sober lifestyle.  2. Abstain from all substances of abuse.  3. Take medications as prescribed.  Please work with your doctor to discuss any concerns you have with your medications or side effects you may be experiencing.  4. Follow up with appointments as scheduled.        Psychiatry Follow-up:   Appointment: Psychiatry: Dr. Jayda Ball: 12/17/20 at 1:30pm  [facility]  Clinic: [facility], 2nd Floor [address]   [place] [address]  [phone]    MTM:  Appointment: Medication Therapy Management - this is a Telemedicine (telephone appointment) MTM will contact you at home to schedule your appointment  Date/Time  Pharmacist    [facility] staff will be calling you to review your medications on   This service is offered to help address any questions/concerns that you may have about your current medications      Resources:   *[facility] Crisis: [phone]    24/7 crisis hotline for adults who are in the midst of a mental health crisis  *[facility]: [address] [place] [address]  [phone]    Walk-in services include access to an onsite team of psychiatrists, nurses, social workers and trained peer support staff that provide person-centered, recovery-focused care. Urgent Care for Adult Mental Health offers an alternative to visiting the emergency room during a mental health crisis.   Monday through Friday, 8 a.m. - 5:30 p.m.  Closed on Saturday, Sunday and holidays      General Medication Instructions:   See your medication sheet(s) for instructions.   Take all medicines as directed.  Make no changes unless your doctor suggests them.   Go to all your doctor visits.  Be sure to have all your required lab tests. This way, your medicines can be refilled on time.  Do not use any drugs not prescribed by your doctor.    The treatment team has appreciated the opportunity to work with you.  We wish you the best in the future.    If you have any questions or concerns our unit number is [phone].

## 2020-11-26 NOTE — DISCHARGE SUMMARY
"  ----------------------------------------------------------------------------------------------------------  [facility], [place]   Discharge Summary        Jose Francisco Sommers MRN# [id number]   Age: 30 year old YOB: 1990   Date of Admission:  11/12/2020  Date of Discharge:  11/26/2020 12:18 PM  Admitting Physician:  Mihai Finn MD  Discharge Physician:  Neida Huynh MD       Event Leading to Hospitalization:     History of Present Illness:  \"Jose Francisco Sommers is a 30 year old male previously diagnosed with schizoaffective disorder, bipolar subtype who presented on 11/13/2020 with irritability and agitation, no sleep for several days, tangential speech, disorganized thought, speech, and behavior, and inappropriate comments towards family member in the context of being titrated off of olanzapine by primary psychiatrist secondary to weight gain.     Per nursing note, on 11/12/2020:  Pt presents at 2215 with decompensated schizoaffective disorder and lauren after having titrated down on his neuroleptic Rx due to weight gain. Pt has not been sleeping for two to three days, and presents with some pressured, rambling and tangential speech. His thought process is disorganized and he apparently has been acting inappropriately with his family, with whom he lives. Upon interview pt denies SI and hallucinations, but is clearly overwhelmed, disorganized and anxious. He is a poor historian. He cried spontaneously during interview, but was able to articulate that he wants help, and signed in voluntarily. He requested an IM of 2.5 mg Zyprexa. Pt did not present with the same ruminations or fixations that he apparently had earlier in [facility]- the movie Seven, medical or Restorationist preoccupations. Pt was simply too disorganized to carry these ruminations forward during interview. Pt was ultimately grateful to be here and for the help, and he was otherwise cooperative.   Pt Covid swabbed, " "test pending, is asymptomatic. Pt was apparently unable to process enough in BEC to agree to blood labs and Utox. Those are reordered. Pt presents with notable HTN of 168/125. Psychiatry team aware and thought to be related to increased anxiety. Other VSS. Pt denies other medical issues. 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 Pt is apparently well known to [facility].      Collateral from DEC report, November 13, 2020:   This is a voluntary patient risk to self and others,  called mother prior to admission.  Patient initially complains that he cannot sleep and is \"losing part of me.\"  He says he did not sleep in the last 2 days has a negative affect on others and unwillingly has \"room and then.\"  Patient believes that he needs a full body MRI for unknown reasons.  He says he considered suicide but is a constant because of his [employer] beliefs.  He has frequent obsessions and focus thoughts on the movie 7 with Benjamin Linda especially regarding ending when the main character finds his significant others head in a box.  When asked if he had thoughts of killing others he did not answer but responded that he needed medical attention.  His mother says that she is unhappy with recent medication change from 12.5 to 2.5 mg (Dr. Jayda Ball this is new medication manager).      He was medically cleared for admission to inpatient psychiatric unit. The risks, benefits, alternatives, and side effects of treatments including no treatment have been discussed and are understood by the patient and other caregivers.     Patient interview 11/13/2020:     Manuel states that \"he's been having some problems but he doesn't want to talk.\" He wants to \"talk after I've started on medication.\" He states that \"the medications seem to be helping me since I've been here.\" He says that he doesn't feel like he's a threat to others or himself.\" When asked " "about his Olanzapine, he states that the medication \"isn't the problem,\" but was not sure what the problem was and that he needed time to \"figure it out.\"   He says that he doesn't want to talk to multiple people, but is open to talking to fewer people on Monday.\"    See Admission note by Mihai Finn MD on 11/12/2020 for additional details.      Objective:     B/P: 143/88, T: 97.5, P: 58, R: 16    Psychiatric Examination:   Appearance:  awake, alert and adequately groomed  Muscle Strength and Tone: normal  Gait and Station: Normal  Behavior (Psychomotor):  no evidence of tardive dyskinesia, dystonia, or tics and evidence of dystonia  Eye Contact:  good  Speech:  clear, coherent  Mood:  better  Affect:  appropriate and in normal range and mood congruent  Attitude:  cooperative  Thought Process:  logical  Thought Content:  no evidence of suicidal ideation or homicidal ideation and no evidence of psychotic thought  Associations:  no loose associations  Insight:  good  Judgment:  fair  Oriented to:  time, person, and place  Attention Span and Concentration:  intact  Recent and Remote Memory:  intact  Language: Fluent in English with appropriate syntax and vocabulary.  Fund of Knowledge: appropriate       Hospital Course:     Diagnostic Impression:   Jose Francisco Sommers is a 30 year old single White male previously diagnosed with schizoaffective disorder, bipolar subtype, ADHD, sensory processing d/o who presented on 11/13/2020 with irritability and agitation, no sleep for several days, tangential speech, disorganized thought, speech, and behavior, and inappropriate comments towards family member in the context of being titrated off of olanzapine by primary psychiatrist secondary to weight gain. Current psychosocial stressors include body image (weight gain dissatisfaction), chronic mental health issues, history of legal issues (difficulty \"getting out\" of the incarceration system as a person with mental illness) and " school issues which he has been coping with by Weaning off of his olanzapine.  Patient's support system includes family and outpatient team.  Substance use does not appear to be playing a contributing role in the patient's presentation. Biological contributions to mental health presentation include chronic mental illness and weight gain.      The patient's presentation is consistent with schizoaffective disorder, bipolar subtype, currently with manic features on admission.    Psychiatric Course:  Jose Francisco Sommers was admitted to [facility] as a voluntary patient. His PTA lithium & olanzapine were continued. PTA olanzapine was increased to 10 mg nightly, a previous dose that worked well to manage the patient's psychotic features and keep the patient in a balanced, euthymic state.  Collateral obtained from mother and outpatient medication manager, , revealed that the patient had been titrating down on olanzapine despite the medication keeping the patient in a euthymic state in order to reduce the medications effects on his increasing weight.  The most recent reduction was to 2.5 mg, however a higher dose will be expected to manage the patient's symptoms at this time. On 11/17, the patient demonstrated behavior indicating approach to a euthymic state (with some minor manic features such as increased rate of speech and verbosity as well as disorganized thought process) so lurasidone was added 20 mg at that time with the goal of transitioning to lurasidone from olanzapine as it has a lower risk for weight gain with chronic use compares to olanzapine. Lurasidone does has been gradually increased to 80 mg and patient has benefited from the dose increase, been feeling less depressed and had no side effects. On 11/23 doxepin 10 mg started at bed time to aid his sleep. Olanzapine discontinued on 11/24. Seroquel 25 mg started  and increased to 50 mg. Two consecutive lithium levels are in the therapeutic range (see  "below). Discharge on 11/26.     Medical Course:  The patient was medically cleared for admission. He continued to have hypertension sustained at 190-200/ for several days, after which time the patient was started on clonidine 0.1 mg twice daily as needed, which helped with hypertension. It is unclear if this hypertension is essential HTN or related to neuroleptic malignant syndrome, but NMS is unlikely as the patient did not demonstrate other features, such as altered mental status, hyperthermia, tachycardia. The patient expressed concern for STDs on 11/16/20, so these labs were drawn at this time (all negative). At this time he also vocalized desire for a \"whole body MRI scan\" to address generalized paranoid belief that something in his body is broken, which we mentioned would be addressed at a later time after ruling out recent MSK injury/indications for MRI. BP has been stabilized, did not need prn hydralazine most of the time.      The patient began having bradycardia in the setting of ineffective hypertension management, so hydralazine was added and clonidine was discontinued on 11/17. BP is being monitored.    Consults:   Medicine, 11/17/20 for HTN management - continue Hydralazine 25 mg q6h prn (to be given if SBP >160 or DBP >110 mmHg) while inpatient.     Risk Assessment:     Jose Francisco Sommers has notable risk factors for self-harm, including single status, anxiety and psychosis. However, risk is mitigated by absence of past attempts, ability to volunteer a safety plan and history of seeking help when needed. Therefore, based on all available evidence including the factors cited above, Jose Francisco Sommers does not appear to be an imminent danger to self or others and is appropriate for outpatient level of care. However, if patient uses substances or is non-adherent with medication, their risk of decompensation and SI/HI will be elevated. This was discussed with the patient.    This document serves as a " transfer of care to Jose Francisco Sommers's outpatient providers.     Diagnoses:     #Schizoaffective disorder, bipolar subtype, current manic features     Discharge Plan:     Patient is being discharged to home with the following medications and appointments as detailed below:    Current Discharge Medication List      START taking these medications    Details   cloNIDine (CATAPRES) 0.1 MG tablet Take 1 tablet (0.1 mg) by mouth 2 times daily as needed (anxiety, high blood pressure)  Qty: 15 tablet, Refills: 0    Associated Diagnoses: Benign essential hypertension      doxepin (SINEQUAN) 10 MG capsule Take 1 capsule (10 mg) by mouth At Bedtime  Qty: 30 capsule, Refills: 0    Associated Diagnoses: Schizoaffective disorder, bipolar type (H)      lithium ER (LITHOBID) 300 MG CR tablet Take 4 tablets (1,200 mg) by mouth At Bedtime  Qty: 120 tablet, Refills: 0    Associated Diagnoses: Schizoaffective disorder, bipolar type (H)      lurasidone (LATUDA) 80 MG TABS tablet Take 1 tablet (80 mg) by mouth daily  Qty: 30 tablet, Refills: 0    Associated Diagnoses: Schizoaffective disorder, bipolar type (H)      metFORMIN (GLUCOPHAGE-XR) 500 MG 24 hr tablet Take 1 tablet (500 mg) by mouth daily (with dinner)  Qty: 30 tablet, Refills: 0    Associated Diagnoses: Weight gain      QUEtiapine (SEROQUEL) 50 MG tablet Take 1 tablet (50 mg) by mouth At Bedtime  Qty: 30 tablet, Refills: 0    Associated Diagnoses: Schizoaffective disorder, bipolar type (H); Psychosis, unspecified psychosis type (H)         STOP taking these medications       lithium 300 MG capsule Comments:   Reason for Stopping:         modafinil (PROVIGIL) 200 MG tablet Comments:   Reason for Stopping:         OLANZapine (ZYPREXA) 5 MG tablet Comments:   Reason for Stopping:             Relevant Labs:    11/12/2020 23:27 11/18/2020 07:40 11/25/2020 07:51   Lithium Level 0.44 (L) 0.72 0.79     EKG 11/24:  Sinus rhythm with sinus arrhythmia  Incomplete right bundle branch  block  Borderline ECG  Vent. rate 68 BPM  QT/QTc 402/427 ms     Medications:  Added/Changed:  - Lurasidone 80 mg p.o. daily   - Doxepin 10 mg at bedtime   - Seroquel 50 mg at bedtime  - Metformin 500 mg XR with supper (for antipsychotic-induced weight gain)    Continued:  - Lithium CR tablets 1200 mg nightly  - Lorazepam 0.5  mg TID p.o.  (continued during admission, discontinued at discharge)    Discontinued:  Olanzapine (tapered off after starting lurasidone).     Psychiatry Follow-up:   Appointment: Psychiatry: Dr. Jayda Ball: 20 at 1:30pm  [facility]: [facility], [address]nd Floor [address]   [place] [address]  [phone]    Follow up with PCP for essential HTN and EKG abnormalities.       New Shoemaker MD  PGY-1 Psychiatry Resident    Attestation:   The patient has been seen and evaluated by me,  Neida Huynh MD. I have examined the patient today and reviewed the discharge plan with the resident and medical student. I agree with the final assessment and plan, as noted in the discharge summary. I have reviewed today's vital signs, medications, labs and imaging.  Total time discharge plannin minutes  Neida Huynh MD          Appendix A: All Labs This Admission:     Results for orders placed or performed during the hospital encounter of 20   Drug abuse screen 6 urine (tox)     Status: None   Result Value Ref Range    Amphetamine Qual Urine Negative NEG^Negative    Barbiturates Qual Urine Negative NEG^Negative    Benzodiazepine Qual Urine Negative NEG^Negative    Cannabinoids Qual Urine Negative NEG^Negative    Cocaine Qual Urine Negative NEG^Negative    Ethanol Qual Urine Negative NEG^Negative    Opiates Qualitative Urine Negative NEG^Negative   Asymptomatic COVID-19 Virus (Coronavirus) by PCR     Status: None    Specimen: Nasopharyngeal   Result Value Ref Range    COVID-19 Virus PCR to [facility] - Source Nasopharyngeal     COVID-19 Virus PCR to [facility]  - Result       Test received-See reflex to IDDL test SARS CoV2 (COVID-19) Virus RT-PCR   Lithium level     Status: Abnormal   Result Value Ref Range    Lithium Level 0.44 (L) 0.60 - 1.20 mmol/L   SARS-CoV-2 COVID-19 Virus (Coronavirus) RT-PCR Nasopharyngeal     Status: None    Specimen: Nasopharyngeal   Result Value Ref Range    SARS-CoV-2 Virus Specimen Source Nasopharyngeal     SARS-CoV-2 PCR Result NEGATIVE     SARS-CoV-2 PCR Comment       Testing was performed using the [employer] SARS-CoV-2 Assay on the [employer] Instrument System.   Additional information about this Emergency Use Authorization (EUA) assay can be found via   the Lab Guide.     CBC with platelets     Status: Abnormal   Result Value Ref Range    WBC 12.9 (H) 4.0 - 11.0 10e9/L    RBC Count 5.01 4.4 - 5.9 10e12/L    Hemoglobin 15.1 13.3 - 17.7 g/dL    Hematocrit 46.3 40.0 - 53.0 %    MCV 92 78 - 100 fl    MCH 30.1 26.5 - 33.0 pg    MCHC 32.6 31.5 - 36.5 g/dL    RDW 12.8 10.0 - 15.0 %    Platelet Count 254 150 - 450 10e9/L   Comprehensive metabolic panel     Status: None   Result Value Ref Range    Sodium 142 133 - 144 mmol/L    Potassium 4.4 3.4 - 5.3 mmol/L    Chloride 108 94 - 109 mmol/L    Carbon Dioxide 30 20 - 32 mmol/L    Anion Gap 4 3 - 14 mmol/L    Glucose 82 70 - 99 mg/dL    Urea Nitrogen 17 7 - 30 mg/dL    Creatinine 1.10 0.66 - 1.25 mg/dL    GFR Estimate >90 >60 mL/min/[1.73_m2]    GFR Estimate If Black >90 >60 mL/min/[1.73_m2]    Calcium 9.4 8.5 - 10.1 mg/dL    Bilirubin Total 0.7 0.2 - 1.3 mg/dL    Albumin 3.6 3.4 - 5.0 g/dL    Protein Total 6.8 6.8 - 8.8 g/dL    Alkaline Phosphatase 71 40 - 150 U/L    ALT 39 0 - 70 U/L    AST 26 0 - 45 U/L   Lipid panel     Status: Abnormal   Result Value Ref Range    Cholesterol 111 <200 mg/dL    Triglycerides 211 (H) <150 mg/dL    HDL Cholesterol 49 >39 mg/dL    LDL Cholesterol Calculated 20 <100 mg/dL    Non HDL Cholesterol 62 <130 mg/dL   TSH with free T4 reflex and/or T3 as indicated     Status: None    Result Value Ref Range    TSH 1.21 0.40 - 4.00 mU/L   Hepatitis B surface antigen     Status: None   Result Value Ref Range    Hep B Surface Agn Nonreactive NR^Nonreactive   Hepatitis C antibody     Status: None   Result Value Ref Range    Hepatitis C Antibody Nonreactive NR^Nonreactive   Treponema Abs w Reflex to RPR and Titer     Status: None   Result Value Ref Range    Treponema Antibodies Nonreactive NR^Nonreactive   Hepatitis B Surface Antibody     Status: Abnormal   Result Value Ref Range    Hepatitis B Surface Antibody 611.67 (H) <8.00 m[IU]/mL   HIV Antigen Antibody Combo     Status: None   Result Value Ref Range    HIV Antigen Antibody Combo Nonreactive NR^Nonreactive       Lithium level     Status: None   Result Value Ref Range    Lithium Level 0.72 0.60 - 1.20 mmol/L   Lithium level     Status: None   Result Value Ref Range    Lithium Level 0.79 0.60 - 1.20 mmol/L   EKG 12-lead, complete     Status: None   Result Value Ref Range    Interpretation ECG Click View Image link to view waveform and result    Internal Medicine Adult IP Consult for [facility]: Patient to be seen: Routine within 24 hrs; Call back #: [phone]; HTN since admission, trial of clonidine helped a little, but now pt is bradycardic, recommendation for BP man...     Status: None ()    Jerson Allen PA-C     11/16/2020 11:00 AM  Medicine consult dictated.    Neisseria gonorrhoeae PCR     Status: None    Specimen: Urine   Result Value Ref Range    Specimen Descrip Urine     N Gonorrhea PCR Negative NEG^Negative   Chlamydia trachomatis PCR     Status: None    Specimen: Urine   Result Value Ref Range    Specimen Description Urine     Chlamydia Trachomatis PCR Negative NEG^Negative

## 2020-11-26 NOTE — PROGRESS NOTES
Manuel appeared to be sleeping comfortably for 6.75 hours this past night shift.  No prns or snacks given.

## 2020-11-26 NOTE — PROGRESS NOTES
Pt was isolative this evening. Pt was seen pacing in his room. Pt was pleasant when approached by staff and peers. Pt denied SI/SIB/AH/VH. Pt denied any thoughts of self harm. Pt didn't shower this evening or take care of any ADL's. No concerns were observed or reported.

## 2020-11-26 NOTE — PROGRESS NOTES
Pt discharged as per order.  Pt denied SI.  Reviewed discharge paperwork with pt.  Pt pleasant and cooperative. Pt disicharged with all belongings, discharge paperwork, and ordered discharge medications.  Pts mother picked him up for dischrage

## 2020-11-27 ENCOUNTER — TELEPHONE (OUTPATIENT)
Dept: FAMILY MEDICINE | Facility: CLINIC | Age: 30
End: 2020-11-27

## 2020-11-27 NOTE — TELEPHONE ENCOUNTER
This patient was discharged from [facility] on 11/26/20.    Discharge Diagnosis: manic behavior (H), benign essential hypertension    Follow-up instructions: Follow up with PCP for essential HTN and EKG abnormalities.     A follow-up visit has not been scheduled.      Number of ED/ER visits in the last 12 months:  1     Please follow-up with patient.    Annika Allred

## 2020-11-30 ENCOUNTER — TELEPHONE (OUTPATIENT)
Dept: BEHAVIORAL HEALTH | Facility: CLINIC | Age: 30
End: 2020-11-30

## 2020-11-30 ENCOUNTER — HOSPITAL ENCOUNTER (INPATIENT)
Facility: CLINIC | Age: 30
LOS: 20 days | Discharge: HOME | End: 2020-12-21
Attending: PSYCHIATRY & NEUROLOGY | Admitting: PSYCHIATRY & NEUROLOGY
Payer: COMMERCIAL

## 2020-11-30 DIAGNOSIS — K76.0 FATTY LIVER: ICD-10-CM

## 2020-11-30 DIAGNOSIS — F25.0 SCHIZOAFFECTIVE DISORDER, BIPOLAR TYPE (H): ICD-10-CM

## 2020-11-30 DIAGNOSIS — K59.00 CONSTIPATION, UNSPECIFIED CONSTIPATION TYPE: ICD-10-CM

## 2020-11-30 DIAGNOSIS — R03.0 ELEVATED BLOOD PRESSURE READING WITHOUT DIAGNOSIS OF HYPERTENSION: Primary | ICD-10-CM

## 2020-11-30 DIAGNOSIS — Z20.828 CONTACT WITH AND (SUSPECTED) EXPOSURE TO OTHER VIRAL COMMUNICABLE DISEASES: ICD-10-CM

## 2020-11-30 DIAGNOSIS — E78.1 HIGH BLOOD TRIGLYCERIDES: ICD-10-CM

## 2020-11-30 DIAGNOSIS — F29 PSYCHOSIS, UNSPECIFIED PSYCHOSIS TYPE (H): ICD-10-CM

## 2020-11-30 LAB
AMPHETAMINES UR QL SCN: NEGATIVE
BARBITURATES UR QL: NEGATIVE
BENZODIAZ UR QL: NEGATIVE
CANNABINOIDS UR QL SCN: NEGATIVE
COCAINE UR QL: NEGATIVE
ETHANOL UR QL SCN: NEGATIVE
OPIATES UR QL SCN: NEGATIVE

## 2020-11-30 PROCEDURE — 250N000013 HC RX MED GY IP 250 OP 250 PS 637: Performed by: PSYCHIATRY & NEUROLOGY

## 2020-11-30 PROCEDURE — 250N000013 HC RX MED GY IP 250 OP 250 PS 637: Performed by: EMERGENCY MEDICINE

## 2020-11-30 PROCEDURE — 80307 DRUG TEST PRSMV CHEM ANLYZR: CPT | Performed by: EMERGENCY MEDICINE

## 2020-11-30 PROCEDURE — U0003 INFECTIOUS AGENT DETECTION BY NUCLEIC ACID (DNA OR RNA); SEVERE ACUTE RESPIRATORY SYNDROME CORONAVIRUS 2 (SARS-COV-2) (CORONAVIRUS DISEASE [COVID-19]), AMPLIFIED PROBE TECHNIQUE, MAKING USE OF HIGH THROUGHPUT TECHNOLOGIES AS DESCRIBED BY CMS-2020-01-R: HCPCS | Performed by: PSYCHIATRY & NEUROLOGY

## 2020-11-30 PROCEDURE — C9803 HOPD COVID-19 SPEC COLLECT: HCPCS | Performed by: PSYCHIATRY & NEUROLOGY

## 2020-11-30 PROCEDURE — 99285 EMERGENCY DEPT VISIT HI MDM: CPT | Mod: 25 | Performed by: PSYCHIATRY & NEUROLOGY

## 2020-11-30 PROCEDURE — 90791 PSYCH DIAGNOSTIC EVALUATION: CPT

## 2020-11-30 PROCEDURE — 99284 EMERGENCY DEPT VISIT MOD MDM: CPT | Performed by: PSYCHIATRY & NEUROLOGY

## 2020-11-30 PROCEDURE — 80320 DRUG SCREEN QUANTALCOHOLS: CPT | Performed by: EMERGENCY MEDICINE

## 2020-11-30 RX ORDER — HYDROXYZINE HYDROCHLORIDE 25 MG/1
25 TABLET, FILM COATED ORAL ONCE
Status: COMPLETED | OUTPATIENT
Start: 2020-11-30 | End: 2020-11-30

## 2020-11-30 RX ORDER — NICOTINE 21 MG/24HR
1 PATCH, TRANSDERMAL 24 HOURS TRANSDERMAL ONCE
Status: COMPLETED | OUTPATIENT
Start: 2020-11-30 | End: 2020-12-01

## 2020-11-30 RX ORDER — OLANZAPINE 10 MG/1
10 TABLET, ORALLY DISINTEGRATING ORAL ONCE
Status: COMPLETED | OUTPATIENT
Start: 2020-11-30 | End: 2020-11-30

## 2020-11-30 RX ADMIN — HYDROXYZINE HYDROCHLORIDE 25 MG: 25 TABLET, FILM COATED ORAL at 20:01

## 2020-11-30 RX ADMIN — NICOTINE 1 PATCH: 21 PATCH, EXTENDED RELEASE TRANSDERMAL at 21:15

## 2020-11-30 RX ADMIN — OLANZAPINE 10 MG: 10 TABLET, ORALLY DISINTEGRATING ORAL at 21:15

## 2020-11-30 ASSESSMENT — ENCOUNTER SYMPTOMS
HYPERACTIVE: 1
EYES NEGATIVE: 1
DECREASED CONCENTRATION: 1
RESPIRATORY NEGATIVE: 1
MUSCULOSKELETAL NEGATIVE: 1
NERVOUS/ANXIOUS: 1
NEUROLOGICAL NEGATIVE: 1
CARDIOVASCULAR NEGATIVE: 1
GASTROINTESTINAL NEGATIVE: 1
SLEEP DISTURBANCE: 1
ACTIVITY CHANGE: 1

## 2020-12-01 LAB
LABORATORY COMMENT REPORT: NORMAL
SARS-COV-2 RNA SPEC QL NAA+PROBE: NEGATIVE
SARS-COV-2 RNA SPEC QL NAA+PROBE: NORMAL
SPECIMEN SOURCE: NORMAL
SPECIMEN SOURCE: NORMAL

## 2020-12-01 PROCEDURE — 250N000013 HC RX MED GY IP 250 OP 250 PS 637: Performed by: PSYCHIATRY & NEUROLOGY

## 2020-12-01 PROCEDURE — 99232 SBSQ HOSP IP/OBS MODERATE 35: CPT | Performed by: PHYSICIAN ASSISTANT

## 2020-12-01 PROCEDURE — 99222 1ST HOSP IP/OBS MODERATE 55: CPT | Mod: AI | Performed by: PSYCHIATRY & NEUROLOGY

## 2020-12-01 PROCEDURE — 124N000002 HC R&B MH UMMC

## 2020-12-01 RX ORDER — NICOTINE 21 MG/24HR
1 PATCH, TRANSDERMAL 24 HOURS TRANSDERMAL DAILY
Status: DISCONTINUED | OUTPATIENT
Start: 2020-12-01 | End: 2020-12-21 | Stop reason: HOSPADM

## 2020-12-01 RX ORDER — OLANZAPINE 5 MG/1
5 TABLET, ORALLY DISINTEGRATING ORAL AT BEDTIME
Status: DISCONTINUED | OUTPATIENT
Start: 2020-12-01 | End: 2020-12-01 | Stop reason: CLARIF

## 2020-12-01 RX ORDER — HYDRALAZINE HYDROCHLORIDE 25 MG/1
25 TABLET, FILM COATED ORAL EVERY 6 HOURS PRN
Status: DISCONTINUED | OUTPATIENT
Start: 2020-12-01 | End: 2020-12-03

## 2020-12-01 RX ORDER — OLANZAPINE 5 MG/1
10 TABLET, ORALLY DISINTEGRATING ORAL 3 TIMES DAILY PRN
Status: DISCONTINUED | OUTPATIENT
Start: 2020-12-01 | End: 2020-12-21 | Stop reason: HOSPADM

## 2020-12-01 RX ORDER — MAGNESIUM HYDROXIDE/ALUMINUM HYDROXICE/SIMETHICONE 120; 1200; 1200 MG/30ML; MG/30ML; MG/30ML
30 SUSPENSION ORAL EVERY 4 HOURS PRN
Status: DISCONTINUED | OUTPATIENT
Start: 2020-12-01 | End: 2020-12-21 | Stop reason: HOSPADM

## 2020-12-01 RX ORDER — OLANZAPINE 10 MG/2ML
10 INJECTION, POWDER, FOR SOLUTION INTRAMUSCULAR 3 TIMES DAILY PRN
Status: DISCONTINUED | OUTPATIENT
Start: 2020-12-01 | End: 2020-12-21 | Stop reason: HOSPADM

## 2020-12-01 RX ORDER — ACETAMINOPHEN 325 MG/1
650 TABLET ORAL EVERY 4 HOURS PRN
Status: DISCONTINUED | OUTPATIENT
Start: 2020-12-01 | End: 2020-12-21 | Stop reason: HOSPADM

## 2020-12-01 RX ORDER — HYDROXYZINE HYDROCHLORIDE 25 MG/1
25 TABLET, FILM COATED ORAL EVERY 4 HOURS PRN
Status: DISCONTINUED | OUTPATIENT
Start: 2020-12-01 | End: 2020-12-21 | Stop reason: HOSPADM

## 2020-12-01 RX ORDER — LURASIDONE HYDROCHLORIDE 40 MG/1
80 TABLET, FILM COATED ORAL DAILY
Status: DISCONTINUED | OUTPATIENT
Start: 2020-12-01 | End: 2020-12-01

## 2020-12-01 RX ORDER — TRAZODONE HYDROCHLORIDE 50 MG/1
50 TABLET, FILM COATED ORAL
Status: DISCONTINUED | OUTPATIENT
Start: 2020-12-01 | End: 2020-12-21 | Stop reason: HOSPADM

## 2020-12-01 RX ORDER — QUETIAPINE FUMARATE 50 MG/1
50 TABLET, FILM COATED ORAL AT BEDTIME
Status: DISCONTINUED | OUTPATIENT
Start: 2020-12-01 | End: 2020-12-04

## 2020-12-01 RX ORDER — OLANZAPINE 10 MG/1
10 TABLET, ORALLY DISINTEGRATING ORAL AT BEDTIME
Status: DISCONTINUED | OUTPATIENT
Start: 2020-12-01 | End: 2020-12-07

## 2020-12-01 RX ORDER — CLONIDINE HYDROCHLORIDE 0.1 MG/1
0.1 TABLET ORAL 2 TIMES DAILY PRN
Status: DISCONTINUED | OUTPATIENT
Start: 2020-12-01 | End: 2020-12-02

## 2020-12-01 RX ORDER — CLONIDINE HYDROCHLORIDE 0.1 MG/1
0.1 TABLET ORAL 2 TIMES DAILY PRN
Status: DISCONTINUED | OUTPATIENT
Start: 2020-12-01 | End: 2020-12-01

## 2020-12-01 RX ORDER — DOXEPIN HYDROCHLORIDE 10 MG/1
10 CAPSULE ORAL AT BEDTIME
Status: DISCONTINUED | OUTPATIENT
Start: 2020-12-01 | End: 2020-12-07

## 2020-12-01 RX ORDER — AMOXICILLIN 250 MG
1 CAPSULE ORAL 2 TIMES DAILY PRN
Status: DISCONTINUED | OUTPATIENT
Start: 2020-12-01 | End: 2020-12-21 | Stop reason: HOSPADM

## 2020-12-01 RX ORDER — METFORMIN HCL 500 MG
500 TABLET, EXTENDED RELEASE 24 HR ORAL
Status: DISCONTINUED | OUTPATIENT
Start: 2020-12-01 | End: 2020-12-21 | Stop reason: HOSPADM

## 2020-12-01 RX ADMIN — LITHIUM CARBONATE 1200 MG: 300 TABLET, EXTENDED RELEASE ORAL at 21:03

## 2020-12-01 RX ADMIN — OLANZAPINE 10 MG: 10 TABLET, ORALLY DISINTEGRATING ORAL at 21:03

## 2020-12-01 RX ADMIN — ACETAMINOPHEN 650 MG: 325 TABLET, FILM COATED ORAL at 12:12

## 2020-12-01 RX ADMIN — QUETIAPINE FUMARATE 50 MG: 50 TABLET ORAL at 21:03

## 2020-12-01 RX ADMIN — LITHIUM CARBONATE 1200 MG: 300 TABLET, EXTENDED RELEASE ORAL at 04:16

## 2020-12-01 RX ADMIN — NICOTINE POLACRILEX 4 MG: 2 GUM, CHEWING BUCCAL at 21:55

## 2020-12-01 RX ADMIN — NICOTINE 1 PATCH: 21 PATCH, EXTENDED RELEASE TRANSDERMAL at 08:45

## 2020-12-01 RX ADMIN — HYDROXYZINE HYDROCHLORIDE 25 MG: 25 TABLET, FILM COATED ORAL at 08:45

## 2020-12-01 RX ADMIN — QUETIAPINE FUMARATE 50 MG: 50 TABLET ORAL at 04:16

## 2020-12-01 RX ADMIN — DOXEPIN HYDROCHLORIDE 10 MG: 10 CAPSULE ORAL at 04:16

## 2020-12-01 RX ADMIN — METFORMIN HYDROCHLORIDE 500 MG: 500 TABLET, EXTENDED RELEASE ORAL at 18:44

## 2020-12-01 RX ADMIN — DOXEPIN HYDROCHLORIDE 10 MG: 10 CAPSULE ORAL at 21:03

## 2020-12-01 RX ADMIN — CLONIDINE HYDROCHLORIDE 0.1 MG: 0.1 TABLET ORAL at 04:16

## 2020-12-01 RX ADMIN — CLONIDINE HYDROCHLORIDE 0.1 MG: 0.1 TABLET ORAL at 12:15

## 2020-12-01 RX ADMIN — OLANZAPINE 10 MG: 10 TABLET, ORALLY DISINTEGRATING ORAL at 12:12

## 2020-12-01 ASSESSMENT — ACTIVITIES OF DAILY LIVING (ADL)
DRESS: INDEPENDENT
HYGIENE/GROOMING: INDEPENDENT
ORAL_HYGIENE: INDEPENDENT
LAUNDRY: WITH SUPERVISION

## 2020-12-01 ASSESSMENT — MIFFLIN-ST. JEOR: SCORE: 1934.92

## 2020-12-01 NOTE — H&P
Admitted:     11/30/2020      PSYCHIATRY EVALUATION      The patient prefers to be called Manuel.      The patient was seen for 41 minutes face-to-face on station 10 of [facility] in presence of a PA student; greater than 50% of this time was spent on counseling, discussing patient's medications, plans to followup after discharge and importance of good adherence to medication.      CHIEF COMPLAINT AND REASON FOR ADMISSION:  The patient is a 30-year-old male admitted with complaints of feeling restless, anxious, poor sleep and making clearly out of touch with reality statements.      HISTORY OF PRESENT ILLNESS:  The patient recently was hospitalized on station 20 for 2 weeks with complaints of agitation, irritability, not sleeping, tangential speech, disorganized thinking and inappropriate comments towards family members in the context of being titrated off olanzapine by primary Psychiatry secondary to weight gain.  The patient was started on Latuda.  He said that he did not like Latuda; however, could not provide during our visit an explanation why he did not like this medication.  He reported that he was pretty irritable, could not sleep well, has a lot of difficulties with focusing.  He could not formulate his feelings very well; however, did agree that he has had racing thoughts and was overstimulated.  A number of times during our visit, the patient had delayed responses.  At times, he just went silent as if he was listening to internal stimuli; however, repeatedly denied presence of auditory and visual hallucinations.  It is important to mention that Manuel perseverated about being ordered stimulant (amphetamine).      PAST PSYCHIATRIC HISTORY:  Has a history of decompensated schizoaffective disorder, lauren.  Has history of other psychiatric hospitalizations at [facility] approximately 5 years ago.  Denies any self-injurious behavior and suicide attempt.  Denied history of violence.       Medications that patient has tried in the past are Adderall, Prozac, Seroquel, Concerta.  Apparently, Seroquel helps with sleep and irritability.  Provigil almost killed him.  Abilify made him irritable, agitated.  Clonazepam had a calming effect.  Risperdal gave some confusion, slow with ?, withdrawn.  Zyprexa, less anxious, overall improvement, made him calmer and overall better.  Latuda, severe fatigue and depression.  Synthroid contributed to help with mood.  A brief trial with Strattera, Intuniv, Lamictal and Zoloft.  The patient has no recent history of drinking alcohol.  The patient denied current or past addiction to stimulants, opiates, benzodiazepines, hallucinogens or other illicit substances.      SOCIAL HISTORY:  Noncontributory.  Childhood:  Manic episodes.  Subsequent diagnosis ?  Educational history:  Went to [employer], studied math and computer science; had to take leave due to difficulties concentrating and focusing.  Lives with his family in their house.  His parents are in their early 70s and retired.  Mother worked as a mental health nurse.  Had a history of civil commitment 5 years ago.  Single, not , no children.      FAMILY HISTORY:  Significant for alcohol use disorder in paternal grandfather and mental illness in maternal grandmother.      PAST MEDICAL HISTORY:  There is no past medical history on file.      THE PATIENT REPORTED BEING ALLERGIC TO SULFA.      HOME MEDICATIONS:  Doxepin 10 mg at bedtime, clonidine 0.1 mg 2 times a day p.r.n. for anxiety and high blood pressure, lithium extended release 1200 mg at bedtime, Latuda 80 mg daily, Metformin  mg daily with dinner, Seroquel 50 mg at bedtime.      For physical examination and 12-point review of systems, please refer to Dr. Ramirez's note from 11/30/2020.  I reviewed this note and agree with it.      VITAL SIGNS:  Temperature 97.9, respirations 16, heart rate 96, blood pressure 160/111.      MENTAL STATUS  EXAMINATION:  The patient is a  male who overall was reasonably cooperative during our interview.  Speech was slow, monotonous with delayed onset.  At times, he stopped talking as if he was experiencing auditory hallucinations.  Also denied auditory and visual hallucinations.  Denied suicidal or homicidal thoughts.  Thought processes were disorganized.  Associations loose.  The patient needed to be redirected a number of times.  He had clear difficulties with focus and concentration.  He in fact tried to explain to myself and PA student, who was present during the interview, what was going on inside of his brain by loudly screaming.  Immediate short and long-term memories appear to be moderately impaired.  He was alert and oriented x3.  Fund of knowledge appeared to be average with proper usage of vocabulary.  Gait and posture within normal limits.  Insight and judgment moderately impaired.      IMPRESSION:  Schizoaffective disorder, bipolar type, lauren, severe with psychotic features.      TREATMENT PLAN:  We will continue inpatient stabilization.  I discussed with the patient his meds.  He indicated that he does not like Latuda and would like it to be discontinued and be restarted on Zyprexa Zydis.  We will continue him on Metformin, which hopefully will prevent weight gain.  As weight gain was discussed with the patient, he indicated himself that after he gained weight, he was able to lose it quite quickly and would still like to be on Zyprexa Zydis.  We will continue him on lithium.  We will order Internal Medicine consult for significantly elevated blood pressure.  For now, patient will continue to be on 72-hour hold; however, tomorrow I will discuss with him discontinuing 72-hour hold and keeping him in the hospital on a voluntary basis.         ARMAAN BRINK MD             D: 12/01/2020   T: 12/01/2020   MT: HAMMAD      Name:     SAEED MORALES   MRN:      [id number]        Account:       [id number]   :      1990        Admitted:     2020                   Document: [id number]

## 2020-12-01 NOTE — PHARMACY-ADMISSION MEDICATION HISTORY
Admission Medication History Completed by Pharmacy    See [facility] Admission Navigator for allergy information, preferred outpatient pharmacy, prior to admission medications and immunization status.     Medication History Sources:     Spoke with patient via phone to complete a med history    Changes made to PTA medication list (reason):    Added: None    Deleted: None    Changed: made note about patient using more doxepin than prescribed    Additional Information:    Doxepin: patient reports using 1-2 capsules (10-20mg), original sig was 1 capsule (10 mg) at bedtime     Patient was a good historian of medications    Prior to Admission medications    Medication Sig Last Dose Taking? Auth Provider   cloNIDine (CATAPRES) 0.1 MG tablet Take 1 tablet (0.1 mg) by mouth 2 times daily as needed (anxiety, high blood pressure) 11/29/2020 at Unknown time Yes Mihai Finn MD   doxepin (SINEQUAN) 10 MG capsule Take 1 capsule (10 mg) by mouth At Bedtime 11/30/2020 at Unknown time Yes Mihai Finn MD   lithium ER (LITHOBID) 300 MG CR tablet Take 4 tablets (1,200 mg) by mouth At Bedtime 11/30/2020 at Unknown time Yes Mihai Finn MD   lurasidone (LATUDA) 80 MG TABS tablet Take 1 tablet (80 mg) by mouth daily 11/29/2020 at Unknown time Yes Mihai Finn MD   metFORMIN (GLUCOPHAGE-XR) 500 MG 24 hr tablet Take 1 tablet (500 mg) by mouth daily (with dinner) 11/29/2020 at Unknown time Yes Mihai Finn MD   QUEtiapine (SEROQUEL) 50 MG tablet Take 1 tablet (50 mg) by mouth At Bedtime 11/29/2020 at Unknown time Yes Mihai Finn MD       Date completed: 12/01/20    Medication history completed by: Monika Billy

## 2020-12-01 NOTE — PROGRESS NOTES
Pt has not attended scheduled occupational therapy sessions. Encourage attendance and participation.    Plan: OT staff will meet with pt to review the role of occupational therapy and explain the value of having them involved in their treatment plan including options to meet current needs/self-identified goals. As group attendance is established, Pt will be given self-assessment to inform OT initial assessment.

## 2020-12-01 NOTE — ED NOTES
"ED to Behavioral Floor Handoff    SITUATION  Jose Francisco Sommers is a 30 year old male who speaks English and lives in a home unknown The patient arrived in the ED by private car from home with a complaint of Manic Behavior (\"I slept twice in the last 5 days.\" Pt rambling.) and Depression (\"I'm so depressed but not suicidal.\" Denies SI/HI. )  .The patient's current symptoms started/worsened 2 week(s) ago and during this time the symptoms have increased.   In the ED, pt was diagnosed with   Final diagnoses:   Schizoaffective disorder, bipolar type (H)        Initial vitals were: BP: (!) 175/111  Pulse: 111  Temp: 97.9  F (36.6  C)  Resp: 18  SpO2: 94 %   --------  Is the patient diabetic? No   If yes, last blood glucose? --     If yes, was this treated in the ED? --  --------  Is the patient inebriated (ETOH) No or Impaired on other substances? No  MSSA done? N/A  Last MSSA score: --    Were withdrawal symptoms treated? N/A  Does the patient have a seizure history? No. If yes, date of most recent seizure--  --------  Is the patient patient experiencing suicidal ideation? denies current or recent suicidal ideation     Homicidal ideation? denies current or recent homicidal ideation or behaviors.    Self-injurious behavior/urges? denies current or recent self injurious behavior or ideation.  ------  Was pt aggressive in the ED No  Was a code called No  Is the pt now cooperative? Yes  -------  Meds given in ED:   Medications   nicotine (NICODERM CQ) 21 MG/24HR 24 hr patch 1 patch (1 patch Transdermal Patch/Med Applied 11/30/20 2115)   hydrOXYzine (ATARAX) tablet 25 mg (25 mg Oral Given 11/30/20 2001)   OLANZapine zydis (zyPREXA) ODT tab 10 mg (10 mg Oral Given 11/30/20 2115)      Family present during ED course? No  Family currently present? No    BACKGROUND  Does the patient have a cognitive impairment or developmental disability? No  Allergies:   Allergies   Allergen Reactions     Sulfa Drugs    .   Social demographics are "   Social History     Socioeconomic History     Marital status: Single     Spouse name: Not on file     Number of children: Not on file     Years of education: Not on file     Highest education level: Not on file   Occupational History     Not on file   Social Needs     Financial resource strain: Not on file     Food insecurity     Worry: Not on file     Inability: Not on file     Transportation needs     Medical: Not on file     Non-medical: Not on file   Tobacco Use     Smoking status: Former Smoker     Packs/day: 2.00     Types: Cigarettes     Start date: 11/22/2017     Smokeless tobacco: Never Used   Substance and Sexual Activity     Alcohol use: No     Alcohol/week: 0.0 standard drinks     Drug use: No     Sexual activity: Not Currently   Lifestyle     Physical activity     Days per week: Not on file     Minutes per session: Not on file     Stress: Not on file   Relationships     Social connections     Talks on phone: Not on file     Gets together: Not on file     Attends Jehovah's witness service: Not on file     Active member of club or organization: Not on file     Attends meetings of clubs or organizations: Not on file     Relationship status: Not on file     Intimate partner violence     Fear of current or ex partner: Not on file     Emotionally abused: Not on file     Physically abused: Not on file     Forced sexual activity: Not on file   Other Topics Concern     Parent/sibling w/ CABG, MI or angioplasty before 65F 55M? Not Asked   Social History Narrative     Not on file        ASSESSMENT  Labs results   Labs Ordered and Resulted from Time of ED Arrival Up to the Time of Departure from the ED   DRUG ABUSE SCREEN 6 CHEM DEP URINE ([facility])   COVID-19 VIRUS (CORONAVIRUS) BY PCR   SARS-COV-2 (COVID-19) VIRUS RT-PCR      Imaging Studies: No results found for this or any previous visit (from the past 24 hour(s)).   Most recent vital signs BP (!) 175/111   Pulse 111   Temp 97.9  F (36.6  C) (Tympanic)   Resp 18    SpO2 94%    Abnormal labs/tests/findings requiring intervention:---   Pain control: pt had none  Nausea control: pt had none    RECOMMENDATION  Are any infection precautions needed (MRSA, VRE, etc.)? No If yes, what infection? --  ---  Does the patient have mobility issues? independently. If yes, what device does the pt use? ---  ---  Is patient on 72 hour hold or commitment? Yes If on 72 hour hold, have hold and rights been given to patient? Yes  Are admitting orders written if after 10 p.m. ?N/A  Tasks needing to be completed:---     Cuca Kim, RN   [phone] [facility]

## 2020-12-01 NOTE — PLAN OF CARE
Problem: Adult Inpatient Plan of Care  Goal: Plan of Care Review  12/1/2020 1302 by Veronique Huang, RN  Outcome: No Change  12/1/2020 1245 by Veronique Huang RN  Outcome: No Change  Goal: Patient-Specific Goal (Individualized)  Outcome: No Change  Goal: Absence of Hospital-Acquired Illness or Injury  Outcome: No Change  Goal: Optimal Comfort and Wellbeing  Outcome: No Change  Goal: Readiness for Transition of Care  Outcome: No Change   Patient spent time in his room, awaiting Covid test results. He is alert and oriented x3. Able to communicate his needs. He presents with flat, blunted affect. Mood is calm. He reports his mood as being up and down. C/O being anxious, unable to rate it. He received hydroxyzine prn.   He denies SI/SIB/AVH/HI. He reports that he did not sleep at all last night, that he paced in his room all night, still pacing. Patient reports the only time he slept for a little bit was in the ED after they gave him Zydis medication. He said the medication really helped. He ate both his meals. C/O pain to his feet and was given tylenol prn. His BP is elevated today. BP was 158/102 (sitting) pulse 106 and 147/102 (standing) pulse 106. We Checked it again at noon and sitting BP was 172/128 and standing BP was 170/116. He received prn clonidine. Dr Monsivais was update. There is an order for internal medicine consult. Patient Covid test is still pending.

## 2020-12-01 NOTE — SAFE
Jose Francisco Sommers  November 30, 2020    30 year old single white male with history of schizoaffective disorder and who was just discharged from inpatient service on 11-26.  Prescribed Olanzapine was discontinued and Latuda started.  Pt has decompensated.    Current Suicidal Ideation/Self-Injurious Concerns/Methods: None - N/A   No history of suicidal ideation or attempts.  Becomes more disorganized and less able to process information as he becomes more symptomatic.    Inappropriate Sexual Behavior: No    Aggression/Homicidal Ideation: Rumination  No history of other directed violence.  When he becomes psychotic he will have more problems concentrating and engage in rapid pacing.    For additional details see full DEC assessment.       Leanna Reddy, LP

## 2020-12-01 NOTE — PLAN OF CARE
Work Completed: CAITLYN (writer) met with trx team, provided update, and reviewed pt's chart. CTC complete team note in plan of care. IPA was conducted and completed by fellow CTC, due to time constraints. CTC attempted to meet with pt this afternoon, after his COVID test was returned negative. However, pt was asleep at the time and could not be roused. CTC will meet with pt tomorrow in order to introduce themselves and explain their role on the trx team. Pt's mother, Ember [phone], called requesting an update on pt's status and offered to provide additional collateral if needed. She expressed that his recent discharge did not go well and would like an update on him prior to discharge due to feeling he wasn't ready to come back home. Mom thinks that olanzapine was more effective, but understands that pt hated the weight gain, and thinks the Latuda wasn't as effective. Mom describes pt as edgy and irritable, but not aggressive, and believes that Abilify (and meds of that family) made it worse. Mom would appreciate a call from the attending to discuss meds further, due to pt having a complicated hx with medications and had questions about pt being moved to one of the University floors (station 20 or 22) due to pt recently being there. CTC said they would pass this information onto the trx team.     Discharge plan or goal: TBD upon trx team evaluation and assessment.                Barriers to discharge: Safe discharge plan, medication evaluation, and symptom severity.

## 2020-12-01 NOTE — PLAN OF CARE
Initial Psychosocial Assessment    I have reviewed the chart, met with the patient, and developed Care Plan.  Information for assessment was obtained from:     Patient: Interview and Chart: Review    Presenting Problem:  Per HPI: Jose Francisco Sommers is a 30 year old male with a PMH of schizoaffective disorder (bipolar type), ADD, psychosis, cannabis abuse, and cocaine abuse who presents the ED today complaining of manic behavior and depression. Patient was recently admitted to the psychiatry service here from 11/12/2020 to 11/26/2020 complaining of irritability, agitation, no sleep for several days, tangential speech, disorganized thought, behavior, and inappropriate comments towards family members in the context of being titrated off of olanzapine by primary psychiatrist secondary to weight gain. Patient was started on latuda and on discharge, has been restless and anxious at home. He has slept couple days past week. He perseverates on poor focus and wants to be placed on an amphetamine. He is disorganized and tracks poorly. Mother tried monitoring him and is now exhausted was he is not sleeping. He was sent in as a result. Patient denies using drugs. He only agrees to be here if he is prescribed a stimulant. He otherwise feels it is a waste of his time.    History of Mental Health and Chemical Dependency:  Pt has had several previous hospitalizations most recently last month at [facility].   Patient was civilly committed in 2015.  He has no history of suicide attempts/SIB     Patient has a h/o alcohol use disorder and polysubstance use.  He has been sober for several years.  Underwent CD treatment in 2015.     Family Description (Constellation, Family Psychiatric History):   Patient was born/raised in [place] in an intact family.       Patient is single- never , no children     Family history is significant for alcohol use disorder in PGF, mental illness in MGM.     Significant Life Events (Illness, Abuse,  Trauma, Death):  Unknown at this time     Living Situation:     Patient has been living with his retired parents in [place]     Educational Background:   Patient had been attending [employer] studying Math and Computer Science until recent leave d/t mental health issues     Occupational History:   Patient is not currently employed     Financial Status:    No immediate concerns     Legal Issues:    Patient has significant legal issues including DWI x4, Disorderly conduct, possession.     Ethnic/Cultural Issues:  No concerns identified     Spiritual Orientation:    Nondenominational Claribel                       Service History:   N/A     Social Functioning (organization, interests):  Patient enjoys reading                                                      Current Treatment Providers are:  Psychiatry: [facility]    Social Service Assessment/Plan:  Patient has been admitted for psychiatric stabilization for this acute crisis. Patient will meet with treatment team including a psychiatrist to have psychiatric assessment and medication management. Team will coordinate with the any outpatient medication providers to review and adjust medications as appropriate. Staff will provide therapeutic programming and structure to help maintain a safe environment for healing. Staff will continue to assess patient as needed. Patient will participate in unit groups and activities. Patient will receive individual and group support on the unit. CTC will coordinate with outpatient providers and will place referrals to ensure appropriate follow up care is in place.

## 2020-12-01 NOTE — CONSULTS
"Circumstances of recent discharge and re-admittance noted. Please refer to recent medicine H&P by MICHAEL Zavala in charting dated 11/16/2020 , which was reviewed by this writer and is up to date. Please call the on-call [facility] for any f/u medical concerns if they arise.     In short, 30-year-old male with reported history of schizoaffective disorder, admitted to station 10N due to psychiatric decompensation. Medicine asked to consult for \"arterial hypertension\".     On a previous admission earlier this month, patient was hypertensive and started on clonidine 0.1mg daily PRN and hydralazine PRN which helped decrease his BP. On discharge, his BP was stable and sent home on clonidine for HTN with directions to follow-up with PCP. He reports missing a few doses, has been using it maybe daily for the last week. Denies use of cocaine while out of the hospital. He has trouble sleeping but has never stopped breathing in his sleep or woken up gasping for air. He denies any headaches, vision changes, CP, and shortness of breath.     Objective:  BP (!) 172/128   Pulse 108   Temp 97.7  F (36.5  C) (Oral)   Resp 16   Ht 1.842 m (6' 0.5\")   Wt 92.4 kg (203 lb 11.2 oz)   SpO2 99%   BMI 27.25 kg/m    GEN: Manic appearing, pacing in room, tangential speech  CV: Tachycardic rate, regular rhythm, no mumrus  EXT: No edema    A/P:  #Elevated BP: No diagnosis of HTN. Happened last admission w/ PRN clonidine BID, hydralazine offered w/ improvement. Utox negative. OP BPs from clinic prior to 02/2020 were in 130s-140s/80s (virtual visits since then). DDx includes cessatoin of clonidine w/ doxepin on board (denies taking more than 1 /day, unreliable historian), psychiatric d/o, anxiety, untreated HTN (less likely),   -Treat underlying psychiatric illness  -PRN clonidine for SBP >180 and/or DBP >110, will discuss with psychiatry tomorrow, if not indicated for anxiety, recommend stopping due to increased incidence of rebound HTN w/ " doxepin  -PRN hydralazine for SBP >200 and/or DBP >120  -Will continue to peripherally follow BPs    Vianca Toure PA-C

## 2020-12-01 NOTE — ED PROVIDER NOTES
"    [facility] EMERGENCY DEPARTMENT ([facility])     November 30, 2020  History     Chief Complaint   Patient presents with     Manic Behavior     \"I slept twice in the last 5 days.\" Pt rambling.     Depression     \"I'm so depressed but not suicidal.\" Denies SI/HI.      HPI  Jose Francisco Sommers is a 30 year old male with a PMH of schizoaffective disorder (bipolar type), ADD, psychosis, cannabis abuse, and cocaine abuse who presents the ED today complaining of manic behavior and depression. Patient was recently admitted to the psychiatry service here from 11/12/2020 to 11/26/2020 complaining of irritability, agitation, no sleep for several days, tangential speech, disorganized thought, behavior, and inappropriate comments towards family members in the context of being titrated off of olanzapine by primary psychiatrist secondary to weight gain. Patient was started on latuda and on discharge, has been restless and anxious at home. He has slept couple days past week. He perseverates on poor focus and wants to be placed on an amphetamine. He is disorganized and tracks poorly. Mother tried monitoring him and is now exhausted was he is not sleeping. He was sent in as a result. Patient denies using drugs. He only agrees to be here if he is prescribed a stimulant. He otherwise feels it is a waste of his time.    Please see DEC Crisis Assessment on 11/30/2020 in Epic for further details.    I have reviewed the Medications, Allergies, Past Medical and Surgical History, and Social History in the [employer] system.    PAST MEDICAL HISTORY: No past medical history on file.    PAST SURGICAL HISTORY: No past surgical history on file.    Past medical history, past surgical history, medications, and allergies were reviewed with the patient.     FAMILY HISTORY:   Family History   Problem Relation Age of Onset     Diabetes Maternal Grandfather        SOCIAL HISTORY:   Social History     Tobacco Use     Smoking status: Former Smoker     " Packs/day: 2.00     Types: Cigarettes     Start date: 11/22/2017     Smokeless tobacco: Never Used   Substance Use Topics     Alcohol use: No     Alcohol/week: 0.0 standard drinks     Social history was reviewed with the patient.       Patient's Medications   New Prescriptions    No medications on file   Previous Medications    CLONIDINE (CATAPRES) 0.1 MG TABLET    Take 1 tablet (0.1 mg) by mouth 2 times daily as needed (anxiety, high blood pressure)    DOXEPIN (SINEQUAN) 10 MG CAPSULE    Take 1 capsule (10 mg) by mouth At Bedtime    LITHIUM ER (LITHOBID) 300 MG CR TABLET    Take 4 tablets (1,200 mg) by mouth At Bedtime    LURASIDONE (LATUDA) 80 MG TABS TABLET    Take 1 tablet (80 mg) by mouth daily    METFORMIN (GLUCOPHAGE-XR) 500 MG 24 HR TABLET    Take 1 tablet (500 mg) by mouth daily (with dinner)    QUETIAPINE (SEROQUEL) 50 MG TABLET    Take 1 tablet (50 mg) by mouth At Bedtime   Modified Medications    No medications on file   Discontinued Medications    No medications on file          Allergies   Allergen Reactions     Sulfa Drugs         Review of Systems   Constitutional: Positive for activity change.   HENT: Negative.    Eyes: Negative.    Respiratory: Negative.    Cardiovascular: Negative.    Gastrointestinal: Negative.    Genitourinary: Negative.    Musculoskeletal: Negative.    Skin: Negative.    Neurological: Negative.    Psychiatric/Behavioral: Positive for decreased concentration and sleep disturbance. The patient is nervous/anxious and is hyperactive.    All other systems reviewed and are negative.        Physical Exam   BP: (!) 175/111  Pulse: 111  Temp: 97.9  F (36.6  C)  Resp: 18  SpO2: 94 %      Physical Exam  Vitals signs and nursing note reviewed.   HENT:      Head: Normocephalic.   Eyes:      Pupils: Pupils are equal, round, and reactive to light.   Neck:      Musculoskeletal: Normal range of motion.   Pulmonary:      Effort: Pulmonary effort is normal.   Musculoskeletal: Normal range of  motion.   Neurological:      General: No focal deficit present.      Mental Status: He is alert.   Psychiatric:         Attention and Perception: He is inattentive. He does not perceive visual hallucinations.         Mood and Affect: Mood normal. Affect is inappropriate.         Speech: Speech is tangential.         Behavior: Behavior is agitated and hyperactive.         Thought Content: Thought content is delusional.         Cognition and Memory: Cognition normal.         Judgment: Judgment is impulsive and inappropriate.         ED Course        Procedures               Results for orders placed or performed during the hospital encounter of 11/30/20 (from the past 24 hour(s))   Drug abuse screen 6 urine (tox)   Result Value Ref Range    Amphetamine Qual Urine Negative NEG^Negative    Barbiturates Qual Urine Negative NEG^Negative    Benzodiazepine Qual Urine Negative NEG^Negative    Cannabinoids Qual Urine Negative NEG^Negative    Cocaine Qual Urine Negative NEG^Negative    Ethanol Qual Urine Negative NEG^Negative    Opiates Qualitative Urine Negative NEG^Negative     Medications   nicotine (NICODERM CQ) 21 MG/24HR 24 hr patch 1 patch (1 patch Transdermal Patch/Med Applied 11/30/20 2115)   hydrOXYzine (ATARAX) tablet 25 mg (25 mg Oral Given 11/30/20 2001)   OLANZapine zydis (zyPREXA) ODT tab 10 mg (10 mg Oral Given 11/30/20 2115)             Assessments & Plan (with Medical Decision Making)   Patient with schizoaffective disorder, bipolar type who continues to be unstable despite being placed on Latuda. He is disorganized and appears to function poorly. He has minimal insight. Patient will be referred for admission due to decompensated mental illness. He is placed on a 72 hour hold.    I have reviewed the nursing notes.    I have reviewed the findings, diagnosis, plan and need for follow up with the patient.    New Prescriptions    No medications on file       Final diagnoses:   Schizoaffective disorder, bipolar  type (H)       11/30/2020   [facility] EMERGENCY DEPARTMENT     Braulio Ramirez MD  11/30/20 0461

## 2020-12-01 NOTE — PLAN OF CARE
BEHAVIORAL TEAM DISCUSSION    Participants: Dr. Minerva AL, Monica Huang RN, Hansel Nichole LPC  Progress: New Admit  Anticipated length of stay: 7 days  Continued Stay Criteria/Rationale: Evaluation and assessment for MH needs  Medical/Physical: None.  Precautions:   Behavioral Orders   Procedures    Assault precautions    Code 1 - Restrict to Unit    Discontinue 1:1 attendant for suicide risk     Order Specific Question:   I have performed an in person assessment of the patient     Answer:   Based on this assessment the patient no longer requires a one on one attendant at this point in time.    Elopement precautions    Routine Programming     As clinically indicated    Single Room    Status 15     Every 15 minutes.    Suicide precautions     Patients on Suicide Precautions should have a Combination Diet ordered that includes a Diet selection(s) AND a Behavioral Tray selection for Safe Tray - with utensils, or Safe Tray - NO utensils       Plan: Continue evaluation and assessment for stabilization and aftercare needs.  Rationale for change in precautions or plan: None

## 2020-12-01 NOTE — ED NOTES
I have performed an in person assessment of the patient. Based on this assessment the patient no longer requires a one on one attendant at this point in time.  The patient adamantly denied denies any self-harm ideation or plan or any thoughts of hurting anyone else.  He simply states that there was a misunderstanding as he had requested MDMA from somebody and had stated that if he could get this or something similar, he would not need food or water or sleep anymore.  He states that he cannot get this medication, he would like some Xanax.  He does report depressed mood.  He denies self-harm attempts in the past.  The patient is calm and cooperative here in the emergency department.  He has not tried to hurt himself or anyone else while he has been here in the emergency department.  I discussed with the patient that I could not get him MDMA and was not willing to prescribe Xanax for him at this time.  Offered hydroxyzine for patient's anxiety, he would like to try this at this time.    Mindy Morales MD  7:36 PM  November 30, 2020         Mindy Morales MD  11/30/20 1938

## 2020-12-01 NOTE — TELEPHONE ENCOUNTER
S: Pt is a 30 yr old male in [place] ED for psychosis report by Leanna GREER: Hx of schizoaffective d/o.  Pt reports he's not sleeping.   reports pt is preoccupied with internal stimuli, is struggling to find words and is not oriented.  Pt was recently taken off olanzapine due to weight gain.  No reported medical concerns or cd issues.  Utox neg.  COVID test ordered.     A: vol     R: 10 / Minerva     Patient cleared and ready for behavioral bed placement: Yes

## 2020-12-01 NOTE — PLAN OF CARE
"Name: Manuel  Admitted From: [place] ED  Time: 0305   Legal Status: 72HH     Diagnosis: hx of schizoaffective bipolar type     Circumstances leading up to admission: According to ED Provider: \"Patient was recently admitted to the psychiatry service here from 11/12/2020 to 11/26/2020 complaining of irritability, agitation, no sleep for several days, tangential speech, disorganized thought, behavior, and inappropriate comments towards family members in the context of being titrated off of olanzapine by primary psychiatrist secondary to weight gain. Patient was started on latuda and on discharge, has been restless and anxious at home. He has slept couple days past week. He perseverates on poor focus and wants to be placed on an amphetamine. He is disorganized and tracks poorly. Mother tried monitoring him and is now exhausted was he is not sleeping. He was sent in as a result. Patient denies using drugs. He only agrees to be here if he is prescribed a stimulant. He otherwise feels it is a waste of his time.\"    Pt stated reason for admission: pt unable to give clear answer, however, he told the ED that he had not slept in 5 days.     Psych History: schizoaffective disorder bipolar type, ADD, psychosis, cannabis abuse, and cocaine abuse; pt recently admitted on station 20N from 11/12-11/26      Medical History: hypertension -PRN clonidine 0.1 mg available       SI/SIB/HI: pt unable to give clear answer. Seems to deny SI/SIB/HI, however pt states his thoughts are \"messed up\" and appears to be afraid of what he is thinking     Contract for safety? pt unable at this time due to disorganization    Physical Appearance: pt appears tense/anxious, looks disheveled     Behavior upon arrival: pt has been cooperative with search, unit orientation, and medication administration; however appears very distracted, pacing in his room since he has been here; slight tremor of hands observed and skin discoloration- hands and face are " "bright red     Notification of family/other: ED contacted pt's mother     Admission profile complete? partially complete     Pertinent interview information: Pt was unable to be interviewed due to disorganized behavior. He listens to questions asked, but his answers do not track with questions. He appears highly anxious and continues to say his mind is \"a mess\" and struggles to grasp answers he wants to say. He attempts to answer questions but appears confused at his own answers. Pt has been pacing in his room for about 2 hours since he has been here. He has been encouraged by staff to lay down and sleep multiple times, but he states \"oh I already did\" (which he did not) or just politely declines. He eventually laid down in bed around 0530. He was able to go over his PTA medication list and he mentioned that the Zydis he received in the ED really helped him.     Pt was given his HS medications at about 0400 as they were not given in the ED. He was compliant with this. He was also given PRN clonidine 0.1 mg due to /116 and pulse 108    Plan: code 1, status 15 minute safety checks; elopement, assault, suicide precautions; single room; build rapport with patient; provide safe, therapeutic environment        "

## 2020-12-02 PROCEDURE — 124N000002 HC R&B MH UMMC

## 2020-12-02 PROCEDURE — 250N000013 HC RX MED GY IP 250 OP 250 PS 637: Performed by: PSYCHIATRY & NEUROLOGY

## 2020-12-02 PROCEDURE — 99233 SBSQ HOSP IP/OBS HIGH 50: CPT | Performed by: PSYCHIATRY & NEUROLOGY

## 2020-12-02 RX ORDER — CLONIDINE HYDROCHLORIDE 0.1 MG/1
0.1 TABLET ORAL 2 TIMES DAILY PRN
Status: DISCONTINUED | OUTPATIENT
Start: 2020-12-02 | End: 2020-12-19

## 2020-12-02 RX ORDER — OLANZAPINE 5 MG/1
5 TABLET, ORALLY DISINTEGRATING ORAL DAILY
Status: DISCONTINUED | OUTPATIENT
Start: 2020-12-02 | End: 2020-12-07

## 2020-12-02 RX ADMIN — QUETIAPINE FUMARATE 50 MG: 50 TABLET ORAL at 21:02

## 2020-12-02 RX ADMIN — NICOTINE 1 PATCH: 21 PATCH, EXTENDED RELEASE TRANSDERMAL at 08:39

## 2020-12-02 RX ADMIN — METFORMIN HYDROCHLORIDE 500 MG: 500 TABLET, EXTENDED RELEASE ORAL at 17:20

## 2020-12-02 RX ADMIN — HYDROXYZINE HYDROCHLORIDE 25 MG: 25 TABLET, FILM COATED ORAL at 09:22

## 2020-12-02 RX ADMIN — NICOTINE POLACRILEX 4 MG: 2 GUM, CHEWING BUCCAL at 06:37

## 2020-12-02 RX ADMIN — DOXEPIN HYDROCHLORIDE 10 MG: 10 CAPSULE ORAL at 21:02

## 2020-12-02 RX ADMIN — OLANZAPINE 10 MG: 10 TABLET, ORALLY DISINTEGRATING ORAL at 21:02

## 2020-12-02 RX ADMIN — OLANZAPINE 5 MG: 5 TABLET, ORALLY DISINTEGRATING ORAL at 17:21

## 2020-12-02 RX ADMIN — LITHIUM CARBONATE 1200 MG: 300 TABLET, EXTENDED RELEASE ORAL at 21:02
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
## 2020-12-02 NOTE — PLAN OF CARE
"Pt has been mostly isolative to his room this shift. He did come out to eat dinner and snacks when prompted. He has been pacing in his room, sitting on his bed staring. He appears very anxious/tense, but denies anxiety. Upon check in, pt not tracking well with conversations. Pt does deny SI/SIB/HI/AVH, though appears preoccupied during conversation, usually staring and taking a lot of time to process before answering questions. His answers do not align with questions asked most times and he seems confused. When asked how he's doing, he says \"i'm doing great ever since I came to the hospital\". Pt believes zyprexa is very helpful for him stating \"it makes me feel so much better\".     Pt seen by IM this evening for hypertension consult. Received orders from IM for PRN antihypertensives with parameters. Pt BP continues to be elevated. At 1600, it was 160/111 and at 2115, it is 175/108. Pt not meeting criteria to give antihypertensive PRN at this time. Pt denies any symptoms of hypertension including headache, dizziness, vision changes. Will continue to monitor.     Writer spoke to pt's mother, Ember, ([phone]) tonight (IVA signed in chart). Writer gave her an update on medication changes and how pt is doing. She called because she wanted to share her concerns about why the pt may have declined so quickly. She shared that the patient has poor metabolization of drugs in the liver (CYP2D6). She shared that patient had been smoking cigars every 15 minutes and constantly pacing the few days before he came in. She wants to speak with the doctor tomorrow. I will put a note in the physician sticky note.   "

## 2020-12-02 NOTE — PLAN OF CARE
Work Completed: CAITLYN (writer) met with trx team, provided update, and reviewed pt's chart. The trx team was updated with the details about the conversation CAITLYN had with opt's mother yersterday, and attending confirmed that he would give her a call. CTC met with pt this morning to introduce themselves, and explain their role on the trx team. Pt thanked CTC for letting them sleep yesterday, as he felt that his biggest issue right now is not being able to sleep. CTC and pt discussed potential discharge plans, and pt was ambivalent about therapy, but was willing to consider it. CTC and pt agreed to let him focus on getting his meds figured out first and they would check in about it again latter. During the conversation pt had poor eye contact, and appeared distracted, possibly presenting as responding to internal stimuli. Pt was cooperative and pleasant during the conversation, but it was unclear how much information he was tracking at times.      Discharge plan or goal: TBD upon trx team evaluation and assessment. Tentatively planning on discharge pt home once he stabilized on meds.                  Barriers to discharge: Safe discharge plan, medication evaluation, and symptom severity (actively hallucinating).

## 2020-12-02 NOTE — PLAN OF CARE
Patient has been isolative/withdrawn in his room, observed pacing. He came out for medication and meals. He has been calm and cooperative with staff. Medication compliant. He presents with a flat, blunted tense affect. He is very brief with his answers, mostly yes/no  response. He at times stares blankly before he talks. Mood is calm. He has has no goal for today. He is not attending groups on the unit, no interaction with peers.  He rates depression at a 4 out of 10 and anxiety at a 6 out of 10. Patient denies all mental health symptoms. Denies SI/SIB/AVH. Reports poor concentration and has a little bit of racing thoughts.   He reports that he slept really well last night (5.75 hrs). He thinks the medication, nicotine patch and a different environment contributed to his good sleep last night. Patient went on to report that when at home he smokes a lot of cigarettes and listens to music all the time and that might be why he sleeps poorly when at home. Nest step is to return back home to his parents.   He has a good appetite. He denies pain. BP was 152/100 sitting and 162/102 standing. BP readings did not meet parameters for PRNs. Patient denies medication SE. He received prn hydroxyzine for anxiety.

## 2020-12-02 NOTE — PROGRESS NOTES
Patient slept 5.75 hours this shift. He got up at past 0630 and requested for a PRN Nicotine gum which was given to him.

## 2020-12-03 PROCEDURE — 250N000013 HC RX MED GY IP 250 OP 250 PS 637: Performed by: PHYSICIAN ASSISTANT

## 2020-12-03 PROCEDURE — 250N000013 HC RX MED GY IP 250 OP 250 PS 637: Performed by: PSYCHIATRY & NEUROLOGY

## 2020-12-03 PROCEDURE — H2032 ACTIVITY THERAPY, PER 15 MIN: HCPCS

## 2020-12-03 PROCEDURE — 124N000002 HC R&B MH UMMC

## 2020-12-03 PROCEDURE — 99233 SBSQ HOSP IP/OBS HIGH 50: CPT | Performed by: PSYCHIATRY & NEUROLOGY

## 2020-12-03 RX ORDER — HYDRALAZINE HYDROCHLORIDE 25 MG/1
50 TABLET, FILM COATED ORAL EVERY 6 HOURS PRN
Status: DISCONTINUED | OUTPATIENT
Start: 2020-12-03 | End: 2020-12-04

## 2020-12-03 RX ORDER — LABETALOL 20 MG/4 ML (5 MG/ML) INTRAVENOUS SYRINGE
20 EVERY 6 HOURS PRN
Status: DISCONTINUED | OUTPATIENT
Start: 2020-12-03 | End: 2020-12-03

## 2020-12-03 RX ADMIN — CLONIDINE HYDROCHLORIDE 0.1 MG: 0.1 TABLET ORAL at 21:38

## 2020-12-03 RX ADMIN — OLANZAPINE 10 MG: 10 TABLET, ORALLY DISINTEGRATING ORAL at 19:22

## 2020-12-03 RX ADMIN — HYDROXYZINE HYDROCHLORIDE 25 MG: 25 TABLET, FILM COATED ORAL at 19:21

## 2020-12-03 RX ADMIN — NICOTINE 1 PATCH: 21 PATCH, EXTENDED RELEASE TRANSDERMAL at 08:10

## 2020-12-03 RX ADMIN — OLANZAPINE 10 MG: 10 TABLET, ORALLY DISINTEGRATING ORAL at 23:46

## 2020-12-03 RX ADMIN — LITHIUM CARBONATE 1200 MG: 300 TABLET, EXTENDED RELEASE ORAL at 19:20

## 2020-12-03 RX ADMIN — TRAZODONE HYDROCHLORIDE 50 MG: 50 TABLET ORAL at 21:41

## 2020-12-03 RX ADMIN — METFORMIN HYDROCHLORIDE 500 MG: 500 TABLET, EXTENDED RELEASE ORAL at 17:53

## 2020-12-03 RX ADMIN — CLONIDINE HYDROCHLORIDE 0.1 MG: 0.1 TABLET ORAL at 09:38

## 2020-12-03 RX ADMIN — TRAZODONE HYDROCHLORIDE 50 MG: 50 TABLET ORAL at 22:42

## 2020-12-03 RX ADMIN — OLANZAPINE 5 MG: 5 TABLET, ORALLY DISINTEGRATING ORAL at 08:10

## 2020-12-03 RX ADMIN — DOXEPIN HYDROCHLORIDE 10 MG: 10 CAPSULE ORAL at 19:22

## 2020-12-03 RX ADMIN — QUETIAPINE FUMARATE 50 MG: 50 TABLET ORAL at 19:21

## 2020-12-03 RX ADMIN — HYDRALAZINE HYDROCHLORIDE 25 MG: 25 TABLET ORAL at 19:21

## 2020-12-03 ASSESSMENT — ACTIVITIES OF DAILY LIVING (ADL)
ORAL_HYGIENE: INDEPENDENT
DRESS: INDEPENDENT
HYGIENE/GROOMING: INDEPENDENT

## 2020-12-03 ASSESSMENT — MIFFLIN-ST. JEOR: SCORE: 1932.2

## 2020-12-03 NOTE — TELEPHONE ENCOUNTER
Attempt # 2, x 1 week to contact patient. Mailbox full, unable to leave VM.     TC, please send letter requesting ER follow up appointment.     Ca Burt, RN, BSN, PHN  [facility]: [place]

## 2020-12-03 NOTE — PLAN OF CARE
"  Problem: Behavior Regulation Impairment (Psychotic Signs/Symptoms)  Goal: Improved Behavioral Control (Psychotic Signs/Symptoms)  Outcome: No Change.    Patient reports that he feels pretty good today. \"I feel like I can go home today, I wasn't thinking to be so soon but I feel ready.\" He did come out for breakfast. He attended community meeting. He was encouraged to try to spend some time outside his room more but responded \"I prefer being by myself.\" No interaction noted with peers.   Affect is flat, blunted and tense. Mood is calm. He reports that his concentration has actually improved.  Depression is at a 3 out of 10 and anxiety is at a 2 out of 10. Denies all MH symptoms. Denies SI/SIB. He is hopeful about his future. Appetite is good. Slept okay last night (6.5 hours). C/O mild discomfort to his feet and attributes this to too much walking/pacing.   BP this morning 161/117(sitting) and 148/107(standing). After recheck /111(sitting) to 170/117(standing). He receive prn clonidine for . Staff will continue to monitor.   "

## 2020-12-03 NOTE — PLAN OF CARE
"  Problem: Decreased Participation and Engagement (Psychotic Signs/Symptoms)  Goal: Increased Participation and Engagement (Psychotic Signs/Symptoms)  Outcome: No Change  D) Isolative to room and withdrawn socially, observed standing in front of window, affect tense, \"okay, I guess\" in response to mood, denies SI/SIB or HI, appears preoccupied  responding to internal stimuli, Room untidy with piles of unclean clothes, \"why are you looking at me with discontent?\" patient asks writer.  A) Intervention: Facilitate Participation and Engagement  Recent Flowsheet Documentation  Taken 12/2/2020 2000 by Jessica Mckeon, RN  Supportive Measures:    active listening utilized    verbalization of feelings encouraged  Offer 1:1 during MH assessment, prompts for medications, assault, SI/SIB and Elopement precautions  R) Patient out of room for meals/snacks, and cooperative for brief moments due to inability to sit with writer for longer than a few minutes at a time, declines 1:1 and states he prefers to be left alone.  Polite, non aggressive, takes medications denies any side effects including muscle stiffness and reassures writer he is urinating without difficulty. Blood pressure remains elevated 173/107 and pulse 76 , patient denies any headache, visual changes, lightheaded or dizziness, SOB or CP. Unable to give prn clonidine or hydralazine due to not meeting parameters. Continue to monitor closely.        "

## 2020-12-03 NOTE — PLAN OF CARE
"Patient states his mood feels \"good, improving\" despite feeling like his anxiety is \"constant\" states he feels like his \"anxiety has become my personality\" and is concerned it will worsen. States the Seroquel is \"helping\" and wonders if increasing the dose would be beneficial. States he is, \"backwards and that stimulants make mefeel more sedated and depressants make me feel more up. I wish the doctor could see that.\" Contradictory statements about sleep, initially states sleep is \"so good\" and that it is relieving to be able to fall asleep at night and wake up rested in the morning, \"going to bed is like silence, it's so nice.\" Later, awake and pacing the halls unable to sleep and states, \"Oh it's [not being able to sleep] always like this.\" Denies SI, SIB, HI or psychotic symptoms, \"Oh no, I never have those thoughts I can understand why some people can, but I don't.\" States appetite is good and he enjoys the structure of the hospital and the limited capacity for food portions to help maintain a healthy weight. Presents with poor hygiene, patient and room slight malodorous. Initially declined prompts shower, \"Let me think on that one.\" Around 20:00 pt took shower.     Patient initially declined BP check, \"I'm good, I'm going to group.\" BP in evening was 174/128 and HR 92. Patient denied headache, vision change, dyspnea, or chest pain. States he felt anxious and asked for medication and PRN anxiety medication. Write provided scheduled medications early at 19:21 along with Hydroxyzine 25 mg PO and Hydralazine 25 mg per order parameters. Patient declined coping skills offered (shower, talking 1:1, walking, art) \"I hate it, but I prefer to be alone. I'm ok in here [room].\"     On re-check at 21:36 BP was 189/138. Patient met parameters for PRN Hydralazine; however, medication is ordered q6 hours therefore patient given PRN Clonidine 0.1 mg. Again, patient denies headache, vision change, dyspnea, or chest pain and " "parameters to contact IM not met. States he feels \"good.\" Patient has been pacing in room the entire shift. Appears nervous. Patient requested medication for sleep. Prn Trazodone 50 mg was provided. Patient asked for and received additional dose of Trazodone after 60 minutes.     On re-check at 22:24 /113 and HR 93. Again, patient denies headache, vision change, dyspnea, or chest pain and parameters to contact IM not met. Patient states taking his blood pressure is \"stressing me out, I think it is making it worse.\" Paced some more in the hallway. On-call IM paged an FYI d/t patient meeting parameters for medication but not dose timing. Will continue to monitor.   "

## 2020-12-03 NOTE — PROGRESS NOTES
NOC Shift Report    Pt in bed at beginning of shift, breathing quiet and unlabored. Pt slept through shift. Pt slept 6.5 hours.     No pt complaints or concerns at this time.     No PRNs given. Will continue to monitor.

## 2020-12-03 NOTE — PLAN OF CARE
Work Completed: CAITLYN (writer) met with trx team, provided update, and reviewed pt's chart. Pt continues to be ambivilant about aftercare appointments, especially therapy. Pt is still willing to consider it. Pt expressed feeling much better and thinking that he was about ready to discharge. CTC agreed to check in with pt tomorrow to discuss discharge plans for early next week. Trx team feels that pt should remain in the hospital over the weekend, due to ongoing evidence of internal stimuli and needing additional medication adjustments. Pt verbalized agreement to stay over the weekend today, but was reluctant. Trx team agrees that pt's mother should be called to encourage pt to remain hospitalized, if pt insists on discharge prior to being fully stabilized. CTC will connect with pt's mother after a discharge plan has been fully established.      Discharge plan or goal: Tentatively planning on discharge pt home once he stabilized on meds.                  Barriers to discharge: Safe discharge plan, medication evaluation, and symptom severity (actively hallucinating).

## 2020-12-03 NOTE — PROGRESS NOTES
"[facility], [place]   Psychiatric Progress Note        Interim History:   The patient's care was discussed with the treatment team during the daily team meeting and/or staff's chart notes were reviewed.  Staff report patient is calm and isolative. Does not attend groups. Patient has told staff that he feels a lot better and that his depression and anxiety have improved. Patients blood pressure remains elevated typically measuring 160s-170s/110-120s. Patient has been taking clonidine as needed for elevated BP.     Met with patient in the presence of Dr. Palma: Today, patient continues to appear anxious. Fidgeted during the visit, preferred to stand, and avoided eye contact. Asked about discharge several times. Recommended he stay in the hospital for further stabilization given that he decompensated quickly after his previous discharge. For now, patient agreed to stay. Reported no issues with the increase in Zyprexa and that he thought it was helping. We asked about our conversation yesterday that upset him, he respectfully and adamantly asked to not talk about it. Discussed paranoid symptoms again with patient. He admitted to having difficulty trusting people. States that he is \"afraid of pain\". When asked if he was worried about someone hurting him he responded \"Rodger\". Asked him to elaborated and he stated Rodger was a friend and that \"I don't think he could hurt me but I think he would hurt me\". Thoughts were disorganized and he was not able to clarify what he meant. Overall, sounded and looked guarded/paranoid. Stated he felt safe in the hospital. Denied hearing voices. Recommended patient spend more time in the community. Had long discussion about how it could be beneficial. Patient agreed to participate more.          Medications:       doxepin  10 mg Oral At Bedtime     lithium ER  1,200 mg Oral At Bedtime     metFORMIN  500 mg Oral Daily with supper     nicotine  1 patch " "Transdermal Daily     nicotine   Transdermal Q8H     OLANZapine zydis  10 mg Oral At Bedtime     OLANZapine zydis  5 mg Oral Daily     QUEtiapine  50 mg Oral At Bedtime          Allergies:     Allergies   Allergen Reactions     Sulfa Drugs Rash          Labs:   No results found for this or any previous visit (from the past 24 hour(s)).       Psychiatric Examination:     BP (!) 173/107   Pulse 76   Temp 98.4  F (36.9  C) (Oral)   Resp 16   Ht 1.842 m (6' 0.5\")   Wt 92.1 kg (203 lb 1.6 oz)   SpO2 97%   BMI 27.17 kg/m    Weight is 203 lbs 1.6 oz  Body mass index is 27.17 kg/m .     Orthostatic Vitals       Most Recent      Sitting Orthostatic /111 12/03 0932    Sitting Orthostatic Pulse (bpm) 86 12/03 0932    Standing Orthostatic /117 12/03 0932    Standing Orthostatic Pulse (bpm) 93 12/03 0932        Appearance: awake, alert, adequately groomed, dressed in hospital scrubs and appeared as age stated  Attitude: somewhat cooperative  Eye Contact:  fair, intense at times, minimal blinking  Mood:  anxious  Affect:  mood congruent and intensity is blunted  Speech:  clear, coherent and paucity of speech, monotone. pauses mid-sentence and appears to be listening to internal stimuli.   Psychomotor Behavior: Fidgeting, no evidence of tardive dyskinesia, dystonia, or tics   Throught Process:  disorganized, needed redirection   Associations:  loosening of associations present  Thought Content:  no evidence of suicidal ideation or homicidal ideation, denies Auditory hallucinations/Visual hallucinations, delusions are likely present  Insight: partial  Judgement: limited  Oriented to:  time, person, and place  Attention Span and Concentration:  fair, needs redirection and prompting at time  Recent and Remote Memory:  fair    Clinical Global Impressions  First: 6/4 12/2/2020      Most recent:            Precautions:     Behavioral Orders   Procedures     Assault precautions     Code 1 - Restrict to Unit     " Discontinue 1:1 attendant for suicide risk     Order Specific Question:   I have performed an in person assessment of the patient     Answer:   Based on this assessment the patient no longer requires a one on one attendant at this point in time.     Elopement precautions     Routine Programming     As clinically indicated     Single Room     Status 15     Every 15 minutes.     Suicide precautions     Patients on Suicide Precautions should have a Combination Diet ordered that includes a Diet selection(s) AND a Behavioral Tray selection for Safe Tray - with utensils, or Safe Tray - NO utensils            DIagnoses:     Schizoaffective disorder, bipolar type, lauren, severe with psychotic features         Plan:     Continue inpatient stabilization. No medication changes today after Zyprexa Zydis was increased yesterday. Continue to provide support and structure. Will continue to monitor blood pressure. Encouraged patient to spend more time outside of his room and to participate in groups.     I was present with the a student who participated in the service and the documentation of the note.  I have verified the history and personally performed the mental status exam and medical decision making.  I agree with the assessment and plan of care as documented in the note.

## 2020-12-04 ENCOUNTER — TELEPHONE (OUTPATIENT)
Dept: PSYCHIATRY | Facility: CLINIC | Age: 30
End: 2020-12-04

## 2020-12-04 LAB
ALBUMIN SERPL-MCNC: 4.1 G/DL (ref 3.4–5)
ALP SERPL-CCNC: 78 U/L (ref 40–150)
ALT SERPL W P-5'-P-CCNC: 46 U/L (ref 0–70)
ANION GAP SERPL CALCULATED.3IONS-SCNC: 3 MMOL/L (ref 3–14)
AST SERPL W P-5'-P-CCNC: 28 U/L (ref 0–45)
BASOPHILS # BLD AUTO: 0.1 10E9/L (ref 0–0.2)
BASOPHILS NFR BLD AUTO: 1 %
BILIRUB SERPL-MCNC: 0.5 MG/DL (ref 0.2–1.3)
BUN SERPL-MCNC: 26 MG/DL (ref 7–30)
CALCIUM SERPL-MCNC: 9.4 MG/DL (ref 8.5–10.1)
CHLORIDE SERPL-SCNC: 106 MMOL/L (ref 94–109)
CO2 SERPL-SCNC: 29 MMOL/L (ref 20–32)
CREAT SERPL-MCNC: 1.11 MG/DL (ref 0.66–1.25)
DIFFERENTIAL METHOD BLD: NORMAL
EOSINOPHIL # BLD AUTO: 0.3 10E9/L (ref 0–0.7)
EOSINOPHIL NFR BLD AUTO: 2.6 %
ERYTHROCYTE [DISTWIDTH] IN BLOOD BY AUTOMATED COUNT: 11.9 % (ref 10–15)
GFR SERPL CREATININE-BSD FRML MDRD: 89 ML/MIN/{1.73_M2}
GLUCOSE SERPL-MCNC: 80 MG/DL (ref 70–99)
HCT VFR BLD AUTO: 42.7 % (ref 40–53)
HGB BLD-MCNC: 14.3 G/DL (ref 13.3–17.7)
IMM GRANULOCYTES # BLD: 0.1 10E9/L (ref 0–0.4)
IMM GRANULOCYTES NFR BLD: 0.5 %
LYMPHOCYTES # BLD AUTO: 1.9 10E9/L (ref 0.8–5.3)
LYMPHOCYTES NFR BLD AUTO: 16.8 %
MCH RBC QN AUTO: 30.4 PG (ref 26.5–33)
MCHC RBC AUTO-ENTMCNC: 33.5 G/DL (ref 31.5–36.5)
MCV RBC AUTO: 91 FL (ref 78–100)
MONOCYTES # BLD AUTO: 0.7 10E9/L (ref 0–1.3)
MONOCYTES NFR BLD AUTO: 5.9 %
NEUTROPHILS # BLD AUTO: 8 10E9/L (ref 1.6–8.3)
NEUTROPHILS NFR BLD AUTO: 73.2 %
NRBC # BLD AUTO: 0 10*3/UL
NRBC BLD AUTO-RTO: 0 /100
PLATELET # BLD AUTO: 285 10E9/L (ref 150–450)
POTASSIUM SERPL-SCNC: 4.1 MMOL/L (ref 3.4–5.3)
PROT SERPL-MCNC: 7.5 G/DL (ref 6.8–8.8)
RBC # BLD AUTO: 4.7 10E12/L (ref 4.4–5.9)
SODIUM SERPL-SCNC: 138 MMOL/L (ref 133–144)
TROPONIN I SERPL-MCNC: <0.015 UG/L (ref 0–0.04)
TSH SERPL DL<=0.005 MIU/L-ACNC: 2.01 MU/L (ref 0.4–4)
WBC # BLD AUTO: 11 10E9/L (ref 4–11)

## 2020-12-04 PROCEDURE — 99207 PR CDG-MDM COMPONENT: MEETS HIGH - UP CODED: CPT | Performed by: PSYCHIATRY & NEUROLOGY

## 2020-12-04 PROCEDURE — 36415 COLL VENOUS BLD VENIPUNCTURE: CPT | Performed by: PHYSICIAN ASSISTANT

## 2020-12-04 PROCEDURE — 84443 ASSAY THYROID STIM HORMONE: CPT | Performed by: PHYSICIAN ASSISTANT

## 2020-12-04 PROCEDURE — 124N000002 HC R&B MH UMMC

## 2020-12-04 PROCEDURE — 99233 SBSQ HOSP IP/OBS HIGH 50: CPT | Performed by: PSYCHIATRY & NEUROLOGY

## 2020-12-04 PROCEDURE — 250N000013 HC RX MED GY IP 250 OP 250 PS 637: Performed by: PSYCHIATRY & NEUROLOGY

## 2020-12-04 PROCEDURE — 250N000013 HC RX MED GY IP 250 OP 250 PS 637: Performed by: PHYSICIAN ASSISTANT

## 2020-12-04 PROCEDURE — 93005 ELECTROCARDIOGRAM TRACING: CPT

## 2020-12-04 PROCEDURE — 93010 ELECTROCARDIOGRAM REPORT: CPT | Performed by: INTERNAL MEDICINE

## 2020-12-04 PROCEDURE — 80053 COMPREHEN METABOLIC PANEL: CPT | Performed by: PHYSICIAN ASSISTANT

## 2020-12-04 PROCEDURE — 85025 COMPLETE CBC W/AUTO DIFF WBC: CPT | Performed by: PHYSICIAN ASSISTANT

## 2020-12-04 PROCEDURE — 84484 ASSAY OF TROPONIN QUANT: CPT | Performed by: PHYSICIAN ASSISTANT

## 2020-12-04 RX ORDER — QUETIAPINE FUMARATE 50 MG/1
50 TABLET, FILM COATED ORAL 4 TIMES DAILY PRN
Status: DISCONTINUED | OUTPATIENT
Start: 2020-12-04 | End: 2020-12-21 | Stop reason: HOSPADM

## 2020-12-04 RX ORDER — HYDRALAZINE HYDROCHLORIDE 25 MG/1
50 TABLET, FILM COATED ORAL EVERY 6 HOURS PRN
Status: DISCONTINUED | OUTPATIENT
Start: 2020-12-04 | End: 2020-12-04

## 2020-12-04 RX ADMIN — SENNOSIDES AND DOCUSATE SODIUM 1 TABLET: 8.6; 5 TABLET ORAL at 15:12

## 2020-12-04 RX ADMIN — NICOTINE 1 PATCH: 21 PATCH, EXTENDED RELEASE TRANSDERMAL at 07:47

## 2020-12-04 RX ADMIN — QUETIAPINE FUMARATE 50 MG: 50 TABLET ORAL at 17:09

## 2020-12-04 RX ADMIN — DOXEPIN HYDROCHLORIDE 10 MG: 10 CAPSULE ORAL at 18:35

## 2020-12-04 RX ADMIN — TRAZODONE HYDROCHLORIDE 50 MG: 50 TABLET ORAL at 19:59

## 2020-12-04 RX ADMIN — CLONIDINE HYDROCHLORIDE 0.1 MG: 0.1 TABLET ORAL at 07:57

## 2020-12-04 RX ADMIN — OLANZAPINE 10 MG: 10 TABLET, ORALLY DISINTEGRATING ORAL at 22:53

## 2020-12-04 RX ADMIN — LITHIUM CARBONATE 1200 MG: 300 TABLET, EXTENDED RELEASE ORAL at 19:59

## 2020-12-04 RX ADMIN — METFORMIN HYDROCHLORIDE 500 MG: 500 TABLET, EXTENDED RELEASE ORAL at 17:07

## 2020-12-04 RX ADMIN — QUETIAPINE FUMARATE 50 MG: 50 TABLET ORAL at 22:53

## 2020-12-04 RX ADMIN — NICOTINE POLACRILEX 4 MG: 2 GUM, CHEWING BUCCAL at 07:46

## 2020-12-04 RX ADMIN — SENNOSIDES AND DOCUSATE SODIUM 1 TABLET: 8.6; 5 TABLET ORAL at 11:28

## 2020-12-04 RX ADMIN — Medication 50 MG: at 20:57

## 2020-12-04 RX ADMIN — HYDROXYZINE HYDROCHLORIDE 25 MG: 25 TABLET, FILM COATED ORAL at 17:09

## 2020-12-04 RX ADMIN — Medication 50 MG: at 13:45

## 2020-12-04 RX ADMIN — OLANZAPINE 10 MG: 10 TABLET, ORALLY DISINTEGRATING ORAL at 19:59

## 2020-12-04 ASSESSMENT — ACTIVITIES OF DAILY LIVING (ADL)
HYGIENE/GROOMING: INDEPENDENT
HYGIENE/GROOMING: INDEPENDENT
ORAL_HYGIENE: INDEPENDENT
LAUNDRY: WITH SUPERVISION
DRESS: INDEPENDENT
DRESS: SCRUBS (BEHAVIORAL HEALTH);INDEPENDENT
ORAL_HYGIENE: INDEPENDENT
LAUNDRY: WITH SUPERVISION

## 2020-12-04 NOTE — PROGRESS NOTES
"[facility], [place]   Psychiatric Progress Note        Interim History:   The patient's care was discussed with the treatment team during the daily team meeting and/or staff's chart notes were reviewed.  Staff report patient is calm and isolative. Still disorganized and appears tense and agitated. Has not been aggressive. Patients blood pressure remains elevated highest reading was last night and was 189/138 . Patient has been taking clonidine and hydralazine as needed for elevated BP. There is a note from  today, see excerpt below:    \"No diagnosis of HTN. Happened last admission w/ PRN clonidine BID, hydralazine offered w/ improvement. Utox negative. OP BPs from clinic prior to 02/2020 were in 130s-140s/80s (virtual visits since then). DDx includes cessatoin of clonidine w/ doxepin on board (denies taking more than 1 /day, unreliable historian), psychiatric d/o, anxiety, untreated HTN (less likely). Continues to have elevated BP.   -Treat underlying psychiatric illness as he endorses anxiety to nurses, and appeared to be responding to internal stimuli on my last visit  -Will get labs (CMP, CBC, trop, TSH) to ensure no e/o renal issues or end organ damage  -Lower parameters for hydralazine, can use PRN for SBP >160 and/or DBP >100  -Continue PRN clonidine for SBP >180 and/or DBP >110,  Removed indication for anxiety, should not discharge home on this med as cessation w/ doxepin increases risk of rebound HTN  -Will continue to peripherally follow BPs\"    Met with patient in the presence of Dr. Palma: Today, patient continues to appear anxious. Again asked about discharge several times. Recommended he stay in the hospital for further stabilization given that he decompensated quickly after his previous discharge. For now, patient did agree to stay and sign in voluntarily. Patient complained of severe constipation. Believes it is due to not being home. Strongly recommended he ask " "for prn medication to help. Patient also requested starting Seroquel and Fluoxetine because \"they are the right medications because of CYT2D6 liver enzyme.\" Agreed to start Seroquel prn up to 4x daily. Discussed Fluoxetine is not an appropriate medication to start right now. Asked about stimulants and xanax. Reiterated that these are not appropriate medications for him and that we were worried he would be overstimulated. Overall, sounded and looked guarded/paranoid. Stated he felt safe in the hospital. Denied hearing voices. Recommended patient spend more time in the community.         Medications:       doxepin  10 mg Oral At Bedtime     lithium ER  1,200 mg Oral At Bedtime     metFORMIN  500 mg Oral Daily with supper     nicotine  1 patch Transdermal Daily     nicotine   Transdermal Q8H     OLANZapine zydis  10 mg Oral At Bedtime     OLANZapine zydis  5 mg Oral Daily          Allergies:     Allergies   Allergen Reactions     Sulfa Drugs Rash          Labs:   No results found for this or any previous visit (from the past 24 hour(s)).       Psychiatric Examination:     BP (!) 148/97   Pulse 97   Temp 98.1  F (36.7  C) (Oral)   Resp 16   Ht 1.842 m (6' 0.5\")   Wt 92.1 kg (203 lb 1.6 oz)   SpO2 93%   BMI 27.17 kg/m    Weight is 203 lbs 1.6 oz  Body mass index is 27.17 kg/m .     Orthostatic Vitals       Most Recent      Sitting Orthostatic /116 12/04 0743    Sitting Orthostatic Pulse (bpm) 77 12/04 0743    Standing Orthostatic /108 12/04 0743    Standing Orthostatic Pulse (bpm) 97 12/04 0743        Appearance: awake, alert, adequately groomed, dressed in hospital scrubs and appeared as age stated  Attitude: somewhat cooperative  Eye Contact:  fair, intense at times, minimal blinking  Mood:  anxious, mildly agitated but not aggressive.   Affect:  mood congruent and intensity is blunted  Speech:  clear, coherent and paucity of speech, monotone. pauses mid-sentence and appears to be listening to " internal stimuli.   Psychomotor Behavior: Fidgeting, no evidence of tardive dyskinesia, dystonia, or tics   Throught Process:  disorganized, needed redirection   Associations:  loosening of associations present  Thought Content:  no evidence of suicidal ideation or homicidal ideation, denies Auditory hallucinations/Visual hallucinations, delusions are likely present  Insight: partial  Judgement: limited  Oriented to:  time, person, and place  Attention Span and Concentration:  fair, needs redirection and prompting at time  Recent and Remote Memory:  fair    Clinical Global Impressions  First: 6/4 12/2/2020      Most recent:            Precautions:     Behavioral Orders   Procedures     Assault precautions     Code 1 - Restrict to Unit     Discontinue 1:1 attendant for suicide risk     Order Specific Question:   I have performed an in person assessment of the patient     Answer:   Based on this assessment the patient no longer requires a one on one attendant at this point in time.     Elopement precautions     Routine Programming     As clinically indicated     Single Room     Status 15     Every 15 minutes.     Suicide precautions     Patients on Suicide Precautions should have a Combination Diet ordered that includes a Diet selection(s) AND a Behavioral Tray selection for Safe Tray - with utensils, or Safe Tray - NO utensils            DIagnoses:     Schizoaffective disorder, bipolar type, lauren, severe with psychotic features         Plan:     Continue inpatient stabilization. Started patient on Seroquel as needed, per patient request. Continue to provide support and structure. Will continue to monitor blood pressure. He is peripherally followed by IM, See discussion above. Encouraged patient to spend more time outside of his room and to participate in groups.     I was present with the a student who participated in the service and the documentation of the note.  I have verified the history and personally  performed the mental status exam and medical decision making.  I agree with the assessment and plan of care as documented in the note.

## 2020-12-04 NOTE — TELEPHONE ENCOUNTER
[facility] Call Center    Phone Message    May a detailed message be left on voicemail: yes     Reason for Call: Other: Pt mom called and stated that the pt was currently admitted and would need a sooner appt than the one with Dr. Ball. Pt mom also said that Dr. Finn told them that they should schedule with Dr. Lyons and should continue care with Dr. Lyons. Writer told pt mom that there were no sooner appt with Dr. Lyons and that they would send a message to the providers. Pt mom can be reached at number provided. Pt is estimated to be discharged next week.      Action Taken: Other: Sent to Eloy Finn Wick and Kaitlin    Travel Screening: Not Applicable

## 2020-12-04 NOTE — CONSULTS
"Medicine peripherally following blood pressures.    In short, 30-year-old male with reported history of schizoaffective disorder, admitted to station [facility] due to psychiatric decompensation. Medicine asked to consult for \"arterial hypertension\".     Medicine evaluated 12/01, see my consult note.     A/P:  #Elevated BP: No diagnosis of HTN. Happened last admission w/ PRN clonidine BID, hydralazine offered w/ improvement. Utox negative. OP BPs from clinic prior to 02/2020 were in 130s-140s/80s (virtual visits since then). DDx includes cessatoin of clonidine w/ doxepin on board (denies taking more than 1 /day, unreliable historian), psychiatric d/o, anxiety, untreated HTN (less likely). Continues to have elevated BP.   -Treat underlying psychiatric illness as he endorses anxiety to nurses, and appeared to be responding to internal stimuli on my last visit  -Will get labs (CMP, CBC, trop, TSH) to ensure no e/o renal issues or end organ damage  -Lower parameters for hydralazine, can use PRN for SBP >160 and/or DBP >100  -Continue PRN clonidine for SBP >180 and/or DBP >110,  Removed indication for anxiety, should not discharge home on this med as cessation w/ doxepin increases risk of rebound HTN  -Will continue to peripherally follow BPs    Vianca Toure PA-C     Addendum: EKG w/ NSR, no acute ischemic changes. CMP, CBC, TSH and trop WNL. Treat elevated BP as above. Will continue to peripherally monitor. Notify medicine if any HA, vision changes, chest pain, dyspnea. Notify on call [facility] if any intercurrent medical issues arise.   "

## 2020-12-04 NOTE — PLAN OF CARE
Problem: Adult Inpatient Plan of Care  Goal: Plan of Care Review  Outcome: No Change  Goal: Absence of Hospital-Acquired Illness or Injury  Intervention: Identify and Manage Fall Risk  Recent Flowsheet Documentation  Taken 12/4/2020 1700 by Aleksandra Anthony RN  Safety Promotion/Fall Prevention: clutter free environment maintained     Problem: Adult Inpatient Plan of Care  Goal: Absence of Hospital-Acquired Illness or Injury  Intervention: Identify and Manage Fall Risk  Recent Flowsheet Documentation  Taken 12/4/2020 1700 by Aleksandra Anthony RN  Safety Promotion/Fall Prevention: clutter free environment maintained     Problem: Decreased Participation and Engagement (Psychotic Signs/Symptoms)  Goal: Increased Participation and Engagement (Psychotic Signs/Symptoms)  Intervention: Facilitate Participation and Engagement  Recent Flowsheet Documentation  Taken 12/4/2020 1700 by Aleksandra Anthony RN  Diversional Activity: (walking ) other (see comments)     Problem: Mood Impairment (Psychotic Signs/Symptoms)  Goal: Improved Mood Symptoms (Psychotic Signs/Symptoms)  Intervention: Optimize Emotion and Mood  Recent Flowsheet Documentation  Taken 12/4/2020 1700 by Aleksandra Anthony RN  Diversional Activity: (walking ) other (see comments)     Problem: Psychomotor Impairment (Psychotic Signs/Symptoms)  Goal: Improved Psychomotor Symptoms (Psychotic Signs/Symptoms)  Intervention: Manage Psychomotor Movement  Recent Flowsheet Documentation  Taken 12/4/2020 1700 by Aleksandra Anthony RN  Patient Performed Hygiene: dressed  Diversional Activity: (walking ) other (see comments)  Activity (Behavioral Health): up ad tommie     Problem: Behavioral Health Plan of Care  Goal: Adheres to Safety Considerations for Self and Others  Intervention: Develop and Maintain Individualized Safety Plan  Recent Flowsheet Documentation  Taken 12/4/2020 1700 by Aleksandra Anthony RN  Safety Measures: safety rounds completed  Goal:  "Absence of New-Onset Illness or Injury  Intervention: Identify and Manage Fall Risk  Recent Flowsheet Documentation  Taken 12/4/2020 1700 by Aleksandra Anthony, RN  Safety Measures: safety rounds completed     Problem: Behavioral Health Plan of Care  Goal: Absence of New-Onset Illness or Injury  Intervention: Identify and Manage Fall Risk  Recent Flowsheet Documentation  Taken 12/4/2020 1700 by Aleksandra Anthony, RN  Safety Measures: safety rounds completed    Patient was visible in the milieu. Pacing in the nur this evening. Flat and blunted affect. Poor concentration. May and agitated mood. Said that his goal is not to worry too much. Encouraged him to participate in groups and he stated, \"I didn't feel like attending today.\" He rated his depression and anxiety 10/10. He said, \"I'm agitated, I can't describe my feelings. I think I needed a Seroquel.\" Denied having SI, SIB, HI and hallucinations. Appetite is good. Has a difficulty of falling asleep at night. Denied pain. No stated side effects of medications. Pt verbalized concern if he could use his tennis shoes due to his feet hurt while walking in the hallway. PRN Hydroxyzine 25 mg and PRN Seroquel 50 mg at 1709. He was also compliant with taking his bed time meds. Checked BP as 165/110 and a PRN Hydralazine 50 mg was given at 2057. Will continue to monitor and assess pt.          "

## 2020-12-04 NOTE — PLAN OF CARE
"Manuel participated in Music Therapy group this evening.  Intervention offered addressed emotional, psychosocial and communicative goals of non-verbal expression, spontaneous communication/interactions, and to enhance quality of life and wellness.  Manuel had a more \"intense\" presentation, with the types and themes of music that resonated with him, but was calm and cooperative, sharing about what his music means to him.  Also worked on patience in group, taking turns participating in the intervention.  (Manuel had requested to take 2 turns in row, but MT explained we would go around the room and come back to him, which he accepted.)      "

## 2020-12-04 NOTE — PLAN OF CARE
Work Completed: CAITLYN (writer) met with trx team, provided update, and reviewed pt's chart. [facility] spoke with pt today about therapy and to check in with him. Pt reported feeling better today, and had better eye contact when talking with CTC. Furthermore, pt expressed being more open to therapy today then previously, and was agreeable to a follow-up referral being made. Pt did not mention anything to [facility] about discharge today. [facility] spoke with pt mother, Ember [phone], to give an update and inquire about pt's past therapy experiences. Ember confirmed that pt's past therapy experience was not the greatest, but felt that pt would greatly benefit from therapy. [facility] also requested that if Ember felt that pt needed to remain in the hospital, that she encourage pt to remain hospitalized to reinforce the trx teams encouragement. Ember agreed to this and reported that if pt called her this weekend to discharge, that she would explain why she felt he should remain hospitalized. [facility] did some research into possible therapy options for pt and started AVS.     Discharge plan or goal: Tentatively planning on discharge pt home once he stabilized on meds.                  Barriers to discharge: Safe discharge plan, medication evaluation, and symptom severity (actively hallucinating).

## 2020-12-04 NOTE — PLAN OF CARE
"48 Hours Nursing Assessment  Shift Summary  Pt feels \"better\". Alert and oriented x 2 to person and place. Able to communicate needs. Poor eye contact. Very isolative and withdrawn. Disorganized behavior has been noticed. Declined morning Zyprexa 5 mg schedule. provider updated. Took meal tray to room and ate alone. Looks depressed and nervous. Rated both depression and anxiety 10/10. BP still running high. Clonidine prn given. Denies any signs or symptoms of liane bp. See new order for Interal medicine consult. Senna prn given per pt's request for constipation. Reported no goal for today.   Pt denies any SI, SIB, HI, hallucination or racing thought. Denies any suicidal or homicidal ideations.  Bed time Seroquel was changed to prn. See MAR  for details. Seems tolerating medications well.   Pt denies pain. Appetite adequate. Slept for 4.0 hours last night.   Continue to monitor pt's status q 15 minutes per order and stabilize the patient's mental health symptoms with the use of medications and therapeutic programming.  Today's Goal: verbalized willingness to be involved with others  Interventions:   1. Encourage participation in any activities  2. Allow pt to verbalize his feeling and concerns  progress: no change         "

## 2020-12-04 NOTE — PROGRESS NOTES
"Pt presented as anxious, disorganized, rambling  and not making a lot of sense during the first hour of the night shift.  BP re-check  at 2346 as follow up from pm shift and was 148/97. No prn medication administered as parameters not met.  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 The more he rambled off, on different topics, the more he seem to get slightly agitated. He was receptive to PRN Zyprexa mg. He paced on and off before finally falling at 0230. He has slept for 4.25 hours. No behavior or safety concerns.  "

## 2020-12-05 PROCEDURE — 250N000013 HC RX MED GY IP 250 OP 250 PS 637: Performed by: PHYSICIAN ASSISTANT

## 2020-12-05 PROCEDURE — 124N000002 HC R&B MH UMMC

## 2020-12-05 PROCEDURE — 250N000013 HC RX MED GY IP 250 OP 250 PS 637: Performed by: PSYCHIATRY & NEUROLOGY

## 2020-12-05 RX ADMIN — TRAZODONE HYDROCHLORIDE 50 MG: 50 TABLET ORAL at 23:43

## 2020-12-05 RX ADMIN — METFORMIN HYDROCHLORIDE 500 MG: 500 TABLET, EXTENDED RELEASE ORAL at 18:55

## 2020-12-05 RX ADMIN — NICOTINE POLACRILEX 4 MG: 2 GUM, CHEWING BUCCAL at 11:32

## 2020-12-05 RX ADMIN — SENNOSIDES AND DOCUSATE SODIUM 1 TABLET: 8.6; 5 TABLET ORAL at 10:00

## 2020-12-05 RX ADMIN — OLANZAPINE 10 MG: 10 TABLET, ORALLY DISINTEGRATING ORAL at 20:25

## 2020-12-05 RX ADMIN — DOXEPIN HYDROCHLORIDE 10 MG: 10 CAPSULE ORAL at 18:56

## 2020-12-05 RX ADMIN — LITHIUM CARBONATE 1200 MG: 300 TABLET, EXTENDED RELEASE ORAL at 20:25

## 2020-12-05 RX ADMIN — QUETIAPINE FUMARATE 50 MG: 50 TABLET ORAL at 23:43

## 2020-12-05 RX ADMIN — OLANZAPINE 5 MG: 5 TABLET, ORALLY DISINTEGRATING ORAL at 09:29

## 2020-12-05 RX ADMIN — Medication 50 MG: at 09:29

## 2020-12-05 RX ADMIN — HYDROXYZINE HYDROCHLORIDE 25 MG: 25 TABLET, FILM COATED ORAL at 11:32

## 2020-12-05 RX ADMIN — Medication 50 MG: at 16:38

## 2020-12-05 RX ADMIN — NICOTINE 1 PATCH: 21 PATCH, EXTENDED RELEASE TRANSDERMAL at 09:32

## 2020-12-05 ASSESSMENT — ACTIVITIES OF DAILY LIVING (ADL)
DRESS: INDEPENDENT
ORAL_HYGIENE: INDEPENDENT
LAUNDRY: WITH SUPERVISION
ORAL_HYGIENE: INDEPENDENT
HYGIENE/GROOMING: INDEPENDENT
HYGIENE/GROOMING: INDEPENDENT
LAUNDRY: WITH SUPERVISION
DRESS: SCRUBS (BEHAVIORAL HEALTH)

## 2020-12-05 NOTE — PROGRESS NOTES
Shift Report:    Pt had a much restful night, he was up at start of shift and sitting in bed but able to fall and stay asleep. No behavior or safety concerns. No prn medication utilized. He has slept for a total of 5.0 hours so far and remains asleep at time of this entry.

## 2020-12-05 NOTE — PLAN OF CARE
"48 Hours Nursing Assessment  Shift Summary  Pt feels \" better\". Alert and oriented x 3 to self, place and date with intermittent confusion or forgetfulness?  Pt presents tense and irritated. Good eye contact. Denies depression and anxiety. Rated 0 to both of them. However pt seems very anxious and tense. Pacing in hallway with heaving breathing. Denies SOB. Poor concentrations. Insight/judgement into illness is fair. Pt knows that he is sick and needs help ,but at the same time wants to keep his home routine such as smoking and walking outside. Future perspective \" don't know\" .Visible in milieu pacing, isolative and withdrawn. Did not participate in community meeting.   Pt denies any SI, SIB, HI, hallucination or racing thought. Denies any suicidal or homicidal ideations.  No new medications today. Good medication compliance is noted. Seems tolerating medications well. No side effect reported by pt or noted by this writer.   Pt denies pain. Appetite adequate. Slept for 5.0 hours last night. C/o constipation. Had Senakot and prune juice. Reported having one medium BM yesterday ,but needs to empty his stomach more. Blood pressure still high. Hydralazine prn given. Pt denies any signs or symptoms of high BP.   Continue to monitor pt's status q 15 minutes per order and stabilize the patient's mental health symptoms with the use of medications and therapeutic programming.  progress: no change         "

## 2020-12-05 NOTE — PROGRESS NOTES
At almost 2300, pt was out of his bedroom pacing and then started to be agitated, raising his voice, wanting to leave the hospital or at least go outside to smoke because his BP is high. He believes if he smokes a little, his blood pressure will be ok. Staff tried to redirect and de-escalate him by offering a PRN medication but he declined. Staff explained to him about signing a 12 Hour Intent in which he did sign and witnessed by staff. Then he went back to his bedroom. Writer re-approached him with another staff and offered again a PRN Seroquel 50 mg and PRN Olanzapine 10 mg in which he was compliant at 2253. Later on, I went to check on him again and he rescinded the 12 Hour Intent. He said he will rather talk to the doctor on Monday. Will continue to monitor pt.

## 2020-12-06 PROCEDURE — 250N000013 HC RX MED GY IP 250 OP 250 PS 637: Performed by: PSYCHIATRY & NEUROLOGY

## 2020-12-06 PROCEDURE — 250N000013 HC RX MED GY IP 250 OP 250 PS 637: Performed by: PHYSICIAN ASSISTANT

## 2020-12-06 PROCEDURE — 124N000002 HC R&B MH UMMC

## 2020-12-06 RX ADMIN — LITHIUM CARBONATE 1200 MG: 300 TABLET, EXTENDED RELEASE ORAL at 19:43

## 2020-12-06 RX ADMIN — METFORMIN HYDROCHLORIDE 500 MG: 500 TABLET, EXTENDED RELEASE ORAL at 17:52

## 2020-12-06 RX ADMIN — OLANZAPINE 10 MG: 10 TABLET, ORALLY DISINTEGRATING ORAL at 17:52

## 2020-12-06 RX ADMIN — HYDROXYZINE HYDROCHLORIDE 25 MG: 25 TABLET, FILM COATED ORAL at 13:29

## 2020-12-06 RX ADMIN — TRAZODONE HYDROCHLORIDE 50 MG: 50 TABLET ORAL at 01:28

## 2020-12-06 RX ADMIN — NICOTINE 1 PATCH: 21 PATCH, EXTENDED RELEASE TRANSDERMAL at 09:31

## 2020-12-06 RX ADMIN — OLANZAPINE 5 MG: 5 TABLET, ORALLY DISINTEGRATING ORAL at 09:32

## 2020-12-06 RX ADMIN — TRAZODONE HYDROCHLORIDE 50 MG: 50 TABLET ORAL at 19:51

## 2020-12-06 RX ADMIN — OLANZAPINE 10 MG: 10 TABLET, ORALLY DISINTEGRATING ORAL at 19:43

## 2020-12-06 RX ADMIN — HYDROXYZINE HYDROCHLORIDE 25 MG: 25 TABLET, FILM COATED ORAL at 09:32

## 2020-12-06 RX ADMIN — OLANZAPINE 10 MG: 10 TABLET, ORALLY DISINTEGRATING ORAL at 01:28

## 2020-12-06 RX ADMIN — Medication 50 MG: at 09:32

## 2020-12-06 RX ADMIN — NICOTINE POLACRILEX 4 MG: 2 GUM, CHEWING BUCCAL at 19:46

## 2020-12-06 RX ADMIN — QUETIAPINE FUMARATE 50 MG: 50 TABLET ORAL at 13:29

## 2020-12-06 RX ADMIN — DOXEPIN HYDROCHLORIDE 10 MG: 10 CAPSULE ORAL at 19:43

## 2020-12-06 ASSESSMENT — ACTIVITIES OF DAILY LIVING (ADL)
LAUNDRY: WITH SUPERVISION
ORAL_HYGIENE: INDEPENDENT
DRESS: INDEPENDENT
HYGIENE/GROOMING: INDEPENDENT
DRESS: INDEPENDENT
ORAL_HYGIENE: INDEPENDENT
LAUNDRY: WITH SUPERVISION
HYGIENE/GROOMING: INDEPENDENT

## 2020-12-06 NOTE — PLAN OF CARE
"  Problem: Adult Inpatient Plan of Care  Goal: Plan of Care Review  12/5/2020 2143 by Rissa Stroud RN  Outcome: Improving  Flowsheets (Taken 12/5/2020 2100)  Plan of Care Reviewed With: patient    Pt has been isolative, guarded and withdrawn. Was pacing the hallway incessantly to the point he needed to wear his shoes to help alleviate his foot pain. Pt was allowed to wear his shoes without laces. Pt was reminded to avoid pacing by the exit door he said \" Sorry, sometimes I'm forgetful.\" Pt has a tense affect, appeared to be responding to internal stimuli but he denied voices, SI and HI. His mom called the unit and said that \" patient does not metabolize clonidine very well.\"  Pt ate dinner and took his med in the lounge. No medication side effects noted or reported.    Plan: Status 15s; Build trust with pt. Continue to build on strengths. Encourage compliance and healthy coping.        "

## 2020-12-06 NOTE — PROGRESS NOTES
Medicine peripherally following BP levels. See note and work up from 12/04.    No physical symptoms per RN and documentation. Quite disorganized and psychotic. No known h/o HTN.     Most likely 2/2 active psychosis and underlying psychiatric d/o. PRN meds ordered for BP, but expect BP to improve with psychiatric care. Reassuring EKG, trop, BMP w/o e/o end organ damage.    #Elevated BP: No diagnosis of HTN. Happened last admission w/ PRN clonidine BID, hydralazine offered w/ improvement. Utox negative. OP BPs from clinic prior to 02/2020 were in 130s-140s/80s (virtual visits since then). DDx includes cessatoin of clonidine w/ doxepin on board (denies taking more than 1 /day, unreliable historian), psychiatric d/o, anxiety, untreated HTN (less likely). Continues to have elevated BP.   -Treat underlying psychiatric illness as he endorses anxiety to nurses & appears actively psychotic  -Lower parameters for hydralazine, can use PRN for SBP >160 and/or DBP >100  -Continue PRN clonidine for SBP >180 and/or DBP >110,  Removed indication for anxiety, should not discharge home on this med as cessation w/ doxepin increases risk of rebound HTN  -Will continue to peripherally follow BPs, notify if consistently >180/100, and/or if any HA, vision changes, chest pain, dyspnea.     Vianca Toure PA-C

## 2020-12-06 NOTE — PLAN OF CARE
"48 Hours Nursing Assessment  Shift Summary  Pt feels \"ok\". Alert and oriented x 2 to self and place. Presents psychotic and delusional. Responding to internal stimuli. Disorganized behavior has been observed. Denies depression and anxiety however seem nervous. Pacing in hallway frequently. Took shower. Did not participate in community meeting.   Pt denies any SI, SIB, HI, hallucination or racing thought. Denies any suicidal or homicidal ideations.  No new medications today. Good medication compliance is noted. Seems tolerating medications well. No side effect reported by pt or noted by this writer.   Hydralazine prn given for liane bp. Pt declined bp re-check. Seroquel and hydroxyzine given per pt's request for anxiety.   Pt denies pain. Appetite adequate. Slept for one hours last night.   Continue to monitor pt's status q 15 minutes per order and stabilize the patient's mental health symptoms with the use of medications and therapeutic programming.  progress: declining         "

## 2020-12-06 NOTE — PROGRESS NOTES
"Pt awake at start of night shift, presents as disorganized and tense. He was observed walking in hallway lifting both arms repeatedly in the air. His speech is loud, disorganized, rambling and not making much sense,\"hello black and white\" \"I want straight lips on my nose and some of my eyes\" Also noted to refer to himself as \"we\" \"we need milk\" He has been actively psychotic;  responding to internal stimuli  and talking loudly to self while in his room.     He complained to a peer that he was talking loudly because his blood pressure was high, however when approached by staff regarding checking his blood pressure, had replied with \"f--k you\" followed by slammed his door loudly. He has had periods when he is able to self regulate and be calm and other periods when he is loud and agitated. He seems to do well with validation, reassurance and support, and was receptive to offers of prn medications.     He elected to take Seroquel 50 mg and Trazodone 50 mg at 2340, and was briefly in his room. He later came out of his room paranoid about  a peer in [address] and walked towards this peer's door and shut their door, \"that's Lucifer in there, I don't want him to hurt me\". Pt was assured of his safety, and seems relief that staff are here to maintain his safety. He then requested and was allowed  to stay in the lounge, \"I feel safer here\" Pt stayed awake, sitting  in lounge and listening to a headset till about 0415 this morning. While in lounge, he appeared to be less actively responding.     He returned to his room at about 0500 for a brief while, then approached the desk slightly agitated with copy of a rescinded 12 hour intent from 12/4/20 and demanded that he be discharged or transferred to another unit. He was easily redirected back to his room as he agreed \"I know my mom will want me to stay here and get better.\" Pt finally fell asleep at about 0545 and has been sleeping soundly. He has slept for a total of 1.0 hours " this shift.

## 2020-12-07 LAB — INTERPRETATION ECG - MUSE: NORMAL

## 2020-12-07 PROCEDURE — 250N000013 HC RX MED GY IP 250 OP 250 PS 637: Performed by: PHYSICIAN ASSISTANT

## 2020-12-07 PROCEDURE — 99233 SBSQ HOSP IP/OBS HIGH 50: CPT | Mod: 95 | Performed by: PSYCHIATRY & NEUROLOGY

## 2020-12-07 PROCEDURE — 124N000002 HC R&B MH UMMC

## 2020-12-07 PROCEDURE — 250N000013 HC RX MED GY IP 250 OP 250 PS 637: Performed by: PSYCHIATRY & NEUROLOGY

## 2020-12-07 RX ORDER — OLANZAPINE 5 MG/1
10 TABLET, ORALLY DISINTEGRATING ORAL 2 TIMES DAILY
Status: DISCONTINUED | OUTPATIENT
Start: 2020-12-07 | End: 2020-12-10

## 2020-12-07 RX ADMIN — NICOTINE 1 PATCH: 21 PATCH, EXTENDED RELEASE TRANSDERMAL at 10:14

## 2020-12-07 RX ADMIN — LITHIUM CARBONATE 1200 MG: 300 TABLET, EXTENDED RELEASE ORAL at 20:14

## 2020-12-07 RX ADMIN — OLANZAPINE 10 MG: 10 TABLET, ORALLY DISINTEGRATING ORAL at 20:14

## 2020-12-07 RX ADMIN — OLANZAPINE 5 MG: 5 TABLET, ORALLY DISINTEGRATING ORAL at 10:14

## 2020-12-07 RX ADMIN — HYDROXYZINE HYDROCHLORIDE 25 MG: 25 TABLET, FILM COATED ORAL at 16:32

## 2020-12-07 RX ADMIN — Medication 50 MG: at 20:23

## 2020-12-07 RX ADMIN — Medication 50 MG: at 13:18

## 2020-12-07 RX ADMIN — QUETIAPINE FUMARATE 50 MG: 50 TABLET ORAL at 16:32

## 2020-12-07 RX ADMIN — METFORMIN HYDROCHLORIDE 500 MG: 500 TABLET, EXTENDED RELEASE ORAL at 17:58

## 2020-12-07 RX ADMIN — TRAZODONE HYDROCHLORIDE 50 MG: 50 TABLET ORAL at 20:14

## 2020-12-07 ASSESSMENT — ACTIVITIES OF DAILY LIVING (ADL)
HYGIENE/GROOMING: INDEPENDENT
LAUNDRY: WITH SUPERVISION
DRESS: SCRUBS (BEHAVIORAL HEALTH);PROMPTS
ORAL_HYGIENE: INDEPENDENT

## 2020-12-07 NOTE — PLAN OF CARE
Problem: Adult Inpatient Plan of Care  Goal: Plan of Care Review  Outcome: No Change  Flowsheets (Taken 12/6/2020 2100)  Plan of Care Reviewed With: patient    Pt was pacing the halls back and forth for most of the shift. Continued to be guarded, keep to himself and withdrawn. Appeared to be responding but denied voices. Pt refused his dinner. Took dinner plate to his room then brought it back untouched, did not take not even a single bite out of it. Had an angry outburst, demanding to leave. Was able to calm down with verbal de-escalation and after taking prn medications. No med side effects noted or reported.   Plan: Status 15s; Build trust with pt. Continue to build on strengths. Encourage compliance and healthy coping.

## 2020-12-07 NOTE — PLAN OF CARE
Pt has been pacing the hallways most of the shift. Pt is withdrawn to self. Not social with peers. Showered. Medication compliant. Continues to have elevated BP.  157/103 and then 166/110. Hydralazine given PRN. Pt takes his tray and eats in his room. After talking to his mother on the phone pt came to the desk stating he wants to move to st 20 because his mother told him to. Pt later states he is not comfortable here and wants to discharge. States he wants to live alone and smoke cigarettes and if he fails he will kill himself. Pt does finally say he will stay in the hospital and contracts for safety. Pt was tense but pleasant with writer.

## 2020-12-07 NOTE — PLAN OF CARE
Work Completed: CAITLYN (writer) met with trx team, provided update, and reviewed pt's chart. CAITLYN received a call from pt mother, Ember [phone], providing an update on resources she was looking into. Ember also clarified that she had previously tried to get the pt set up with the First Episode program, but they declined pt. [facility] called, [facility] and [facility], [phone], to research what Ember had reported. Both programs looks like they would be a good fit for pt's needs, and [facility] wanted to discuss appropriateness of fit with them directly. [facility] left a VM and had a message sent requesting a callback to discuss the pt's case to determine if a referral would be appropriate. According to the staff [facility] spoke to, pt was already established with the associated clinic ([facility]) so pt likely would be able to get into one of those programs reliably if they were deemed appropriate. CAITLYN spoke with pt today briefly, who reported he spoke with his mom and that they both agreed that he should be transferred to station 20. [facility] reinforced what RN had said earlier, that the attending would need to speak with him about that directly. Pt also stated that he trusted whatever recommendations for outpatient trx the trx team would make, but requested that whoever it is schedule with pt's poor memory in mind. Pt feels that his ability to remember his appointments and attend them is his biggest barrier. [facility] did not hear back from the two program by end of shift and will call them tomorrow morning to follow-up. CAITLYN plans on touching base with Ember, once they have an update on potential referrals for pt.    Discharge plan or goal: Tentatively planning on discharge pt home once he stabilized on meds.                  Barriers to discharge: Safe discharge plan, medication evaluation, and symptom severity (actively hallucinating).

## 2020-12-07 NOTE — PROGRESS NOTES
Shift Report:    Pt had a much quieter night compared to the night prior. He has slept for 6.0 hours. Is currently up and in the lounge. Pt made one attempt to go into another peer's room but was easily redirected. Confused? No behavior or safety concerns noted this shift.

## 2020-12-07 NOTE — PLAN OF CARE
Problem: Adult Inpatient Plan of Care  Goal: Plan of Care Review  Outcome: No Change  Flowsheets (Taken 12/7/2020 1644)  Plan of Care Reviewed With: patient  Progress: no change     Problem: Psychomotor Impairment (Psychotic Signs/Symptoms)  Goal: Improved Psychomotor Symptoms (Psychotic Signs/Symptoms)  Intervention: Manage Psychomotor Movement  Recent Flowsheet Documentation  Taken 12/7/2020 1633 by Aleksandra Anthony RN  Patient Performed Hygiene: dressed  Activity (Behavioral Health): up ad tommie     Problem: Behavioral Health Plan of Care  Goal: Plan of Care Review  Recent Flowsheet Documentation  Taken 12/7/2020 1644 by Aleksandra Anthony RN  Plan of Care Reviewed With: patient  Progress: no change  Goal: Adheres to Safety Considerations for Self and Others  Intervention: Develop and Maintain Individualized Safety Plan  Recent Flowsheet Documentation  Taken 12/7/2020 1633 by Aleksandra Anthony RN  Safety Measures: safety rounds completed  Goal: Absence of New-Onset Illness or Injury  Intervention: Identify and Manage Fall Risk  Recent Flowsheet Documentation  Taken 12/7/2020 1633 by Aleksandra Anthony RN  Safety Measures: safety rounds completed     Patient was visible in the milieu, pacing up and down the hallway. Flat, blunted, tense affect. Appears paranoid and guarded. He stated he is anxious and a little agitated. Denied pain when asked. PRN Seroquel 50 mg and Hydroxyzine 25 mg was given at 1632. He denied having SI, SIB and HI. Ate well at supper. Compliant with taking his night time meds. No reported side effects of medications. BP and pulse still elevated. BP at past 2000 is 162/95, Pulse is 109. PRN Hydralazine 50 mg was given at 2023. Pt was back pacing in the hallway after taking his meds. Noted him responding to internal stimuli, talking to himself loudly. He was redirected by staff to tone down his voice and he did apologize and continued pacing up and down the hallway. Will continue  to monitor pt.

## 2020-12-08 ENCOUNTER — TELEPHONE (OUTPATIENT)
Dept: PSYCHIATRY | Facility: CLINIC | Age: 30
End: 2020-12-08

## 2020-12-08 PROCEDURE — 250N000013 HC RX MED GY IP 250 OP 250 PS 637: Performed by: PHYSICIAN ASSISTANT

## 2020-12-08 PROCEDURE — 250N000013 HC RX MED GY IP 250 OP 250 PS 637: Performed by: PSYCHIATRY & NEUROLOGY

## 2020-12-08 PROCEDURE — 124N000002 HC R&B MH UMMC

## 2020-12-08 PROCEDURE — 99207 PR CDG-MDM COMPONENT: MEETS HIGH - UP CODED: CPT | Performed by: PSYCHIATRY & NEUROLOGY

## 2020-12-08 PROCEDURE — 99233 SBSQ HOSP IP/OBS HIGH 50: CPT | Mod: 95 | Performed by: PSYCHIATRY & NEUROLOGY

## 2020-12-08 PROCEDURE — H2032 ACTIVITY THERAPY, PER 15 MIN: HCPCS

## 2020-12-08 RX ADMIN — METFORMIN HYDROCHLORIDE 500 MG: 500 TABLET, EXTENDED RELEASE ORAL at 18:03

## 2020-12-08 RX ADMIN — OLANZAPINE 10 MG: 10 TABLET, ORALLY DISINTEGRATING ORAL at 20:07

## 2020-12-08 RX ADMIN — LITHIUM CARBONATE 1200 MG: 300 TABLET, EXTENDED RELEASE ORAL at 20:07

## 2020-12-08 RX ADMIN — OLANZAPINE 10 MG: 10 TABLET, ORALLY DISINTEGRATING ORAL at 08:25

## 2020-12-08 RX ADMIN — NICOTINE 1 PATCH: 21 PATCH, EXTENDED RELEASE TRANSDERMAL at 08:25

## 2020-12-08 RX ADMIN — Medication 50 MG: at 09:20

## 2020-12-08 ASSESSMENT — ACTIVITIES OF DAILY LIVING (ADL)
LAUNDRY: WITH SUPERVISION
HYGIENE/GROOMING: INDEPENDENT;SHOWER
DRESS: SCRUBS (BEHAVIORAL HEALTH);INDEPENDENT
ORAL_HYGIENE: INDEPENDENT

## 2020-12-08 ASSESSMENT — MIFFLIN-ST. JEOR: SCORE: 1926.75

## 2020-12-08 NOTE — TELEPHONE ENCOUNTER
R:  Patient cleared and ready for behavioral bed placement: Yes     Dr Palma called at 11:04am reporting per the PT and family - they request Dr Finn and transfer to unit 20N.  Dr Palma and Dr Finn have completed a doc to doc and Dr Finn has accepted pt to 20.   Pt placed in worklist pending bed availability.      Dr Finn did accept pt.

## 2020-12-08 NOTE — TELEPHONE ENCOUNTER
Is the patient between the ages of 15-40? Yes  Is the patient experiencing or recently experienced symptoms of psychosis? Yes (pt is currently IP to treat psychosis sx)  How long has pt been taking anti-psychotics? More than a year cumulatively    Writer informed caller that pt does not meet criteria for a Psychosis Intake due to length of time he has been taking anti-psychotic medication.    Yolanda Taylor,

## 2020-12-08 NOTE — PLAN OF CARE
"Patient has been  mostly pacing in the hallway. He is flat, blunted. No interaction noted with peers. Patient wants to transfer to station 20. He states \" if I don't transfer to station 20 then I would like to go home, it's my right.\" He reports poor sleep,states he can barely sleep here at the hospital. Appears tense and guarded. Mood is calm. He denies depression and anxiety. Denies all MH symptoms, denies SI/SIB. His BP is still elevated, 177/96, pulse of 105 (sitting) to 165/104 pulse 114 (standing). He received prn hydralazine. After recheck, /93 with a pulse of 103 (sitting) and 151/104 with a pulse of 122 (standing). Appetite is okay. Poor sleep. Denies pain. Denies medication SE.   "

## 2020-12-08 NOTE — PROGRESS NOTES
"Shift Report:    Pt woke up at 0415, approached the nursing desk quite intense, loud and slightly agitated, \"I have a right to leave this place, can I do that?\" Staff  listened and validated his feelings, assured him his treatment team was doing their best to ensure he's getting the best treatment. He was accepting of this. He apologized for being loud and rude. He returned to bed and a short time later, fell asleep. He has slept for 3.25 hours. Pt did not request any PRN's.  "

## 2020-12-08 NOTE — PLAN OF CARE
Work Completed: CAITLYN (writer) met with trx team, provided update, and reviewed pt's chart. [facility] completed team note. [facility] spoke with pt mother, Ember [phone], to give an update and provide potential resources for her to follow-up on independently. CAITLYN explained that they had looking into multiple first episode and psychosis focused programs, but that pt was not eligible for them. [facility] did follow-up with the [facility] psychiatry clinic requesting the pt be referred to Dr. Lyons as per Ember's request. [facility] did provide Ember with the intake number for [facility] and info for [facility] as a possible resource. Furthermore, [facility] informed Ember that pt was being transferred back to station 20, once a bed becomes available. Ember expressed gratitude and agreed with trx team that pt would be better served on station 20, as that is the same group that sees pt outpatient as well. [facility] met with pt and informed him of the plan to transfer to station 20 once a bed becomes available. Pt was agreeable with this and otherwise didn't wants to really talk more then that. [facility] also informed pt that his mother had been told about the plan as well.      Discharge plan or goal: Tentatively planning on discharge pt home once he stabilized on meds.                  Barriers to discharge: Safe discharge plan, medication evaluation, and symptom severity (actively hallucinating and paranoid presentation).

## 2020-12-08 NOTE — PROGRESS NOTES
"[facility], [place]   Psychiatric Progress Note        Interim History:     The patient's care was discussed with the treatment team during the daily team meeting and/or staff's chart notes were reviewed.  Staff report patient shows minimal changes. Paces in the unit corridor. Periodically asks to leave this unit or to be transferred to [facility]. Needed an encouragement to take meds, but per RNs in general was compliant with them. Was given Hydralazine prn for SBP over 170 (IM follows him peripherally).    Met with patient in the presence of PA student: Today, patient continues to appear anxious and tense, however, was also somewhat more cooperative. Reported that he still feels very confused and even requested MRI of his whole body to find out what is wrong with him. He had difficulties formulating his exact feelings, however, appeared to be experiencing psychotic symptoms. Admitted to having passive Suicidal ideation, but firmly denied having any desire/plan to kill self: \"I would never do that\". I informed him that yesterday I talked to his mother and today talked to Dr. Finn who agreed to take over his care on [facility] as soon as his service has bed available. Manuel was glad to hear that. He denied having med side effects and had no further questions or concerns.          Medications:       lithium ER  1,200 mg Oral At Bedtime     metFORMIN  500 mg Oral Daily with supper     nicotine  1 patch Transdermal Daily     nicotine   Transdermal Q8H     OLANZapine zydis  10 mg Oral BID          Allergies:     Allergies   Allergen Reactions     Sulfa Drugs Rash          Labs:   No results found for this or any previous visit (from the past 24 hour(s)).       Psychiatric Examination:     BP (!) 177/96   Pulse 109   Temp 98.3  F (36.8  C) (Tympanic)   Resp 16   Ht 1.842 m (6' 0.5\")   Wt 91.6 kg (201 lb 14.4 oz)   SpO2 96%   BMI 27.01 kg/m    Weight is 201 lbs 14.4 oz  Body mass " index is 27.01 kg/m .     Orthostatic Vitals       Most Recent      Sitting Orthostatic /92 12/08 1229    Sitting Orthostatic Pulse (bpm) 103 12/08 1229    Standing Orthostatic /104 12/08 1229    Standing Orthostatic Pulse (bpm) 122 12/08 1229         Appearance: awake, alert, adequately groomed, dressed in hospital scrubs and appeared as age stated  Attitude: somewhat cooperative  Eye Contact:  fair, intense at times,   Mood:  anxious, mildly agitated but not aggressive.   Affect:  mood congruent and intensity is blunted  Speech:  clear, coherent and paucity of speech, monotone. pauses mid-sentence and appears to be listening to internal stimuli.   Psychomotor Behavior: Fidgeting, no evidence of tardive dyskinesia, dystonia, or tics   Throught Process: still disorganized, needed redirection   Associations:  loosening of associations present  Thought Content:  no evidence of suicidal ideation or homicidal ideation, denies Auditory hallucinations/Visual hallucinations, delusions are present  Insight: partial  Judgement: limited  Oriented to:  time, person, and place  Attention Span and Concentration:  fair, needs redirection and prompting at time  Recent and Remote Memory:  fair    Clinical Global Impressions  First: 6/4 12/2/2020      Most recent:            Precautions:     Behavioral Orders   Procedures     Assault precautions     Code 1 - Restrict to Unit     Discontinue 1:1 attendant for suicide risk     Order Specific Question:   I have performed an in person assessment of the patient     Answer:   Based on this assessment the patient no longer requires a one on one attendant at this point in time.     Elopement precautions     Routine Programming     As clinically indicated     Single Room     Status 15     Every 15 minutes.     Suicide precautions     Patients on Suicide Precautions should have a Combination Diet ordered that includes a Diet selection(s) AND a Behavioral Tray selection for Safe  Tray - with utensils, or Safe Tray - NO utensils            DIagnoses:     Schizoaffective disorder, bipolar type, lauren, severe with psychotic features         Plan:     Continue inpatient stabilization.  Continue to provide support and structure. Will continue to monitor blood pressure. He is peripherally followed by IM, they recommend to continue to treat psychosis and continue to use Hydralazine and Clonidine prn. See discussion above. No medication changes today. As per discussion above will transfer to [facility] when they have bed.    I was present with the a student who participated in the service and the documentation of the note.  I have verified the history and personally performed the mental status exam and medical decision making.  I agree with the assessment and plan of care as documented in the note.    The patient is a  who is being evaluated via a video billable telemedicine visit ("[employer] due to Covid-19 epidemic. The patient/guardian has consented to being seen via telemedicine. The provider was in front of a computer in a home office. The patient was on the inpatient unit at [facility].      Start time: 11:30  Stop time: 12:00     The patient/guardian has been notified of the following:   This telemedicine visit is conducted live between you and your clinician. We have found that certain health care needs can be provided without the need for a physical exam. This service lets us provide the care you need with a telemedicine conversation.

## 2020-12-08 NOTE — TELEPHONE ENCOUNTER
Hansel, pt's Clinical Treatment Coordinator from , stated that Dr. Finn wants the pt to transfer care from Dr. Ball to Dr. Lyons. There is concern that the yearly turnover of residents and not having a consistent med provider has been destabilizing for the pt.    Pt wants discharge from the unit today.    Message routed with high priority to: Kaitlin Rivas RNCC; Jayda Ball DO; BRADY Gutierrez; Dinh Lyons MD; Mihai Finn MD

## 2020-12-08 NOTE — PLAN OF CARE
BEHAVIORAL TEAM DISCUSSION    Participants: Dr. Minerva AL, Monica Huang RN, Hansel Nichole LPC  Progress: Minimal improvement. Pt continues to exhibit paranoia and likely is responding to internal stimuli. Pt is taking his medication as recommended and is less isolative. Pt continues to eat in his room and frequently requests to discharge, but changes his mind to stay when this is discussed with him directly. Trx team and family continue to encourage pt to remain hospitalized at this time, as pt does not appear to be stabilized. Trx team was informed that pt's pervious attending on station 20 is willing to take the pt back once a bed becomes available. Pt will be transferred to their care once a bed becomes available on station 20. Pt and his family will be informed.   Anticipated length of stay: 7 additional days  Continued Stay Criteria/Rationale: Ongoing medication adjustments, and active psychosis symptoms (paranoia and possible internal stimuli).   Medical/Physical: Pt continues to have abnormally high blood pressure. Internal medicine has been consulted.  Precautions:   Behavioral Orders   Procedures    Assault precautions    Code 1 - Restrict to Unit    Discontinue 1:1 attendant for suicide risk     Order Specific Question:   I have performed an in person assessment of the patient     Answer:   Based on this assessment the patient no longer requires a one on one attendant at this point in time.    Elopement precautions    Routine Programming     As clinically indicated    Single Room    Status 15     Every 15 minutes.    Suicide precautions     Patients on Suicide Precautions should have a Combination Diet ordered that includes a Diet selection(s) AND a Behavioral Tray selection for Safe Tray - with utensils, or Safe Tray - NO utensils       Plan: Pt will be discharged back home with outpatient referrals for therapy and back to his psychiatry team once stabilized.  Rationale for change in precautions or plan:  Ongoing stabilization and aftercare support efforts.

## 2020-12-08 NOTE — PROGRESS NOTES
"[facility], [place]   Psychiatric Progress Note        Interim History:   The patient's care was discussed with the treatment team during the daily team meeting and/or staff's chart notes were reviewed.  Staff report patient had bad weekend. Appeared to be more psychotic, pacing in the unit corridor and voicing delusional ideas, talking about self as \"we\". Put 12 hour intent to leave note, but later rescinded it. Needed an encouragement to take meds, but per RNs in general was compliant with them.    Met with patient in the presence of PA student: Today, patient continues to appear anxious and tense. Again asked about discharge several times, then, said that we had only five days and he would either like to be transferred to [facility] or have his mother come and take him home. He, however, then, calmed down and agreed with my recommendations to increase dose of Zyprexa Zydis, start Haldol and asked me to talk to his mother.    After visit with the patient I had approximately 15 min long phone conversation with patient's mother Ember. We discussed worsening of Manuel's psychotic symptoms. I explained that Manuel was, most likely, still having psychotic symptoms since his admission, but could successfully mask them and suggested to increase Zyprexa Zydis and start oral Haldol. Mother was OK with increase in Zyprexa Zydis dose, but was concerned about Haldol and risk of side effects as Manuel might be a poor metabolizer of it. Ember was under impression that Manuel was doing great immediately after coming home from [facility] and decompensated after two days of being at home because he was started on wrong meds. I tried to explain that decompensation after two days of being at home was likely due to still present on discharge psychotic symptoms and Manuel's insistence on discharge, Ember felt that this was not the case. She agreed with my recommendation to discontinue Doxepine. Requested to " "discontinue Clonidine. I explained that Clonidine is only prn med and IM recommend still use it as needed for elevated blood pressure and this is why I was reluctant to stop it. Asked to transfer patient to Station 20 where Manuel was recently hospitalized. I promised to call Dr. Finn tomorrow and ask to take Manuel's care over.              Medications:       lithium ER  1,200 mg Oral At Bedtime     metFORMIN  500 mg Oral Daily with supper     nicotine  1 patch Transdermal Daily     nicotine   Transdermal Q8H     OLANZapine zydis  10 mg Oral BID          Allergies:     Allergies   Allergen Reactions     Sulfa Drugs Rash          Labs:   No results found for this or any previous visit (from the past 24 hour(s)).       Psychiatric Examination:     BP (!) 133/97   Pulse 120   Temp 98.4  F (36.9  C)   Resp 16   Ht 1.842 m (6' 0.5\")   Wt 92.1 kg (203 lb 1.6 oz)   SpO2 96%   BMI 27.17 kg/m    Weight is 203 lbs 1.6 oz  Body mass index is 27.17 kg/m .     Orthostatic Vitals       Most Recent      Sitting Orthostatic /103 12/07 0745    Sitting Orthostatic Pulse (bpm) 76 12/07 0745    Standing Orthostatic /103 12/07 0745    Standing Orthostatic Pulse (bpm) 100 12/07 0745         Appearance: awake, alert, adequately groomed, dressed in hospital scrubs and appeared as age stated  Attitude: somewhat cooperative  Eye Contact:  fair, intense at times, minimal blinking  Mood:  anxious, mildly agitated but not aggressive.   Affect:  mood congruent and intensity is blunted  Speech:  clear, coherent and paucity of speech, monotone. pauses mid-sentence and appears to be listening to internal stimuli.   Psychomotor Behavior: Fidgeting, no evidence of tardive dyskinesia, dystonia, or tics   Throught Process:  disorganized, needed redirection   Associations:  loosening of associations present  Thought Content:  no evidence of suicidal ideation or homicidal ideation, denies Auditory hallucinations/Visual hallucinations, " delusions are present  Insight: partial  Judgement: limited  Oriented to:  time, person, and place  Attention Span and Concentration:  fair, needs redirection and prompting at time  Recent and Remote Memory:  fair    Clinical Global Impressions  First: 6/4 12/2/2020      Most recent:            Precautions:     Behavioral Orders   Procedures     Assault precautions     Code 1 - Restrict to Unit     Discontinue 1:1 attendant for suicide risk     Order Specific Question:   I have performed an in person assessment of the patient     Answer:   Based on this assessment the patient no longer requires a one on one attendant at this point in time.     Elopement precautions     Routine Programming     As clinically indicated     Single Room     Status 15     Every 15 minutes.     Suicide precautions     Patients on Suicide Precautions should have a Combination Diet ordered that includes a Diet selection(s) AND a Behavioral Tray selection for Safe Tray - with utensils, or Safe Tray - NO utensils            DIagnoses:     Schizoaffective disorder, bipolar type, lauren, severe with psychotic features         Plan:     Continue inpatient stabilization.  Continue to provide support and structure. Will continue to monitor blood pressure. He is peripherally followed by IM, they recommend to continue to treat psychosis and continue to use Hydralazine and Clonidine prn. See discussion above. Will increase Zyprexa Zydis and discontinue Doxepine.     I was present with the a student who participated in the service and the documentation of the note.  I have verified the history and personally performed the mental status exam and medical decision making.  I agree with the assessment and plan of care as documented in the note.    The patient is a  who is being evaluated via a video billable telemedicine visit ([employer]) due to Covid-19 epidemic. The patient/guardian has consented to being seen via telemedicine. The provider was in  front of a computer in a home office. The patient was on the inpatient unit at [facility].      Start time: 11:30  Stop time: 12:10     The patient/guardian has been notified of the following:   This telemedicine visit is conducted live between you and your clinician. We have found that certain health care needs can be provided without the need for a physical exam. This service lets us provide the care you need with a telemedicine conversation.

## 2020-12-09 PROCEDURE — 250N000013 HC RX MED GY IP 250 OP 250 PS 637: Performed by: PSYCHIATRY & NEUROLOGY

## 2020-12-09 PROCEDURE — 99207 PR CDG-MDM COMPONENT: MEETS HIGH - UP CODED: CPT | Performed by: PSYCHIATRY & NEUROLOGY

## 2020-12-09 PROCEDURE — 124N000002 HC R&B MH UMMC

## 2020-12-09 PROCEDURE — 250N000013 HC RX MED GY IP 250 OP 250 PS 637: Performed by: PHYSICIAN ASSISTANT

## 2020-12-09 PROCEDURE — H2032 ACTIVITY THERAPY, PER 15 MIN: HCPCS

## 2020-12-09 PROCEDURE — 99233 SBSQ HOSP IP/OBS HIGH 50: CPT | Mod: 95 | Performed by: PSYCHIATRY & NEUROLOGY

## 2020-12-09 RX ORDER — AMLODIPINE BESYLATE 5 MG/1
5 TABLET ORAL DAILY
Status: DISCONTINUED | OUTPATIENT
Start: 2020-12-09 | End: 2020-12-10

## 2020-12-09 RX ADMIN — Medication 50 MG: at 19:24

## 2020-12-09 RX ADMIN — NICOTINE 1 PATCH: 21 PATCH, EXTENDED RELEASE TRANSDERMAL at 07:53

## 2020-12-09 RX ADMIN — AMLODIPINE BESYLATE 5 MG: 5 TABLET ORAL at 16:33

## 2020-12-09 RX ADMIN — METFORMIN HYDROCHLORIDE 500 MG: 500 TABLET, EXTENDED RELEASE ORAL at 16:33

## 2020-12-09 RX ADMIN — OLANZAPINE 10 MG: 10 TABLET, ORALLY DISINTEGRATING ORAL at 20:39

## 2020-12-09 RX ADMIN — LITHIUM CARBONATE 1200 MG: 300 TABLET, EXTENDED RELEASE ORAL at 20:39

## 2020-12-09 RX ADMIN — OLANZAPINE 10 MG: 10 TABLET, ORALLY DISINTEGRATING ORAL at 07:52

## 2020-12-09 RX ADMIN — NICOTINE POLACRILEX 4 MG: 2 GUM, CHEWING BUCCAL at 08:08

## 2020-12-09 RX ADMIN — Medication 50 MG: at 07:55

## 2020-12-09 NOTE — PLAN OF CARE
"Pt spent most of shift pacing in the hallway. His blood pressure at 1800 was 143/99. Pt commented \"that's much better than it has been.\" Pt reported that he was feeling \"good.\" He denied headache, dyspnea, racing heart. He also denied anxiety. Pt was polite when speaking with staff, he was not observed interacting with peers very much. Pt appears to be responding to internal stimuli at times. He is confused and slow to respond at times. Pt denies SI/SIB. He states \"I'm feeling great.\" He had no concerns to report. He again denied anxiety when he received his HS meds. Pt's affect at this time was bright and he was smiling. Pt did not received any prns this shift. He had no behavioral outbursts.     "

## 2020-12-09 NOTE — PROGRESS NOTES
Brief Medicine Note    Medicine following for elevated BP without diagnosis of HTN felt secondary to acute psychiatric illness. BP remains significantly elevated in 170s/100s despite PRN Hydralazine and Clonidine. Start Norvasc 5 mg daily with hold parameters.    Medicine will continue to peripherally follow BP.    Venus Moreau PA-C  Hospitalist Service  [phone]

## 2020-12-09 NOTE — PROGRESS NOTES
Patient transferred to station 20. He moved with all his belongings. Reports was given to RN on station 20.   Internal medicine was notified of patient's high blood pressure and they will assess patient on the unit(station 20).

## 2020-12-09 NOTE — PLAN OF CARE
Problem: General Rehab Plan of Care  Goal: Therapeutic Recreation/Music Therapy Goal  Description: The patient and/or their representative will achieve their patient-specific goals related to the plan of care.  Pt actively participated in a structured Therapeutic Recreation group with a focus on visuospatial problem solving and social engagement via a group game.  Pt would often pace in the room during the other patient's turns, occasionally needing prompts for each of his turns. Slightly impulsive in his task approach at times, though he was receptive to assistance offered by this writer. Pt stated he wasn't able to focus on anything right now.   Outcome: No Change

## 2020-12-09 NOTE — TELEPHONE ENCOUNTER
R:  12/9/20  10:40 AM  Patient accepted by [facility] on station 20.  DOC to DOC completed.  Placed in Q-called and gave station 20 charge nurse disposition.  They are cleaning a room currently and when done, charge nurse will call station 10 for nurse to nurse and arrange transfer.  Notified Dr. Palma of transfer.

## 2020-12-09 NOTE — PROGRESS NOTES
"Pt participated in dance/movement therapy (DMT) with intermittent engagement and disengagement.  Pt would occasionally lock his gaze forward and his arms would dangle stiffly at his sides.  When prompted, he would engage- even verbalizing that he was trying not to have his arms get noticed.  They did appear red and a bit swollen in the hands, as well as stiff.  He did enjoy movements that involved his arms, including spreading them wide in a flying motion.  This helped him engage more fully both physically and socially.    He was particularly influenced by peers- both positively and negatively.  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  But he also became less \"locked\" into these fallacies of social support when a peer offered a more gentle and true support.  After the session, pt found this therapist to share that the group really helped him.  Adding that dancing like that is something he would only do when no one else is with him, but dancing with others was more fun.  He continued with a bit of a contradtiction, however, stating if others see him dancing, then he \"always blames the dance therapist.\"  This was the first group this therapist had with the patient.       12/09/20 0930   Dance Movement Therapy   Type of Intervention structured groups   Response participates with encouragement   Hours 1     "

## 2020-12-09 NOTE — PLAN OF CARE
"Patient has been mostly pacing in the hallway this morning. He Sat in the [facility] [facility] [employer] area during community meeting but got up and left without participating. He is flat, tense and guarded. Mood is calm. He reports that he is depressed,states \"I don't feel like I am worthy it as a person. Denies SI/SIB. \" Asked if he has thoughts to harm himself or others he denies, \"I would never harm or myself or anyone else.\" He feels safe in the hospital and contracts for safety on the unit. He denies hallucinations. Concentration is poor. He is hopeful that things will get better after he transfers to a different unit. No complain of pain. BP still elevated, 176/103 (sitting) and 151/95 (standing), he received prn hydralazine. He has a good appetite. Slept well last night.   "

## 2020-12-09 NOTE — PLAN OF CARE
Work Completed: CAITLYN (writer) met with trx team, provided update, and reviewed pt's chart. CTC was informed that pt would be transferred to station 20 today. AVS was updated with info.      Discharge plan or goal: Tentatively planning on discharge pt home once he stabilized on meds.                  Barriers to discharge: Safe discharge plan, medication evaluation, and symptom severity (actively hallucinating and paranoid presentation).

## 2020-12-10 PROCEDURE — 250N000013 HC RX MED GY IP 250 OP 250 PS 637: Performed by: PHYSICIAN ASSISTANT

## 2020-12-10 PROCEDURE — G0177 OPPS/PHP; TRAIN & EDUC SERV: HCPCS

## 2020-12-10 PROCEDURE — 90853 GROUP PSYCHOTHERAPY: CPT

## 2020-12-10 PROCEDURE — 250N000013 HC RX MED GY IP 250 OP 250 PS 637: Performed by: PSYCHIATRY & NEUROLOGY

## 2020-12-10 PROCEDURE — 99232 SBSQ HOSP IP/OBS MODERATE 35: CPT | Mod: GC | Performed by: PSYCHIATRY & NEUROLOGY

## 2020-12-10 PROCEDURE — 99232 SBSQ HOSP IP/OBS MODERATE 35: CPT | Performed by: PHYSICIAN ASSISTANT

## 2020-12-10 PROCEDURE — 99207 PR CDG-MDM COMPONENT: MEETS MODERATE - UP CODED: CPT | Performed by: PHYSICIAN ASSISTANT

## 2020-12-10 PROCEDURE — 250N000013 HC RX MED GY IP 250 OP 250 PS 637: Performed by: STUDENT IN AN ORGANIZED HEALTH CARE EDUCATION/TRAINING PROGRAM

## 2020-12-10 PROCEDURE — 124N000002 HC R&B MH UMMC

## 2020-12-10 RX ORDER — LAMOTRIGINE 25 MG/1
25 TABLET ORAL DAILY
Status: DISCONTINUED | OUTPATIENT
Start: 2020-12-11 | End: 2020-12-11

## 2020-12-10 RX ORDER — POLYETHYLENE GLYCOL 3350 17 G/17G
17 POWDER, FOR SOLUTION ORAL DAILY
Status: DISCONTINUED | OUTPATIENT
Start: 2020-12-10 | End: 2020-12-21 | Stop reason: HOSPADM

## 2020-12-10 RX ORDER — PROPRANOLOL HYDROCHLORIDE 10 MG/1
10 TABLET ORAL 3 TIMES DAILY
Status: DISCONTINUED | OUTPATIENT
Start: 2020-12-10 | End: 2020-12-13

## 2020-12-10 RX ORDER — QUETIAPINE FUMARATE 100 MG/1
100 TABLET, FILM COATED ORAL 2 TIMES DAILY
Status: DISCONTINUED | OUTPATIENT
Start: 2020-12-10 | End: 2020-12-11

## 2020-12-10 RX ORDER — AMOXICILLIN 250 MG
1-2 CAPSULE ORAL 2 TIMES DAILY
Status: DISCONTINUED | OUTPATIENT
Start: 2020-12-10 | End: 2020-12-21 | Stop reason: HOSPADM

## 2020-12-10 RX ADMIN — HYDROXYZINE HYDROCHLORIDE 25 MG: 25 TABLET, FILM COATED ORAL at 11:17

## 2020-12-10 RX ADMIN — PROPRANOLOL HYDROCHLORIDE 10 MG: 10 TABLET ORAL at 19:43

## 2020-12-10 RX ADMIN — METFORMIN HYDROCHLORIDE 500 MG: 500 TABLET, EXTENDED RELEASE ORAL at 16:35

## 2020-12-10 RX ADMIN — SENNOSIDES AND DOCUSATE SODIUM 1 TABLET: 8.6; 5 TABLET ORAL at 09:16

## 2020-12-10 RX ADMIN — LITHIUM CARBONATE 1200 MG: 300 TABLET, EXTENDED RELEASE ORAL at 19:44

## 2020-12-10 RX ADMIN — PROPRANOLOL HYDROCHLORIDE 10 MG: 10 TABLET ORAL at 16:34

## 2020-12-10 RX ADMIN — POLYETHYLENE GLYCOL 3350 17 G: 17 POWDER, FOR SOLUTION ORAL at 16:35

## 2020-12-10 RX ADMIN — AMLODIPINE BESYLATE 5 MG: 5 TABLET ORAL at 07:59

## 2020-12-10 RX ADMIN — NICOTINE 1 PATCH: 21 PATCH, EXTENDED RELEASE TRANSDERMAL at 07:59

## 2020-12-10 RX ADMIN — SENNOSIDES AND DOCUSATE SODIUM 2 TABLET: 8.6; 5 TABLET ORAL at 19:43

## 2020-12-10 RX ADMIN — OLANZAPINE 10 MG: 10 TABLET, ORALLY DISINTEGRATING ORAL at 07:59

## 2020-12-10 RX ADMIN — QUETIAPINE FUMARATE 100 MG: 100 TABLET ORAL at 20:12

## 2020-12-10 RX ADMIN — ACETAMINOPHEN 650 MG: 325 TABLET, FILM COATED ORAL at 07:58

## 2020-12-10 ASSESSMENT — ACTIVITIES OF DAILY LIVING (ADL)
ORAL_HYGIENE: INDEPENDENT
DRESS: INDEPENDENT
LAUNDRY: WITH SUPERVISION
HYGIENE/GROOMING: INDEPENDENT
ORAL_HYGIENE: INDEPENDENT
HYGIENE/GROOMING: INDEPENDENT
DRESS: INDEPENDENT

## 2020-12-10 ASSESSMENT — MIFFLIN-ST. JEOR: SCORE: 1923.58

## 2020-12-10 NOTE — PROGRESS NOTES
"Brief Medicine Note    Medicine following for elevated BP without diagnosis of HTN. Met with patient who notes BP is typically controlled as an outpatient, but increases during hospitalizations. He attributes this to stress and anxiety related to hospital setting. He also feels constipation is contributing as it is causing him to feel anxious; last BM a few days ago. He has had 3 BMs since admission on 11/30.    BP (!) 167/113   Pulse 99   Temp 98.2  F (36.8  C) (Temporal)   Resp 16   Ht 1.842 m (6' 0.5\")   Wt 91.3 kg (201 lb 3.2 oz)   SpO2 95%   BMI 26.91 kg/m    General - Awake. Non-toxic appearing. NAD.  Neuro - Alert. Answers questions appropriately. Moves all extremities.  Skin - No rashes or jaundice on exposed areas.    Assessment and Plan  #Elevated BP without diagnosis of HTN - During last 2 psychiatric hospitalizations. Utox negative. Outpatient BPs 130s-140s/80s. Suspect related to decompensated psychiatric illness +/- anxiety. Less likely d/t essential HTN. Norvasc started 12/9 with minimal improvement.  - Case d/w Psychiatry, starting new psych meds today.   - Discontinue Norvasc. Start Propanolol 10 mg TID as will treat underlying anxiety as well as BP/HR.  - Hydralazine and Clonidine PRN    #Constipation - Only 3 BMs since admit on 11/30, last a few days ago.  - Start Senna-Docusate 1-2 tabs BID and Miralax 17 g daily. Hold for loose stools.  - Notify Medicine if abdominal pain, n/v, or ongoing constipation    Medicine will peripherally follow BP.    TEE MorrisonC  Hospitalist Service  [phone]    "

## 2020-12-10 NOTE — PROGRESS NOTES
Behavioral Health  Note   Behavioral Health  Spirituality Group Note     Unit 20    Name: Jose Francisco Sommers    YOB: 1990   MRN: [id number]    Age: 30 year old     Patient attended -led group, which included discussion of spirituality, coping with illness and building resilience.   Patient attended group for 1.0 hrs.   The patient actively participated in group discussion     Priscillamsashley [facility]  Staff    Page [phone]

## 2020-12-10 NOTE — PLAN OF CARE
"Pacing the hallway frequently this morning, resting in bed later this afternoon. C/o a headache this morning received Tylenol with relief of pain.  Also requested and received senokot for c/o constipation.  At 11:15; c/o increased anxiety and feeling \"foggy\", requested to get his PM medications early, informed that his bedtime medications could not be given at this time, offered  and given PRN hydroxyzine with decrease in anxiety. Affect is flat and blunted.   Bilateral hands slightly swollen this morning with redness across the fingers,improving throughout the day.  Hands with good capillary return, warm to touch, normal sensation. States \"It's from my high blood pressure.  Usually they are purple, this is getting better.\"    "

## 2020-12-10 NOTE — PROGRESS NOTES
"    ----------------------------------------------------------------------------------------------------------  [facility]  Psychiatric Progress Note  Hospital Day #9     Subjective     The patient's care was discussed with the treatment team and chart notes were reviewed.     Per Staff Notes:\"Pt was visible in the milieu but withdrawn pacing in the hallway by himself. Pt presented with blunted and flat affect. Pt reported anxiety \"6/10\". Pt says states that \"the anxiety is because of being here\". Pt denied SI/SIB/AH/VH. Denied feeling depressed or any other psychiatric symptoms. Pt blood pressure was elevated this shift (see flow sheet). Prn BP administered as ordered.\"     \"Reports concerns about his elevated blood pressure. Stated since he got transferred to 20 he is less depressed and feels content. Described his mood as \"dull\" Says he is having less interest in things. Reported that he is not eating as he use to.\"    Per Interview:   Manuel reports that he is feeling \"pretty good\".  When asked how he feels about being back in the hospital, he says \"he likes being back.\"  He states that he has been having \"nervousness\" after being discharged from the hospital and decided to come back.  When asked about the symptoms that he has been having, he said \"hard to describe.\"  He lithium has been helpful, and team confirmed that he was on 1200 mg lithium.  He said he does not like Latuda, gave nightmares when he was younger.  He said other doctors recommended that he starts taking an antidepressant, he says however he gets too elated and it is \"not safe to be around\" him when he is on antidepressants.  The team discussed possibility of Lamictal for him to try.  The medication needs to be titrated up slowly and has no side effect of weight gain.  He stated that he does not want Zyprexa anymore, he rather stays on Seroquel.  He described how his mood \"changes so significantly\" it seems like \"he " "has bipolar disorder\", although he says \"he does not really have high periods.\"  He also mentioned that he he benefited from doxepin previously.  Denies hallucinations.  He has some paranoia and worries that someone may hurt him, these thoughts has been getting worse, however he feels safe on the unit.  He asked for laxative for constipation.  He had 3 bowel movements since admission.  He states he is pleased to be transferred from station 10.  Other than headache yesterday for which he got Tylenol no other pain is reported.  His anxiety is 6/10.  He continues to feel depressed and hopeless.  He is concerned about his blood pressure being high.  The team informed him that his blood pressure will be monitored and medicine consult will be requested if needed.  He mentioned that his left hand and fingers were swollen the unit nurse will examine him.    Per Phone Call with Mother (Ember) on 12/10: She stated that Olanzapine has been more helpful in controlling negative symptoms than Seroquel. Manuel took extra doses of Doxepin since discharge (six tablets extra were missing.     The risks, benefits, alternatives, and side effects of treatments including no treatment have been discussed and are understood by the patient and other caregivers.    Review of systems:  Complete psychiatric ROS is negative unless otherwise noted above.      Objective     BP (!) 167/113   Pulse 99   Temp 98.2  F (36.8  C) (Temporal)   Resp 16   Ht 1.842 m (6' 0.5\")   Wt 91.3 kg (201 lb 3.2 oz)   SpO2 95%   BMI 26.91 kg/m      Slept: 7 hrs    Psychiatric Examination:  Appearance:  awake, alert, dressed in hospital scrubs and appeared as age stated  Muscle Strength and Tone: normal  Gait and Station: Normal  Behavior (Psychomotor):  no evidence of tardive dyskinesia, dystonia, or tics  Eye Contact:  good  Speech:  clear, coherent and paucity of speech  Mood:  anxious  Affect:  mood congruent  Attitude:  cooperative  Thought Process:  " disorganized  Thought Content:  no evidence of suicidal ideation or homicidal ideation, denies hallucinations  Associations:  loosening of associations present  Insight:  fair  Judgment:  limited  Oriented to:  time, person, and place  Attention Span and Concentration:  fair  Recent and Remote Memory:  fair  Language: Fluent in English with appropriate syntax and vocabulary.  Fund of Knowledge: appropriate      No results found for this or any previous visit (from the past 24 hour(s)).     Assessment & Plan   The patient is a 30-year-old male, reviously diagnosed with schizoaffective disorder, bipolar subtype, ADHD, sensory processing admitted on 12/01 to [facility] (transferred to [facility] on 12/10) with complaints of feeling restless, anxious, poor sleep and psychosis.  The patient recently was hospitalized on [facility] from 11/12 to 11/26 with complaints of agitation, irritability, not sleeping, tangential speech, disorganized thinking in the context of being titrated off olanzapine due to weight gain.  On admission on 12/01, he reported that he was pretty irritable, could not sleep well, has a lot of difficulties with focusing.       Psychiatric diagnoses:   - Schizoaffective disorder, bipolar type, lauren, severe with psychotic features    Hospital Course: Jose Francisco Sommers was transferred from [facility] to [facility] as a voluntary patient. His PTA lithium and olanzapine were continued. Patient continued to appear anxious and disorganized. Lamotrigine 25 mg started, first dose on 12/11.     # Discontinued Medications (& Rationale):  - None     Today's Changes:  - Started Lamotrigine 25 mg daily, first dose on 12/11  - Lithium level scheduled on 12/11  - Change from Olanzapine 10 mg to Seroquel 100 mg postponed per conversation with the mother on 12/10, to be discussed on 12/11. (She stated that Olanzapine has been more helpful in controlling negative symptoms than Seroquel).    Medical course:   PTA  medications were resumed. Patient continued to have high BP. Per medicine, discontinued Norvasc, started Propanolol 10 mg TID as will treat underlying anxiety as well as BP/HR. Hydralazine and Clonidine PRN are available PRN.     #Elevated BP without diagnosis of HTN -    - Discontinue Norvasc  - Startwd Propanolol 10 mg TID as will treat underlying anxiety as well as BP/HR.  - Hydralazine and Clonidine PRN     #Constipation   - Start Senna-Docusate 1-2 tabs BID and Miralax 17 g daily. Hold for loose stools.  - Notify Medicine if abdominal pain, n/v, or ongoing constipation     Consults:  - Medicine on 12/10 for HTN and constipation     Lab/Imaging:   CBC and CMP on 12/04 - unremarkable     Legal Status: Voluntary    Disposition: TBD, pending clinical stabilization, medication optimization, and formulation of a safe discharge plan.     Safety Assessment:   Behavioral Orders   Procedures     Assault precautions     Code 1 - Restrict to Unit     Discontinue 1:1 attendant for suicide risk     Order Specific Question:   I have performed an in person assessment of the patient     Answer:   Based on this assessment the patient no longer requires a one on one attendant at this point in time.     Elopement precautions     Routine Programming     As clinically indicated     Single Room     Status 15     Every 15 minutes.     Suicide precautions     Patients on Suicide Precautions should have a Combination Diet ordered that includes a Diet selection(s) AND a Behavioral Tray selection for Safe Tray - with utensils, or Safe Tray - NO utensils          Patient seen and discussed with my attending physician, Dr. Mihai Finn MD who is in agreement with my assessment and plan.    New Shoemaker MD  PGY-1 Psychiatry Resident    ATTENDING:  I, Mihai Finn, saw and evaluated the patient with the resident physician.  I agree with the findings and plan of care as documented in the resident note.  I have reviewed all labs and  vital signs.

## 2020-12-10 NOTE — PLAN OF CARE
Daily CTC Note:    Work Completed:   The patient's care was discussed with the treatment team and chart notes were reviewed. Patient was transferred back to [facility] 20 from [facility] 10 for continuity of care since he was recently on [facility] under the care of Dr. Finn  Patient met with team.  He provided events leading up to readmission.  Patient was somewhat disorganized in thoughts.    Care will be coordinated with family    Discharge plan or goal:   Home with parents when stable    Barriers to discharge:   Ongoing thought disorder symptoms  Medication adjustments

## 2020-12-10 NOTE — PROGRESS NOTES
"[facility], [place]   Psychiatric Progress Note        Interim History:     The patient's care was discussed with the treatment team during the daily team meeting and/or staff's chart notes were reviewed.  Staff report patient shows minimal changes. Paces in the unit corridor. Blunted/flat affect. Needed an encouragement to take meds, runs high blood pressure. Today was started on Norvasc by IM. Denied Auditory hallucinations, Visual hallucinations, Homicidal thoughts, reported passive Suicidal ideation, See discussion above.    Met with patient in the presence of PA student: Today, patient continues to appear anxious and tense, however, was also somewhat more cooperative. Reported that he still feels very confused. He had difficulties formulating his feelings, however, appeared to be experiencing psychotic symptoms. Admitted to having passive Suicidal ideation, but firmly denied having any desire/plan to kill self: \"I would never do that\". This has not changed since yesterday. Manuel was glad to hear that. He denied having med side effects and had no further questions or concerns.          Medications:       amLODIPine  5 mg Oral Daily     lithium ER  1,200 mg Oral At Bedtime     metFORMIN  500 mg Oral Daily with supper     nicotine  1 patch Transdermal Daily     nicotine   Transdermal Q8H     OLANZapine zydis  10 mg Oral BID          Allergies:     Allergies   Allergen Reactions     Sulfa Drugs Rash          Labs:   No results found for this or any previous visit (from the past 24 hour(s)).       Psychiatric Examination:     BP (!) 167/113   Pulse 99   Temp 97.9  F (36.6  C) (Temporal)   Resp 16   Ht 1.842 m (6' 0.5\")   Wt 91.6 kg (201 lb 14.4 oz)   SpO2 96%   BMI 27.01 kg/m    Weight is 201 lbs 14.4 oz  Body mass index is 27.01 kg/m .     Orthostatic Vitals       Most Recent      Sitting Orthostatic /100 12/09 1511    Sitting Orthostatic Pulse (bpm) 109 12/09 1511    " Standing Orthostatic /95 12/09 0746    Standing Orthostatic Pulse (bpm) 116 12/09 0746         Appearance: awake, alert, adequately groomed, dressed in hospital scrubs and appeared as age stated  Attitude: somewhat cooperative  Eye Contact:  fair, intense at times,   Mood:  anxious, mildly agitated but not aggressive.   Affect:  mood congruent and intensity is blunted  Speech:  clear, coherent and paucity of speech, monotone. pauses mid-sentence and appears to be listening to internal stimuli.   Psychomotor Behavior: Fidgeting, no evidence of tardive dyskinesia, dystonia, or tics   Throught Process: still disorganized, needed redirection   Associations:  loosening of associations present  Thought Content:  no evidence of suicidal ideation or homicidal ideation, denies Auditory hallucinations/Visual hallucinations, delusions are present  Insight: partial  Judgement: limited  Oriented to:  time, person, and place  Attention Span and Concentration:  fair, needs redirection and prompting at time  Recent and Remote Memory:  fair    Clinical Global Impressions  First: 6/4 12/2/2020      Most recent: 5/4 12/9/2020             Precautions:     Behavioral Orders   Procedures     Assault precautions     Code 1 - Restrict to Unit     Discontinue 1:1 attendant for suicide risk     Order Specific Question:   I have performed an in person assessment of the patient     Answer:   Based on this assessment the patient no longer requires a one on one attendant at this point in time.     Elopement precautions     Routine Programming     As clinically indicated     Single Room     Status 15     Every 15 minutes.     Suicide precautions     Patients on Suicide Precautions should have a Combination Diet ordered that includes a Diet selection(s) AND a Behavioral Tray selection for Safe Tray - with utensils, or Safe Tray - NO utensils            DIagnoses:     Schizoaffective disorder, bipolar type, lauren, severe with psychotic  features         Plan:     Continue inpatient stabilization.  Continue to provide support and structure. Will continue to monitor blood pressure. Started on Norvasc. Will transfer to Station 20 under care of Dr. Finn.    I was present with the a student who participated in the service and the documentation of the note.  I have verified the history and personally performed the mental status exam and medical decision making.  I agree with the assessment and plan of care as documented in the note.    The patient is a  who is being evaluated via a video billable telemedicine visit ([employer]) due to Covid-19 epidemic. The patient/guardian has consented to being seen via telemedicine. The provider was in front of a computer in a home office. The patient was on the inpatient unit at [facility].      Start time: 10:00  Stop time: 10:15     The patient/guardian has been notified of the following:   This telemedicine visit is conducted live between you and your clinician. We have found that certain health care needs can be provided without the need for a physical exam. This service lets us provide the care you need with a telemedicine conversation.

## 2020-12-10 NOTE — PROGRESS NOTES
"Patient has been mostly pacing hallway. Patient appeared calm and pleasant upon approach. Did not attend groups or interacted with peers. Reports concerns about his elevated blood pressure. Stated since he got transferred to  he is less depressed and feels content. Described his mood as \"dull\" Says he is having less interest in things. Reported that he is not eating as he use to. Patient denied SI/SIB and anxiety.    "

## 2020-12-10 NOTE — PLAN OF CARE
"Pt was visible in the milieu but withdrawn pacing in the hallway by himself. Pt presented with blunted and flat affect. Pt reported anxiety \"6/10\". Pt says states that \"the anxiety is because of being here\". Pt denied SI/SIB/AH/VH. Denied feeling depressed or any other psychiatric symptoms. Pt blood pressure was elevated this shift (see flow sheet). Prn BP administered as ordered. Will continue to monitor.  "

## 2020-12-11 LAB — LITHIUM SERPL-SCNC: 0.58 MMOL/L (ref 0.6–1.2)

## 2020-12-11 PROCEDURE — 80178 ASSAY OF LITHIUM: CPT | Performed by: STUDENT IN AN ORGANIZED HEALTH CARE EDUCATION/TRAINING PROGRAM

## 2020-12-11 PROCEDURE — 124N000002 HC R&B MH UMMC

## 2020-12-11 PROCEDURE — 250N000013 HC RX MED GY IP 250 OP 250 PS 637: Performed by: PHYSICIAN ASSISTANT

## 2020-12-11 PROCEDURE — 250N000013 HC RX MED GY IP 250 OP 250 PS 637: Performed by: PSYCHIATRY & NEUROLOGY

## 2020-12-11 PROCEDURE — 99232 SBSQ HOSP IP/OBS MODERATE 35: CPT | Mod: GC | Performed by: PSYCHIATRY & NEUROLOGY

## 2020-12-11 PROCEDURE — 36415 COLL VENOUS BLD VENIPUNCTURE: CPT | Performed by: STUDENT IN AN ORGANIZED HEALTH CARE EDUCATION/TRAINING PROGRAM

## 2020-12-11 PROCEDURE — 250N000013 HC RX MED GY IP 250 OP 250 PS 637: Performed by: STUDENT IN AN ORGANIZED HEALTH CARE EDUCATION/TRAINING PROGRAM

## 2020-12-11 RX ORDER — QUETIAPINE FUMARATE 100 MG/1
100 TABLET, FILM COATED ORAL ONCE
Status: COMPLETED | OUTPATIENT
Start: 2020-12-11 | End: 2020-12-11

## 2020-12-11 RX ORDER — OLANZAPINE 10 MG/1
10 TABLET ORAL 2 TIMES DAILY
Status: DISCONTINUED | OUTPATIENT
Start: 2020-12-11 | End: 2020-12-14

## 2020-12-11 RX ORDER — LITHIUM CARBONATE 300 MG/1
300 TABLET, FILM COATED, EXTENDED RELEASE ORAL EVERY MORNING
Status: DISCONTINUED | OUTPATIENT
Start: 2020-12-12 | End: 2020-12-21 | Stop reason: HOSPADM

## 2020-12-11 RX ORDER — LITHIUM CARBONATE 450 MG
900 TABLET, EXTENDED RELEASE ORAL AT BEDTIME
Status: DISCONTINUED | OUTPATIENT
Start: 2020-12-12 | End: 2020-12-21 | Stop reason: HOSPADM

## 2020-12-11 RX ORDER — QUETIAPINE FUMARATE 200 MG/1
200 TABLET, FILM COATED ORAL AT BEDTIME
Status: DISCONTINUED | OUTPATIENT
Start: 2020-12-12 | End: 2020-12-14

## 2020-12-11 RX ORDER — QUETIAPINE FUMARATE 100 MG/1
100 TABLET, FILM COATED ORAL EVERY MORNING
Status: DISCONTINUED | OUTPATIENT
Start: 2020-12-12 | End: 2020-12-21 | Stop reason: HOSPADM

## 2020-12-11 RX ADMIN — LITHIUM CARBONATE 1200 MG: 300 TABLET, EXTENDED RELEASE ORAL at 20:07

## 2020-12-11 RX ADMIN — SENNOSIDES AND DOCUSATE SODIUM 2 TABLET: 8.6; 5 TABLET ORAL at 09:08

## 2020-12-11 RX ADMIN — SENNOSIDES AND DOCUSATE SODIUM 2 TABLET: 8.6; 5 TABLET ORAL at 20:07

## 2020-12-11 RX ADMIN — OLANZAPINE 10 MG: 10 TABLET, FILM COATED ORAL at 09:05

## 2020-12-11 RX ADMIN — PROPRANOLOL HYDROCHLORIDE 10 MG: 10 TABLET ORAL at 09:06

## 2020-12-11 RX ADMIN — OLANZAPINE 10 MG: 10 TABLET, FILM COATED ORAL at 20:08

## 2020-12-11 RX ADMIN — PROPRANOLOL HYDROCHLORIDE 10 MG: 10 TABLET ORAL at 20:07

## 2020-12-11 RX ADMIN — NICOTINE 1 PATCH: 21 PATCH, EXTENDED RELEASE TRANSDERMAL at 09:08

## 2020-12-11 RX ADMIN — QUETIAPINE FUMARATE 100 MG: 100 TABLET ORAL at 09:06

## 2020-12-11 RX ADMIN — NICOTINE POLACRILEX 2 MG: 2 GUM, CHEWING BUCCAL at 20:17

## 2020-12-11 RX ADMIN — QUETIAPINE FUMARATE 100 MG: 100 TABLET ORAL at 20:08

## 2020-12-11 RX ADMIN — PROPRANOLOL HYDROCHLORIDE 10 MG: 10 TABLET ORAL at 16:10

## 2020-12-11 RX ADMIN — METFORMIN HYDROCHLORIDE 500 MG: 500 TABLET, EXTENDED RELEASE ORAL at 16:10

## 2020-12-11 RX ADMIN — POLYETHYLENE GLYCOL 3350 17 G: 17 POWDER, FOR SOLUTION ORAL at 09:06

## 2020-12-11 ASSESSMENT — ACTIVITIES OF DAILY LIVING (ADL)
DRESS: INDEPENDENT
HYGIENE/GROOMING: INDEPENDENT
LAUNDRY: WITH SUPERVISION
HYGIENE/GROOMING: INDEPENDENT
ORAL_HYGIENE: INDEPENDENT
ORAL_HYGIENE: INDEPENDENT
DRESS: INDEPENDENT

## 2020-12-11 NOTE — PLAN OF CARE
Problem: OT General Care Plan  Goal: OT Goal 1    Attended 1 occupational therapy group this date: OT Clinic. Presented with mild demeanor, content and cooperative. Chose to read a [employer] as choice leisure activity.     OT staff met with pt to review the role of occupational therapy and explained the value of having them involved in their treatment plan including options to meet current needs/self-identified goals. Pt was given a self-assessment to inform OT initial assessment - to be completed with continued clinical observation.     Outcome: Improving

## 2020-12-11 NOTE — PROGRESS NOTES
Manuel called his mom re: being discharged.  He came back to desk after calling her and stated that she did not want him discharged.  Pt went on about how he still wanted to be discharged and that he would stay in a hotel.  After some talking with staff pt stated he would stay I hospital and re evaluate on Monday.

## 2020-12-11 NOTE — PROGRESS NOTES
" 12/10/20 2100   Group Therapy Session   Group Attendance attended group session   Time Session Began 2000   Time Session Ended 2100   Group Type psychotherapeutic   Group Topic Covered coping skills/lifestyle management   Patient Participation/Contribution cooperative with task;discussed personal experience with topic, a bit irritable and tense toward end of group   Patient Participation Detail    engaged   The  Psychotherapy group goal is to promote insight to positive choice and change. Group processing is within a supportive and safe environment. Patients will process emotions using verbal group and expressive psychotherapy interventions including visual art/writing interventions.     Group interventions support patients by : cultivating resilience, fostering self awareness, self expression, self esteem and self compassion.  Groups will provide tools to encourage self and others in group, to practice communication/ social skills and supports, learn positive coping mechanisms, self efficacy, conflict resolution, empowerment, optimism ,hope , understanding ones emotions,and a sense of self and community.  Tools will be provided to manage life stressors  and individual's diagnosis      Modalities to reach these goals include: positive and solution focused psychology, CBT, DBT, ACT, Narrative psychology, Adlerian psychology, FLOW, Expressive Arts Continuum Therapies( Art Therapy) and Mindfulness based directives and discussions.        Subjective -patient report of mood today-fine     Objective/ Intervention- Goal of group and Therapeutic modality utilized-coping with depression and anxiety     Group Response-engaged     Patient Response-engaged- Pt talked about being frustrated a medication change took all day to get approved  but pleased that it happened and that his doctor listened to him. He has low insight about why he is \" locked up\" Writer suggested he make a list of questions he has for his doctor. Writer " offered that writer doesn't make the decision on length of stay etc, but that he was here to be safe and he should ask his doctor the specifics of why and how long he is here. He got a bit frustrated and needed to take a break. He was pleasant about it. He said he would come back after getting a drink of water but did not return. Writer was told he had a nose bleed and that is why he did not return to the last few minutes of group.           Hugh Bull, AMYFT, ATR-BC

## 2020-12-11 NOTE — PROGRESS NOTES
Pt was visible in the milieu this evening. Pt attended group and was seen pacing up and down the hallway. Pt was pleasant and sociable with peers and staff. Pt didn't shower this evening or take care of ADL's. No concerns were observed or reported.

## 2020-12-11 NOTE — PROGRESS NOTES
Manuel appeared to sleep comfortably for a total of 6.75 hours this night shift.  No prns or snacks given or requested.  Lithium level this morning.

## 2020-12-11 NOTE — PLAN OF CARE
Problem: Behavior Regulation Impairment (Psychotic Signs/Symptoms)  Goal: Improved Behavioral Control (Psychotic Signs/Symptoms)  Outcome: Improving  Note: Pt was visible in the milieu pacing in the halls most of the time. Pt had a small nose bleed this evening. Blood pressure slightly better than it was yesterday 154/84 and 159/95. Pts mother called and informed this writer after thinking over the discussions she had with the doctors she no longer has any objects to the doctor's recommendations. Pt refused the Zyprexa tonight and stated he prefers Seroquel per discussions with the team earlier today. On-call resident was updated and new orders was received.

## 2020-12-11 NOTE — PLAN OF CARE
Daily CTC Note:     Work Completed:   The patient's care was discussed with the treatment team and chart notes were reviewed.  Patient met with team.  He reported he slept well and is feeling s/w better with medication adjustments.  He expressed his preferences with medications.   Dr. Shoemaker spoke to patient's mother at length.  Patient's affect appeared brighter today.  Speech more fluent though somewhat disorganized and patient admits to having a hard time expressing self. Also reports difficulty maintaining concentration.  Care will continue to be coordinated with family     Discharge plan or goal:   Home with parents when stable     Barriers to discharge:   Ongoing thought disorder symptoms  Medication adjustments

## 2020-12-11 NOTE — PLAN OF CARE
Pt denies SI.  Pt signed 12 hour at 11:15am.  Pt states he feels good..  states no need to be here.  Said he smoked 2 black and mild cigarettes and that made him worked up.  But now feel fine.  Pt waiting to speak to mom.

## 2020-12-11 NOTE — PROGRESS NOTES
RN notified this writer that patient is declining scheduled olanzapine and requesting quetiapine. Per chart review scheduled olanzapine was originally planned to be discontinued today and replaced with quetiapine 100 mg BID, but this change was postponed after mother stated olanzapine had been more helpful in controlling negative symptoms. RN reported mother called unit and stating she is now in agreement with starting quetiapine today and defers to care teams recommendations.     Given the patient is declining olanzapine, requesting quetiapine, patient's mother is supporting starting quetiapine and no medical contraindications; discontinuing olanzapine 10 mg BID and starting quetiapine 100 mg BID is appropriate.     Changes made:  - Discontinue olanzapine 10 mg BID  - Start quetiapine 100 mg BID    Ray Burgos MD  PGY-2 Psychiatry Resident Physician

## 2020-12-12 PROCEDURE — 250N000013 HC RX MED GY IP 250 OP 250 PS 637: Performed by: STUDENT IN AN ORGANIZED HEALTH CARE EDUCATION/TRAINING PROGRAM

## 2020-12-12 PROCEDURE — 250N000013 HC RX MED GY IP 250 OP 250 PS 637: Performed by: PHYSICIAN ASSISTANT

## 2020-12-12 PROCEDURE — 124N000002 HC R&B MH UMMC

## 2020-12-12 PROCEDURE — 250N000013 HC RX MED GY IP 250 OP 250 PS 637: Performed by: PSYCHIATRY & NEUROLOGY

## 2020-12-12 RX ADMIN — QUETIAPINE 200 MG: 200 TABLET, FILM COATED ORAL at 20:40

## 2020-12-12 RX ADMIN — OLANZAPINE 10 MG: 10 TABLET, FILM COATED ORAL at 20:40

## 2020-12-12 RX ADMIN — NICOTINE 1 PATCH: 21 PATCH, EXTENDED RELEASE TRANSDERMAL at 08:42

## 2020-12-12 RX ADMIN — PROPRANOLOL HYDROCHLORIDE 10 MG: 10 TABLET ORAL at 20:40

## 2020-12-12 RX ADMIN — OLANZAPINE 10 MG: 10 TABLET, FILM COATED ORAL at 08:42

## 2020-12-12 RX ADMIN — SENNOSIDES AND DOCUSATE SODIUM 2 TABLET: 8.6; 5 TABLET ORAL at 20:40

## 2020-12-12 RX ADMIN — QUETIAPINE FUMARATE 100 MG: 100 TABLET ORAL at 08:42

## 2020-12-12 RX ADMIN — POLYETHYLENE GLYCOL 3350 17 G: 17 POWDER, FOR SOLUTION ORAL at 08:41

## 2020-12-12 RX ADMIN — SENNOSIDES AND DOCUSATE SODIUM 2 TABLET: 8.6; 5 TABLET ORAL at 08:42

## 2020-12-12 RX ADMIN — LITHIUM CARBONATE 300 MG: 300 TABLET, EXTENDED RELEASE ORAL at 08:41

## 2020-12-12 RX ADMIN — PROPRANOLOL HYDROCHLORIDE 10 MG: 10 TABLET ORAL at 08:42

## 2020-12-12 RX ADMIN — PROPRANOLOL HYDROCHLORIDE 10 MG: 10 TABLET ORAL at 14:38

## 2020-12-12 RX ADMIN — METFORMIN HYDROCHLORIDE 500 MG: 500 TABLET, EXTENDED RELEASE ORAL at 20:40

## 2020-12-12 RX ADMIN — LITHIUM CARBONATE 900 MG: 450 TABLET, EXTENDED RELEASE ORAL at 20:40

## 2020-12-12 ASSESSMENT — ACTIVITIES OF DAILY LIVING (ADL)
ORAL_HYGIENE: INDEPENDENT
DRESS: SCRUBS (BEHAVIORAL HEALTH);INDEPENDENT
HYGIENE/GROOMING: INDEPENDENT

## 2020-12-12 NOTE — PLAN OF CARE
Pacing the hallway most of the day. Medication compliant, writer answered questions about his medication schedule, concerned about Seroquel dose and frequency, agrees with and understands with current schedule. Pleasant and cooperative, interacts appropriately with staff and peers.

## 2020-12-12 NOTE — PROGRESS NOTES
Patient walking hallway for activity, listening to radio headset. Appetite good. Patient typically keeps to self. Patient offers no complaints other than stating that he is not sleeping well at night. Mostly pleasant and approachable.          12/12/20 1149   Behavioral Health   Affect blunted, flat;irritable   Mood depressed   Physical Appearance/Attire attire appropriate to age and situation   1. Wish to be Dead (Recent) No   Activity restless;withdrawn   Speech clear;coherent;circumstantial

## 2020-12-12 NOTE — PROGRESS NOTES
Manuel appeared to be sleeping a total of 6.25 hours this night shift.  Appeared comfortable.  No prns or snacks given or requested.

## 2020-12-12 NOTE — PLAN OF CARE
Pt was visible in the milieu pacing in the halls most of the evening. Pt was not interacting with peers just kept to self. Pt presents with blunted and flat affect. Pt denied SI/SIB/AH/VH. Pt stated his mood is better today. Pt says he thinks the Seroquel is helping is helping.

## 2020-12-13 PROCEDURE — 250N000013 HC RX MED GY IP 250 OP 250 PS 637: Performed by: PSYCHIATRY & NEUROLOGY

## 2020-12-13 PROCEDURE — 250N000013 HC RX MED GY IP 250 OP 250 PS 637: Performed by: STUDENT IN AN ORGANIZED HEALTH CARE EDUCATION/TRAINING PROGRAM

## 2020-12-13 PROCEDURE — 250N000013 HC RX MED GY IP 250 OP 250 PS 637: Performed by: PHYSICIAN ASSISTANT

## 2020-12-13 PROCEDURE — 124N000002 HC R&B MH UMMC

## 2020-12-13 RX ORDER — PROPRANOLOL HYDROCHLORIDE 10 MG/1
20 TABLET ORAL 3 TIMES DAILY
Status: DISCONTINUED | OUTPATIENT
Start: 2020-12-13 | End: 2020-12-21 | Stop reason: HOSPADM

## 2020-12-13 RX ADMIN — QUETIAPINE FUMARATE 100 MG: 100 TABLET ORAL at 08:35

## 2020-12-13 RX ADMIN — SENNOSIDES AND DOCUSATE SODIUM 2 TABLET: 8.6; 5 TABLET ORAL at 08:34

## 2020-12-13 RX ADMIN — PROPRANOLOL HYDROCHLORIDE 20 MG: 10 TABLET ORAL at 21:09

## 2020-12-13 RX ADMIN — PROPRANOLOL HYDROCHLORIDE 10 MG: 10 TABLET ORAL at 14:41

## 2020-12-13 RX ADMIN — NICOTINE 1 PATCH: 21 PATCH, EXTENDED RELEASE TRANSDERMAL at 08:38

## 2020-12-13 RX ADMIN — OLANZAPINE 10 MG: 10 TABLET, FILM COATED ORAL at 21:09

## 2020-12-13 RX ADMIN — QUETIAPINE 200 MG: 200 TABLET, FILM COATED ORAL at 21:09

## 2020-12-13 RX ADMIN — LITHIUM CARBONATE 900 MG: 450 TABLET, EXTENDED RELEASE ORAL at 21:09

## 2020-12-13 RX ADMIN — SENNOSIDES AND DOCUSATE SODIUM 1 TABLET: 8.6; 5 TABLET ORAL at 21:09

## 2020-12-13 RX ADMIN — POLYETHYLENE GLYCOL 3350 17 G: 17 POWDER, FOR SOLUTION ORAL at 08:35

## 2020-12-13 RX ADMIN — OLANZAPINE 10 MG: 10 TABLET, FILM COATED ORAL at 08:33

## 2020-12-13 RX ADMIN — PROPRANOLOL HYDROCHLORIDE 10 MG: 10 TABLET ORAL at 08:33

## 2020-12-13 RX ADMIN — LITHIUM CARBONATE 300 MG: 300 TABLET, EXTENDED RELEASE ORAL at 08:34

## 2020-12-13 RX ADMIN — TRAZODONE HYDROCHLORIDE 50 MG: 50 TABLET ORAL at 21:09

## 2020-12-13 ASSESSMENT — ACTIVITIES OF DAILY LIVING (ADL)
DRESS: INDEPENDENT
HYGIENE/GROOMING: INDEPENDENT
ORAL_HYGIENE: INDEPENDENT

## 2020-12-13 ASSESSMENT — MIFFLIN-ST. JEOR: SCORE: 1956.24

## 2020-12-13 NOTE — PLAN OF CARE
Pacing hallway most of the day. Social with staff and peers. Pleasant and cooperative.  Reports sleeping well last night, stated he feels he would improve more with a higher dose of Seroquel. States mood is improving, rates depression 9/10 and anxiety 7/10.

## 2020-12-13 NOTE — PROGRESS NOTES
Manuel appeared to be sleeping comfortably for a total of 5.5 hours this night shift.  Awoke early and is now sitting quietly in the lounge.  No prns or snacks given or requested.

## 2020-12-13 NOTE — PROGRESS NOTES
Brief Medicine Note    #Elevated BP without diagnosis of HTN - During last 2 psychiatric hospitalizations. Utox negative. Outpatient BPs 130s-140s/80s. Suspect related to decompensated psychiatric illness +/- anxiety. Less likely d/t essential HTN. Norvasc started 12/9 with minimal improvement. Transitioned from Norvasc to Propanolol and new psych meds started 12/10 with some improvement, though BP remains above goal.  - Increase Propanolol to 20 mg TID as will treat underlying anxiety as well as BP/HR.  - Hydralazine and Clonidine PRN    Medicine will peripherally follow BP.    Venus Moreau PA-C  Hospitalist Service  [phone]

## 2020-12-14 PROCEDURE — 250N000013 HC RX MED GY IP 250 OP 250 PS 637: Performed by: STUDENT IN AN ORGANIZED HEALTH CARE EDUCATION/TRAINING PROGRAM

## 2020-12-14 PROCEDURE — 99231 SBSQ HOSP IP/OBS SF/LOW 25: CPT | Performed by: NURSE PRACTITIONER

## 2020-12-14 PROCEDURE — 250N000013 HC RX MED GY IP 250 OP 250 PS 637: Performed by: PHYSICIAN ASSISTANT

## 2020-12-14 PROCEDURE — 124N000002 HC R&B MH UMMC

## 2020-12-14 PROCEDURE — 99231 SBSQ HOSP IP/OBS SF/LOW 25: CPT | Mod: GC | Performed by: PSYCHIATRY & NEUROLOGY

## 2020-12-14 PROCEDURE — 250N000013 HC RX MED GY IP 250 OP 250 PS 637: Performed by: PSYCHIATRY & NEUROLOGY

## 2020-12-14 RX ORDER — OLANZAPINE 10 MG/1
10 TABLET ORAL AT BEDTIME
Status: DISCONTINUED | OUTPATIENT
Start: 2020-12-14 | End: 2020-12-17

## 2020-12-14 RX ORDER — OLANZAPINE 5 MG/1
5 TABLET ORAL EVERY MORNING
Status: DISCONTINUED | OUTPATIENT
Start: 2020-12-14 | End: 2020-12-18

## 2020-12-14 RX ADMIN — SENNOSIDES AND DOCUSATE SODIUM 2 TABLET: 8.6; 5 TABLET ORAL at 09:06

## 2020-12-14 RX ADMIN — NICOTINE 1 PATCH: 21 PATCH, EXTENDED RELEASE TRANSDERMAL at 09:07

## 2020-12-14 RX ADMIN — LITHIUM CARBONATE 300 MG: 300 TABLET, EXTENDED RELEASE ORAL at 09:06

## 2020-12-14 RX ADMIN — SENNOSIDES AND DOCUSATE SODIUM 1 TABLET: 8.6; 5 TABLET ORAL at 22:16

## 2020-12-14 RX ADMIN — PROPRANOLOL HYDROCHLORIDE 20 MG: 10 TABLET ORAL at 15:51

## 2020-12-14 RX ADMIN — METFORMIN HYDROCHLORIDE 500 MG: 500 TABLET, EXTENDED RELEASE ORAL at 19:15

## 2020-12-14 RX ADMIN — LITHIUM CARBONATE 900 MG: 450 TABLET, EXTENDED RELEASE ORAL at 22:07

## 2020-12-14 RX ADMIN — SENNOSIDES AND DOCUSATE SODIUM 1 TABLET: 8.6; 5 TABLET ORAL at 22:10

## 2020-12-14 RX ADMIN — POLYETHYLENE GLYCOL 3350 17 G: 17 POWDER, FOR SOLUTION ORAL at 09:06

## 2020-12-14 RX ADMIN — OLANZAPINE 5 MG: 5 TABLET, FILM COATED ORAL at 09:06

## 2020-12-14 RX ADMIN — QUETIAPINE FUMARATE 100 MG: 100 TABLET ORAL at 09:06

## 2020-12-14 RX ADMIN — QUETIAPINE 300 MG: 200 TABLET, FILM COATED ORAL at 22:08

## 2020-12-14 RX ADMIN — PROPRANOLOL HYDROCHLORIDE 20 MG: 10 TABLET ORAL at 22:10

## 2020-12-14 RX ADMIN — OLANZAPINE 10 MG: 10 TABLET, FILM COATED ORAL at 22:08

## 2020-12-14 RX ADMIN — PROPRANOLOL HYDROCHLORIDE 20 MG: 10 TABLET ORAL at 09:06

## 2020-12-14 ASSESSMENT — ACTIVITIES OF DAILY LIVING (ADL)
HYGIENE/GROOMING: INDEPENDENT
ORAL_HYGIENE: INDEPENDENT
DRESS: SCRUBS (BEHAVIORAL HEALTH);INDEPENDENT

## 2020-12-14 NOTE — PROGRESS NOTES
Manuel appeared to be sleeping comfortably all 7 hours this night shift.  No prns or snacks given or requested.

## 2020-12-14 NOTE — PROGRESS NOTES
"    ----------------------------------------------------------------------------------------------------------  [facility]  Psychiatric Progress Note  Hospital Day #13     Subjective     The patient's care was discussed with the treatment team and chart notes were reviewed.     Sleep: 7hrs  PRN meds: trazodone 50mg last evening (12/13)  Scheduled meds: taken as prescribed    Per Staff Notes: 12/13-told staff depression 9/10, anxiety 7/10. Over the weekend shared with multiple staff that he felt like seroquel was helping him. Wanting higher dose to help with sleep. Pacing hallway for much of days over weekend, pleasant and social with staff/peers.     Per Interview: Reports he's doing \"fine,\" feels ok being here in the hospital. Was wanting to leave this weekend, but his parents convinced him to stay, because \"they're worried about me.\" Reports that he's \"liking seroquel a lot,\" that it's \"improving [his] life.\" Wants to increase the dose \"to the max.\" Informed him of plan to increase seroquel dose this evening, and slowly decrease olanzapine, which he was in agreement with. He shared that he was wondering whether down the road we could add abilify to his medication regimen, as a \"nice doctor\" once told him about how that could be a good medication for him (of note, per chart review, he did not tolerate this medication in the past).     Denies any side effects from medications, eating well, sleeping well. Denies any physical complaints, apart from \"2 left toes squished from walking a lot.\"     The risks, benefits, alternatives, and side effects of treatments including no treatment have been discussed and are understood by the patient and other caregivers.    Review of systems:  Complete psychiatric ROS is negative unless otherwise noted above.      Objective     BP (!) 155/107   Pulse 84   Temp 97.5  F (36.4  C) (Tympanic)   Resp 16   Ht 1.842 m (6' 0.5\")   Wt 94.5 kg (208 lb 6.4 oz)  " " SpO2 97%   BMI 27.88 kg/m      Psychiatric Examination:   Appearance:  awake, alert, dressed in hospital scrubs and appeared as age stated  Muscle Strength and Tone: normal  Gait and Station: normal  Behavior (Psychomotor):  no evidence of tardive dyskinesia, dystonia, or tics  Eye Contact:  good  Speech:  clear, coherent and paucity of speech  Mood:  \"fine\"  Affect:  mood congruent, blunted affect  Attitude:  cooperative  Thought Process:  Primarily linear, illogical at times, perseverative on topic of medications   Thought Content:  no evidence of suicidal ideation or homicidal ideation, denies hallucinations  Associations:  loosening of associations present  Insight:  fair  Judgment:  limited  Oriented to:  time, person, and place  Attention Span and Concentration:  fair  Recent and Remote Memory:  fair  Language: Fluent in English with appropriate syntax and vocabulary.  Fund of Knowledge: appropriate      No results found for this or any previous visit (from the past 24 hour(s)).     Assessment & Plan   The patient is a 30-year-old male, reviously diagnosed with schizoaffective disorder, bipolar subtype, ADHD, sensory processing admitted on 12/01 to [facility] (transferred to [facility] on 12/10) with complaints of feeling restless, anxious, poor sleep and psychosis.  The patient recently was hospitalized on [facility] from 11/12 to 11/26 with complaints of agitation, irritability, not sleeping, tangential speech, disorganized thinking in the context of being titrated off olanzapine due to weight gain.  On admission on 12/01, he reported that he was pretty irritable, could not sleep well, has a lot of difficulties with focusing.       Psychiatric diagnoses:   - Schizoaffective disorder, bipolar type, lauren, severe with psychotic features    Hospital Course: Jose Farncisco Sommers was transferred from [facility] to [facility] as a voluntary patient. His PTA lithium and olanzapine were continued. Patient continued to " appear anxious and disorganized. Lamotrigine 25 mg started, first dose on 12/11. Per discussion with the patient and his mom, changes were made to his medication doses and schedule. Discontinued Lamotrigine 25 mg daily. Continued Seroquel 100 mg BID and change the evening dose to 200 mg on 12/12. Change Lithium CR 1200 mg at bedtime to Lithium  QAM and Lithium  mg at bedtime on Saturday, 12/12. Reduced Olanzapine 10 mg BID to 5 mg qam + 10mg at bedtime on 12/14, with increase to 100mg qam + 300mg at bedtime of seroquel on 12/14.      # Discontinued Medications (& Rationale):  - None     Today's Changes:   - decrease olanzapine to 5mg qam + 10mg at bedtime (from 10mg BID)  - increase seroquel to 100mg qam + 300mg at bedtime (from 100mg + 200mg)    Scheduled Medications:  - Lithium CR 1200 mg at bedtime   - Olanzapine 5mg qam + 10mg at bedtime   - Seroquel 100 mg qam + 300mg at bedtime   - propranolol 20mg TID (prescribed by medicine for HTN, with thought that it would also be beneficial for anxiety)  - Metformin  mg - for weight gain from antipsychotics     Medical course:   PTA medications were resumed. Patient continued to have high BP. Per medicine, discontinued Norvasc, started Propanolol 10 mg TID as will treat underlying anxiety as well as BP/HR. Hydralazine and Clonidine PRN are available PRN.     #Elevated BP without diagnosis of HTN -    - Discontinue Norvasc  - Propanolol 20 mg TID   - Hydralazine and Clonidine PRN     #Constipation   - Start Senna-Docusate 1-2 tabs BID and Miralax 17 g daily. Hold for loose stools.  - Notify Medicine if abdominal pain, n/v, or ongoing constipation     Consults:  - Medicine on 12/10 for HTN and constipation     Lab/Imaging:   CBC and CMP on 12/04 - unremarkable     Legal Status: Voluntary     Disposition: TBD, pending clinical stabilization, medication optimization, and formulation of a safe discharge plan. Remains in the hospital due to continued symptoms  of psychosis, medication titration to treat said symptoms.     Safety Assessment:   Behavioral Orders   Procedures     Assault precautions     Code 1 - Restrict to Unit     Discontinue 1:1 attendant for suicide risk     Order Specific Question:   I have performed an in person assessment of the patient     Answer:   Based on this assessment the patient no longer requires a one on one attendant at this point in time.     Elopement precautions     Routine Programming     As clinically indicated     Single Room     Status 15     Every 15 minutes.     Suicide precautions     Patients on Suicide Precautions should have a Combination Diet ordered that includes a Diet selection(s) AND a Behavioral Tray selection for Safe Tray - with utensils, or Safe Tray - NO utensils          Patient seen and discussed with my attending physician, Dr. Mihai Finn MD who is in agreement with my assessment and plan.    Josie Mtz MD  PGY-2    ATTENDING:  I, Mihai Finn, saw and evaluated the patient with the resident physician.  I agree with the findings and plan of care as documented in the resident note.  I have reviewed all labs and vital signs.

## 2020-12-14 NOTE — PROGRESS NOTES
Patient spent the evening pacing the hallway. Presents with full range affect and a calm mood. Patient is social with peers when approached. Patient showered this evening.

## 2020-12-14 NOTE — PROGRESS NOTES
"Brief Medicine Note    Today's vital signs were reviewed.     BP (!) 155/107   Pulse 84   Temp 97.5  F (36.4  C) (Tympanic)   Resp 16   Ht 1.842 m (6' 0.5\")   Wt 94.5 kg (208 lb 6.4 oz)   SpO2 97%   BMI 27.88 kg/m      #elevated blood pressure reading without diagnosis of HTN    -outpatient BP trends 130s-140s/80s.     -elevated BP during last 2 psychiatric hospitalizations as well    -HTN likely 2/2 psychiatric illness, less likely essential HTN    -trial of amlodipine 12/9 minimal improvement, propranolol started 12/10 and titrated to 20mg TID    Plan:  Continue propranolol, continue treatment of underlying anxiety, continue to utilize hydralazine prn per ordered parameters.    Keren Galaviz, MARIAH [facility]  Hospitalist Service      "

## 2020-12-15 PROCEDURE — 250N000013 HC RX MED GY IP 250 OP 250 PS 637: Performed by: STUDENT IN AN ORGANIZED HEALTH CARE EDUCATION/TRAINING PROGRAM

## 2020-12-15 PROCEDURE — H2032 ACTIVITY THERAPY, PER 15 MIN: HCPCS

## 2020-12-15 PROCEDURE — 250N000013 HC RX MED GY IP 250 OP 250 PS 637: Performed by: PSYCHIATRY & NEUROLOGY

## 2020-12-15 PROCEDURE — 99232 SBSQ HOSP IP/OBS MODERATE 35: CPT | Mod: GC | Performed by: PSYCHIATRY & NEUROLOGY

## 2020-12-15 PROCEDURE — 124N000002 HC R&B MH UMMC

## 2020-12-15 PROCEDURE — 250N000013 HC RX MED GY IP 250 OP 250 PS 637: Performed by: PHYSICIAN ASSISTANT

## 2020-12-15 RX ORDER — QUETIAPINE FUMARATE 200 MG/1
400 TABLET, FILM COATED ORAL AT BEDTIME
Status: DISCONTINUED | OUTPATIENT
Start: 2020-12-16 | End: 2020-12-17

## 2020-12-15 RX ADMIN — PROPRANOLOL HYDROCHLORIDE 20 MG: 10 TABLET ORAL at 09:35

## 2020-12-15 RX ADMIN — QUETIAPINE FUMARATE 100 MG: 100 TABLET ORAL at 09:35

## 2020-12-15 RX ADMIN — OLANZAPINE 10 MG: 10 TABLET, FILM COATED ORAL at 21:56

## 2020-12-15 RX ADMIN — POLYETHYLENE GLYCOL 3350 17 G: 17 POWDER, FOR SOLUTION ORAL at 09:35

## 2020-12-15 RX ADMIN — PROPRANOLOL HYDROCHLORIDE 20 MG: 10 TABLET ORAL at 21:55

## 2020-12-15 RX ADMIN — LITHIUM CARBONATE 900 MG: 450 TABLET, EXTENDED RELEASE ORAL at 21:56

## 2020-12-15 RX ADMIN — OLANZAPINE 5 MG: 5 TABLET, FILM COATED ORAL at 09:35

## 2020-12-15 RX ADMIN — NICOTINE 1 PATCH: 21 PATCH, EXTENDED RELEASE TRANSDERMAL at 09:37

## 2020-12-15 RX ADMIN — SENNOSIDES AND DOCUSATE SODIUM 2 TABLET: 8.6; 5 TABLET ORAL at 21:55

## 2020-12-15 RX ADMIN — PROPRANOLOL HYDROCHLORIDE 20 MG: 10 TABLET ORAL at 13:52

## 2020-12-15 RX ADMIN — QUETIAPINE 300 MG: 200 TABLET, FILM COATED ORAL at 21:56

## 2020-12-15 RX ADMIN — SENNOSIDES AND DOCUSATE SODIUM 2 TABLET: 8.6; 5 TABLET ORAL at 09:35

## 2020-12-15 RX ADMIN — METFORMIN HYDROCHLORIDE 500 MG: 500 TABLET, EXTENDED RELEASE ORAL at 17:41

## 2020-12-15 RX ADMIN — LITHIUM CARBONATE 300 MG: 300 TABLET, EXTENDED RELEASE ORAL at 09:35

## 2020-12-15 RX ADMIN — Medication 50 MG: at 00:13

## 2020-12-15 ASSESSMENT — ACTIVITIES OF DAILY LIVING (ADL)
LAUNDRY: WITH SUPERVISION
ORAL_HYGIENE: INDEPENDENT
DRESS: INDEPENDENT
ORAL_HYGIENE: INDEPENDENT
HYGIENE/GROOMING: INDEPENDENT
DRESS: INDEPENDENT;SCRUBS (BEHAVIORAL HEALTH)
HYGIENE/GROOMING: INDEPENDENT

## 2020-12-15 NOTE — PROGRESS NOTES
Manuel appeared to sleep for a total of 6.5 hours this night shift.  Showered and took Hydralazine for elevated BP (see VS flowsheet).  No snack given or requested.  Calm and cooperative.  Will continue to monitor BP, etc.

## 2020-12-15 NOTE — PROGRESS NOTES
Pt approached writer and requested to take a shower. Originally this writer turned him down as it was after unit hours, but after taken his vitals, pt had quite an elevated BP and did have an odor. This writer did agree to let the pt shower and retake his BP after.

## 2020-12-15 NOTE — TELEPHONE ENCOUNTER
Eloy, Dinh Haywood MD  You; Mihai Finn MD; Yuko Ventura MD; Jayda Ball MD; Evelin Jorge; Tahira Suh; Joel Tay MD 8 days ago     Hi all,     As it turns out, I'm free early Thursday afternoon Dec 17, so I can staff with Jayda then.  We should schedule further follow-ups Monday mornings when Jayda and I are both in clinic.     DB    Message text

## 2020-12-15 NOTE — PLAN OF CARE
Pt denied SI.  Pt walking up and down nur most of shift listening to his headphones.  Pt states his favorite music is rap favorite artist Martin Michelle.  Pt states feels good can see himself home with his parents. Pt polite and cooperative.

## 2020-12-15 NOTE — PLAN OF CARE
BEHAVIORAL TEAM DISCUSSION    Participants:   Dr. Finn, Dr. Shoemaker, Dr. Woods, Tamanna Hardy MA.TALIB, Adriana Boykin RN, Med students, Pharm student    Progress:  Patient has shown improvement in thought disorder symptoms .  He continues to focus on medications changes however accepts MD's recommendations.  Patient continues to pace, listen to music.  Attending some groups.    Anticipated length of stay:   3-4 days    Continued Stay Criteria/Rationale:   Medication mgmt, inability to care for self    Medical/Physical:   HTN    Precautions:   Behavioral Orders   Procedures     Assault precautions     Code 1 - Restrict to Unit     Discontinue 1:1 attendant for suicide risk     Order Specific Question:   I have performed an in person assessment of the patient     Answer:   Based on this assessment the patient no longer requires a one on one attendant at this point in time.     Elopement precautions     Routine Programming     As clinically indicated     Single Room     Status 15     Every 15 minutes.     Suicide precautions     Patients on Suicide Precautions should have a Combination Diet ordered that includes a Diet selection(s) AND a Behavioral Tray selection for Safe Tray - with utensils, or Safe Tray - NO utensils       Plan:   MD's continue to adjust medications.  Since patient so quickly bounced back, plan is to ensure stability prior to discharge.  Care continues to be coordinated with family.    Rationale for change in precautions or plan:   No change in plan/precautions

## 2020-12-15 NOTE — TELEPHONE ENCOUNTER
[facility] Call Center    Phone Message    May a detailed message be left on voicemail: yes     Reason for Call: Other: Pt mom calling back in regards to conversation on 12/4. Pt is back in the hospital and will not be discharged until next week. 12/17 appointment with Lizzy has been cancelled and not rescheduled. Pt mom wanted to follow up on the conversation from 12/4. She stated that she has not received any call with updates in regards to transfering care from  to . She would like an updated. She can be reached at either [phone] or [phone].     Action Taken: Other: Sent to Aakash Lyons Bassm Wick, Burd, Gabor, Brozac, Patel    Travel Screening: Not Applicable

## 2020-12-15 NOTE — PLAN OF CARE
Pt listening to head phones most of the shift.  Pt appeared to be spending more time in his room today.  Pt mood good, denied SI.

## 2020-12-15 NOTE — PROGRESS NOTES
"    ----------------------------------------------------------------------------------------------------------  [facility]  Psychiatric Progress Note  Hospital Day #14     Subjective     The patient's care was discussed with the treatment team and chart notes were reviewed.     Sleep: 6.5 hrs  PRN meds: No PRN taken  Scheduled meds: taken as prescribed    Per Staff Notes: No acute events overnight. Patient continues to have high BP (seen by medicine). Participated in groups. Listened to music with headphones.     Per Interview: Patient was interviewed via Zoom video call remotely due to COVID-19 precautions.  Manuel stated that he has been doing \"fine, and sleeping well the last few days.  He has been enjoying his hospital stay, listening to a lot of rap music and participating in group activities.  He has been to a few activities and has been \"very social here.\" He is scheduled for lithium level check tomorrow.  He does not want to go over 1200 mg of lithium dose.  He inquired about BuSpar, gabapentin and desoxyn.  The team informed him that there are no current plans to add another medication and will continue monitor his current treatment, and open to hearing his input regarding his treatment.  He agrees that his Seroquel dose will increase to 500 mg total tomorrow  (100 mg every morning and 400 mg at bedtime).  He mentioned an \"inflection\" in his voice that he does not like now. Denies any side effects from medications, eating well, sleeping well. Denies any physical complaints, apart from \"two left toes squished from walking a lot.\"     Phone Call w/irish Pa on 12/15: Mom was updated on pt's current medications.    The risks, benefits, alternatives, and side effects of treatments including no treatment have been discussed and are understood by the patient and other caregivers.    Review of systems:  Complete psychiatric ROS is negative unless otherwise noted above.      Objective " "    BP (!) 155/104 (BP Location: Left arm)   Pulse 82   Temp 98.3  F (36.8  C) (Tympanic)   Resp 16   Ht 1.842 m (6' 0.5\")   Wt 94.5 kg (208 lb 6.4 oz)   SpO2 97%   BMI 27.88 kg/m      Psychiatric Examination:   Appearance:  awake, alert, dressed in hospital scrubs and appeared as age stated  Muscle Strength and Tone: normal  Gait and Station: normal  Behavior (Psychomotor):  no evidence of tardive dyskinesia, dystonia, or tics  Eye Contact:  good  Speech:  clear, coherent and paucity of speech  Mood:  \"fine\"  Affect:  mood congruent  Attitude:  cooperative  Thought Process:  primarily linear, illogical at times, perseverative on topic of medications   Thought Content:  no evidence of suicidal ideation or homicidal ideation, denies hallucinations  Associations:  no loosening of associations  Insight:  fair  Judgment:  limited  Oriented to:  time, person, and place  Attention Span and Concentration:  fair  Recent and Remote Memory:  fair  Language: Fluent in English with appropriate syntax and vocabulary.  Fund of Knowledge: appropriate      No results found for this or any previous visit (from the past 24 hour(s)).     Assessment & Plan   The patient is a 30-year-old male, reviously diagnosed with schizoaffective disorder, bipolar subtype, ADHD, sensory processing admitted on 12/01 to [facility] (transferred to [facility] on 12/10) with complaints of feeling restless, anxious, poor sleep and psychosis.  The patient recently was hospitalized on [facility] from 11/12 to 11/26 with complaints of agitation, irritability, not sleeping, tangential speech, disorganized thinking in the context of being titrated off olanzapine due to weight gain.  On admission on 12/01, he reported that he was pretty irritable, could not sleep well, has a lot of difficulties with focusing.       Psychiatric diagnoses:   - Schizoaffective disorder, bipolar type, lauren, severe with psychotic features    Hospital Course: Jose Francisco Sommers " was transferred from [facility] to [facility] as a voluntary patient. His PTA lithium and olanzapine were continued. Patient continued to appear anxious and disorganized. Lamotrigine 25 mg started, first dose on 12/11. Per discussion with the patient and his mom, changes were made to his medication doses and schedule. Discontinued Lamotrigine 25 mg daily (pt did not receive any dose, discontinued on the same day it started). Continued Seroquel 100 mg BID and change the evening dose to 200 mg on 12/12. Change Lithium CR 1200 mg at bedtime to Lithium  QAM and Lithium  mg at bedtime on Saturday, 12/12. Reduced Olanzapine 10 mg BID to 5 mg qam + 10mg at bedtime on 12/14, with increase to 100mg qam + 300mg at bedtime of seroquel on 12/14 then to 100mg qam + 400mg at bedtime of seroquel on 12/16.    # Discontinued Medications (& Rationale):  - None     Today's Changes:   - Call mom to answer medication questions  - Increase Seroquel to 100mg qam + 400mg at bedtime on 12/16 (from 100mg + 300mg) - order placed  - Lithium level ordered for 12/16    Scheduled Medications:  - Lithium  mg QAM and 900 mg at bedtime    - Olanzapine 5mg qam + 10mg at bedtime   - Seroquel 100 mg qam + 300mg at bedtime   - propranolol 20mg TID (prescribed by medicine for HTN, with thought that it would also be beneficial for anxiety)  - Metformin  mg - for weight gain from antipsychotics     Medical course:   PTA medications were resumed. Patient continued to have high BP. Per medicine, discontinued Norvasc, started Propanolol 10 mg TID as will treat underlying anxiety as well as BP/HR. Hydralazine and Clonidine PRN are available PRN.     #Elevated BP without diagnosis of HTN -    - Discontinue Norvasc  - Propanolol 20 mg TID   - Hydralazine and Clonidine PRN     #Constipation   - Start Senna-Docusate 1-2 tabs BID and Miralax 17 g daily. Hold for loose stools.  - Notify Medicine if abdominal pain, n/v, or ongoing  constipation     Consults:  - Medicine on 12/10 for HTN and constipation   - Medicine follow up on 12/14:  Continue propranolol, continue treatment of underlying anxiety, continue to utilize hydralazine prn per ordered parameters.    Lab/Imaging:   CBC and CMP on 12/04 - unremarkable      11/12/2020 23:27 11/18/2020 07:40 11/25/2020 07:51 12/11/2020 07:26   Lithium Level 0.44 (L) 0.72 0.79 0.58 (L)       Legal Status: Voluntary     Disposition: TBD, pending clinical stabilization, medication optimization, and formulation of a safe discharge plan. Remains in the hospital due to continued symptoms of psychosis, medication titration to treat said symptoms.     Safety Assessment:   Behavioral Orders   Procedures     Assault precautions     Code 1 - Restrict to Unit     Discontinue 1:1 attendant for suicide risk     Order Specific Question:   I have performed an in person assessment of the patient     Answer:   Based on this assessment the patient no longer requires a one on one attendant at this point in time.     Elopement precautions     Routine Programming     As clinically indicated     Single Room     Status 15     Every 15 minutes.     Suicide precautions     Patients on Suicide Precautions should have a Combination Diet ordered that includes a Diet selection(s) AND a Behavioral Tray selection for Safe Tray - with utensils, or Safe Tray - NO utensils          Patient seen and discussed with my attending physician, Dr. Mihai Finn MD who is in agreement with my assessment and plan.    New Shoemaker MD  PGY-1 Psychiatry Resident    ATTENDING:  IMihai, saw and evaluated the patient with the resident physician.  I agree with the findings and plan of care as documented in the resident note.  I have reviewed all labs and vital signs.

## 2020-12-16 LAB — LITHIUM SERPL-SCNC: 0.84 MMOL/L (ref 0.6–1.2)

## 2020-12-16 PROCEDURE — 250N000013 HC RX MED GY IP 250 OP 250 PS 637: Performed by: STUDENT IN AN ORGANIZED HEALTH CARE EDUCATION/TRAINING PROGRAM

## 2020-12-16 PROCEDURE — 124N000002 HC R&B MH UMMC

## 2020-12-16 PROCEDURE — H2032 ACTIVITY THERAPY, PER 15 MIN: HCPCS

## 2020-12-16 PROCEDURE — 80178 ASSAY OF LITHIUM: CPT | Performed by: STUDENT IN AN ORGANIZED HEALTH CARE EDUCATION/TRAINING PROGRAM

## 2020-12-16 PROCEDURE — 250N000013 HC RX MED GY IP 250 OP 250 PS 637: Performed by: PSYCHIATRY & NEUROLOGY

## 2020-12-16 PROCEDURE — 36415 COLL VENOUS BLD VENIPUNCTURE: CPT | Performed by: STUDENT IN AN ORGANIZED HEALTH CARE EDUCATION/TRAINING PROGRAM

## 2020-12-16 PROCEDURE — 99232 SBSQ HOSP IP/OBS MODERATE 35: CPT | Mod: GC | Performed by: PSYCHIATRY & NEUROLOGY

## 2020-12-16 PROCEDURE — 250N000013 HC RX MED GY IP 250 OP 250 PS 637: Performed by: PHYSICIAN ASSISTANT

## 2020-12-16 RX ADMIN — METFORMIN HYDROCHLORIDE 500 MG: 500 TABLET, EXTENDED RELEASE ORAL at 17:59

## 2020-12-16 RX ADMIN — SENNOSIDES AND DOCUSATE SODIUM 2 TABLET: 8.6; 5 TABLET ORAL at 20:36

## 2020-12-16 RX ADMIN — QUETIAPINE FUMARATE 100 MG: 100 TABLET ORAL at 08:47

## 2020-12-16 RX ADMIN — QUETIAPINE 400 MG: 200 TABLET, FILM COATED ORAL at 20:37

## 2020-12-16 RX ADMIN — OLANZAPINE 5 MG: 5 TABLET, FILM COATED ORAL at 08:47

## 2020-12-16 RX ADMIN — PROPRANOLOL HYDROCHLORIDE 20 MG: 10 TABLET ORAL at 08:47

## 2020-12-16 RX ADMIN — PROPRANOLOL HYDROCHLORIDE 20 MG: 10 TABLET ORAL at 20:37

## 2020-12-16 RX ADMIN — LITHIUM CARBONATE 900 MG: 450 TABLET, EXTENDED RELEASE ORAL at 20:37

## 2020-12-16 RX ADMIN — LITHIUM CARBONATE 300 MG: 300 TABLET, EXTENDED RELEASE ORAL at 08:47

## 2020-12-16 RX ADMIN — PROPRANOLOL HYDROCHLORIDE 20 MG: 10 TABLET ORAL at 13:35

## 2020-12-16 RX ADMIN — POLYETHYLENE GLYCOL 3350 17 G: 17 POWDER, FOR SOLUTION ORAL at 08:47

## 2020-12-16 RX ADMIN — NICOTINE 1 PATCH: 21 PATCH, EXTENDED RELEASE TRANSDERMAL at 08:56

## 2020-12-16 RX ADMIN — SENNOSIDES AND DOCUSATE SODIUM 2 TABLET: 8.6; 5 TABLET ORAL at 08:47

## 2020-12-16 ASSESSMENT — ACTIVITIES OF DAILY LIVING (ADL)
HYGIENE/GROOMING: INDEPENDENT
DRESS: INDEPENDENT;SCRUBS (BEHAVIORAL HEALTH)
ORAL_HYGIENE: INDEPENDENT

## 2020-12-16 NOTE — PROGRESS NOTES
"Pt participated in dance/movement therapy (DMT) group initially exploring breath, awareness of the movement, where it moves in the body, length and rhythm.  Pt was able to use this as a basis for moving from that natural rhythm to explore \"lightness\" to not take oneself so seriously, and express true to the self while also functioning in relation to others in ways that take their rhythms and needs into consideration- the healthy dance of human relating.      Pt expressed feeling awkward, however, led creative expression that supported his own self-determination in the world and encouraged others.  He said he felt awkward using words as well, but the movement/dance was new to him (from last session and this one) and he demonstrated significant improvement in his comfort level.  He offered compliments to peers as well.       12/16/20 1015   Dance Movement Therapy   Type of Intervention structured groups   Response participates with encouragement   Hours 1       "

## 2020-12-16 NOTE — TELEPHONE ENCOUNTER
Plan per Dr. Ventura, Dr. Ball and Dr. Lyons:   -we plan to do a family session this Thurs 17th at the currently planned time   -clinic staff relays this to mom but does NOT address the long term care plan/ they simply state   Dr Lyons will be present for the visit     Writer placed a call to momEmber to relay the above plan. No answer at number provided. LVM, requesting a call back. Clinic number provided.

## 2020-12-16 NOTE — PLAN OF CARE
Pt in bed sleeping at start of shift. Breathing quiet and unlabored. Documented as having slept 6.75 hours during the night.     On Suicide, Assault, and Elopement precautions, in addition to Single Room order, with no related events occurring this shift.     Will continue to monitor and assess.       Problem: Adult Inpatient Plan of Care  Goal: Absence of Hospital-Acquired Illness or Injury  Outcome: Improving

## 2020-12-16 NOTE — PROGRESS NOTES
Pt's mood was calm and had full range of affect. He was pacing in the hallway. Pt told writer that he was restless for being here. He had trouble concentrate and had racing thoughts when he first arrived here. He stated the medication had helped him to decrease racing thoughts and improved his concentration. He stated he had no suicidal ideation or self-harm thoughts. He had no auditory or visual hallucination. Staff will continue to monitor pt per care plan.
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
## 2020-12-16 NOTE — PROGRESS NOTES
"er  ----------------------------------------------------------------------------------------------------------  [facility], [place]   Psychiatric Progress Note  Hospital Day #15     Interim History:   The patient's care was discussed with the treatment team and chart notes were reviewed.    Sleep 6.75 hours (12/16/20 0615)  Scheduled Medications: took all scheduled medications as prescribed   PRN medications: no psychiatric PRNs given     Staff Report:   Patient endorsed good mood and denied SI. He was observed listening to headphones and pacing halls most of shift. He  Reported to staff feeling restless from being hospitalized. He reports medications have decreased his racing thought and improved his concentration.     Patient Interview:   Jose Francisco Sommers was interviewed remotely via teleconference. Patient reported mood as \"fine\" and endorsed medications have been helpful. He denies any adverse side effects. Patient is aware of meeting with Dr. Ball and Dr. Lyons tomorrow. Patient asked about planned medication changes and team informed patient of plan to increase quetiapine to 400 mg tonight. He denies any pain or physical complaints. Patient asked about discharge planning and care team informed patient no firm date is set and care team will continue to coordinate outpatient plan after his visit tomorrow with Dr. Ball and Dr. Lyons is complete    The risks, benefits, alternatives and side effects of any medication changes have been discussed and are understood by the patient and other caregivers.    Review of systems:     ROS was negative unless noted above.          Allergies:     Allergies   Allergen Reactions     Sulfa Drugs Rash            Psychiatric Examination:   /83   Pulse 64   Temp 98  F (36.7  C) (Temporal)   Resp 17   Ht 1.842 m (6' 0.5\")   Wt 94.5 kg (208 lb 6.4 oz)   SpO2 99%   BMI 27.88 kg/m    Weight is 208 lbs 6.4 oz  Body mass index is 27.88 " "kg/m .    MENTAL STATUS EXAM    Appearance:  no apparent distress, normal posture, appropriately dressed and unkempt  Attitude:  cooperative and engaged  Psychomotor:  normal and no evidence of tics, dystonia, or tardive dyskinesia  Eye Contact: appropriate  Speech:  fluent English and flattened prosody  Mood: \"fine\"  Affect:  congruent and blunted  Thought Content: no evidence of suicidal ideation or psychosis  Thought Process: linear, coherent and goal directed  Sensorium: awake and alert  Cognition: memory grossly intact  Impulse control: fair  Insight: fair  Judgment: fair         Labs:     Results for orders placed or performed during the hospital encounter of 11/30/20 (from the past 24 hour(s))   Lithium level   Result Value Ref Range    Lithium Level 0.84 0.60 - 1.20 mmol/L        Assessment    Principal Diagnosis:   # Schizoaffective disorder, bipolar type, lauren, severe with psychotic features    Secondary psychiatric diagnoses of concern this admission:   - none    Diagnostic Impression:   The patient is a 30-year-old male, reviously diagnosed with schizoaffective disorder, bipolar subtype, ADHD, sensory processing admitted on 12/01 to [facility] (transferred to [facility] on 12/10) with complaints of feeling restless, anxious, poor sleep and psychosis.  The patient recently was hospitalized on [facility] from 11/12 to 11/26 with complaints of agitation, irritability, not sleeping, tangential speech, disorganized thinking in the context of being titrated off olanzapine due to weight gain.  On admission on 12/01, he reported that he was pretty irritable, could not sleep well, has a lot of difficulties with focusing.     Psychiatric Hospital course:   Jose Francisco Sommers was transferred from [facility] to [facility] as a voluntary patient. His PTA lithium and olanzapine were continued. Patient continued to appear anxious and disorganized. Lamotrigine 25 mg started, first dose on 12/11. Per discussion with the " patient and his mom, changes were made to his medication doses and schedule. Discontinued Lamotrigine 25 mg daily (pt did not receive any dose, discontinued on the same day it started). Continued Seroquel 100 mg BID and change the evening dose to 200 mg on 12/12. Change Lithium CR 1200 mg at bedtime to Lithium  QAM and Lithium  mg at bedtime on Saturday, 12/12. Reduced Olanzapine 10 mg BID to 5 mg qam + 10mg at bedtime on 12/14, with increase to 100mg qam + 300mg at bedtime of seroquel on 12/14 then to 100mg qam + 400mg at bedtime of seroquel on 12/16.    Discontinued Medications (& Rationale):  - None    Medical course   PTA medications were resumed. Patient continued to have high BP. Per medicine, discontinued Norvasc, started Propanolol 10 mg TID as will treat underlying anxiety as well as BP/HR. Hydralazine and Clonidine PRN are available PRN.     Data:   CBC and CMP on 12/04 - unremarkable        11/12/2020 23:27 11/18/2020 07:40 11/25/2020 07:51 12/11/2020 07:26   Lithium Level 0.44 (L) 0.72 0.79 0.58 (L)     Consults:   - Medicine on 12/10 for HTN and constipation   - Medicine follow up on 12/14:  Continue propranolol, continue treatment of underlying anxiety, continue to utilize hydralazine prn per ordered parameters.    Plan     Today's Changes:  - Increase quetiapine to 400 mg tonight    Psychotropic Medications:  Scheduled Psych Medications:  - Lithium  mg QAM and 900 mg at bedtime    - Olanzapine 5mg qam + 10mg at bedtime   - Seroquel 100 mg qam +  400 mg at bedtime   - propranolol 20mg TID (prescribed by medicine for HTN, with thought that it would also be beneficial for anxiety)  - Metformin  mg - for weight gain from antipsychotics     PRN Psych Medications  - Hydroxyzine 25 mg PRN Q4H  - Olanzapine 10 mg PO/IM prn Q2H severe agitation/psychosis  - Quetiapine 50 mg QID PRN for agitation  - Trazodone 50 mg at bedtime PRN    Patient will be treated in therapeutic milieu with  appropriate individual and group therapies as described.    Medical diagnoses to be addressed this admission:    #Elevated BP without diagnosis of HTN -    - Discontinue Norvasc  - Propanolol 20 mg TID   - Hydralazine and Clonidine PRN     #Constipation   - Start Senna-Docusate 1-2 tabs BID and Miralax 17 g daily. Hold for loose stools.  - Notify Medicine if abdominal pain, n/v, or ongoing constipation    Legal Status:   Orders Placed This Encounter      Legal status Voluntary      Safety Assessment:   Behavioral Orders   Procedures     Assault precautions     Code 1 - Restrict to Unit     Discontinue 1:1 attendant for suicide risk     Order Specific Question:   I have performed an in person assessment of the patient     Answer:   Based on this assessment the patient no longer requires a one on one attendant at this point in time.     Elopement precautions     Routine Programming     As clinically indicated     Single Room     Status 15     Every 15 minutes.     Suicide precautions     Patients on Suicide Precautions should have a Combination Diet ordered that includes a Diet selection(s) AND a Behavioral Tray selection for Safe Tray - with utensils, or Safe Tray - NO utensils         Disposition:  Pending stabilization & development of a safe discharge plan.     The patient was seen and the plan was discussed with the attending physician.     This patient was seen and discussed with my attending physician.  Ray Burgos MD  PGY-2 Psychiatry Resident Physician    Psychiatry Attending Attestation:   Attestation:  This patient has been seen and evaluated by me, Neida Huynh MD.  I have discussed this patient with the psychiatry resident and I agree with the findings and plan in this note.    I have reviewed today's vital signs, medications, labs and imaging. Neida Huynh MD

## 2020-12-16 NOTE — TELEPHONE ENCOUNTER
Message  Received: Today  Message Contents   Jayda Ball MD Patel, Radhika, RN; Yuko Ventura MD   Cc: Dinh Lyons MD; Joel Tay MD; Ema Tinsley, PhD LP; Tahira Suh; Tara Lawson; 1 other   Caller: Unspecified (1 week ago)             Kavin Madrigal,     Mom did cancel the appointment but Tamanna Hardy, the inpatient , discussed plan with mom today.  Dr. Lyons and I are all set to meet with patient via Zoom tomorrow at 1:30pm.  Nothing more for you to do!     Thank you for your help!   Jayda

## 2020-12-17 ENCOUNTER — TELEPHONE (OUTPATIENT)
Dept: PSYCHIATRY | Facility: CLINIC | Age: 30
End: 2020-12-17

## 2020-12-17 PROCEDURE — 90853 GROUP PSYCHOTHERAPY: CPT

## 2020-12-17 PROCEDURE — 99231 SBSQ HOSP IP/OBS SF/LOW 25: CPT | Mod: GC | Performed by: PSYCHIATRY & NEUROLOGY

## 2020-12-17 PROCEDURE — 250N000013 HC RX MED GY IP 250 OP 250 PS 637: Performed by: STUDENT IN AN ORGANIZED HEALTH CARE EDUCATION/TRAINING PROGRAM

## 2020-12-17 PROCEDURE — G0177 OPPS/PHP; TRAIN & EDUC SERV: HCPCS

## 2020-12-17 PROCEDURE — 250N000013 HC RX MED GY IP 250 OP 250 PS 637: Performed by: PHYSICIAN ASSISTANT

## 2020-12-17 PROCEDURE — 250N000013 HC RX MED GY IP 250 OP 250 PS 637: Performed by: PSYCHIATRY & NEUROLOGY

## 2020-12-17 PROCEDURE — 124N000002 HC R&B MH UMMC

## 2020-12-17 RX ORDER — OLANZAPINE 5 MG/1
5 TABLET ORAL AT BEDTIME
Status: DISCONTINUED | OUTPATIENT
Start: 2020-12-17 | End: 2020-12-18

## 2020-12-17 RX ADMIN — METFORMIN HYDROCHLORIDE 500 MG: 500 TABLET, EXTENDED RELEASE ORAL at 21:55

## 2020-12-17 RX ADMIN — LITHIUM CARBONATE 900 MG: 450 TABLET, EXTENDED RELEASE ORAL at 21:53

## 2020-12-17 RX ADMIN — NICOTINE 1 PATCH: 21 PATCH, EXTENDED RELEASE TRANSDERMAL at 09:05

## 2020-12-17 RX ADMIN — PROPRANOLOL HYDROCHLORIDE 20 MG: 10 TABLET ORAL at 08:58

## 2020-12-17 RX ADMIN — LITHIUM CARBONATE 300 MG: 300 TABLET, EXTENDED RELEASE ORAL at 08:58

## 2020-12-17 RX ADMIN — QUETIAPINE FUMARATE 100 MG: 100 TABLET ORAL at 08:58

## 2020-12-17 RX ADMIN — PROPRANOLOL HYDROCHLORIDE 20 MG: 10 TABLET ORAL at 14:07

## 2020-12-17 RX ADMIN — QUETIAPINE 500 MG: 200 TABLET, FILM COATED ORAL at 21:52

## 2020-12-17 RX ADMIN — OLANZAPINE 5 MG: 5 TABLET, FILM COATED ORAL at 21:54

## 2020-12-17 RX ADMIN — PROPRANOLOL HYDROCHLORIDE 20 MG: 10 TABLET ORAL at 21:52

## 2020-12-17 RX ADMIN — OLANZAPINE 5 MG: 5 TABLET, FILM COATED ORAL at 08:58

## 2020-12-17 RX ADMIN — POLYETHYLENE GLYCOL 3350 17 G: 17 POWDER, FOR SOLUTION ORAL at 08:58

## 2020-12-17 RX ADMIN — SENNOSIDES AND DOCUSATE SODIUM 2 TABLET: 8.6; 5 TABLET ORAL at 08:58

## 2020-12-17 RX ADMIN — SENNOSIDES AND DOCUSATE SODIUM 2 TABLET: 8.6; 5 TABLET ORAL at 21:54

## 2020-12-17 ASSESSMENT — ACTIVITIES OF DAILY LIVING (ADL)
DRESS: INDEPENDENT
ORAL_HYGIENE: INDEPENDENT
HYGIENE/GROOMING: INDEPENDENT

## 2020-12-17 ASSESSMENT — MIFFLIN-ST. JEOR: SCORE: 1965.31

## 2020-12-17 NOTE — TELEPHONE ENCOUNTER
On 12/17/2020 the patient signed an IVA authorizing medical records to be released from [facility] to Ember Sommers for the purpose of continuing care. I faxed the request to our medical records department at [phone]. I sent the original to IVA to scanning and kept a copy in psychiatry until scanning is complete.  Betsy Jo CMA    On 12/17/2020 the patient signed a PHI authorizing person to person communication with Ember Sommers for scheduling, medical, and billing information.  I sent this document to scanning on 12/17/2020 and kept a copy in Psychiatry until scanning is confirmed. Vijaya Jo CMA

## 2020-12-17 NOTE — PLAN OF CARE
Daily CTC Note:     Work Completed:   The patient's care was discussed with the treatment team and chart notes were reviewed.   Patient met with team.  Meds continue to be adjusted per MD's. Discharge was discussed with goal for early next week.  Meeting is scheduled today with Dr. Lyosn and Dr. Ball.  Care continues to be coordinated with Mom.    Discharge plan or goal:   Return home with family when stable     Barriers to discharge:   Medication adjustments - likely discharge early next week

## 2020-12-17 NOTE — PLAN OF CARE
Pt walking nur listening to music.  Pt seen walking with another patient and talking.  Pt wanting to know what meditations ordered for this evening. Pt asked this staff about coming up with a backup plan for if he isn't discharged early next week.

## 2020-12-17 NOTE — PROGRESS NOTES
Behavioral Health  Note   Behavioral Health  Spirituality Group Note     [facility]    Name: Jose Francisco Sommers    YOB: 1990   MRN: [id number]    Age: 30 year old     Patient attended -led group, which included discussion of spirituality, coping with illness and building resilience.   Patient attended group for 1.0 hrs.   The patient actively participated in group discussion     Priscillamsashley [facility]  Staff    Page [phone]

## 2020-12-17 NOTE — PROGRESS NOTES
"Pt was up 1 time checking on the clock. Woke up early at about 0500 AM and when asked how his sleep was, he answered \" it was fine\". Pleasant when approached. No snack or PRNs given this shift. Total sleep hous: 5.5 hours.   "

## 2020-12-17 NOTE — TELEPHONE ENCOUNTER
Pt request     Betsy Jo CMA  You 2 minutes ago (8:57 AM)     Faxed    Message text      IVA faxed to [facility] for patient to sign to release information to momEmber. Will wait for completed IVA to be returned.

## 2020-12-17 NOTE — PLAN OF CARE
Pt was more isolative to his room this evening shift than previous observed. He was not observed pacing the hallway as much this evening. He was in his room pacing back and forth with headphones on. He interacted very minimally with other patients and staff. When he did interact with staff, he was pleasant and appropriate. He did disclose feeling some anxiety regarding some medication adjustments and discharge planning but still felt ready to leave the hospital setting soon.     Pt denied any SI/SIB. Denied any AH/VH. Did not indicate any delusional thought content.

## 2020-12-17 NOTE — PROGRESS NOTES
Brief Medicine Note    Medicine following peripherally for BP monitoring. Current /83. Has had intermittent elevated BP to , and other occasions . On Propranolol 20mg TID. Has not required hydralazine since 12/15.     A:P:  # Elevated blood pressure reading without diagnosis of HTN:   Outpatient BP trends 130s-140s/80s. Noted patient has had elevated BP during last 2 psychiatric hospitalizations as well. HTN likely 2/2 psychiatric illness, less likely essential HTN. Trial of amlodipine 12/9 minimal improvement, propranolol started 12/10 and titrated to 20mg TID. Has not required Hydralazine since 12/15.   - Continue Propranolol 20mg TID   - Continue Hydralazine prn per ordered parameters  - Continue to monitor BP - holding any acute changes for now   - Psychiatry to continue to management underlying anxiety/psychiatric illness     Medicine will continue to follow BP peripherally. Please page with any additional questions or concerns.     Erendira Potter PA-C  Hospitalist Service  Pager [phone]

## 2020-12-17 NOTE — PROGRESS NOTES
[facility]  Occupational Therapy Behavioral Health Assessment    Patient Name: Jose Francisco Sommers    Description: OT staff met with pt to review the role of occupational therapy and explain the value of having them involved in their treatment plan including options to meet current needs/self-identified goals. The below evaluation is a compilation of functional performance observation within 2 unstructured Clinic groups and information obtained from an OT self-assessment.     Clinical impression through direct observation within 2 Clinic groups:    12/17/20 1025   Clinical Impression   Affect Appropriate to situation   Orientation Oriented to person, place and time   Appearance and ADLs General cleanliness observed in most areas   Attention to Internal Stimuli No observed signs   Interaction Skills Interacts appropriately with staff   Ability to Communicate Needs Independent   Verbal Content Clear   Ability to Maintain Boundaries Maintains appropriate physical boundaries;Maintains appropriate verbal boundaries   Participation Initiates participation   Concentration Concentrates 20-30 minutes   Ability to Concentrate With structure   Follows and Comprehends Directions Needs further assessment   Memory Needs further assessment   Organization Independently organizes simple tasks   Decision Making Independent   Planning and Problem Solving Needs further assessment   Ability to Apply and Learn Concepts Needs further assessment       Patient indicated success in: (Bolded items indicate activities the pt selected)   Time spent with family or friends  Relaxing and enjoying myself  Having a satisfying routine  Work or volunteering  Concentrating on my tasks     Living independently  Taking care of the place where I live  Transportation  Managing my finances  Managing my basic needs (food, medicine, sleep)  Learning new things  Ability to pursue my goals  Other:    Patient indicated barriers to success are: (Bolded items  "indicate activities the pt selected)   Difficulty concentrating  Memory problems  Physical health/Chronic Pain  Lacks support  Motivation/mood  Finances  Using drugs or alcohol  Sleeping too much or not enough  Missing work/appointments  Bothered by lights or sounds in the environment  Bothered by touch, texture, or movement  Lack of satisfying daily routine   Living environment  Transportation  Other:          Patient indicated these supports: (Bolded items indicate activities the pt selected)   Safe place to live  Family, friends or caregivers to help out when needed  A best friend or significant other  Pets  Belief in myself and abilities  Routines and rituals that support my wellness  Access to email, social media, phone calls  Leisure supplies to support my interests and hobbies  Professionals: , Therapist, etc.  Other:      Patient identified these emotional, physical or mental health concerns: difficulty concentrating    Patient caryl with these concerns: smoking    Patient enjoys spending time with: parents    Identified enjoyment in activities: walking    Stressors or changes in the past year: \"Quality of life is worse\"    Patient identified values: \"Freedom and fair treatment. [place] with respect\"    Patient's goal for the future is \"A fine life\"    Goals selected by patient to work on with OT: (Bolded items indicate activities the pt selected)   Have hope for the future  Learn ways to stay well and avoid coming to the hospital  Share my thoughts and feelings  Feel more confident  Improve my sleep  Improve my concentration  Relax and enjoy myself  Improve my relationships with others  Handle my frustrations  Develop a satisfying daily routine  Manage my physical pain  Explore use of sensory coping strategies  Other:     Assessment: Patient would benefit from continue engagement in OT groups that support healthy recovery, specifically exploration of positive coping skills for symptom " management/relapse prevention.      Plan: Initiate care plan goals and interventions.    Patient participated in goal(s) selection and understands the plan of care: Yes    IP OT Goal: Pt will demonstrate consistent engagement in OT group activities that support recovery as evidenced by participating in >1 scheduled OT group/day for 5 days.     Plan for Next Treatment: Provide graded occupation-based activities for increased success and ongoing assessment.     Sheila Dobson, OT on 12/17/2020 at 10:27 AM

## 2020-12-17 NOTE — PROGRESS NOTES
"  ----------------------------------------------------------------------------------------------------------  [facility], [place]   Psychiatric Progress Note  Hospital Day #16     Interim History:   The patient's care was discussed with the treatment team and chart notes were reviewed.    Sleep 5.5 hours (12/17/20 0657)  Scheduled Medications: took all scheduled medications as prescribed   PRN medications: no psychiatric PRNs given     Staff Report:    Patient participated in groups and was engaged and appropriate during activities. Patient woke earlier than usual at 0500 and reported sleep was \"fine\". He appears isolative but is pleasant and cooperative with staff.    Patient Interview:   Jose Francisco Sommers was interviewed remotely via teleconference. Patient reports waking up early this morning but reports he does not know why. He denies any hallucinations and reports his mood is \"okay\". He denies racing thought and is amenable to planned medication changes and a goal for discharge early next week. He expresses \"I don't feel like myself in here\" and \"I'm worried I'm kind of offensive\". Patient was offered reassurance and responded positively.     The risks, benefits, alternatives and side effects of any medication changes have been discussed and are understood by the patient and other caregivers.    Review of systems:     ROS was negative unless noted above.          Allergies:     Allergies   Allergen Reactions     Sulfa Drugs Rash            Psychiatric Examination:   BP (!) 145/88   Pulse 88   Temp 98  F (36.7  C) (Oral)   Resp 18   Ht 1.842 m (6' 0.5\")   Wt 95.4 kg (210 lb 6.4 oz)   SpO2 99%   BMI 28.14 kg/m    Weight is 210 lbs 6.4 oz  Body mass index is 28.14 kg/m .    MENTAL STATUS EXAM    Appearance:  no apparent distress, normal posture, obese and casually dressed  Attitude:  cooperative, engaged and pleasant  Psychomotor:  normal and no evidence of tics, dystonia, or " "tardive dyskinesia  Eye Contact: appropriate  Speech:  fluent English and flattened prosody  Mood: \"okay\"  Affect:  congruent and blunted  Thought Content: denies suicidal ideation and ruminations  Thought Process: coherent, goal directed and ruminative  Sensorium: awake, alert, denies auditory hallucinations and denies visual hallucinations  Cognition: memory grossly intact  Impulse control: good  Insight: questionable  Judgment: fair         Labs:   No results found for this or any previous visit (from the past 24 hour(s)).     Assessment    Principal Diagnosis:   # Schizoaffective disorder, bipolar type, lauren, severe with psychotic features     Secondary psychiatric diagnoses of concern this admission:   - none     Diagnostic Impression:   The patient is a 30-year-old male, reviously diagnosed with schizoaffective disorder, bipolar subtype, ADHD, sensory processing admitted on 12/01 to [facility] (transferred to [facility] on 12/10) with complaints of feeling restless, anxious, poor sleep and psychosis.  The patient recently was hospitalized on [facility] from 11/12 to 11/26 with complaints of agitation, irritability, not sleeping, tangential speech, disorganized thinking in the context of being titrated off olanzapine due to weight gain.  On admission on 12/01, he reported that he was pretty irritable, could not sleep well, has a lot of difficulties with focusing.      Psychiatric Hospital course:   Jose Francisco Sommers was transferred from [facility] to [facility] as a voluntary patient. His PTA lithium and olanzapine were continued. Patient continued to appear anxious and disorganized. Lamotrigine 25 mg started, first dose on 12/11. Per discussion with the patient and his mom, changes were made to his medication doses and schedule. Discontinued Lamotrigine 25 mg daily (pt did not receive any dose, discontinued on the same day it started). Continued Seroquel 100 mg BID and change the evening dose to 200 mg on " 12/12. Change Lithium CR 1200 mg at bedtime to Lithium  QAM and Lithium  mg at bedtime on Saturday, 12/12. Reduced Olanzapine 10 mg BID to 5 mg qam + 10mg at bedtime on 12/14, with increase to 100mg qam + 300mg at bedtime of seroquel on 12/14 then to 100mg qam + 400mg at bedtime of seroquel on 12/16. On 12/17/2020: Increased at bedtime quetiapine to 500 mg and decreased at bedtime olanzapine to 5 mg.     Discontinued Medications (& Rationale):  - None     Medical course   PTA medications were resumed. Patient continued to have high BP. Per medicine, discontinued Norvasc, started Propanolol 10 mg TID as will treat underlying anxiety as well as BP/HR. Hydralazine and Clonidine PRN are available PRN.      Data:   CBC and CMP on 12/04 - unremarkable        11/12/2020 23:27 11/18/2020 07:40 11/25/2020 07:51 12/11/2020 07:26   Lithium Level 0.44 (L) 0.72 0.79 0.58 (L)      Consults:   - Medicine on 12/10 for HTN and constipation   - Medicine follow up on 12/14:  Continue propranolol, continue treatment of underlying anxiety, continue to utilize hydralazine prn per ordered parameters.     Plan      Today's Changes:  - Increase quetiapine to 500 mg tonight  - Decrease olanzapine at bedtime to 5 mg tonight     Psychotropic Medications:  Scheduled Psych Medications:  - Lithium  mg QAM and 900 mg at bedtime    - Olanzapine 5mg qam + 5 mg at bedtime   - Seroquel 100 mg qam +  500 mg at bedtime   - propranolol 20mg TID (prescribed by medicine for HTN, with thought that it would also be beneficial for anxiety)  - Metformin  mg - for weight gain from antipsychotics      PRN Psych Medications  - Hydroxyzine 25 mg PRN Q4H  - Olanzapine 10 mg PO/IM prn Q2H severe agitation/psychosis  - Quetiapine 50 mg QID PRN for agitation  - Trazodone 50 mg at bedtime PRN     Patient will be treated in therapeutic milieu with appropriate individual and group therapies as described.     Medical diagnoses to be addressed this  admission:    #Elevated BP without diagnosis of HTN -    - Discontinue Norvasc  - Propanolol 20 mg TID   - Hydralazine and Clonidine PRN     #Constipation   - Start Senna-Docusate 1-2 tabs BID and Miralax 17 g daily. Hold for loose stools.  - Notify Medicine if abdominal pain, n/v, or ongoing constipation    Legal Status:   Orders Placed This Encounter      Legal status Voluntary      Safety Assessment:   Behavioral Orders   Procedures     Assault precautions     Code 1 - Restrict to Unit     Discontinue 1:1 attendant for suicide risk     Order Specific Question:   I have performed an in person assessment of the patient     Answer:   Based on this assessment the patient no longer requires a one on one attendant at this point in time.     Elopement precautions     Routine Programming     As clinically indicated     Single Room     Status 15     Every 15 minutes.     Suicide precautions     Patients on Suicide Precautions should have a Combination Diet ordered that includes a Diet selection(s) AND a Behavioral Tray selection for Safe Tray - with utensils, or Safe Tray - NO utensils         Disposition:  Pending stabilization & development of a safe discharge plan.     The patient was seen and the plan was discussed with the attending physician.     This patient was seen and discussed with my attending physician.  Ray Burgos MD  PGY-2 Psychiatry Resident Physician    Psychiatry Attending Attestation:  I, Mihai Finn, saw and evaluated the patient with the resident physician.  I agree with the findings and plan of care as documented in the resident note.  I have reviewed all labs and vital signs.

## 2020-12-18 PROCEDURE — 250N000013 HC RX MED GY IP 250 OP 250 PS 637: Performed by: PSYCHIATRY & NEUROLOGY

## 2020-12-18 PROCEDURE — 250N000013 HC RX MED GY IP 250 OP 250 PS 637: Performed by: STUDENT IN AN ORGANIZED HEALTH CARE EDUCATION/TRAINING PROGRAM

## 2020-12-18 PROCEDURE — 124N000002 HC R&B MH UMMC

## 2020-12-18 PROCEDURE — 99231 SBSQ HOSP IP/OBS SF/LOW 25: CPT | Mod: GC | Performed by: PSYCHIATRY & NEUROLOGY

## 2020-12-18 PROCEDURE — 250N000013 HC RX MED GY IP 250 OP 250 PS 637: Performed by: PHYSICIAN ASSISTANT

## 2020-12-18 RX ORDER — OLANZAPINE 5 MG/1
5 TABLET ORAL EVERY MORNING
Status: DISCONTINUED | OUTPATIENT
Start: 2020-12-19 | End: 2020-12-21 | Stop reason: HOSPADM

## 2020-12-18 RX ADMIN — LITHIUM CARBONATE 300 MG: 300 TABLET, EXTENDED RELEASE ORAL at 09:11

## 2020-12-18 RX ADMIN — NICOTINE 1 PATCH: 21 PATCH, EXTENDED RELEASE TRANSDERMAL at 09:11

## 2020-12-18 RX ADMIN — SENNOSIDES AND DOCUSATE SODIUM 2 TABLET: 8.6; 5 TABLET ORAL at 09:11

## 2020-12-18 RX ADMIN — QUETIAPINE 500 MG: 200 TABLET, FILM COATED ORAL at 21:46

## 2020-12-18 RX ADMIN — PROPRANOLOL HYDROCHLORIDE 20 MG: 10 TABLET ORAL at 14:44

## 2020-12-18 RX ADMIN — PROPRANOLOL HYDROCHLORIDE 20 MG: 10 TABLET ORAL at 09:10

## 2020-12-18 RX ADMIN — PROPRANOLOL HYDROCHLORIDE 20 MG: 10 TABLET ORAL at 21:46

## 2020-12-18 RX ADMIN — SENNOSIDES AND DOCUSATE SODIUM 2 TABLET: 8.6; 5 TABLET ORAL at 21:47

## 2020-12-18 RX ADMIN — LITHIUM CARBONATE 900 MG: 450 TABLET, EXTENDED RELEASE ORAL at 21:46

## 2020-12-18 RX ADMIN — METFORMIN HYDROCHLORIDE 500 MG: 500 TABLET, EXTENDED RELEASE ORAL at 18:32

## 2020-12-18 RX ADMIN — QUETIAPINE FUMARATE 100 MG: 100 TABLET ORAL at 09:10

## 2020-12-18 ASSESSMENT — ACTIVITIES OF DAILY LIVING (ADL)
ORAL_HYGIENE: INDEPENDENT
DRESS: INDEPENDENT
HYGIENE/GROOMING: INDEPENDENT

## 2020-12-18 NOTE — PROGRESS NOTES
"  ----------------------------------------------------------------------------------------------------------  [facility], [place]   Psychiatric Progress Note  Hospital Day #17     Interim History:   The patient's care was discussed with the treatment team and chart notes were reviewed.    Sleep 7 hours (12/18/20 0600)  Scheduled Medications: took all scheduled medications as prescribed, med compliant  PRN medications: no psychiatric PRNs given     Staff Report:    Pt was out in the milieu. He denied any SI, anxiety or A/V hallucinations. He was bright and  social with staff. He paced the halls. Participated in groups \"Pt participated and was pleasant, cooperative and engaged.  He reported he was trying to do basic things and not be hard on himself. He enjoyed a conversation with mom today he reported. He was kind and supportive to peers.\" Staff also reported that Martin sometimes looked through you while talking to you.    Patient Interview:   Jose Francisco Sommers was interviewed remotely via teleconference.  He denies any hallucinations and reports his mood is \"medicated\" \" not too high or not too low\". . He would like to decrease olanzapine a little more. Patient denied any problems in the unit. Denied suicidal thoughts. Patient endorsed that just being in the unit is a little depressive, expressed his wishes to be with family during [place]. Patient was reassured that it is okay to express his thoughts and feelings about discharge planning. He met with Dr. Jonas yesterday. Martin liked Dr. Jonas a lot, felt that Dr. Jonas understood him. \" I can tell I have bipolar disorder\"      The risks, benefits, alternatives and side effects of any medication changes have been discussed and are understood by the patient and other caregivers.    Review of systems:     ROS was negative unless noted above.          Allergies:     Allergies   Allergen Reactions     Sulfa Drugs Rash            Psychiatric " "Examination:   BP (!) 148/96   Pulse 64   Temp 98.2  F (36.8  C) (Oral)   Resp 16   Ht 1.842 m (6' 0.5\")   Wt 95.4 kg (210 lb 6.4 oz)   SpO2 97%   BMI 28.14 kg/m    Weight is 210 lbs 6.4 oz  Body mass index is 28.14 kg/m .    MENTAL STATUS EXAM    Appearance:  no apparent distress, normal posture, obese and casually dressed  Attitude:  cooperative, engaged and pleasant  Psychomotor:  normal and no evidence of tics, dystonia, or tardive dyskinesia  Eye Contact: appropriate  Speech:  fluent English and flattened prosody  Mood: \"not too high not too low, medicated\"  Affect:  congruent and blunted  Thought Content: denies suicidal ideation and ruminations  Thought Process: coherent, goal directed and ruminative  Sensorium: awake, alert, denies auditory hallucinations and denies visual hallucinations  Cognition: memory grossly intact  Impulse control: good  Insight: questionable  Judgment: fair         Labs:   No results found for this or any previous visit (from the past 24 hour(s)).     Assessment    Principal Diagnosis:   # Schizoaffective disorder, bipolar type, lauren, severe with psychotic features     Secondary psychiatric diagnoses of concern this admission:   - none     Diagnostic Impression:   The patient is a 30-year-old male, reviously diagnosed with schizoaffective disorder, bipolar subtype, ADHD, sensory processing admitted on 12/01 to [facility] (transferred to [facility] on 12/10) with complaints of feeling restless, anxious, poor sleep and psychosis.  The patient recently was hospitalized on [facility] 20 from 11/12 to 11/26 with complaints of agitation, irritability, not sleeping, tangential speech, disorganized thinking in the context of being titrated off olanzapine due to weight gain.  On admission on 12/01, he reported that he was pretty irritable, could not sleep well, has a lot of difficulties with focusing.      Psychiatric Hospital course:   Jose Francisco Sommers was transferred from [facility] 10 to " [facility] 20 as a voluntary patient. His PTA lithium and olanzapine were continued. Patient continued to appear anxious and disorganized. Lamotrigine 25 mg started, first dose on 12/11. Per discussion with the patient and his mom, changes were made to his medication doses and schedule. Discontinued Lamotrigine 25 mg daily (pt did not receive any dose, discontinued on the same day it started). Continued Seroquel 100 mg BID and change the evening dose to 200 mg on 12/12. Change Lithium CR 1200 mg at bedtime to Lithium  QAM and Lithium  mg at bedtime on Saturday, 12/12. Reduced Olanzapine 10 mg BID to 5 mg qam + 10mg at bedtime on 12/14, with increase to 100mg qam + 300mg at bedtime of seroquel on 12/14 then to 100mg qam + 400mg at bedtime of seroquel on 12/16. On 12/17/2020: Increased at bedtime quetiapine to 500 mg and decreased at bedtime olanzapine to 5 mg. On 12/18/2020 no change in quetiapine dose, change olanzapine to 5 mg QDay, every morning.     Discontinued Medications (& Rationale):  - changed olanzapine form BID to QDay - pt feels medicated.     Medical course   PTA medications were resumed. Patient continued to have high BP. Per medicine, discontinued Norvasc, started Propanolol 10 mg TID as will treat underlying anxiety as well as BP/HR. Hydralazine and Clonidine PRN are available PRN.     Outpatient BP trends 130s-140s/80s. Noted patient has had elevated BP during last 2 psychiatric hospitalizations as well. HTN likely 2/2 psychiatric illness, less likely essential HTN. Trial of amlodipine 12/9 minimal improvement, propranolol started 12/10 and titrated to 20mg TID. Has not required Hydralazine since 12/15.   - Continue Propranolol 20mg TID   - Continue Hydralazine prn per ordered parameters  - Continue to monitor BP - holding any acute changes for now   - Psychiatry to continue to management underlying anxiety/psychiatric illness     Data:   CBC and CMP on 12/04 - unremarkable         11/12/2020 23:27 11/18/2020 07:40 11/25/2020 07:51 12/11/2020 07:26   Lithium Level 0.44 (L) 0.72 0.79 0.58 (L)      Consults:   - Medicine on 12/10 for HTN and constipation   - Medicine follow up on 12/14:  Continue propranolol, continue treatment of underlying anxiety, continue to utilize hydralazine prn per ordered parameters.  - Medicine follow up on 12/17: Continue propranolol scheduled, hydralazine prn.     Plan      Today's Changes:  - Discontinue olanzapine at bedtime to 5 mg      Psychotropic Medications:  Scheduled Psych Medications:  - Lithium  mg QAM and 900 mg at bedtime    - Olanzapine 5mg qam    - Seroquel 100 mg qam +  500 mg at bedtime   - propranolol 20mg TID (prescribed by medicine for HTN, with thought that it would also be beneficial for anxiety)  - Metformin  mg - for weight gain from antipsychotics      PRN Psych Medications  - Hydroxyzine 25 mg PRN Q4H  - Olanzapine 10 mg PO/IM prn Q2H severe agitation/psychosis  - Quetiapine 50 mg QID PRN for agitation  - Trazodone 50 mg at bedtime PRN     Patient will be treated in therapeutic milieu with appropriate individual and group therapies as described.     Medical diagnoses to be addressed this admission:    #Elevated BP without diagnosis of HTN -    - Discontinue Norvasc  - Propanolol 20 mg TID   - Hydralazine and Clonidine PRN     #Constipation   - Start Senna-Docusate 1-2 tabs BID and Miralax 17 g daily. Hold for loose stools.  - Notify Medicine if abdominal pain, n/v, or ongoing constipation    Legal Status:   Orders Placed This Encounter      Legal status Voluntary      Safety Assessment:   Behavioral Orders   Procedures     Assault precautions     Code 1 - Restrict to Unit     Discontinue 1:1 attendant for suicide risk     Order Specific Question:   I have performed an in person assessment of the patient     Answer:   Based on this assessment the patient no longer requires a one on one attendant at this point in time.      Elopement precautions     Routine Programming     As clinically indicated     Single Room     Status 15     Every 15 minutes.     Suicide precautions     Patients on Suicide Precautions should have a Combination Diet ordered that includes a Diet selection(s) AND a Behavioral Tray selection for Safe Tray - with utensils, or Safe Tray - NO utensils         Disposition:  Pending stabilization & development of a safe discharge plan.     The patient was seen and the plan was discussed with the attending physician.     This patient was seen and discussed with my attending physician.  Flory Florian MD    PGY-1 Psychiatry Resident Physician    Psychiatry Attending Attestation:  I, Mihai Finn, saw and evaluated the patient with the resident physician.  I agree with the findings and plan of care as documented in the resident note.  I have reviewed all labs and vital signs.

## 2020-12-18 NOTE — PROGRESS NOTES
Pt remained in his room the entire shift. No PRNs or snack given. Appeared to have slept comfortably for 7 hours.

## 2020-12-18 NOTE — PROGRESS NOTES
12/17/20 2200   Psychotherapy Group   Type of Intervention    Psychotherapy   Response Participates with encouragement   Hours 1   Treatment Detail    Psychotherapy -CBT Self beliefs/ challenging negative thoughts      The  Psychotherapy group goal is to promote insight to positive choice and change. Group processing is within a supportive and safe environment. Patients will process emotions using verbal group and expressive psychotherapy interventions including visual art/writing interventions.     Group interventions support patients by : cultivating resilience, fostering self awareness, self expression, self esteem and self compassion.  Groups will provide tools to encourage self and others in group, to practice communication/ social skills and supports, learn positive coping mechanisms, self efficacy, conflict resolution, empowerment, optimism ,hope , understanding ones emotions,and a sense of self and community.  Tools will be provided to manage life stressors  and individual's diagnosis      Modalities to reach these goals include: positive and solution focused psychology, CBT, DBT, ACT, Narrative psychology, Adlerian psychology, FLOW, Expressive Arts Continuum Therapies( Art Therapy) and Mindfulness based directives and discussions.        Subjective -patient report of mood today-OK           Objective/ Intervention- Goal of group and Therapeutic modality utilized- CBT Self beliefs/ challenging negative thoughts  Group Response-engaged- 4 patients - engaged The group had a very interactive discussion and supported each other about challenging negative thoughts.       Patient Response- Pt participated and was pleasant, cooperative and engaged.  He reported he was trying to do basic things and not be hard on himself. He enjoyed a conversation with mom today he reported. He was kind and supportive to peers.    Hugh Bull, AMYFT, ATR-BC

## 2020-12-18 NOTE — PLAN OF CARE
Problem: OT General Care Plan  Goal: OT Goal 1  Description: Pt will demonstrate consistent engagement in OT group activities that support recovery as evidenced by participating in >1 scheduled OT group/day for 5 days.     Attended 2 occupational therapy groups this date. Content and cooperative.   Occupational therapy clinic to make a beaded bracelet. Pt Response: Demonstrated independence in this task with assistance needed in fine motor components. I to ask for assistance; pleasant within conversation.   Mental health management group focusing on coping skill exploration. Education provided on maladaptive versus adaptive response to stress /symptoms and use within routine. Pt Response: Spent the majority of group looking out the window and attentive to discussion rather than partake. Identified a few coping skills utilized to reduce stress and manage symptoms: walking, drinking water, and spending time with his dog. Accepted additional suggestions within conversation.     Outcome: Improving

## 2020-12-18 NOTE — PROGRESS NOTES
Pt was out in the milieu. He denied any SI, anxiety or A/V hallucinations. He was bright and  social with staff. He paced the halls.

## 2020-12-18 NOTE — PLAN OF CARE
"Pt has not been displaying any overt psychotic symptoms during this shift. Pt does pace the halls a bit, but he does it at a slow pace that seems to be more about passing time than expending nervous energy.  Pt generally displays a blunted affect, but will warm quickly when approached.  Pt stated he is looking forward to discharge and living a \"simple life\".  Pt's medication regimen is not problematic.  Pt's appetite and fluid intake are appropriate.  Pt was reminded to keep up a steady fluid intake due to his medication regimen.   "

## 2020-12-18 NOTE — PROGRESS NOTES
Work Completed:Attended Team Meeting.  Pt having medicine adjusted.  Possible discharge before [place].    Discharge plan or goal: Discharge to parents' home  With outpatient services.                Barriers to discharge: Pt is not stable.

## 2020-12-19 PROCEDURE — 250N000013 HC RX MED GY IP 250 OP 250 PS 637: Performed by: STUDENT IN AN ORGANIZED HEALTH CARE EDUCATION/TRAINING PROGRAM

## 2020-12-19 PROCEDURE — 250N000013 HC RX MED GY IP 250 OP 250 PS 637: Performed by: PHYSICIAN ASSISTANT

## 2020-12-19 PROCEDURE — 250N000013 HC RX MED GY IP 250 OP 250 PS 637: Performed by: PSYCHIATRY & NEUROLOGY

## 2020-12-19 PROCEDURE — 124N000002 HC R&B MH UMMC

## 2020-12-19 RX ADMIN — NICOTINE 1 PATCH: 21 PATCH, EXTENDED RELEASE TRANSDERMAL at 08:55

## 2020-12-19 RX ADMIN — SENNOSIDES AND DOCUSATE SODIUM 2 TABLET: 8.6; 5 TABLET ORAL at 08:53

## 2020-12-19 RX ADMIN — QUETIAPINE 500 MG: 200 TABLET, FILM COATED ORAL at 21:38

## 2020-12-19 RX ADMIN — QUETIAPINE FUMARATE 100 MG: 100 TABLET ORAL at 08:57

## 2020-12-19 RX ADMIN — POLYETHYLENE GLYCOL 3350 17 G: 17 POWDER, FOR SOLUTION ORAL at 08:53

## 2020-12-19 RX ADMIN — LITHIUM CARBONATE 900 MG: 450 TABLET, EXTENDED RELEASE ORAL at 21:37

## 2020-12-19 RX ADMIN — PROPRANOLOL HYDROCHLORIDE 20 MG: 10 TABLET ORAL at 21:37

## 2020-12-19 RX ADMIN — OLANZAPINE 5 MG: 5 TABLET, FILM COATED ORAL at 08:53

## 2020-12-19 RX ADMIN — METFORMIN HYDROCHLORIDE 500 MG: 500 TABLET, EXTENDED RELEASE ORAL at 18:15

## 2020-12-19 RX ADMIN — SENNOSIDES AND DOCUSATE SODIUM 2 TABLET: 8.6; 5 TABLET ORAL at 21:38

## 2020-12-19 RX ADMIN — LITHIUM CARBONATE 300 MG: 300 TABLET, EXTENDED RELEASE ORAL at 08:53

## 2020-12-19 RX ADMIN — PROPRANOLOL HYDROCHLORIDE 20 MG: 10 TABLET ORAL at 08:53

## 2020-12-19 RX ADMIN — PROPRANOLOL HYDROCHLORIDE 20 MG: 10 TABLET ORAL at 14:10

## 2020-12-19 ASSESSMENT — ACTIVITIES OF DAILY LIVING (ADL)
HYGIENE/GROOMING: INDEPENDENT;SHOWER
HYGIENE/GROOMING: INDEPENDENT
ORAL_HYGIENE: INDEPENDENT
ORAL_HYGIENE: INDEPENDENT
DRESS: INDEPENDENT
DRESS: INDEPENDENT;SCRUBS (BEHAVIORAL HEALTH)

## 2020-12-19 NOTE — PROGRESS NOTES
Pt reported having a good day. Pt paced the halls most of the morning, spent time in his room, and watched navi television. Pt denies SI and SIB.

## 2020-12-19 NOTE — PLAN OF CARE
Pt walking halls talking to another patient.  Pt denies SI.  Hoping to be discharged early next week. Affect at times brightens upon approach.  Pt polite and cooperative.

## 2020-12-19 NOTE — PROGRESS NOTES
Pt's mood was calm but had flat affect. He participated in groups today. He watched TV and pacing in the hallway. He had good appetite. He stated he still depressed, but his depression had impproved. He contributed his depression to his bipolar disorder. He told writer that he had learned to live with it. Pt stated he had no suicidal ideation or self-harm thoughts. He had no auditory or visual  hallucination. Staff will continue to monitor pt per care plan.

## 2020-12-19 NOTE — PROGRESS NOTES
Pt appeared to to have slept comfortably for 6.75 hours this shift. No PRNs or snack given. No concerns raised. Remained in his room the entire shift.

## 2020-12-19 NOTE — PROGRESS NOTES
Brief Medicine Note     Medicine following peripherally for BP monitoring.  Current BP improved to 131/85 and 142/92.     A:P:  # Elevated blood pressure reading without diagnosis of HTN:   Outpatient BP trends 130s-140s/80s. Noted patient has had elevated BP during last 2 psychiatric hospitalizations as well. HTN likely 2/2 psychiatric illness, less likely essential HTN. Trial of amlodipine 12/9 minimal improvement, propranolol started 12/10 and titrated to 20mg TID. Has not required Hydralazine since 12/15.   - Continue Propranolol 20mg TID w/ hold parameters  - Discontinued hydralazine as no longer requiring  - Please notify medicine if BP is persistently SBP >140 or DBP >90 on blood checks  - Psychiatry to continue to management underlying anxiety/psychiatric illness     No further medical intervention is required at this time. Medicine signing off. Please feel free to call with any questions.         Erendira Potter PA-C  Hospitalist Service  Pager [phone]

## 2020-12-20 PROCEDURE — 250N000013 HC RX MED GY IP 250 OP 250 PS 637: Performed by: STUDENT IN AN ORGANIZED HEALTH CARE EDUCATION/TRAINING PROGRAM

## 2020-12-20 PROCEDURE — 250N000013 HC RX MED GY IP 250 OP 250 PS 637: Performed by: PHYSICIAN ASSISTANT

## 2020-12-20 PROCEDURE — 124N000002 HC R&B MH UMMC

## 2020-12-20 PROCEDURE — 250N000013 HC RX MED GY IP 250 OP 250 PS 637: Performed by: PSYCHIATRY & NEUROLOGY

## 2020-12-20 RX ADMIN — PROPRANOLOL HYDROCHLORIDE 20 MG: 10 TABLET ORAL at 08:42

## 2020-12-20 RX ADMIN — LITHIUM CARBONATE 300 MG: 300 TABLET, EXTENDED RELEASE ORAL at 08:42

## 2020-12-20 RX ADMIN — PROPRANOLOL HYDROCHLORIDE 20 MG: 10 TABLET ORAL at 13:43

## 2020-12-20 RX ADMIN — SENNOSIDES AND DOCUSATE SODIUM 2 TABLET: 8.6; 5 TABLET ORAL at 20:55

## 2020-12-20 RX ADMIN — QUETIAPINE 500 MG: 200 TABLET, FILM COATED ORAL at 20:55

## 2020-12-20 RX ADMIN — NICOTINE 1 PATCH: 21 PATCH, EXTENDED RELEASE TRANSDERMAL at 08:44

## 2020-12-20 RX ADMIN — PROPRANOLOL HYDROCHLORIDE 20 MG: 10 TABLET ORAL at 20:55

## 2020-12-20 RX ADMIN — QUETIAPINE FUMARATE 100 MG: 100 TABLET ORAL at 08:42

## 2020-12-20 RX ADMIN — LITHIUM CARBONATE 900 MG: 450 TABLET, EXTENDED RELEASE ORAL at 20:55

## 2020-12-20 RX ADMIN — OLANZAPINE 5 MG: 5 TABLET, FILM COATED ORAL at 08:42

## 2020-12-20 RX ADMIN — POLYETHYLENE GLYCOL 3350 17 G: 17 POWDER, FOR SOLUTION ORAL at 08:41

## 2020-12-20 RX ADMIN — METFORMIN HYDROCHLORIDE 500 MG: 500 TABLET, EXTENDED RELEASE ORAL at 20:11

## 2020-12-20 RX ADMIN — SENNOSIDES AND DOCUSATE SODIUM 2 TABLET: 8.6; 5 TABLET ORAL at 08:42

## 2020-12-20 ASSESSMENT — ACTIVITIES OF DAILY LIVING (ADL)
HYGIENE/GROOMING: INDEPENDENT
ORAL_HYGIENE: INDEPENDENT
HYGIENE/GROOMING: INDEPENDENT
DRESS: INDEPENDENT
DRESS: INDEPENDENT;SCRUBS (BEHAVIORAL HEALTH)
ORAL_HYGIENE: INDEPENDENT

## 2020-12-20 ASSESSMENT — MIFFLIN-ST. JEOR: SCORE: 1980.73

## 2020-12-20 NOTE — PLAN OF CARE
"Pt states his mood is \"fine.\"  denies SI.  Reports hoping for discharge early this week.  Pt walking the halls listening to his headphones.  Pt denies psychotic sx's.  "

## 2020-12-20 NOTE — PROGRESS NOTES
Pt remained in his room the entire shift, except when it was about 0600 AM, pt requested/given  juices and milk When asked how his sleep was, pt replied that it was good. Pt went back to his room soon after. No PRNs given. Total sleep hours: 5.25. Will continue to monitor.

## 2020-12-20 NOTE — PROGRESS NOTES
"Pt's mood was calm and had full range of affect. He paced in the hallway listening to music because \"I am bored\". He wached TV in the lounge. He stated his depression \"come and gone\". He flet much better. He stated he had no suicidal ideation or self-harm thoughts. He had no auditory or visual hallucination.   "

## 2020-12-20 NOTE — PROGRESS NOTES
The patient was isolate in his room while pacing around the room. The patient inform the staff that he staying his room because he like the headphone and the staff didn't put my name tag so he stay in the room but like hallway instead his room. The staff ensure him that he can come out the room and the headphone is for all to use but while he using no should take way from you. Pt denies SI/SIB, visual/auditory hallucinations as well as depression and anxious. Patient is looking forward to discharge early this coming week. The patient blunted/flat effect, mood is calm. Overall, patient took shower, ate all the meals, cooperative with staff, pacing the hallway and follows staff directions. Patient has no concerns to report.

## 2020-12-21 VITALS
WEIGHT: 213.8 LBS | OXYGEN SATURATION: 98 % | HEIGHT: 73 IN | TEMPERATURE: 96.8 F | BODY MASS INDEX: 28.34 KG/M2 | DIASTOLIC BLOOD PRESSURE: 89 MMHG | SYSTOLIC BLOOD PRESSURE: 139 MMHG | HEART RATE: 76 BPM | RESPIRATION RATE: 16 BRPM

## 2020-12-21 PROBLEM — K59.00 CONSTIPATION: Status: ACTIVE | Noted: 2020-12-21

## 2020-12-21 PROBLEM — R03.0 ELEVATED BLOOD PRESSURE READING WITHOUT DIAGNOSIS OF HYPERTENSION: Status: ACTIVE | Noted: 2020-12-21

## 2020-12-21 PROCEDURE — 250N000013 HC RX MED GY IP 250 OP 250 PS 637: Performed by: PSYCHIATRY & NEUROLOGY

## 2020-12-21 PROCEDURE — G0177 OPPS/PHP; TRAIN & EDUC SERV: HCPCS

## 2020-12-21 PROCEDURE — 99238 HOSP IP/OBS DSCHRG MGMT 30/<: CPT | Mod: GC | Performed by: PSYCHIATRY & NEUROLOGY

## 2020-12-21 PROCEDURE — 250N000013 HC RX MED GY IP 250 OP 250 PS 637: Performed by: PHYSICIAN ASSISTANT

## 2020-12-21 PROCEDURE — 250N000013 HC RX MED GY IP 250 OP 250 PS 637: Performed by: STUDENT IN AN ORGANIZED HEALTH CARE EDUCATION/TRAINING PROGRAM

## 2020-12-21 RX ORDER — AMOXICILLIN 250 MG
2 CAPSULE ORAL DAILY
Qty: 60 TABLET | Refills: 0 | Status: SHIPPED | OUTPATIENT
Start: 2020-12-21 | End: 2020-12-21

## 2020-12-21 RX ORDER — QUETIAPINE FUMARATE 100 MG/1
500 TABLET, FILM COATED ORAL AT BEDTIME
Qty: 60 TABLET | Refills: 1 | Status: SHIPPED | OUTPATIENT
Start: 2020-12-21 | End: 2020-12-21

## 2020-12-21 RX ORDER — AMOXICILLIN 250 MG
2 CAPSULE ORAL DAILY
Qty: 60 TABLET | Refills: 0 | Status: SHIPPED | OUTPATIENT
Start: 2020-12-21 | End: 2021-01-20

## 2020-12-21 RX ORDER — LITHIUM CARBONATE 300 MG/1
300 TABLET, FILM COATED, EXTENDED RELEASE ORAL EVERY MORNING
Qty: 60 TABLET | Refills: 1 | Status: SHIPPED | OUTPATIENT
Start: 2020-12-22 | End: 2020-12-21

## 2020-12-21 RX ORDER — METFORMIN HCL 500 MG
500 TABLET, EXTENDED RELEASE 24 HR ORAL
Qty: 30 TABLET | Refills: 0 | Status: SHIPPED | OUTPATIENT
Start: 2020-12-21 | End: 2021-01-04

## 2020-12-21 RX ORDER — PROPRANOLOL HYDROCHLORIDE 20 MG/1
20 TABLET ORAL 3 TIMES DAILY
Qty: 60 TABLET | Refills: 1 | Status: SHIPPED | OUTPATIENT
Start: 2020-12-21 | End: 2020-12-21

## 2020-12-21 RX ORDER — QUETIAPINE FUMARATE 100 MG/1
100 TABLET, FILM COATED ORAL EVERY MORNING
Qty: 60 TABLET | Refills: 1 | Status: SHIPPED | OUTPATIENT
Start: 2020-12-22 | End: 2020-12-21

## 2020-12-21 RX ORDER — PROPRANOLOL HYDROCHLORIDE 20 MG/1
20 TABLET ORAL 3 TIMES DAILY
Qty: 90 TABLET | Refills: 0 | Status: SHIPPED | OUTPATIENT
Start: 2020-12-21 | End: 2021-01-04

## 2020-12-21 RX ORDER — OLANZAPINE 5 MG/1
5 TABLET ORAL EVERY MORNING
Qty: 60 TABLET | Refills: 1 | Status: SHIPPED | OUTPATIENT
Start: 2020-12-22 | End: 2020-12-21

## 2020-12-21 RX ORDER — POLYETHYLENE GLYCOL 3350 17 G/17G
17 POWDER, FOR SOLUTION ORAL DAILY
Qty: 30 PACKET | Refills: 0 | Status: SHIPPED | OUTPATIENT
Start: 2020-12-22 | End: 2021-01-21

## 2020-12-21 RX ORDER — QUETIAPINE FUMARATE 100 MG/1
500 TABLET, FILM COATED ORAL AT BEDTIME
Qty: 60 TABLET | Refills: 0 | Status: SHIPPED | OUTPATIENT
Start: 2020-12-21 | End: 2021-01-04

## 2020-12-21 RX ORDER — LITHIUM CARBONATE 450 MG
900 TABLET, EXTENDED RELEASE ORAL AT BEDTIME
Qty: 60 TABLET | Refills: 1 | Status: SHIPPED | OUTPATIENT
Start: 2020-12-21 | End: 2020-12-21

## 2020-12-21 RX ORDER — POLYETHYLENE GLYCOL 3350 17 G/17G
17 POWDER, FOR SOLUTION ORAL DAILY
Qty: 30 PACKET | Refills: 0 | Status: SHIPPED | OUTPATIENT
Start: 2020-12-22 | End: 2020-12-21

## 2020-12-21 RX ORDER — LITHIUM CARBONATE 450 MG
900 TABLET, EXTENDED RELEASE ORAL AT BEDTIME
Qty: 60 TABLET | Refills: 0 | Status: SHIPPED | OUTPATIENT
Start: 2020-12-21 | End: 2021-02-22

## 2020-12-21 RX ORDER — QUETIAPINE FUMARATE 100 MG/1
100 TABLET, FILM COATED ORAL EVERY MORNING
Qty: 60 TABLET | Refills: 0 | Status: SHIPPED | OUTPATIENT
Start: 2020-12-22 | End: 2021-01-04

## 2020-12-21 RX ORDER — LITHIUM CARBONATE 300 MG/1
300 TABLET, FILM COATED, EXTENDED RELEASE ORAL EVERY MORNING
Qty: 60 TABLET | Refills: 0 | Status: SHIPPED | OUTPATIENT
Start: 2020-12-22 | End: 2021-03-30 | Stop reason: DRUGHIGH

## 2020-12-21 RX ORDER — OLANZAPINE 5 MG/1
5 TABLET ORAL EVERY MORNING
Qty: 60 TABLET | Refills: 0 | Status: SHIPPED | OUTPATIENT
Start: 2020-12-22 | End: 2021-01-04

## 2020-12-21 RX ADMIN — NICOTINE 1 PATCH: 21 PATCH, EXTENDED RELEASE TRANSDERMAL at 09:31

## 2020-12-21 RX ADMIN — PROPRANOLOL HYDROCHLORIDE 20 MG: 10 TABLET ORAL at 09:26

## 2020-12-21 RX ADMIN — LITHIUM CARBONATE 300 MG: 300 TABLET, EXTENDED RELEASE ORAL at 09:26

## 2020-12-21 RX ADMIN — POLYETHYLENE GLYCOL 3350 17 G: 17 POWDER, FOR SOLUTION ORAL at 09:27

## 2020-12-21 RX ADMIN — OLANZAPINE 5 MG: 5 TABLET, FILM COATED ORAL at 09:26

## 2020-12-21 RX ADMIN — SENNOSIDES AND DOCUSATE SODIUM 2 TABLET: 8.6; 5 TABLET ORAL at 09:26

## 2020-12-21 RX ADMIN — PROPRANOLOL HYDROCHLORIDE 20 MG: 10 TABLET ORAL at 14:11

## 2020-12-21 RX ADMIN — QUETIAPINE FUMARATE 100 MG: 100 TABLET ORAL at 09:27

## 2020-12-21 ASSESSMENT — ACTIVITIES OF DAILY LIVING (ADL)
HYGIENE/GROOMING: INDEPENDENT
ORAL_HYGIENE: INDEPENDENT
DRESS: INDEPENDENT;SCRUBS (BEHAVIORAL HEALTH)
LAUNDRY: WITH SUPERVISION

## 2020-12-21 NOTE — DISCHARGE SUMMARY
"    Psychiatric Discharge Summary    Hospital Day #20    Jose Francisco Sommers MRN# [id number]   Age: 30 year old YOB: 1990     Date of Admission:  11/30/2020  Date of Discharge:  12/21/2020  Admitting Physician:  Mihai Finn MD  Discharge Physician:  Mihai Finn MD         Event Leading to Hospitalization:   \" The patient recently was hospitalized on station 20 for 2 weeks with complaints of agitation, irritability, not sleeping, tangential speech, disorganized thinking and inappropriate comments towards family members in the context of being titrated off olanzapine by primary Psychiatry secondary to weight gain.  The patient was started on Latuda.  He said that he did not like Latuda; however, could not provide during our visit an explanation why he did not like this medication.  He reported that he was pretty irritable, could not sleep well, has a lot of difficulties with focusing.  He could not formulate his feelings very well; however, did agree that he has had racing thoughts and was overstimulated.  A number of times during our visit, the patient had delayed responses.  At times, he just went silent as if he was listening to internal stimuli; however, repeatedly denied presence of auditory and visual hallucinations.  It is important to mention that Manuel perseverated about being ordered stimulant (amphetamine). \"       See Admission note by Alek Palma MD on 12/01/2020 for additional details.          Diagnoses:   #Primary Psychiatric Diagnosis  Schizoaffective disorder, bipolar type, lauren, severe with psychotic features    #Secondary Psychiatric Diagnosis  ADHD, sensory processing    Diagnostic Impression:   The patient is a 30-year-old male, reviously diagnosed with schizoaffective disorder, bipolar subtype, ADHD, sensory processing admitted on 12/01 to [facility] (transferred to [facility] on 12/10) with complaints of feeling restless, anxious, poor sleep and psychosis.  The " patient recently was hospitalized on [facility] from 11/12 to 11/26 with complaints of agitation, irritability, not sleeping, tangential speech, disorganized thinking in the context of being titrated off olanzapine due to weight gain.  On admission on 12/01, he reported that he was pretty irritable, could not sleep well, has a lot of difficulties with focusing.          Consults:   - Medicine on 12/10 for HTN and constipation   - Medicine follow up on 12/14:  Continue propranolol, continue treatment of underlying anxiety, continue to utilize hydralazine prn per ordered parameters.  - Medicine follow up on 12/17: Continue propranolol scheduled, hydralazine prn.  - Medicine follow up on 12/19: Continue propranolol, discontinue hydralazine prn.         Hospital Course:   Psychiatric Course:  Jose Francisco Sommers was transferred from [facility] to [facility] under  Dr. Mihai Finn's supervision as a voluntary patient. His PTA lithium and olanzapine were continued. Patient continued to appear anxious and disorganized. Lamotrigine 25 mg started, first dose on 12/11. Per discussion with the patient and his mom, changes were made to his medication doses and schedule. Discontinued Lamotrigine 25 mg daily (pt did not receive any dose, discontinued on the same day it started). Continued Seroquel 100 mg BID and change the evening dose to 200 mg on 12/12. Change Lithium CR 1200 mg at bedtime to Lithium  QAM and Lithium  mg at bedtime on Saturday, 12/12. Reduced Olanzapine 10 mg BID to 5 mg qam + 10mg at bedtime on 12/14, with increase to 100mg qam + 300mg at bedtime of seroquel on 12/14 then to 100mg qam + 400mg at bedtime of seroquel on 12/16. On 12/17/2020: Increased at bedtime quetiapine to 500 mg and decreased at bedtime olanzapine to 5 mg. On 12/18/2020 no change in quetiapine dose, change olanzapine to 5 mg QDay, every morning.     Over the weekend 12/19 and 12/20 patient had an uneventful weekend. Overall was  pleasant and cooperative, no reports of SI. Based on our conversation with his mother, patient also sounded more like himself over the weekend. Patient is stable to discharge. Mother, Ember, is agreeable to the plan. We went over his medications and outpatient follow up plan over the phone.    Medical Course:  PTA medications were resumed. Patient continued to have high BP. Per medicine, discontinued Norvasc, started Propanolol 10 mg TID as will treat underlying anxiety as well as BP/HR. Hydralazine and Clonidine PRN are available PRN. We discontinued prn's. Current BP improved to 131/85 and 142/92. Outpatient BP trends 130s-140s/80s. Noted patient has had elevated BP during last 2 psychiatric hospitalizations as well. HTN likely 2/2 psychiatric illness, less likely essential HTN. Trial of amlodipine 12/9 minimal improvement, propranolol started 12/10 and titrated to 20mg TID. Has not required Hydralazine since 12/15.     RISK ASSESSMENT:  Today Jose Francisco Sommers denies SI, SIB, and HI. No overt evidence of psychosis or lauren observed. Patient grossly appears to be cognitively intact. Patient has not exhibited any aggressive or violent behaviors since 12/18/2020. he has notable risk factors for self-harm including recent psych inpt stay, severe anxiety, SIB and male.  However, risk is mitigated by no plan or intent, future oriented, feeling hopeful, good social support   and parents and sister, you enjoys walks. Patient does not have access to firearms. Based on all available evidence he does not appear to be at imminent risk for self-harm therefore does not meet criteria for a 72-hr hold/ involuntary hospitalization.  However, based on degree of symptoms voluntary hospitalization was recommended which the pt did agree to. Patient also agreed to call 911/present to ED if any imminent safety concerns arise, including re-emergence of SI. Patient was provided with crisis resources at the time of discharge. Patient agreed  to further reduce risk of self-harm by completely abstaining from illicit substances and alcohol, and agreed to remain medication adherent. Expressed understanding of the risks associated with excessive alcohol use, illicit substance use, and medication/treatment non-adherence, including increased risk of harm to self or others.  His plan in the case of emergency is to reach out to parents, then her sister Ca, then call 911.    Jose Francisco was released to home. During this admission, he did participate in groups and was visible in the milieu, and his symptoms of SI, being sullen and tearful improved. At the time of discharge he was determined to not be a danger to himself or others.     This document serves as a transfer of care to Jose Francisco Sommers's outpatient providers.         Discharge Medications:     Current Discharge Medication List      START taking these medications    Details   OLANZapine (ZYPREXA) 5 MG tablet Take 1 tablet (5 mg) by mouth every morning  Qty: 60 tablet, Refills: 0    Associated Diagnoses: Psychosis, unspecified psychosis type (H)      polyethylene glycol (MIRALAX) 17 g packet Take 17 g by mouth daily  Qty: 30 packet, Refills: 0    Associated Diagnoses: Constipation, unspecified constipation type      propranolol (INDERAL) 20 MG tablet Take 1 tablet (20 mg) by mouth 3 times daily  Qty: 90 tablet, Refills: 0    Associated Diagnoses: Elevated blood pressure reading without diagnosis of hypertension      senna-docusate (SENOKOT-S/PERICOLACE) 8.6-50 MG tablet Take 2 tablets by mouth daily  Qty: 60 tablet, Refills: 0    Associated Diagnoses: Constipation, unspecified constipation type         CONTINUE these medications which have CHANGED    Details   !! lithium (ESKALITH CR/LITHOBID) 450 MG CR tablet Take 2 tablets (900 mg) by mouth At Bedtime  Qty: 60 tablet, Refills: 0    Associated Diagnoses: Schizoaffective disorder, bipolar type (H)      !! lithium ER (LITHOBID) 300 MG CR tablet Take 1 tablet  "(300 mg) by mouth every morning  Qty: 60 tablet, Refills: 0    Associated Diagnoses: Schizoaffective disorder, bipolar type (H)      !! metFORMIN (GLUCOPHAGE-XR) 500 MG 24 hr tablet Take 1 tablet (500 mg) by mouth daily (with dinner)  Qty: 30 tablet, Refills: 0    Associated Diagnoses: Fatty liver; High blood triglycerides      !! QUEtiapine (SEROQUEL) 100 MG tablet Take 1 tablet (100 mg) by mouth every morning  Qty: 60 tablet, Refills: 0    Associated Diagnoses: Schizoaffective disorder, bipolar type (H)      !! QUEtiapine (SEROQUEL) 100 MG tablet Take 5 tablets (500 mg) by mouth At Bedtime  Qty: 60 tablet, Refills: 0    Associated Diagnoses: Schizoaffective disorder, bipolar type (H)       !! - Potential duplicate medications found. Please discuss with provider.      CONTINUE these medications which have NOT CHANGED    Details   !! metFORMIN (GLUCOPHAGE-XR) 500 MG 24 hr tablet Take 1 tablet (500 mg) by mouth daily (with dinner)  Qty: 30 tablet, Refills: 0    Associated Diagnoses: Weight gain       !! - Potential duplicate medications found. Please discuss with provider.      STOP taking these medications       cloNIDine (CATAPRES) 0.1 MG tablet Comments:   Reason for Stopping:         doxepin (SINEQUAN) 10 MG capsule Comments:   Reason for Stopping:         lurasidone (LATUDA) 80 MG TABS tablet Comments:   Reason for Stopping:                    Psychiatric Examination:   Appearance:  no apparent distress  Attitude:  cooperative, friendly and pleasant  Psychomotor:  normal and no evidence of tics, dystonia, or tardive dyskinesia  Eye Contact: fair  Speech:  normal tone, decreased rate and flattened prosody  Mood: \"fine\"  Affect:  congruent, non-labile and appropriate  Thought Content: denies suicidal ideation, denies homicidal ideation and no delusions  Thought Process: linear and coherent  Sensorium: awake and alert  Cognition: memory grossly intact  Impulse control: fair  Insight: fair  Judgment: fair         " Discharge Plan:   Psychiatry Follow-up:  Appointment: Psychiatry: Dr. Jayda Ball/ Dr. Tao Lyons : 1/4/21 at 9:30am  [facility]: [facility] 2nd Floor [address] [facility] [place] [address]  [phone]    Resources:   *[facility] Crisis: [phone]    24/7 crisis hotline for adults who are in the midst of a mental health crisis  *[facility]: [address] [place] [address]  [phone]    Walk-in services include access to an onsite team of psychiatrists, nurses, social workers and trained peer support staff that provide person-centered, recovery-focused care. Urgent Care for Adult Mental Health offers an alternative to visiting the emergency room during a mental health crisis.   Monday through Friday, 8 a.m. - 5:30 p.m.  Closed on Saturday, Sunday and holidays        General Medication Instructions:   See your medication sheet(s) for instructions.   Take all medicines as directed.  Make no changes unless your doctor suggests them.   Go to all your doctor visits.  Be sure to have all your required lab tests. This way, your medicines can be refilled on time.  Do not use any drugs not prescribed by your doctor.     The treatment team has appreciated the opportunity to work with you.  We wish you the best in the future.    If you have any questions or concerns our unit number is [phone].    Pt seen and discussed with my attending physician, Mihai Finn MD.   MOY Florian MD  PGY-1 Resident    Attestation:  I, Mihai Finn, saw and evaluated the patient with the resident physician.  I agree with the findings and plan of care as documented in the resident note.  I have reviewed all labs and vital signs.  I, Mihai Finn, have reviewed this summary and agree with the findings and discharge plan as written.            Appendix A: All Labs This Admission:     Results for orders placed or performed during the hospital encounter of 11/30/20   Drug abuse  screen 6 urine (tox)     Status: None   Result Value Ref Range    Amphetamine Qual Urine Negative NEG^Negative    Barbiturates Qual Urine Negative NEG^Negative    Benzodiazepine Qual Urine Negative NEG^Negative    Cannabinoids Qual Urine Negative NEG^Negative    Cocaine Qual Urine Negative NEG^Negative    Ethanol Qual Urine Negative NEG^Negative    Opiates Qualitative Urine Negative NEG^Negative   Asymptomatic COVID-19 Virus (Coronavirus) by PCR     Status: None    Specimen: Nasopharyngeal   Result Value Ref Range    COVID-19 Virus PCR to [facility] - Source Nasopharyngeal     COVID-19 Virus PCR to [facility] - Result       Test received-See reflex to IDDL test SARS CoV2 (COVID-19) Virus RT-PCR   SARS-CoV-2 COVID-19 Virus (Coronavirus) RT-PCR Nasopharyngeal     Status: None    Specimen: Nasopharyngeal   Result Value Ref Range    SARS-CoV-2 Virus Specimen Source Nasopharyngeal     SARS-CoV-2 PCR Result NEGATIVE     SARS-CoV-2 PCR Comment       Testing was performed using the Aptima SARS-CoV-2 Assay on the [employer] Instrument System.   Additional information about this Emergency Use Authorization (EUA) assay can be found via   the Lab Guide.     Comprehensive metabolic panel     Status: None   Result Value Ref Range    Sodium 138 133 - 144 mmol/L    Potassium 4.1 3.4 - 5.3 mmol/L    Chloride 106 94 - 109 mmol/L    Carbon Dioxide 29 20 - 32 mmol/L    Anion Gap 3 3 - 14 mmol/L    Glucose 80 70 - 99 mg/dL    Urea Nitrogen 26 7 - 30 mg/dL    Creatinine 1.11 0.66 - 1.25 mg/dL    GFR Estimate 89 >60 mL/min/[1.73_m2]    GFR Estimate If Black >90 >60 mL/min/[1.73_m2]    Calcium 9.4 8.5 - 10.1 mg/dL    Bilirubin Total 0.5 0.2 - 1.3 mg/dL    Albumin 4.1 3.4 - 5.0 g/dL    Protein Total 7.5 6.8 - 8.8 g/dL    Alkaline Phosphatase 78 40 - 150 U/L    ALT 46 0 - 70 U/L    AST 28 0 - 45 U/L   CBC with platelets differential     Status: None   Result Value Ref Range    WBC 11.0 4.0 - 11.0 10e9/L    RBC Count 4.70 4.4 - 5.9 10e12/L     Hemoglobin 14.3 13.3 - 17.7 g/dL    Hematocrit 42.7 40.0 - 53.0 %    MCV 91 78 - 100 fl    MCH 30.4 26.5 - 33.0 pg    MCHC 33.5 31.5 - 36.5 g/dL    RDW 11.9 10.0 - 15.0 %    Platelet Count 285 150 - 450 10e9/L    Diff Method Automated Method     % Neutrophils 73.2 %    % Lymphocytes 16.8 %    % Monocytes 5.9 %    % Eosinophils 2.6 %    % Basophils 1.0 %    % Immature Granulocytes 0.5 %    Nucleated RBCs 0 0 /100    Absolute Neutrophil 8.0 1.6 - 8.3 10e9/L    Absolute Lymphocytes 1.9 0.8 - 5.3 10e9/L    Absolute Monocytes 0.7 0.0 - 1.3 10e9/L    Absolute Eosinophils 0.3 0.0 - 0.7 10e9/L    Absolute Basophils 0.1 0.0 - 0.2 10e9/L    Abs Immature Granulocytes 0.1 0 - 0.4 10e9/L    Absolute Nucleated RBC 0.0    Troponin I     Status: None   Result Value Ref Range    Troponin I ES <0.015 0.000 - 0.045 ug/L   TSH with free T4 reflex     Status: None   Result Value Ref Range    TSH 2.01 0.40 - 4.00 mU/L   Lithium level     Status: Abnormal   Result Value Ref Range    Lithium Level 0.58 (L) 0.60 - 1.20 mmol/L   Lithium level     Status: None   Result Value Ref Range    Lithium Level 0.84 0.60 - 1.20 mmol/L   EKG 12-lead, complete     Status: None   Result Value Ref Range    Interpretation ECG Click View Image link to view waveform and result    Internal Medicine Adult IP Consult for [facility] in [facility]: Patient to be seen: Routine within 24 hrs; Call back #: no; arterial hypertension; Consultant may enter orders: Yes; Requesting provider? Attending physician     Status: None ()    Narrative    Vianca Phillip PA-C     12/1/2020  4:32 PM  Circumstances of recent discharge and re-admittance noted. Please   refer to recent medicine H&P by MICHAEL Zavala in charting dated   11/16/2020 , which was reviewed by this writer and is up to date.   Please call the on-call [facility] for any f/u medical concerns if they   arise.     In short, 30-year-old male with reported history of   schizoaffective disorder, admitted to  "[facility] due to   psychiatric decompensation. Medicine asked to consult for   \"arterial hypertension\".     On a previous admission earlier this month, patient was   hypertensive and started on clonidine 0.1mg daily PRN and   hydralazine PRN which helped decrease his BP. On discharge, his   BP was stable and sent home on clonidine for HTN with directions   to follow-up with PCP. He reports missing a few doses, has been   using it maybe daily for the last week. Denies use of cocaine   while out of the hospital. He has trouble sleeping but has never   stopped breathing in his sleep or woken up gasping for air. He   denies any headaches, vision changes, CP, and shortness of   breath.     Objective:  BP (!) 172/128   Pulse 108   Temp 97.7  F (36.5  C) (Oral)     Resp 16   Ht 1.842 m (6' 0.5\")   Wt 92.4 kg (203 lb 11.2 oz)     SpO2 99%   BMI 27.25 kg/m    GEN: Manic appearing, pacing in room, tangential speech  CV: Tachycardic rate, regular rhythm, no mumrus  EXT: No edema    A/P:  #Elevated BP: No diagnosis of HTN. Happened last admission w/ PRN   clonidine BID, hydralazine offered w/ improvement. Utox negative.   OP BPs from clinic prior to 02/2020 were in 130s-140s/80s   (virtual visits since then). DDx includes cessatoin of clonidine   w/ doxepin on board (denies taking more than 1 /day, unreliable   historian), psychiatric d/o, anxiety, untreated HTN (less   likely),   -Treat underlying psychiatric illness  -PRN clonidine for SBP >180 and/or DBP >110, will discuss with   psychiatry tomorrow, if not indicated for anxiety, recommend   stopping due to increased incidence of rebound HTN w/ doxepin  -PRN hydralazine for SBP >200 and/or DBP >120  -Will continue to peripherally follow BPs    Vianca Toure PA-C     Internal Medicine Adult IP Consult for [facility] in [facility]: Patient to be seen: Routine within 24 hrs; Call back #: [phone]; constantly having high blood pressure; Consultant may " "enter orders: Yes; Requesting provider? Hospitalist (if...     Status: None ()    Narrative    Vianca Phillip PA-C     12/4/2020  3:17 PM  Medicine peripherally following blood pressures.    In short, 30-year-old male with reported history of   schizoaffective disorder, admitted to [facility] due to   psychiatric decompensation. Medicine asked to consult for   \"arterial hypertension\".     Medicine evaluated 12/01, see my consult note.     A/P:  #Elevated BP: No diagnosis of HTN. Happened last admission w/ PRN   clonidine BID, hydralazine offered w/ improvement. Utox negative.   OP BPs from clinic prior to 02/2020 were in 130s-140s/80s   (virtual visits since then). DDx includes cessatoin of clonidine   w/ doxepin on board (denies taking more than 1 /day, unreliable   historian), psychiatric d/o, anxiety, untreated HTN (less   likely). Continues to have elevated BP.   -Treat underlying psychiatric illness as he endorses anxiety to   nurses, and appeared to be responding to internal stimuli on my   last visit  -Will get labs (CMP, CBC, trop, TSH) to ensure no e/o renal   issues or end organ damage  -Lower parameters for hydralazine, can use PRN for SBP >160   and/or DBP >100  -Continue PRN clonidine for SBP >180 and/or DBP >110,  Removed   indication for anxiety, should not discharge home on this med as   cessation w/ doxepin increases risk of rebound HTN  -Will continue to peripherally follow BPs    Vianca Toure PA-C     Addendum: EKG w/ NSR, no acute ischemic changes. CMP, CBC, TSH   and trop WNL. Treat elevated BP as above. Will continue to   peripherally monitor. Notify medicine if any HA, vision changes,   chest pain, dyspnea. Notify on call [facility] if any intercurrent   medical issues arise.          "

## 2020-12-21 NOTE — PLAN OF CARE
Pt hoping to be discharged today or tomorrow.  Pt anxious this will not work out.  Pt walking the halls listening to music.  Pt pleasant and cooperative upon approach.  Pt denies SI

## 2020-12-21 NOTE — PROGRESS NOTES
Pt will dscharging at 4pm.  His mother will be coming to pick pt up.  Pt denies SI.  Reviewed discharge paperwork with pt.  Pt pleasant and cooperative.  Pt will be discharging with all belongings, discharge paperwork, and ordered discharge medications.

## 2020-12-21 NOTE — PLAN OF CARE
Problem: OT General Care Plan  Goal: OT Goal 1  Description: Pt will demonstrate consistent engagement in OT group activities that support recovery as evidenced by participating in >1 scheduled OT group/day for 5 days.     Attended 1 occupational therapy group this date. Congruent-pleasant affect. Fully engaged in hands-on task and conversation with writer.    Occupational therapy clinic to make a second bracelet. Pt Response: Demonstrated consistent performance skills, yet improved social interaction, compared to observations made on previous dates. Appears ready for discharge; expressed looking forward to living with parents where he feels must comfortable.     Outcome: Improving

## 2020-12-21 NOTE — PROGRESS NOTES
Pt slept for 5.5 hours. Woke up early about 0330 and had difficulty falling back to sleep. No PRNs or snack given. A change of headphone provided. No concerns raised. Will continue to monitor.

## 2020-12-21 NOTE — DISCHARGE INSTRUCTIONS
Behavioral Discharge Planning and Instructions  Summary:  You were admitted to [facility] on 12/1/20 with worsening depression, manic symptoms and decrease in functioning under the care of Dr. Finn. You met with Dr. Finn and his team daily for ongoing psychiatric assessment and medication management. You had opportunities to participate in therapeutic groups on the unit.  At this time you report your mood is stabilizing and you report you are not having thoughts or intent to harm yourself  or others. You will be discharged home and will resume care with your outpatient providers. You have an outpatient  Psychiatry appointment coming up in January with Dr. Ball and Dr. Jonas on 4th. Until then continue your medication regimen as prescribed.    Disposition:  Home with family    Main Diagnosis:  Schizoaffective Disorder  Alcohol Use Disorder- in remission    Major Treatments, Procedures and Findings:  Medications were managed throughout your stay. An internal medicine consult was completed during your stay. You  had the opportunity to participate in treatment programming while on the unit including occupational therapy, mental  health support and education and spiritual services.    Symptoms to Report:  Please report if you are experiencing increased aggression and/or confusion, problematic loss of sleep, worsening  mood, or thoughts of suicide to your treatment team or notify your primary provider.  IF THE SYMPTOMS YOU ARE EXPERIENCING ARE A MEDICAL EMERGENCY, CALL 911 IMMEDIATELY    Lifestyle Adjustment:  1. Adjust your lifestyle to get enough sleep, relaxation, exercise and good nutrition. Continue to develop healthy coping  skills to decrease stress and promote a healthy and sober lifestyle.  2. Abstain from all substances of abuse.  3. Take medications as prescribed. Please work with your doctor to discuss any concerns you have with  your  medications or side effects you may be experiencing.  4. Follow up with appointments as scheduled.    Psychiatry Follow-up:  Appointment: Psychiatry: Dr. Jayda Ball/ Dr. Dinh Lyons : 1/4/21 at 9:30am  [facility]: [facility], 2nd Floor [address] [place] [address]  [phone]      Resources:   *[facility] Crisis: [phone]    24/7 crisis hotline for adults who are in the midst of a mental health crisis  *[facility]: [address] [place] [address]  [phone]    Walk-in services include access to an onsite team of psychiatrists, nurses, social workers and trained peer support staff that provide person-centered, recovery-focused care. Urgent Care for Adult Mental Health offers an alternative to visiting the emergency room during a mental health crisis.   Monday through Friday, 8 a.m. - 5:30 p.m.  Closed on Saturday, Sunday and holidays      General Medication Instructions:   See your medication sheet(s) for instructions.   Take all medicines as directed.  Make no changes unless your doctor suggests them.   Go to all your doctor visits.  Be sure to have all your required lab tests. This way, your medicines can be refilled on time.  Do not use any drugs not prescribed by your doctor.    The treatment team has appreciated the opportunity to work with you.  We wish you the best in the future.    If you have any questions or concerns our unit number is [phone].

## 2020-12-21 NOTE — PROGRESS NOTES
Pt's mood was calm and had full range of affect. He watched TV in the lounge with others. He paced in the hallway listenng to music. She told writer that he was anxous regarding going home. Writer discussed care plan with pt to alleviate his anxiety. Pt stated he had no suicidal ideation or self-harm thoughts. He had no auditrory or visual hallucination.

## 2020-12-21 NOTE — PROGRESS NOTES
"  ----------------------------------------------------------------------------------------------------------  [facility], [place]   Psychiatric Progress Note  Hospital Day #20     Interim History:   The patient's care was discussed with the treatment team and chart notes were reviewed.    Sleep 5.5 hours (12/21/20 0700), 6.75 on Saturday  Scheduled Medications: took all scheduled medications as prescribed, med compliant  PRN medications: no prns were given      Staff Report: Patient has been pleasant over the weekend. No reports of SI or SIB. \"Pt's mood was calm and had full range of affect. He watched TV in the lounge with others. He paced in the hallway listenng to music. She told writer that he was anxous regarding going home. Writer discussed care plan with pt to alleviate his anxiety. Pt stated he had no suicidal ideation or self-harm thoughts. He had no auditrory or visual hallucination. \"     Patient Interview:   Jose Francisco Sommers was interviewed remotely via teleconference.  He stated that he was doing better. Still has some passive suicidal ideation, bu feels more in control. Patient had an uneventful weekend. During our interview engaged in meaningful conversation. We talked about listening to music, he reported that he finds it helpful. He usually listens to hip hop, enjoyed listening to 102.5 on radio. Patient is motivated to get home. Looking forward to discharge. Patient denied suicidal ideation. Jose Francisco reported a positive mood, and positive feelings for future.    Phone call with mother Ember: Ember reported that she feels comfortable picking up Jose Francisco today, whenever possible. Over the weekend when they talked, he sounded much better. We went over the medications and the immediate follow up plan with Dr. Ball in the first week of January 2021.     The risks, benefits, alternatives and side effects of any medication changes have been discussed and are understood by the " "patient and other caregivers.    Review of systems:     ROS was negative unless noted above.          Allergies:     Allergies   Allergen Reactions     Sulfa Drugs Rash            Psychiatric Examination:   /89 (BP Location: Right arm)   Pulse 76   Temp 98.4  F (36.9  C) (Oral)   Resp 16   Ht 1.842 m (6' 0.5\")   Wt 97 kg (213 lb 12.8 oz)   SpO2 98%   BMI 28.60 kg/m    Weight is 213 lbs 12.8 oz  Body mass index is 28.6 kg/m .    MENTAL STATUS EXAM    Appearance:  no apparent distress, normal posture, well-developed, well-nourished and casually dressed  Attitude:  cooperative, engaged and pleasant  Psychomotor:  normal and no evidence of tics, dystonia, or tardive dyskinesia  Eye Contact: appropriate  Speech:  fluent English and flattened prosody  Mood: \"positive\"  Affect:  congruent and blunted  Thought Content: denies suicidal ideation and ruminations  Thought Process: coherent, goal directed and ruminative  Sensorium: awake, alert, denies auditory hallucinations and denies visual hallucinations  Cognition: memory grossly intact  Impulse control: good  Insight: questionable  Judgment: fair         Labs:   No results found for this or any previous visit (from the past 24 hour(s)).     Assessment    Principal Diagnosis:   # Schizoaffective disorder, bipolar type, lauren, severe with psychotic features     Secondary psychiatric diagnoses of concern this admission:   - none     Diagnostic Impression:   The patient is a 30-year-old male, reviously diagnosed with schizoaffective disorder, bipolar subtype, ADHD, sensory processing admitted on 12/01 to [facility] (transferred to [facility] on 12/10) with complaints of feeling restless, anxious, poor sleep and psychosis.  The patient recently was hospitalized on [facility] from 11/12 to 11/26 with complaints of agitation, irritability, not sleeping, tangential speech, disorganized thinking in the context of being titrated off olanzapine due to weight gain.  On " admission on 12/01, he reported that he was pretty irritable, could not sleep well, has a lot of difficulties with focusing.      Psychiatric Hospital course:   Jose Francisco Sommers was transferred from [facility] to [facility] as a voluntary patient. His PTA lithium and olanzapine were continued. Patient continued to appear anxious and disorganized. Lamotrigine 25 mg started, first dose on 12/11. Per discussion with the patient and his mom, changes were made to his medication doses and schedule. Discontinued Lamotrigine 25 mg daily (pt did not receive any dose, discontinued on the same day it started). Continued Seroquel 100 mg BID and change the evening dose to 200 mg on 12/12. Change Lithium CR 1200 mg at bedtime to Lithium  QAM and Lithium  mg at bedtime on Saturday, 12/12. Reduced Olanzapine 10 mg BID to 5 mg qam + 10mg at bedtime on 12/14, with increase to 100mg qam + 300mg at bedtime of seroquel on 12/14 then to 100mg qam + 400mg at bedtime of seroquel on 12/16. On 12/17/2020: Increased at bedtime quetiapine to 500 mg and decreased at bedtime olanzapine to 5 mg. On 12/18/2020 no change in quetiapine dose, change olanzapine to 5 mg QDay, every morning.    Over the weekend 12/19 and 12/20 patient had an uneventful weekend. Overall was pleasant and cooperative, no reports of SI. Based on our conversation with his mother, patient also sounded more like himself over the weekend. Patient is stable to discharge. Mother, Ember, is agreeable to the plan. We went over his medications and outpatient follow up plan over the phone.     Discontinued Medications (& Rationale):  - no changes today.     Medical course   PTA medications were resumed. Patient continued to have high BP. Per medicine, discontinued Norvasc, started Propanolol 10 mg TID as will treat underlying anxiety as well as BP/HR. Hydralazine and Clonidine PRN are available PRN. We discontinued prn's. Patient      Outpatient BP trends 130s-140s/80s.  Noted patient has had elevated BP during last 2 psychiatric hospitalizations as well. HTN likely 2/2 psychiatric illness, less likely essential HTN. Trial of amlodipine 12/9 minimal improvement, propranolol started 12/10 and titrated to 20mg TID. Has not required Hydralazine since 12/15.   - Continue Propranolol 20mg TID   - Continue Hydralazine prn per ordered parameters  - Continue to monitor BP - holding any acute changes for now   - Psychiatry to continue to management underlying anxiety/psychiatric illness       Follow up note on 12/19/2020  Medicine following peripherally for BP monitoring.  Current BP improved to 131/85 and 142/92.      A:P:  # Elevated blood pressure reading without diagnosis of HTN:   Outpatient BP trends 130s-140s/80s. Noted patient has had elevated BP during last 2 psychiatric hospitalizations as well. HTN likely 2/2 psychiatric illness, less likely essential HTN. Trial of amlodipine 12/9 minimal improvement, propranolol started 12/10 and titrated to 20mg TID. Has not required Hydralazine since 12/15.   - Continue Propranolol 20mg TID w/ hold parameters  - Discontinued hydralazine as no longer requiring    No further medical intervention is required at this time.    Data:   CBC and CMP on 12/04 - unremarkable        11/12/2020 23:27 11/18/2020 07:40 11/25/2020 07:51 12/11/2020 07:26   Lithium Level 0.44 (L) 0.72 0.79 0.58 (L)      Consults:   - Medicine on 12/10 for HTN and constipation   - Medicine follow up on 12/14:  Continue propranolol, continue treatment of underlying anxiety, continue to utilize hydralazine prn per ordered parameters.  - Medicine follow up on 12/17: Continue propranolol scheduled, hydralazine prn.     Plan      Today's Changes:  - no changes today     Psychotropic Medications:  Scheduled Psych Medications:  - Lithium  mg QAM and 900 mg at bedtime    - Olanzapine 5mg qam    - Seroquel 100 mg qam +  500 mg at bedtime   - propranolol 20mg TID (prescribed by  medicine for HTN, with thought that it would also be beneficial for anxiety)  - Metformin  mg - for weight gain from antipsychotics      PRN Psych Medications  - Hydroxyzine 25 mg PRN Q4H  - Olanzapine 10 mg PO/IM prn Q2H severe agitation/psychosis  - Quetiapine 50 mg QID PRN for agitation  - Trazodone 50 mg at bedtime PRN     Patient will be treated in therapeutic milieu with appropriate individual and group therapies as described.     Medical diagnoses to be addressed this admission:    #Elevated BP without diagnosis of HTN -    - Discontinue Norvasc  - Propanolol 20 mg TID   - Hydralazine and Clonidine PRN     #Constipation   - Start Senna-Docusate 1-2 tabs BID and Miralax 17 g daily. Hold for loose stools.  - Notify Medicine if abdominal pain, n/v, or ongoing constipation    Legal Status:   Orders Placed This Encounter      Legal status Voluntary      Safety Assessment:   Behavioral Orders   Procedures     Assault precautions     Code 1 - Restrict to Unit     Discontinue 1:1 attendant for suicide risk     Order Specific Question:   I have performed an in person assessment of the patient     Answer:   Based on this assessment the patient no longer requires a one on one attendant at this point in time.     Elopement precautions     Routine Programming     As clinically indicated     Single Room     Status 15     Every 15 minutes.     Suicide precautions     Patients on Suicide Precautions should have a Combination Diet ordered that includes a Diet selection(s) AND a Behavioral Tray selection for Safe Tray - with utensils, or Safe Tray - NO utensils         Disposition:  Pending stabilization & development of a safe discharge plan.     The patient was seen and the plan was discussed with the attending physician.     This patient was seen and discussed with my attending physician.  Flory Florian MD    PGY-1 Psychiatry Resident Physician    Psychiatry Attending Attestation:  IMihai, saw and  evaluated the patient with the resident physician.  I agree with the findings and plan of care as documented in the resident note.  I have reviewed all labs and vital signs.

## 2020-12-21 NOTE — PROGRESS NOTES
"Pt denied SI and SIB. Pt ate supper, visible in the milieu, pacing in the hallway while listening to music.  Pt reported he had a good day.  Pt reported he is looking forward to be discharged from the hospital.  Pt talked about ways he will keep active after his discharge \"read neftali stone.\" Pt is independent with ADL's.    "

## 2020-12-21 NOTE — PROGRESS NOTES
The patient visible in milieu, pacing hallway and in his room, sometimes. The patient report to the staff that he is discharging today. The patient denies SI/SIB, visual/auditory hallucinations, as well depression and anxious. Overall, the patient blunted/flat effect, mood is calm, cooperative with staff, and ate all the meals. The patient has no concerns to report at this time.

## 2020-12-21 NOTE — PROGRESS NOTES
Pt discharged with his belongings and medications at 1600. He continues to deny SI. He was picked up by his mom at the building entrance.

## 2020-12-21 NOTE — PLAN OF CARE
Daily CTC Note:    Work Completed:   The patient's care was discussed with the treatment team and chart notes were reviewed.   Patient met with team.  Patient reports he is feeling well- feels stable to leave..  Per staff, patient has shown improvement in insight, decrease in thought disor symmptoms, and stable for discharge.  Mom has been in contact with team and feels  comfortable with patient coming home today.      Discharge plan or goal:   Home with family today  Patient is scheduled for psychiatric f/u    Barriers to discharge:   None

## 2020-12-22 NOTE — TELEPHONE ENCOUNTER
Pt request     Jayda Ball MD  You 42 minutes ago (11:55 AM)     Kavin Madrigal,     Yes we were.  Nikki emailed an IVA to Tamanna Hardy on [facility].  Patient signed it. Tamanna told me she faxed it back on 12/17 to the clinic.  So we should be good to go.       Thank you,   Jayda     Message text

## 2020-12-23 ENCOUNTER — TELEPHONE (OUTPATIENT)
Dept: PHARMACY | Facility: CLINIC | Age: 30
End: 2020-12-23

## 2020-12-23 ENCOUNTER — TELEPHONE (OUTPATIENT)
Dept: FAMILY MEDICINE | Facility: CLINIC | Age: 30
End: 2020-12-23

## 2020-12-23 NOTE — TELEPHONE ENCOUNTER
MTM referral from: Transitions of Care (recent hospital discharge or ED visit)    MTM referral outreach attempt #2 on December 23, 2020 at 2:19 PM      Outcome: Patient not reachable after several attempts, will route to MTM Pharmacist/Provider as an FYI. Thank you for the referral.    Skyler Cha, MTM coordinator

## 2020-12-23 NOTE — TELEPHONE ENCOUNTER
Attempt #1 to call patient.     Patient did not answer, RN Unable to leave VM, inbox is full.     Will try later    Ca Burt RN, BSN, PHN  [facility]: [place]

## 2020-12-28 NOTE — TELEPHONE ENCOUNTER
Attempt #2 to call patient.      Patient did not answer, RN Unable to leave VM, inbox is full.      Ca Burt, RN, BSN, PHN  [facility]: [place]

## 2020-12-30 NOTE — TELEPHONE ENCOUNTER
Attempt to contact patient x 1 week without response.     TC, please send letter requesting to schedule hospital follow up.     Ca Burt RN, BSN, PHN  [facility]: [place]

## 2021-01-04 ENCOUNTER — TELEPHONE (OUTPATIENT)
Dept: PSYCHIATRY | Facility: CLINIC | Age: 31
End: 2021-01-04

## 2021-01-04 ENCOUNTER — VIRTUAL VISIT (OUTPATIENT)
Dept: PSYCHIATRY | Facility: CLINIC | Age: 31
End: 2021-01-04
Attending: PSYCHIATRY & NEUROLOGY
Payer: COMMERCIAL

## 2021-01-04 DIAGNOSIS — K76.0 FATTY LIVER: ICD-10-CM

## 2021-01-04 DIAGNOSIS — F25.0 SCHIZOAFFECTIVE DISORDER, BIPOLAR TYPE (H): Primary | ICD-10-CM

## 2021-01-04 DIAGNOSIS — E78.1 HIGH BLOOD TRIGLYCERIDES: ICD-10-CM

## 2021-01-04 DIAGNOSIS — R03.0 ELEVATED BLOOD PRESSURE READING WITHOUT DIAGNOSIS OF HYPERTENSION: ICD-10-CM

## 2021-01-04 DIAGNOSIS — F29 PSYCHOSIS, UNSPECIFIED PSYCHOSIS TYPE (H): ICD-10-CM

## 2021-01-04 PROCEDURE — 99214 OFFICE O/P EST MOD 30 MIN: CPT | Mod: GC | Performed by: STUDENT IN AN ORGANIZED HEALTH CARE EDUCATION/TRAINING PROGRAM

## 2021-01-04 RX ORDER — METFORMIN HCL 500 MG
500 TABLET, EXTENDED RELEASE 24 HR ORAL
Qty: 30 TABLET | Refills: 1 | Status: SHIPPED | OUTPATIENT
Start: 2021-01-04 | End: 2021-02-22

## 2021-01-04 RX ORDER — LORAZEPAM 0.5 MG/1
0.5 TABLET ORAL 2 TIMES DAILY PRN
Qty: 60 TABLET | Refills: 0 | Status: SHIPPED | OUTPATIENT
Start: 2021-01-04 | End: 2021-01-14

## 2021-01-04 RX ORDER — OLANZAPINE 5 MG/1
5 TABLET ORAL EVERY MORNING
Qty: 60 TABLET | Refills: 1 | Status: SHIPPED | OUTPATIENT
Start: 2021-01-04 | End: 2021-02-22

## 2021-01-04 RX ORDER — PROPRANOLOL HYDROCHLORIDE 20 MG/1
20 TABLET ORAL 3 TIMES DAILY
Qty: 90 TABLET | Refills: 1 | Status: SHIPPED | OUTPATIENT
Start: 2021-01-04 | End: 2021-02-22

## 2021-01-04 RX ORDER — LITHIUM CARBONATE 300 MG/1
TABLET, FILM COATED, EXTENDED RELEASE ORAL
Qty: 120 TABLET | Refills: 1 | Status: SHIPPED | OUTPATIENT
Start: 2021-01-04 | End: 2021-02-22

## 2021-01-04 RX ORDER — QUETIAPINE FUMARATE 100 MG/1
100 TABLET, FILM COATED ORAL EVERY MORNING
Qty: 30 TABLET | Refills: 1 | Status: SHIPPED | OUTPATIENT
Start: 2021-01-04 | End: 2021-01-21

## 2021-01-04 RX ORDER — QUETIAPINE FUMARATE 100 MG/1
500 TABLET, FILM COATED ORAL AT BEDTIME
Qty: 150 TABLET | Refills: 1 | Status: SHIPPED | OUTPATIENT
Start: 2021-01-04 | End: 2021-01-21

## 2021-01-04 NOTE — TELEPHONE ENCOUNTER
Writer received incoming call from patient inquiring about Ativan prescription. He shared being prescribed Ativan today. Writer unable to locate the note but agreed to pass this along to provider. Updated patient that provider was running late but a reminder will be sent again. Patient is wanting the script to be sent to [id number] [place], [place] - [address]  [phone].     Routed to provider

## 2021-01-04 NOTE — TELEPHONE ENCOUNTER
On January 4, 2021, at 8:42 AM, writer called patient at [phone] to confirm Virtual Visit. Writer unable to make contact with patient. Writer unable to leave detailed voice mail message due to voice mail box full. Bela Hanna, [facility]

## 2021-01-04 NOTE — TELEPHONE ENCOUNTER
Patient Call    Jayda Ball MD  You 15 minutes ago (1:28 PM)     Sent in Ativan Rx.  Haven't had time to put in other med orders yet so if he wants to pick them all up together it will be a bit. But ativan should be at pharmacy.     Thank you.   Jayda     Message text      Writer placed a call to patient and updated him of the Ativan script being sent to the pharmacy. Patient agreed with the plan. Also updated him that rest of his meds have not been refilled yet but will be sent to the pharmacy later today. Patient verbalized understanding.

## 2021-01-04 NOTE — PROGRESS NOTES
"Video- Visit Details  Type of service:  video visit for medication management  Time of service:    Date: 01/04/2021    Video Start Time:  10:10am      Video End Time:  10:55am    Reason for video visit:  Services only offered telehealth  Originating Site (patient location):  [facility]   Location- Patient's home  Distant Site (provider location):  [facility]  Mode of Communication:  Video Conference via AmWell  Consent:  Patient has given verbal consent for video visit?: Yes         [facility]  Psychiatry Clinic  PSYCHIATRY PROGRESS NOTE     CARE TEAM:  PCP- Provider, Clinic.  Therapist: None. Community  Team: None     Jose Francisco Sommers is a 30 year old   patient who prefers the name Manuel and uses pronouns he, him, his, himself.     PERTINENT BACKGROUND                                               [most recent eval 08/24/2020]     Psych pertinent item history includes psychosis [sxs include disorganization, possible catatonia], mutiple psychotropic trials, psych hosp (<3) and SUBSTANCE USE: alcohol and cannabis    INTERIM HISTORY                                                                                    [4, 4]   History was provided by the patient and and his mom, Ember, who was a good historian. Treatment adherence is good.  Since the last visit:   - Manuel reports he is \"not great\" today.  - He describes anxiety and \"difficulty thoughts\" which are present during the day and worsen with mental exertion such as reading and at night when trying to fall asleep.   - He describes the thoughts as ruminating and states he is unable to control them.  He finds them to be very distressing.   - He notes these thoughts were present at the hospital but have worsened since his discharge.   - He feels the medications he is currently taking are helpful but is hoping he can be prescribed something to help with the anxiety and thoughts.    - Manuel reports that he has taken " "benzodiazepines in the past and found those to be helpful.  He also mentioned he talked to another patient who was taking Ativan and found it helpful.  Manuel feels the Seroquel he is taking at bedtime currently helps him feel sedated for bed but is not particularly helpful with the difficult thoughts or anxiety.  He reports he has tried Trazodone and Hydroxyzine in the past but feels he needs something \"stronger\" because the symptoms he is experiencing are so distressing.   - We discussed role of benzodiazepines for short term management of anxiety and Manuel expressed his understanding.   - Manuel endorses passive suicidal ideation without intent or plan.  He denies acute safety concerns including acute lauren, acute psychosis, SIB, and HI.     RECENT PSYCH ROS:   Depression:  low energy and passive suicidal ideaion without intent or plan  Elevated:  none   Psychosis:  none  Anxiety:  nervous/overwhelmed, ruminating difficult to control thoughts   Trauma Related:  none  Eating Disorder: no     Adverse Effects:  Patient denies adverse effects of medications   Pertinent Negative Symptoms: No suicidal ideation, self-injurious behavior/urges, violent ideation, aggression, psychosis and lauren  Recent Substance Use:     None reported    FAMILY and SOCIAL HISTORY                                    [1ea, 1ea]       pt reported         Family Hx:   Paternal grandfather: alcoholism; Maternal grandmother: \"long-standing mental health issues\"; Maternal Aunt: Anxiety when young; Maternal Aunt: Anxiety and depression      CURRENT SOCIAL HISTORY:  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          PSYCH and SUBSTANCE USE Critical Summary Points since July 2020 08/24/2020: Resident transfer visit, Olanzapine decreased from 7.5mg to 5mg  09/30/2020: Olanzapine decreased to 2.5mg then discontinued  10/08/2020: Family meeting with mom, no medication changes  11/04/2020: No medication changes, referred to MTM  Interim; Patient hospitalized twice, please see discharge summaries for details   01/04/2021: Started Ativan 0.5mg BID PRN     PAST MED TRIALS                                                            Adderall 10-20 mg - improved concentration, but \"cold personality\" and perseveration  Prozac 20 mg - limited response, less irritable, first episode of \"rage\"  Seroquel 25-75 mg - helped with sleep and irritability  Concerta 18 mg - improved focus, no improvement in motivation  Provigil 150 mg - Stimulants \"almost killed him\" triggered alcohol binges  Abilify ?25 mg - OK when QOD, but irritable and agitated on QD  Clonazepam 0.5-1.5 mg - calming, helped with sleep and \"catatonia\"  Risperdal up to 5 mg - some confusion, slow processing, withdrawn, ?\"catatonia\", panic attacks  Zyprexa 5 mg less anxious overall improvement  Lithium - calmer, overall better  Latuda 20 mg - severe fatigue and depression  Synthroid 75 mcg - added to help with mood.  Brief trials with Strattera, Intuniv, Lamictal, Xanax, Zoloft      MEDICAL HISTORY and ALLERGY     ALLERGIES: Sulfa drugs     Patient Active Problem List   Diagnosis     Schizoaffective disorder, bipolar type (H)     Hx of psychiatric care     Elevated liver enzymes     Fatty liver     High blood triglycerides     History of cannabis abuse     History of cocaine abuse (H)     Psychosis (H)     Elevated blood pressure reading without " "diagnosis of hypertension     Constipation         MEDICAL REVIEW OF SYSTEMS                                                           [2, 10]    Contraception- Unkown    A comprehensive review of systems was performed and is negative other than noted in the HPI.    MEDICATIONS        Current Outpatient Medications   Medication Sig Dispense Refill     lithium (ESKALITH CR/LITHOBID) 450 MG CR tablet Take 2 tablets (900 mg) by mouth At Bedtime 60 tablet 0     lithium ER (LITHOBID) 300 MG CR tablet Take 1 tablet (300 mg) by mouth every morning 60 tablet 0     metFORMIN (GLUCOPHAGE-XR) 500 MG 24 hr tablet Take 1 tablet (500 mg) by mouth daily (with dinner) 30 tablet 0     metFORMIN (GLUCOPHAGE-XR) 500 MG 24 hr tablet Take 1 tablet (500 mg) by mouth daily (with dinner) 30 tablet 0     OLANZapine (ZYPREXA) 5 MG tablet Take 1 tablet (5 mg) by mouth every morning 60 tablet 0     polyethylene glycol (MIRALAX) 17 g packet Take 17 g by mouth daily 30 packet 0     propranolol (INDERAL) 20 MG tablet Take 1 tablet (20 mg) by mouth 3 times daily 90 tablet 0     QUEtiapine (SEROQUEL) 100 MG tablet Take 1 tablet (100 mg) by mouth every morning 60 tablet 0     QUEtiapine (SEROQUEL) 100 MG tablet Take 5 tablets (500 mg) by mouth At Bedtime 60 tablet 0     senna-docusate (SENOKOT-S/PERICOLACE) 8.6-50 MG tablet Take 2 tablets by mouth daily 60 tablet 0     VITALS                                                                                                                               [3, 3]   There were no vitals taken for this visit.   MENTAL STATUS EXAM                                                              [9, 14 cog gs]     Alertness: alert  and oriented  Appearance: well groomed  Behavior/Demeanor: cooperative, pleasant and calm, with good  eye contact   Speech: normal and regular rate and rhythm  Language: intact and no problems  Psychomotor: normal or unremarkable  Mood: \"not great\"  Affect: full range; congruent to: " mood- yes, content- yes  Thought Process/Associations: unremarkable  Thought Content:  Reports none;  Denies suicidal & violent ideation and delusions  Perception:  Reports none;  Denies hallucinations  Insight: good  Judgment: good  Cognition: (6) oriented: time, person, and place  attention span: intact  concentration: intact  recent memory: intact  remote memory: intact  fund of knowledge: appropriate  Gait and Station: N/A (telehealth)    LABS and DATA     PHQ9 TODAY = N/A  PHQ 11/27/2019 12/26/2019 11/4/2020   PHQ-9 Total Score 3 3 12   Q9: Thoughts of better off dead/self-harm past 2 weeks Not at all Not at all Not at all       Recent Labs   Lab Test 12/04/20  1414 11/13/20  0758 03/04/20  1001   CR 1.11 1.10 1.09   GFRESTIMATED 89 >90 >90     Recent Labs   Lab Test 12/04/20  1414 11/13/20  0758 06/01/20  1339   AST 28 26 26   ALT 46 39 42   ALKPHOS 78 71 73     ANTIPSYCHOTIC LABS  [glu, A1C, lipids (ivania LDL), liver enzymes, WBC, ANEU, Hgb, plts]  q12 mo  Recent Labs   Lab Test 12/04/20  1414 11/13/20  0758 06/01/20  1339 03/04/20  1001 11/08/19  1020 05/07/19  1101 12/06/18  1442 09/30/16  1348 09/30/16  1348   GLC 80 82  --  75 93 88 86   < > 83   A1C  --   --  4.7  --   --  4.9 5.0  --  4.6    < > = values in this interval not displayed.     Recent Labs   Lab Test 11/13/20  0758 06/01/20  1339 05/07/19  1101 12/13/18  1027   CHOL 111 136 141 173   TRIG 211* 260* 195* 278*   LDL 20 43 63 81   HDL 49 41 39* 36*     Recent Labs   Lab Test 12/04/20  1414 11/13/20  0758 06/01/20  1339 05/07/19  1101   AST 28 26 26 26   ALT 46 39 42 52   ALKPHOS 78 71 73 69     Recent Labs   Lab Test 12/04/20  1414 11/13/20  0758 06/01/20  1339 05/07/19  1101 12/06/18  1442   WBC 11.0 12.9* 7.5 7.3 8.9   ANEU 8.0  --  4.8 4.5 5.7   HGB 14.3 15.1 15.1 15.0 15.8    254 265 247 270     LITHIUM LABS     [level, renal, SG, TSH, WBC]    q6 mo  Recent Labs   Lab Test 12/16/20  0718 12/11/20  0726 11/25/20  0751 11/18/20  0740    LITHIUM 0.84 0.58* 0.79 0.72     Recent Labs   Lab Test 12/04/20  1414 11/13/20  0758 03/04/20  1001 11/08/19  1020   CR 1.11 1.10 1.09 1.12   GFRESTIMATED 89 >90 >90 89    142 139 138   POTASSIUM 4.1 4.4 3.8 3.5   ADONAY 9.4 9.4 10.1 9.2     Recent Labs   Lab Test 11/08/19  1024 05/07/19  1101 12/06/18  1505 10/30/17  1429   SG 1.015 1.015 1.009 1.015     Recent Labs   Lab Test 12/04/20  1414 11/13/20  0758 06/01/20  1339 03/04/20  1001   TSH 2.01 1.21 2.51 5.82*     Recent Labs   Lab Test 12/04/20  1414 11/13/20  0758 06/01/20  1339 05/07/19  1101 12/06/18  1442   WBC 11.0 12.9* 7.5 7.3 8.9   ANEU 8.0  --  4.8 4.5 5.7     PSYCHOTROPIC DRUG INTERACTIONS     None    MANAGEMENT:  Monitoring for adverse effects and routine labs    RISK STATEMENT for SAFETY     Jose Francisco Sommers did not appear to be an imminent safety risk to self or others.    DIAGNOSES                                                                                         [m2, h3]    Mood disorder, unspecified   ADHD, predominantly inattentive type     ASSESSMENT                                                                                      [m2, h3]        Today Manuel returned accompanied by his mom, Ember, for continued medication management after two recent inpatient hospitalizations.  He reports general mood stability on his current medication regimen, however describes distressing anxiety and difficult thoughts that are making falling asleep especially challenging.  Manuel expressed desire for a short term course of a benzodiazepine to help with his symptoms and given the level of acute distress and recent stressors it is a reasonable option.  We will start a short term course of Ativan, for approx two months.  If further anxiety management is needed beyond that timeframe we will consider other medication classes. Manuel has successfully completed short term courses of benzodiazepines in the past.  He denies acute safety concerns  today.     Future considerations:  Can consider repeat neuropsych testing for further clarity related to processing difficulties.     MNPMP was checked today:  Indicates no medication misuse .    PLAN                                                                                                                [m2, h3]      1) Meds  - Start Ativan 0.5mg BID PRN   - Continue Olanzapine 5mg daily  - Continue Lithium 300mg daily and 900mg at bedtime  - Continue Seroquel 100mg daily and 500mg at bedtime   - Continue Metformin 500mg daily with dinner.      - Continue Propranolol 20mg TID for hypertension      2) Therapy-  recommended    3) Next Due   Labs- N/A  EKG- PRN  Rating scales- N/A    4) Referrals  No Referrals needed     5) RTC: Feb 22nd    6) Crisis Numbers:   Provided routinely in AVS     After hours:  [phone]      TREATMENT RISK STATEMENT:  The risks, benefits, alternatives and potential adverse effects have been discussed and are understood by the pt. The pt understands the risks of using street drugs or alcohol. There are no medical contraindications, the pt agrees to treatment with the ability to do so. The pt knows to call the clinic for any problems or to access emergency care if needed.  Medical and substance use concerns are documented above.  Psychotropic drug interaction check was done, including changes made today.      PROVIDER:  Jayda Ball DO, MBA, MS    Patient staffed in clinic with Dr. Lyons who will sign the note.  Supervisor is Dr. Tay.  Ativan prescription was signed by Dr. Tay after case was discussed with him because patient called and requested prescription prior to Dr. Lyons's availability to sign prescription.     TELEHEALTH ATTENDING ATTESTATION  Following the ACGME guidelines on telemedicine and direct supervision due to COVID-19, I was concurrently participating in and/or monitoring the patient care through appropriate telecommunication technology.  I discussed the key  portions of the service with the resident, including the mental status examination and developing the plan of care. I reviewed key portions of the history with the resident. I agree with the findings and plan as documented in this note.  We saw Manuel today for follow-up following his hospitalization for lauren.  He reports mostly doing well from a mood perspective, but reports increased anxiety.  He will continue his usual medications, including lithium, olanzapine, Seroquel, and metformin which was recently started to target medication induced weight gain.  He accepts a trial of short-term Ativan, for no more than 2 months, to target anxiety.  He will return for follow-up in 2 months.  Dinh Lyons MD

## 2021-01-12 DIAGNOSIS — F25.0 SCHIZOAFFECTIVE DISORDER, BIPOLAR TYPE (H): ICD-10-CM

## 2021-01-12 RX ORDER — LORAZEPAM 1 MG/1
1 TABLET ORAL 2 TIMES DAILY PRN
Qty: 60 TABLET | Refills: 1 | Status: CANCELLED | OUTPATIENT
Start: 2021-01-18

## 2021-01-14 ENCOUNTER — VIRTUAL VISIT (OUTPATIENT)
Dept: PSYCHIATRY | Facility: CLINIC | Age: 31
End: 2021-01-14
Attending: PSYCHIATRY & NEUROLOGY
Payer: COMMERCIAL

## 2021-01-14 DIAGNOSIS — F25.0 SCHIZOAFFECTIVE DISORDER, BIPOLAR TYPE (H): ICD-10-CM

## 2021-01-14 PROCEDURE — 99214 OFFICE O/P EST MOD 30 MIN: CPT | Mod: GC | Performed by: STUDENT IN AN ORGANIZED HEALTH CARE EDUCATION/TRAINING PROGRAM

## 2021-01-14 RX ORDER — ARIPIPRAZOLE 2 MG/1
1 TABLET ORAL DAILY
Qty: 30 TABLET | Refills: 1 | Status: SHIPPED | OUTPATIENT
Start: 2021-01-14 | End: 2021-01-21

## 2021-01-14 RX ORDER — OLANZAPINE 2.5 MG/1
TABLET, FILM COATED ORAL
Qty: 7 TABLET | Refills: 0 | Status: SHIPPED | OUTPATIENT
Start: 2021-01-14 | End: 2021-02-22

## 2021-01-14 ASSESSMENT — PAIN SCALES - GENERAL: PAINLEVEL: NO PAIN (0)

## 2021-01-14 NOTE — PROGRESS NOTES
"VIDEO VISIT  Jose Francisco Sommers is a 30 year old patient who is being evaluated via a billable video visit.      The patient has been notified of following:   \"This video visit will be conducted via a call between you and your physician/provider. We have found that certain health care needs can be provided without the need for an in-person physical exam. This service lets us provide the care you need with a video conversation. If a prescription is necessary we can send it directly to your pharmacy. If lab work is needed we can place an order for that and you can then stop by our lab to have the test done at a later time. Insurers are generally covering virtual visits as they would in-office visits so billing should not be different than normal.  If for some reason you do get billed incorrectly, you should contact the billing office to correct it and that number is in the AVS .    Video Conference to be completed via:  Elizabeth    Patient has given verbal consent for video visit?:  Yes    Patient would prefer that any video invitations be sent by: Text to cell phone: [phone]      How would patient like to obtain AVS?:  [employer]orgi    AVS SmartPhrase [PsychAVS] has been placed in 'Patient Instructions':  Yes    "

## 2021-01-14 NOTE — PATIENT INSTRUCTIONS
Thank you for coming to the [facility] [place] CL[facility]IC.    Lab Testing:  If you had lab testing today and your results are reassuring or normal they will be mailed to you or sent through [employer] within 7 days. If the lab tests need quick action we will call you with the results. The phone number we will call with results is # [phone] (home) . If this is not the best number please call our clinic and change the number.    Medication Refills:  If you need any refills please call your pharmacy and they will contact us. Our fax number for refills is [phone]. Please allow three business for refill processing. If you need to  your refill at a new pharmacy, please contact the new pharmacy directly. The new pharmacy will help you get your medications transferred.     Scheduling:  If you have any concerns about today's visit or wish to schedule another appointment please call our office during normal business hours [phone] (8-5:00 M-F)    Contact Us:  Please call [phone] during business hours (8-5:00 M-F).  If after clinic hours, or on the weekend, please call  [phone].    Financial Assistance [phone]  [facility] Billing [phone]  Central Billing Office, [facility]: [phone]  [facility] Billing [phone]  Medical Records [phone]  [facility] Patient Bill of Rights [email or web address]       MENTAL HEALTH CRISIS NUMBERS:  For a medical emergency please call  911 or go to the nearest ER.     [facility]:   [facility] -[phone]   Crisis Residence [facility] -[phone]   Walk-In Counseling Center [facility] -[phone]   COPE 24/7 [place] Mobile Team -[phone] (adults)/[phone] (child)  CHILD: [facility] needs assessment team - [phone]      [facility]:   [facility] - [phone]   Walk-in counseling [facility]  [facility] - [phone]   Walk-in counseling [facility] - [phone]   Crisis Residence [facility] - [phone]  Urgent Care Adult Mental [facility]-[phone] mobile unit/ 24/7 crisis line    National Crisis Numbers:   National Suicide Prevention Lifeline: 9-995-674-TALK (155-271-9275)  Poison Control Center - 1-316.423.5694  [employer]/resources for a list of additional resources (SOS)  Trans Lifeline a hotline for transgender people 9-281-670-7793  [employer] a hotline for LGBT youth 1-627.940.1218  Crisis Text Line: For any crisis 24/7   To: 396552  see www.crisistextline.org  - IF MAKING A CALL FEELS TOO HARD, send a text!         Again thank you for choosing [facility] MENTAL HEALTH & ADDICTION [facility] and please let us know how we can best partner with you to improve you and your family's health.    You may be receiving a survey regarding this appointment. We would love to have your feedback, both positive and negative. The survey is done by an external company, so your answers are anonymous.

## 2021-01-19 RX ORDER — LORAZEPAM 1 MG/1
1 TABLET ORAL 2 TIMES DAILY PRN
Qty: 60 TABLET | Refills: 1 | Status: SHIPPED | OUTPATIENT
Start: 2021-01-19 | End: 2021-02-22

## 2021-01-21 DIAGNOSIS — F25.0 SCHIZOAFFECTIVE DISORDER, BIPOLAR TYPE (H): ICD-10-CM

## 2021-01-21 RX ORDER — ARIPIPRAZOLE 2 MG/1
2 TABLET ORAL DAILY
Qty: 30 TABLET | Refills: 1 | Status: SHIPPED | OUTPATIENT
Start: 2021-01-21 | End: 2021-02-22

## 2021-01-27 NOTE — PROGRESS NOTES
"Video- Visit Details  Type of service:  video visit for medication management  Time of service:    Date: 01/14/2021    Video Start Time:  8:00am     Video End Time:  8:30am    Reason for video visit:  Services only offered telehealth  Originating Site (patient location):  [facility]   Location- Patient's home  Distant Site (provider location):  [facility]  Mode of Communication:  Video Conference via [employer]  Consent:  Patient has given verbal consent for video visit?: Yes         [facility]  Psychiatry Clinic  PSYCHIATRY PROGRESS NOTE     CARE TEAM:  PCP- Provider, Clinic.  Therapist: None. Community  Team: None     Jose Francisco Sommers is a 30 year old   patient who prefers the name Manuel and uses pronouns he, him, his, himself.     PERTINENT BACKGROUND                                               [most recent eval 08/24/2020]     Psych pertinent item history includes psychosis [sxs include disorganization, possible catatonia], mutiple psychotropic trials, psych hosp (<3) and SUBSTANCE USE: alcohol and cannabis    INTERIM HISTORY                                                                                    [4, 4]   History was provided by the patient and and his mom, Ember, who was a good historian. Treatment adherence is good.  Since the last visit:   - Manuel reports he is doing \"about the same\" as at our last appointment 10 days ago.   - Much of our conversation today was spent discussing the medication changes Manuel requested over MyChart.   - I let Manuel and his mom know that my recommendation, along with Dr. Lyons and Dr. Tay's recommendation is to not make further medication changes at this time.   - Manuel reported he would like to taper off the Olanzapine due to concerns of grogginess and weight gain.   - We discussed replacing Olanzapine with another neuroleptic and Manuel was agreeable to starting Abilify.  Manuel's mom reported he has been on 1mg in the past " "and requested starting at this dose.    - Manuel endorses occasional passive suicidal ideation without intent or plan.  He denies acute safety concerns including acute lauren, acute psychosis, SIB, and HI.     RECENT PSYCH ROS:   Depression:  anhedonia, low energy and passive suicidal ideaion without intent or plan  Elevated:  none   Psychosis:  none  Anxiety:  nervous/overwhelmed, ruminating difficult to control thoughts   Trauma Related:  none  Eating Disorder: no     Adverse Effects:  Patient reports Olanzapine causes grogginess.  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          PSYCH and SUBSTANCE USE Critical Summary Points since July 2020 08/24/2020: Resident transfer visit, Olanzapine decreased from 7.5mg to 5mg  09/30/2020: Olanzapine decreased to 2.5mg then discontinued  10/08/2020: Family meeting with mom, no medication changes  11/04/2020: No medication changes, referred " "to [facility]  Interim; Patient hospitalized twice, please see discharge summaries for details   01/04/2021: Started Ativan 0.5mg BID PRN   01/14/2021: Increased ativan to 1mg BID PRN, discontinued Olanzapine, started Abilify    PAST MED TRIALS                                                            Adderall 10-20 mg - improved concentration, but \"cold personality\" and perseveration  Prozac 20 mg - limited response, less irritable, first episode of \"rage\"  Seroquel 25-75 mg - helped with sleep and irritability  Concerta 18 mg - improved focus, no improvement in motivation  Provigil 150 mg - Stimulants \"almost killed him\" triggered alcohol binges  Abilify ?25 mg - OK when QOD, but irritable and agitated on QD  Clonazepam 0.5-1.5 mg - calming, helped with sleep and \"catatonia\"  Risperdal up to 5 mg - some confusion, slow processing, withdrawn, ?\"catatonia\", panic attacks  Zyprexa 5 mg less anxious overall improvement  Lithium - calmer, overall better  Latuda 20 mg - severe fatigue and depression  Synthroid 75 mcg - added to help with mood.  Brief trials with Strattera, Intuniv, Lamictal, Xanax, Zoloft      MEDICAL HISTORY and ALLERGY     ALLERGIES: Sulfa drugs     Patient Active Problem List   Diagnosis     Schizoaffective disorder, bipolar type (H)     Hx of psychiatric care     Elevated liver enzymes     Fatty liver     High blood triglycerides     History of cannabis abuse     History of cocaine abuse (H)     Psychosis (H)     Elevated blood pressure reading without diagnosis of hypertension     Constipation         MEDICAL REVIEW OF SYSTEMS                                                           [2, 10]    Contraception- Unkown    A comprehensive review of systems was performed and is negative other than noted in the HPI.    MEDICATIONS        Current Outpatient Medications   Medication Sig Dispense Refill     lithium (ESKALITH CR/LITHOBID) 450 MG CR tablet Take 2 tablets (900 mg) by mouth At Bedtime 60 tablet 0     " "lithium ER (LITHOBID) 300 MG CR tablet Take 1 tablet (300mg) in the morning and 3 tablets (900mg) at bedtime 120 tablet 1     lithium ER (LITHOBID) 300 MG CR tablet Take 1 tablet (300 mg) by mouth every morning 60 tablet 0     LORazepam (ATIVAN) 1 MG tablet Take 1 tablet (1 mg) by mouth 2 times daily as needed for anxiety 60 tablet 1     metFORMIN (GLUCOPHAGE-XR) 500 MG 24 hr tablet Take 1 tablet (500 mg) by mouth daily (with dinner) 30 tablet 1     metFORMIN (GLUCOPHAGE-XR) 500 MG 24 hr tablet Take 1 tablet (500 mg) by mouth daily (with dinner) 30 tablet 0     OLANZapine (ZYPREXA) 2.5 MG tablet Take 1 tablet at bedtime for 7 days then discontinue 7 tablet 0     OLANZapine (ZYPREXA) 5 MG tablet Take 1 tablet (5 mg) by mouth every morning 60 tablet 1     propranolol (INDERAL) 20 MG tablet Take 1 tablet (20 mg) by mouth 3 times daily 90 tablet 1     ARIPiprazole (ABILIFY) 2 MG tablet Take 1 tablet (2 mg) by mouth daily 30 tablet 1     QUEtiapine (SEROQUEL) 100 MG tablet Take 1 tablet (100 mg) by mouth every morning And 1 tab (100 mg) with 1 tab (400 mg) for bedtime dose of 500 mg 60 tablet 0     QUEtiapine (SEROQUEL) 400 MG tablet Take 1 tablet (400 mg) by mouth At Bedtime Along with 1 tab (100 mg) for bedtime dose of 500 mg 30 tablet 0     senna-docusate (SENOKOT-S/PERICOLACE) 8.6-50 MG tablet Take 2 tablets by mouth daily 60 tablet 0     VITALS                                                                                                                               [3, 3]   There were no vitals taken for this visit.   MENTAL STATUS EXAM                                                              [9, 14 cog gs]     Alertness: alert  and oriented  Appearance: well groomed  Behavior/Demeanor: cooperative, pleasant and calm, with good  eye contact   Speech: normal and regular rate and rhythm  Language: intact and no problems  Psychomotor: normal or unremarkable  Mood: \"about the same\"  Affect: full range; congruent " to: mood- yes, content- yes  Thought Process/Associations: unremarkable  Thought Content:  Reports none;  Denies suicidal & violent ideation and delusions  Perception:  Reports none;  Denies hallucinations  Insight: good  Judgment: good  Cognition: (6) oriented: time, person, and place  attention span: intact  concentration: intact  recent memory: intact  remote memory: intact  fund of knowledge: appropriate  Gait and Station: N/A (telehealth)    LABS and DATA     PHQ9 TODAY = N/A  PHQ 11/27/2019 12/26/2019 11/4/2020   PHQ-9 Total Score 3 3 12   Q9: Thoughts of better off dead/self-harm past 2 weeks Not at all Not at all Not at all       Recent Labs   Lab Test 12/04/20  1414 11/13/20  0758 03/04/20  1001   CR 1.11 1.10 1.09   GFRESTIMATED 89 >90 >90     Recent Labs   Lab Test 12/04/20  1414 11/13/20  0758 06/01/20  1339   AST 28 26 26   ALT 46 39 42   ALKPHOS 78 71 73     ANTIPSYCHOTIC LABS  [glu, A1C, lipids (ivania LDL), liver enzymes, WBC, ANEU, Hgb, plts]  q12 mo  Recent Labs   Lab Test 12/04/20  1414 11/13/20  0758 06/01/20  1339 03/04/20  1001 11/08/19  1020 05/07/19  1101 12/06/18  1442 09/30/16  1348 09/30/16  1348   GLC 80 82  --  75 93 88 86   < > 83   A1C  --   --  4.7  --   --  4.9 5.0  --  4.6    < > = values in this interval not displayed.     Recent Labs   Lab Test 11/13/20  0758 06/01/20  1339 05/07/19  1101 12/13/18  1027   CHOL 111 136 141 173   TRIG 211* 260* 195* 278*   LDL 20 43 63 81   HDL 49 41 39* 36*     Recent Labs   Lab Test 12/04/20  1414 11/13/20  0758 06/01/20  1339 05/07/19  1101   AST 28 26 26 26   ALT 46 39 42 52   ALKPHOS 78 71 73 69     Recent Labs   Lab Test 12/04/20  1414 11/13/20  0758 06/01/20  1339 05/07/19  1101 12/06/18  1442   WBC 11.0 12.9* 7.5 7.3 8.9   ANEU 8.0  --  4.8 4.5 5.7   HGB 14.3 15.1 15.1 15.0 15.8    254 265 247 270     LITHIUM LABS     [level, renal, SG, TSH, WBC]    q6 mo  Recent Labs   Lab Test 12/16/20  0718 12/11/20  0726 11/25/20  0751 11/18/20  0740    LITHIUM 0.84 0.58* 0.79 0.72     Recent Labs   Lab Test 12/04/20  1414 11/13/20  0758 03/04/20  1001 11/08/19  1020   CR 1.11 1.10 1.09 1.12   GFRESTIMATED 89 >90 >90 89    142 139 138   POTASSIUM 4.1 4.4 3.8 3.5   ADONAY 9.4 9.4 10.1 9.2     Recent Labs   Lab Test 11/08/19  1024 05/07/19  1101 12/06/18  1505 10/30/17  1429   SG 1.015 1.015 1.009 1.015     Recent Labs   Lab Test 12/04/20  1414 11/13/20  0758 06/01/20  1339 03/04/20  1001   TSH 2.01 1.21 2.51 5.82*     Recent Labs   Lab Test 12/04/20  1414 11/13/20  0758 06/01/20  1339 05/07/19  1101 12/06/18  1442   WBC 11.0 12.9* 7.5 7.3 8.9   ANEU 8.0  --  4.8 4.5 5.7     PSYCHOTROPIC DRUG INTERACTIONS     None    MANAGEMENT:  Monitoring for adverse effects and routine labs    RISK STATEMENT for SAFETY     Jose Francisco Sommers did not appear to be an imminent safety risk to self or others.    DIAGNOSES                                                                                         [m2, h3]    Mood disorder, unspecified  (Primary Psychiatric Diagnosis upon discharge 12/21/2020 per Bon Florian and Aakash was Schizoaffective disorder, bipolar type, lauren, severe with psychotic features)  ADHD, predominantly inattentive type     ASSESSMENT                                                                                      [m2, h3]        Today Manuel returned accompanied by his mom, Embre, for continued medication management.  He requested an earlier appointment after sending a [facility] message inquiring about making numerous medication changes.   Manuel and his mom were informed the recommendation of this writer, Dr. Lyons, and Dr. Tay was to not make any medication changes.  We feel it is important for Manuel to maintain a period of stability before adjusting brain chemistry futher.      Manuel understood our recommendations and wanted to move forward with tapering his Olanzapine due to metabolic side effects/risks.  We suggested replacing it with another  neuroleptic medication with lower risk of metabolic side effects and agreed to start Abilify.  Although this is not equivalent dose to 5mg of Olanzapine, we are starting at 1mg per request of Manuel and his mom.     Manuel denied acute safety concerns including SI, SIB, HI and he feels safe at home.     Future considerations:  Can consider repeat neuropsych testing for further clarity related to processing difficulties.  Consider optimizing Abilify for psychosis coverage.     [place] was [place] today:  Indicates no medication misuse .    PLAN                                                                                                                [m2, h3]      1) Meds  - Increase Ativan to 1 mg BID PRN for anxiety  - Decrease Olanzapine to  2.5mg daily for one week then discontinue  - Start Abilify 1mg daily  - Continue Lithium 300mg daily and 900mg at bedtime  - Continue Seroquel 100mg daily and 500mg at bedtime   - Continue Metformin 500mg daily with dinner.      - Continue Propranolol 20mg TID for hypertension      2) Therapy-  recommended    3) Next Due   Labs- N/A  EKG- PRN  Rating scales- N/A    4) Referrals  No Referrals needed     5) RTC: Feb 22nd    6) Crisis Numbers:   Provided routinely in AVS     After hours:  [phone]      TREATMENT RISK STATEMENT:  The risks, benefits, alternatives and potential adverse effects have been discussed and are understood by the pt. The pt understands the risks of using street drugs or alcohol. There are no medical contraindications, the pt agrees to treatment with the ability to do so. The pt knows to call the clinic for any problems or to access emergency care if needed.  Medical and substance use concerns are documented above.  Psychotropic drug interaction check was done, including changes made today.      PROVIDER:  Jayda Ball DO, MBA, MS    Patient staffed via video with Dr. Tay who will sign the note.  Supervisor is Dr. Tay.        TELEHEALTH ATTENDING  ATTESTATION  Following the ACGME guidelines on telemedicine and direct supervision due to COVID-19, I was concurrently participating in and/or monitoring the patient care through appropriate telecommunication technology.  I discussed the key portions of the service with the resident, including the mental status examination and developing the plan of care. I reviewed key portions of the history with the resident. I agree with the findings and plan as documented in this note.   Joel Tay MD

## 2021-02-08 ENCOUNTER — VIRTUAL VISIT (OUTPATIENT)
Dept: PSYCHOLOGY | Facility: CLINIC | Age: 31
End: 2021-02-08
Payer: COMMERCIAL

## 2021-02-08 ENCOUNTER — FCC EXTENDED DOCUMENTATION (OUTPATIENT)
Dept: PSYCHOLOGY | Facility: CLINIC | Age: 31
End: 2021-02-08

## 2021-02-08 DIAGNOSIS — F25.0 SCHIZOAFFECTIVE DISORDER, BIPOLAR TYPE (H): Primary | ICD-10-CM

## 2021-02-08 PROCEDURE — 90834 PSYTX W PT 45 MINUTES: CPT | Mod: 95 | Performed by: SOCIAL WORKER

## 2021-02-08 ASSESSMENT — COLUMBIA-SUICIDE SEVERITY RATING SCALE - C-SSRS
5. HAVE YOU STARTED TO WORK OUT OR WORKED OUT THE DETAILS OF HOW TO KILL YOURSELF? DO YOU INTEND TO CARRY OUT THIS PLAN?: NO
1. IN THE PAST MONTH, HAVE YOU WISHED YOU WERE DEAD OR WISHED YOU COULD GO TO SLEEP AND NOT WAKE UP?: YES
1. IN THE PAST MONTH, HAVE YOU WISHED YOU WERE DEAD OR WISHED YOU COULD GO TO SLEEP AND NOT WAKE UP?: NO
2. HAVE YOU ACTUALLY HAD ANY THOUGHTS OF KILLING YOURSELF LIFETIME?: YES

## 2021-02-08 ASSESSMENT — ANXIETY QUESTIONNAIRES
GAD7 TOTAL SCORE: 3
7. FEELING AFRAID AS IF SOMETHING AWFUL MIGHT HAPPEN: NOT AT ALL
GAD7 TOTAL SCORE: 3
3. WORRYING TOO MUCH ABOUT DIFFERENT THINGS: NOT AT ALL
6. BECOMING EASILY ANNOYED OR IRRITABLE: NOT AT ALL
7. FEELING AFRAID AS IF SOMETHING AWFUL MIGHT HAPPEN: NOT AT ALL
2. NOT BEING ABLE TO STOP OR CONTROL WORRYING: SEVERAL DAYS
GAD7 TOTAL SCORE: 3
4. TROUBLE RELAXING: SEVERAL DAYS
1. FEELING NERVOUS, ANXIOUS, OR ON EDGE: SEVERAL DAYS
5. BEING SO RESTLESS THAT IT IS HARD TO SIT STILL: NOT AT ALL

## 2021-02-08 ASSESSMENT — PATIENT HEALTH QUESTIONNAIRE - PHQ9
SUM OF ALL RESPONSES TO PHQ QUESTIONS 1-9: 10
10. IF YOU CHECKED OFF ANY PROBLEMS, HOW DIFFICULT HAVE THESE PROBLEMS MADE IT FOR YOU TO DO YOUR WORK, TAKE CARE OF THINGS AT HOME, OR GET ALONG WITH OTHER PEOPLE: VERY DIFFICULT
SUM OF ALL RESPONSES TO PHQ QUESTIONS 1-9: 10
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
## 2021-02-08 NOTE — PROGRESS NOTES
Progress Note - Initial Visit    Client Name:  JoseF rancisco Sommers Date: 2021         Service Type: Individual     Visit Start Time: 10:05  Visit End Time: 10:52  Visit Time:47    Visit #: 1    Attendees: Client attended alone    Service Modality:  Video Visit:      Provider verified identity through the following two step process.  Patient provided:  Patient  and Patient address    Telemedicine Visit: The patient's condition can be safely assessed and treated via synchronous audio and visual telemedicine encounter.      Reason for Telemedicine Visit: Services only offered telehealth    Originating Site (Patient Location): Patient's home    Distant Site (Provider Location): Provider Remote Setting    Consent:  The patient/guardian has verbally consented to: the potential risks and benefits of telemedicine (video visit) versus in person care; bill my insurance or make self-payment for services provided; and responsibility for payment of non-covered services.     Patient would like the video invitation sent by:  Text to cell phone: [phone]    Mode of Communication:  Video Conference via Amwell    As the provider I attest to compliance with applicable laws and regulations related to telemedicine.       DATA:   Interactive Complexity: No   Crisis: No     Presenting Concerns/  Current Stressors:   Patient reports experiencing depression after the experience of a psychotic episode.      ASSESSMENT:  Mental Status Assessment:  Appearance:   Appropriate   Eye Contact:   Fair   Psychomotor Behavior: Normal   Attitude:   Cooperative  Indifferent  Orientation:   All  Speech   Rate / Production: Monotone    Volume:  Normal   Mood:    Anxious   Affect:    Blunted   Thought Content:  Perservative   Thought Form:  Coherent  [place]  Insight:    Fair       Safety Issues and Plan for Safety and Risk Management:     [place] Suicide Severity Rating Scale (Lifetime/Recent)  [place] Suicide Severity Rating  (Lifetime/Recent) 12/19/2020 12/19/2020 12/20/2020 12/20/2020 12/21/2020 12/21/2020 2/8/2021   1. Wish to be Dead (Lifetime) - - - - - - Yes   1. Wish to be Dead (Recent) No No No No No No No   Wish to be Dead Description (Recent) denies - denies - denies - -   Comments - - - - - - -   2. Non-Specific Active Suicidal Thoughts (Lifetime) - - - - - - Yes   2. Non-Specific Active Suicidal Thoughts (Recent) No No No No No No -   Non-Specific Active Suicidal Thought Description (Recent) denies - denies - denies - -   Comments - - - - - - -   3. Active Suicidal Ideation with any Methods (Not Plan) Without Intent to Act (Lifetime) - - - - - - -   3. Active Suicidal Ideation with any Methods (Not Plan) Without Intent to Act (Recent) No No No No No No -   Active Suicidal Ideation with any Methods (Not Plan) Description (Recent) denies - denies - denies - -   4. Active Suicidal Ideation with Some Intent to Act, Without Specific Plan (Lifetime) - - - - - - -   4. Active Suicidal Ideation with Some Intent to Act, Without Specific Plan (Recent) No No No No No No -   Active Suicidal Ideation with Some Intent to Act, Without Specific Plan Description (Recent) denies - denies - denies - -   5. Active Suicidal Ideation with Specific Plan and Intent (Lifetime) - - - - - - No   5. Active Suicidal Ideation with Specific Plan and Intent (Recent) No No No No No No -   Most Severe Ideation Rating (Lifetime) - - - - - - 2   Comments - - - - - - -   Most Severe Ideation Description (Lifetime) - - - - - - passive non specific   Frequency (Lifetime) - - - - - - 1   Duration (Lifetime) NA NA NA - NA - 1   Controllability (Lifetime) - - - - - - 1   Protective Factors  (Lifetime) - - - - - - NA   Reasons for Ideation (Lifetime) - - - - - - -   Most Severe Ideation Rating (Past Month) - - - - - - -   Most Severe Ideation Description (Past Month) - - - - - - -   Frequency (Past Month) - - - - - - -   Duration (Past Month) - - - - - - -    Controllability (Past Month) - - - - - - -   Protective Factors (Past Month) - - - - - - -   Actual Attempt (Lifetime) - - - - - - -   Actual Attempt (Past 3 Months) - - - - - - -   Has subject engaged in non-suicidal self-injurious behavior? (Lifetime) - - - - - - No     Patient denies current fears or concerns for personal safety.  Patient denies current or recent suicidal ideation or behaviors.  Patient denies current or recent homicidal ideation or behaviors.  Patient denies current or recent self injurious behavior or ideation.  Patient denies other safety concerns.  Recommended that patient call 911 or go to the local ED should there be a change in any of these risk factors.  Patient reports there are no firearms in the house.     Diagnostic Criteria:  A. Characteristic symptoms: Two (or more) of the following, each present for a significant portion of time during a 1-month period (or less if successfully treated)*:    - Disorganized speech (eg, frequent derailment or incoherence)    - Negative symptoms, ie, affective flattening, alogia, or avolition   B. Marked functional impairment in Occupational or Academic/Interpersonal Relationships/Self-care: For a significant portion of the time since the onset of the disturbance, one or more major areas of functioning such as work, interpersonal relations, or self-care are markedly below the level achieved prior to the onset (or when the onset is in childhood or adolescence, failure to achieve expected level of interpersonal, academic, or occupational achievement).  C. Duration: Continuous signs of the disturbance persist for at least 6 months. This 6-month period must include at least 1 month of symptoms (or less if successfully treated) that meet Criterion A (ie, active-phase symptoms) and may include periods of prodromal or residual symptoms. During these prodromal or residual periods, the signs of the disturbance may be manifested by only negative symptoms or  two or more symptoms listed in Criterion A present in an attenuated form (eg, odd beliefs, unusual perceptual experiences).  E. Substance/general medical condition exclusion: The disturbance is not due to the direct physiological effects of a substance (eg, a drug of abuse, a medication) or a general medical condition.  F. Relationship to a pervasive developmental disorder: If there is a history of autistic disorder or another pervasive developmental disorder, the additional diagnosis of schizophrenia is made only if prominent delusions or hallucinations are also present for at least a month (or less if successfully treated).  Multiple episodes, currently in partial remission      DSM5 Diagnoses: (Sustained by DSM5 Criteria Listed Above)  Diagnoses: 295.70  (F25) Schizoaffective Disorder Bipolar Type  Psychosocial & Contextual Factors: Recent hospitalization, believes that medication is very helpful, lives with parents  WHODAS 2.0 (12 item):   WHODAS 2.0 Total Score 10/5/2017 2/8/2021   Total Score 23 18   Total Score MyChart - 18     Intervention:   Provided information on what therapy looks like providing motivational interviewing on how therapy could be helpful.  Collateral Reports Completed:  Not Applicable      PLAN: (Homework, other):  1. Provider will continue Diagnostic Assessment.  Patient agreed to try meeting with therapist one more time.      Maria Luz Licea, [facility]  February 8, 2021

## 2021-02-09 ASSESSMENT — PATIENT HEALTH QUESTIONNAIRE - PHQ9: SUM OF ALL RESPONSES TO PHQ QUESTIONS 1-9: 10

## 2021-02-09 ASSESSMENT — ANXIETY QUESTIONNAIRES: GAD7 TOTAL SCORE: 3

## 2021-02-18 ENCOUNTER — VIRTUAL VISIT (OUTPATIENT)
Dept: PSYCHOLOGY | Facility: CLINIC | Age: 31
End: 2021-02-18
Payer: COMMERCIAL

## 2021-02-18 DIAGNOSIS — F25.0 SCHIZOAFFECTIVE DISORDER, BIPOLAR TYPE (H): Primary | ICD-10-CM

## 2021-02-18 PROCEDURE — 90791 PSYCH DIAGNOSTIC EVALUATION: CPT | Mod: 95 | Performed by: SOCIAL WORKER
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
## 2021-02-18 NOTE — Clinical Note
Hello Provider, Clinic,    Your patient presented to [facility] for a diagnostic evaluation today.  They will be beginning individual therapy with me.      Please do not hesitate to contact me if you have any questions in regards to Jose Francisco Sommers's care.        FILOMENA Kent  February 18, 2021  [facility]

## 2021-02-22 ENCOUNTER — VIRTUAL VISIT (OUTPATIENT)
Dept: PSYCHIATRY | Facility: CLINIC | Age: 31
End: 2021-02-22
Attending: PSYCHIATRY & NEUROLOGY
Payer: COMMERCIAL

## 2021-02-22 DIAGNOSIS — E78.1 HIGH BLOOD TRIGLYCERIDES: ICD-10-CM

## 2021-02-22 DIAGNOSIS — K76.0 FATTY LIVER: ICD-10-CM

## 2021-02-22 DIAGNOSIS — F25.0 SCHIZOAFFECTIVE DISORDER, BIPOLAR TYPE (H): ICD-10-CM

## 2021-02-22 PROCEDURE — 99214 OFFICE O/P EST MOD 30 MIN: CPT | Mod: 95 | Performed by: STUDENT IN AN ORGANIZED HEALTH CARE EDUCATION/TRAINING PROGRAM

## 2021-02-22 RX ORDER — QUETIAPINE FUMARATE 400 MG/1
400 TABLET, FILM COATED ORAL AT BEDTIME
Qty: 30 TABLET | Refills: 1 | Status: SHIPPED | OUTPATIENT
Start: 2021-02-22 | End: 2021-03-30

## 2021-02-22 RX ORDER — ARIPIPRAZOLE 2 MG/1
2 TABLET ORAL DAILY
Qty: 30 TABLET | Refills: 1 | Status: CANCELLED | OUTPATIENT
Start: 2021-02-22

## 2021-02-22 RX ORDER — METFORMIN HCL 500 MG
500 TABLET, EXTENDED RELEASE 24 HR ORAL
Qty: 30 TABLET | Refills: 1 | Status: SHIPPED | OUTPATIENT
Start: 2021-02-22 | End: 2021-03-30

## 2021-02-22 RX ORDER — LITHIUM CARBONATE 300 MG/1
TABLET, FILM COATED, EXTENDED RELEASE ORAL
Qty: 120 TABLET | Refills: 1 | Status: SHIPPED | OUTPATIENT
Start: 2021-02-22 | End: 2021-03-30

## 2021-02-22 RX ORDER — ARIPIPRAZOLE 2 MG/1
1 TABLET ORAL DAILY
Qty: 30 TABLET | Refills: 1 | Status: SHIPPED | OUTPATIENT
Start: 2021-02-22 | End: 2021-03-26

## 2021-02-22 RX ORDER — FLUOXETINE 10 MG/1
TABLET, FILM COATED ORAL
Qty: 15 TABLET | Refills: 1 | Status: SHIPPED | OUTPATIENT
Start: 2021-02-22 | End: 2021-03-26

## 2021-02-22 RX ORDER — QUETIAPINE FUMARATE 400 MG/1
400 TABLET, FILM COATED ORAL AT BEDTIME
Qty: 30 TABLET | Refills: 0 | Status: CANCELLED | OUTPATIENT
Start: 2021-02-22

## 2021-02-22 RX ORDER — QUETIAPINE FUMARATE 100 MG/1
100 TABLET, FILM COATED ORAL EVERY MORNING
Qty: 60 TABLET | Refills: 1 | Status: SHIPPED | OUTPATIENT
Start: 2021-02-22 | End: 2021-03-30

## 2021-02-22 ASSESSMENT — PAIN SCALES - GENERAL: PAINLEVEL: NO PAIN (0)

## 2021-02-22 NOTE — PROGRESS NOTES
"Video- Visit Details  Type of service:  video visit for medication management  Time of service:    Date: 02/22/21    Video Start Time:  8:00am     Video End Time:  8:30am    Reason for video visit:  Services only offered telehealth  Originating Site (patient location):  [facility]   Location- Patient's home  Distant Site (provider location):  [facility]  Mode of Communication:  Video Conference via [employer]Well  Consent:  Patient has given verbal consent for video visit?: Yes         [facility]  Psychiatry Clinic  PSYCHIATRY PROGRESS NOTE     CARE TEAM:  PCP- Provider, Clinic.  Therapist: None. Community  Team: None     Jose Francisco Sommers is a 30 year old   patient who prefers the name Manuel and uses pronouns he, him, his, himself.     PERTINENT BACKGROUND                                               [most recent eval 08/24/2020]     Psych pertinent item history includes psychosis [sxs include disorganization, possible catatonia], mutiple psychotropic trials, psych hosp (<3) and SUBSTANCE USE: alcohol and cannabis    INTERIM HISTORY                                                                                    [4, 4]   History was provided by the patient and and his mom, Ember, who was a good historian. Treatment adherence is good.  Since the last visit:   - Today Manuel reports he is doing \"okay\".   - He notes that he has noticed improvement in his side effects since stopping Olanzapine.   - He requests multiple medication changes including initiation of an antidepressant.   - Manuel and his mom detail that Manuel has been experiencing low mood, anhedonia, and amotivation.   - Manuel reports that he has taken prozac in the past and has done well on it.   - He denies acute safety concerns including SI, SIB, HI and reports he feels safe at home.         RECENT PSYCH ROS:   Depression:  anhedonia, low energy and passive suicidal ideaion without intent or plan  Elevated:  none " "  Psychosis:  none  Anxiety:  nervous/overwhelmed, ruminating difficult to control thoughts   Trauma Related:  none  Eating Disorder: no     Adverse Effects:  Patient reports Olanzapine causes grogginess.  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          PSYCH and SUBSTANCE USE Critical Summary Points since July 2020 08/24/2020: Resident transfer visit, Olanzapine decreased from 7.5mg to 5mg  09/30/2020: Olanzapine decreased to 2.5mg then discontinued  10/08/2020: Family meeting with mom, no medication changes  11/04/2020: No medication changes, referred to [facility]  Interim; Patient hospitalized twice, please see discharge summaries for details   01/04/2021: Started Ativan 0.5mg BID PRN   01/14/2021: Increased ativan to 1mg BID PRN, discontinued Olanzapine, started Abilify  2/22/21: Start Prozac     PAST MED TRIALS                                                            Adderall 10-20 " "mg - improved concentration, but \"cold personality\" and perseveration  Prozac 20 mg - limited response, less irritable, first episode of \"rage\"  Seroquel 25-75 mg - helped with sleep and irritability  Concerta 18 mg - improved focus, no improvement in motivation  Provigil 150 mg - Stimulants \"almost killed him\" triggered alcohol binges  Abilify ?25 mg - OK when QOD, but irritable and agitated on QD  Clonazepam 0.5-1.5 mg - calming, helped with sleep and \"catatonia\"  Risperdal up to 5 mg - some confusion, slow processing, withdrawn, ?\"catatonia\", panic attacks  Zyprexa 5 mg less anxious overall improvement  Lithium - calmer, overall better  Latuda 20 mg - severe fatigue and depression  Synthroid 75 mcg - added to help with mood.  Brief trials with Strattera, Intuniv, Lamictal, Xanax, Zoloft      MEDICAL HISTORY and ALLERGY     ALLERGIES: Sulfa drugs     Patient Active Problem List   Diagnosis     Schizoaffective disorder, bipolar type (H)     Hx of psychiatric care     Elevated liver enzymes     Fatty liver     High blood triglycerides     History of cannabis abuse     History of cocaine abuse (H)     Psychosis (H)     Elevated blood pressure reading without diagnosis of hypertension     Constipation         MEDICAL REVIEW OF SYSTEMS                                                           [2, 10]    Contraception- Unkown    A comprehensive review of systems was performed and is negative other than noted in the HPI.    MEDICATIONS        Current Outpatient Medications   Medication Sig Dispense Refill     ARIPiprazole (ABILIFY) 2 MG tablet Take 1 tablet (2 mg) by mouth daily (Patient taking differently: Take 1 mg by mouth daily ) 30 tablet 1     lithium ER (LITHOBID) 300 MG CR tablet Take 1 tablet (300mg) in the morning and 3 tablets (900mg) at bedtime 120 tablet 1     metFORMIN (GLUCOPHAGE-XR) 500 MG 24 hr tablet Take 1 tablet (500 mg) by mouth daily (with dinner) 30 tablet 1     metFORMIN (GLUCOPHAGE-XR) 500 MG 24 " "hr tablet Take 1 tablet (500 mg) by mouth daily (with dinner) 30 tablet 0     OLANZapine (ZYPREXA) 5 MG tablet Take 1 tablet (5 mg) by mouth every morning 60 tablet 1     QUEtiapine (SEROQUEL) 100 MG tablet Take 1 tablet (100 mg) by mouth every morning And 1 tab (100 mg) with 1 tab (400 mg) for bedtime dose of 500 mg 60 tablet 0     QUEtiapine (SEROQUEL) 400 MG tablet Take 1 tablet (400 mg) by mouth At Bedtime Along with 1 tab (100 mg) for bedtime dose of 500 mg 30 tablet 0     senna-docusate (SENOKOT-S/PERICOLACE) 8.6-50 MG tablet Take 2 tablets by mouth daily 60 tablet 0     lithium (ESKALITH CR/LITHOBID) 450 MG CR tablet Take 2 tablets (900 mg) by mouth At Bedtime 60 tablet 0     lithium ER (LITHOBID) 300 MG CR tablet Take 1 tablet (300 mg) by mouth every morning 60 tablet 0     LORazepam (ATIVAN) 1 MG tablet Take 1 tablet (1 mg) by mouth 2 times daily as needed for anxiety (Patient not taking: Reported on 2/22/2021) 60 tablet 1     OLANZapine (ZYPREXA) 2.5 MG tablet Take 1 tablet at bedtime for 7 days then discontinue 7 tablet 0     propranolol (INDERAL) 20 MG tablet Take 1 tablet (20 mg) by mouth 3 times daily (Patient not taking: Reported on 2/22/2021) 90 tablet 1     VITALS                                                                                                                               [3, 3]   There were no vitals taken for this visit.   MENTAL STATUS EXAM                                                              [9, 14 cog gs]     Alertness: alert  and oriented  Appearance: well groomed  Behavior/Demeanor: cooperative, pleasant and calm, with good  eye contact   Speech: normal and regular rate and rhythm  Language: intact and no problems  Psychomotor: normal or unremarkable  Mood: \"okay\"  Affect: full range; congruent to: mood- yes, content- yes  Thought Process/Associations: unremarkable  Thought Content:  Reports none;  Denies suicidal & violent ideation and delusions  Perception:  Reports " none;  Denies hallucinations  Insight: good  Judgment: good  Cognition: (6) oriented: time, person, and place  attention span: intact  concentration: intact  recent memory: intact  remote memory: intact  fund of knowledge: appropriate  Gait and Station: N/A (telehealth)    LABS and DATA     PHQ9 TODAY = N/A  PHQ 12/26/2019 11/4/2020 2/8/2021   PHQ-9 Total Score 3 12 10   Q9: Thoughts of better off dead/self-harm past 2 weeks Not at all Not at all Not at all       Recent Labs   Lab Test 12/04/20  1414 11/13/20  0758 03/04/20  1001   CR 1.11 1.10 1.09   GFRESTIMATED 89 >90 >90     Recent Labs   Lab Test 12/04/20  1414 11/13/20  0758 06/01/20  1339   AST 28 26 26   ALT 46 39 42   ALKPHOS 78 71 73     ANTIPSYCHOTIC LABS  [glu, A1C, lipids (ivania LDL), liver enzymes, WBC, ANEU, Hgb, plts]  q12 mo  Recent Labs   Lab Test 12/04/20  1414 11/13/20  0758 06/01/20  1339 03/04/20  1001 11/08/19  1020 05/07/19  1101 12/06/18  1442 09/30/16  1348 09/30/16  1348   GLC 80 82  --  75 93 88 86   < > 83   A1C  --   --  4.7  --   --  4.9 5.0  --  4.6    < > = values in this interval not displayed.     Recent Labs   Lab Test 11/13/20  0758 06/01/20  1339 05/07/19  1101 12/13/18  1027   CHOL 111 136 141 173   TRIG 211* 260* 195* 278*   LDL 20 43 63 81   HDL 49 41 39* 36*     Recent Labs   Lab Test 12/04/20  1414 11/13/20  0758 06/01/20  1339 05/07/19  1101   AST 28 26 26 26   ALT 46 39 42 52   ALKPHOS 78 71 73 69     Recent Labs   Lab Test 12/04/20  1414 11/13/20  0758 06/01/20  1339 05/07/19  1101 12/06/18  1442   WBC 11.0 12.9* 7.5 7.3 8.9   ANEU 8.0  --  4.8 4.5 5.7   HGB 14.3 15.1 15.1 15.0 15.8    254 265 247 270     LITHIUM LABS     [level, renal, SG, TSH, WBC]    q6 mo  Recent Labs   Lab Test 12/16/20  0718 12/11/20  0726 11/25/20  0751 11/18/20  0740   LITHIUM 0.84 0.58* 0.79 0.72     Recent Labs   Lab Test 12/04/20  1414 11/13/20  0758 03/04/20  1001 11/08/19  1020   CR 1.11 1.10 1.09 1.12   GFRESTIMATED 89 >90 >90 89   NA  138 142 139 138   POTASSIUM 4.1 4.4 3.8 3.5   ADONAY 9.4 9.4 10.1 9.2     Recent Labs   Lab Test 11/08/19  1024 05/07/19  1101 12/06/18  1505 10/30/17  1429   SG 1.015 1.015 1.009 1.015     Recent Labs   Lab Test 12/04/20  1414 11/13/20  0758 06/01/20  1339 03/04/20  1001   TSH 2.01 1.21 2.51 5.82*     Recent Labs   Lab Test 12/04/20  1414 11/13/20  0758 06/01/20  1339 05/07/19  1101 12/06/18  1442   WBC 11.0 12.9* 7.5 7.3 8.9   ANEU 8.0  --  4.8 4.5 5.7     PSYCHOTROPIC DRUG INTERACTIONS     None    MANAGEMENT:  Monitoring for adverse effects and routine labs    RISK STATEMENT for SAFETY     Jose Francisco Sommers did not appear to be an imminent safety risk to self or others.    DIAGNOSES                                                                                         [m2, h3]    Mood disorder, unspecified  (Primary Psychiatric Diagnosis upon discharge 12/21/2020 per Bon Florian and Aakash was Schizoaffective disorder, bipolar type, lauren, severe with psychotic features)  ADHD, predominantly inattentive type     ASSESSMENT                                                                                      [m2, h3]        Today Manuel returned accompanied by his mom, Ember, for continued medication management.  He reports he has noticed positive changes since discontinuing his Olanzapine.  He requests to start an antidepressant to target his low mood, anhedonia, and amotivation. He and his mom report he has done well on Prozac in the past.  They were counseled on this risks of an antidepressant in elevating his mood or contributing to hypomania or lauren.  Manuel and his mom expressed understanding.   We will start Prozac 5mg daily to target depressed mood.  No other medication changes were made today.      Manuel denied acute safety concerns including SI, SIB, HI and he feels safe at home.     Future considerations:  Can consider repeat neuropsych testing for further clarity related to processing difficulties.   Consider optimizing Abilify for psychosis coverage.     [place]MP was checked today:  Indicates no medication misuse .    PLAN                                                                                                                [m2, h3]      1) Meds  - Increase Ativan to 1 mg BID PRN for anxiety  - Start Prozac 5mg daily  - Start Abilify 1mg daily  - Continue Lithium 300mg daily and 900mg at bedtime  - Continue Seroquel 100mg daily and 500mg at bedtime   - Continue Metformin 500mg daily with dinner.      - Continue Propranolol 20mg TID for hypertension      2) Therapy-  recommended    3) Next Due   Labs- N/A  EKG- PRN  Rating scales- N/A    4) Referrals  No Referrals needed     5) RTC: 1 month    6) Crisis Numbers:   Provided routinely in AVS     After hours:  [phone]      TREATMENT RISK STATEMENT:  The risks, benefits, alternatives and potential adverse effects have been discussed and are understood by the pt. The pt understands the risks of using street drugs or alcohol. There are no medical contraindications, the pt agrees to treatment with the ability to do so. The pt knows to call the clinic for any problems or to access emergency care if needed.  Medical and substance use concerns are documented above.  Psychotropic drug interaction check was done, including changes made today.      PROVIDER:  Jayda Ball DO, MBA, MS    Patient staffed via video with Dr. Lyons who will sign the note.  Supervisor is Dr. Tay.      TELEHEALTH ATTENDING ATTESTATION  Following the ACGME guidelines on telemedicine and direct supervision due to COVID-19, I was concurrently participating in and/or monitoring the patient care through appropriate telecommunication technology.  I discussed the key portions of the service with the resident, including the mental status examination and developing the plan of care. I reviewed key portions of the history with the resident. I agree with the findings and plan as documented in  this note.   Dinh Lyons MD

## 2021-02-22 NOTE — PROGRESS NOTES
"VIDEO VISIT  Jose Francisco Sommers is a 30 year old patient who is being evaluated via a billable video visit.      The patient has been notified of following:   \"This video visit will be conducted via a call between you and your physician/provider. We have found that certain health care needs can be provided without the need for an in-person physical exam. This service lets us provide the care you need with a video conversation. If a prescription is necessary we can send it directly to your pharmacy. If lab work is needed we can place an order for that and you can then stop by our lab to have the test done at a later time. Insurers are generally covering virtual visits as they would in-office visits so billing should not be different than normal.  If for some reason you do get billed incorrectly, you should contact the billing office to correct it and that number is in the AVS .    Video Conference to be completed via:  Elizabeth    Patient has given verbal consent for video visit?:  Yes    Patient would prefer that any video invitations be sent by: Send to e-mail at: [email or web address]      How would patient like to obtain AVS?:  Aj    AVS SmartPhrase [PsychAVS] has been placed in 'Patient Instructions':  Yes    "

## 2021-02-22 NOTE — PATIENT INSTRUCTIONS
**For crisis resources, please see the information at the end of this document**     Patient Education      Thank you for coming to the [facility] [place] [facility].    Lab Testing:  If you had lab testing today and your results are reassuring or normal they will be mailed to you or sent through [employer] within 7 days. If the lab tests need quick action we will call you with the results. The phone number we will call with results is # [phone] (home) . If this is not the best number please call our clinic and change the number.    Medication Refills:  If you need any refills please call your pharmacy and they will contact us. Our fax number for refills is [phone]. Please allow three business for refill processing. If you need to  your refill at a new pharmacy, please contact the new pharmacy directly. The new pharmacy will help you get your medications transferred.     Scheduling:  If you have any concerns about today's visit or wish to schedule another appointment please call our office during normal business hours [phone] (8-5:00 M-F)    Contact Us:  Please call [phone] during business hours (8-5:00 M-F).  If after clinic hours, or on the weekend, please call  [phone].    Financial Assistance [phone]  [facility] Billing [phone]  Central Billing Office, [facility]: [phone]  [facility] Billing [phone]  Medical Records [phone]  [facility] Patient Bill of Rights [email or web address]       MENTAL HEALTH CRISIS NUMBERS:  For a medical emergency please call  911 or go to the nearest ER.     [facility]:   [facility] -[phone]   Crisis Residence [facility]idence -[phone]   Walk-In Counseling Center [facility] -[phone]   COPE 24/7 [facility] Mobile Team -[phone] (adults)/[phone] (child)  CHILD: [facility] needs assessment  team - [phone]      [facility]:   [facility] - [phone]   Walk-in counseling [facility] - [phone]   Walk-in counseling [facility] - [phone]   Crisis Residence [facility] Residence - [phone]  Urgent Care Adult Mental Rczwdj-[phone] mobile unit/ 24/7 crisis line    National Crisis Numbers:   National Suicide Prevention Lifeline: 2-169-295-TALK (476-371-1848)  Poison Control Center - 4-950-137-1395  [employer].com/resources for a list of additional resources (SOS)  Trans Lifeline a hotline for transgender people 1-155.875.9172  The [employer] Project a hotline for LGBT youth 6-101-970-4645  Crisis Text Line: For any crisis 24/7   To: 874993  see www.crisistextline.org  - IF MAKING A CALL FEELS TOO HARD, send a text!         Again thank you for choosing [facility] MENTAL HE[facility] & ADDICTION [facility] [[email or web address]] and please let us know how we can best partner with you to improve you and your family's health.    You may be receiving a survey regarding this appointment. We would love to have your feedback, both positive and negative. The survey is done by an external company, so your answers are anonymous.

## 2021-03-02 ENCOUNTER — TELEPHONE (OUTPATIENT)
Dept: PSYCHIATRY | Facility: CLINIC | Age: 31
End: 2021-03-02

## 2021-03-02 NOTE — TELEPHONE ENCOUNTER
"- patient's mother called stating that at last appointment, Dr. Ball prescribed \"prozac 5 mg for two weeks then increase to prozac 10 mg\"  - states they have been unable to obtain this medication from the pharmacy  - [facility] pharmacy informed them that insurance would only cover capsules, not tablets     Per review of most recent provider note (2/22/21, unsigned) the plan is:   - Increase Ativan to 1 mg BID PRN for anxiety  - Decrease Olanzapine to  2.5mg daily for one week then discontinue  - Start Abilify 1mg daily  - Continue Lithium 300mg daily and 900mg at bedtime  - Continue Seroquel 100mg daily and 500mg at bedtime   - Continue Metformin 500mg daily with dinner.    - Continue Propranolol 20mg TID for hypertension    Per review of medication tab, FLUoxetine (PROZAC) 10 MG tablet / Take 1/2 tablet (5mg) daily was filled for #15 tablet.     - will route to provider for further input  "

## 2021-03-04 NOTE — TELEPHONE ENCOUNTER
"Prior Authorization Retail Medication Request    Medication/Dose: FLUoxetine (PROZAC) 10 MG tablet / Take 1/2 tablet (5mg) daily    ICD code (if different than what is on RX):  [F25.0]     Previously Tried and Failed:    PAST MED TRIALS                                                             Adderall 10-20 mg - improved concentration, but \"cold personality\" and perseveration  Prozac 20 mg - limited response, less irritable, first episode of \"rage\"  Seroquel 25-75 mg - helped with sleep and irritability  Concerta 18 mg - improved focus, no improvement in motivation  Provigil 150 mg - Stimulants \"almost killed him\" triggered alcohol binges  Abilify ?25 mg - OK when QOD, but irritable and agitated on QD  Clonazepam 0.5-1.5 mg - calming, helped with sleep and \"catatonia\"  Risperdal up to 5 mg - some confusion, slow processing, withdrawn, ?\"catatonia\", panic attacks  Zyprexa 5 mg less anxious overall improvement  Lithium - calmer, overall better  Latuda 20 mg - severe fatigue and depression  Synthroid 75 mcg - added to help with mood.  Brief trials with Strattera, Intuniv, Lamictal, Xanax, Zoloft    Rationale: Patient first experienced mental health issues in elementary school. He has a standing history of schizoaffective disorder bipolar type, mood disorder, and ADHD. He experiences ongoing symptoms of depression and anxiety. Past medication trials have been ineffective at managing symptoms of have caused undesirable side effects. Prozac is being prescribed to target unmanaged symptoms of depression and anxiety. It is neccessary for the patient to start this medication at a low dose (5 mg) to minimize the risk of manic induction. In the future, there is potential for a dosage increase and change in medication formulation. Currently, the patient requires the tablet formulation so he is able to start at the recommended dose. Any denial in medication could lead to decompensation and potential hospitalization. "     Insurance Name:  same  Insurance ID:  same    Pharmacy Information (if different than what is on RX)  Name: [id number] - [place], [place] - [address]  Phone: [phone]

## 2021-03-05 NOTE — TELEPHONE ENCOUNTER
Central Prior Authorization Team   Phone: [phone]      PA Initiation    Medication: FLUoxetine (PROZAC) 10 MG tablet  Insurance Company: [employer]AP - Phone [phone] Fax [phone]  Pharmacy Filling the Rx: [id number] [place], [place] - [address] [place] [address]  Filling Pharmacy Phone: [phone]  Filling Pharmacy Fax:    Start Date: 3/5/2021

## 2021-03-05 NOTE — TELEPHONE ENCOUNTER
- patient called inquiring about prior authorization for prozac  - writer informed him it was submitted today  - patient stated he was going to call his pharmacy and ask how much it would cost to pay out of pocket

## 2021-03-08 ENCOUNTER — TELEPHONE (OUTPATIENT)
Dept: PSYCHIATRY | Facility: CLINIC | Age: 31
End: 2021-03-08

## 2021-03-08 NOTE — TELEPHONE ENCOUNTER
Karely Yu LPN 13 minutes ago (7:53 AM)        On 3/8/21, a fax was received from BC/BS - Notice of Approval of Prescription Drug Coverage for Fluoxetine HCL. Coverage begins 1/1/2021 and ends 3/6/2022. The original copy was sent to [facility]. A message was routed to Dr Ball & Taisha LIMON RN. Pharmacy and patient will be updated.Karely Yu LPN            Documentation      - prior authorization approved  - patient updated via [employer]  - no further follow up needed

## 2021-03-08 NOTE — TELEPHONE ENCOUNTER
On 3/8/21, a fax was received from BC/BS - Notice of Approval of Prescription Drug Coverage for Fluoxetine HCL. Coverage begins 1/1/2021 and ends 3/6/2022. The original copy was sent to scanning. A message was routed to Dr Ball & Taisha LIMON RN. Pharmacy ( message left with approval info) and patient (unable to leave message as mailbox is full) will be updated.Karely Yu LPN

## 2021-03-22 ENCOUNTER — VIRTUAL VISIT (OUTPATIENT)
Dept: PSYCHOLOGY | Facility: CLINIC | Age: 31
End: 2021-03-22
Payer: COMMERCIAL

## 2021-03-22 DIAGNOSIS — F25.0 SCHIZOAFFECTIVE DISORDER, BIPOLAR TYPE (H): Primary | ICD-10-CM

## 2021-03-22 PROCEDURE — 90834 PSYTX W PT 45 MINUTES: CPT | Mod: 95 | Performed by: SOCIAL WORKER

## 2021-03-22 NOTE — Clinical Note
Hi,  I wanted to let you know about my last meeting with this patient.  Patient presented as very confused and had difficulty engaging in conversation.  He showed struggles with memory just a few moments after he would make a comment.  He also would make contradictory comments as he was speaking.  This appeared different from when I met with him to complete the DA.  He informed me that he didn't think I could help with what he needed help with.  My speciality is working with patient's who experience psychosis so if Manuel ever wants to continue with therapy I would be more than happy to continue working with him when he is ready and I let him know this.  Regards,  Maria Luz Licea, CHARLESSW

## 2021-03-22 NOTE — PROGRESS NOTES
Discharge Summary  Multiple Sessions    Client Name: Jose Francisco Sommers MRN#: [id number] YOB: 1990    Discharge Date:   March 23, 2021    Service Modality: Video Visit:      Provider verified identity through the following two step process.  Patient provided:  Patient is known previously to provider    Telemedicine Visit: The patient's condition can be safely assessed and treated via synchronous audio and visual telemedicine encounter.      Reason for Telemedicine Visit: Services only offered telehealth    Originating Site (Patient Location): Patient's home    Distant Site (Provider Location): Provider Remote Setting    Consent:  The patient/guardian has verbally consented to: the potential risks and benefits of telemedicine (video visit) versus in person care; bill my insurance or make self-payment for services provided; and responsibility for payment of non-covered services.     Patient would like the video invitation sent by:  My Chart    Mode of Communication:  Video Conference via [employer]    As the provider I attest to compliance with applicable laws and regulations related to telemedicine.    Service Type: Individual      Session Start Time: 16:30  Session End Time: 17:08      Session Length: 38     Session #: 3     Attendees: Client attended alone      Focus of Treatment Objective(s):  Client's presenting concerns included: Support with communicating with providers  Stage of Change at time of Discharge: Patient presented as not able to fully engage in conversation    Medication Adherence:  NA    Chemical Use:  No    Assessment: Current Emotional / Mental Status (status of significant symptoms):    Risk status (Self / Other harm or suicidal ideation)  Client denies current fears or concerns for personal safety.  Client denies current or recent suicidal ideation or behaviors.  Client denies current or recent homicidal ideation or behaviors.  Client denies current or recent self  injurious behavior or ideation.  Client denies other safety concerns.  A safety and risk management plan has not been developed at this time, however client was given the after-hours number should there be a change in any of these risk factors.    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  WHODAS 2.0 (12 item) Score: 18    Reason for Discharge:  Client reported that he did not feel he could get what he needed      Aftercare Plan:  Client may resume counseling services at any time in the future by calling the [facility] Intake Office, [phone].  Therapist communicating with psychiary due to patient's level of confusion during last session      Maria Luz Licea, [facility]  March 23, 2021

## 2021-03-23 ENCOUNTER — TELEPHONE (OUTPATIENT)
Dept: PSYCHIATRY | Facility: CLINIC | Age: 31
End: 2021-03-23

## 2021-03-23 NOTE — TELEPHONE ENCOUNTER
"Patient's mother called requesting a sooner appointment. She states Manuel's medication regimen is not effective at all. She reports he is irritable, on edge, was \"unbelievably rude\" to therapist during appointment yesterday, has been pacing constantly, and sleep schedule has been erratic. She reports patient has self-decreased abilify dose and stopped/restarted prozac twice since it's been prescribed. Patient's mother believes prozac is causing more harm than good.     She feels if Manuel has to wait another month for an appointment he will end up requiring hospitalization. Writer informed her of plan to schedule him with a resident for a sooner appointment. Patient's mother expresses fear that Manuel will request to start a stimulant. She reports these have induced psychosis in the past and he is not stable enough to attempt again at the moment.    Will route to Dr. Ventura and Eloy for input.        [phone] Ember (mom) cell phone  "

## 2021-03-25 ENCOUNTER — MYC MEDICAL ADVICE (OUTPATIENT)
Dept: PSYCHIATRY | Facility: CLINIC | Age: 31
End: 2021-03-25

## 2021-03-25 NOTE — TELEPHONE ENCOUNTER
Eloy, MD Robb Jaeger Taylor, RN Hi Taylor,    I copied you on another note to Manuel, so you're probably aware of this already, but I thought I would send a separate message with some instructions for Manuel and mom.    It sounds like Manuel is starting to experience manic symptoms.  I will suggest he discontinue both Prozac and Abilify.  To get his lauren under control he should either restart olanzapine 5 to 10 mg at bedtime and 5 to 10 mg daily as needed, or risperidone 1 to 2 mg at bedtime and 1 to 2 mg daily as needed.     We're trying to arrange an urgent appointment for him in the clinic in the next few days.  If he or mom are concerned for his safety he should be seen in the ER immediately.     DB

## 2021-03-25 NOTE — TELEPHONE ENCOUNTER
Called patient's mother with suggestions as outlined by Dr. Lyons.    Patient's mother states Manuel has responded really well to olanzapine and they will begin 5-10 mg nightly and 5-10 mg daily PRN. She verbalized understanding that Prozac and Abilify are to be discontinued.     Informed patient's mother that there is work being done in attempts to schedule an urgent appointment. She reports feeling much better about this plan. She verbalizes understanding that patient should be seen in ER if there are any immediate safety concerns.    Will send message via [employer] for reference to changes made today.

## 2021-03-26 ENCOUNTER — VIRTUAL VISIT (OUTPATIENT)
Dept: FAMILY MEDICINE | Facility: CLINIC | Age: 31
End: 2021-03-26
Payer: COMMERCIAL

## 2021-03-26 DIAGNOSIS — R21 RASH: ICD-10-CM

## 2021-03-26 DIAGNOSIS — I10 HYPERTENSION GOAL BP (BLOOD PRESSURE) < 140/90: Primary | ICD-10-CM

## 2021-03-26 DIAGNOSIS — F25.0 SCHIZOAFFECTIVE DISORDER, BIPOLAR TYPE (H): ICD-10-CM

## 2021-03-26 DIAGNOSIS — F41.9 ANXIETY: ICD-10-CM

## 2021-03-26 PROCEDURE — 99214 OFFICE O/P EST MOD 30 MIN: CPT | Mod: 95 | Performed by: PHYSICIAN ASSISTANT

## 2021-03-26 RX ORDER — PROPRANOLOL HYDROCHLORIDE 20 MG/1
20 TABLET ORAL 3 TIMES DAILY
COMMUNITY
Start: 2021-01-19 | End: 2021-03-26

## 2021-03-26 RX ORDER — TRIAMCINOLONE ACETONIDE 1 MG/G
CREAM TOPICAL 2 TIMES DAILY
Qty: 30 G | Refills: 2 | Status: SHIPPED | OUTPATIENT
Start: 2021-03-26 | End: 2022-09-26

## 2021-03-26 RX ORDER — PROPRANOLOL HCL 60 MG
60 CAPSULE, EXTENDED RELEASE 24HR ORAL DAILY
Qty: 90 CAPSULE | Refills: 3 | Status: SHIPPED | OUTPATIENT
Start: 2021-03-26 | End: 2021-11-26

## 2021-03-26 NOTE — PROGRESS NOTES
"Manuel is a 30 year old who is being evaluated via a billable video visit.      How would you like to obtain your AVS? MyChart  If the video visit is dropped, the invitation should be resent by: Text to cell phone: [phone]  Will anyone else be joining your video visit? No    Video Start Time: 300 PM    Assessment & Plan     Hypertension goal BP (blood pressure) < 140/90  BP elevated per report.  Notes that it seems labile. Is taking short acting propranolol for anxiety.  Recommend okay to stay on propranolol because he feels it keeps him calm  Will increase to 60 mg LA. Will have him monitor his home BPs and then update me  - propranolol ER (INDERAL LA) 60 MG 24 hr capsule; Take 1 capsule (60 mg) by mouth daily    Rash  Hard to see but may be dermatitic. Will treat. This prescription is given with a discussion of side effects, risks and proper use.  Instructions are given to follow up if not improving or symptoms change or worsen as discussed.   - triamcinolone (KENALOG) 0.1 % external cream; Apply topically 2 times daily    Schizoaffective disorder, bipolar type (H)  Managed and stable by outside provider / psychiatry. No concerns.    Anxiety  As noted.   - propranolol ER (INDERAL LA) 60 MG 24 hr capsule; Take 1 capsule (60 mg) by mouth daily       Tobacco Cessation:   reports that he has been smoking cigarettes. He started smoking about 3 years ago. He has been smoking about 2.00 packs per day. He has never used smokeless tobacco.      BMI:   Estimated body mass index is 28.6 kg/m  as calculated from the following:    Height as of 12/1/20: 1.842 m (6' 0.5\").    Weight as of 12/20/20: 97 kg (213 lb 12.8 oz).       No follow-ups on file.    ROMINA CHANG PA-C  [facility]    Subjective   Manuel is a 30 year old who presents for the following health issues  accompanied by his mother:    HPI     Medication Followup of Propanolol    Taking Medication as prescribed: yes    Side Effects:  " None    Medication Helping Symptoms:  Not helping with bp    Switch to long acting bp medication.      Rash  Onset/Duration: today   Description  Location: Finger, between index and thumb on both hands, has been smoking and thinks it from the smoke.   Character: small bumps, redness, look like warts, non are pustules   Itching: no  Intensity:  mild  Progression of Symptoms:  worsening  Accompanying signs and symptoms:   Fever: no, feeling flushed in the face  Body aches or joint pain: no, some in the past  Sore throat symptoms: no  Recent cold symptoms: no  History:           Previous episodes of similar rash: maybe in the past  New exposures:  None  Recent travel: no  Exposure to similar rash: no  Precipitating or alleviating factors: none  Therapies tried and outcome: none      Review of Systems   Constitutional, HEENT, cardiovascular, pulmonary, GI, , musculoskeletal, neuro, skin, endocrine and psych systems are negative, except as otherwise noted.      Objective           Vitals:  No vitals were obtained today due to virtual visit.    Physical Exam   GENERAL: Healthy, alert and no distress  EYES: Eyes grossly normal to inspection.  No discharge or erythema, or obvious scleral/conjunctival abnormalities.  RESP: No audible wheeze, cough, or visible cyanosis.  No visible retractions or increased work of breathing.    SKIN: Visible skin clear. No significant rash, abnormal pigmentation or lesions.  NEURO: Cranial nerves grossly intact.  Mentation and speech appropriate for age.  PSYCH: Mentation appears normal, affect normal/bright, judgement and insight intact, normal speech and appearance well-groomed.            Video-Visit Details    Type of service:  Video Visit    Video End Time:320 PM    Originating Location (pt. Location): Home    Distant Location (provider location):  [facility]     Platform used for Video Visit: [employer] [place]

## 2021-03-30 ENCOUNTER — VIRTUAL VISIT (OUTPATIENT)
Dept: PSYCHIATRY | Facility: CLINIC | Age: 31
End: 2021-03-30
Attending: PSYCHIATRY & NEUROLOGY
Payer: COMMERCIAL

## 2021-03-30 ENCOUNTER — TELEPHONE (OUTPATIENT)
Dept: PSYCHIATRY | Facility: CLINIC | Age: 31
End: 2021-03-30

## 2021-03-30 DIAGNOSIS — E78.1 HIGH BLOOD TRIGLYCERIDES: ICD-10-CM

## 2021-03-30 DIAGNOSIS — F25.0 SCHIZOAFFECTIVE DISORDER, BIPOLAR TYPE (H): ICD-10-CM

## 2021-03-30 DIAGNOSIS — K76.0 FATTY LIVER: ICD-10-CM

## 2021-03-30 PROCEDURE — 99214 OFFICE O/P EST MOD 30 MIN: CPT | Mod: 95 | Performed by: STUDENT IN AN ORGANIZED HEALTH CARE EDUCATION/TRAINING PROGRAM

## 2021-03-30 RX ORDER — LITHIUM CARBONATE 300 MG/1
1200 TABLET, FILM COATED, EXTENDED RELEASE ORAL AT BEDTIME
Qty: 120 TABLET | Refills: 1 | Status: SHIPPED | OUTPATIENT
Start: 2021-03-30 | End: 2021-04-30

## 2021-03-30 RX ORDER — OLANZAPINE 10 MG/1
10 TABLET ORAL AT BEDTIME
Qty: 30 TABLET | Refills: 1 | Status: SHIPPED | OUTPATIENT
Start: 2021-03-30 | End: 2021-04-30

## 2021-03-30 RX ORDER — QUETIAPINE FUMARATE 400 MG/1
400 TABLET, FILM COATED ORAL AT BEDTIME
Qty: 30 TABLET | Refills: 1 | Status: SHIPPED | OUTPATIENT
Start: 2021-03-30 | End: 2021-04-30

## 2021-03-30 RX ORDER — METFORMIN HCL 500 MG
500 TABLET, EXTENDED RELEASE 24 HR ORAL
Qty: 30 TABLET | Refills: 1 | Status: SHIPPED | OUTPATIENT
Start: 2021-03-30 | End: 2021-04-30

## 2021-03-30 RX ORDER — QUETIAPINE FUMARATE 100 MG/1
100 TABLET, FILM COATED ORAL EVERY MORNING
Qty: 60 TABLET | Refills: 1 | Status: SHIPPED | OUTPATIENT
Start: 2021-03-30 | End: 2021-04-30

## 2021-03-30 RX ORDER — OLANZAPINE 5 MG/1
5 TABLET ORAL EVERY MORNING
Qty: 30 TABLET | Refills: 1 | Status: SHIPPED | OUTPATIENT
Start: 2021-03-30 | End: 2021-04-30

## 2021-03-30 RX ORDER — OLANZAPINE 5 MG/1
TABLET ORAL
Qty: 90 TABLET | Refills: 1 | Status: SHIPPED | OUTPATIENT
Start: 2021-03-30 | End: 2021-03-30

## 2021-03-30 ASSESSMENT — PAIN SCALES - GENERAL: PAINLEVEL: NO PAIN (0)

## 2021-03-30 NOTE — PROGRESS NOTES
"Video- Visit Details  Type of service:  video visit for medication management  Time of service:    Date:  03/30/2021    Video Start Time:  9:01 AM        Video End Time:  9:45 am    Reason for video visit:  Patient unable to travel due to Covid-19  Originating Site (patient location):  [facility]   Location- Patient's home  Distant Site (provider location):  Remote location  Mode of Communication:  Video Conference via AmWell  Consent:  Patient has given verbal consent for video visit?: Yes         [facility]  Psychiatry Clinic  PSYCHIATRY PROGRESS NOTE     CARE TEAM:  PCP- Provider, Clinic.  Therapist: None. Community  Team: None     Jose Francisco Sommers is a 30 year old   patient who prefers the name Manuel and uses pronouns he, him, his, himself.     PERTINENT BACKGROUND                                               [most recent eval 08/24/2020]     Psych pertinent item history includes psychosis [sxs include disorganization, possible catatonia], mutiple psychotropic trials, psych hosp (<3) and SUBSTANCE USE: alcohol and cannabis    INTERIM HISTORY                                                                                    [4, 4]   History was provided by the patient and and his mom, Ember, who was a good historian. Treatment adherence is good.   Since the last visit:   - Manuel's mother contacted our clinic last week with concerns of emerging symptoms of lauren. She noted he was irritable, constantly pacing, and his sleep schedule was erratic. His aripiprazole and fluoxetine were discontinued, and he was instructed to take olanzapine 5-10 mg HS + 5-10 mg daily prn  - Today, Manuel's mother shared that there was a 2 week delay in starting the fluoxetine due to insurance prior authorization issues. She first noticed changes 8-9 days after he started the medication. He was more \"resistant\" and \"on edge\". He was talking 9 miles/day. He would sleep anywhere from a few hours to 10 hours. " "He had actually maintained on olanzapine 5 mg throughout the episode (it was never discontinued during the cross-taper to aripiprazole). He is now taking olanzapine 5 mg AM + 10 mg HS, and his symptoms have greatly improved  - Manuel requests a stimulant to help his inattention. He notes that it takes a great deal of effort to read a book. He reports that his mood is \"fine\". He does not recall having any recent mood changes, including irritability. He is now sleeping 10 hours/night. He also questioned if he could start Depakote. He reports this was suggested in the hospital  - Both Manuel and his mother note that olanzapine is effective, but they worry about the metabolic side effects and sedation      RECENT PSYCH ROS:   Depression:  poor concentration /memory  Elevated:  See HPI - recent sleep disruption, irritability, and increased activity   Psychosis:  none  Anxiety:  nervous/overwhelmed, ruminating difficult to control thoughts   Trauma Related:  none  Eating Disorder: no     Adverse Effects: None  Pertinent Negative Symptoms: No suicidal ideation, self-injurious behavior/urges, violent ideation  Recent Substance Use:     None reported    FAMILY and SOCIAL HISTORY                                    [1ea, 1ea]       pt reported         Family Hx:   Paternal grandfather: alcoholism; Maternal grandmother: \"long-standing mental health issues\"; Maternal Aunt: Anxiety when young; Maternal Aunt: Anxiety and depression      CURRENT SOCIAL HISTORY:  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          PSYCH and SUBSTANCE USE Critical Summary Points since July 2020 08/24/2020: Resident transfer visit, Olanzapine decreased from 7.5mg to 5mg  09/30/2020: Olanzapine decreased to 2.5mg then discontinued  10/08/2020: Family meeting with mom, no medication changes  11/04/2020: No medication changes, referred to MTM  Interim; Patient hospitalized twice, please see discharge summaries for details   01/04/2021: Started Ativan 0.5mg BID PRN   01/14/2021: Increased ativan to 1mg BID PRN, discontinued Olanzapine, started Abilify  3/30/201: discontinued fluoxetine and aripiprazole d/t emerging lauren, restarted olanzapine 5 mg AM + 10 mg HS    PAST MED TRIALS                                                            Adderall 10-20 mg - improved concentration, but \"cold personality\" and perseveration  Prozac 20 mg - limited response, less irritable, first episode of \"rage\"  Seroquel 25-75 mg - helped with sleep and irritability  Concerta 18 mg - improved focus, no improvement in motivation  Provigil 150 mg - Stimulants \"almost killed him\" triggered alcohol binges  Abilify ?25 mg - OK when QOD, but irritable and agitated on QD  Clonazepam 0.5-1.5 mg - calming, helped with sleep and \"catatonia\"  Risperdal up to 5 mg - some confusion, slow processing, withdrawn, ?\"catatonia\", panic attacks  Zyprexa 5 mg less anxious overall improvement  Lithium - calmer, overall better  Latuda 20 mg - severe fatigue and depression  Synthroid 75 mcg - added to help with mood.  Brief trials with Strattera, Intuniv, Lamictal, Xanax, Zoloft    MEDICAL HISTORY and ALLERGY     ALLERGIES: Sulfa drugs     Patient Active Problem List   Diagnosis     Schizoaffective disorder, bipolar type (H)     Hx of psychiatric care     Elevated liver enzymes     Fatty liver     High blood triglycerides     History of cannabis " "abuse     History of cocaine abuse (H)     Psychosis (H)     Elevated blood pressure reading without diagnosis of hypertension     Constipation         MEDICAL REVIEW OF SYSTEMS                                                           [2, 10]    Contraception- Unkown    A comprehensive review of systems was performed and is negative other than noted in the HPI.    MEDICATIONS        Current Outpatient Medications   Medication Sig Dispense Refill     lithium ER (LITHOBID) 300 MG CR tablet Take 1 tablet (300mg) in the morning and 3 tablets (900mg) at bedtime 120 tablet 1     lithium ER (LITHOBID) 300 MG CR tablet Take 1 tablet (300 mg) by mouth every morning 60 tablet 0     metFORMIN (GLUCOPHAGE-XR) 500 MG 24 hr tablet Take 1 tablet (500 mg) by mouth daily (with dinner) 30 tablet 1     propranolol ER (INDERAL LA) 60 MG 24 hr capsule Take 1 capsule (60 mg) by mouth daily 90 capsule 3     QUEtiapine (SEROQUEL) 100 MG tablet Take 1 tablet (100 mg) by mouth every morning And 1 tab (100 mg) with 1 tab (400 mg) for bedtime dose of 500 mg 60 tablet 1     QUEtiapine (SEROQUEL) 400 MG tablet Take 1 tablet (400 mg) by mouth At Bedtime Along with 1 tab (100 mg) for bedtime dose of 500 mg 30 tablet 1     triamcinolone (KENALOG) 0.1 % external cream Apply topically 2 times daily 30 g 2     VITALS                                                                                                                               [3, 3]   There were no vitals taken for this visit.   MENTAL STATUS EXAM                                                              [9, 14 cog gs]     Alertness: alert  and oriented  Appearance: well groomed  Behavior/Demeanor: cooperative, with fair  eye contact   Speech: normal and regular rate and rhythm  Language: intact and no problems  Psychomotor: normal or unremarkable  Mood: \"fine\"  Affect: restricted and irritable; congruent to: mood- no, content- yes  Thought Process/Associations: perseverative on " desired medication changes, difficulty set-shifting to new topics  Thought Content:  Reports none;  Denies suicidal & violent ideation and delusions  Perception:  Reports none;  Denies hallucinations  Insight: limited  Judgment: adequate for safety  Cognition: (6) oriented: time, person, and place  attention span: fair  concentration: fair  recent memory: fair  remote memory: intact  fund of knowledge: appropriate  Gait and Station: N/A (telehealth)    LABS and DATA     PHQ9 TODAY =   PHQ 12/26/2019 11/4/2020 2/8/2021   PHQ-9 Total Score 3 12 10   Q9: Thoughts of better off dead/self-harm past 2 weeks Not at all Not at all Not at all       Recent Labs   Lab Test 12/04/20  1414 11/13/20  0758 03/04/20  1001   CR 1.11 1.10 1.09   GFRESTIMATED 89 >90 >90     Recent Labs   Lab Test 12/04/20  1414 11/13/20  0758 06/01/20  1339   AST 28 26 26   ALT 46 39 42   ALKPHOS 78 71 73     ANTIPSYCHOTIC LABS  [glu, A1C, lipids (ivania LDL), liver enzymes, WBC, ANEU, Hgb, plts]  q12 mo  Recent Labs   Lab Test 12/04/20  1414 11/13/20  0758 06/01/20  1339 03/04/20  1001 11/08/19  1020 05/07/19  1101 12/06/18  1442 09/30/16  1348 09/30/16  1348   GLC 80 82  --  75 93 88 86   < > 83   A1C  --   --  4.7  --   --  4.9 5.0  --  4.6    < > = values in this interval not displayed.     Recent Labs   Lab Test 11/13/20  0758 06/01/20  1339 05/07/19  1101 12/13/18  1027   CHOL 111 136 141 173   TRIG 211* 260* 195* 278*   LDL 20 43 63 81   HDL 49 41 39* 36*     Recent Labs   Lab Test 12/04/20  1414 11/13/20  0758 06/01/20  1339 05/07/19  1101   AST 28 26 26 26   ALT 46 39 42 52   ALKPHOS 78 71 73 69     Recent Labs   Lab Test 12/04/20  1414 11/13/20  0758 06/01/20  1339 05/07/19  1101 12/06/18  1442   WBC 11.0 12.9* 7.5 7.3 8.9   ANEU 8.0  --  4.8 4.5 5.7   HGB 14.3 15.1 15.1 15.0 15.8    254 265 247 270     LITHIUM LABS     [level, renal, SG, TSH, WBC]    q6 mo  Recent Labs   Lab Test 12/16/20  0718 12/11/20  0726 11/25/20  0751  11/18/20  0740   LITHIUM 0.84 0.58* 0.79 0.72     Recent Labs   Lab Test 12/04/20  1414 11/13/20  0758 03/04/20  1001 11/08/19  1020   CR 1.11 1.10 1.09 1.12   GFRESTIMATED 89 >90 >90 89    142 139 138   POTASSIUM 4.1 4.4 3.8 3.5   ADONAY 9.4 9.4 10.1 9.2     Recent Labs   Lab Test 11/08/19  1024 05/07/19  1101 12/06/18  1505 10/30/17  1429   SG 1.015 1.015 1.009 1.015     Recent Labs   Lab Test 12/04/20  1414 11/13/20  0758 06/01/20  1339 03/04/20  1001   TSH 2.01 1.21 2.51 5.82*     Recent Labs   Lab Test 12/04/20  1414 11/13/20  0758 06/01/20  1339 05/07/19  1101 12/06/18  1442   WBC 11.0 12.9* 7.5 7.3 8.9   ANEU 8.0  --  4.8 4.5 5.7     PSYCHOTROPIC DRUG INTERACTIONS     None    MANAGEMENT:  Monitoring for adverse effects and routine labs    RISK STATEMENT for SAFETY     Jose Francisco Sommers did not appear to be an imminent safety risk to self or others.    DIAGNOSES                                                                                         [m2, h3]      Schizoaffective disorder, Bipolar type, mre manic, mild (improving)  ADHD, predominantly inattentive presentation    ASSESSMENT                                                                                      [m2, h3]          Manuel recently experienced symptoms of lauren, including sleep disruption, increased psychomotor activity (walking 9 miles/day), and irritability. He has poor insight and recollection of his recent mood symptoms. His symptoms of lauren have now improved with cessation of fluoxetine and a higher dose of olanzapine. His primary concern today was starting a stimulant for poor concentration. We cautioned Manuel against making too many medication changes in a short period of time. Given that his mood is just now stabilizing, we recommended we not make any med changes today.    Olanzapine has been historically very helpful to stabilize his mood, but it has unfortunately contributed to weight gain and sedation. After a few months  of stability, it may be reasonable to consider a trial of cariprazine, ziprasidone, or depakote as an adjunct to lithium. The dose of metfomin could also be optimized to reduce AP associated weight gain. Manuel and his mother were in agreement with this plan.      [place] was [place] today:  Indicates Lorazepam, last filled in 01/2021.    PLAN                                                                                                                [m2, h3]      1) Meds  - Lithium 1,200 mg HS  - Seroquel 100mg daily and 500mg at bedtime   - olanzapine 5 mg AM + 10 mg HS  - Metformin 500mg daily with dinner      2) Therapy-  Continue    3) Next Due   Labs- N/A  EKG- PRN  Rating scales- N/A    4) Referrals  No Referrals needed     5) RTC: w/Dr. Ball in 3 weeks    6) Crisis Numbers:   Provided routinely in AVS     After hours:  [phone]      TREATMENT RISK STATEMENT:  The risks, benefits, alternatives and potential adverse effects have been discussed and are understood by the pt. The pt understands the risks of using street drugs or alcohol. There are no medical contraindications, the pt agrees to treatment with the ability to do so. The pt knows to call the clinic for any problems or to access emergency care if needed.  Medical and substance use concerns are documented above.  Psychotropic drug interaction check was done, including changes made today.      PROVIDER: Beau Barreto MD    Patient staffed via video with Dr. Lyons who will sign the note.  Supervisor is Dr. Ventura.      TELEHEALTH ATTENDING ATTESTATION  Following the ACGME guidelines on telemedicine and direct supervision due to COVID-19, I was concurrently participating in and/or monitoring the patient care through appropriate telecommunication technology.  I discussed the key portions of the service with the resident, including the mental status examination and developing the plan of care. I reviewed key portions of the history with the resident. I agree  with the findings and plan as documented in this note.   Dinh Lyons MD

## 2021-03-30 NOTE — TELEPHONE ENCOUNTER
Writer was informed that insurance will not cover medication due to qty limit exceeded. Will confirm with Dr. Barreto that it's appropriate to prescribe 5 mg tab QAM and 10 mg tab at bedtime.

## 2021-03-30 NOTE — TELEPHONE ENCOUNTER
On March 30, 2021 at 8:36 AM writer called patient and completed the rooming process. Bela Hanna CMA

## 2021-03-30 NOTE — TELEPHONE ENCOUNTER
On 3/30/2021 a PA was received from [facility] and is required for the Olanzapine 5mg tabs, this was routed and emailed to Dyllan ABREU and placed in her folder, and a copy was held. Vijaya Jo CMA

## 2021-03-30 NOTE — TELEPHONE ENCOUNTER
Beau Barreto MD Labossiere, Laura, RN   Caller: Unspecified (Today, 11:44 AM)             Kavin Aguillon - Yes, we can do two separate orders. Easy fix. Thanks!     Beau CADET        Two new prescriptions sent to avoid insurance issue. Note included on prescriptions to discontinue previous 5 mg script.

## 2021-03-30 NOTE — PROGRESS NOTES
"VIDEO VISIT  Jose Francisco Sommers is a 30 year old patient who is being evaluated via a billable video visit.      The patient has been notified of following:   \"This video visit will be conducted via a call between you and your physician/provider. We have found that certain health care needs can be provided without the need for an in-person physical exam. This service lets us provide the care you need with a video conversation. If a prescription is necessary we can send it directly to your pharmacy. If lab work is needed we can place an order for that and you can then stop by our lab to have the test done at a later time. Insurers are generally covering virtual visits as they would in-office visits so billing should not be different than normal.  If for some reason you do get billed incorrectly, you should contact the billing office to correct it and that number is in the AVS .    Video Conference to be completed via:  Elizabeth    Patient has given verbal consent for video visit?:  Yes    Patient would prefer that any video invitations be sent by: Send to e-mail at: [email or web address]      How would patient like to obtain AVS?:  Aj    AVS SmartPhrase [PsychAVS] has been placed in 'Patient Instructions':  Yes  "

## 2021-03-30 NOTE — TELEPHONE ENCOUNTER
On March 30, 2021, at 8:14 AM, writer called patient at [phone] to confirm Virtual Visit. Writer unable to make contact with patient. Writer left detailed voice message for call back. [phone] left as call back number. Bela Hanna, [facility]

## 2021-03-30 NOTE — PATIENT INSTRUCTIONS
"It was nice to see you today Manuel    We will not make any changes to your medications today. We need to allow some time for your neurochemistry to \"settle\" before adding any new medications    You will see Dr. Ball again in 3 weeks      **For crisis resources, please see the information at the end of this document**     Patient Education      Thank you for coming to the [facility] [place] [facility].    Lab Testing:  If you had lab testing today and your results are reassuring or normal they will be mailed to you or sent through [employer] within 7 days. If the lab tests need quick action we will call you with the results. The phone number we will call with results is # [phone] (home) . If this is not the best number please call our clinic and change the number.    Medication Refills:  If you need any refills please call your pharmacy and they will contact us. Our fax number for refills is [phone]. Please allow three business for refill processing. If you need to  your refill at a new pharmacy, please contact the new pharmacy directly. The new pharmacy will help you get your medications transferred.     Scheduling:  If you have any concerns about today's visit or wish to schedule another appointment please call our office during normal business hours [phone] (8-5:00 M-F)    Contact Us:  Please call [phone] during business hours (8-5:00 M-F).  If after clinic hours, or on the weekend, please call  [phone].    Financial Assistance [phone]  [facility] Billing [phone]  Central Billing Office, ealth: [phone]  [place] Billing [phone]  Medical Records [phone]  [place] Patient Bill of Rights [email or web address]       MENTAL HEALTH CRISIS NUMBERS:  For a medical emergency please call  911 or go to the nearest ER.     [facility]:   [facility] " -[phone]   Crisis Residence [facility] -[phone]   Walk-In Counseling Center [facility] -[phone]   COPE 24/7 [facility] Mobile Team -[phone] (adults)/[phone] (child)  CHILD: [facility] needs assessment team - [phone]      [facility]:   [facility] - [phone]   Walk-in counseling [facility] - [phone]   Walk-in counseling [facility] - [phone]   Crisis Residence [facility] - [phone]  Urgent Care Adult Mental Lcoqkf-[phone] mobile unit/ 24/7 crisis line    National Crisis Numbers:   National Suicide Prevention Lifeline: 3-830-971-TALK (118-423-0319)  Poison Control Center - 1-496.364.9821  [employer]/resources for a list of additional resources (SOS)  Trans mediaBunkerline a hotline for transgender people 1-370.151.4463  The united healthcare practice solutions a hotline for LGBT youth 1-436.939.1144  Crisis Text Line: For any crisis 24/7   To: 518114  see www.crisistextline.org  - IF MAKING A CALL FEELS TOO HARD, send a text!         Again thank you for choosing [facility] & [facility]DI[facility] and please let us know how we can best partner with you to improve you and your family's health.    You may be receiving a survey regarding this appointment. We would love to have your feedback, both positive and negative. The survey is done by an external company, so your answers are anonymous.                   **For crisis resources, please see the information at the end of this document**     Patient Education      Thank you for coming to the [facility].    Lab Testing:  If you had lab testing today and your results are reassuring or normal they will be mailed to you or sent through [employer] within 7 days. If the lab tests need quick action we will call you with the results. The phone number we will call with results is # [phone]  (home) . If this is not the best number please call our clinic and change the number.    Medication Refills:  If you need any refills please call your pharmacy and they will contact us. Our fax number for refills is [phone]. Please allow three business for refill processing. If you need to  your refill at a new pharmacy, please contact the new pharmacy directly. The new pharmacy will help you get your medications transferred.     Scheduling:  If you have any concerns about today's visit or wish to schedule another appointment please call our office during normal business hours [phone] (8-5:00 M-F)    Contact Us:  Please call [phone] during business hours (8-5:00 M-F).  If after clinic hours, or on the weekend, please call  [phone].    Financial Assistance [phone]  [facility] Billing [phone]  Central Billing Office, [facility]: [phone]  [facility] Billing [phone]  Medical Records [phone]  [place] Patient Bill of Rights [email or web address]       MENTAL HEALTH CRISIS NUMBERS:  For a medical emergency please call  911 or go to the nearest ER.     [facility]:   [facility] -[phone]   Crisis Residence [facility] -[phone]   Walk-In Counseling [facility] -[phone]   COPE 24/7 [place] Mobile Team -[phone] (adults)/[phone] (child)  CHILD: [facility] needs assessment team - [phone]      [facility]:   [facility] - [phone]   Walk-in counseling [facility] - [phone]   Walk-in counseling [facility] - [phone]   Crisis Residence [facility] Res[facility] - [phone]  Urgent Care Adult Mental Ljugbu-[phone] mobile unit/ 24/7 crisis line    National Crisis Numbers:   National Suicide Prevention Lifeline: 8-118-990-TALK (905-203-8030)  Poison Control Center -  3-838-785-5941  be[employer].com/resources for a list of additional resources (SOS)  Trans Lifeline a hotline for transgender people 1-404.553.9132  The [employer] a hotline for LGBT youth 9-505-283-5404  Crisis Text Line: For any crisis 24/7   To: 624606  see www.crisistextline.org  - IF MAKING A CALL FEELS TOO HARD, send a text!         Again thank you for choosing [facility] [place] [facility] and please let us know how we can best partner with you to improve you and your family's health.    You may be receiving a survey regarding this appointment. We would love to have your feedback, both positive and negative. The survey is done by an external company, so your answers are anonymous.

## 2021-04-19 ENCOUNTER — TELEPHONE (OUTPATIENT)
Dept: PSYCHIATRY | Facility: CLINIC | Age: 31
End: 2021-04-19
Payer: COMMERCIAL

## 2021-04-19 ENCOUNTER — VIRTUAL VISIT (OUTPATIENT)
Dept: PSYCHIATRY | Facility: CLINIC | Age: 31
End: 2021-04-19
Attending: PSYCHIATRY & NEUROLOGY
Payer: COMMERCIAL

## 2021-04-19 DIAGNOSIS — F25.0 SCHIZOAFFECTIVE DISORDER, BIPOLAR TYPE (H): Primary | ICD-10-CM

## 2021-04-19 NOTE — TELEPHONE ENCOUNTER
[facility] PSYCHIATRY CLINIC NURSING QUICK ASSESSMENT NOTE       SITUATION                                                                                                                 Jose Francisco Sommers presents via telephone for the following reason: RNCC clinical assessment       BACKGROUND                                                                                                                   Provider unavailable for scheduled appointment today      ASSESSMENT                                                                                                                      ==> Suicide, safety <==    If detailed assessment needed, see CAMS Suicide Status Form on RNCC Share.    Suicidal thoughts: no  Homicidal thoughts: no  Thoughts of self-harm: no  Concerns for safety in home or relationships (physical and emotional): no  Plan for suicide / self-harm: no  Has the patient acted on any of these? N/A  Access to means (firearm, rope, knife, medications, automobile): N/A  Change in frequency and/or intensity of thoughts: N/A  New stressors or life changes: no  Hope for future, motivation to not attempt suicide: yes  Strengths and resources, skills and abilities, vulnerabilities: yes  Support system (hemalatha, family, through relationships and from professionals (including therapist)): yes  Likelihood to act on these thoughts: N/A  Does the patient feel a need to be hospitalized? no  Does the patient feel a need a referral to a crisis residence? no      ==> Medications <==    Review ALL meds with patient.  Include coaching on effects of alcohol and other substances.    Taking medications as prescribed: yes  Difficulty obtaining refills: no  Medication side effect concerns: no  Concerns with pregnancy and medications: N/A  Questions about meds: yes -- states he is currently taking lithium 300 mg in AM and 900 mg in PM as prescribed in the hospital. He is wondering if he can take full 1200 mg dose in  "PM. Per chart review, prescription is written as: lithium ER (LITHOBID) 300 MG CR tablet / Sig - Route: Take 4 tablets (1,200 mg) by mouth At Bedtime - Oral. Relayed this to patient but he would like provider's input.   Any new meds prescribed outside of psych clinic: no      Is there anything else you want me to know? yes   - wants to talk to Dr. Lyosn about potentially starting depakote. States RN in hospital told him it can be helpful with \"certain behaviors.\" Patient unable to further elaborate.  - wants to start a medication for smoking cessation, states he is having a hard time quitting. States he prefers no nicotine/stimulants.       RECOMMENDATION                                                                                                     Phone call notes:   - sleeping okay  - appetite okay, slightly increased since starting olanzapine   - denies medication side effects   - denies auditory/visual hallucinations, paranoia   - denies SI/SIB/HI  - coherent, organized thought process during conversation   - requests to be rescheduled for 4/29 at 10am   "

## 2021-04-19 NOTE — TELEPHONE ENCOUNTER
----- Message from Betsy Jo CMA sent at 4/19/2021 10:27 AM CDT -----  Cam Larkin out sick email today-     10:30am please have my nurse partner call and triage, can fit patient in during admin time on Thursday 04/29 at 10-10:30am if needed, otherwise can reschedule to next available     Is listed in the psych nurse schedule.      Vijaya :)

## 2021-04-29 ENCOUNTER — TELEPHONE (OUTPATIENT)
Dept: PSYCHIATRY | Facility: CLINIC | Age: 31
End: 2021-04-29

## 2021-04-29 ENCOUNTER — VIRTUAL VISIT (OUTPATIENT)
Dept: PSYCHIATRY | Facility: CLINIC | Age: 31
End: 2021-04-29
Attending: PSYCHIATRY & NEUROLOGY
Payer: COMMERCIAL

## 2021-04-29 DIAGNOSIS — E78.1 HIGH BLOOD TRIGLYCERIDES: ICD-10-CM

## 2021-04-29 DIAGNOSIS — K76.0 FATTY LIVER: ICD-10-CM

## 2021-04-29 DIAGNOSIS — F25.0 SCHIZOAFFECTIVE DISORDER, BIPOLAR TYPE (H): Primary | ICD-10-CM

## 2021-04-29 PROCEDURE — 99214 OFFICE O/P EST MOD 30 MIN: CPT | Mod: 95 | Performed by: STUDENT IN AN ORGANIZED HEALTH CARE EDUCATION/TRAINING PROGRAM

## 2021-04-29 ASSESSMENT — PAIN SCALES - GENERAL: PAINLEVEL: NO PAIN (0)

## 2021-04-29 NOTE — TELEPHONE ENCOUNTER
[facility] Call Center    Phone Message    May a detailed message be left on voicemail: yes     Reason for Call:     Pt called to let Dr. Ball know that he got her message and that they will not change their medication at this time. They can be reached at the number provided.    Action Taken: Other: Sent to Taisha TOPETE    Travel Screening: Not Applicable

## 2021-04-29 NOTE — PATIENT INSTRUCTIONS
**For crisis resources, please see the information at the end of this document**     Patient Education      Thank you for coming to the [facility] [place] [facility]INIC.    Lab Testing:  If you had lab testing today and your results are reassuring or normal they will be mailed to you or sent through [place] within 7 days. If the lab tests need quick action we will call you with the results. The phone number we will call with results is # [phone] (home) . If this is not the best number please call our clinic and change the number.    Medication Refills:  If you need any refills please call your pharmacy and they will contact us. Our fax number for refills is [phone]. Please allow three business for refill processing. If you need to  your refill at a new pharmacy, please contact the new pharmacy directly. The new pharmacy will help you get your medications transferred.     Scheduling:  If you have any concerns about today's visit or wish to schedule another appointment please call our office during normal business hours [phone] (8-5:00 M-F)    Contact Us:  Please call [phone] during business hours (8-5:00 M-F).  If after clinic hours, or on the weekend, please call  [phone].    Financial Assistance [phone]  [facility] Billing [phone]  Central Billing Office, [facility]: [phone]  [facility] Billing [phone]  Medical Records [phone]  [facility] Patient Bill of Rights [email or web address]       MENTAL HEALTH CRISIS NUMBERS:  For a medical emergency please call  911 or go to the nearest ER.     [facility]:   [facility] -[phone]   Crisis Residence [facility] -[phone]   Walk-In [facility] [place] -[phone]   COPE 24/7 [place] Mobile Team -[phone] (adults)/[phone] (child)  CHILD: [facility] needs assessment  team - [phone]      [facility]:   [facility] - [phone]   Walk-in counseling [facility] - [phone]   Walk-in counseling [facility] - [phone]   Crisis Residence [facility] - [phone]  Urgent Care Adult Mental Xwppnr-[phone] mobile unit/ 24/7 crisis line    National Crisis Numbers:   National Suicide Prevention Lifeline: 4-714-051-TALK (889-213-4746)  Poison Control Center - 0-552-300-1048  Xirrus/resources for a list of additional resources (SOS)  Trans Lifeline a hotline for transgender people 1-540.148.8058  The Luis Project a hotline for LGBT youth 7-881-886-0764  Crisis Text Line: For any crisis 24/7   To: 271909  see www.crisistextline.org  - IF MAKING A CALL FEELS TOO HARD, send a text!         Again thank you for choosing [facility] MENTAL HEALTH & ADDICTION [facility] and please let us know how we can best partner with you to improve you and your family's health.    You may be receiving a survey regarding this appointment. We would love to have your feedback, both positive and negative. The survey is done by an external company, so your answers are anonymous.

## 2021-04-29 NOTE — PROGRESS NOTES
"Video- Visit Details  Type of service:  video visit for medication management  Time of service:    Date:  04/29/2021    Video Start Time:  10:00       Video End Time:  11:00am    Reason for video visit:  Patient unable to travel due to Covid-19  Originating Site (patient location):  [facility]   Location- Patient's home  Distant Site (provider location):  Remote location  Mode of Communication:  Video Conference via AmWell  Consent:  Patient has given verbal consent for video visit?: Yes         [facility]  Psychiatry Clinic  PSYCHIATRY PROGRESS NOTE     CARE TEAM:  PCP- Provider, Clinic.  Therapist: None. Community  Team: None     Jose Francisco Sommers is a 30 year old   patient who prefers the name Manuel and uses pronouns he, him, his, himself.     PERTINENT BACKGROUND                                               [most recent eval 08/24/2020]     Psych pertinent item history includes psychosis [sxs include disorganization, possible catatonia], mutiple psychotropic trials, psych hosp (<3) and SUBSTANCE USE: alcohol and cannabis    INTERIM HISTORY                                                                                    [4, 4]   History was provided by the patient and and his mom, Ember, who was a good historian. Treatment adherence is good.   Since the last visit:   - Today Manuel reports he is doing \"okay\".   - At their last appointment with Dr. Barreto/Dr. Lyons, Abilify and Prozac were discontinued and Olanzapine was started due to an elevated mood state. Over the past few weeks Manuel has been euthymic.   - Today Manuel is interested in making medication changes.  He brings up several ideas of changes including tapering off Seroquel or olanzapine, starting Depakote or Provigil.   - Manuel expresses significant concern regarding the metabolic side effects of olanzapine and Seroquel. He feels the olanzapine is more helpful than the Seroquel so he would be more interested in tapering " "off the Seroquel.   - Both Manuel and his mom report he has been on Provigil in the past and did quite well when it was combined with Olanzapine and Lithium.   - We spent much of the appointment discussing previous recommendations to not make any further med changes until Manuel has a period of stability for several months.     - Discussed smoking cessation, he has had good luck with nicotine replacement patch in the past. He is not interested in starting this today.   - He denies acute safety concerns including SI, SIB, HI. He feels safe at home.       RECENT PSYCH ROS:   Depression:  poor concentration /memory  Elevated:  none   Psychosis:  none  Anxiety:  nervous/overwhelmed, ruminating difficult to control thoughts   Trauma Related:  none  Eating Disorder: no     Adverse Effects: None  Pertinent Negative Symptoms: No suicidal ideation, self-injurious behavior/urges, violent ideation  Recent Substance Use:     None reported    FAMILY and SOCIAL HISTORY                                    [1ea, 1ea]       pt reported         Family Hx:   Paternal grandfather: alcoholism; Maternal grandmother: \"long-standing mental health issues\"; Maternal Aunt: Anxiety when young; Maternal Aunt: Anxiety and depression      CURRENT SOCIAL HISTORY:  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          PSYCH and SUBSTANCE USE Critical Summary Points since July 2020 08/24/2020: " "Resident transfer visit, Olanzapine decreased from 7.5mg to 5mg  09/30/2020: Olanzapine decreased to 2.5mg then discontinued  10/08/2020: Family meeting with mom, no medication changes  11/04/2020: No medication changes, referred to [facility]  Interim; Patient hospitalized twice, please see discharge summaries for details   01/04/2021: Started Ativan 0.5mg BID PRN   01/14/2021: Increased ativan to 1mg BID PRN, discontinued Olanzapine, started Abilify  3/30/201: discontinued fluoxetine and aripiprazole d/t emerging lauren, restarted olanzapine 5 mg AM + 10 mg HS  04/29/21: No med changes    PAST MED TRIALS                                                            Adderall 10-20 mg - improved concentration, but \"cold personality\" and perseveration  Prozac 20 mg - limited response, less irritable, first episode of \"rage\"  Seroquel 25-75 mg - helped with sleep and irritability  Concerta 18 mg - improved focus, no improvement in motivation  Provigil 150 mg - Stimulants \"almost killed him\" triggered alcohol binges  Abilify ?25 mg - OK when QOD, but irritable and agitated on QD  Clonazepam 0.5-1.5 mg - calming, helped with sleep and \"catatonia\"  Risperdal up to 5 mg - some confusion, slow processing, withdrawn, ?\"catatonia\", panic attacks  Zyprexa 5 mg less anxious overall improvement  Lithium - calmer, overall better  Latuda 20 mg - severe fatigue and depression  Synthroid 75 mcg - added to help with mood.  Brief trials with Strattera, Intuniv, Lamictal, Xanax, Zoloft    MEDICAL HISTORY and ALLERGY     ALLERGIES: Sulfa drugs     Patient Active Problem List   Diagnosis     Schizoaffective disorder, bipolar type (H)     Hx of psychiatric care     Elevated liver enzymes     Fatty liver     High blood triglycerides     History of cannabis abuse     History of cocaine abuse (H)     Psychosis (H)     Elevated blood pressure reading without diagnosis of hypertension     Constipation         MEDICAL REVIEW OF SYSTEMS                    " "                                       [2, 10]    Contraception- Unkown    A comprehensive review of systems was performed and is negative other than noted in the HPI.    MEDICATIONS        Current Outpatient Medications   Medication Sig Dispense Refill     lithium ER (LITHOBID) 300 MG CR tablet Take 4 tablets (1,200 mg) by mouth At Bedtime 120 tablet 1     metFORMIN (GLUCOPHAGE-XR) 500 MG 24 hr tablet Take 1 tablet (500 mg) by mouth daily (with dinner) 30 tablet 1     OLANZapine (ZYPREXA) 10 MG tablet Take 1 tablet (10 mg) by mouth At Bedtime 30 tablet 1     OLANZapine (ZYPREXA) 5 MG tablet Take 1 tablet (5 mg) by mouth every morning 30 tablet 1     propranolol ER (INDERAL LA) 60 MG 24 hr capsule Take 1 capsule (60 mg) by mouth daily 90 capsule 3     QUEtiapine (SEROQUEL) 100 MG tablet Take 1 tablet (100 mg) by mouth every morning And 1 tab (100 mg) with 1 tab (400 mg) for bedtime dose of 500 mg 60 tablet 1     QUEtiapine (SEROQUEL) 400 MG tablet Take 1 tablet (400 mg) by mouth At Bedtime Along with 1 tab (100 mg) for bedtime dose of 500 mg 30 tablet 1     triamcinolone (KENALOG) 0.1 % external cream Apply topically 2 times daily 30 g 2     VITALS                                                                                                                               [3, 3]   There were no vitals taken for this visit.   MENTAL STATUS EXAM                                                              [9, 14 cog gs]     Alertness: alert  and oriented  Appearance: well groomed  Behavior/Demeanor: cooperative, with fair  eye contact   Speech: normal and regular rate and rhythm  Language: intact and no problems  Psychomotor: normal or unremarkable  Mood: \"okay\"  Affect: restricted and irritable; congruent to: mood- no, content- yes  Thought Process/Associations: perseverative on desired medication changes, difficulty set-shifting to new topics  Thought Content:  Reports none;  Denies suicidal & violent ideation and " delusions  Perception:  Reports none;  Denies hallucinations  Insight: limited  Judgment: adequate for safety  Cognition: (6) oriented: time, person, and place  attention span: fair  concentration: fair  recent memory: fair  remote memory: intact  fund of knowledge: appropriate  Gait and Station: N/A (telehealth)    LABS and DATA     PHQ9 TODAY =   PHQ 12/26/2019 11/4/2020 2/8/2021   PHQ-9 Total Score 3 12 10   Q9: Thoughts of better off dead/self-harm past 2 weeks Not at all Not at all Not at all       ANTIPSYCHOTIC LABS  [glu, A1C, lipids (ivania LDL), liver enzymes, WBC, ANEU, Hgb, plts]  q12 mo  Recent Labs   Lab Test 12/04/20  1414 11/13/20  0758 06/01/20  1339 03/04/20  1001 11/08/19  1020 05/07/19  1101 12/06/18  1442 09/30/16  1348 09/30/16  1348   GLC 80 82  --  75 93 88 86   < > 83   A1C  --   --  4.7  --   --  4.9 5.0  --  4.6    < > = values in this interval not displayed.     Recent Labs   Lab Test 11/13/20  0758 06/01/20  1339 05/07/19  1101 12/13/18  1027   CHOL 111 136 141 173   TRIG 211* 260* 195* 278*   LDL 20 43 63 81   HDL 49 41 39* 36*     Recent Labs   Lab Test 12/04/20  1414 11/13/20  0758 06/01/20  1339 05/07/19  1101   AST 28 26 26 26   ALT 46 39 42 52   ALKPHOS 78 71 73 69     Recent Labs   Lab Test 12/04/20  1414 11/13/20  0758 06/01/20  1339 05/07/19  1101 12/06/18  1442   WBC 11.0 12.9* 7.5 7.3 8.9   ANEU 8.0  --  4.8 4.5 5.7   HGB 14.3 15.1 15.1 15.0 15.8    254 265 247 270     LITHIUM LABS     [level, renal, SG, TSH, WBC]    q6 mo  Recent Labs   Lab Test 12/16/20  0718 12/11/20  0726 11/25/20  0751 11/18/20  0740   LITHIUM 0.84 0.58* 0.79 0.72     Recent Labs   Lab Test 12/04/20  1414 11/13/20  0758 03/04/20  1001 11/08/19  1020   CR 1.11 1.10 1.09 1.12   GFRESTIMATED 89 >90 >90 89    142 139 138   POTASSIUM 4.1 4.4 3.8 3.5   ADONAY 9.4 9.4 10.1 9.2     Recent Labs   Lab Test 11/08/19  1024 05/07/19  1101 12/06/18  1505 10/30/17  1429   SG 1.015 1.015 1.009 1.015     Recent Labs    Lab Test 12/04/20  1414 11/13/20  0758 06/01/20  1339 03/04/20  1001   TSH 2.01 1.21 2.51 5.82*       PSYCHOTROPIC DRUG INTERACTIONS     None    MANAGEMENT:  Monitoring for adverse effects and routine labs    RISK STATEMENT for SAFETY     Jose Francisco Sommers did not appear to be an imminent safety risk to self or others.    DIAGNOSES                                                                                         [m2, h3]      Schizoaffective disorder, Bipolar type, mre manic, mild (improving)  ADHD, predominantly inattentive presentation    ASSESSMENT                                                                                      [m2, h3]          Manuel recently experienced symptoms of lauren, including sleep disruption, increased psychomotor activity (walking 9 miles/day), and irritability. He has poor insight and recollection of his recent mood symptoms. His symptoms of lauren have now improved with cessation of fluoxetine and a higher dose of olanzapine. His primary concern today is making medication changes, tapering off Seroquel, starting a stimulant, and starting Depakote.  We stressed to Manuel and his mom that our strong recommendation is to not making any medication changes at this time.  It is important for Manuel to have a period of mood stability.  There is concern if we continue to change medications and alter brain chemistry, we are creating the mood instability we are trying to avoid. Olanzapine has been historically very helpful to stabilize his mood, but it has unfortunately contributed to weight gain and sedation. After a few months of stability, it may be reasonable to consider a trial of cariprazine, ziprasidone, or depakote as an adjunct to lithium.   We discussed a referral to the weight management clinic, however Manuel declined at this time.  Manuel ultimately agreed to not making any medication changes today.      [place] was checked today:  Indicates Lorazepam, last filled in  01/2021.    PLAN                                                                                                                [m2, h3]      1) Meds  - Lithium 1,200 mg HS  - Seroquel 100mg daily and 500mg at bedtime   - olanzapine 5 mg AM + 10 mg HS  - Metformin 500mg daily with dinner      2) Therapy-  Continue    3) Next Due   Labs- N/A  EKG- PRN  Rating scales- N/A    4) Referrals  No Referrals needed     5) RTC: 4 weeks     6) Crisis Numbers:   Provided routinely in AVS     After hours:  [phone]      TREATMENT RISK STATEMENT:  The risks, benefits, alternatives and potential adverse effects have been discussed and are understood by the pt. The pt understands the risks of using street drugs or alcohol. There are no medical contraindications, the pt agrees to treatment with the ability to do so. The pt knows to call the clinic for any problems or to access emergency care if needed.  Medical and substance use concerns are documented above.  Psychotropic drug interaction check was done, including changes made today.      PROVIDER: Jayda Ball,     Patient staffed via video with Dr. Tay who will sign the note.  Supervisor is Dr. Tay.   Case also discussed with Dr. Lyons.    TELEHEALTH ATTENDING ATTESTATION  Following the ACGME guidelines on telemedicine and direct supervision due to COVID-19, I was concurrently participating in and/or monitoring the patient care through appropriate telecommunication technology.  I discussed the key portions of the service with the resident, including the mental status examination and developing the plan of care. I reviewed key portions of the history with the resident. I agree with the findings and plan as documented in this note.   Joel Tay MD

## 2021-04-29 NOTE — PROGRESS NOTES
"VIDEO VISIT  Jose Francisco Sommers is a 30 year old patient who is being evaluated via a billable video visit.      The patient has been notified of following:   \"This video visit will be conducted via a call between you and your physician/provider. We have found that certain health care needs can be provided without the need for an in-person physical exam. This service lets us provide the care you need with a video conversation. If a prescription is necessary we can send it directly to your pharmacy. If lab work is needed we can place an order for that and you can then stop by our lab to have the test done at a later time. Insurers are generally covering virtual visits as they would in-office visits so billing should not be different than normal.  If for some reason you do get billed incorrectly, you should contact the billing office to correct it and that number is in the AVS .    Video Conference to be completed via:  Elizabeth    Patient has given verbal consent for video visit?:  Yes    Patient would prefer that any video invitations be sent by: Send to e-mail at: [email or web address]      How would patient like to obtain AVS?:  Aj    AVS SmartPhrase [PsychAVS] has been placed in 'Patient Instructions':  Yes    "

## 2021-04-30 RX ORDER — LITHIUM CARBONATE 300 MG/1
1200 TABLET, FILM COATED, EXTENDED RELEASE ORAL AT BEDTIME
Qty: 120 TABLET | Refills: 1 | Status: SHIPPED | OUTPATIENT
Start: 2021-04-30 | End: 2021-05-24

## 2021-04-30 RX ORDER — METFORMIN HCL 500 MG
500 TABLET, EXTENDED RELEASE 24 HR ORAL
Qty: 30 TABLET | Refills: 1 | Status: SHIPPED | OUTPATIENT
Start: 2021-04-30 | End: 2021-05-24

## 2021-04-30 RX ORDER — QUETIAPINE FUMARATE 100 MG/1
100 TABLET, FILM COATED ORAL EVERY MORNING
Qty: 60 TABLET | Refills: 1 | Status: SHIPPED | OUTPATIENT
Start: 2021-04-30 | End: 2021-05-24

## 2021-04-30 RX ORDER — OLANZAPINE 5 MG/1
5 TABLET ORAL EVERY MORNING
Qty: 30 TABLET | Refills: 1 | Status: SHIPPED | OUTPATIENT
Start: 2021-04-30 | End: 2021-05-24

## 2021-04-30 RX ORDER — OLANZAPINE 10 MG/1
10 TABLET ORAL AT BEDTIME
Qty: 30 TABLET | Refills: 1 | Status: SHIPPED | OUTPATIENT
Start: 2021-04-30 | End: 2021-05-24

## 2021-04-30 RX ORDER — QUETIAPINE FUMARATE 400 MG/1
400 TABLET, FILM COATED ORAL AT BEDTIME
Qty: 30 TABLET | Refills: 1 | Status: SHIPPED | OUTPATIENT
Start: 2021-04-30 | End: 2021-05-24

## 2021-05-24 ENCOUNTER — VIRTUAL VISIT (OUTPATIENT)
Dept: PSYCHIATRY | Facility: CLINIC | Age: 31
End: 2021-05-24
Attending: PSYCHIATRY & NEUROLOGY
Payer: COMMERCIAL

## 2021-05-24 DIAGNOSIS — K76.0 FATTY LIVER: ICD-10-CM

## 2021-05-24 DIAGNOSIS — E78.1 HIGH BLOOD TRIGLYCERIDES: ICD-10-CM

## 2021-05-24 DIAGNOSIS — F25.0 SCHIZOAFFECTIVE DISORDER, BIPOLAR TYPE (H): ICD-10-CM

## 2021-05-24 PROCEDURE — 99214 OFFICE O/P EST MOD 30 MIN: CPT | Mod: GC | Performed by: STUDENT IN AN ORGANIZED HEALTH CARE EDUCATION/TRAINING PROGRAM

## 2021-05-24 RX ORDER — QUETIAPINE FUMARATE 200 MG/1
100 TABLET, FILM COATED ORAL EVERY MORNING
Qty: 30 TABLET | Refills: 1 | Status: SHIPPED | OUTPATIENT
Start: 2021-05-24 | End: 2021-10-08 | Stop reason: ALTCHOICE

## 2021-05-24 RX ORDER — METFORMIN HCL 500 MG
TABLET, EXTENDED RELEASE 24 HR ORAL
Qty: 75 TABLET | Refills: 1 | Status: SHIPPED | OUTPATIENT
Start: 2021-05-24 | End: 2021-10-08

## 2021-05-24 RX ORDER — OLANZAPINE 10 MG/1
10 TABLET ORAL AT BEDTIME
Qty: 30 TABLET | Refills: 1 | Status: SHIPPED | OUTPATIENT
Start: 2021-05-24 | End: 2021-07-12

## 2021-05-24 RX ORDER — LITHIUM CARBONATE 300 MG/1
1200 TABLET, FILM COATED, EXTENDED RELEASE ORAL AT BEDTIME
Qty: 120 TABLET | Refills: 1 | Status: SHIPPED | OUTPATIENT
Start: 2021-05-24 | End: 2021-09-13

## 2021-05-24 RX ORDER — OLANZAPINE 5 MG/1
5 TABLET ORAL AT BEDTIME
Qty: 30 TABLET | Refills: 1 | Status: SHIPPED | OUTPATIENT
Start: 2021-05-24 | End: 2021-07-12

## 2021-05-24 RX ORDER — QUETIAPINE FUMARATE 400 MG/1
400 TABLET, FILM COATED ORAL AT BEDTIME
Qty: 30 TABLET | Refills: 1 | Status: SHIPPED | OUTPATIENT
Start: 2021-05-24 | End: 2021-10-08 | Stop reason: ALTCHOICE

## 2021-05-24 ASSESSMENT — PAIN SCALES - GENERAL: PAINLEVEL: NO PAIN (0)

## 2021-05-24 NOTE — PROGRESS NOTES
"Video- Visit Details  Type of service:  video visit for medication management  Time of service:    Date:  05/24/2021    Video Start Time:  10:30am       Video End Time:  11:00am    Reason for video visit:  Patient unable to travel due to Covid-19  Originating Site (patient location):  [facility]   Location- Patient's home  Distant Site (provider location):  Remote location  Mode of Communication:  Video Conference via AmWell  Consent:  Patient has given verbal consent for video visit?: Yes         [facility]  Psychiatry Clinic  PSYCHIATRY PROGRESS NOTE     CARE TEAM:  PCP- Provider, Clinic.  Therapist: None. Community  Team: None     Jose Francisco Sommers is a 30 year old   patient who prefers the name Manuel and uses pronouns he, him, his, himself.     PERTINENT BACKGROUND                                               [most recent eval 08/24/2020]     Psych pertinent item history includes psychosis [sxs include disorganization, possible catatonia], mutiple psychotropic trials, psych hosp (<3) and SUBSTANCE USE: alcohol and cannabis    INTERIM HISTORY                                                                                    [4, 4]   History was provided by the patient and and his mom, Ember, who was a good historian. Treatment adherence is good.   Since the last visit:   - Today Manuel reports he is doing \"okay\".   - He endorses general mood stability over the past month.   - He describes feeling \"depressed\" and \"melancholy\" after taking his bedtime Seroquel.  He feels this is different than tiredness or sedation related to the medication.   - He also expresses concerns of \"rumination\". He is unable to describe his ruminating thoughts in much detail, but it is clear they are very distressing to him.   - He expresses frustration at the slow rate of medication change.  We discussed the importance of having a period of good mood stability before making further medication changes.   - " "He denies acute safety concerns including SI, SIB, HI. He feels safe at home.     RECENT PSYCH ROS:   Depression:  poor concentration /memory, low mood after taking seroquel  Elevated:  none   Psychosis:  none  Anxiety:  nervous/overwhelmed, ruminating difficult to control thoughts   Trauma Related:  none  Eating Disorder: no     Adverse Effects: None  Pertinent Negative Symptoms: No suicidal ideation, self-injurious behavior/urges, violent ideation  Recent Substance Use:     None reported    FAMILY and SOCIAL HISTORY                                    [1ea, 1ea]       pt reported         Family Hx:   Paternal grandfather: alcoholism; Maternal grandmother: \"long-standing mental health issues\"; Maternal Aunt: Anxiety when young; Maternal Aunt: Anxiety and depression      CURRENT SOCIAL HISTORY:  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          PSYCH and SUBSTANCE USE Critical Summary Points since July 2020 08/24/2020: Resident transfer visit, Olanzapine decreased from 7.5mg to 5mg  09/30/2020: Olanzapine decreased to 2.5mg then discontinued  10/08/2020: Family meeting with mom, no medication changes  11/04/2020: No medication changes, referred to [facility]  Interim; Patient hospitalized twice, please see discharge summaries for details   01/04/2021: Started Ativan 0.5mg BID PRN   01/14/2021: Increased ativan to 1mg BID PRN, discontinued Olanzapine, started " "Abilify  3/30/201: discontinued fluoxetine and aripiprazole d/t emerging lauren, restarted olanzapine 5 mg AM + 10 mg HS  04/29/21: No med changes  05/24/21: Move all medications to bedtime, increase Metformin to 500mg BID    PAST MED TRIALS                                                            Adderall 10-20 mg - improved concentration, but \"cold personality\" and perseveration  Prozac 20 mg - limited response, less irritable, first episode of \"rage\"  Seroquel 25-75 mg - helped with sleep and irritability  Concerta 18 mg - improved focus, no improvement in motivation  Provigil 150 mg - Stimulants \"almost killed him\" triggered alcohol binges  Abilify ?25 mg - OK when QOD, but irritable and agitated on QD  Clonazepam 0.5-1.5 mg - calming, helped with sleep and \"catatonia\"  Risperdal up to 5 mg - some confusion, slow processing, withdrawn, ?\"catatonia\", panic attacks  Zyprexa 5 mg less anxious overall improvement  Lithium - calmer, overall better  Latuda 20 mg - severe fatigue and depression  Synthroid 75 mcg - added to help with mood.  Brief trials with Strattera, Intuniv, Lamictal, Xanax, Zoloft    MEDICAL HISTORY and ALLERGY     ALLERGIES: Sulfa drugs     Patient Active Problem List   Diagnosis     Schizoaffective disorder, bipolar type (H)     Hx of psychiatric care     Elevated liver enzymes     Fatty liver     High blood triglycerides     History of cannabis abuse     History of cocaine abuse (H)     Psychosis (H)     Elevated blood pressure reading without diagnosis of hypertension     Constipation         MEDICAL REVIEW OF SYSTEMS                                                           [2, 10]    Contraception- Unkown    A comprehensive review of systems was performed and is negative other than noted in the HPI.    MEDICATIONS        Current Outpatient Medications   Medication Sig Dispense Refill     lithium ER (LITHOBID) 300 MG CR tablet Take 4 tablets (1,200 mg) by mouth At Bedtime 120 tablet 1     " "metFORMIN (GLUCOPHAGE-XR) 500 MG 24 hr tablet Take 1 tablet (500 mg) by mouth daily (with dinner) 30 tablet 1     OLANZapine (ZYPREXA) 10 MG tablet Take 1 tablet (10 mg) by mouth At Bedtime 30 tablet 1     OLANZapine (ZYPREXA) 5 MG tablet Take 1 tablet (5 mg) by mouth every morning 30 tablet 1     propranolol ER (INDERAL LA) 60 MG 24 hr capsule Take 1 capsule (60 mg) by mouth daily 90 capsule 3     QUEtiapine (SEROQUEL) 100 MG tablet Take 1 tablet (100 mg) by mouth every morning And 1 tab (100 mg) with 1 tab (400 mg) for bedtime dose of 500 mg 60 tablet 1     QUEtiapine (SEROQUEL) 400 MG tablet Take 1 tablet (400 mg) by mouth At Bedtime Along with 1 tab (100 mg) for bedtime dose of 500 mg 30 tablet 1     triamcinolone (KENALOG) 0.1 % external cream Apply topically 2 times daily 30 g 2     VITALS                                                                                                                               [3, 3]   There were no vitals taken for this visit.   MENTAL STATUS EXAM                                                              [9, 14 cog gs]     Alertness: alert  and oriented  Appearance: well groomed  Behavior/Demeanor: cooperative, with fair  eye contact   Speech: normal and regular rate and rhythm  Language: intact and no problems  Psychomotor: normal or unremarkable  Mood: \"okay\"  Affect: restricted and irritable; congruent to: mood- no, content- yes  Thought Process/Associations: perseverative on desired medication changes, difficulty set-shifting to new topics  Thought Content:  Reports none;  Denies suicidal & violent ideation and delusions  Perception:  Reports none;  Denies hallucinations  Insight: limited  Judgment: adequate for safety  Cognition: (6) oriented: time, person, and place  attention span: fair  concentration: fair  recent memory: fair  remote memory: intact  fund of knowledge: appropriate  Gait and Station: N/A (telehealth)    LABS and DATA     PHQ9 TODAY =   PHQ " 12/26/2019 11/4/2020 2/8/2021   PHQ-9 Total Score 3 12 10   Q9: Thoughts of better off dead/self-harm past 2 weeks Not at all Not at all Not at all       ANTIPSYCHOTIC LABS  [glu, A1C, lipids (ivania LDL), liver enzymes, WBC, ANEU, Hgb, plts]  q12 mo  Recent Labs   Lab Test 12/04/20  1414 11/13/20  0758 06/01/20  1339 03/04/20  1001 11/08/19  1020 05/07/19  1101 12/06/18  1442 09/30/16  1348 09/30/16  1348   GLC 80 82  --  75 93 88 86   < > 83   A1C  --   --  4.7  --   --  4.9 5.0  --  4.6    < > = values in this interval not displayed.     Recent Labs   Lab Test 11/13/20  0758 06/01/20  1339 05/07/19  1101 12/13/18  1027   CHOL 111 136 141 173   TRIG 211* 260* 195* 278*   LDL 20 43 63 81   HDL 49 41 39* 36*     Recent Labs   Lab Test 12/04/20  1414 11/13/20  0758 06/01/20  1339 05/07/19  1101   AST 28 26 26 26   ALT 46 39 42 52   ALKPHOS 78 71 73 69     Recent Labs   Lab Test 12/04/20  1414 11/13/20  0758 06/01/20  1339 05/07/19  1101 12/06/18  1442   WBC 11.0 12.9* 7.5 7.3 8.9   ANEU 8.0  --  4.8 4.5 5.7   HGB 14.3 15.1 15.1 15.0 15.8    254 265 247 270     LITHIUM LABS     [level, renal, SG, TSH, WBC]    q6 mo  Recent Labs   Lab Test 12/16/20  0718 12/11/20  0726 11/25/20  0751 11/18/20  0740   LITHIUM 0.84 0.58* 0.79 0.72     Recent Labs   Lab Test 12/04/20  1414 11/13/20  0758 03/04/20  1001 11/08/19  1020   CR 1.11 1.10 1.09 1.12   GFRESTIMATED 89 >90 >90 89    142 139 138   POTASSIUM 4.1 4.4 3.8 3.5   ADONAY 9.4 9.4 10.1 9.2     Recent Labs   Lab Test 11/08/19  1024 05/07/19  1101 12/06/18  1505 10/30/17  1429   SG 1.015 1.015 1.009 1.015     Recent Labs   Lab Test 12/04/20  1414 11/13/20  0758 06/01/20  1339 03/04/20  1001   TSH 2.01 1.21 2.51 5.82*       PSYCHOTROPIC DRUG INTERACTIONS     None    MANAGEMENT:  Monitoring for adverse effects and routine labs    RISK STATEMENT for SAFETY     Jose Francisco Sommers did not appear to be an imminent safety risk to self or others.    DIAGNOSES                                                                                          [m2, h3]      Schizoaffective disorder, Bipolar type, mre manic, mild (improving)  ADHD, predominantly inattentive presentation    ASSESSMENT                                                                                      [m2, h3]          Manuel returns today for continued medication management of his Schizoaffective disorder and ADHD. He expresses general mood stability over the past month.  He expresses concerns related to side effects of his medications, particularly weight gain related to Seroquel and Olanzapine.  Similar to previous appointments, he requests medication changes.  We stressed to Manuel and his mom that our strong recommendation is to not making any medication changes at this time.  It is important for Manuel to have a period of mood stability.  There is concern if we continue to change medications and alter brain chemistry, we are creating the mood instability we are trying to avoid. Olanzapine has been historically very helpful to stabilize his mood, but it has unfortunately contributed to weight gain and sedation. After a few months of stability, it may be reasonable to consider a trial of cariprazine, ziprasidone, or depakote as an adjunct to lithium.   To aid with weight control we will increase Metformin to 500mg BID.  No other med changes made today.   Manuel denies acute safety concerns including SI, SIB, and HI.        MNPMP was checked today:  Indicates Lorazepam, last filled in 01/2021.    PLAN                                                                                                                [m2, h3]      1) Meds  -  Continue Lithium 1,200 mg HS  - Continue Seroquel 600mg at bedtime   - Continue olanzapine 15 mg HS  - Increase Metformin to  500mg BID with meals      2) Therapy-  Continue    3) Next Due   Labs- N/A  EKG- PRN  Rating scales- N/A    4) Referrals  No Referrals needed     5) RTC: 4 weeks      6) Crisis Numbers:   Provided routinely in AVS     After hours:  [phone]      TREATMENT RISK STATEMENT:  The risks, benefits, alternatives and potential adverse effects have been discussed and are understood by the pt. The pt understands the risks of using street drugs or alcohol. There are no medical contraindications, the pt agrees to treatment with the ability to do so. The pt knows to call the clinic for any problems or to access emergency care if needed.  Medical and substance use concerns are documented above.  Psychotropic drug interaction check was done, including changes made today.      PROVIDER: Jayda Ball, DO    Patient staffed via video with Dr. Lyons who will sign the note.  Supervisor is Dr. Tay.      TELEHEALTH ATTENDING ATTESTATION  Following the ACGME guidelines on telemedicine and direct supervision due to COVID-19, I was concurrently participating in and/or monitoring the patient care through appropriate telecommunication technology.  I discussed the key portions of the service with the resident, including the mental status examination and developing the plan of care. I reviewed key portions of the history with the resident. I agree with the findings and plan as documented in this note.   Dinh Lyons MD

## 2021-05-24 NOTE — PROGRESS NOTES
"VIDEO VISIT  Jose Francisco Sommers is a 30 year old patient who is being evaluated via a billable video visit.      The patient has been notified of following:   \"This video visit will be conducted via a call between you and your physician/provider. We have found that certain health care needs can be provided without the need for an in-person physical exam. This service lets us provide the care you need with a video conversation. If a prescription is necessary we can send it directly to your pharmacy. If lab work is needed we can place an order for that and you can then stop by our lab to have the test done at a later time. Insurers are generally covering virtual visits as they would in-office visits so billing should not be different than normal.  If for some reason you do get billed incorrectly, you should contact the billing office to correct it and that number is in the AVS .    Video Conference to be completed via:  Elizabeth    Patient has given verbal consent for video visit?:  Yes    Patient would prefer that any video invitations be sent by: Send to e-mail at: [email or web address]      How would patient like to obtain AVS?:  Aj    AVS SmartPhrase [PsychAVS] has been placed in 'Patient Instructions':  Yes    "

## 2021-05-24 NOTE — PATIENT INSTRUCTIONS
**For crisis resources, please see the information at the end of this document**     Patient Education      Thank you for coming to the [facility] [place] [facility].    Lab Testing:  If you had lab testing today and your results are reassuring or normal they will be mailed to you or sent through [employer] within 7 days. If the lab tests need quick action we will call you with the results. The phone number we will call with results is # [phone] (home) . If this is not the best number please call our clinic and change the number.    Medication Refills:  If you need any refills please call your pharmacy and they will contact us. Our fax number for refills is [phone]. Please allow three business for refill processing. If you need to  your refill at a new pharmacy, please contact the new pharmacy directly. The new pharmacy will help you get your medications transferred.     Scheduling:  If you have any concerns about today's visit or wish to schedule another appointment please call our office during normal business hours [phone] (8-5:00 M-F)    Contact Us:  Please call [phone] during business hours (8-5:00 M-F).  If after clinic hours, or on the weekend, please call  [phone].    Financial Assistance [phone]  [facility] Billing [phone]  Central Billing Office, [facility]: [phone]  [facility] Billing [phone]  Medical Records [phone]  [facility] Patient Bill of Rights [email or web address]       MENTAL HEALTH CRISIS NUMBERS:  For a medical emergency please call  911 or go to the nearest ER.     [facility]:   [facility] -[phone]   Crisis Residence [facility] Residence -[phone]   Walk-In Counseling Center [facility] -[phone]   COPE 24/7 [place] Mobile Team -[phone] (adults)/[phone] (child)  CHILD: [facility] needs assessment  team - [phone]      [facility]:   [facility] - [phone]   Walk-in counseling [facility] - [phone]   Walk-in counseling [facility] - [phone]   Crisis Residence [facility] Residence - [phone]  Urgent Care Adult [facility]-[phone] mobile unit/ 24/7 crisis line    National Crisis Numbers:   National Suicide Prevention Lifeline: 5-882-185-TALK (189-648-1785)  Poison Control Center - 1-119-202-8048  enEvolv/resources for a list of additional resources (SOS)  Trans Lifeline a hotline for transgender people 1-510.547.6136  The [employer] Project a hotline for LGBT youth 6-022-213-2061  Crisis Text Line: For any crisis 24/7   To: 890387  see www.crisistextline.org  - IF MAKING A CALL FEELS TOO HARD, send a text!         Again thank you for choosing [facility] M[facility] HE[facility] & ADDI[facility] and please let us know how we can best partner with you to improve you and your family's health.    You may be receiving a survey regarding this appointment. We would love to have your feedback, both positive and negative. The survey is done by an external company, so your answers are anonymous.

## 2021-06-17 ENCOUNTER — MYC MEDICAL ADVICE (OUTPATIENT)
Dept: PSYCHIATRY | Facility: CLINIC | Age: 31
End: 2021-06-17

## 2021-06-23 NOTE — TELEPHONE ENCOUNTER
Jayda Ball MD Rambo, Taylor, RN   Phone Number: [phone]             Yes please let him know that we recommend he not make any med changes until he has an appointment with her. The earliest appointment is 07/12 which looks like it is on hold for him     Patient notified via [employer].

## 2021-06-26 ENCOUNTER — MYC MEDICAL ADVICE (OUTPATIENT)
Dept: PSYCHIATRY | Facility: CLINIC | Age: 31
End: 2021-06-26

## 2021-06-29 NOTE — TELEPHONE ENCOUNTER
Jayda Ball MD Rambo, Taylor, RN   Phone Number: [phone]             Kavin Sumner,     Yes he will need to wait until his appointment to discuss with Dr. Julian and Dr. Lyons.     Thank you,   Jayda

## 2021-07-11 NOTE — PROGRESS NOTES
"VIDEO VISIT  Jose Francisco Sommers is a 30 year old patient who is being evaluated via a billable video visit.      The patient has been notified of following:   \"We have found that certain health care needs can be provided without the need for an in-person physical exam. This service lets us provide the care you need with a video conversation. If a prescription is necessary we can send it directly to your pharmacy. If lab work is needed we can place an order for that and you can then stop by our lab to have the test done at a later time. Insurers are generally covering virtual visits as they would in-office visits so billing should not be different than normal.  If for some reason you do get billed incorrectly, you should contact the billing office to correct it and that number is in the AVS .    Patient has given verbal consent for video visit?: Yes   How would you like to obtain your AVS?: Optoro  AVS SmartPhrase [PsychAVS] has been placed in 'Patient Instructions': Yes      Video- Visit Details  Type of service:  video visit for mental health treatment.  Time of service:    Date:  07/12/2021    Video Start Time:  3:00PM        Video End Time:  4:00PM    Reason for video visit: COVID-19  Originating Site (patient location):  Patient's home  Distant Site (provider location):  [facility]  Mode of Communication:  Video Conference via [facility]  Psychiatry Clinic  TRANSFER of CARE DIAGNOSTIC ASSESSMENT     CARE TEAM:  PCP- Clinic Provider, Psychotherapist- None    Jose Francisco Sommers is a 30 year old who prefers the name Manuel and uses pronouns he, him.      DIAGNOSIS     1) Schizoaffective disorder, Bipolar type, mre manic, partial remission to mild (improving)  2) ADHD, predominantly inattentive presentation     ASSESSMENT     Manuel returns today for continued medication management of his Schizoaffective disorder and ADHD. He expressed general mood stability over the " "past month which is supported by his mother's observations.  He expressed concerns related to side effects of his medications, particularly weight gain and sedation related to Seroquel. We are in agreement that Zyprexa is likely a \"critical\" medication for him given his decompensation during the last taper. Given his stability and strong desire to discontinue Seroquel we will start a taper today with plan to follow up in 4 weeks. Manuel and his mother were counseled on the risks/benefits of this as well as what symptoms to watch for. They expressed agreement that they would contact the clinic with any new and/or concerning symptoms.    [place]:  Indicates no controlled substances since 1/2021.     PLAN                                                                                                                1) Meds-  -  Continue Lithium 1,200 mg HS  - Taper Seroquel, decrease by 100mg every week until next follow-up appointment - ie, decrease to 500mg po qhs x 1 week, then decrease to 400mg po qhs x 1 week, then decrease to 300mg po at bedtime x 1 week, then decrease to 200mg po at bedtime (at this point, will follow-up in clinic)   - Continue olanzapine 15 mg HS  - Continue Metformin 500mg BID with meals    2) Psychotherapy- none, will discuss referral at next appointment    3) Next due-  Labs- Ordered today - lithium and atypical neuroleptic monitoring labs  EKG- as needed  Rating scales- N/A    4) Referrals-  None    5) Dispo- RTC in 4 weeks       PERTINENT BACKGROUND                           [most recent eval 07/11/21]      Jose Francisco Sommers first experienced mental health issues in grade school and has received treatment for ADHD, depression, psychosis and substance use.  See last transfer evaluation for detailed history. Notably, this patient had FEP in 2015 in the setting of cannabis abuse and has been stable on olanzapine and lithium since that time. He has never lived independently and has " "only been slowly developing insight into his illness and past symptoms. GeneSight testing was completed noting patient is a poor CYP2D6 metabolizer. Past records from Dr. Mike Powers were received in summer 2018 to assist with diagnostic clarity, however they were hand written notes that were illegible though accompanied by typed neuropsychology consult from [facility]. This documentation was unable to support a diagnosis of bipolar disorder at that time though noted potential Asperger's diagnosis and ADHD. Given that there has been evidence for psychotic signs and symptoms outside the context of mood episodes, a schizophrenia spectrum disorder such as schizoaffective disorder diagnosis seems most accurate. He has had two recent hospitalizations for decompensated psychosis in the setting of decreasing his olanzapine dose.    Psych pertinent item history includes psychosis [sxs include disorganization, possible catatonia], mutiple psychotropic trials, psych hosp (<3) and SUBSTANCE USE: alcohol and cannabis     SUBJECTIVE     - overall goal is to decrease medication burden  - Zyprexa has been the most helpful medication, the symptom Manuel noticed most when he was in the hospital was his disorganized thoughts and Zyprexa was beneficial  - wants to decrease Seroquel as he has been experiencing side effects   - \"body falling asleep before [he does],\" sedation in general   - weight gain, has not noticed any benefit from the Metformin  - discussed risks/benefits of tapering Seroquel including reemergence of manic or psychotic symptoms, Manuel prefers to get off of the medication as soon as possible. After discussing the potential benefits and risks of various rates of tapering off of Seroquel, Manuel agreed to a plan of decreasing dose by 100mg per week, until discontinued - current dose is 100mg po qam and 500mg po at bedtime.  - Manuel's mom described that she noticed increased speech and distractibility in addition to " "depressed mood when he had to be hospitalized before, she will watch for these symptoms during the seroquel taper  - Manuel will monitor for disorganized thoughts during the taper    - stopped smoking a few weeks ago, he expresses concern that he will not be able to sustain not smoking  - discussed that he has quit smoking before and was successful, he also has a good system set up with his mom for her to monitor his car keys/money temporarily  - not interested in patch or other NRT    - reported difficulty concentrating, would like to consider ADHD medications again in the future  - discussed that stimulant medications could make his symptoms of psychosis and mood instability worse and that for now it would make most sense to focus on optimizing his neuroleptic and mood stabilizing medication regimen    - not currently in school because of COVID, would like to resume    RECENT PSYCH ROS:   Depression:  none  Elevated:  none  Psychosis:  none  Anxiety:  none, thinks Zyprexa is helpful for this  Trauma Related:  none  Sleep: sleeping too much  Other: N/A    Adverse Effects: see HPI  Pertinent Negative Symptoms: No suicidal ideation, psychosis, hallucinations or lauren  Recent Substance Use:     None reported     FAMILY and SOCIAL HISTORY                                 pt reported     Family Hx: Paternal grandfather: alcoholism; Maternal grandmother: \"long-standing mental health issues\"; Maternal Aunt: Anxiety when young; Maternal Aunt: Anxiety and depression     Social Hx:  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      Other - DUIs. Also In February 2014 the patient left for [place] to attend a [employer] tournament. He ended up in [place] and was arrested for shoplifting from [employer] (had \"intended to purchase\" a pair of jeans but became panicked when security approached him). Had a brief stay in longterm, and afterwards, took him 5 days to return to [place] with parents frequently wiring him money. Had difficulty adhering to travel plans, seemed confused, and was communicating with parents via \"bizarre\" text messages (parents even filed a missing persons report at one point). He finally arrived home exhausted, not sharing much of events in [place], but slightly more consistent with baseline.      PSYCHIATRIC HISTORY     SIB- None  Suicide Attempt [#, most recent]- None  Suicidal Ideation Hx- None    Violence/Aggression Hx- None  Psychosis Hx- Predominantly disorganization. Denies AVH but describes thought blocking and slowed cognition/difficulty speaking during prior psychotic episode.  First episode began in December 2014 - was spending great amounts of time reorganizing his room/house/garage, and even used a  on the kitchen floor.  Eating Disorder Hx- None  Other- None    Psych Hosp [#, most recent]- x3, most recent November 2020  Commitment- None  ECT- None  Outpatient Programs - None  Other - N/A     08/24/2020: Resident transfer visit, Olanzapine decreased from 7.5mg to 5mg  09/30/2020: Olanzapine decreased to 2.5mg then discontinued  10/08/2020: Family meeting with mom, no medication changes  11/04/2020: No medication changes, referred to [facility]  Interim; Patient hospitalized twice, please see discharge summaries for details   01/04/2021: Started Ativan 0.5mg BID PRN   01/14/2021: Increased ativan to 1mg BID PRN, discontinued Olanzapine, started " "Abilify  3/30/201: discontinued fluoxetine and aripiprazole d/t emerging lauren, restarted olanzapine 5 mg AM + 10 mg HS  04/29/21: No med changes  05/24/21: Move all medications to bedtime, increase Metformin to 500mg BID     PAST MED TRIALS     Adderall 10-20 mg - improved concentration, but \"cold personality\" and perseveration  Prozac 20 mg - limited response, less irritable, first episode of \"rage\"  Seroquel 25-75 mg - helped with sleep and irritability  Concerta 18 mg - improved focus, no improvement in motivation  Provigil 150 mg - Stimulants \"almost killed him\" triggered alcohol binges  Abilify ?25 mg - OK when QOD, but irritable and agitated on QD  Clonazepam 0.5-1.5 mg - calming, helped with sleep and \"catatonia\"  Risperdal up to 5 mg - some confusion, slow processing, withdrawn, ?\"catatonia\", panic attacks  Zyprexa 5 mg less anxious overall improvement  Lithium - calmer, overall better  Latuda 20 mg - severe fatigue and depression  Synthroid 75 mcg - added to help with mood.  Brief trials with Strattera, Intuniv, Lamictal, Xanax, Zoloft     SUBSTANCE USE HISTORY     Past Use- Alcohol- in high school  and Cannabis- in high school   Treatment- #, most recent- [facility] at age 19 for cannabis, did not complete  Medical Consequences- no  Legal Consequences- DUI for drinking at ages 20 and 21  Other- no     MEDICAL HISTORY and ALLERGY     ALLERGIES: Sulfa drugs    Patient Active Problem List   Diagnosis     Schizoaffective disorder, bipolar type (H)     Hx of psychiatric care     Elevated liver enzymes     Fatty liver     High blood triglycerides     History of cannabis abuse     History of cocaine abuse (H)     Psychosis (H)     Elevated blood pressure reading without diagnosis of hypertension     Constipation     No past medical history on file.     MEDICAL REVIEW OF SYSTEMS   Contraception- not discussed    A comprehensive review of systems was performed and is negative other than noted in the HPI.     " "MEDICATIONS     Current Outpatient Medications   Medication Sig Dispense Refill     lithium ER (LITHOBID) 300 MG CR tablet Take 4 tablets (1,200 mg) by mouth At Bedtime 120 tablet 1     metFORMIN (GLUCOPHAGE-XR) 500 MG 24 hr tablet Take 500mg twice a day for 14 days then increase to 500mg in the morning and 1000mg at bedtime 75 tablet 1     OLANZapine (ZYPREXA) 10 MG tablet Take 1 tablet (10 mg) by mouth At Bedtime 30 tablet 1     OLANZapine (ZYPREXA) 5 MG tablet Take 1 tablet (5 mg) by mouth At Bedtime 30 tablet 1     propranolol ER (INDERAL LA) 60 MG 24 hr capsule Take 1 capsule (60 mg) by mouth daily 90 capsule 3     QUEtiapine (SEROQUEL) 200 MG tablet Take 0.5 tablets (100 mg) by mouth every morning And 1 tab (100 mg) with 1 tab (400 mg) for bedtime dose of 600 mg 30 tablet 1     QUEtiapine (SEROQUEL) 400 MG tablet Take 1 tablet (400 mg) by mouth At Bedtime Along with 1 tab (200mg) for bedtime dose of 600mg 30 tablet 1     triamcinolone (KENALOG) 0.1 % external cream Apply topically 2 times daily 30 g 2      VITALS   There were no vitals taken for this visit.    MENTAL STATUS EXAM     Alertness: alert   Appearance: well groomed  Behavior/Demeanor: cooperative, with good  eye contact   Speech: slowed  Language: intact  Psychomotor: normal or unremarkable  Mood: \"good\"  Affect: blunted; congruent to: mood- yes, content- yes  Thought Process/Associations: unremarkable  Thought Content:  Reports none;  Denies suicidal ideation  Perception:  Reports none;  Denies hallucinations  Insight: adequate  Judgment: fair  Cognition: does  appear grossly intact; formal cognitive testing was not done  Gait and Station: N/A ([facility])     LABS and DATA     PHQ9 TODAY = not collected  PHQ 12/26/2019 11/4/2020 2/8/2021   PHQ-9 Total Score 3 12 10   Q9: Thoughts of better off dead/self-harm past 2 weeks Not at all Not at all Not at all       Recent Labs   Lab Test 12/04/20  1414 11/13/20  0758 03/04/20  1001   CR 1.11 1.10 1.09 "   GFRESTIMATED 89 >90 >90       Recent Labs   Lab Test 12/04/20  1414 11/13/20  0758 06/01/20  1339 05/07/19  1101   GLC 80 82  --  88   A1C  --   --  4.7 4.9     Recent Labs   Lab Test 11/13/20  0758 06/01/20  1339   CHOL 111 136   TRIG 211* 260*   LDL 20 43   HDL 49 41     Recent Labs   Lab Test 12/04/20  1414 11/13/20  0758   AST 28 26   ALT 46 39   ALKPHOS 78 71     Recent Labs   Lab Test 12/04/20  1414 11/13/20  0758 06/01/20  1339   WBC 11.0 12.9* 7.5   ANEU 8.0  --  4.8   HGB 14.3 15.1 15.1    254 265     Recent Labs   Lab Test 12/16/20  0718 12/11/20  0726 11/25/20  0751   LITHIUM 0.84 0.58* 0.79     Recent Labs   Lab Test 12/04/20  1414 11/13/20  0758   CR 1.11 1.10   GFRESTIMATED 89 >90    142   POTASSIUM 4.1 4.4   ADONAY 9.4 9.4     Recent Labs   Lab Test 11/08/19  1024 05/07/19  1101   SG 1.015 1.015     Recent Labs   Lab Test 12/04/20  1414 11/13/20  0758   TSH 2.01 1.21       ECG 11/20 QTc = 436ms     PSYCHOTROPIC DRUG INTERACTIONS     QUETIAPINE + OLANZAPINE + PROPRANOLOL Blood Pressure Lowering Agents may enhance the hypotensive effect of Antipsychotic Agents    QUETIAPINE + + OLANZAPINE + LITHIUM Lithium may enhance the neurotoxic effect of Antipsychotic Agents.    QUETIAPINE + + OLANZAPINE QT-prolonging Antipsychotics (Moderate Risk) may enhance the QTc-prolonging effect of OLANZapine    MANAGEMENT:  Monitoring for adverse effects, routine vitals, routine labs and periodic EKGs     RISK STATEMENT for SAFETY     Jose Francisco Sommers did not appear to be an imminent safety risk to self or others.    TREATMENT RISK STATEMENT: The risks, benefits, alternatives and potential adverse effects have been discussed and are understood by the pt. The pt understands the risks of using street drugs or alcohol. There are no medical contraindications, the pt agrees to treatment with the ability to do so. The pt knows to call the clinic for any problems or to access emergency care if needed.  Medical and  substance use concerns are documented above.  Psychotropic drug interaction check was done, including changes made today.    PROVIDER: Kristie Julian MD    Patient staffed with Dr. Tay who will sign the note.  Supervisor is Dr. Tay.        TELEHEALTH ATTENDING ATTESTATION  Following the ACGME guidelines on telemedicine and direct supervision due to COVID-19, I was concurrently participating in and/or monitoring the patient care through appropriate telecommunication technology.  I discussed the key portions of the service with the resident, including the mental status examination and developing the plan of care. I reviewed key portions of the history with the resident. We met via video platform with Manuel and his mother to discuss findings and treatment recommendations/treatment plan. I agree with the findings and plan as documented in this note.   Joel Tay MD

## 2021-07-12 ENCOUNTER — VIRTUAL VISIT (OUTPATIENT)
Dept: PSYCHIATRY | Facility: CLINIC | Age: 31
End: 2021-07-12
Attending: PSYCHIATRY & NEUROLOGY
Payer: COMMERCIAL

## 2021-07-12 DIAGNOSIS — F25.0 SCHIZOAFFECTIVE DISORDER, BIPOLAR TYPE (H): ICD-10-CM

## 2021-07-12 PROCEDURE — 99215 OFFICE O/P EST HI 40 MIN: CPT | Mod: 95 | Performed by: STUDENT IN AN ORGANIZED HEALTH CARE EDUCATION/TRAINING PROGRAM

## 2021-07-12 RX ORDER — OLANZAPINE 10 MG/1
10 TABLET ORAL AT BEDTIME
Qty: 30 TABLET | Refills: 1 | Status: SHIPPED | OUTPATIENT
Start: 2021-07-12 | End: 2021-09-13

## 2021-07-12 RX ORDER — QUETIAPINE FUMARATE 200 MG/1
TABLET, FILM COATED ORAL
Qty: 50 TABLET | Refills: 0 | Status: SHIPPED | OUTPATIENT
Start: 2021-07-12 | End: 2021-10-08 | Stop reason: ALTCHOICE

## 2021-07-12 RX ORDER — OLANZAPINE 5 MG/1
5 TABLET ORAL AT BEDTIME
Qty: 30 TABLET | Refills: 1 | Status: SHIPPED | OUTPATIENT
Start: 2021-07-12 | End: 2021-09-13

## 2021-07-12 ASSESSMENT — PAIN SCALES - GENERAL: PAINLEVEL: NO PAIN (0)

## 2021-07-12 NOTE — PROGRESS NOTES
"VIDEO VISIT  Jose Francisco Sommers is a 30 year old patient who is being evaluated via a billable video visit.      The patient has been notified of following:   \"This video visit will be conducted via a call between you and your physician/provider. We have found that certain health care needs can be provided without the need for an in-person physical exam. This service lets us provide the care you need with a video conversation. If a prescription is necessary we can send it directly to your pharmacy. If lab work is needed we can place an order for that and you can then stop by our lab to have the test done at a later time. Insurers are generally covering virtual visits as they would in-office visits so billing should not be different than normal.  If for some reason you do get billed incorrectly, you should contact the billing office to correct it and that number is in the AVS .    Video Conference to be completed via:  Elizabeth    Patient has given verbal consent for video visit?:  Yes    Patient would prefer that any video invitations be sent by: Send to e-mail at: [email or web address]      How would patient like to obtain AVS?:  Aj    AVS SmartPhrase [PsychAVS] has been placed in 'Patient Instructions':  Yes    "

## 2021-07-12 NOTE — PATIENT INSTRUCTIONS
Seroquel taper:  Decrease to 500mg for week 1  Decrease to 400mg for week 2  Decrease to 300mg for week 3  Decrease to 200mg for week 4    - Return to clinic after 4 weeks   - Labwork to be completed at a [place] lab before next appointment      **For crisis resources, please see the information at the end of this document**     Patient Education      Thank you for coming to the [facility] [place] [facility].    Lab Testing:  If you had lab testing today and your results are reassuring or normal they will be mailed to you or sent through [employer] within 7 days. If the lab tests need quick action we will call you with the results. The phone number we will call with results is # [phone] (home) . If this is not the best number please call our clinic and change the number.    Medication Refills:  If you need any refills please call your pharmacy and they will contact us. Our fax number for refills is [phone]. Please allow three business for refill processing. If you need to  your refill at a new pharmacy, please contact the new pharmacy directly. The new pharmacy will help you get your medications transferred.     Scheduling:  If you have any concerns about today's visit or wish to schedule another appointment please call our office during normal business hours [phone] (8-5:00 M-F)    Contact Us:  Please call [phone] during business hours (8-5:00 M-F).  If after clinic hours, or on the weekend, please call  [phone].    Financial Assistance [phone]  [facility] Billing [phone]  Central Billing Office, [facility]: [phone]  [facility] Billing [phone]  Medical Records [phone]  [facility] Patient Bill of Rights [email or web address]       MENTAL HEALTH CRISIS NUMBERS:  For a medical emergency please call  911 or go to the nearest ER.     [facility]:   [facility] -[phone]   Crisis Residence [facility] -[phone]   Walk-In Counseling Center [facility] -[phone]   COPE 24/7 [facility] Mobile Team -[phone] (adults)/[phone] (child)  CHILD: [facility] needs assessment team - [phone]      [facility]:   [facility] - [phone]   Walk-in counseling [facility] - [phone]   Walk-in counseling [facility] - [phone]   Crisis Residence [facility] - [phone]  Urgent Care Adult Mental [facility]-[phone] mobile unit/ 24/7 crisis line    National Crisis Numbers:   National Suicide Prevention Lifeline: 2-274-587-TALK (268-230-8012)  Poison Control Center - 1-515.272.1235  [employer]/resources for a list of additional resources (SOS)  Trans Lifeline a hotline for transgender people 0-270-192-7609  The Luis Project a hotline for LGBT youth 3-879-246-7239  Crisis Text Line: For any crisis 24/7   To: 729286  see www.crisistextline.org  - IF MAKING A CALL FEELS TOO HARD, send a text!         Again thank you for choosing [facility] [place] [facility] and please let us know how we can best partner with you to improve you and your family's health.    You may be receiving a survey regarding this appointment. We would love to have your feedback, both positive and negative. The survey is done by an external company, so your answers are anonymous.

## 2021-07-22 NOTE — TELEPHONE ENCOUNTER
This patient was discharged from [facility] on 12/21/20.    Discharge Diagnosis: schizoaffective disorder, bipolar type (H),    Follow-up instructions: Psychiatry Follow-up:  Appointment: Psychiatry: Dr. Jayda Ball/ Dr. Dinh Lyons : 1/4/21 at 9:30am  [facility]: [facility], 2nd Floor [address] [place] [id number]  [phone]    A follow-up visit has been scheduled with PCP    Number of ED/ER visits in the last 12 months:  2     Please follow-up with patient.    BEBA Buckner  [facility]         Ivermectin Pregnancy And Lactation Text: This medication is Pregnancy Category C and it isn't known if it is safe during pregnancy. It is also excreted in breast milk.

## 2021-07-26 ENCOUNTER — LAB (OUTPATIENT)
Dept: LAB | Facility: CLINIC | Age: 31
End: 2021-07-26
Payer: COMMERCIAL

## 2021-07-26 DIAGNOSIS — F25.0 SCHIZOAFFECTIVE DISORDER, BIPOLAR TYPE (H): ICD-10-CM

## 2021-07-26 LAB
BASOPHILS # BLD AUTO: 0.1 10E3/UL (ref 0–0.2)
BASOPHILS NFR BLD AUTO: 1 %
EOSINOPHIL # BLD AUTO: 0.4 10E3/UL (ref 0–0.7)
EOSINOPHIL NFR BLD AUTO: 5 %
ERYTHROCYTE [DISTWIDTH] IN BLOOD BY AUTOMATED COUNT: 12.5 % (ref 10–15)
HBA1C MFR BLD: 4.9 % (ref 0–5.6)
HCT VFR BLD AUTO: 44.6 % (ref 40–53)
HGB BLD-MCNC: 14.7 G/DL (ref 13.3–17.7)
IMM GRANULOCYTES # BLD: 0.1 10E3/UL
IMM GRANULOCYTES NFR BLD: 2 %
LITHIUM SERPL-SCNC: 0.6 MMOL/L
LYMPHOCYTES # BLD AUTO: 1.9 10E3/UL (ref 0.8–5.3)
LYMPHOCYTES NFR BLD AUTO: 24 %
MCH RBC QN AUTO: 29.5 PG (ref 26.5–33)
MCHC RBC AUTO-ENTMCNC: 33 G/DL (ref 31.5–36.5)
MCV RBC AUTO: 90 FL (ref 78–100)
MONOCYTES # BLD AUTO: 0.6 10E3/UL (ref 0–1.3)
MONOCYTES NFR BLD AUTO: 8 %
NEUTROPHILS # BLD AUTO: 4.8 10E3/UL (ref 1.6–8.3)
NEUTROPHILS NFR BLD AUTO: 60 %
NRBC # BLD AUTO: 0 10E3/UL
NRBC BLD AUTO-RTO: 0 /100
PLATELET # BLD AUTO: 263 10E3/UL (ref 150–450)
RBC # BLD AUTO: 4.98 10E6/UL (ref 4.4–5.9)
SP GR UR STRIP: 1.01 (ref 1–1.03)
WBC # BLD AUTO: 8 10E3/UL (ref 4–11)

## 2021-07-26 PROCEDURE — 36415 COLL VENOUS BLD VENIPUNCTURE: CPT

## 2021-07-26 PROCEDURE — 80061 LIPID PANEL: CPT

## 2021-07-26 PROCEDURE — 81003 URINALYSIS AUTO W/O SCOPE: CPT

## 2021-07-26 PROCEDURE — 80050 GENERAL HEALTH PANEL: CPT

## 2021-07-26 PROCEDURE — 83036 HEMOGLOBIN GLYCOSYLATED A1C: CPT

## 2021-07-26 PROCEDURE — 83721 ASSAY OF BLOOD LIPOPROTEIN: CPT | Mod: XU

## 2021-07-26 PROCEDURE — 80178 ASSAY OF LITHIUM: CPT

## 2021-07-27 LAB
ALBUMIN SERPL-MCNC: 4 G/DL (ref 3.4–5)
ALP SERPL-CCNC: 70 U/L (ref 40–150)
ALT SERPL W P-5'-P-CCNC: 68 U/L (ref 0–70)
ANION GAP SERPL CALCULATED.3IONS-SCNC: 9 MMOL/L (ref 3–14)
AST SERPL W P-5'-P-CCNC: 36 U/L (ref 0–45)
BILIRUB SERPL-MCNC: 0.4 MG/DL (ref 0.2–1.3)
BUN SERPL-MCNC: 15 MG/DL (ref 7–30)
CALCIUM SERPL-MCNC: 9.4 MG/DL (ref 8.5–10.1)
CHLORIDE BLD-SCNC: 107 MMOL/L (ref 94–109)
CHOLEST SERPL-MCNC: 247 MG/DL
CO2 SERPL-SCNC: 22 MMOL/L (ref 20–32)
CREAT SERPL-MCNC: 1.12 MG/DL (ref 0.66–1.25)
FASTING STATUS PATIENT QL REPORTED: NO
GFR SERPL CREATININE-BSD FRML MDRD: 88 ML/MIN/1.73M2
GLUCOSE BLD-MCNC: 85 MG/DL (ref 70–99)
HDLC SERPL-MCNC: 42 MG/DL
LDLC SERPL CALC-MCNC: 101 MG/DL
LDLC SERPL CALC-MCNC: ABNORMAL MG/DL
NONHDLC SERPL-MCNC: 205 MG/DL
POTASSIUM BLD-SCNC: 3.7 MMOL/L (ref 3.4–5.3)
PROT SERPL-MCNC: 7.2 G/DL (ref 6.8–8.8)
SODIUM SERPL-SCNC: 138 MMOL/L (ref 133–144)
TRIGL SERPL-MCNC: 554 MG/DL
TSH SERPL DL<=0.005 MIU/L-ACNC: 2.22 MU/L (ref 0.4–4)

## 2021-07-30 NOTE — PROGRESS NOTES
"VIDEO VISIT  Jose Francisco Sommers is a 30 year old patient who is being evaluated via a billable video visit.      The patient has been notified of following:   \"We have found that certain health care needs can be provided without the need for an in-person physical exam. This service lets us provide the care you need with a video conversation. If a prescription is necessary we can send it directly to your pharmacy. If lab work is needed we can place an order for that and you can then stop by our lab to have the test done at a later time. Insurers are generally covering virtual visits as they would in-office visits so billing should not be different than normal.  If for some reason you do get billed incorrectly, you should contact the billing office to correct it and that number is in the AVS .    Patient has given verbal consent for video visit?: Yes   How would you like to obtain your AVS?: [email or web address]  AVS SmartPhrase [PsychAVS] has been placed in 'Patient Instructions': Yes      Video- Visit Details  Type of service:  video visit for mental health treatment.  Time of service:    Date:  08/02/2021    Video Start Time:  9:30AM        Video End Time:  10:30AM    Reason for video visit: COVID-19  Originating Site (patient location):  Patient's home  Distant Site (provider location):  [facility]  Mode of Communication:  Video Conference via [facility]  Psychiatry Clinic  MEDICAL PROGRESS NOTE     CARE TEAM:  PCP- Clinic Provider, Psychotherapist- None    Jose Francisco Sommers is a 30 year old who prefers the name Manuel and uses pronouns he, him.      DIAGNOSIS     1) Schizoaffective disorder, Bipolar type, most recent episode manic, partial remission (improving)  2) ADHD, predominantly inattentive presentation     ASSESSMENT     TODAY: Manuel was accompanied to the visit by his mother. He reported no change in his symptoms since starting the Seroquel taper including emergency " of manic or psychotic symptoms. We will continue tapering with the goal of discontinuing in 2 weeks. Manuel is continuing to struggle with weight gain, we discussed that hopefully this would improve once Seroquel discontinued however we can also make a referral to weight management in the future. He requested to discontinue his Zyprexa because of weight gain, agreed to discuss this in the future pending Seroquel taper with the understanding that to date Zyprexa has been an extremely helpful medication and previous tapers have resulted in recurrence of his manic symptoms. Also wished to start Provigil which we discussed would be relatively safe however we would prefer to have a period of stability after Seroquel is discontinued before making this change.    Future Considerations:  - Provigil for concentration   - Lamictal  - Weight management referral    [place]MP was not checked today, no use of controlled substances     PLAN                                                                                                                1) Meds-  - Continue Lithium 1,200 mg HS  - Continue Seroquel taper, decrease by 100mg every week until discontinued  - Continue olanzapine 15 mg HS  - Discontinue Metformin 500mg BID with meals    2) Psychotherapy- none, will discuss referral at next appointment    3) Next due-  Labs- AP labs due 7/2022, elevated lipids 7/2021  EKG- as needed  Rating scales- N/A    4) Referrals-  None    5) Dispo- RTC in 6 weeks       PERTINENT BACKGROUND                           [most recent eval 07/11/21]      Jose Francisco Sommers first experienced mental health issues in grade school and has received treatment for ADHD, depression, psychosis and substance use. Notably, this patient had FEP in 2015 in the setting of cannabis abuse and has been stable on olanzapine and lithium since that time. He has never lived independently and has only been slowly developing insight into his illness and past symptoms.  GeneSight testing was completed noting patient is a poor CYP2D6 metabolizer. Past records from Dr. Mike Powers were received in summer 2018 to assist with diagnostic clarity, however they were hand written notes that were illegible though accompanied by typed neuropsychology consult from [facility]. This documentation was unable to support a diagnosis of bipolar disorder at that time though noted potential Asperger's diagnosis and ADHD. Given that there has been evidence for psychotic signs and symptoms outside the context of mood episodes, a schizophrenia spectrum disorder such as schizoaffective disorder diagnosis seems most accurate. He has had two recent hospitalizations for decompensated psychosis in the setting of decreasing his olanzapine dose.    Psych pertinent item history includes psychosis [sxs include disorganization, possible catatonia], mutiple psychotropic trials, psych hosp (<3) and SUBSTANCE USE: alcohol and cannabis     SUBJECTIVE     - overall doing well     - since tapering Seroquel has noticed improvement in breathing (Manuel reports having difficulty with breathing through his nose at night related to his medications, denies seasonal allergies/SOB/wheezing), discussed following up with PCP re: nose issues if this persists   - no change in sleep or daytime fatigue   - no recurrence of hypomanic or manic symptoms, confirmed by mom   - no symptoms of psychosis   - no SIB or suicidal ideation   - no other new or concerning physical symptoms     - continuing to have difficulty with weight gain   - discontinued Metformin 2 weeks ago, did not feel like this was helping and thought that it was contributing to difficulty breathing   - walking as able, used to go to the gym regularly but would like to make improvements in his diet before returning to this   - eating a lot of sugary snacks from the gas station, describes this as a craving   - discussed that if the craving does not improve with decreased  "Seroquel and efforts to cut back there are medications we could try to help with this     - smoking again, noticed that it helps him be more focused and able to engage in activities during the day   - would like to start Provigil as this has helped with ADHD symptoms and his mood in the past, does not remember experiencing negative side effects     - wanted to discuss starting Lamictal instead of Zyprexa, agreed to defer this conversation until other medication changes are complete     - also discussed hyperlipidemia, I let Manuel know that I sent a message to his PCP regarding this for monitoring/intervention    RECENT PSYCH ROS:   Depression:  none  Elevated:  none  Psychosis:  none  Anxiety:  none  Trauma Related:  none  Sleep: no change  Other: N/A    Adverse Effects: see HPI  Pertinent Negative Symptoms: No suicidal ideation, psychosis, hallucinations or lauren  Recent Substance Use:     Tobacco - see HPI     FAMILY and SOCIAL HISTORY                                 pt reported     Family Hx: Paternal grandfather: alcoholism; Maternal grandmother: \"long-standing mental health issues\"; Maternal Aunt: Anxiety when young; Maternal Aunt: Anxiety and depression     Social Hx:  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     Partner/ - single  Children- None      Living situation- lives with parents in [place]  Social/ Spiritual Support- family, friends, Sabianist hemalatha     Feels Safe at Home- yes   Legal- None       PSYCH and SUBSTANCE USE Critical Summary Points since July 2020 7/12/21 - started Seroquel taper  8/2/21 - continue Seroquel taper     PAST MED TRIALS     Adderall 10-20 mg - improved concentration, but \"cold personality\" and perseveration  Prozac 20 mg - limited response, less irritable, first episode of \"rage\"  Seroquel 25-75 mg - helped with sleep and irritability  Concerta 18 mg - improved focus, no improvement in motivation  Provigil 150 mg - Stimulants \"almost " "killed him\" triggered alcohol binges  Abilify ?25 mg - OK when QOD, but irritable and agitated on QD  Clonazepam 0.5-1.5 mg - calming, helped with sleep and \"catatonia\"  Risperdal up to 5 mg - some confusion, slow processing, withdrawn, ?\"catatonia\", panic attacks  Zyprexa 5 mg less anxious overall improvement  Lithium - calmer, overall better  Latuda 20 mg - severe fatigue and depression  Synthroid 75 mcg - added to help with mood.  Brief trials with Strattera, Intuniv, Lamictal, Xanax, Zoloft     SUBSTANCE USE HISTORY     Past Use- Alcohol- in high school  and Cannabis- in high school   Treatment- #, most recent- [facility] at age 19 for cannabis, did not complete  Medical Consequences- no  Legal Consequences- DUI for drinking at ages 20 and 21  Other- no     MEDICAL HISTORY and ALLERGY     ALLERGIES: Sulfa drugs    Patient Active Problem List   Diagnosis     Schizoaffective disorder, bipolar type (H)     Hx of psychiatric care     Elevated liver enzymes     Fatty liver     High blood triglycerides     History of cannabis abuse     History of cocaine abuse (H)     Psychosis (H)     Elevated blood pressure reading without diagnosis of hypertension     Constipation     No past medical history on file.     MEDICAL REVIEW OF SYSTEMS   Contraception- not discussed    A comprehensive review of systems was performed and is negative other than noted in the HPI.     MEDICATIONS     Current Outpatient Medications   Medication Sig Dispense Refill     lithium ER (LITHOBID) 300 MG CR tablet Take 4 tablets (1,200 mg) by mouth At Bedtime 120 tablet 1     metFORMIN (GLUCOPHAGE-XR) 500 MG 24 hr tablet Take 500mg twice a day for 14 days then increase to 500mg in the morning and 1000mg at bedtime 75 tablet 1     OLANZapine (ZYPREXA) 10 MG tablet Take 1 tablet (10 mg) by mouth At Bedtime 30 tablet 1     OLANZapine (ZYPREXA) 5 MG tablet Take 1 tablet (5 mg) by mouth At Bedtime 30 tablet 1     propranolol ER (INDERAL LA) 60 MG 24 hr " "capsule Take 1 capsule (60 mg) by mouth daily 90 capsule 3     QUEtiapine (SEROQUEL) 200 MG tablet Take 0.5 tablets (100 mg) by mouth every morning And 1 tab (100 mg) with 1 tab (400 mg) for bedtime dose of 600 mg 30 tablet 1     QUEtiapine (SEROQUEL) 400 MG tablet Take 1 tablet (400 mg) by mouth At Bedtime Along with 1 tab (200mg) for bedtime dose of 600mg 30 tablet 1     triamcinolone (KENALOG) 0.1 % external cream Apply topically 2 times daily 30 g 2      VITALS   There were no vitals taken for this visit.    MENTAL STATUS EXAM     Alertness: alert   Appearance: well groomed  Behavior/Demeanor: cooperative, with good eye contact   Speech: slowed  Language: intact  Psychomotor: normal or unremarkable  Mood: \"fine\"  Affect: blunted; congruent to: mood- yes, content- yes  Thought Process/Associations: unremarkable  Thought Content:  Reports none;  Denies suicidal ideation  Perception:  Reports none;  Denies hallucinations  Insight: adequate  Judgment: fair  Cognition: does  appear grossly intact; formal cognitive testing was not done  Gait and Station: N/A (telehealth)     LABS and DATA     PHQ9 TODAY = not collected  PHQ 12/26/2019 11/4/2020 2/8/2021   PHQ-9 Total Score 3 12 10   Q9: Thoughts of better off dead/self-harm past 2 weeks Not at all Not at all Not at all       Recent Labs   Lab Test 12/04/20  1414 11/13/20  0758 03/04/20  1001   CR 1.11 1.10 1.09   GFRESTIMATED 89 >90 >90       Recent Labs   Lab Test 12/04/20  1414 11/13/20  0758 06/01/20  1339 05/07/19  1101   GLC 80 82  --  88   A1C  --   --  4.7 4.9     Recent Labs   Lab Test 11/13/20  0758 06/01/20  1339   CHOL 111 136   TRIG 211* 260*   LDL 20 43   HDL 49 41     Recent Labs   Lab Test 12/04/20  1414 11/13/20  0758   AST 28 26   ALT 46 39   ALKPHOS 78 71       Recent Labs   Lab Test 07/26/21  1339 12/04/20  1414 06/01/20  1339   GLC 85 80  --    A1C 4.9  --  4.7     Recent Labs   Lab Test 07/26/21  1339 11/13/20  0758   CHOL 247* 111   TRIG 554* " 211*   * 20   HDL 42 49     Recent Labs   Lab Test 07/26/21  1339 12/04/20  1414   AST 36 28   ALT 68 46   ALKPHOS 70 78     Recent Labs   Lab Test 07/26/21  1339 12/04/20  1414 06/01/20  1339   WBC 8.0 11.0 7.5   ANEU  --  8.0 4.8   HGB 14.7 14.3 15.1    285 265     Recent Labs   Lab Test 07/26/21  1339 12/16/20  0718 12/11/20  0726   LITHIUM 0.6 0.84 0.58*     Recent Labs   Lab Test 07/26/21  1339 12/04/20  1414   CR 1.12 1.11   GFRESTIMATED 88 89    138   POTASSIUM 3.7 4.1   ADONAY 9.4 9.4     Recent Labs   Lab Test 07/26/21  1341 11/08/19  1024   SG 1.015 1.015     Recent Labs   Lab Test 07/26/21  1339 12/04/20  1414   TSH 2.22 2.01     Recent Labs   Lab Test 07/26/21  1339 12/04/20  1414 06/01/20  1339   WBC 8.0 11.0 7.5   ANEU  --  8.0 4.8       ECG 11/20 QTc = 436ms     PSYCHOTROPIC DRUG INTERACTIONS     QUETIAPINE + OLANZAPINE + PROPRANOLOL Blood Pressure Lowering Agents may enhance the hypotensive effect of Antipsychotic Agents    QUETIAPINE + + OLANZAPINE + LITHIUM Lithium may enhance the neurotoxic effect of Antipsychotic Agents.    QUETIAPINE + + OLANZAPINE QT-prolonging Antipsychotics (Moderate Risk) may enhance the QTc-prolonging effect of OLANZapine    MANAGEMENT:  Monitoring for adverse effects, routine vitals, routine labs and periodic EKGs     RISK STATEMENT for SAFETY     Jose Francisco Sommers did not appear to be an imminent safety risk to self or others.    TREATMENT RISK STATEMENT: The risks, benefits, alternatives and potential adverse effects have been discussed and are understood by the pt. The pt understands the risks of using street drugs or alcohol. There are no medical contraindications, the pt agrees to treatment with the ability to do so. The pt knows to call the clinic for any problems or to access emergency care if needed.  Medical and substance use concerns are documented above.  Psychotropic drug interaction check was done, including changes made today.    PROVIDER:  Kristie Julian MD    Patient staffed with Dr. Lyons  who will sign the note.  Supervisor is Dr. Tay.      TELEHEALTH ATTENDING ATTESTATION  Following the ACGME guidelines on telemedicine and direct supervision due to COVID-19, I was concurrently participating in and/or monitoring the patient care through appropriate telecommunication technology.  I discussed the key portions of the service with the resident, including the mental status examination and developing the plan of care. I reviewed key portions of the history with the resident. I agree with the findings and plan as documented in this note.   Dinh Lyons MD

## 2021-08-02 ENCOUNTER — VIRTUAL VISIT (OUTPATIENT)
Dept: PSYCHIATRY | Facility: CLINIC | Age: 31
End: 2021-08-02
Attending: PSYCHIATRY & NEUROLOGY
Payer: COMMERCIAL

## 2021-08-02 DIAGNOSIS — F25.0 SCHIZOAFFECTIVE DISORDER, BIPOLAR TYPE (H): Primary | ICD-10-CM

## 2021-08-02 PROCEDURE — 99214 OFFICE O/P EST MOD 30 MIN: CPT | Mod: GC | Performed by: STUDENT IN AN ORGANIZED HEALTH CARE EDUCATION/TRAINING PROGRAM

## 2021-08-02 ASSESSMENT — PAIN SCALES - GENERAL: PAINLEVEL: NO PAIN (0)

## 2021-08-02 NOTE — PATIENT INSTRUCTIONS
**For crisis resources, please see the information at the end of this document**     Patient Education      Thank you for coming to the [facility] [place] [facility].    Lab Testing:  If you had lab testing today and your results are reassuring or normal they will be mailed to you or sent through BOKU within 7 days. If the lab tests need quick action we will call you with the results. The phone number we will call with results is # [phone] (home) . If this is not the best number please call our clinic and change the number.    Medication Refills:  If you need any refills please call your pharmacy and they will contact us. Our fax number for refills is [phone]. Please allow three business for refill processing. If you need to  your refill at a new pharmacy, please contact the new pharmacy directly. The new pharmacy will help you get your medications transferred.     Scheduling:  If you have any concerns about today's visit or wish to schedule another appointment please call our office during normal business hours [phone] (8-5:00 M-F)    Contact Us:  Please call [phone] during business hours (8-5:00 M-F).  If after clinic hours, or on the weekend, please call  [phone].    Financial Assistance [phone]  [facility] Billing [phone]  Central Billing Office, [facility]: [phone]  [facility] Billing [phone]  Medical Records [phone]  [facility] Patient Bill of Rights [email or web address]       MENTAL HEALTH CRISIS NUMBERS:  For a medical emergency please call  911 or go to the nearest ER.     [facility]:   [facility] -[phone]   Crisis Residence [facility] -[phone]   Walk-In Counseling Center [facility] -[phone]   COPE 24/7 [facility] Mobile Team -[phone] (adults)/[phone] (child)  CHILD: [facility] needs assessment  team - [phone]      [facility]:   [facility] - [phone]   Walk-in counseling [facility] - [phone]   Walk-in counseling [facility] - [phone]   Crisis Residence [facility] - [phone]  Urgent Care Adult Mental Ppifrs-[phone] mobile unit/ 24/7 crisis line    National Crisis Numbers:   National Suicide Prevention Lifeline: 0-017-423-TALK (237-203-6684)  Poison Control Center - 4-639-849-4038  Jobspotting/resources for a list of additional resources (SOS)  Trans Lifeline a hotline for transgender people 1-896.481.9683  The [employer] Project a hotline for LGBT youth 1-943-765-9124  Crisis Text Line: For any crisis 24/7   To: 674306  see www.crisistextline.org  - IF MAKING A CALL FEELS TOO HARD, send a text!         Again thank you for choosing [facility] and please let us know how we can best partner with you to improve you and your family's health.    You may be receiving a survey regarding this appointment. We would love to have your feedback, both positive and negative. The survey is done by an external company, so your answers are anonymous.

## 2021-09-13 ENCOUNTER — VIRTUAL VISIT (OUTPATIENT)
Dept: PSYCHIATRY | Facility: CLINIC | Age: 31
End: 2021-09-13
Attending: PSYCHIATRY & NEUROLOGY
Payer: COMMERCIAL

## 2021-09-13 ENCOUNTER — TELEPHONE (OUTPATIENT)
Dept: PSYCHIATRY | Facility: CLINIC | Age: 31
End: 2021-09-13

## 2021-09-13 DIAGNOSIS — E78.1 HIGH BLOOD TRIGLYCERIDES: ICD-10-CM

## 2021-09-13 DIAGNOSIS — F25.0 SCHIZOAFFECTIVE DISORDER, BIPOLAR TYPE (H): ICD-10-CM

## 2021-09-13 DIAGNOSIS — K76.0 FATTY LIVER: ICD-10-CM

## 2021-09-13 DIAGNOSIS — G47.00 INSOMNIA, UNSPECIFIED TYPE: Primary | ICD-10-CM

## 2021-09-13 PROCEDURE — 99214 OFFICE O/P EST MOD 30 MIN: CPT | Mod: 95 | Performed by: STUDENT IN AN ORGANIZED HEALTH CARE EDUCATION/TRAINING PROGRAM

## 2021-09-13 RX ORDER — METFORMIN HCL 500 MG
TABLET, EXTENDED RELEASE 24 HR ORAL
Qty: 75 TABLET | Refills: 1 | Status: CANCELLED | OUTPATIENT
Start: 2021-09-13

## 2021-09-13 RX ORDER — OLANZAPINE 10 MG/1
10 TABLET ORAL AT BEDTIME
Qty: 30 TABLET | Refills: 1 | Status: SHIPPED | OUTPATIENT
Start: 2021-09-13 | End: 2021-11-18

## 2021-09-13 RX ORDER — OLANZAPINE 5 MG/1
5 TABLET ORAL AT BEDTIME
Qty: 30 TABLET | Refills: 1 | Status: SHIPPED | OUTPATIENT
Start: 2021-09-13 | End: 2021-11-18

## 2021-09-13 RX ORDER — LITHIUM CARBONATE 300 MG/1
1200 TABLET, FILM COATED, EXTENDED RELEASE ORAL AT BEDTIME
Qty: 120 TABLET | Refills: 1 | Status: SHIPPED | OUTPATIENT
Start: 2021-09-13 | End: 2021-11-18

## 2021-09-13 RX ORDER — QUETIAPINE FUMARATE 200 MG/1
TABLET, FILM COATED ORAL
Qty: 50 TABLET | Refills: 0 | Status: CANCELLED | OUTPATIENT
Start: 2021-09-13

## 2021-09-13 RX ORDER — QUETIAPINE FUMARATE 200 MG/1
100 TABLET, FILM COATED ORAL EVERY MORNING
Qty: 30 TABLET | Refills: 1 | Status: CANCELLED | OUTPATIENT
Start: 2021-09-13

## 2021-09-13 RX ORDER — QUETIAPINE FUMARATE 400 MG/1
400 TABLET, FILM COATED ORAL AT BEDTIME
Qty: 30 TABLET | Refills: 1 | Status: CANCELLED | OUTPATIENT
Start: 2021-09-13

## 2021-09-13 ASSESSMENT — PAIN SCALES - GENERAL: PAINLEVEL: NO PAIN (0)

## 2021-09-13 NOTE — TELEPHONE ENCOUNTER
On 9/13/2021, at 837, writer called patient at [phone] to confirm Virtual Visit. Writer unable to make contact with patient. Writer left detailed voice message for call back. [phone] left as call back number. Also an email for Elizabeth was sent to [email or web address].  Vijaya Jo CMA

## 2021-09-13 NOTE — TELEPHONE ENCOUNTER
Received a fax from [facility] indicating that Belsomra 5 mg tabs is not covered by insurance. From medication database, it appears that the alternative the pharmacy recommended also requires a PA - [employer]. Will verify with Dr. Julian the dose and quantity per month that patient is allowed, as well as rationale for the use of Belsomra.     Key: [id number]  Last name:   : 1990

## 2021-09-13 NOTE — TELEPHONE ENCOUNTER
PA submitted via [email or web address]    Key: [id number]    Your information has been submitted to [employer]. [employer] is reviewing the PA request and you will receive an electronic response. You may check for the updated outcome later by reopening this request. The standard fax determination will also be sent to you directly.    If you have any questions about your PA submission, contact [employer] at [phone].      Downloaded the PA submitted through [employer] and sent to scanning.

## 2021-09-13 NOTE — TELEPHONE ENCOUNTER
From: Kristie Julian MD   Sent: 9/13/2021   1:44 PM CDT   To: EFREM Varner Ill try for the 10mg tabs, we want him to start at 5mg but presumably you can split them.     He is unable to fall and stay asleep, for the last several weeks he has only been getting ~2hours a night which could worsen his bipolar symptoms. He has failed other sleep medications including trazodone, Seroquel, and gabapentin. Emili Lyons and Jasvir reported good clinical response in their experience.     Is that helpful if we need to do the PA?     Kristie Peters   ----- Message -----   From: Pam Wagner RN   Sent: 9/13/2021   1:30 PM CDT   To: Kristie Julian MD     ----- Message from Pam Wagner RN sent at 9/13/2021  1:30 PM CDT -----   Kristie Vale!     Belsomra requires a PA - can we try sending Rx for 10 mg tabs first? Or does he need 5-10 mg HS? Also, does he have a limit of 15 days per month that he can take? What is the rationale for Belsomra? Is it that he has no trouble falling asleep but needs help staying asleep? Also something about he needs an orexin blocker for appetite control?     Thanks!   VV

## 2021-09-13 NOTE — PROGRESS NOTES
"VIDEO VISIT  Jose Francisco Sommers is a 30 year old patient who is being evaluated via a billable video visit.      The patient has been notified of following:   \"This video visit will be conducted via a call between you and your physician/provider. We have found that certain health care needs can be provided without the need for an in-person physical exam. This service lets us provide the care you need with a video conversation. If a prescription is necessary we can send it directly to your pharmacy. If lab work is needed we can place an order for that and you can then stop by our lab to have the test done at a later time. Insurers are generally covering virtual visits as they would in-office visits so billing should not be different than normal.  If for some reason you do get billed incorrectly, you should contact the billing office to correct it and that number is in the AVS .    Video Conference to be completed via:  Elizabeth    Patient has given verbal consent for video visit?:  Yes    Patient would prefer that any video invitations be sent by: Send to e-mail at: [email or web address]      How would patient like to obtain AVS?:  Aj    AVS SmartPhrase [PsychAVS] has been placed in 'Patient Instructions':  Yes    "

## 2021-09-13 NOTE — PATIENT INSTRUCTIONS
- Start Belsomra 5mg for sleep  - Schedule sleep study  - Review insomnia rresources    **For crisis resources, please see the information at the end of this document**     Patient Education      Thank you for coming to the [facility] [place] [facility].    Lab Testing:  If you had lab testing today and your results are reassuring or normal they will be mailed to you or sent through [employer] within 7 days. If the lab tests need quick action we will call you with the results. The phone number we will call with results is # [phone] (home) . If this is not the best number please call our clinic and change the number.    Medication Refills:  If you need any refills please call your pharmacy and they will contact us. Our fax number for refills is [phone]. Please allow three business for refill processing. If you need to  your refill at a new pharmacy, please contact the new pharmacy directly. The new pharmacy will help you get your medications transferred.     Scheduling:  If you have any concerns about today's visit or wish to schedule another appointment please call our office during normal business hours [phone] (8-5:00 M-F)    Contact Us:  Please call [phone] during business hours (8-5:00 M-F).  If after clinic hours, or on the weekend, please call  [phone].    Financial Assistance [phone]  [facility] Billing [phone]  Central Billing Office, [facility]: [phone]  [place] Billing [phone]  Medical Records [phone]  [place] Patient Bill of Rights [email or web address]       MENTAL HEALTH CRISIS NUMBERS:  For a medical emergency please call  911 or go to the nearest ER.     [facility]:   [facility] -[phone]   Crisis Residence [facility] -[phone]   Walk-In Counseling Center [facility] -[phone]   COPE 24/7 [facility] Team  -[phone] (adults)/[phone] (child)  CH[facility]: [facility] Care needs assessment team - [phone]      [facility]:   [facility] - [phone]   Walk-in counseling [facility] - [phone]   Walk-in counseling [facility] - [phone]   Crisis Residence [facility] - [phone]  Urgent Care Adult Mental Mcqxfz-[phone] mobile unit/ 24/7 crisis line    National Crisis Numbers:   National Suicide Prevention Lifeline: 0-577-238-TALK (196-978-2100)  Poison Control Center - 1-760.950.1824  [employer]/resources for a list of additional resources (SOS)  Trans Lifeline a hotline for transgender people 0-978-541-4174  The [employer] Project a hotline for LGBT youth 0-211-100-5488  Crisis Text Line: For any crisis 24/7   To: 672130  see www.crisistextline.org  - IF MAKING A CALL FEELS TOO HARD, send a text!         Again thank you for choosing [facility] [place] [facility] and please let us know how we can best partner with you to improve you and your family's health.    You may be receiving a survey regarding this appointment. We would love to have your feedback, both positive and negative. The survey is done by an external company, so your answers are anonymous.

## 2021-09-14 NOTE — TELEPHONE ENCOUNTER
Writer called pharmacy ([phone]) and identified the approval. Pharmacist reported it was successfully billed.  Writer called pt's mother, Ember, and identified the approval.

## 2021-09-18 ENCOUNTER — HEALTH MAINTENANCE LETTER (OUTPATIENT)
Age: 31
End: 2021-09-18

## 2021-09-27 ENCOUNTER — MYC MEDICAL ADVICE (OUTPATIENT)
Dept: PSYCHIATRY | Facility: CLINIC | Age: 31
End: 2021-09-27

## 2021-09-27 DIAGNOSIS — G47.00 INSOMNIA, UNSPECIFIED TYPE: Primary | ICD-10-CM

## 2021-10-06 NOTE — PROGRESS NOTES
"VIDEO VISIT  Jose Francisco Sommers is a 30 year old patient who is being evaluated via a billable video visit.      The patient has been notified of following:   \"We have found that certain health care needs can be provided without the need for an in-person physical exam. This service lets us provide the care you need with a video conversation. If a prescription is necessary we can send it directly to your pharmacy. If lab work is needed we can place an order for that and you can then stop by our lab to have the test done at a later time. Insurers are generally covering virtual visits as they would in-office visits so billing should not be different than normal.  If for some reason you do get billed incorrectly, you should contact the billing office to correct it and that number is in the AVS .    Patient has given verbal consent for video visit?: Yes   How would you like to obtain your AVS?: Is[employer]alk  AVS SmartPhrase [PsychAVS] has been placed in 'Patient Instructions': Yes      Video- Visit Details  Type of service:  video visit for mental health treatment.  Time of service:    Date:  10/8/21    Video Start Time:  9:30AM        Video End Time:  10:00AM    Reason for video visit: COVID-19  Originating Site (patient location):  Patient's home  Distant Site (provider location):  [facility]  Mode of Communication:  Video Conference via [facility]  Psychiatry Clinic  MEDICAL PROGRESS NOTE     CARE TEAM:  PCP- Clinic Provider, Psychotherapist- None    Jose Francisco Sommers is a 30 year old who prefers the name Manuel and uses pronouns he, him.      DIAGNOSIS     1) Schizoaffective disorder, Bipolar type, most recent episode manic, partial remission (improving)  2) ADHD, predominantly inattentive presentation     ASSESSMENT     TODAY: Manuel reports sleep has improved with Belsomra. He reports that his primary concern is difficulty focusing and concentrating. He notices this " "especially when he is trying to read and believes it is interfering with his ability to plan and/or be productive during the day. Because of this lack of concentration he is feeling \"discouraged\" and has not been engaging in activities he wants to do as much. We discussed that lack of concentration can be a symptoms of worsening mood and psychosis, he was adamant that he was not experiencing a depressive episode and denied any symptoms of psychosis. His mother was present for the visit and agreed with his self-assessment.    Manuel requested to start Vyvanse which he has taken in the past for ADHD. He has trialed non-stimulant medications including Strattera and Intuniv in addition to multiple stimulants with minimal improvement. Given his past difficulties with lauren and psychosis we discussed that stimulants are higher risk medications in terms of worsening these symptoms. His mother reported he is in a safe environment and that they would feel comfortable monitoring him.    His mother also expressed interest in augmenting or trying a new antipsychotic medication that may be more effective for negative symptoms such as amisulpride or clozarail. We will discuss this further at next visit.    Future Considerations:  - Lamictal  - Weight management referral  - trial of cariprazine, ziprasidone, or depakote     [place]MP was not checked today, no use of controlled substances     PLAN                                                                                                                1) Meds-  - Continue Lithium 1,200 mg HS  - Continue olanzapine 15 mg HS  - Continue Belsomra 15mg HS   - Start Vyvanse 30mg daily (discussed w/ Dr. Lyons, will monitor closely for recurrence of psychosis or lauren)    Prescribed by PCP  - Propranolol ER 60mg qD    2) Psychotherapy- none, encouraged    3) Next due-  Labs- [facility] labs due 7/2022, elevated lipids 7/2021 (message sent to PCP for f/u)  EKG- as needed  Rating scales- N/A    4) " "Referrals-  Weight management in the future    5) Dispo- RTC in 4 weeks       PERTINENT BACKGROUND                           [most recent eval 07/11/21]      Jose Francisco Sommers first experienced mental health issues in grade school and has received treatment for ADHD, depression, psychosis and substance use. Notably, this patient had FEP in 2015 in the setting of cannabis abuse and has been stable on olanzapine and lithium since that time. He has never lived independently and has only been slowly developing insight into his illness and past symptoms. GeneSight testing was completed noting patient is a poor CYP2D6 metabolizer. Past records from Dr. Mike Powers were received in summer 2018 to assist with diagnostic clarity, however they were hand written notes that were illegible though accompanied by typed neuropsychology consult from [facility]. This documentation was unable to support a diagnosis of bipolar disorder at that time though noted potential Asperger's diagnosis and ADHD. Given that there has been evidence for psychotic signs and symptoms outside the context of mood episodes, a schizophrenia spectrum disorder such as schizoaffective disorder diagnosis seems most accurate. He has had two recent hospitalizations for decompensated psychosis in the setting of decreasing his olanzapine dose.    Psych pertinent item history includes psychosis [sxs include disorganization, possible catatonia], mutiple psychotropic trials, psych hosp (<3) and SUBSTANCE USE: alcohol and cannabis     SUBJECTIVE     - sleep medicine helping with falling, sleep feels heavier but this is not distressing   - not waking up at night   - 8-10 hours a night    - lot of trouble with focus  - getting distracted, \"serious problem thinking\"  - can barely read a book, feels like it is \"difficult,\" able to watch TV  - Provigil helps with wakefulness, overly activating, doesn't promote focus, mom thinks this helped mood  - didn't like Strattera, " "cant remember why  - Intuniv felt detached, delayed  - Vyvanse worked well  - Wellbutrin tried in past with no improvement    - mom thinks Zyprexa is making him flatten  - curious about amisulpride or clozapine    - mood \"discouraged\" because of focus  - reports that he is \"not depressed at all\"  - finding enjoyment in some things (eating), discouraged with things like reading but would still like to do them  - not motivated right now  - remembers being depressed in the hospital and thinks this is the last time he had any depressive symptoms    - no symptoms of psychosis or lauren    - discussed elevated lipids  - does have a PCP, sees in February    RECENT PSYCH ROS:   Depression:  none  Elevated:  none  Psychosis:  none  Anxiety:  none  Trauma Related:  none  Sleep: no change, see HPI  Other: N/A    Adverse Effects: increased appetite  Pertinent Negative Symptoms: No psychosis or lauren  Recent Substance Use:     Tobacco - see HPI  No alcohol or cannabis     SOCIAL HISTORY                                 pt reported     Social Hx:  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, 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     Partner/ - single  Children- None      Living situation- lives with parents in [place]  Social/ Spiritual Support- family, friends, Gnosticism hemalatha     Feels Safe at Home- yes   Legal- None       PSYCH and SUBSTANCE USE Critical Summary Points since July 2020 7/12/21 - started Seroquel taper  8/2/21 - continue Seroquel taper  9/13/21 - started Belsomra for sleep     PAST MED TRIALS     Adderall 10-20 mg - improved concentration, but \"cold personality\" and perseveration  Prozac 20 mg - limited response, less irritable, first episode of \"rage\"  Seroquel 25-75 mg - helped with sleep and irritability, discontinued d/t weight gain and daytime fatigue  Concerta 18 mg - not helpful for focus, no improvement in motivation, mom thought this helped with insight  Provigil 150 mg - Stimulants \"almost killed him\" " "triggered alcohol binges  Abilify ?25 mg - OK when QOD, but irritable and agitated on QD  Clonazepam 0.5-1.5 mg - calming, helped with sleep and \"catatonia\"  Risperdal up to 5 mg - some confusion, slow processing, withdrawn, ?\"catatonia\", panic attacks  Zyprexa 5 mg less anxious overall improvement  Lithium - calmer, overall better  Latuda 20 mg - severe fatigue and depression  Synthroid 75 mcg - added to help with mood.  Gabapentin  Trazodone - worsened mood  Seroquel - weight gain, daytime drowsiness    Brief trials with Strattera, Intuniv, Lamictal, Xanax, Zoloft     MEDICAL HISTORY and ALLERGY     ALLERGIES: Sulfa drugs    Patient Active Problem List   Diagnosis     Schizoaffective disorder, bipolar type (H)     Hx of psychiatric care     Elevated liver enzymes     Fatty liver     High blood triglycerides     History of cannabis abuse     History of cocaine abuse (H)     Psychosis (H)     Elevated blood pressure reading without diagnosis of hypertension     Constipation     No past medical history on file.     MEDICAL REVIEW OF SYSTEMS   Contraception- not discussed    A comprehensive review of systems was performed and is negative other than noted in the HPI.     MEDICATIONS     Current Outpatient Medications   Medication Sig Dispense Refill     lithium ER (LITHOBID) 300 MG CR tablet Take 4 tablets (1,200 mg) by mouth At Bedtime 120 tablet 1     metFORMIN (GLUCOPHAGE-XR) 500 MG 24 hr tablet Take 500mg twice a day for 14 days then increase to 500mg in the morning and 1000mg at bedtime 75 tablet 1     OLANZapine (ZYPREXA) 10 MG tablet Take 1 tablet (10 mg) by mouth At Bedtime 30 tablet 1     OLANZapine (ZYPREXA) 5 MG tablet Take 1 tablet (5 mg) by mouth At Bedtime 30 tablet 1     propranolol ER (INDERAL LA) 60 MG 24 hr capsule Take 1 capsule (60 mg) by mouth daily 90 capsule 3     QUEtiapine (SEROQUEL) 200 MG tablet Take 0.5 tablets (100 mg) by mouth every morning And 1 tab (100 mg) with 1 tab (400 mg) for " "bedtime dose of 600 mg 30 tablet 1     QUEtiapine (SEROQUEL) 400 MG tablet Take 1 tablet (400 mg) by mouth At Bedtime Along with 1 tab (200mg) for bedtime dose of 600mg 30 tablet 1     triamcinolone (KENALOG) 0.1 % external cream Apply topically 2 times daily 30 g 2      VITALS   There were no vitals taken for this visit.    MENTAL STATUS EXAM     Alertness: alert   Appearance: appropriately groomed  Behavior/Demeanor: cooperative, with good eye contact   Speech: regular  Language: intact  Psychomotor: normal or unremarkable  Mood: \"discouraged\"  Affect: blunted; congruent to: mood- yes, content- yes  Thought Process/Associations: unremarkable  Thought Content:  Reports none;    Perception:  Reports none;  Denies hallucinations  Insight: adequate  Judgment: fair  Cognition: does  appear grossly intact; formal cognitive testing was not done  Gait and Station: N/A ([facility])     LABS and DATA     PHQ9 TODAY = not collected  PHQ 12/26/2019 11/4/2020 2/8/2021   PHQ-9 Total Score 3 12 10   Q9: Thoughts of better off dead/self-harm past 2 weeks Not at all Not at all Not at all       Recent Labs   Lab Test 12/04/20  1414 11/13/20  0758 03/04/20  1001   CR 1.11 1.10 1.09   GFRESTIMATED 89 >90 >90       Recent Labs   Lab Test 12/04/20  1414 11/13/20  0758 06/01/20  1339 05/07/19  1101   GLC 80 82  --  88   A1C  --   --  4.7 4.9     Recent Labs   Lab Test 11/13/20  0758 06/01/20  1339   CHOL 111 136   TRIG 211* 260*   LDL 20 43   HDL 49 41     Recent Labs   Lab Test 12/04/20  1414 11/13/20  0758   AST 28 26   ALT 46 39   ALKPHOS 78 71       Recent Labs   Lab Test 07/26/21  1339 12/04/20  1414 11/13/20  0758 06/01/20  1339   GLC 85 80   < >  --    A1C 4.9  --   --  4.7    < > = values in this interval not displayed.     Recent Labs   Lab Test 07/26/21  1339 11/13/20  0758   CHOL 247* 111   TRIG 554* 211*   * 20   HDL 42 49     Recent Labs   Lab Test 07/26/21  1339 12/04/20  1414   AST 36 28   ALT 68 46   ALKPHOS 70 78 "     Recent Labs   Lab Test 07/26/21  1339 12/04/20  1414 11/13/20  0758 06/01/20  1339   WBC 8.0 11.0   < > 7.5   ANEU  --  8.0  --  4.8   HGB 14.7 14.3   < > 15.1    285   < > 265    < > = values in this interval not displayed.     Recent Labs   Lab Test 07/26/21  1339 12/16/20  0718 12/11/20  0726   LITHIUM 0.6 0.84 0.58*     Recent Labs   Lab Test 07/26/21  1339 12/04/20  1414   CR 1.12 1.11   GFRESTIMATED 88 89    138   POTASSIUM 3.7 4.1   ADONAY 9.4 9.4     Recent Labs   Lab Test 07/26/21  1341 11/08/19  1024   SG 1.015 1.015     Recent Labs   Lab Test 07/26/21  1339 12/04/20  1414   TSH 2.22 2.01     Recent Labs   Lab Test 07/26/21  1339 12/04/20  1414 11/13/20  0758 06/01/20  1339   WBC 8.0 11.0   < > 7.5   ANEU  --  8.0  --  4.8    < > = values in this interval not displayed.       ECG 11/20 QTc = 436ms     PSYCHOTROPIC DRUG INTERACTIONS     OLANZAPINE + LITHIUM + BELSOMRA: CNS depression    OLANZAPINE + LITHIUM Lithium may enhance the neurotoxic effect of Antipsychotic Agents.    OLANZAPINE: QT-prolonging     MANAGEMENT:  Monitoring for adverse effects, routine vitals, routine labs and periodic EKGs     RISK STATEMENT for SAFETY     Jose Francisco Sommers did not appear to be an imminent safety risk to self or others.    TREATMENT RISK STATEMENT: The risks, benefits, alternatives and potential adverse effects have been discussed and are understood by the pt. The pt understands the risks of using street drugs or alcohol. There are no medical contraindications, the pt agrees to treatment with the ability to do so. The pt knows to call the clinic for any problems or to access emergency care if needed.  Medical and substance use concerns are documented above.  Psychotropic drug interaction check was done, including changes made today.    PROVIDER: Kristie Julian MD    Patient staffed with Dr. Tay who will sign the note.  Supervisor is Dr. Tay.        TELEHEALTH ATTENDING ATTESTATION  Following  the ACGME guidelines on telemedicine and direct supervision due to COVID-19, I was concurrently participating in and/or monitoring the patient care through appropriate telecommunication technology.  I discussed the key portions of the service with the resident, including the mental status examination and developing the plan of care. I reviewed key portions of the history with the resident. I agree with the findings and plan as documented in this note.   Joel Tay MD

## 2021-10-08 ENCOUNTER — VIRTUAL VISIT (OUTPATIENT)
Dept: PSYCHIATRY | Facility: CLINIC | Age: 31
End: 2021-10-08
Attending: PSYCHIATRY & NEUROLOGY
Payer: COMMERCIAL

## 2021-10-08 ENCOUNTER — TELEPHONE (OUTPATIENT)
Dept: PSYCHIATRY | Facility: CLINIC | Age: 31
End: 2021-10-08

## 2021-10-08 DIAGNOSIS — F25.0 SCHIZOAFFECTIVE DISORDER, BIPOLAR TYPE (H): Primary | ICD-10-CM

## 2021-10-08 DIAGNOSIS — F90.9 ATTENTION DEFICIT HYPERACTIVITY DISORDER (ADHD), UNSPECIFIED ADHD TYPE: ICD-10-CM

## 2021-10-08 PROCEDURE — 99215 OFFICE O/P EST HI 40 MIN: CPT | Mod: 95 | Performed by: STUDENT IN AN ORGANIZED HEALTH CARE EDUCATION/TRAINING PROGRAM

## 2021-10-08 NOTE — TELEPHONE ENCOUNTER
On October 8, 2021, at 8:32 AM, writer called patient at mobile to confirm Virtual Visit. Writer unable to make contact with patient. Writer left detailed voice message for call back. [phone] left as call back number. Rizwan Vences, EMT

## 2021-10-13 DIAGNOSIS — F90.9 ATTENTION DEFICIT HYPERACTIVITY DISORDER (ADHD), UNSPECIFIED ADHD TYPE: Primary | ICD-10-CM

## 2021-10-13 RX ORDER — LISDEXAMFETAMINE DIMESYLATE 30 MG/1
30 CAPSULE ORAL EVERY MORNING
Qty: 30 CAPSULE | Refills: 0 | Status: SHIPPED | OUTPATIENT
Start: 2021-10-13 | End: 2021-11-26

## 2021-10-16 DIAGNOSIS — G47.00 INSOMNIA, UNSPECIFIED TYPE: ICD-10-CM

## 2021-10-18 DIAGNOSIS — G47.00 INSOMNIA, UNSPECIFIED TYPE: ICD-10-CM

## 2021-10-19 RX ORDER — SUVOREXANT 10 MG/1
TABLET, FILM COATED ORAL
Qty: 30 TABLET | Refills: 0 | OUTPATIENT
Start: 2021-10-19

## 2021-10-19 NOTE — TELEPHONE ENCOUNTER
Last Seen 10/8/21  RTC 4 Weeks  Cancel None  No-Show None    Next Appt 11/5/21    Incoming Refill From [facility] [id number] via fax    Medication Requested Suvorexant (BELSOMRA) 5 MG tablet    Directions   Take 1 tablet (5 mg) by mouth nightly as needed for sleep Together with one 10 mg tablet for a total dose of 15 mg    Qty 30    Last Refill 9/28/21      Message routed to BRADY Orozco         
Per [place] , medication was last filled on 10/2/21 (5 mg) and 9/17/21 (10 mg).    Reviewed pt's chart. Appears pt needs a fill of both 5 mg and 10 mg tabs. Will send prescriptions to provider for approval  
0

## 2021-11-03 ENCOUNTER — TELEPHONE (OUTPATIENT)
Dept: PSYCHIATRY | Facility: CLINIC | Age: 31
End: 2021-11-03

## 2021-11-03 ENCOUNTER — VIRTUAL VISIT (OUTPATIENT)
Dept: PSYCHIATRY | Facility: CLINIC | Age: 31
End: 2021-11-03
Attending: SOCIAL WORKER
Payer: COMMERCIAL

## 2021-11-03 DIAGNOSIS — F25.0 SCHIZOAFFECTIVE DISORDER, BIPOLAR TYPE (H): Primary | ICD-10-CM

## 2021-11-03 PROCEDURE — 90853 GROUP PSYCHOTHERAPY: CPT | Mod: 95 | Performed by: SOCIAL WORKER

## 2021-11-04 ENCOUNTER — TELEPHONE (OUTPATIENT)
Dept: PSYCHIATRY | Facility: CLINIC | Age: 31
End: 2021-11-04
Payer: COMMERCIAL

## 2021-11-04 NOTE — TELEPHONE ENCOUNTER
This writer called patient's home number at [phone] on 11/3, 9:24am, to confirm patient's attendance in support group beginning 11/3. Patient's mother answered and confirmed that patient plans to attend, and will access the video link through [facility].

## 2021-11-05 ENCOUNTER — VIRTUAL VISIT (OUTPATIENT)
Dept: PSYCHIATRY | Facility: CLINIC | Age: 31
End: 2021-11-05
Attending: PSYCHIATRY & NEUROLOGY
Payer: COMMERCIAL

## 2021-11-05 DIAGNOSIS — F90.9 ATTENTION DEFICIT HYPERACTIVITY DISORDER (ADHD), UNSPECIFIED ADHD TYPE: Primary | ICD-10-CM

## 2021-11-05 PROCEDURE — 99214 OFFICE O/P EST MOD 30 MIN: CPT | Mod: 95 | Performed by: STUDENT IN AN ORGANIZED HEALTH CARE EDUCATION/TRAINING PROGRAM

## 2021-11-05 RX ORDER — DEXTROAMPHETAMINE SACCHARATE, AMPHETAMINE ASPARTATE, DEXTROAMPHETAMINE SULFATE AND AMPHETAMINE SULFATE 2.5; 2.5; 2.5; 2.5 MG/1; MG/1; MG/1; MG/1
TABLET ORAL
Qty: 60 TABLET | Refills: 0 | Status: SHIPPED | OUTPATIENT
Start: 2021-11-05 | End: 2022-01-12

## 2021-11-05 ASSESSMENT — PAIN SCALES - GENERAL: PAINLEVEL: NO PAIN (0)

## 2021-11-05 NOTE — PROGRESS NOTES
"   [facility]  Psychiatry Clinic  First Episode of Psychosis - [facility]  Mood Disorders Education and Support Group     Patient: Jose Francisco Sommers (1990)     MRN: [id number]  Date:  11/03/21  Diagnosis: (F25.0) Schizoaffective disorder, bipolar type (H)  (primary encounter diagnosis)       Video- Visit Details  Type of service:  video visit for Group Therapy  Time of service:    Date:  11/03/21    Video Start Time:  3:30 PM       Video End Time:  4:30 PM    Reason for video visit:  Patient unable to travel due to Covid-19  Originating Site (patient location):  [facility]   Location- Patient's home  Distant Site (provider location):  Remote location  Mode of Communication:  Video Conference via Zoom  Consent:  Patient has given verbal consent for video visit?: Yes     Number of Participants: 5  Group Time: 60 minutes  Facilitators: FILOMENA Ferrera; ERICKA Beaulieu/MPH candidate; ERICKA Guzmán candidate;       The mood disorders education and support group is based on the [employer]o Model that uses psychoeducation to inform participants about their illness, course of treatment, and how they can improve their own wellness.        Diagnostic criteria for group participation: Bipolar I Disorder, Bipolar II Disorder, Schizoaffective Disorder      Group Topic for Today: Group orientation and the role of hope in recovery      Description: Active Participant  Mr. Sommers was present in group today.  The patient actively participated in group discussion  Manuel's emotional presentation was attentive and engaged. Manuel's mood was \"good\", and his affect was Appropriate/mood-congruent.    Plan: Continue to engage in mood disorders education group weekly on Wednesdays at 3pm.  Angelita Lopes      Attestation:  I have reviewed this note, and was present during the entirety of this visit.  I agree with the plan as documented.  ERICKA Ferrera, FILOMENA,  November 9, 2021          "

## 2021-11-05 NOTE — PATIENT INSTRUCTIONS
**For crisis resources, please see the information at the end of this document**     Patient Education      Thank you for coming to the [facility] [place] [facility].    Lab Testing:  If you had lab testing today and your results are reassuring or normal they will be mailed to you or sent through Ze[employer] within 7 days. If the lab tests need quick action we will call you with the results. The phone number we will call with results is # [phone] (home) . If this is not the best number please call our clinic and change the number.    Medication Refills:  If you need any refills please call your pharmacy and they will contact us. Our fax number for refills is [phone]. Please allow three business for refill processing. If you need to  your refill at a new pharmacy, please contact the new pharmacy directly. The new pharmacy will help you get your medications transferred.     Scheduling:  If you have any concerns about today's visit or wish to schedule another appointment please call our office during normal business hours [phone] (8-5:00 M-F)    Contact Us:  Please call [phone] during business hours (8-5:00 M-F).  If after clinic hours, or on the weekend, please call  [phone].    Financial Assistance [phone]  [facility] Billing [phone]  Central Billing Office, [facility]: [phone]  [facility] Billing [phone]  Medical Records [phone]  [facility] Patient Bill of Rights [email or web address]       MENTAL HEALTH CRISIS NUMBERS:  For a medical emergency please call  911 or go to the nearest ER.     [facility]:   [facility] -[phone]   Crisis Residence [facility] -[phone]   Walk-In Counseling Center [facility] -[phone]   COPE 24/7 [facility] Mobile Team -[phone] (adults)/[phone] (child)  CHILD: [facility] needs assessment  team - [phone]      [facility]:   [facility] - [phone]   Walk-in counseling [facility] - [phone]   Walk-in counseling [facility] - [phone]   Crisis Residence [facility] - [phone]  Urgent Care Adult Mental [facility]-[phone] mobile unit/ 24/7 crisis line    National Crisis Numbers:   National Suicide Prevention Lifeline: 8-830-723-TALK (789-882-9206)  Poison Control Center - 7-939-488-4709  [employer]/resources for a list of additional resources (SOS)  Trans Lifeline a hotline for transgender people 1-266.292.5653  The [employer] Project a hotline for LGBT youth 0-504-065-4917  Crisis Text Line: For any crisis 24/7   To: 835086  see www.crisistextline.org  - IF MAKING A CALL FEELS TOO HARD, send a text!         Again thank you for choosing [facility] M[facility] & [facility]DI[facility] and please let us know how we can best partner with you to improve you and your family's health.    You may be receiving a survey regarding this appointment. We would love to have your feedback, both positive and negative. The survey is done by an external company, so your answers are anonymous.

## 2021-11-05 NOTE — PROGRESS NOTES
"VIDEO VISIT  Jose Francisco Sommers is a 30 year old patient who is being evaluated via a billable video visit.      The patient has been notified of following:   \"We have found that certain health care needs can be provided without the need for an in-person physical exam. This service lets us provide the care you need with a video conversation. If a prescription is necessary we can send it directly to your pharmacy. If lab work is needed we can place an order for that and you can then stop by our lab to have the test done at a later time. Insurers are generally covering virtual visits as they would in-office visits so billing should not be different than normal.  If for some reason you do get billed incorrectly, you should contact the billing office to correct it and that number is in the AVS .    Patient has given verbal consent for video visit?: Yes   How would you like to obtain your AVS?: Beddit  AVS SmartPhrase [PsychAVS] has been placed in 'Patient Instructions': Yes      Video- Visit Details  Type of service:  video visit for mental health treatment.  Time of service:    Date:  11/05/2021    Video Start Time:  9:30AM        Video End Time:  10:00AM    Reason for video visit: COVID-19  Originating Site (patient location):  Patient's home  Distant Site (provider location):  [facility]  Mode of Communication:  Video Conference via [facility]  Psychiatry Clinic  MEDICAL PROGRESS NOTE     CARE TEAM:  PCP- Clinic Provider, Psychotherapist- None    Jose Francisco Sommers is a 30 year old who prefers the name Manuel and uses pronouns he, him.      DIAGNOSIS     1) Schizoaffective disorder, Bipolar type, most recent episode manic, partial remission (improving)  2) ADHD, predominantly inattentive presentation     ASSESSMENT     TODAY: Manuel reports since starting Vyvanse he has had notable improvement in his ability to think clearly and focus to the point that he is now able " to read for leisure again. His mother also agreed that the positive changes were notable. He denied any symptoms concerning for psychosis or lauren, unfortunately he is having substantial difficulty maintaining a regular sleep schedule and is currently going to bed around 2-4am and waking up at 2-4pm. Maintaining a regular sleep schedule has been a chronic difficulty for Manuel but it is likely the Vyvanse is making this worse. We will try a shorter-acting stimulant to see if we can normalize his sleep schedule.     Future Considerations:  - Lamictal  - Weight management referral  - trial of cariprazine, ziprasidone, or depakote     [place]MP was not checked today, no use of controlled substances     PLAN                                                                                                                1) Meds-  - Continue Lithium 1,200 mg HS  - Continue olanzapine 15 mg HS  - Continue Belsomra 15mg HS   - Discontinue Vyvanse 30mg daily  - Start Adderall IR 20mg daily, can decrease to 10mg daily if still having difficulties with sleep    Prescribed by PCP  - Propranolol ER 60mg qD    2) Psychotherapy- none, encouraged    3) Next due-  Labs- AP labs due 7/2022, elevated lipids 7/2021 (message sent to PCP for f/u)  EKG- as needed  Rating scales- N/A    4) Referrals-  Weight management in the future  Sleep management     5) Dispo- RTC in 4 weeks       PERTINENT BACKGROUND                           [most recent eval 07/11/21]      Jose Francisco Sommers first experienced mental health issues in grade school and has received treatment for ADHD, depression, psychosis and substance use. Notably, this patient had FEP in 2015 in the setting of cannabis abuse and has been stable on olanzapine and lithium since that time. He has never lived independently and has only been slowly developing insight into his illness and past symptoms. [employer] testing was completed noting patient is a poor CYP2D6 metabolizer. Past records from   "[facility] were received in summer 2018 to assist with diagnostic clarity, however they were hand written notes that were illegible though accompanied by typed neuropsychology consult from [facility]. This documentation was unable to support a diagnosis of bipolar disorder at that time though noted potential Asperger's diagnosis and ADHD. Given that there has been evidence for psychotic signs and symptoms outside the context of mood episodes, a schizophrenia spectrum disorder such as schizoaffective disorder diagnosis seems most accurate. He has had two recent hospitalizations for decompensated psychosis in the setting of decreasing his olanzapine dose.    Psych pertinent item history includes psychosis [sxs include disorganization, possible catatonia], mutiple psychotropic trials, psych hosp (<3) and SUBSTANCE USE: alcohol and cannabis     SUBJECTIVE     - Bellsomra \"not working as well\"  - sleep is an issue   - Vyvanse keeping him up   - fall asleep 2-4am, wake up 2-4pm   - occaisonally takes Vyvanse earlier in the day but usually in the afternoon   - sleep ongoing issue, very signifigant in high school, gotten worse per mom as he has gotten older    - mom noticed a \"huge improvement\" since starting Vyvanse   - Manuel reported that he \"organized a lot of stuff\" and that reading is a lot easier  - feels he is \"getting out of [his] funk\"  - thinking more clear overall    - no symptoms of psychosis or lauren    RECENT PSYCH ROS:   Depression:  none  Elevated:  none  Psychosis:  none  Anxiety:  none  Trauma Related:  none  Sleep: no change, see HPI  Other: N/A    Adverse Effects: increased appetite  Pertinent Negative Symptoms: No psychosis or lauren  Recent Substance Use:     Tobacco - see HPI  No alcohol or cannabis     SOCIAL HISTORY                                 pt reported     Social Hx:  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     Partner/ - single  Children- None      Living " "situation- lives with parents in [place]  Social/ Spiritual Support- family, friends, [employer] hemalatha     Feels Safe at Home- yes   Legal- None       PSYCH and SUBSTANCE USE Critical Summary Points since July 2020 7/12/21 - started Seroquel taper  8/2/21 - continue Seroquel taper  9/13/21 - started Belsomra for sleep     PAST MED TRIALS     Adderall 10-20 mg - improved concentration, but \"cold personality\" and perseveration  Prozac 20 mg - limited response, less irritable, first episode of \"rage\"  Seroquel 25-75 mg - helped with sleep and irritability, discontinued d/t weight gain and daytime fatigue  Concerta 18 mg - not helpful for focus, no improvement in motivation, mom thought this helped with insight  Provigil 150 mg - Stimulants \"almost killed him\" triggered alcohol binges  Abilify ?25 mg - OK when QOD, but irritable and agitated on QD  Clonazepam 0.5-1.5 mg - calming, helped with sleep and \"catatonia\"  Risperdal up to 5 mg - some confusion, slow processing, withdrawn, ?\"catatonia\", panic attacks  Zyprexa 5 mg less anxious overall improvement  Lithium - calmer, overall better  Latuda 20 mg - severe fatigue and depression  Synthroid 75 mcg - added to help with mood.  Gabapentin  Trazodone - worsened mood  Seroquel - weight gain, daytime drowsiness    Brief trials with Strattera, Intuniv, Lamictal, Xanax, Zoloft     MEDICAL HISTORY and ALLERGY     ALLERGIES: Sulfa drugs    Patient Active Problem List   Diagnosis     Schizoaffective disorder, bipolar type (H)     Hx of psychiatric care     Elevated liver enzymes     Fatty liver     High blood triglycerides     History of cannabis abuse     History of cocaine abuse (H)     Psychosis (H)     Elevated blood pressure reading without diagnosis of hypertension     Constipation     No past medical history on file.     MEDICAL REVIEW OF SYSTEMS   Contraception- not discussed    A comprehensive review of systems was performed and is negative other than noted " in the HPI.     MEDICATIONS     Current Outpatient Medications   Medication Sig Dispense Refill     lithium ER (LITHOBID) 300 MG CR tablet Take 4 tablets (1,200 mg) by mouth At Bedtime 120 tablet 1     metFORMIN (GLUCOPHAGE-XR) 500 MG 24 hr tablet Take 500mg twice a day for 14 days then increase to 500mg in the morning and 1000mg at bedtime 75 tablet 1     OLANZapine (ZYPREXA) 10 MG tablet Take 1 tablet (10 mg) by mouth At Bedtime 30 tablet 1     OLANZapine (ZYPREXA) 5 MG tablet Take 1 tablet (5 mg) by mouth At Bedtime 30 tablet 1     propranolol ER (INDERAL LA) 60 MG 24 hr capsule Take 1 capsule (60 mg) by mouth daily 90 capsule 3     QUEtiapine (SEROQUEL) 200 MG tablet Take 0.5 tablets (100 mg) by mouth every morning And 1 tab (100 mg) with 1 tab (400 mg) for bedtime dose of 600 mg 30 tablet 1     QUEtiapine (SEROQUEL) 400 MG tablet Take 1 tablet (400 mg) by mouth At Bedtime Along with 1 tab (200mg) for bedtime dose of 600mg 30 tablet 1     triamcinolone (KENALOG) 0.1 % external cream Apply topically 2 times daily 30 g 2      VITALS   There were no vitals taken for this visit.    MENTAL STATUS EXAM     Alertness: alert   Appearance: appropriately groomed  Behavior/Demeanor: cooperative, with good eye contact   Speech: regular  Language: intact  Psychomotor: normal or unremarkable  Mood: description consistent with euthymia  Affect: restricted, more bright than previous; congruent to: mood- yes, content- yes  Thought Process/Associations: unremarkable  Thought Content:  Reports none;    Perception:  Reports none;  Denies hallucinations  Insight: adequate to good  Judgment: good  Cognition: does  appear grossly intact; formal cognitive testing was not done  Gait and Station: N/A (telehealth)     LABS and DATA     PHQ9 TODAY = not collected  PHQ 12/26/2019 11/4/2020 2/8/2021   PHQ-9 Total Score 3 12 10   Q9: Thoughts of better off dead/self-harm past 2 weeks Not at all Not at all Not at all       Recent Labs   Lab  Test 12/04/20  1414 11/13/20  0758 03/04/20  1001   CR 1.11 1.10 1.09   GFRESTIMATED 89 >90 >90       Recent Labs   Lab Test 12/04/20  1414 11/13/20  0758 06/01/20  1339 05/07/19  1101   GLC 80 82  --  88   A1C  --   --  4.7 4.9     Recent Labs   Lab Test 11/13/20  0758 06/01/20  1339   CHOL 111 136   TRIG 211* 260*   LDL 20 43   HDL 49 41     Recent Labs   Lab Test 12/04/20  1414 11/13/20  0758   AST 28 26   ALT 46 39   ALKPHOS 78 71       Recent Labs   Lab Test 07/26/21  1339 12/04/20  1414 11/13/20  0758 06/01/20  1339   GLC 85 80   < >  --    A1C 4.9  --   --  4.7    < > = values in this interval not displayed.     Recent Labs   Lab Test 07/26/21  1339 11/13/20  0758   CHOL 247* 111   TRIG 554* 211*   * 20   HDL 42 49     Recent Labs   Lab Test 07/26/21  1339 12/04/20  1414   AST 36 28   ALT 68 46   ALKPHOS 70 78     Recent Labs   Lab Test 07/26/21  1339 12/04/20  1414 11/13/20  0758 06/01/20  1339   WBC 8.0 11.0   < > 7.5   ANEU  --  8.0  --  4.8   HGB 14.7 14.3   < > 15.1    285   < > 265    < > = values in this interval not displayed.     Recent Labs   Lab Test 07/26/21  1339 12/16/20  0718 12/11/20  0726   LITHIUM 0.6 0.84 0.58*     Recent Labs   Lab Test 07/26/21  1339 12/04/20  1414   CR 1.12 1.11   GFRESTIMATED 88 89    138   POTASSIUM 3.7 4.1   ADONAY 9.4 9.4     Recent Labs   Lab Test 07/26/21  1341 11/08/19  1024   SG 1.015 1.015     Recent Labs   Lab Test 07/26/21  1339 12/04/20  1414   TSH 2.22 2.01     Recent Labs   Lab Test 07/26/21  1339 12/04/20  1414 11/13/20  0758 06/01/20  1339   WBC 8.0 11.0   < > 7.5   ANEU  --  8.0  --  4.8    < > = values in this interval not displayed.       ECG 11/20 QTc = 436ms     PSYCHOTROPIC DRUG INTERACTIONS     OLANZAPINE + LITHIUM + BELSOMRA: CNS depression    OLANZAPINE + LITHIUM Lithium may enhance the neurotoxic effect of Antipsychotic Agents.    OLANZAPINE: QT-prolonging     MANAGEMENT:  Monitoring for adverse effects, routine vitals, routine  labs and periodic EKGs     RISK STATEMENT for SAFETY     Jose Francisco Sommers did not appear to be an imminent safety risk to self or others.    TREATMENT RISK STATEMENT: The risks, benefits, alternatives and potential adverse effects have been discussed and are understood by the pt. The pt understands the risks of using street drugs or alcohol. There are no medical contraindications, the pt agrees to treatment with the ability to do so. The pt knows to call the clinic for any problems or to access emergency care if needed.  Medical and substance use concerns are documented above.  Psychotropic drug interaction check was done, including changes made today.    PROVIDER: Kristie Julian MD    Patient staffed with Dr. Lyons who will sign the note.  Supervisor is Dr. Tay.     TELEHEALTH ATTENDING ATTESTATION  Following the ACGME guidelines on telemedicine and direct supervision due to COVID-19, I was concurrently participating in and/or monitoring the patient care through appropriate telecommunication technology.  I discussed the key portions of the service with the resident, including the mental status examination and developing the plan of care. I reviewed key portions of the history with the resident. I agree with the findings and plan as documented in this note.   Dinh Lyons MD

## 2021-11-05 NOTE — PROGRESS NOTES
"VIDEO VISIT  Jose Francisco Sommers is a 31 year old patient that has consented to receive services via billable video visit.      The patient has been notified of following:   \"This video visit will be conducted via a call between you and your physician/provider. We have found that certain health care needs can be provided without the need for an in-person physical exam. This service lets us provide the care you need with a video conversation. If a prescription is necessary we can send it directly to your pharmacy. If lab work is needed we can place an order for that and you can then stop by our lab to have the test done at a later time. Insurers are generally covering virtual visits as they would in-office visits so billing should not be different than normal.  If for some reason you do get billed incorrectly, you should contact the billing office to correct it and that number is in the AVS .    Patient will join video visit via:  FlameStower (Patient / guardian confirmed to join via FlameStower)    If patient attempts to join the video via FlameStower at appointment start time, but is unable to, they would prefer that the provider send them a video invitation via:   Send to preferred e-mail: [email or web address]      How would patient like to obtain AVS?:  MyChart    "

## 2021-11-10 ENCOUNTER — VIRTUAL VISIT (OUTPATIENT)
Dept: PSYCHIATRY | Facility: CLINIC | Age: 31
End: 2021-11-10
Attending: SOCIAL WORKER
Payer: COMMERCIAL

## 2021-11-10 ENCOUNTER — BEH TREATMENT PLAN (OUTPATIENT)
Dept: PSYCHIATRY | Facility: CLINIC | Age: 31
End: 2021-11-10
Payer: COMMERCIAL

## 2021-11-10 DIAGNOSIS — F25.0 SCHIZOAFFECTIVE DISORDER, BIPOLAR TYPE (H): Primary | ICD-10-CM

## 2021-11-10 PROCEDURE — 90853 GROUP PSYCHOTHERAPY: CPT | Mod: 95 | Performed by: SOCIAL WORKER

## 2021-11-17 ENCOUNTER — VIRTUAL VISIT (OUTPATIENT)
Dept: PSYCHIATRY | Facility: CLINIC | Age: 31
End: 2021-11-17
Attending: SOCIAL WORKER
Payer: COMMERCIAL

## 2021-11-17 DIAGNOSIS — F25.0 SCHIZOAFFECTIVE DISORDER, BIPOLAR TYPE (H): Primary | ICD-10-CM

## 2021-11-17 DIAGNOSIS — F25.0 SCHIZOAFFECTIVE DISORDER, BIPOLAR TYPE (H): ICD-10-CM

## 2021-11-17 PROCEDURE — 90853 GROUP PSYCHOTHERAPY: CPT | Mod: U7

## 2021-11-18 RX ORDER — LITHIUM CARBONATE 300 MG/1
1200 TABLET, FILM COATED, EXTENDED RELEASE ORAL AT BEDTIME
Qty: 120 TABLET | Refills: 0 | Status: SHIPPED | OUTPATIENT
Start: 2021-11-18 | End: 2021-12-27

## 2021-11-18 RX ORDER — OLANZAPINE 10 MG/1
10 TABLET ORAL AT BEDTIME
Qty: 30 TABLET | Refills: 0 | Status: SHIPPED | OUTPATIENT
Start: 2021-11-18 | End: 2022-01-03

## 2021-11-18 RX ORDER — OLANZAPINE 5 MG/1
5 TABLET ORAL AT BEDTIME
Qty: 30 TABLET | Refills: 0 | Status: SHIPPED | OUTPATIENT
Start: 2021-11-18 | End: 2022-01-03

## 2021-11-18 NOTE — TELEPHONE ENCOUNTER
Medication requested: Lithium Carbonate ER Oral Tablet Extended Release 300 MG   Last refilled: 9/13/21  Qty: 120/1  OLANZapine Oral Tablet 10 MG  Last refilled: 9/13/21  Qty: 30/1  OLANZapine Oral Tablet 5 MG  Last refilled: 9/13/21  Qty: 30/1  Last seen: 11/5/21  RTC: 4 weeks  Cancel: 0  No-show: 0  Next appt: 12/10/21    Refill decision:   Refilled for 30 days per protocol.

## 2021-11-19 NOTE — PROGRESS NOTES
[facility]  Psychiatry Clinic  Mood Disorders Education and Support Group     Patient: Jose Francisco Sommers (1990)     MRN: [id number]  Date:  11/17/21  Diagnosis: (F25.0) Schizoaffective disorder, bipolar type (H)  (primary encounter diagnosis)      Video- Visit Details  Type of service:  video visit for Group Therapy  Time of service:    Date:  11/17/21    Video Start Time:  3:00 PM       Video End Time:  4:30 PM    Reason for video visit:  Patient unable to travel due to Covid-19  Originating Site (patient location):  [facility]   Location- Patient's home  Distant Site (provider location):  Remote location  Mode of Communication:  Video Conference via Zoom  Consent:  Patient has given verbal consent for video visit?: Yes     Number of Participants: 5  Group Time: 90 minutes  Facilitators: FILOMENA Ferrera; Steven Gutierrez, MSW/MPH candidate; Angelita Lopes, MSW candidate;       The mood disorders education and support group is based on the [employer]xto Model that uses psychoeducation to inform participants about their illness, course of treatment, and how they can improve their own wellness.        Diagnostic criteria for group participation: Bipolar I Disorder, Bipolar II Disorder, Schizoaffective Disorder      Group Topic for Today: Psychoeducation about diagnosis      Description: Active Participant  Mr. Sommers was present in group today.  patient minimally participated, but was respectful to the group process.  Manuel's emotional presentation was passive and inattentive. Manuel's mood was euthymic and neutral, and his affect was Subdued.    Plan: Continue to engage in mood disorders education group weekly on Wednesdays at 3pm.  Steven Gutierrez

## 2021-11-22 NOTE — PROGRESS NOTES
[facility]  Psychiatry Clinic  Mood Disorders Education and Support Group     Patient: Jose Francisco Sommers (1990)     MRN: [id number]  Date:  11/10/21  Diagnosis: (F25.0) Schizoaffective disorder, bipolar type (H)  (primary encounter diagnosis)      Video- Visit Details  Type of service:  video visit for Group Therapy  Time of service:    Date:  11/10/21    Video Start Time:  3:00 PM       Video End Time:  4:30 PM    Reason for video visit:  Patient unable to travel due to Covid-19  Originating Site (patient location):  [facility]   Location- Patient's home  Distant Site (provider location):  Remote location  Mode of Communication:  Video Conference via Zoom  Consent:  Patient has given verbal consent for video visit?: Yes     Number of Participants: 6  Group Time: 90 minutes  Facilitators: FILOMENA Ferrera; Steven Gutierrez, MSW/MPH candidate; Angelita Lopes, MSW candidate;       The mood disorders education and support group is based on the Calixto Model that uses psychoeducation to inform participants about their illness, course of treatment, and how they can improve their own wellness.        Diagnostic criteria for group participation: Bipolar I Disorder, Bipolar II Disorder, Schizoaffective Disorder      Group Topic for Today: Goal setting and neurobiology of mental health      Description: Active Participant  Mr. Sommers was present in group today.  The patient actively participated in group discussion  Manuel's emotional presentation was attentive and engaged. Manuel's mood was friendly, and his affect was Appropriate/mood-congruent.     Plan: Continue to engage in mood disorders education group weekly on Wednesdays at 3pm.  Angelita Lopes

## 2021-11-26 ENCOUNTER — VIRTUAL VISIT (OUTPATIENT)
Dept: FAMILY MEDICINE | Facility: CLINIC | Age: 31
End: 2021-11-26
Payer: COMMERCIAL

## 2021-11-26 DIAGNOSIS — F41.9 ANXIETY: ICD-10-CM

## 2021-11-26 DIAGNOSIS — I10 HYPERTENSION GOAL BP (BLOOD PRESSURE) < 140/90: Primary | ICD-10-CM

## 2021-11-26 DIAGNOSIS — E78.1 HIGH BLOOD TRIGLYCERIDES: ICD-10-CM

## 2021-11-26 PROCEDURE — 99213 OFFICE O/P EST LOW 20 MIN: CPT | Mod: 95 | Performed by: PHYSICIAN ASSISTANT

## 2021-11-26 RX ORDER — PROPRANOLOL HCL 60 MG
60 CAPSULE, EXTENDED RELEASE 24HR ORAL DAILY
Qty: 90 CAPSULE | Refills: 1 | Status: SHIPPED | OUTPATIENT
Start: 2021-11-26 | End: 2022-04-22

## 2021-11-26 NOTE — PROGRESS NOTES
Manuel is a 31 year old who is being evaluated via a billable video visit.      How would you like to obtain your AVS? MyChart  If the video visit is dropped, the invitation should be resent by: Text to cell phone: [phone]  Will anyone else be joining your video visit? No    Video Start Time: 300 PM     Assessment & Plan     Hypertension goal BP (blood pressure) < 140/90  BP stable.   Primarily propranolol being used for his anxiety  Refills - stable.   - propranolol ER (INDERAL LA) 60 MG 24 hr capsule; Take 1 capsule (60 mg) by mouth daily    Anxiety  Stable   - propranolol ER (INDERAL LA) 60 MG 24 hr capsule; Take 1 capsule (60 mg) by mouth daily    High blood triglycerides  Lab Results   Component Value Date    TRIG 554 07/26/2021    TRIG 211 11/13/2020     Counseling given  Reviewed options  He wants to focus on his dietary approaches first and get back to us for a lab recheck in a few months.   - Lipid panel reflex to direct LDL Fasting; Future     Tobacco Cessation:   reports that he has been smoking cigarettes. He started smoking about 4 years ago. He has been smoking about 2.00 packs per day. He has never used smokeless tobacco.      No follow-ups on file.    ROMINA CHANG PA-C  [facility]   Manuel is a 31 year old who presents for the following health issues     HPI     Hypertension Follow-up      Do you check your blood pressure regularly outside of the clinic? Yes     Are you following a low salt diet? No    Are your blood pressures ever more than 140 on the top number (systolic) OR more   than 90 on the bottom number (diastolic), for example 140/90? Yes      How many servings of fruits and vegetables do you eat daily?  2-3    On average, how many sweetened beverages do you drink each day (Examples: soda, juice, sweet tea, etc.  Do NOT count diet or artificially sweetened beverages)?   2    How many days per week do you exercise enough to make your heart beat  faster? 3 or less    How many minutes a day do you exercise enough to make your heart beat faster? 9 or less    How many days per week do you miss taking your medication? 0    Acute Illness  Acute illness concerns: cough, maybe smoker's cough, still everyday smoker  Onset/Duration: 1 month  Symptoms:  Fever: no  Chills/Sweats: no  Headache (location?): no  Sinus Pressure: no  Conjunctivitis:  no  Ear Pain: no  Rhinorrhea: no  Congestion: no  Sore Throat: no  Cough: YES  Wheeze: no  Decreased Appetite: no  Nausea: no  Vomiting: no  Diarrhea: no  Dysuria/Freq.: no  Dysuria or Hematuria: no  Fatigue/Achiness: no  Sick/Strep Exposure: no  Therapies tried and outcome: None    Discuss elevated triglycerides.         Review of Systems   Constitutional, HEENT, cardiovascular, pulmonary, gi and gu systems are negative, except as otherwise noted.      Objective           Vitals:  No vitals were obtained today due to virtual visit.    Physical Exam   GENERAL: Healthy, alert and no distress  EYES: Eyes grossly normal to inspection.  No discharge or erythema, or obvious scleral/conjunctival abnormalities.  RESP: No audible wheeze, cough, or visible cyanosis.  No visible retractions or increased work of breathing.    SKIN: Visible skin clear. No significant rash, abnormal pigmentation or lesions.  NEURO: Cranial nerves grossly intact.  Mentation and speech appropriate for age.  PSYCH: Mentation appears normal, affect normal/bright, judgement and insight intact, normal speech and appearance well-groomed.        Video-Visit Details    Type of service:  Video Visit    Video End Time:330 PM     Originating Location (pt. Location): Home    Distant Location (provider location):  [facility]     Platform used for Video Visit: [employer]

## 2021-12-01 ENCOUNTER — VIRTUAL VISIT (OUTPATIENT)
Dept: PSYCHIATRY | Facility: CLINIC | Age: 31
End: 2021-12-01
Attending: SOCIAL WORKER
Payer: COMMERCIAL

## 2021-12-01 DIAGNOSIS — F25.0 SCHIZOAFFECTIVE DISORDER, BIPOLAR TYPE (H): Primary | ICD-10-CM

## 2021-12-01 PROCEDURE — 90853 GROUP PSYCHOTHERAPY: CPT | Mod: 95

## 2021-12-02 ENCOUNTER — MYC REFILL (OUTPATIENT)
Dept: PSYCHIATRY | Facility: CLINIC | Age: 31
End: 2021-12-02
Payer: COMMERCIAL

## 2021-12-02 DIAGNOSIS — G47.00 INSOMNIA, UNSPECIFIED TYPE: ICD-10-CM

## 2021-12-02 NOTE — TELEPHONE ENCOUNTER
Last Seen 11/5  RTC 4 Weeks  Cancel None  No-Show None    Next Appt 12/10    Incoming Refill From patient via [employer]    Medication Requested   Suvorexant (BELSOMRA) 5 MG tablet  Directions   Take 1 tablet (5 mg) by mouth nightly as needed for sleep Together with one 10 mg tablet for a total dose of 15 mg  Qty 30  Last Refill Per  10/25 & 9/28    Medication Requested   Suvorexant (BELSOMRA) 10 MG tablet  Directions   Take 1 tablet (10 mg) by mouth nightly as needed for sleep  Qty 30  Last Refill Per  10/19 & 9/14    Medication pended and routed to provider.

## 2021-12-03 NOTE — PROGRESS NOTES
"   [facility]  Psychiatry Clinic  Mood Disorders Education and Support Group     Patient: Jose Francisco Sommers (1990)     MRN: [id number]  Date:  12/01/21  Diagnosis: (F25.0) Schizoaffective disorder, bipolar type (H)  (primary encounter diagnosis)    Video- Visit Details  Type of service:  video visit for Group Therapy  Time of service:    Date:  12/01/21    Video Start Time:  3:00 PM       Video End Time:  4:30 PM    Reason for video visit:  Patient unable to travel due to Covid-19  Originating Site (patient location):  [facility]   Location- Patient's home  Distant Site (provider location):  Remote location  Mode of Communication:  Video Conference via Zoom  Consent:  Patient has given verbal consent for video visit?: Yes     Number of Participants: 4  Group Time: 90 minutes  Facilitators: FILOMENA Ferrera; Steven Gutierrez, MSW/MPH candidate; Angelita Lopes, MSW candidate;       The mood disorders education and support group is based on the Calixto Model that uses psychoeducation to inform participants about their illness, course of treatment, and how they can improve their own wellness.        Diagnostic criteria for group participation: Bipolar I Disorder, Bipolar II Disorder, Schizoaffective Disorder      Group Topic for Today: Coping Skills/Lifestyle Management and Goal Setting      Description: Active Participant  Mr. Sommers was present in group today.  The patient actively participated in group discussion and patient demonstrated an appreciation of topic's application for their personal circumstances.  Manuel's emotional presentation was cooperative, attentive and engaged. Manuel's mood was \"good\", euthymic, friendly and normal, and his affect was Appropriate/mood-congruent. Jose Francisco participated in goal setting reflection and offered advice to other group members regarding their stated goals. Jose Francisco also participated in coping skills activity     Plan: Continue to engage in mood " disorders education group weekly on Wednesdays at 3pm.  Steven Gutierrez

## 2021-12-08 ENCOUNTER — VIRTUAL VISIT (OUTPATIENT)
Dept: PSYCHIATRY | Facility: CLINIC | Age: 31
End: 2021-12-08
Attending: SOCIAL WORKER
Payer: COMMERCIAL

## 2021-12-08 DIAGNOSIS — F25.0 SCHIZOAFFECTIVE DISORDER, BIPOLAR TYPE (H): Primary | ICD-10-CM

## 2021-12-08 PROCEDURE — 90853 GROUP PSYCHOTHERAPY: CPT | Mod: 95

## 2021-12-09 NOTE — PROGRESS NOTES
[facility]  Psychiatry Clinic  Mood Disorders Education and Support Group     Patient: Jose Francisco Sommers (1990)     MRN: [id number]  Date:  12/08/21  Diagnosis: (F25.0) Schizoaffective disorder, bipolar type (H)  (primary encounter diagnosis)      Video- Visit Details  Type of service:  video visit for Group Therapy  Time of service:    Date:  12/08/21    Video Start Time:  3:00 PM       Video End Time:  4:30 PM    Reason for video visit:  Patient unable to travel due to Covid-19  Originating Site (patient location):  [facility]   Location- Patient's home  Distant Site (provider location):  Remote location  Mode of Communication:  Video Conference via Zoom  Consent:  Patient has given verbal consent for video visit?: Yes     Number of Participants: 3  Group Time: 90 minutes  Facilitators: FILOMENA Ferrera; Steven Gutierrez, MSW/MPH candidate; Angelita Lopes, MSW candidate;       The mood disorders education and support group is based on the Calixto Model that uses psychoeducation to inform participants about their illness, course of treatment, and how they can improve their own wellness.        Diagnostic criteria for group participation: Bipolar I Disorder, Bipolar II Disorder, Schizoaffective Disorder      Group Topic for Today: Role of Medications in Recovering from Mood Disorders      Description: Active Participant  Mr. Sommers was present in group today.  patient minimally participated, but was respectful to the group process.  Manuel's emotional presentation was distracted. Manuel's mood was neutral, and his affect was appropriate/mood-congruent.    Plan: Continue to engage in mood disorders education group weekly on Wednesdays at 3pm.  Angelita Lopes

## 2021-12-10 ENCOUNTER — TELEPHONE (OUTPATIENT)
Dept: PSYCHIATRY | Facility: CLINIC | Age: 31
End: 2021-12-10
Payer: COMMERCIAL

## 2021-12-10 ASSESSMENT — ANXIETY QUESTIONNAIRES
7. FEELING AFRAID AS IF SOMETHING AWFUL MIGHT HAPPEN: NEARLY EVERY DAY
2. NOT BEING ABLE TO STOP OR CONTROL WORRYING: NOT AT ALL
1. FEELING NERVOUS, ANXIOUS, OR ON EDGE: NEARLY EVERY DAY
6. BECOMING EASILY ANNOYED OR IRRITABLE: NOT AT ALL
GAD7 TOTAL SCORE: 10
7. FEELING AFRAID AS IF SOMETHING AWFUL MIGHT HAPPEN: NEARLY EVERY DAY
3. WORRYING TOO MUCH ABOUT DIFFERENT THINGS: NOT AT ALL
4. TROUBLE RELAXING: NEARLY EVERY DAY
5. BEING SO RESTLESS THAT IT IS HARD TO SIT STILL: SEVERAL DAYS

## 2021-12-10 NOTE — TELEPHONE ENCOUNTER
On December 10, 2021, at 9:35 AM, writer called patient at mobile to confirm Virtual Visit. Writer unable to make contact with patient. Writer left detailed voice message for call back, with [phone] left as callback number. Link for [employer] was provided via: Send to preferred e-mail: [email or web address]. Brisa Daigle, EMT

## 2021-12-11 ASSESSMENT — ANXIETY QUESTIONNAIRES: GAD7 TOTAL SCORE: 10

## 2021-12-15 ENCOUNTER — VIRTUAL VISIT (OUTPATIENT)
Dept: PSYCHIATRY | Facility: CLINIC | Age: 31
End: 2021-12-15
Attending: SOCIAL WORKER
Payer: COMMERCIAL

## 2021-12-15 DIAGNOSIS — F25.0 SCHIZOAFFECTIVE DISORDER, BIPOLAR TYPE (H): Primary | ICD-10-CM

## 2021-12-15 PROCEDURE — 90853 GROUP PSYCHOTHERAPY: CPT | Mod: 95

## 2021-12-15 NOTE — PROGRESS NOTES
"   [facility]  Psychiatry Clinic  Mood Disorders Education and Support Group     Patient: Jose Francisco Sommers (1990)     MRN: [id number]  Date:  12/15/21  Diagnosis: (F25.0) Schizoaffective disorder, bipolar type (H)  (primary encounter diagnosis)      Video- Visit Details  Type of service:  video visit for Group Therapy  Time of service:    Date:  12/15/21    Video Start Time:  3:00 PM       Video End Time:  4:30 PM    Reason for video visit:  Patient unable to travel due to Covid-19  Originating Site (patient location):  [facility]   Location- Patient's home  Distant Site (provider location):  Remote location  Mode of Communication:  Video Conference via Zoom  Consent:  Patient has given verbal consent for video visit?: Yes     Number of Participants: 4  Group Time: 90 minutes  Facilitators: FILOMENA Ferrera; Steven Gutierrez, MSW/MPH candidate; Angelita Lopes, MSW candidate;       The mood disorders education and support group is based on the Calixto Model that uses psychoeducation to inform participants about their illness, course of treatment, and how they can improve their own wellness.        Diagnostic criteria for group participation: Bipolar I Disorder, Bipolar II Disorder, Schizoaffective Disorder      Group Topic for Today: review of progress toward goals, coping skills learned and used, and wellness planning      Description: Active Participant  Mr. Sommers was present in group today.  The patient actively participated in group discussion and patient demonstrated an appreciation of topic's application for their personal circumstances.  Manuel's emotional presentation was attentive and engaged. Manuel's mood was \"fine\", and his affect was Appropriate/mood-congruent.    Plan: Offer weekly ongoing young adult psychosis group and spring of 2022 mood disorders support group  Angelita Lopes  "

## 2021-12-27 ENCOUNTER — MYC REFILL (OUTPATIENT)
Dept: PSYCHIATRY | Facility: CLINIC | Age: 31
End: 2021-12-27
Payer: COMMERCIAL

## 2021-12-27 DIAGNOSIS — G47.00 INSOMNIA, UNSPECIFIED TYPE: ICD-10-CM

## 2021-12-27 DIAGNOSIS — F25.0 SCHIZOAFFECTIVE DISORDER, BIPOLAR TYPE (H): ICD-10-CM

## 2021-12-27 RX ORDER — LITHIUM CARBONATE 300 MG/1
1200 TABLET, FILM COATED, EXTENDED RELEASE ORAL AT BEDTIME
Qty: 120 TABLET | Refills: 0 | Status: SHIPPED | OUTPATIENT
Start: 2021-12-27 | End: 2022-01-27

## 2021-12-27 NOTE — TELEPHONE ENCOUNTER
Last seen: 12/10/21  RTC: 4 weeks  Cancel: none  No-show: none  Next appt: 1/14/22     Medication requested: Suvorexant (BELSOMRA) 10 MG tablet  Directions: Take 1 tablet (10 mg) by mouth nightly as needed for sleep  Qty: 30  Last refilled: 12/2/21, 10/21/21, and 9/17/21 per [place]      Rx pended for 20 mg tablets, as patient reports he increased his dose at the last visit

## 2021-12-27 NOTE — TELEPHONE ENCOUNTER
Last seen: 12/10/21  RTC: 4 weeks  Cancel: none  No-show: none  Next appt: 1/14/22     Medication requested: lithium ER (LITHOBID) 300 MG CR tablet  Directions: Take 4 tablets (1,200 mg) by mouth At Bedtime  Qty: 120  Last refilled: 11/19/21 per outside med rec     Medication refill approved per refill protocol

## 2022-01-01 ENCOUNTER — TELEPHONE (OUTPATIENT)
Dept: PSYCHIATRY | Facility: CLINIC | Age: 32
End: 2022-01-01
Payer: COMMERCIAL

## 2022-01-01 ENCOUNTER — TELEPHONE (OUTPATIENT)
Dept: BEHAVIORAL HEALTH | Facility: CLINIC | Age: 32
End: 2022-01-01
Payer: COMMERCIAL

## 2022-01-01 DIAGNOSIS — F25.0 SCHIZOAFFECTIVE DISORDER, BIPOLAR TYPE (H): Primary | ICD-10-CM

## 2022-01-01 RX ORDER — OLANZAPINE 10 MG/1
20 TABLET ORAL AT BEDTIME
Qty: 14 TABLET | Refills: 0 | Status: SHIPPED | OUTPATIENT
Start: 2022-01-01 | End: 2022-01-03

## 2022-01-01 NOTE — TELEPHONE ENCOUNTER
Mother of pt called as pt ran of Olanzapine 20 mg script.  Please call irish - Ember @  [phone]

## 2022-01-01 NOTE — TELEPHONE ENCOUNTER
Telephone Call with Mother Ember Sommers    Start time: 13:42  End time: 13:49    Notified by intake that Jose Francisco Sommers's mother Ember would like to call back regarding his olanzapine.     On return call, Ms. Sommers reported that Jose Francisco thought he refilled all his medications, but he actually did not refill the prescribed olanzapine. He is now out of Olanzapine 20mg at bedtime after taking last night's dose. They are requesting a refill.    Chart review indicates that he has an appointment with Dr. Julian scheduled for 1/14/22. Mother is requesting enough olanzapine to last throughout weekend, and will call clinic next week for a refill.    Plan:  -Prescribe 7 days of Olanzapine 20mg, at bedtime for psychosis (14 pills)  -Call [facility] Psychiatry clinic after weekend for further supply  -Attend outpt follow up with Dr. Julian 1/14/22  -Route note to Dr. Eliel Santa MD, PhD  PGY-2 Psychiatry Resident

## 2022-01-08 ENCOUNTER — HEALTH MAINTENANCE LETTER (OUTPATIENT)
Age: 32
End: 2022-01-08

## 2022-01-12 NOTE — PROGRESS NOTES
"VIDEO VISIT  Jose Francisco Sommers is a 30 year old patient who is being evaluated via a billable video visit.      The patient has been notified of following:   \"We have found that certain health care needs can be provided without the need for an in-person physical exam. This service lets us provide the care you need with a video conversation. If a prescription is necessary we can send it directly to your pharmacy. If lab work is needed we can place an order for that and you can then stop by our lab to have the test done at a later time. Insurers are generally covering virtual visits as they would in-office visits so billing should not be different than normal.  If for some reason you do get billed incorrectly, you should contact the billing office to correct it and that number is in the AVS .    Patient has given verbal consent for video visit?: Yes   How would you like to obtain your AVS?: [employer]  AVS SmartPhrase [PsychAVS] has been placed in 'Patient Instructions': Yes      Video- Visit Details  Type of service:  video visit for mental health treatment.  Time of service:    Date:  1/14/21    Video Start Time:  9:00AM        Video End Time:  9:30AM    Reason for video visit: COVID-19  Originating Site (patient location):  Patient's home  Distant Site (provider location):  [facility]  Mode of Communication:  Video Conference via [facility]  Psychiatry Clinic  MEDICAL PROGRESS NOTE     CARE TEAM:  PCP- Clinic Provider, Psychotherapist- None    Jose Francisco Sommers is a 30 year old who prefers the name Manuel and uses pronouns he, him.      DIAGNOSIS     1) Schizoaffective disorder, Bipolar type, most recent episode manic, partial remission (improving)  2) ADHD, predominantly inattentive presentation     ASSESSMENT     TODAY: Manuel reports that he discontinued his Ritalin and Belsomra between appointments as he no longer found them beneficial for concentration and sleep " respectively. He requested again to start Desoxyn and we discontinued that this was not routinely prescribed for ADHD, especially when there were other medication options available that he had not tried such as Focalin.     He denied any symptoms concerning for lauren or psychosis. He and his mother requested to begin tapering Zyprexa as Manuel has experienced signifigant amounts of weight gain. Discussed that in the past efforts to taper this medication have resulted in a relapse of manic symptoms and it is not our recommendation to decrease his dose. At this time he and his mother would like to try again as they find the risk of further weight gain to be more detrimental than the possibility of worsening lauren symptoms. Advised Manuel to start Vrayler and taper up to a therapeutic dose (3mg) before tapering his Zyprexa with the strong recommendation to taper Zyprexa slowly as approaching this more quickly will also increase his risk of relapse. He was in agreement with this plan. He and his mother will monitor for symptoms of lauren including decreased need for sleep closely as we make medication changes.    Future Considerations:  - Weight management referral  - trial of cariprazine, ziprasidone, or depakote   - Olanzapine + samidorphan (Lybalvi)  - Focalin    [place] was not [place] today, no use of controlled substances     PLAN                                                                                                                1) Meds-  - Continue Lithium 1,200 mg HS  - Continue olanzapine 20 mg HS for 2 weeks, then decrease to 15mg  - Start Vraylar 1.5mg for 1 week, then increase to 3mg  - Discontinue Belsomra (stopped by patient between appointments)  - Discontinue Ritalin (stopped by patient between appointments)    Prescribed by PCP  - Propranolol ER 60mg qD    2) Psychotherapy- none, encouraged    3) Next due-  Labs- AP labs due 7/2022, elevated lipids 7/2021 (message sent to PCP for f/u)  EKG- as  "needed  Rating scales- N/A    4) Referrals-  Weight management in the future  Sleep management     5) Dispo- RTC in 4 weeks, MyChart check in ~7 days       PERTINENT BACKGROUND                           [most recent eval 07/11/21]      Jose Francisco Sommers first experienced mental health issues in grade school and has received treatment for ADHD, depression, psychosis and substance use. Notably, this patient had FEP in 2015 in the setting of cannabis abuse and has been stable on olanzapine and lithium since that time. He has never lived independently and has only been slowly developing insight into his illness and past symptoms. GeneSight testing was completed noting patient is a poor CYP2D6 metabolizer. Past records from Dr. Mike Powers were received in summer 2018 to assist with diagnostic clarity, however they were hand written notes that were illegible though accompanied by typed neuropsychology consult from [facility]. This documentation was unable to support a diagnosis of bipolar disorder at that time though noted potential Asperger's diagnosis and ADHD. Given that there has been evidence for psychotic signs and symptoms outside the context of mood episodes, a schizophrenia spectrum disorder such as schizoaffective disorder diagnosis seems most accurate. He has had two recent hospitalizations for decompensated psychosis in the setting of decreasing his olanzapine dose.    Psych pertinent item history includes psychosis [sxs include disorganization, possible catatonia], mutiple psychotropic trials, psych hosp (<3) and SUBSTANCE USE: alcohol and cannabis     SUBJECTIVE     - doing okay    - stopped Belsomra  - didn't feel like it was helping  - felt like he needed it to fall asleep  - sleeping fine without it    - stopped taking Ritalin, preferred to Vyvanse and Adderall  - thinking \"more pleasant\" without it  - wants to try Desoxyn    - gaining a lot of weight on Zyprexa  - spending a lot of money on fast food  - " "eating lots of fast food  - extremely frustrated  - wants to taper    - Manuel feels last hospitalization triggered by cannabis use not by tapering Zyprexa  - Mom disagreed with this    - group finished, really nice  - no depression concerns  - no symptoms of lauren    - okay starting taper in 2 weeks    RECENT PSYCH ROS:   Depression:  none  Elevated:  none  Psychosis:  none  Anxiety:  none  Trauma Related:  none  Sleep: no change, see HPI  Other: N/A    Adverse Effects: increased appetite  Pertinent Negative Symptoms: No psychosis or lauren  Recent Substance Use:     Tobacco - see HPI  No alcohol or cannabis     SOCIAL HISTORY                                 pt reported     Social Hx:  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     Partner/ - single  Children- None      Living situation- lives with parents in [place]  Social/ Spiritual Support- family, friends, [employer] hemalatha     Feels Safe at Home- yes   Legal- None       PSYCH and SUBSTANCE USE Critical Summary Points since July 2020 7/12/21 - started Seroquel taper  8/2/21 - continue Seroquel taper  9/13/21 - started Belsomra for sleep  10/8/21 - Vyvanse started       PAST MED TRIALS     Adderall 10-20 mg - improved concentration, but \"cold personality\" and perseveration  Prozac 20 mg - limited response, less irritable, first episode of \"rage\"  Seroquel 25-75 mg - helped with sleep and irritability, discontinued d/t weight gain and daytime fatigue  Concerta 18 mg - not helpful for focus, no improvement in motivation, mom thought this helped with insight  Provigil 150 mg - Stimulants \"almost killed him\" triggered alcohol binges  Abilify ?25 mg - OK when QOD, but irritable and agitated on QD  Clonazepam 0.5-1.5 mg - calming, helped with sleep and \"catatonia\"  Risperdal up to 5 mg - some confusion, slow processing, withdrawn, ?\"catatonia\", panic attacks  Zyprexa 5 mg less anxious overall improvement  Lithium - calmer, overall " better  Latuda 20 mg - severe fatigue and depression  Synthroid 75 mcg - added to help with mood.  Gabapentin  Trazodone - worsened mood  Seroquel - weight gain, daytime drowsiness    Brief trials with Strattera, Intuniv, Lamictal, Xanax, Zoloft     MEDICAL HISTORY and ALLERGY     ALLERGIES: Sulfa drugs    Patient Active Problem List   Diagnosis     Schizoaffective disorder, bipolar type (H)     Hx of psychiatric care     Elevated liver enzymes     Fatty liver     High blood triglycerides     History of cannabis abuse     History of cocaine abuse (H)     Psychosis (H)     Elevated blood pressure reading without diagnosis of hypertension     Constipation     No past medical history on file.     MEDICAL REVIEW OF SYSTEMS   Contraception- not discussed    A comprehensive review of systems was performed and is negative other than noted in the HPI.     MEDICATIONS     Current Outpatient Medications   Medication Sig Dispense Refill     lithium ER (LITHOBID) 300 MG CR tablet Take 4 tablets (1,200 mg) by mouth At Bedtime 120 tablet 1     metFORMIN (GLUCOPHAGE-XR) 500 MG 24 hr tablet Take 500mg twice a day for 14 days then increase to 500mg in the morning and 1000mg at bedtime 75 tablet 1     OLANZapine (ZYPREXA) 10 MG tablet Take 1 tablet (10 mg) by mouth At Bedtime 30 tablet 1     OLANZapine (ZYPREXA) 5 MG tablet Take 1 tablet (5 mg) by mouth At Bedtime 30 tablet 1     propranolol ER (INDERAL LA) 60 MG 24 hr capsule Take 1 capsule (60 mg) by mouth daily 90 capsule 3     QUEtiapine (SEROQUEL) 200 MG tablet Take 0.5 tablets (100 mg) by mouth every morning And 1 tab (100 mg) with 1 tab (400 mg) for bedtime dose of 600 mg 30 tablet 1     QUEtiapine (SEROQUEL) 400 MG tablet Take 1 tablet (400 mg) by mouth At Bedtime Along with 1 tab (200mg) for bedtime dose of 600mg 30 tablet 1     triamcinolone (KENALOG) 0.1 % external cream Apply topically 2 times daily 30 g 2      VITALS   There were no vitals taken for this visit.    MENTAL  STATUS EXAM     Alertness: alert   Appearance: appropriately groomed  Behavior/Demeanor: cooperative, with good eye contact   Speech: regular  Language: intact  Psychomotor: normal or unremarkable  Mood: description consistent with euthymia  Affect: restricted, more bright than previous; congruent to: mood- yes, content- yes  Thought Process/Associations: unremarkable  Thought Content:  Reports none;    Perception:  Reports none;  Denies hallucinations  Insight: adequate to good  Judgment: good  Cognition: does  appear grossly intact; formal cognitive testing was not done  Gait and Station: N/A (telehealth)     LABS and DATA     PHQ9 TODAY = not collected  PHQ 12/26/2019 11/4/2020 2/8/2021   PHQ-9 Total Score 3 12 10   Q9: Thoughts of better off dead/self-harm past 2 weeks Not at all Not at all Not at all         Recent Labs   Lab Test 07/26/21  1339 12/04/20  1414 11/13/20  0758 06/01/20  1339   GLC 85 80   < >  --    A1C 4.9  --   --  4.7    < > = values in this interval not displayed.     Recent Labs   Lab Test 07/26/21  1339 11/13/20  0758   CHOL 247* 111   TRIG 554* 211*   * 20   HDL 42 49     Recent Labs   Lab Test 07/26/21  1339 12/04/20  1414   AST 36 28   ALT 68 46   ALKPHOS 70 78     Recent Labs   Lab Test 07/26/21  1339 12/04/20  1414 11/13/20  0758 06/01/20  1339   WBC 8.0 11.0   < > 7.5   ANEU  --  8.0  --  4.8   HGB 14.7 14.3   < > 15.1    285   < > 265    < > = values in this interval not displayed.     Recent Labs   Lab Test 07/26/21  1339 12/16/20  0718 12/11/20  0726   LITHIUM 0.6 0.84 0.58*     Recent Labs   Lab Test 07/26/21  1339 12/04/20  1414   CR 1.12 1.11   GFRESTIMATED 88 89    138   POTASSIUM 3.7 4.1   ADONAY 9.4 9.4     Recent Labs   Lab Test 07/26/21  1341 11/08/19  1024   SG 1.015 1.015     Recent Labs   Lab Test 07/26/21  1339 12/04/20  1414   TSH 2.22 2.01     Recent Labs   Lab Test 07/26/21  1339 12/04/20  1414 11/13/20  0758 06/01/20  1339   WBC 8.0 11.0   < > 7.5    ANEU  --  8.0  --  4.8    < > = values in this interval not displayed.     ECG 11/20 QTc = 436ms     PSYCHOTROPIC DRUG INTERACTIONS     OLANZAPINE + LITHIUM + BELSOMRA: CNS depression    OLANZAPINE + LITHIUM Lithium may enhance the neurotoxic effect of Antipsychotic Agents.    OLANZAPINE: QT-prolonging     MANAGEMENT:  Monitoring for adverse effects, routine vitals, routine labs and periodic EKGs     RISK STATEMENT for SAFETY     Jose Francisco Sommers did not appear to be an imminent safety risk to self or others.    TREATMENT RISK STATEMENT: The risks, benefits, alternatives and potential adverse effects have been discussed and are understood by the pt. The pt understands the risks of using street drugs or alcohol. There are no medical contraindications, the pt agrees to treatment with the ability to do so. The pt knows to call the clinic for any problems or to access emergency care if needed.  Medical and substance use concerns are documented above.  Psychotropic drug interaction check was done, including changes made today.    PROVIDER: Kristie Julian MD    Patient staffed with Dr. Lyons who will sign the note.  Supervisor is Dr. Tay.     TELEHEALTH ATTENDING ATTESTATION  Following the ACGME guidelines on telemedicine and direct supervision due to COVID-19, I was concurrently participating in and/or monitoring the patient care through appropriate telecommunication technology.  I discussed the key portions of the service with the resident, including the mental status examination and developing the plan of care. I reviewed key portions of the history with the resident. I agree with the findings and plan as documented in this note.   Dinh Lyons MD

## 2022-01-14 ENCOUNTER — VIRTUAL VISIT (OUTPATIENT)
Dept: PSYCHIATRY | Facility: CLINIC | Age: 32
End: 2022-01-14
Attending: PSYCHIATRY & NEUROLOGY
Payer: COMMERCIAL

## 2022-01-14 DIAGNOSIS — F25.0 SCHIZOAFFECTIVE DISORDER, BIPOLAR TYPE (H): ICD-10-CM

## 2022-01-14 DIAGNOSIS — F41.9 ANXIETY: ICD-10-CM

## 2022-01-14 DIAGNOSIS — F90.9 ATTENTION DEFICIT HYPERACTIVITY DISORDER (ADHD), UNSPECIFIED ADHD TYPE: ICD-10-CM

## 2022-01-14 DIAGNOSIS — G47.00 INSOMNIA, UNSPECIFIED TYPE: ICD-10-CM

## 2022-01-14 DIAGNOSIS — I10 HYPERTENSION GOAL BP (BLOOD PRESSURE) < 140/90: ICD-10-CM

## 2022-01-14 PROCEDURE — 99214 OFFICE O/P EST MOD 30 MIN: CPT | Mod: 95 | Performed by: STUDENT IN AN ORGANIZED HEALTH CARE EDUCATION/TRAINING PROGRAM

## 2022-01-14 RX ORDER — PROPRANOLOL HCL 60 MG
60 CAPSULE, EXTENDED RELEASE 24HR ORAL DAILY
Qty: 90 CAPSULE | Refills: 2 | Status: CANCELLED | OUTPATIENT
Start: 2022-01-14

## 2022-01-14 RX ORDER — LITHIUM CARBONATE 300 MG/1
1200 TABLET, FILM COATED, EXTENDED RELEASE ORAL AT BEDTIME
Qty: 120 TABLET | Refills: 2 | Status: CANCELLED | OUTPATIENT
Start: 2022-01-14

## 2022-01-14 RX ORDER — METHYLPHENIDATE HYDROCHLORIDE 5 MG/1
5 TABLET ORAL 2 TIMES DAILY
Qty: 60 TABLET | Refills: 0 | Status: CANCELLED | OUTPATIENT
Start: 2022-02-12

## 2022-01-14 RX ORDER — METHYLPHENIDATE HYDROCHLORIDE 5 MG/1
5 TABLET ORAL 2 TIMES DAILY
Qty: 60 TABLET | Refills: 0 | Status: CANCELLED | OUTPATIENT
Start: 2022-01-14

## 2022-01-14 RX ORDER — OLANZAPINE 20 MG/1
20 TABLET ORAL AT BEDTIME
Qty: 30 TABLET | Refills: 2 | Status: CANCELLED | OUTPATIENT
Start: 2022-01-14

## 2022-01-14 RX ORDER — METHYLPHENIDATE HYDROCHLORIDE 5 MG/1
5 TABLET ORAL 2 TIMES DAILY
Qty: 60 TABLET | Refills: 0 | Status: CANCELLED | OUTPATIENT
Start: 2022-03-12

## 2022-01-14 ASSESSMENT — PAIN SCALES - GENERAL: PAINLEVEL: NO PAIN (0)

## 2022-01-14 NOTE — PATIENT INSTRUCTIONS
**For crisis resources, please see the information at the end of this document**   Patient Education    Thank you for coming to the [facility] [place] [facility].    Lab Testing:  If you had lab testing today and your results are reassuring or normal they will be mailed to you or sent through [employer] within 7 days. If the lab tests need quick action we will call you with the results. The phone number we will call with results is # [phone] (home) . If this is not the best number please call our clinic and change the number.    Medication Refills:  If you need any refills please call your pharmacy and they will contact us. Our fax number for refills is [phone]. Please allow three business for refill processing. If you need to  your refill at a new pharmacy, please contact the new pharmacy directly. The new pharmacy will help you get your medications transferred.     Scheduling:  If you have any concerns about today's visit or wish to schedule another appointment please call our office during normal business hours [phone] (8-5:00 M-F)    Contact Us:  Please call [phone] during business hours (8-5:00 M-F).  If after clinic hours, or on the weekend, please call  [phone].    Financial Assistance [phone]  [email or web address] Billing [phone]  Central Billing Office, [facility]: [phone]  [facility] Billing [phone]  Medical Records [phone]  [facility] Patient Bill of Rights [email or web address]       MENTAL HEALTH CRISIS NUMBERS:  For a medical emergency please call  911 or go to the nearest ER.     [facility]:   [facility] -[phone]   Crisis Residence [facility] -[phone]   Walk-In Counseling Center [facility] -[phone]   COPE 24/7 [facility] Mobile Team -[phone] (adults)/[phone] (child)  CHILD: [facility] needs assessment team -  [phone]      [facility]:   [facility] - [phone]   Walk-in counseling [facility] - [phone]   Walk-in counseling [facility] - [phone]   Crisis Residence [facility] - [phone]  Urgent Care Adult Mental Pnvfgr-[phone] mobile unit/ 24/7 crisis line    National Crisis Numbers:   National Suicide Prevention Lifeline: 0-689-777-TALK (798-286-3270)  Poison Control Center - 3-826-606-5826  [employer]/resources for a list of additional resources (SOS)  Trans Lifeline a hotline for transgender people 7-317-488-6297  The [employer] a hotline for LGBT youth 5-263-866-5859  Crisis Text Line: For any crisis 24/7   To: 129655  see www.crisistextline.org  - IF MAKING A CALL FEELS TOO HARD, send a text!         Again thank you for choosing [facility] and please let us know how we can best partner with you to improve you and your family's health.    You may be receiving a survey regarding this appointment. We would love to have your feedback, both positive and negative. The survey is done by an external company, so your answers are anonymous.

## 2022-01-14 NOTE — PROGRESS NOTES
"VIDEO VISIT  Jose Francisco Sommers is a 31 year old patient that has consented to receive services via billable video visit.      The patient has been notified of following:   \"This video visit will be conducted via a call between you and your physician/provider. We have found that certain health care needs can be provided without the need for an in-person physical exam. This service lets us provide the care you need with a video conversation. If a prescription is necessary we can send it directly to your pharmacy. If lab work is needed we can place an order for that and you can then stop by our lab to have the test done at a later time. Insurers are generally covering virtual visits as they would in-office visits so billing should not be different than normal.  If for some reason you do get billed incorrectly, you should contact the billing office to correct it and that number is in the AVS .    Patient will join video visit via:  [employer] (Patient / guardian confirmed to join via Conne[employer])    If patient attempts to join the video via [employer] at appointment start time, but is unable to, they would prefer that the provider send them a video invitation via:   Send to preferred e-mail: [email or web address]      How would patient like to obtain AVS?:  MyChart    "

## 2022-01-27 ENCOUNTER — MYC REFILL (OUTPATIENT)
Dept: PSYCHIATRY | Facility: CLINIC | Age: 32
End: 2022-01-27
Payer: COMMERCIAL

## 2022-01-27 DIAGNOSIS — F25.0 SCHIZOAFFECTIVE DISORDER, BIPOLAR TYPE (H): ICD-10-CM

## 2022-01-27 RX ORDER — LITHIUM CARBONATE 300 MG/1
1200 TABLET, FILM COATED, EXTENDED RELEASE ORAL AT BEDTIME
Qty: 120 TABLET | Refills: 0 | Status: SHIPPED | OUTPATIENT
Start: 2022-01-27 | End: 2022-02-25

## 2022-01-27 NOTE — TELEPHONE ENCOUNTER
Last Seen 1/14  RTC 4 weeks, MyChart check in 7 days  Cancel None  No-Show None    Next Appt 2/4    Incoming Refill From patient via MyChart    Medication Requested   lithium ER (LITHOBID) 300 MG CR tablet    Directions   Take 4 tablets (1,200 mg) by mouth At Bedtime    Qty 120    Last Refill Approx. 12/27    Last Lithium level on 7/26- 0.6    Medication pended and routed to provider

## 2022-01-31 NOTE — PROGRESS NOTES
"VIDEO VISIT  Jose Francisco Sommers is a 30 year old patient who is being evaluated via a billable video visit.      The patient has been notified of following:   \"We have found that certain health care needs can be provided without the need for an in-person physical exam. This service lets us provide the care you need with a video conversation. If a prescription is necessary we can send it directly to your pharmacy. If lab work is needed we can place an order for that and you can then stop by our lab to have the test done at a later time. Insurers are generally covering virtual visits as they would in-office visits so billing should not be different than normal.  If for some reason you do get billed incorrectly, you should contact the billing office to correct it and that number is in the AVS .    Patient has given verbal consent for video visit?: Yes   How would you like to obtain your AVS?: Zenring  AVS SmartPhrase [PsychAVS] has been placed in 'Patient Instructions': Yes      Video- Visit Details  Type of service:  video visit for mental health treatment.  Time of service:    Date:  2/25/22    Video Start Time:  9:00AM        Video End Time:  9:30AM    Reason for video visit: COVID-19  Originating Site (patient location):  Patient's home  Distant Site (provider location):  [facility]  Mode of Communication:  Video Conference via [facility]  Psychiatry Clinic  MEDICAL PROGRESS NOTE     CARE TEAM:  PCP- Clinic Provider, Psychotherapist- None    Jose Francisco Sommers is a 30 year old who prefers the name Manuel and uses pronouns he, him.      DIAGNOSIS     1) Schizoaffective disorder, Bipolar type, most recent episode manic, partial remission (improving)  2) ADHD, predominantly inattentive presentation     ASSESSMENT     TODAY: Manuel reports that the planned decrease to Zyprexa 15mg and increase of Vraylar to 3mg daily has gone well. He does report that he has been groggy " in the morning and is sleeping a lot, discussed that this will hopefully improve as we continue to cross-taper his medications. He and his mother denied any symptoms concerning for lauren or psychosis.    Manuel continues to report significant difficulty with concentration and focus that is impairing his ability to engage in activities like reading. He did want to try Focalin today as he has not tried this in the past. Reiterated that having realistic expectations for these medications going forward would be helpful as it is unlikely that a stimulant medication will 100% improve his symptoms - Manuel disagreed with this and stated he preferred to be optimistic for 100% improvement.    Future Considerations:  - Weight management referral  - trial of cariprazine, ziprasidone, or depakote   - Olanzapine + samidorphan (Lybalvi)  - Metadate    [place] was not [place] today, no use of controlled substances     PLAN                                                                                                                1) Meds-  - Continue Lithium 1,200 mg HS  - Continue olanzapine 15mg HS  - Continue Vraylar 3mg daily  - Start Focalin 2.5mg daily for 1 week, increase to 5mg daily w/ MyChart check in    Prescribed by PCP  - Propranolol ER 60mg qD    2) Psychotherapy- none, encouraged    3) Next due-  Labs- AP labs due 7/2022, elevated lipids 7/2021 (message sent to PCP for f/u)  EKG- as needed  Rating scales- N/A    4) Referrals-  Weight management in the future  Sleep management     5) Dispo- RTC in 4 weeks, MyChart check in ~7 days       PERTINENT BACKGROUND                           [most recent eval 07/11/21]      Jose Francisco Sommers first experienced mental health issues in grade school and has received treatment for ADHD, depression, psychosis and substance use. Notably, this patient had FEP in 2015 in the setting of cannabis abuse and has been stable on olanzapine and lithium since that time. He has never lived  independently and has only been slowly developing insight into his illness and past symptoms. GeneSight testing was completed noting patient is a poor CYP2D6 metabolizer. Past records from Dr. Mike Powers were received in summer 2018 to assist with diagnostic clarity, however they were hand written notes that were illegible though accompanied by typed neuropsychology consult from [facility]. This documentation was unable to support a diagnosis of bipolar disorder at that time though noted potential Asperger's diagnosis and ADHD. Given that there has been evidence for psychotic signs and symptoms outside the context of mood episodes, a schizophrenia spectrum disorder such as schizoaffective disorder diagnosis seems most accurate. He has had two recent hospitalizations for decompensated psychosis in the setting of decreasing his olanzapine dose.    Psych pertinent item history includes psychosis [sxs include disorganization, possible catatonia], mutiple psychotropic trials, psych hosp (<3) and SUBSTANCE USE: alcohol and cannabis     SUBJECTIVE     - fractured a rip when he was smoking  - pretty okay  - feeling fine physically otherwise    - started Vrayler  - feeling sluggish after increasing Vraylar to 3mg  - more withdrawn    - concentration still difficult, can't read a book  - optimistic for 100% improvement in his symptoms with the right medication  - wants to try focalin    - wants to leave vraylar and zyprexa alone today    - mood doing okay  - feels like thoughts are linear  - waking up feeling feeling groggy, sleeping long hours  - spends a lot of time pacing, hard time staying still, feels like this is worse    - not bothersome    - no safety concerns    RECENT PSYCH ROS:   Depression:  none  Elevated:  none  Psychosis:  none  Anxiety:  none  Trauma Related:  none  Sleep: no change, see HPI  Other: N/A    Adverse Effects: increased appetite  Pertinent Negative Symptoms: No psychosis or lauren  Recent  "Substance Use:     Tobacco - see HPI  No alcohol or cannabis     SOCIAL HISTORY                                 pt reported     Social Hx:  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     Partner/ - single  Children- None      Living situation- lives with parents in [place]  Social/ Spiritual Support- family, friends, [employer] [facility]     Feels Safe at Home- yes   Legal- None       PSYCH and SUBSTANCE USE Critical Summary Points since July 2020 7/12/21 - started Seroquel taper  8/2/21 - continue Seroquel taper  9/13/21 - started Belsomra for sleep  10/8/21 - Vyvanse started  11/5/21 - Vyvanse discontinued, Adderall started  12/10/21 - Adderall discontinued, Ritalin started  1/14/22 - Ritalin and Belsomra discontinued by patient, Vraylar started, Zyprexa decreased  2/25/22 - Focalin started       PAST MED TRIALS     Prozac 20 mg - limited response, less irritable, first episode of \"rage\"  Zoloft  Wellbutrin - did not like    Risperdal up to 5 mg - some confusion, slow processing, withdrawn, ?\"catatonia\", panic attacks  Zyprexa 5 mg less anxious overall improvement  Lithium - calmer, overall better  Latuda 20 mg - severe fatigue and depression  Seroquel - weight gain, daytime drowsiness  Abilify ?25 mg - OK when QOD, but irritable and agitated on every day    Lamictal    Concerta 18 mg - not helpful for focus, no improvement in motivation, mom thought this helped with insight  Provigil 150 mg - Stimulants \"almost killed him\" triggered alcohol binges  Adderall 10-20 mg - improved concentration, but \"cold personality\" and perseveration, retrialed 2021 with initial benefit  Strattera  Intuniv    Synthroid 75 mcg - added to help with mood.  Gabapentin  Clonazepam 0.5-1.5 mg - calming, helped with sleep and \"catatonia\"  Xanax  Trazodone - worsened mood    Has not tried: Focalin     MEDICAL HISTORY and ALLERGY     ALLERGIES: Sulfa drugs    Patient Active Problem List   Diagnosis     " Schizoaffective disorder, bipolar type (H)     Hx of psychiatric care     Elevated liver enzymes     Fatty liver     High blood triglycerides     History of cannabis abuse     History of cocaine abuse (H)     Psychosis (H)     Elevated blood pressure reading without diagnosis of hypertension     Constipation     No past medical history on file.     MEDICAL REVIEW OF SYSTEMS   Contraception- not discussed    A comprehensive review of systems was performed and is negative other than noted in the HPI.     MEDICATIONS     Current Outpatient Medications   Medication Sig Dispense Refill     lithium ER (LITHOBID) 300 MG CR tablet Take 4 tablets (1,200 mg) by mouth At Bedtime 120 tablet 1     metFORMIN (GLUCOPHAGE-XR) 500 MG 24 hr tablet Take 500mg twice a day for 14 days then increase to 500mg in the morning and 1000mg at bedtime 75 tablet 1     OLANZapine (ZYPREXA) 10 MG tablet Take 1 tablet (10 mg) by mouth At Bedtime 30 tablet 1     OLANZapine (ZYPREXA) 5 MG tablet Take 1 tablet (5 mg) by mouth At Bedtime 30 tablet 1     propranolol ER (INDERAL LA) 60 MG 24 hr capsule Take 1 capsule (60 mg) by mouth daily 90 capsule 3     QUEtiapine (SEROQUEL) 200 MG tablet Take 0.5 tablets (100 mg) by mouth every morning And 1 tab (100 mg) with 1 tab (400 mg) for bedtime dose of 600 mg 30 tablet 1     QUEtiapine (SEROQUEL) 400 MG tablet Take 1 tablet (400 mg) by mouth At Bedtime Along with 1 tab (200mg) for bedtime dose of 600mg 30 tablet 1     triamcinolone (KENALOG) 0.1 % external cream Apply topically 2 times daily 30 g 2      VITALS   There were no vitals taken for this visit.    MENTAL STATUS EXAM     Alertness: alert   Appearance: appropriately groomed  Behavior/Demeanor: cooperative, with good eye contact   Speech: regular  Language: intact  Psychomotor: normal or unremarkable  Mood: description consistent with euthymia  Affect: restricted, more bright than previous; congruent to: mood- yes, content- yes  Thought  Process/Associations: unremarkable  Thought Content:  Reports none;    Perception:  Reports none;  Denies hallucinations  Insight: adequate to good  Judgment: good  Cognition: does  appear grossly intact; formal cognitive testing was not done  Gait and Station: N/A (telehealth)     LABS and DATA     PHQ9 TODAY = not collected  PHQ 12/26/2019 11/4/2020 2/8/2021   PHQ-9 Total Score 3 12 10   Q9: Thoughts of better off dead/self-harm past 2 weeks Not at all Not at all Not at all     FERNANDO-7 SCORE 2/8/2021 12/10/2021 12/10/2021   Total Score 3 (minimal anxiety) - 10 (moderate anxiety)   Total Score 3 10 10       Recent Labs   Lab Test 07/26/21  1339 12/04/20  1414 11/13/20  0758 06/01/20  1339   GLC 85 80   < >  --    A1C 4.9  --   --  4.7    < > = values in this interval not displayed.     Recent Labs   Lab Test 07/26/21  1339 11/13/20  0758   CHOL 247* 111   TRIG 554* 211*   * 20   HDL 42 49     Recent Labs   Lab Test 07/26/21  1339 12/04/20  1414   AST 36 28   ALT 68 46   ALKPHOS 70 78     Recent Labs   Lab Test 07/26/21  1339 12/04/20  1414 11/13/20  0758 06/01/20  1339   WBC 8.0 11.0   < > 7.5   ANEU  --  8.0  --  4.8   HGB 14.7 14.3   < > 15.1    285   < > 265    < > = values in this interval not displayed.     Recent Labs   Lab Test 07/26/21  1339 12/16/20  0718 12/11/20  0726   LITHIUM 0.6 0.84 0.58*     Recent Labs   Lab Test 07/26/21  1339 12/04/20  1414   CR 1.12 1.11   GFRESTIMATED 88 89    138   POTASSIUM 3.7 4.1   ADONAY 9.4 9.4     Recent Labs   Lab Test 07/26/21  1341 11/08/19  1024   SG 1.015 1.015     Recent Labs   Lab Test 07/26/21  1339 12/04/20  1414   TSH 2.22 2.01     Recent Labs   Lab Test 07/26/21  1339 12/04/20  1414 11/13/20  0758 06/01/20  1339   WBC 8.0 11.0   < > 7.5   ANEU  --  8.0  --  4.8    < > = values in this interval not displayed.     ECG 11/20 QTc = 436ms     PSYCHOTROPIC DRUG INTERACTIONS     OLANZAPINE + LITHIUM + VRAYLAR: CNS depression    OLANZAPINE/VRAYLAR +  LITHIUM Lithium may enhance the neurotoxic effect of Antipsychotic Agents.    OLANZAPINE: QT-prolonging     OLANZAPINE + VRAYLAR  + FOCALIN: Serotonergic     - Antipsychotic agents may enhance the toxic effects of Focalin    MANAGEMENT:  Monitoring for adverse effects, routine vitals, routine labs and periodic EKGs     RISK STATEMENT for SAFETY     Jose Francisco Sommers did not appear to be an imminent safety risk to self or others.    TREATMENT RISK STATEMENT: The risks, benefits, alternatives and potential adverse effects have been discussed and are understood by the pt. The pt understands the risks of using street drugs or alcohol. There are no medical contraindications, the pt agrees to treatment with the ability to do so. The pt knows to call the clinic for any problems or to access emergency care if needed.  Medical and substance use concerns are documented above.  Psychotropic drug interaction check was done, including changes made today.    PROVIDER: Kristie Julian MD    Patient staffed with Dr. Ventura who will sign the note.  Supervisor is Dr. Tay.

## 2022-02-02 ENCOUNTER — TELEPHONE (OUTPATIENT)
Dept: PSYCHIATRY | Facility: CLINIC | Age: 32
End: 2022-02-02
Payer: COMMERCIAL

## 2022-02-02 ENCOUNTER — OFFICE VISIT (OUTPATIENT)
Dept: FAMILY MEDICINE | Facility: CLINIC | Age: 32
End: 2022-02-02
Payer: COMMERCIAL

## 2022-02-02 VITALS
HEART RATE: 55 BPM | RESPIRATION RATE: 16 BRPM | OXYGEN SATURATION: 96 % | SYSTOLIC BLOOD PRESSURE: 141 MMHG | TEMPERATURE: 97.6 F | WEIGHT: 270 LBS | DIASTOLIC BLOOD PRESSURE: 83 MMHG | BODY MASS INDEX: 36.12 KG/M2

## 2022-02-02 DIAGNOSIS — S22.31XA CLOSED FRACTURE OF ONE RIB OF RIGHT SIDE, INITIAL ENCOUNTER: Primary | ICD-10-CM

## 2022-02-02 PROCEDURE — 99214 OFFICE O/P EST MOD 30 MIN: CPT | Performed by: PHYSICIAN ASSISTANT

## 2022-02-02 RX ORDER — CYCLOBENZAPRINE HCL 5 MG
10 TABLET ORAL 3 TIMES DAILY PRN
Qty: 30 TABLET | Refills: 0 | Status: SHIPPED | OUTPATIENT
Start: 2022-02-02 | End: 2022-02-09

## 2022-02-02 NOTE — TELEPHONE ENCOUNTER
[facility] Call Center    Phone Message    May a detailed message be left on voicemail: yes     Reason for Call: Medication Question or concern regarding medication   Prescription Clarification  Name of Medication: Vraylar  Prescribing Provider: Eliel   Pharmacy: [id number] - [facility] [address]     What on the order needs clarification? Pt's mom calling to speak with someone about pt's upcoming appt- mom states pt is supposed to start Vraylar and prior authorization hasn't been approved. Wanting to check the status of prior auth as well.           Action Taken: Message routed to:  Other: nursing pool    Travel Screening: Not Applicable

## 2022-02-02 NOTE — TELEPHONE ENCOUNTER
Called the pharmacy to verify that a PA is needed for Vraylar. The pharmacist confirmed that one is needed, but is not sure what the reason is (formulary exception, quantity limit). The denial indicated that a call should be placed to [email or web address] at [phone].     [facility] A prior authorization can be submitted via [email or web address]    ID: [id number]  Grp #: [id number]  BIN #: [id number]  PCN: [id number]    Submitted PA request via GreenNote.  Key: [id number]    Chart notes are required for the PA to be reviewed and approved.   Fax to [phone]    Will request assistance from clinic staff in printing out visit nite from 1/14/22 and faxing to [email or web address].

## 2022-02-02 NOTE — TELEPHONE ENCOUNTER
Case #: [id number]    VRAYLAR CAPSULE approved from 01/02/2022 to 02/02/2023.    Called [facility] to let them know that the PA has been approved. They were able to run the prescription through and will get the medication ordered and can get it filled by tomorrow.     Called patient's Mom, Ember, at [phone]. She will pick the medication up tomorrow. She said that the appointment on Friday was to follow up on how he is doing after three weeks on Vraylar, so she requested that the appointment be rescheduled. Scheduled a video appointment for February 25 at 10:30 am.    Routed to Dr. Julian for FYI.

## 2022-02-03 NOTE — PATIENT INSTRUCTIONS
Patient Education     Chest Wall Pain: Costochondritis    The chest pain that you have had today is caused by costochondritis. This condition is caused by an inflammation of the cartilage joining your ribs to your breastbone. It's not caused by heart or lung problems. Your healthcare team has made sure that the chest pain you feel is not from a life threatening cause of chest pain such as heart attack, collapsed lung, blood clot in the lung, tear in the aorta, or esophageal rupture. The inflammation may have been brought on by a blow to the chest, lifting heavy objects, intense exercise, or an illness that made you cough and sneeze a lot. It often occurs during times of emotional stress. It can be painful, but it's not dangerous. It usually goes away in 1 to 2 weeks. But it may happen again. Rarely, a more serious condition may cause symptoms similar to costochondritis. That s why it s important to watch for the warning signs listed below.   Home care  Follow these guidelines when caring for yourself at home:    If you feel that emotional stress is a cause of your condition, try to figure out the sources of that stress. It may not be obvious. Learn ways to deal with the stress in your life. This can include regular exercise, muscle relaxation, meditation, or simply taking time out for yourself.    You may use acetaminophen, ibuprofen, or naproxen to control pain, unless another pain medicine was prescribed. If you have liver or kidney disease or ever had a stomach ulcer, talk with your healthcare provider before using these medicines.    You can also help ease pain by using a hot, wet compress or heating pad. Use this with or without a medicated skin cream that helps relieves pain.    Do stretching exercise as advised by your provider. Typically rest is beneficial for the first few days. Avoid strenuous activity that worsens the pain.    Take any prescribed medicines as directed.  Follow-up care  Follow up with your  healthcare provider, or as advised.   When to seek medical advice  Call your healthcare provider right away if any of these occur:    A change in the type of pain. Call if it feels different, becomes more serious, lasts longer, or spreads into your shoulder, arm, neck, jaw, or back.    Shortness of breath or pain gets worse when you breathe    Weakness, dizziness, or fainting    Cough with dark-colored sputum (phlegm) or blood    Abdominal pain    Dark red or black stools    Fever of 100.4 F (38 C) or higher, or as directed by your healthcare provider  Jonh last reviewed this educational content on 6/1/2019 2000-2021 [employer]. All rights reserved. This information is not intended as a substitute for professional medical care. Always follow your healthcare professional's instructions.

## 2022-02-03 NOTE — PROGRESS NOTES
Assessment & Plan:      Problem List Items Addressed This Visit     None      Visit Diagnoses     Closed fracture of one rib of right side, initial encounter    -  Primary    Relevant Medications    acetaminophen-codeine (TYLENOL #3) 300-30 MG tablet    cyclobenzaprine (FLEXERIL) 5 MG tablet    Other Relevant Orders    XR Ribs & Chest Right G/E 3 Views        Medical Decision Making  Patient presents with acute onset pain of the left lower ribs.  Patient did not appear in respiratory distress with clear lung auscultation and good oxygen saturation.  Unfortunately, rib x-ray was ordered on the incorrect side.  Intended to have patient's left ribs x-rayed, however an order for right rib x-ray was done.  Lungs otherwise showed no signs of pneumothorax, but did show moderate atelectasis likely consistent with patient's history of smoking.  There was a coincidental minimally displaced fracture of the right 10th rib, but patient was not complaining of pain on this side.  Patient was sent home after x-ray.  Did leave a message detailing results of the x-ray, apologized for the incorrect order, and did offer to repeat x-ray of the left ribs if patient wanted to return for further evaluation.  Recommended to patient, that we likely do not need a repeat x-ray of the left ribs as patient is not showing any signs of distress, and patient's care would not change if there was a rib fracture on the left side.  Left callback phone number if any further questions.  Sent patient home with a short course of prescription pain relievers and muscle relaxers.  Continue with rest, avoidance of aggravating activities, warm/cold compresses, and acetaminophen as needed.  Discussed signs of worsening symptoms and when to follow-up with PCP if no symptom improvement.     Subjective:      Jose Francisco Sommers is a 31 year old male with history of schizoaffective disorder, bipolar type with current use of lithium, here for evaluation of left lower  "rib pain.  Patient has had coughing spells for 1 to 2 weeks.  Yesterday he was coughing and noticed a \"pop\" along the left lower ribs.  Since then he has had significant discomfort and tenderness over the ribs.  Pain is increased with deep breaths and coughing.  Patient otherwise denies feeling short of breath.  Pain keeps patient awake at night.  Patient has tried doses of Tylenol with no relief.     The following portions of the patient's history were reviewed and updated as appropriate: allergies, current medications, and problem list.     Review of Systems  Pertinent items are noted in HPI.    Allergies  Allergies   Allergen Reactions     Sulfa Drugs Rash       Family History   Problem Relation Age of Onset     Diabetes Maternal Grandfather        Social History     Tobacco Use     Smoking status: Current Every Day Smoker     Packs/day: 2.00     Types: Cigarettes     Start date: 11/22/2017     Smokeless tobacco: Never Used   Substance Use Topics     Alcohol use: No     Alcohol/week: 0.0 standard drinks        Objective:      BP (!) 141/83 (BP Location: Right arm, Patient Position: Sitting, Cuff Size: Adult Large)   Pulse 55   Temp 97.6  F (36.4  C) (Oral)   Resp 16   Wt 122.5 kg (270 lb)   SpO2 96%   BMI 36.12 kg/m    Chest - clear to auscultation, no wheezes, rales or rhonchi, symmetric air entry, non-toxic  Chest wall - tenderness to palpation of the lower left anterior lateral ribs; no obvious bony deformity  Heart - normal rate, regular rhythm, normal S1, S2, no murmurs, rubs, clicks or gallops  Skin - normal coloration and turgor, no rashes, no suspicious skin lesions noted; no crepitus or ecchymosis    The use of Dragon/ClearSaleing dictation services was used to construct the content of this note; any grammatical errors are non-intentional. Please contact the author directly if you are in need of any clarification.     "

## 2022-02-22 NOTE — TELEPHONE ENCOUNTER
PRIOR AUTHORIZATION DENIED    Medication: modafinil (PROVIGIL) 200 MG tablet-DENIED    Denial Date: 11/2/2019    Denial Rational:           Appeal Information:        no

## 2022-02-25 ENCOUNTER — VIRTUAL VISIT (OUTPATIENT)
Dept: PSYCHIATRY | Facility: CLINIC | Age: 32
End: 2022-02-25
Attending: PSYCHIATRY & NEUROLOGY
Payer: COMMERCIAL

## 2022-02-25 DIAGNOSIS — F90.9 ATTENTION DEFICIT HYPERACTIVITY DISORDER (ADHD), UNSPECIFIED ADHD TYPE: Primary | ICD-10-CM

## 2022-02-25 DIAGNOSIS — F25.0 SCHIZOAFFECTIVE DISORDER, BIPOLAR TYPE (H): ICD-10-CM

## 2022-02-25 PROCEDURE — 99214 OFFICE O/P EST MOD 30 MIN: CPT | Mod: GC | Performed by: STUDENT IN AN ORGANIZED HEALTH CARE EDUCATION/TRAINING PROGRAM

## 2022-02-25 RX ORDER — OLANZAPINE 5 MG/1
5 TABLET ORAL AT BEDTIME
Qty: 30 TABLET | Refills: 1 | Status: SHIPPED | OUTPATIENT
Start: 2022-02-25 | End: 2022-03-15

## 2022-02-25 RX ORDER — LITHIUM CARBONATE 300 MG/1
1200 TABLET, FILM COATED, EXTENDED RELEASE ORAL AT BEDTIME
Qty: 120 TABLET | Refills: 2 | Status: SHIPPED | OUTPATIENT
Start: 2022-02-25 | End: 2022-03-15

## 2022-02-25 RX ORDER — DEXMETHYLPHENIDATE HYDROCHLORIDE 5 MG/1
TABLET ORAL
Qty: 30 TABLET | Refills: 0 | Status: SHIPPED | OUTPATIENT
Start: 2022-02-25 | End: 2022-04-22

## 2022-02-25 RX ORDER — OLANZAPINE 10 MG/1
TABLET ORAL
Qty: 30 TABLET | Refills: 1 | Status: SHIPPED | OUTPATIENT
Start: 2022-02-25 | End: 2022-04-22

## 2022-02-25 NOTE — PATIENT INSTRUCTIONS
**For crisis resources, please see the information at the end of this document**   Patient Education    Thank you for coming to the [facility] MENTAL HEALTH & ADDICTION [place] CLINIC.    Lab Testing:  If you had lab testing today and your results are reassuring or normal they will be mailed to you or sent through [facility] within 7 days. If the lab tests need quick action we will call you with the results. The phone number we will call with results is # [phone]. If this is not the best number please call our clinic and change the number.     Medication Refills:  If you need any refills please call your pharmacy and they will contact us. Our fax number for refills is [phone]. Please allow three business days for refill processing.   If you need to change to a different pharmacy, please contact the new pharmacy directly. The new pharmacy will help you get your medications transferred.     Contact Us:  Please call [phone] during business hours (8-5:00 M-F).  If you have medication related questions after clinic hours, or on the weekend, please call [phone].    Financial Assistance [phone]  Medical Records [phone]       MENTAL HEALTH CRISIS RESOURCES:  For a emergency help, please call 911 or go to the nearest Emergency Department.     Emergency Walk-In Options:   EmPATH Unit @ [facility] ([place]): [phone] - Specialized mental health emergency area designed to be calming  [facility] ([place]): [phone]  [facility] ([place]): [phone]  [facility]): [phone]    County Crisis Information:   [place]: [phone]  [place]: [phone]  Fredi (FRIEDA) - Adult: [phone]     Child: [phone]  [place]ugen[place] - Adult: [phone]     Child: [phone]  [place]: [phone]  List of all [facility] resources:    https://[place].gov/dhs/people-we-serve/adults/health-care/mental-health/resources/crisis-contacts.jsp    National Crisis Information:   Crisis Text Line: Text  [place]  to 114167  National Suicide Prevention Lifeline: 5-933-671-TALK (1-652.977.4729)       For online chat options, visit https://suicidepreventionlifeline.org/chat/  Poison Control Center: 6-081-605-9896  Trans Lifeline: 3-898-134-8971 - Hotline for transgender people of all ages  The Luis Project: 2-884-462-1915 - Hotline for LGBT youth     For Non-Emergency Support:   Fast Tracker: Mental Health & Substance Use Disorder Resources -   https://www.[employer].org/

## 2022-02-25 NOTE — PROGRESS NOTES
Jose Francisco Sommers is a 31 year old who has consented to receive services via billable video visit.      Pt will join video visit via: Stream[employer] System  If there are problems joining the visit, send backup video invite via: Send to preferred e-mail: [email or web address]      Originating Location (patient location): Patient's home  Distant Location (provider location): [facility] MENTAL HEALTH & ADDICTION [place] CLIN[facility]    Will anyone else be joining the video visit? Yes. Mom.    How would you prefer to obtain AVS?: Aj

## 2022-03-14 NOTE — PROGRESS NOTES
"VIDEO VISIT  Jose Francisco Sommers is a 30 year old patient who is being evaluated via a billable video visit.      The patient has been notified of following:   \"We have found that certain health care needs can be provided without the need for an in-person physical exam. This service lets us provide the care you need with a video conversation. If a prescription is necessary we can send it directly to your pharmacy. If lab work is needed we can place an order for that and you can then stop by our lab to have the test done at a later time. Insurers are generally covering virtual visits as they would in-office visits so billing should not be different than normal.  If for some reason you do get billed incorrectly, you should contact the billing office to correct it and that number is in the AVS .    Patient has given verbal consent for video visit?: Yes   How would you like to obtain your AVS?: [employer]  AVS SmartPhrase [PsychAVS] has been placed in 'Patient Instructions': Yes      Video- Visit Details  Type of service:  video visit for mental health treatment.  Time of service:    Date:  3/15/22    Video Start Time:  9:00AM        Video End Time:  9:30AM    Reason for video visit: COVID-19  Originating Site (patient location):  Patient's home  Distant Site (provider location):  [facility]  Mode of Communication:  Video Conference via [facility]  Psychiatry Clinic  MEDICAL PROGRESS NOTE     CARE TEAM:  PCP- Clinic Provider, Psychotherapist- None    Jose Francisco Sommers is a 30 year old who prefers the name Manuel and uses pronouns he, him.      DIAGNOSIS     1) Schizoaffective disorder, Bipolar type, most recent episode manic, partial remission (improving) with current psychotic features  2) ADHD, predominantly inattentive presentation     ASSESSMENT     TODAY: Manuel and his mother were present for his appointment. Manuel reports that he discontinued Vraylar between " "appointments because he was experiencing side effects (not being able to fall asleep, feeling like he had a \"headache without the ache,\" and having thoughts that were \"not respectful\" - he denied that these were intrusive or related to harming self/others, he did not share specific details other than this did not feel like his baseline) and he did not perceive it to be beneficial. He increased his Zyprexa to 20mg and requested to continue this which his mother was in support of. Discussed that we initially started tapering Zyprexa due to metabolic concerns. Manuel reported that he was less concerned about this today and had taken steps to behaviorally manage his weight (e.g. cut out sugar).  We also discussed that it is possible Vraylar was not yet adequately dosed for an ideal assessment and that we generally recommend not making major medication changes every several weeks. No acute concerns today including reporting no symptoms of lauren, psychosis, or suicidal ideation.    Manuel has not started taking Focalin as he wanted to wait until his Zyprexa was increased. Discussed not starting this medication until after he checks in at his next appointment which he was amenable to.    Future Considerations:  - Weight management referral  - trial of ziprasidone or depakote   - retrial of cariprazine  - Olanzapine + samidorphan (Lybalvi)  - Metadate    [place]NPMP was not checked today     PLAN                                                                                                                1) Meds-  - Continue Lithium 1,200 mg HS  - Increase olanzapine to 20mg HS  - Discontinue Vraylar 3mg daily (self-discontinued between appointments)  - Hold off on starting Focalin until next appointment    Prescribed by PCP  - Propranolol ER 60mg qD    2) Psychotherapy- none, encouraged    3) Next due-  Labs- AP labs due 7/2022, elevated lipids 7/2021 (message sent to PCP for f/u)  EKG- as needed  Rating scales- N/A    4) " Referrals-  Weight management in the future  Sleep management     5) Dispo- RTC in 4 weeks       PERTINENT BACKGROUND                           [most recent eval 07/11/21]      Jose Francisco Sommers first experienced mental health issues in grade school and has received treatment for ADHD, depression, psychosis and substance use. Notably, this patient had FEP in 2015 in the setting of cannabis abuse and has been stable on olanzapine and lithium since that time. He has never lived independently and has only been slowly developing insight into his illness and past symptoms. GeneSight testing was completed noting patient is a poor CYP2D6 metabolizer. Past records from Dr. Mike Powers were received in summer 2018 to assist with diagnostic clarity, however they were hand written notes that were illegible though accompanied by typed neuropsychology consult from [facility]. This documentation was unable to support a diagnosis of bipolar disorder at that time though noted potential Asperger's diagnosis and ADHD. Given that there has been evidence for psychotic signs and symptoms outside the context of mood episodes, a schizophrenia spectrum disorder such as schizoaffective disorder diagnosis seems most accurate. He has had two recent hospitalizations for decompensated psychosis in the setting of decreasing his olanzapine dose.    Psych pertinent item history includes psychosis [sxs include disorganization, possible catatonia], mutiple psychotropic trials, psych hosp (<3) and SUBSTANCE USE: alcohol and cannabis     SUBJECTIVE     - stopped taking Vraylar  - went back to Zyprexa 20mg    - had a feeling in his head like he was thinking too hard  - difficulty falling asleep  - having thoughts he didn't like, then had to move his hands   - no suicide thoughts or homicidal thoughts   - went away with increase in olanzapine  - olanzapine works, 20mg helps stabilize    - falls asleep more easily at 25mg   - less difficulty thinking and  "doing things  - goal: more reading, more interactive, more able to focus, more motivated   - mood has been pretty good  - no symptoms or psychosis or lauren    - started doing no sugar, feels like this is easier on a higher dose on olanzapine   - has not noticed   - stopped smoking, money was a big motivation, felt like meds work better    - only took Focalin once  - okay holding off on starting this    RECENT PSYCH ROS:   See HPI    Adverse Effects: increased appetite  Pertinent Negative Symptoms: No psychosis or lauren  Recent Substance Use:     Tobacco - see HPI  No alcohol or cannabis     SOCIAL HISTORY                                 pt reported     Social Hx:  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     Partner/ - single  Children- None      Living situation- lives with parents in [place]  Social/ Spiritual Support- family, friends, Yazdanism hemalatha     Feels Safe at Home- yes   Legal- None       PSYCH and SUBSTANCE USE Critical Summary Points since July 2020 7/12/21 - started Seroquel taper  8/2/21 - continue Seroquel taper  9/13/21 - started Belsomra for sleep  10/8/21 - Vyvanse started  11/5/21 - Vyvanse discontinued, Adderall started  12/10/21 - Adderall discontinued, Ritalin started  1/14/22 - Ritalin and Belsomra discontinued by patient, Vraylar started, Zyprexa decreased  2/25/22 - Focalin started (patient did not end up taking)  3/15/22 - Vraylar discontinued, Zyprexa increased on his own accord between sessions to 20mg       PAST MED TRIALS     Prozac 20 mg - limited response, less irritable, first episode of \"rage\"  Zoloft  Wellbutrin - did not like    Risperdal up to 5 mg - some confusion, slow processing, withdrawn, ?\"catatonia\", panic attacks  Zyprexa 5 mg less anxious overall improvement  Lithium - calmer, overall better  Latuda 20 mg - severe fatigue and depression  Seroquel - weight gain, daytime drowsiness  Abilify ?25 mg - OK when QOD, but irritable " "and agitated on every day    Lamictal    Concerta 18 mg - not helpful for focus, no improvement in motivation, mom thought this helped with insight  Provigil 150 mg - Stimulants \"almost killed him\" triggered alcohol binges  Adderall 10-20 mg - improved concentration, but \"cold personality\" and perseveration, retrialed 2021 with initial benefit  Strattera  Intuniv    Synthroid 75 mcg - added to help with mood.  Gabapentin  Clonazepam 0.5-1.5 mg - calming, helped with sleep and \"catatonia\"  Xanax  Trazodone - worsened mood    Has not tried: Focalin     MEDICAL HISTORY and ALLERGY     ALLERGIES: Sulfa drugs    Patient Active Problem List   Diagnosis     Schizoaffective disorder, bipolar type (H)     Hx of psychiatric care     Elevated liver enzymes     Fatty liver     High blood triglycerides     History of cannabis abuse     History of cocaine abuse (H)     Psychosis (H)     Elevated blood pressure reading without diagnosis of hypertension     Constipation     No past medical history on file.     MEDICAL REVIEW OF SYSTEMS   Contraception- not discussed    A comprehensive review of systems was performed and is negative other than noted in the HPI.     MEDICATIONS     Current Outpatient Medications   Medication     dexmethylphenidate (FOCALIN) 5 MG tablet     lithium ER (LITHOBID) 300 MG CR tablet     OLANZapine (ZYPREXA) 10 MG tablet     OLANZapine (ZYPREXA) 20 MG tablet     OLANZapine (ZYPREXA) 5 MG tablet     propranolol ER (INDERAL LA) 60 MG 24 hr capsule     triamcinolone (KENALOG) 0.1 % external cream     No current facility-administered medications for this visit.      VITALS   There were no vitals taken for this visit.    MENTAL STATUS EXAM     Alertness: alert   Appearance: appropriately groomed  Behavior/Demeanor: cooperative, with good eye contact   Speech: regular  Language: intact  Psychomotor: normal or unremarkable  Mood: description consistent with euthymia  Affect: restricted; congruent to: mood- yes, " content- yes  Thought Process/Associations: unremarkable  Thought Content:  Reports none;    Perception:  Reports none;  Denies hallucinations  Insight: adequate to good  Judgment: good  Cognition: does  appear grossly intact; formal cognitive testing was not done  Gait and Station: N/A (telehealth)     LABS and DATA     PHQ9 TODAY = 12  PHQ 2/8/2021 12/10/2021 3/15/2022   PHQ-9 Total Score 10 24 12   Q9: Thoughts of better off dead/self-harm past 2 weeks Not at all Not at all Not at all       FERNANDO-7 SCORE 2/8/2021 12/10/2021 12/10/2021   Total Score 3 (minimal anxiety) - 10 (moderate anxiety)   Total Score 3 10 10       Recent Labs   Lab Test 07/26/21  1339 12/04/20  1414 11/13/20  0758 06/01/20  1339   GLC 85 80   < >  --    A1C 4.9  --   --  4.7    < > = values in this interval not displayed.     Recent Labs   Lab Test 07/26/21  1339 11/13/20  0758   CHOL 247* 111   TRIG 554* 211*   * 20   HDL 42 49     Recent Labs   Lab Test 07/26/21  1339 12/04/20  1414   AST 36 28   ALT 68 46   ALKPHOS 70 78     Recent Labs   Lab Test 07/26/21  1339 12/04/20  1414 11/13/20  0758 06/01/20  1339   WBC 8.0 11.0   < > 7.5   ANEU  --  8.0  --  4.8   HGB 14.7 14.3   < > 15.1    285   < > 265    < > = values in this interval not displayed.     Recent Labs   Lab Test 07/26/21  1339 12/16/20  0718 12/11/20  0726   LITHIUM 0.6 0.84 0.58*     Recent Labs   Lab Test 07/26/21  1339 12/04/20  1414   CR 1.12 1.11   GFRESTIMATED 88 89    138   POTASSIUM 3.7 4.1   ADONAY 9.4 9.4     Recent Labs   Lab Test 07/26/21  1341 11/08/19  1024   SG 1.015 1.015     Recent Labs   Lab Test 07/26/21  1339 12/04/20  1414   TSH 2.22 2.01     Recent Labs   Lab Test 07/26/21  1339 12/04/20  1414 11/13/20  0758 06/01/20  1339   WBC 8.0 11.0   < > 7.5   ANEU  --  8.0  --  4.8    < > = values in this interval not displayed.     ECG 11/20 QTc = 436ms     PSYCHOTROPIC DRUG INTERACTIONS     OLANZAPINE + LITHIUM + VRAYLAR: CNS  depression    OLANZAPINE/VRAYLAR + LITHIUM Lithium may enhance the neurotoxic effect of Antipsychotic Agents.    OLANZAPINE: QT-prolonging     OLANZAPINE + VRAYLAR  + FOCALIN: Serotonergic     - Antipsychotic agents may enhance the toxic effects of Focalin    MANAGEMENT:  Monitoring for adverse effects, routine vitals, routine labs and periodic EKGs     RISK STATEMENT for SAFETY     Jose Francisco Sommers did not appear to be an imminent safety risk to self or others.    TREATMENT RISK STATEMENT: The risks, benefits, alternatives and potential adverse effects have been discussed and are understood by the pt. The pt understands the risks of using street drugs or alcohol. There are no medical contraindications, the pt agrees to treatment with the ability to do so. The pt knows to call the clinic for any problems or to access emergency care if needed.  Medical and substance use concerns are documented above.  Psychotropic drug interaction check was done, including changes made today.    PROVIDER: Kristie Julian MD    Patient staffed with Dr. Tay who will sign the note.  Supervisor is Dr. Tay.

## 2022-03-15 ENCOUNTER — VIRTUAL VISIT (OUTPATIENT)
Dept: PSYCHIATRY | Facility: CLINIC | Age: 32
End: 2022-03-15
Attending: PSYCHIATRY & NEUROLOGY
Payer: COMMERCIAL

## 2022-03-15 DIAGNOSIS — F90.9 ATTENTION DEFICIT HYPERACTIVITY DISORDER (ADHD), UNSPECIFIED ADHD TYPE: Primary | ICD-10-CM

## 2022-03-15 DIAGNOSIS — F25.0 SCHIZOAFFECTIVE DISORDER, BIPOLAR TYPE (H): ICD-10-CM

## 2022-03-15 PROCEDURE — 99214 OFFICE O/P EST MOD 30 MIN: CPT | Mod: GC | Performed by: STUDENT IN AN ORGANIZED HEALTH CARE EDUCATION/TRAINING PROGRAM

## 2022-03-15 RX ORDER — OLANZAPINE 20 MG/1
TABLET ORAL
Qty: 30 TABLET | Refills: 1 | Status: SHIPPED | OUTPATIENT
Start: 2022-03-15 | End: 2022-04-22

## 2022-03-15 RX ORDER — OLANZAPINE 5 MG/1
TABLET ORAL
Qty: 30 TABLET | Refills: 1 | Status: SHIPPED | OUTPATIENT
Start: 2022-03-15 | End: 2022-04-22

## 2022-03-15 RX ORDER — LITHIUM CARBONATE 300 MG/1
1200 TABLET, FILM COATED, EXTENDED RELEASE ORAL AT BEDTIME
Qty: 120 TABLET | Refills: 2 | Status: SHIPPED | OUTPATIENT
Start: 2022-03-15 | End: 2022-04-22

## 2022-03-15 ASSESSMENT — PATIENT HEALTH QUESTIONNAIRE - PHQ9
10. IF YOU CHECKED OFF ANY PROBLEMS, HOW DIFFICULT HAVE THESE PROBLEMS MADE IT FOR YOU TO DO YOUR WORK, TAKE CARE OF THINGS AT HOME, OR GET ALONG WITH OTHER PEOPLE: EXTREMELY DIFFICULT
SUM OF ALL RESPONSES TO PHQ QUESTIONS 1-9: 12
SUM OF ALL RESPONSES TO PHQ QUESTIONS 1-9: 12

## 2022-03-15 NOTE — PROGRESS NOTES
Jose Francisco Sommers is a 31 year old who has consented to receive services via billable video visit.      Pt will join video visit via: Quoc  If there are problems joining the visit, send backup video invite via: Send to preferred e-mail: [email or web address]      Originating Location (patient location): Patient's home  Distant Location (provider location): [facility] MENTAL [facility] & ADDICTION [place] CLINIC    Will anyone else be joining the video visit? Yes. Mom. Sharing same device.    How would you prefer to obtain AVS?: [employer]  Answers for HPI/ROS submitted by the patient on 3/15/2022  If you checked off any problems, how difficult have these problems made it for you to do your work, take care of things at home, or get along with other people?: Extremely difficult  PHQ9 TOTAL SCORE: 12

## 2022-03-15 NOTE — PATIENT INSTRUCTIONS
**For crisis resources, please see the information at the end of this document**   Patient Education    Thank you for coming to the [facility] MENTAL HEALTH & ADDICTION [place] CLINIC.    Lab Testing:  If you had lab testing today and your results are reassuring or normal they will be mailed to you or sent through [employer] within 7 days. If the lab tests need quick action we will call you with the results. The phone number we will call with results is # [phone]. If this is not the best number please call our clinic and change the number.     Medication Refills:  If you need any refills please call your pharmacy and they will contact us. Our fax number for refills is [phone]. Please allow three business days for refill processing.   If you need to change to a different pharmacy, please contact the new pharmacy directly. The new pharmacy will help you get your medications transferred.     Contact Us:  Please call [phone] during business hours (8-5:00 M-F).  If you have medication related questions after clinic hours, or on the weekend, please call [phone].    Financial Assistance [phone]  Medical Records [phone]       MENTAL HEALTH CRISIS RESOURCES:  For a emergency help, please call 911 or go to the nearest Emergency Department.     Emergency Walk-In Options:   EmPATH Unit @ [facility] ([place]): [phone] - Specialized mental health emergency area designed to be calming  [facility] ([place]): [phone]  [facility] ([place]): [phone]  [facility]): [phone]    County Crisis Information:   [place]: [phone]  [place]: [phone]  Fredi (FRIEDA) - Adult: [phone]     Child: [phone]  [place]ugen[place] - Adult: [phone]     Child: [phone]  [place]: [phone]  List of all [facility] resources:    https://[place].gov/dhs/people-we-serve/adults/health-care/mental-health/resources/crisis-contacts.jsp    National Crisis Information:   Crisis Text Line: Text  [place]  to 339111  National Suicide Prevention Lifeline: 0-882-719-TALK (1-935.305.3607)       For online chat options, visit https://suicidepreventionlifeline.org/chat/  Poison Control Center: 7-666-261-6597  Trans Lifeline: 5-080-671-0230 - Hotline for transgender people of all ages  The Luis Project: 3-834-086-0358 - Hotline for LGBT youth     For Non-Emergency Support:   Fast Tracker: Mental Health & Substance Use Disorder Resources -   https://www.yoonen.org/

## 2022-03-16 ASSESSMENT — PATIENT HEALTH QUESTIONNAIRE - PHQ9: SUM OF ALL RESPONSES TO PHQ QUESTIONS 1-9: 12

## 2022-04-20 NOTE — PROGRESS NOTES
"VIDEO VISIT  Jose Francisco Sommers is a 30 year old patient who is being evaluated via a billable video visit.      The patient has been notified of following:   \"We have found that certain health care needs can be provided without the need for an in-person physical exam. This service lets us provide the care you need with a video conversation. If a prescription is necessary we can send it directly to your pharmacy. If lab work is needed we can place an order for that and you can then stop by our lab to have the test done at a later time. Insurers are generally covering virtual visits as they would in-office visits so billing should not be different than normal.  If for some reason you do get billed incorrectly, you should contact the billing office to correct it and that number is in the AVS .    Patient has given verbal consent for video visit?: Yes   How would you like to obtain your AVS?: Pint Please  AVS SmartPhrase [PsychAVS] has been placed in 'Patient Instructions': Yes      Video- Visit Details  Type of service:  video visit for mental health treatment.  Time of service:    Date:  4/22/22    Video Start Time:  9:00AM        Video End Time:  9:30AM    Reason for video visit: COVID-19  Originating Site (patient location):  Patient's home  Distant Site (provider location):  [facility]  Mode of Communication:  Video Conference via [facility]  Psychiatry Clinic  MEDICAL PROGRESS NOTE     CARE TEAM:  PCP- Clinic Provider, Psychotherapist- None    Jose Francisco Sommers is a 30 year old who prefers the name Manuel and uses pronouns he, him.      DIAGNOSIS     1) Schizoaffective disorder, Bipolar type, most recent episode manic, partial remission (improving) with current psychotic features  2) ADHD, predominantly inattentive presentation     ASSESSMENT     TODAY: Manuel and his mother were present for his appointment. Manuel requested to increase his Zyprexa and the start Prozac " "for depression. We discussed what symptoms he was hoping to target, Manuel and his mom agreed that he \"thinks better\" and tends to function better at higher doses of Zyprexa. He did report multiple symptoms of depression today including low mood, little interest/pleasure in activities, and trouble with motivation. He denied any thoughts of suicide or safety concerns. He has been concerned about weight gain for some time, discussed that a higher dose of Zyprexa could be more problematic in this regard however he was still interested in increasing his dose (was open to referral for Weight Management.) Manuel also quit smoking 1-2 months ago and we discussed that this could increase his Zyprexa levels - he denied noticing any difficulty with side effects since discontinuing smoking.    Will discuss addition of Prozac vs starting an alternative agent with Dr. Lyons prior to proceeding per patient request.     Future Considerations:  - Weight management referral  - topamax, naltrexone  - trial of ziprasidone or depakote   - retrial of cariprazine  - Olanzapine + samidorphan (Lybalvi)  - Metadate    [place] was not [place] today     PLAN                                                                                                                1) Meds-  - Continue Lithium 1,200 mg HS  - Increase olanzapine to 30mg HS    Consider starting Prozac pending recommendations from Dr. Lyons    Prescribed by PCP  - Propranolol ER 60mg qD    2) Psychotherapy- none, encouraged    3) Next due-  Labs- AP labs due 7/2022, elevated lipids 7/2021 (message sent to PCP for f/u)  EKG- as needed  Rating scales- N/A    4) Referrals-  Weight management in the future  Sleep management     5) Dispo- RTC in 4 weeks       PERTINENT BACKGROUND                           [most recent eval 07/11/21]      Jose Francisco Sommers first experienced mental health issues in grade school and has received treatment for ADHD, depression, psychosis and substance use. " Notably, this patient had FEP in 2015 in the setting of cannabis abuse and has been stable on olanzapine and lithium since that time. He has never lived independently and has only been slowly developing insight into his illness and past symptoms. GeneSight testing was completed noting patient is a poor CYP2D6 metabolizer. Past records from Dr. Mike Powers were received in summer 2018 to assist with diagnostic clarity, however they were hand written notes that were illegible though accompanied by typed neuropsychology consult from [facility]. This documentation was unable to support a diagnosis of bipolar disorder at that time though noted potential Asperger's diagnosis and ADHD. Given that there has been evidence for psychotic signs and symptoms outside the context of mood episodes, a schizophrenia spectrum disorder such as schizoaffective disorder diagnosis seems most accurate. He has had two recent hospitalizations for decompensated psychosis in the setting of decreasing his olanzapine dose.    Psych pertinent item history includes psychosis [sxs include disorganization, possible catatonia], mutiple psychotropic trials, psych hosp (<3) and SUBSTANCE USE: alcohol and cannabis     SUBJECTIVE     - wanted to talk about Prozac  - weight specialist    - overall pretty good  - quit smoking, hasn't been drinking caffeine   - quit 1 month ago   - not noticing   - weight has been pretty stable   - doing better so it is easier to think about eating better    - feeling like he wants the day to be over, not tired, wants the next day to start   - little interest or pleasure in doing anything   - trouble with motivation   - getting 11-12 hours of sleep, getting enough sleep     - taking Zyprexa earlier too     - would ideally like to sleep less   - not feeling tired during    - no suicide thoughts    - does have some negative thoughts about loss of function  - not a very anxious person   - mom reports that anxiety     - would  "like to increase Zyprexa   - mom reports he thinks better at a higher dose   - Manuel says that he is able to \"do more\"    - prozac in the past with seroquel, mom thinks he had more \"oppositional behavior\" at this time    RECENT PSYCH ROS:   See HPI    Adverse Effects: increased appetite  Pertinent Negative Symptoms: No psychosis or lauren  Recent Substance Use:     Tobacco - see HPI  No alcohol or cannabis     SOCIAL HISTORY                                 pt reported     Social Hx:  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     Partner/ - single  Children- None      Living situation- lives with parents in [place]  Social/ Spiritual Support- family, friends, Protestant hemalatha     Feels Safe at Home- yes   Legal- None       PSYCH and SUBSTANCE USE Critical Summary Points since July 2020 7/12/21 - started Seroquel taper  8/2/21 - continue Seroquel taper  9/13/21 - started Belsomra for sleep  10/8/21 - Vyvanse started  11/5/21 - Vyvanse discontinued, Adderall started  12/10/21 - Adderall discontinued, Ritalin started  1/14/22 - Ritalin and Belsomra discontinued by patient, Vraylar started, Zyprexa decreased  2/25/22 - Focalin started (patient did not end up taking)  3/15/22 - Vraylar discontinued, Zyprexa increased on his own accord between sessions to 20mg  - Zyprexa increased to 25mg between visits   4/22/22 - Zyprexa increased to 30mg     PAST MED TRIALS     Prozac 20 mg - limited response, less irritable, first episode of \"rage\"  Zoloft  Wellbutrin - did not like    Risperdal up to 5 mg - some confusion, slow processing, withdrawn, ?\"catatonia\", panic attacks  Zyprexa 5 mg less anxious overall improvement  Lithium - calmer, overall better  Latuda 20 mg - severe fatigue and depression  Seroquel - weight gain, daytime drowsiness  Abilify ?25 mg - OK when QOD, but irritable and agitated on every day    Lamictal    Concerta 18 mg - not helpful for focus, no improvement in " "motivation, mom thought this helped with insight  Provigil 150 mg - Stimulants \"almost killed him\" triggered alcohol binges  Adderall 10-20 mg - improved concentration, but \"cold personality\" and perseveration, retrialed 2021 with initial benefit  Strattera  Intuniv    Synthroid 75 mcg - added to help with mood.  Gabapentin  Clonazepam 0.5-1.5 mg - calming, helped with sleep and \"catatonia\"  Xanax  Trazodone - worsened mood    Has not tried: Focalin     MEDICAL HISTORY and ALLERGY     ALLERGIES: Sulfa drugs    Patient Active Problem List   Diagnosis     Schizoaffective disorder, bipolar type (H)     Hx of psychiatric care     Elevated liver enzymes     Fatty liver     High blood triglycerides     History of cannabis abuse     History of cocaine abuse (H)     Psychosis (H)     Elevated blood pressure reading without diagnosis of hypertension     Constipation     No past medical history on file.     MEDICAL REVIEW OF SYSTEMS   Contraception- not discussed    A comprehensive review of systems was performed and is negative other than noted in the HPI.     MEDICATIONS     Current Outpatient Medications   Medication     lithium ER (LITHOBID) 300 MG CR tablet     OLANZapine (ZYPREXA) 15 MG tablet     triamcinolone (KENALOG) 0.1 % external cream     No current facility-administered medications for this visit.      VITALS   There were no vitals taken for this visit.    MENTAL STATUS EXAM     Alertness: alert   Appearance: appropriately groomed  Behavior/Demeanor: cooperative, with good eye contact   Speech: regular  Language: intact  Psychomotor: normal or unremarkable  Mood: \"low\"  Affect: restricted; congruent to: mood- yes, content- yes  Thought Process/Associations: unremarkable  Thought Content:  Reports none;    Perception:  Reports none;  Denies hallucinations  Insight: adequate to good  Judgment: good  Cognition: does  appear grossly intact; formal cognitive testing was not done  Gait and Station: N/A " (telehealth)     LABS and DATA     PHQ9 TODAY = 9  PHQ 12/10/2021 3/15/2022 4/22/2022   PHQ-9 Total Score 24 12 9   Q9: Thoughts of better off dead/self-harm past 2 weeks Not at all Not at all Not at all       FERNANDO-7 SCORE 2/8/2021 12/10/2021 12/10/2021   Total Score 3 (minimal anxiety) - 10 (moderate anxiety)   Total Score 3 10 10       Recent Labs   Lab Test 07/26/21  1339 12/04/20  1414 11/13/20  0758 06/01/20  1339   GLC 85 80   < >  --    A1C 4.9  --   --  4.7    < > = values in this interval not displayed.     Recent Labs   Lab Test 07/26/21 1339 11/13/20  0758   CHOL 247* 111   TRIG 554* 211*   * 20   HDL 42 49     Recent Labs   Lab Test 07/26/21  1339 12/04/20  1414   AST 36 28   ALT 68 46   ALKPHOS 70 78     Recent Labs   Lab Test 07/26/21  1339 12/04/20  1414 11/13/20  0758 06/01/20  1339   WBC 8.0 11.0   < > 7.5   ANEU  --  8.0  --  4.8   HGB 14.7 14.3   < > 15.1    285   < > 265    < > = values in this interval not displayed.     Recent Labs   Lab Test 07/26/21  1339 12/16/20  0718 12/11/20  0726   LITHIUM 0.6 0.84 0.58*     Recent Labs   Lab Test 07/26/21  1339 12/04/20  1414   CR 1.12 1.11   GFRESTIMATED 88 89    138   POTASSIUM 3.7 4.1   ADONAY 9.4 9.4     Recent Labs   Lab Test 07/26/21  1341 11/08/19  1024   SG 1.015 1.015     Recent Labs   Lab Test 07/26/21  1339 12/04/20  1414   TSH 2.22 2.01     Recent Labs   Lab Test 07/26/21  1339 12/04/20  1414 11/13/20  0758 06/01/20  1339   WBC 8.0 11.0   < > 7.5   ANEU  --  8.0  --  4.8    < > = values in this interval not displayed.     ECG 11/20 QTc = 436ms     PSYCHOTROPIC DRUG INTERACTIONS     OLANZAPINE + LITHIUM + VRAYLAR: CNS depression    OLANZAPINE/VRAYLAR + LITHIUM Lithium may enhance the neurotoxic effect of Antipsychotic Agents.    OLANZAPINE: QT-prolonging     OLANZAPINE + VRAYLAR  + FOCALIN: Serotonergic     - Antipsychotic agents may enhance the toxic effects of Focalin    MANAGEMENT:  Monitoring for adverse effects, routine  vitals, routine labs and periodic EKGs     RISK STATEMENT for SAFETY     Jose Francisco Sommers did not appear to be an imminent safety risk to self or others.    TREATMENT RISK STATEMENT: The risks, benefits, alternatives and potential adverse effects have been discussed and are understood by the pt. The pt understands the risks of using street drugs or alcohol. There are no medical contraindications, the pt agrees to treatment with the ability to do so. The pt knows to call the clinic for any problems or to access emergency care if needed.  Medical and substance use concerns are documented above.  Psychotropic drug interaction check was done, including changes made today.    PROVIDER: Kristie Julian MD    Patient staffed with Dr. Tay who will sign the note.  Supervisor is Dr. Tay.

## 2022-04-22 ENCOUNTER — VIRTUAL VISIT (OUTPATIENT)
Dept: PSYCHIATRY | Facility: CLINIC | Age: 32
End: 2022-04-22
Attending: PSYCHIATRY & NEUROLOGY
Payer: COMMERCIAL

## 2022-04-22 DIAGNOSIS — F25.0 SCHIZOAFFECTIVE DISORDER, BIPOLAR TYPE (H): Primary | ICD-10-CM

## 2022-04-22 PROCEDURE — 99214 OFFICE O/P EST MOD 30 MIN: CPT | Mod: GT | Performed by: STUDENT IN AN ORGANIZED HEALTH CARE EDUCATION/TRAINING PROGRAM

## 2022-04-22 RX ORDER — OLANZAPINE 15 MG/1
30 TABLET ORAL AT BEDTIME
Qty: 60 TABLET | Refills: 1 | Status: SHIPPED | OUTPATIENT
Start: 2022-04-22 | End: 2022-09-26

## 2022-04-22 RX ORDER — LITHIUM CARBONATE 300 MG/1
1200 TABLET, FILM COATED, EXTENDED RELEASE ORAL AT BEDTIME
Qty: 120 TABLET | Refills: 2 | Status: SHIPPED | OUTPATIENT
Start: 2022-04-22 | End: 2022-08-04

## 2022-04-22 ASSESSMENT — PATIENT HEALTH QUESTIONNAIRE - PHQ9
10. IF YOU CHECKED OFF ANY PROBLEMS, HOW DIFFICULT HAVE THESE PROBLEMS MADE IT FOR YOU TO DO YOUR WORK, TAKE CARE OF THINGS AT HOME, OR GET ALONG WITH OTHER PEOPLE: VERY DIFFICULT
SUM OF ALL RESPONSES TO PHQ QUESTIONS 1-9: 9
SUM OF ALL RESPONSES TO PHQ QUESTIONS 1-9: 9

## 2022-04-22 NOTE — PATIENT INSTRUCTIONS
**For crisis resources, please see the information at the end of this document**   Patient Education    Thank you for coming to the [facility] [place] [facility].    Lab Testing:  If you had lab testing today and your results are reassuring or normal they will be mailed to you or sent through Whim within 7 days. If the lab tests need quick action we will call you with the results. The phone number we will call with results is # [phone]. If this is not the best number please call our clinic and change the number.     Medication Refills:  If you need any refills please call your pharmacy and they will contact us. Our fax number for refills is [phone]. Please allow three business days for refill processing.   If you need to change to a different pharmacy, please contact the new pharmacy directly. The new pharmacy will help you get your medications transferred.     Contact Us:  Please call [phone] during business hours (8-5:00 M-F).  If you have medication related questions after clinic hours, or on the weekend, please call [phone].    Financial Assistance [phone]  Medical Records [phone]       MENTAL HEALTH CRISIS RESOURCES:  For a emergency help, please call 911 or go to the nearest Emergency Department.     Emergency Walk-In Options:   EmPATH Unit @ [facility] ([place]): [phone] - Specialized mental health emergency area designed to be calming  [facility] ([place]): [phone]  [facility] ([place]): [phone]  [facility]): [phone]    County Crisis Information:   [place]: [phone]  [place]: [phone]  [place] (FRIEDA) - Adult: [phone]     Child: [phone]  [place] - Adult: [phone]     Child: [phone]  [place]: [phone]  List of all [facility] resources:    https://[place].gov/dhs/people-we-serve/adults/health-care/mental-health/resources/crisis-contacts.jsp    National Crisis Information:   Crisis Text Line: Text  [place]  to 647884  National Suicide Prevention Lifeline: 8-589-553-TALK (1-285.247.9841)       For online chat options, visit https://suicidepreventionlifeline.org/chat/  Poison Control Center: 8-323-712-3319  Trans Lifeline: 8-989-393-3815 - Hotline for transgender people of all ages  The Luis Project: 1-107-704-3002 - Hotline for LGBT youth     For Non-Emergency Support:   Fast Tracker: Mental Health & Substance Use Disorder Resources -   https://www.FooPetsn.org/

## 2022-04-22 NOTE — PROGRESS NOTES
Jose Francisco Sommers is a 31 year old who has consented to receive services via billable video visit.      Pt will join video visit via: Vyatta  If there are problems joining the visit, send backup video invite via: Text to preferred phone: [phone]      Originating Location (patient location): Patient's home  Distant Location (provider location): [facility] [place] [facility]    Will anyone else be joining the video visit? No    How would you prefer to obtain AVS?: Aj

## 2022-04-23 ASSESSMENT — PATIENT HEALTH QUESTIONNAIRE - PHQ9: SUM OF ALL RESPONSES TO PHQ QUESTIONS 1-9: 9

## 2022-04-28 DIAGNOSIS — F25.0 SCHIZOAFFECTIVE DISORDER, BIPOLAR TYPE (H): Primary | ICD-10-CM

## 2022-05-05 ENCOUNTER — MYC MEDICAL ADVICE (OUTPATIENT)
Dept: PSYCHIATRY | Facility: CLINIC | Age: 32
End: 2022-05-05
Payer: COMMERCIAL

## 2022-05-05 DIAGNOSIS — Z79.899 LONG-TERM CURRENT USE OF LITHIUM: ICD-10-CM

## 2022-05-05 DIAGNOSIS — Z79.899 ENCOUNTER FOR LONG-TERM (CURRENT) USE OF MEDICATIONS: Primary | ICD-10-CM

## 2022-05-06 NOTE — TELEPHONE ENCOUNTER
Kristie Julian MD [place], [place], RN  Absolutely, I signed the orders. Can you remind him that the Lithium lab needs to be a 12 hour draw? He should also be fasting for the lipids if possible.     Thanks!        Follow up:  - relayed the above via [employer] in [employer]

## 2022-05-06 NOTE — TELEPHONE ENCOUNTER
Patient indicated in MyChart message that lithium level might be low and is requesting orders to be sent to his PCP.

## 2022-05-10 ENCOUNTER — LAB (OUTPATIENT)
Dept: LAB | Facility: CLINIC | Age: 32
End: 2022-05-10
Payer: COMMERCIAL

## 2022-05-10 DIAGNOSIS — Z79.899 LONG-TERM CURRENT USE OF LITHIUM: ICD-10-CM

## 2022-05-10 DIAGNOSIS — Z79.899 ENCOUNTER FOR LONG-TERM (CURRENT) USE OF MEDICATIONS: ICD-10-CM

## 2022-05-10 LAB
BASOPHILS # BLD AUTO: 0.1 10E3/UL (ref 0–0.2)
BASOPHILS NFR BLD AUTO: 1 %
EOSINOPHIL # BLD AUTO: 0.4 10E3/UL (ref 0–0.7)
EOSINOPHIL NFR BLD AUTO: 5 %
ERYTHROCYTE [DISTWIDTH] IN BLOOD BY AUTOMATED COUNT: 12.8 % (ref 10–15)
HBA1C MFR BLD: 5.1 % (ref 0–5.6)
HCT VFR BLD AUTO: 45.5 % (ref 40–53)
HGB BLD-MCNC: 15.1 G/DL (ref 13.3–17.7)
LITHIUM SERPL-SCNC: 0.8 MMOL/L
LYMPHOCYTES # BLD AUTO: 2.1 10E3/UL (ref 0.8–5.3)
LYMPHOCYTES NFR BLD AUTO: 27 %
MCH RBC QN AUTO: 30.1 PG (ref 26.5–33)
MCHC RBC AUTO-ENTMCNC: 33.2 G/DL (ref 31.5–36.5)
MCV RBC AUTO: 91 FL (ref 78–100)
MONOCYTES # BLD AUTO: 0.7 10E3/UL (ref 0–1.3)
MONOCYTES NFR BLD AUTO: 9 %
NEUTROPHILS # BLD AUTO: 4.6 10E3/UL (ref 1.6–8.3)
NEUTROPHILS NFR BLD AUTO: 58 %
PLATELET # BLD AUTO: 233 10E3/UL (ref 150–450)
RBC # BLD AUTO: 5.01 10E6/UL (ref 4.4–5.9)
SP GR UR STRIP: 1.01 (ref 1–1.03)
WBC # BLD AUTO: 7.8 10E3/UL (ref 4–11)

## 2022-05-10 PROCEDURE — 36415 COLL VENOUS BLD VENIPUNCTURE: CPT

## 2022-05-10 PROCEDURE — 81003 URINALYSIS AUTO W/O SCOPE: CPT

## 2022-05-10 PROCEDURE — 80050 GENERAL HEALTH PANEL: CPT

## 2022-05-10 PROCEDURE — 80178 ASSAY OF LITHIUM: CPT

## 2022-05-10 PROCEDURE — 83036 HEMOGLOBIN GLYCOSYLATED A1C: CPT

## 2022-05-10 PROCEDURE — 84439 ASSAY OF FREE THYROXINE: CPT

## 2022-05-11 LAB
ALBUMIN SERPL-MCNC: 3.9 G/DL (ref 3.4–5)
ALP SERPL-CCNC: 81 U/L (ref 40–150)
ALT SERPL W P-5'-P-CCNC: 103 U/L (ref 0–70)
ANION GAP SERPL CALCULATED.3IONS-SCNC: 5 MMOL/L (ref 3–14)
AST SERPL W P-5'-P-CCNC: 31 U/L (ref 0–45)
BILIRUB SERPL-MCNC: 0.7 MG/DL (ref 0.2–1.3)
BUN SERPL-MCNC: 16 MG/DL (ref 7–30)
CALCIUM SERPL-MCNC: 9.5 MG/DL (ref 8.5–10.1)
CHLORIDE BLD-SCNC: 107 MMOL/L (ref 94–109)
CO2 SERPL-SCNC: 27 MMOL/L (ref 20–32)
CREAT SERPL-MCNC: 1.3 MG/DL (ref 0.66–1.25)
GFR SERPL CREATININE-BSD FRML MDRD: 76 ML/MIN/1.73M2
GLUCOSE BLD-MCNC: 110 MG/DL (ref 70–99)
POTASSIUM BLD-SCNC: 3.4 MMOL/L (ref 3.4–5.3)
PROT SERPL-MCNC: 7.2 G/DL (ref 6.8–8.8)
SODIUM SERPL-SCNC: 139 MMOL/L (ref 133–144)
T4 FREE SERPL-MCNC: 0.92 NG/DL (ref 0.76–1.46)
TSH SERPL DL<=0.005 MIU/L-ACNC: 5.36 MU/L (ref 0.4–4)

## 2022-05-20 ENCOUNTER — VIRTUAL VISIT (OUTPATIENT)
Dept: PSYCHIATRY | Facility: CLINIC | Age: 32
End: 2022-05-20
Attending: PSYCHIATRY & NEUROLOGY
Payer: COMMERCIAL

## 2022-05-20 DIAGNOSIS — F25.0 SCHIZOAFFECTIVE DISORDER, BIPOLAR TYPE (H): Primary | ICD-10-CM

## 2022-05-20 PROCEDURE — 99214 OFFICE O/P EST MOD 30 MIN: CPT | Mod: GC | Performed by: STUDENT IN AN ORGANIZED HEALTH CARE EDUCATION/TRAINING PROGRAM

## 2022-05-20 NOTE — PROGRESS NOTES
"VIDEO VISIT  Jose Francisco Sommers is a 30 year old patient who is being evaluated via a billable video visit.      The patient has been notified of following:   \"We have found that certain health care needs can be provided without the need for an in-person physical exam. This service lets us provide the care you need with a video conversation. If a prescription is necessary we can send it directly to your pharmacy. If lab work is needed we can place an order for that and you can then stop by our lab to have the test done at a later time. Insurers are generally covering virtual visits as they would in-office visits so billing should not be different than normal.  If for some reason you do get billed incorrectly, you should contact the billing office to correct it and that number is in the AVS .    Patient has given verbal consent for video visit?: Yes   How would you like to obtain your AVS?: Swiftype  AVS SmartPhrase [PsychAVS] has been placed in 'Patient Instructions': Yes      Video- Visit Details  Type of service:  video visit for mental health treatment.  Time of service:    Date:  5/20/22    Video Start Time:  10:30AM        Video End Time:  11:00AM    Reason for video visit: COVID-19  Originating Site (patient location):  Patient's home  Distant Site (provider location):  [facility]  Mode of Communication:  Video Conference via [facility]  Psychiatry Clinic  MEDICAL PROGRESS NOTE     CARE TEAM:  PCP- Clinic Provider, Psychotherapist- None    Jose Francisco Sommers is a 30 year old who prefers the name Manuel and uses pronouns he, him.      DIAGNOSIS     1) Schizoaffective disorder, Bipolar type, most recent episode depressed  2) ADHD, predominantly inattentive type     ASSESSMENT     TODAY: Manuel and his mother Ember were present for his appointment. Manuel reports that he noticed some improvement with his thinking at a higher dose of Zyprexa, however not as much as he " was hoping for and his mother reported that she has noticed more of a dulling on his personality. Manuel was interested in decreasing this back to 25mg today. He reports that he has also experienced another 10lb weight gain, he has an upcoming appointment with weight management and wished to defer any pharmacotherapy options to them. He also expressed interest in increasing his Prozac, discussed that this would best be deferred until his next visit since we would be making changes in his Zyprexa.    Manuel's Cr was mildly elevated in addition to his TSH (T4 wnl) on his most recent labwork. Discussed that this could be related to lithium use and I encouraged him to follow up with his primary care provider for further monitoring. If this does not improve we may need to consider tapering off of lithium in the future.    Lastly Manuel has been reporting difficulty with concentration this past year and has had minimal benefit with multiple stimulant trials. Today he was reporting difficulty with word finding, although this appears largely related to when he is attempting to express his internal state. He has a previous diagnosis of ADHD however I suspect his cognitive difficulties are likely related to his underlying psychotic disorder and/or long-term use of psychotropic medications, He was interested in repeating Neuropsych testing for further clarification of any deficits.    Future Considerations:  - topamax, naltrexone  - trial of ziprasidone or depakote   - retrial of cariprazine  - Olanzapine + samidorphan (Lybalvi)  - Metadate    [place]MP was not checked today     PLAN                                                                                                                1) Meds-  - Continue Lithium 1,200 mg HS  - Decrease olanzapine to 25mg HS  - Continue prozac 20mg daily    Prescribed by PCP  - Propranolol ER 60mg qD    2) Psychotherapy- none, encouraged    3) Next due-  Labs- AP labs completed 5/2022, Cr and  TSH elevated (T4 wnl)  EKG- as needed  Rating scales- N/A    4) Referrals- none    5) Dispo- RTC in 4 weeks       PERTINENT BACKGROUND                           [most recent eval 07/11/21]      Jose Francisco Sommers first experienced mental health issues in grade school and has received treatment for ADHD, depression, psychosis and substance use. Notably, this patient had FEP in 2015 in the setting of cannabis abuse and has been stable on olanzapine and lithium since that time. He has never lived independently and has only been slowly developing insight into his illness and past symptoms. GeneSight testing was completed noting patient is a poor CYP2D6 metabolizer. Past records from Dr. Mike Powers were received in summer 2018 to assist with diagnostic clarity, however they were hand written notes that were illegible though accompanied by typed neuropsychology consult from [facility]. This documentation was unable to support a diagnosis of bipolar disorder at that time though noted potential Asperger's diagnosis and ADHD. Given that there has been evidence for psychotic signs and symptoms outside the context of mood episodes, a schizophrenia spectrum disorder such as schizoaffective disorder diagnosis seems most accurate. He has had two hospitalizations for decompensated psychosis in the setting of decreasing his olanzapine dose.    Psych pertinent item history includes psychosis [sxs include disorganization, possible catatonia], mutiple psychotropic trials, psych hosp (<3) and SUBSTANCE USE: alcohol and cannabis     SUBJECTIVE     - things have been going better  - noticed some improvement that he liked, thinking was better   - put on 10lbs in the last month, weight doctors in the last week   - breathing slightly difficult at night, but this is better now   - more pacing inside, walking outside too  - likes the prozac, would like to increase this   - thinking improved on this also, hard to put this into words    - sleep  "going well, going to bed around 10 and waking up feeling rested  - nothing concerning for lauren  - some difficulty with thinking and finding the right words, feels this is related to medications, not experiencing this elsewhere  - mom noticing that more complicated thought processes are harder for him to express, some flattening of personality  - much more subtle improvement from mom's perspective     - thinks 25mg of zyprexa might be better  - wants to increase lithium, wants to clarify thinking, still struggling with things that are difficult    RECENT PSYCH ROS:   See HPI    Adverse Effects: increased appetite  Pertinent Negative Symptoms: No psychosis or lauren  Recent Substance Use:     Tobacco - see HPI  No alcohol or cannabis     SOCIAL HISTORY                                 pt reported     Social Hx:  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     Partner/ - single  Children- None      Living situation- lives with parents in [place]  Social/ Spiritual Support- family, friends, Ca[employer] hemalatha     Feels Safe at Home- yes   Legal- None       PSYCH and SUBSTANCE USE Critical Summary Points since July 2020 7/12/21 - started Seroquel taper  8/2/21 - continue Seroquel taper  9/13/21 - started Belsomra for sleep  10/8/21 - Vyvanse started  11/5/21 - Vyvanse discontinued, Adderall started  12/10/21 - Adderall discontinued, Ritalin started  1/14/22 - Ritalin and Belsomra discontinued by patient, Vraylar started, Zyprexa decreased  2/25/22 - Focalin started (patient did not end up taking)  3/15/22 - Vraylar discontinued, Zyprexa increased on his own accord between sessions to 20mg  - Zyprexa increased to 25mg between visits   4/22/22 - Zyprexa increased to 30mg  - Prozac 20mg started  5/20/22 - Zyprexa decreased to 25mg     PAST MED TRIALS     Prozac 20 mg - limited response, less irritable, first episode of \"rage\"  Zoloft  Wellbutrin - did not like    Risperdal up to 5 mg - " "some confusion, slow processing, withdrawn, ?\"catatonia\", panic attacks  Zyprexa 5 mg less anxious overall improvement  Lithium - calmer, overall better  Latuda 20 mg - severe fatigue and depression  Seroquel - weight gain, daytime drowsiness  Abilify ?25 mg - OK when QOD, but irritable and agitated on every day    Lamictal    Concerta 18 mg - not helpful for focus, no improvement in motivation, mom thought this helped with insight  Provigil 150 mg - triggered increased alcohol use (2010, did not happen with other retrials), last trial went well but got to a point where he felt like he didn't need it  Adderall 10-20 mg - improved concentration, but \"cold personality\" and perseveration, retrialed 2021 with initial benefit  Strattera  Intuniv    Synthroid 75 mcg - added to help with mood.  Gabapentin  Clonazepam 0.5-1.5 mg - calming, helped with sleep and \"catatonia\"  Xanax  Trazodone - worsened mood    Has not tried: Focalin     MEDICAL HISTORY and ALLERGY     ALLERGIES: Sulfa drugs    Patient Active Problem List   Diagnosis     Schizoaffective disorder, bipolar type (H)     Hx of psychiatric care     Elevated liver enzymes     Fatty liver     High blood triglycerides     History of cannabis abuse     History of cocaine abuse (H)     Psychosis (H)     Elevated blood pressure reading without diagnosis of hypertension     Constipation     No past medical history on file.     MEDICAL REVIEW OF SYSTEMS   Contraception- not discussed    A comprehensive review of systems was performed and is negative other than noted in the HPI.     MEDICATIONS     Current Outpatient Medications   Medication     FLUoxetine (PROZAC) 20 MG capsule     lithium ER (LITHOBID) 300 MG CR tablet     OLANZapine (ZYPREXA) 15 MG tablet     triamcinolone (KENALOG) 0.1 % external cream     No current facility-administered medications for this visit.      VITALS   There were no vitals taken for this visit.    MENTAL STATUS EXAM     Alertness: alert "   Appearance: appropriately groomed  Behavior/Demeanor: cooperative, with good eye contact   Speech: regular  Language: intact  Psychomotor: normal or unremarkable  Mood: description consistent with euthymia  Affect: restricted; congruent to: mood- yes, content- yes  Thought Process/Associations: unremarkable  Thought Content:  Reports none;    Perception:  Reports none;  Denies hallucinations  Insight: adequate to good  Judgment: good  Cognition: does  appear grossly intact; formal cognitive testing was not done  Gait and Station: N/A (telehealth)     LABS and DATA     PHQ9 TODAY = not collected  PHQ 12/10/2021 3/15/2022 4/22/2022   PHQ-9 Total Score 24 12 9   Q9: Thoughts of better off dead/self-harm past 2 weeks Not at all Not at all Not at all       FERNANDO-7 SCORE 2/8/2021 12/10/2021 12/10/2021   Total Score 3 (minimal anxiety) - 10 (moderate anxiety)   Total Score 3 10 10       Recent Labs   Lab Test 05/10/22  0855 07/26/21  1339   * 85   A1C 5.1 4.9     Recent Labs   Lab Test 07/26/21  1339 11/13/20  0758   CHOL 247* 111   TRIG 554* 211*   * 20   HDL 42 49     Recent Labs   Lab Test 05/10/22  0855 07/26/21  1339   AST 31 36   * 68   ALKPHOS 81 70     Recent Labs   Lab Test 05/10/22  0855 07/26/21  1339 12/04/20  1414 11/13/20  0758 06/01/20  1339   WBC 7.8 8.0 11.0   < > 7.5   ANEU  --   --  8.0  --  4.8   HGB 15.1 14.7 14.3   < > 15.1    263 285   < > 265    < > = values in this interval not displayed.     Recent Labs   Lab Test 05/10/22  0855 07/26/21  1339 12/16/20  0718   LITHIUM 0.8 0.6 0.84     Recent Labs   Lab Test 05/10/22  0855 07/26/21  1339   CR 1.30* 1.12   GFRESTIMATED 76 88    138   POTASSIUM 3.4 3.7   ADONAY 9.5 9.4     Recent Labs   Lab Test 05/10/22  0855 07/26/21  1341   SG 1.010 1.015     Recent Labs   Lab Test 05/10/22  0855 07/26/21  1339   TSH 5.36* 2.22     Recent Labs   Lab Test 05/10/22  0855 07/26/21  1339 12/04/20  1414 11/13/20  0758 06/01/20  1339   WBC  7.8 8.0 11.0   < > 7.5   ANEU  --   --  8.0  --  4.8    < > = values in this interval not displayed.     ECG 11/20 QTc = 436ms     PSYCHOTROPIC DRUG INTERACTIONS     OLANZAPINE + LITHIUM: CNS depression    OLANZAPINE + LITHIUM Lithium may enhance the neurotoxic effect of Antipsychotic Agents.    OLANZAPINE: QT-prolonging     OLANZAPINE + PROZAC: Serotonergic     MANAGEMENT:  Monitoring for adverse effects, routine vitals, routine labs and periodic EKGs     RISK STATEMENT for SAFETY     Jose Francisco Sommers did not appear to be an imminent safety risk to self or others.    TREATMENT RISK STATEMENT: The risks, benefits, alternatives and potential adverse effects have been discussed and are understood by the pt. The pt understands the risks of using street drugs or alcohol. There are no medical contraindications, the pt agrees to treatment with the ability to do so. The pt knows to call the clinic for any problems or to access emergency care if needed.  Medical and substance use concerns are documented above.  Psychotropic drug interaction check was done, including changes made today.    PROVIDER: Kristie Julian MD    Patient staffed with Dr. Lyons who will sign the note.  Supervisor is Dr. Tay.     I directly participated in the patient interview with the resident and discussed the key portions of the service with the resident, including the mental status examination and developing the plan of care. I reviewed key portions of the history with the resident. I agree with the findings and plan as documented in this note.   Dinh Lyons MD

## 2022-05-20 NOTE — PATIENT INSTRUCTIONS
**For crisis resources, please see the information at the end of this document**   Patient Education    Thank you for coming to the [facility] [place] [facility].    Lab Testing:  If you had lab testing today and your results are reassuring or normal they will be mailed to you or sent through [employer] within 7 days. If the lab tests need quick action we will call you with the results. The phone number we will call with results is # [phone]. If this is not the best number please call our clinic and change the number.     Medication Refills:  If you need any refills please call your pharmacy and they will contact us. Our fax number for refills is [phone].   Three business days of notice are needed for general medication refill requests.   Five business days of notice are needed for controlled substance refill requests.   If you need to change to a different pharmacy, please contact the new pharmacy directly. The new pharmacy will help you get your medications transferred.     Contact Us:  Please call [phone] during business hours (8-5:00 M-F).  If you have medication related questions after clinic hours, or on the weekend, please call [phone].    Financial Assistance [phone]  Medical Records [phone]       MENTAL HEALTH CRISIS RESOURCES:  For a emergency help, please call 911 or go to the nearest Emergency Department.     Emergency Walk-In Options:   EmPATH Unit @ [facility] ([place]): [phone] - Specialized mental health emergency area designed to be calming  [facility] ([place]): [phone]  [facility] ([place]): [phone]  [facility]): [phone]    [facility] Crisis Information:   [place]: [phone]  [place]: [phone]  Fredi (FRIEDA) - Adult: [phone]     Child: [phone]  [place] - Adult: [phone]     Child: [phone]  [place]:  [phone]  List of all [facility] resources:   https://[place].gov/dhs/people-we-serve/adults/health-care/mental-health/resources/crisis-contacts.jsp    National Crisis Information:   Crisis Text Line: Text  [place]  to 438142  National Suicide Prevention Lifeline: 2-227-559-TALK (1-884.910.3797)       For online chat options, visit https://suicidepreventionlifeline.org/chat/  Poison Control Center: 0-484-014-7414  Trans Lifeline: 4-578-478-3508 - Hotline for transgender people of all ages  The Luis Project: 0-988-927-9644 - Hotline for LGBT youth     For Non-Emergency Support:   Fast Tracker: Mental Health & Substance Use Disorder Resources -   https://www.[employer] Planet[employer].org/

## 2022-05-20 NOTE — PROGRESS NOTES
Jose Francisco Sommers is a 31 year old who has consented to receive services via billable video visit.      Pt will join video visit via: [employer]  If there are problems joining the visit, send backup video invite via: Send to preferred e-mail: [email or web address]      Originating Location (patient location): Patient's home  Distant Location (provider location): [facility] [place] [facility]    Will anyone else be joining the video visit? Yes- mom    How would you prefer to obtain AVS?: Aj

## 2022-05-31 ENCOUNTER — VIRTUAL VISIT (OUTPATIENT)
Dept: ENDOCRINOLOGY | Facility: CLINIC | Age: 32
End: 2022-05-31
Payer: COMMERCIAL

## 2022-05-31 ENCOUNTER — VIRTUAL VISIT (OUTPATIENT)
Dept: ENDOCRINOLOGY | Facility: CLINIC | Age: 32
End: 2022-05-31

## 2022-05-31 VITALS — WEIGHT: 275 LBS | BODY MASS INDEX: 36.45 KG/M2 | HEIGHT: 73 IN

## 2022-05-31 DIAGNOSIS — F25.0 SCHIZOAFFECTIVE DISORDER, BIPOLAR TYPE (H): ICD-10-CM

## 2022-05-31 DIAGNOSIS — Z71.3 NUTRITIONAL COUNSELING: Primary | ICD-10-CM

## 2022-05-31 PROCEDURE — 97802 MEDICAL NUTRITION INDIV IN: CPT | Mod: GT | Performed by: DIETITIAN, REGISTERED

## 2022-05-31 PROCEDURE — 99203 OFFICE O/P NEW LOW 30 MIN: CPT | Mod: GT | Performed by: INTERNAL MEDICINE

## 2022-05-31 RX ORDER — PROPRANOLOL HCL 60 MG
60 CAPSULE, EXTENDED RELEASE 24HR ORAL DAILY
COMMUNITY
Start: 2022-05-13 | End: 2022-09-26

## 2022-05-31 RX ORDER — NALTREXONE HYDROCHLORIDE 50 MG/1
TABLET, FILM COATED ORAL
Qty: 30 TABLET | Refills: 11 | Status: SHIPPED | OUTPATIENT
Start: 2022-05-31 | End: 2022-09-26

## 2022-05-31 ASSESSMENT — PAIN SCALES - GENERAL: PAINLEVEL: NO PAIN (0)

## 2022-05-31 NOTE — PATIENT INSTRUCTIONS
"Nice to talk with you today in virtual clinic!    For cravings, please start taking Naltrexone 50 mg tabs daily, Take it one to two hours prior to worst cravings. Start with 1/2 tab and if tolerated, on 3rd day, increase to full 50 mg tab daily.    Please see Lauren Turner Bloch, Pharm D re: medication mgmt. Some of your medications, as you mentioned, are associated with weight gain, fatigue, and cognitive impairment    1,500 calorie meal plan to lose 1 lbs weekly without exercise (based on REE calc of 1,992)  Ht 1.842 m (6' 0.5\")   Wt 124.7 kg (275 lb)   BMI 36.78 kg/m      Use meal replacements such as Trciia's meals, Lean Cuisines, Healthy Choice, Smart Ones, Weight Watchers Meals, and Slim Fast and Glucerna shakes and supplement with fresh fruits and vegetables    Penzey's spices can make your meals taste good    Check out Freshly, Purple Carrot, Home , Green , Blue Apron, Hungry Root     Please drink a lot of water daily. Most people typically need about 2 liters of water daily and more if they are exercising, have a large weight, or have a fever or illness.  Please keep a food journal of what you eat, calories in what you eat  Consider using applications for smart phones such as [employer] InPal  Focus on wet volumetrics, meaning, eat more foods that are high in water and fiber such as fruits and vegetables in order to get that full feeling. These are also good for your overall health as well.  Check out Dr. Glory Myers from [facility] - she has cookbooks with low calorie volumetric recipes  Try Cooking Light recipes for low calorie meal preparation and planning    Start walking program working up to 4 miles daily       RTC: monthly with ancillary support staff, quarterly as needed w/ provider    Please use ERN to schedule your appointment(s) or call [phone] to schedule in [facility]      Best Wishes,  Dr. Monique Shoemaker MD, MPH  Endocrinologist    "

## 2022-05-31 NOTE — LETTER
Date:June 2, 2022      Provider requested that no letter be sent. Do not send.       [facility]

## 2022-05-31 NOTE — NURSING NOTE
"Chief Complaint   Patient presents with     New Patient     [facility]        Vitals:    05/31/22 1320   Weight: 124.7 kg (275 lb)   Height: 1.842 m (6' 0.5\")       Body mass index is 36.78 kg/m .                            "

## 2022-05-31 NOTE — PROGRESS NOTES
Manuel is a 31 year old who is being evaluated via a billable video visit.      How would you like to obtain your AVS? MyChart  If the video visit is dropped, the invitation should be resent by: Send to e-mail at: [email or web address]  Will anyone else be joining your video visit? No    Video-Visit Details    Type of service:  Video Visit    Video as above    Originating Location (pt. Location): Home    Distant Location (provider location):  [facility] [place]     Platform used for Video Visit: [email or web address]

## 2022-05-31 NOTE — LETTER
"2022       RE: Jose Francisco Sommers  [address] [place] [address]     Dear Colleague,    Thank you for referring your patient, Jose Francisco Sommers, to the [facility] [place] at [facility]. Please see a copy of my visit note below.    Manuel is a 31 year old who is being evaluated via a billable video visit.      How would you like to obtain your AVS? MyChart  If the video visit is dropped, the invitation should be resent by: Send to e-mail at: [email or web address]  Will anyone else be joining your video visit? No    Video-Visit Details    Type of service:  Video Visit    Video as above    Originating Location (pt. Location): Home    Distant Location (provider location):  [facility] [place]     Platform used for Video Visit: [employer]               [facility] Interdisciplinary Medical Weight Management Consult      PATIENT:  Jose Francisco Sommers  MRN:         [id number]  :         1990  CAMILO:         2022    Dear Colleagues,    I had the pleasure of seeing your patient, Jose Francisco Sommers. Full intake/assessment was done to determine barriers to weight loss success and develop a treatment plan. Jose Francisco Sommers is a 31 year old male interested in treatment of medical problems associated with excess weight. He has a height of 6' .5\", a weight of 275 lbs 0 oz, and the calculated Body mass index is 36.78 kg/m .    ASSESSMENT/PLAN:  Jose Francisco is a patient with mature onset obesity without significant element of familial/genetic influence and with current health consequences. He does need aggressive weight loss plan due to Body mass index is 36.78 kg/m .and co-morbid conditions of HTN, hypertriglyceridemia. Jose Francisco Sommers Patient endorses the following eating, sleeping, and exercise habits (see survey below).    His problem is complicated by psychiatric condition, psychotropic " "medications, recent weight gain while taking psychotropics    His ability to lose weight is impacted by lack of education on nutrition and dietary needs, socioeconomic situation and medications associated with weight gain.    He has tried Vyvance, Adderall, Ritalin but had trouble sleeping, so he asked them to stop.  He has gained about 60 lbs recently which he attributes to olanzapine. He also quit smoking.    Mom is present during the visit today and asks about a newer formulation of combination of zyprexa and an opioid antagonist (samidorphan) - patient is not taking this - yet since it is not available.    PLAN:    Mental health/Medication barriers   Ancillary testing:  N/A. He may need monitoring of TSH, Free T4, PTH (parathyroid hormone) on a quarterly or as needed basis if he is on lithium, defer to  team.  Food Plan:  Volumetrics.   Activity Plan:   Walking program.  Supplementary:   team re medication options not associated with weight gain.   Medication:  The patient will begin medication in pursuit of improved medical status as influenced by body weight. He will start naltrexone. Patient was made aware that naltrexone is not approved for the treatment of obesity.  There is a mutual understanding of the goals and risks of this therapy. The patient is in agreement. He is educated on dosage regimen and possible side effects.    Patient Instructions:    Nice to talk with you today in virtual clinic!    For cravings, please start taking Naltrexone 50 mg tabs daily, Take it one to two hours prior to worst cravings. Start with 1/2 tab and if tolerated, on 3rd day, increase to full 50 mg tab daily.    Please see Lauren Turner Bloch, Pharm D re: medication mgmt. Some of your medications, as you mentioned, are associated with weight gain, fatigue, and cognitive impairment    1,500 calorie meal plan to lose 1 lbs weekly without exercise (based on REE calc of 1,992)  Ht 1.842 m (6' 0.5\")   Wt 124.7 kg (275 lb)  "  BMI 36.78 kg/m      Use meal replacements such as Tricia's meals, Lean Cuisines, Healthy Choice, Smart Ones, Weight Watchers Meals, and Slim Fast and Glucerna shakes and supplement with fresh fruits and vegetables    Penzey's spices can make your meals taste good    Check out Freshly, Purple Carrot, Home , Green , Blue Apron, Hungry Root     Please drink a lot of water daily. Most people typically need about 2 liters of water daily and more if they are exercising, have a large weight, or have a fever or illness.  Please keep a food journal of what you eat, calories in what you eat  Consider using applications for smart phones such as NewLeaf SymbioticsPal  Focus on wet volumetrics, meaning, eat more foods that are high in water and fiber such as fruits and vegetables in order to get that full feeling. These are also good for your overall health as well.  Check out Dr. Glory Myers from [facility] - she has cookbooks with low calorie volumetric recipes  Try Cooking Light recipes for low calorie meal preparation and planning    Start walking program working up to 4 miles daily       RTC: monthly with ancillary support staff, quarterly as needed w/ provider    Please use Scalix to schedule your appointment(s) or call [phone] to schedule in [facility]      Best Wishes,  Dr. Monique Shoemaker MD, MPH  Endocrinologist        He has the following co-morbidities:       5/26/2022   I have the following health issues associated with obesity: None of the above   I have the following symptoms associated with obesity: None of the above       Patient Goals 5/26/2022   I am interested in having a healthier weight to diminish current health problems: Yes   I am interested in having a healthier weight in order to prevent future health problems: Yes   I am interested in having a healthier weight in order to have a future surgery: No       Referring Provider 5/26/2022   Please name the provider who  "referred you to Medical Weight Management.  If you do not know, please answer: \"I Don't Know\". [facility]       Weight History 5/26/2022   How concerned are you about your weight? Very Concerned   Would you describe your weight gain as gradual? Yes   I became overweight: As an Adult   The following factors have contributed to my weight gain:  Mental Health Issues, Started on Medication that Caused Weight Gain, After Quitting Smoking, Eating Wrong Types of Food, Lack of Exercise   I have tried the following methods to lose weight: Watching Portions or Calories, Exercise, Medications   Please list the medications you have tried.  Metformin   My lowest weight since age 19 was: 190   My highest weight since age 19 was: 275   The most weight I have ever lost was: (lbs) 40   I have the following family history of obesity/being overweight:  I am the only one in my immediate family who is overweight   Has anyone in your family had weight loss surgery? No   How has your weight changed over the last year?  Gained   How many pounds? Around 50       Diet Recall Review with Patient 5/26/2022   Do you typically eat breakfast? Yes   If you do eat breakfast, what types of food do you eat? Cereal   Do you typically eat lunch? Yes   If you do eat lunch, what types of food do you typically eat?  Mcdonalds   Do you typically eat supper? Yes   If you do eat supper, what types of food do you typically eat? Varies   Do you typically eat snacks? No   Do you like vegetables?  Yes   Do you drink water? Yes   How many glasses of juice do you drink in a typical day? 0   How many of glasses of milk do you drink in a typical day? 0   If you do drink milk, what type? Skim   How many 8oz glasses of sugar containing drinks such as Zak-Aid/sweet tea do you drink in a day? 0   How many cans/bottles of sugar pop/soda/tea/sports drinks do you drink in a day? 0   How many cans/bottles of diet pop/soda/tea or sports drink do you drink in a " day? 0   How often do you have a drink of alcohol? Never       Eating Habits 5/26/2022   Generally, my meals include foods like these: bread, pasta, rice, potatoes, corn, crackers, sweet dessert, pop, or juice. A Few Times a Week   Generally, my meals include foods like these: fried meats, brats, burgers, french fries, pizza, cheese, chips, or ice cream. Everyday   Eat fast food (like Gigwalk, Fitcline, Taco Bell). Everyday   Eat at a buffet or sit-down restaurant. Once a Week   Eat most of my meals in front of the TV or computer. Never   Often skip meals, eat at random times, have no regular eating times. A Few Times a Week   Rarely sit down for a meal but snack or graze throughout.  A Few Times a Week   Eat extra snacks between meals. A Few Times a Week   Eat most of my food at the end of the day. Less Than Weekly   Eat in the middle of the night or wake up at night to eat. Never   Eat extra snacks to prevent or correct low blood sugar. Never   Eat to prevent acid reflux or stomach pain. Never   Worry about not having enough food to eat. Never   Have you been to the food shelf at least a few times this year? No   I eat when I am depressed. Never   I eat when I am stressed. A Few Times a Week   I eat when I am bored. Everyday   I eat when I am anxious. Never   I eat when I am happy or as a reward. Less Than Weekly   I feel hungry all the time even if I just have eaten. A Few Times a Week   Feeling full is important to me. Almost Everyday   I finish all the food on my plate even if I am already full. Almost Everyday   I can't resist eating delicious food or walk past the good food/smell. Less Than Weekly   I eat/snack without noticing that I am eating. Never   I eat when I am preparing the meal. Never   I eat more than usual when I see others eating. Never   I have trouble not eating sweets, ice cream, cookies, or chips if they are around the house. Almost Everyday   I think about food all day. Less Than Weekly    What foods, if any, do you crave? Sweets/Candy/Chocolate   Please list any other foods you crave? Restaurant food       Amount of Food 5/26/2022   I make myself vomit what I have eaten or use laxatives to get rid of food. Never   I eat a large amount of food, like a loaf of bread, a box of cookies, a pint/quart of ice cream, all at once. Weekly   I eat a large amount of food even when I am not hungry. Never   I eat rapidly. Never   I eat alone because I feel embarrassed and do not want others to see how much I have eaten. Never   I eat until I am uncomfortably full. Monthly   I feel bad, disgusted, or guilty after I overeat. Never   I make myself vomit what I have eaten or use laxatives to get rid of food. Never       Activity/Exercise History 5/26/2022   How much of a typical 12 hour day do you spend sitting? Less Than Half the Day   How much of a typical 12 hour day do you spend lying down? Less Than Half the Day   How much of a typical day do you spend walking/standing? Half the Day   How many hours (not including work) do you spend on the TV/Video Games/Computer/Tablet/Phone? 1 Hour or Less   How many times a week are you active for the purpose of exercise? Once a Week   What keeps you from being more active? Other   How many total minutes do you spend doing some activity for the purpose of exercising when you exercise? More Than 30 Minutes       PAST MEDICAL HISTORY:  No past medical history on file.    Work/Social History Reviewed With Patient 5/26/2022   My employment status is: Unemployed   What is your marital status? Single   If in a relationship, is your significant other overweight? N/A   Do you have children? No   If you have children, are they overweight? N/A   Who do you live with?  Parents   Are they supportive of your health goals? Yes   Who does the food shopping?  Dad       Mental Health History Reviewed With Patient 5/26/2022   Have you ever been physically or sexually abused? No   If yes, do  you feel that the abuse is affecting your weight? N/A   If yes, would you like to talk to a counselor about the abuse? N/A   How often in the past 2 weeks have you felt little interest or pleasure in doing things? For Several Days   Over the past 2 weeks how often have you felt down, depressed, or hopeless? Not at all       Sleep History Reviewed With Patient 5/26/2022   How many hours do you sleep at night? 10   Do you think that you snore loudly or has anybody ever heard you snore loudly (louder than talking or so loud it can be heard behind a shut door)? No   Has anyone seen or heard you stop breathing during your sleep? No   Do you often feel tired, fatigued, or sleepy during the day? No   Do you have a TV/Computer in your bedroom? Yes       MEDICATIONS:   Current Outpatient Medications   Medication Sig Dispense Refill     FLUoxetine (PROZAC) 20 MG capsule Take 1 capsule (20 mg) by mouth daily 30 capsule 1     lithium ER (LITHOBID) 300 MG CR tablet Take 4 tablets (1,200 mg) by mouth At Bedtime 120 tablet 2     OLANZapine (ZYPREXA) 15 MG tablet Take 2 tablets (30 mg) by mouth At Bedtime 60 tablet 1     propranolol ER (INDERAL LA) 60 MG 24 hr capsule Take 60 mg by mouth daily       triamcinolone (KENALOG) 0.1 % external cream Apply topically 2 times daily (Patient not taking: Reported on 5/31/2022) 30 g 2       ALLERGIES:   Allergies   Allergen Reactions     Sulfa Drugs Rash       PHYSICAL EXAM:  There were no vitals taken for this virtual visit.  Gen: well appearing, nad, pleasant and conversant  HEENT: EOMI  Neuro: A&O    LABS:    Lab Results   Component Value Date    A1C 5.1 05/10/2022    A1C 4.9 07/26/2021    A1C 4.7 06/01/2020    A1C 4.9 05/07/2019    A1C 5.0 12/06/2018    A1C 4.6 09/30/2016     Creatinine   Date Value Ref Range Status   05/10/2022 1.30 (H) 0.66 - 1.25 mg/dL Final   12/04/2020 1.11 0.66 - 1.25 mg/dL Final     TSH   Date Value Ref Range Status   05/10/2022 5.36 (H) 0.40 - 4.00 mU/L Final    12/04/2020 2.01 0.40 - 4.00 mU/L Final     LDL Cholesterol Calculated   Date Value Ref Range Status   07/26/2021   Final     Comment:     Cannot estimate LDL when triglyceride exceeds 400 mg/dL  Age 0-19 years:  Desirable: 0-110 mg/dL   Borderline high: 110-129 mg/dL   High: >= 130 mg/dL    Age 20 years and older:  Desirable: <100mg/dL  Above desirable: 100-129 mg/dL   Borderline high: 130-159 mg/dL   High: 160-189 mg/dL   Very high: >= 190 mg/dL   11/13/2020 20 <100 mg/dL Final     Comment:     Desirable:       <100 mg/dl     LDL Cholesterol Direct   Date Value Ref Range Status   07/26/2021 101 (H) <100 mg/dL Final     Comment:     Age 0-19 years:  Desirable: 0-110 mg/dL   Borderline high: 110-129 mg/dL   High: >= 130 mg/dL    Age 20 years and older:  Desirable: <100mg/dL  Above desirable: 100-129 mg/dL   Borderline high: 130-159 mg/dL   High: 160-189 mg/dL   Very high: >= 190 mg/dL     Video: approximately 20 minutes      Total Time: 40 min spent on chart review, evaluation, management, counseling, education, & motivational interviewing on the day of encounter

## 2022-05-31 NOTE — LETTER
"5/31/2022       RE: Jose Francisco Sommers  [address]  [place] [address]     Dear Colleague,    Thank you for referring your patient, Jose Francisco Sommers, to the [facility] [place] at [facility]. Please see a copy of my visit note below.    Jose Francisco Sommers is a 31 year old male who is being evaluated via a billable video visit.      The patient has been notified of following:     \"This video visit will be conducted via a call between you and your physician/provider. We have found that certain health care needs can be provided without the need for an in-person physical exam.  This service lets us provide the care you need with a video conversation.  If a prescription is necessary we can send it directly to your pharmacy.  If lab work is needed we can place an order for that and you can then stop by our lab to have the test done at a later time.    Video visits are billed at different rates depending on your insurance coverage.  Please reach out to your insurance provider with any questions.    If during the course of the call the physician/provider feels a video visit is not appropriate, you will not be charged for this service.\"    Patient has given verbal consent for Video visit? Yes  How would you like to obtain your AVS? MyChart  If you are dropped from the video visit, the video invite should be resent to: Send to e-mail at: [email or web address]  Will anyone else be joining your video visit? No  {If patient encounters technical issues they should call [phone]      Video-Visit Details    Type of service:  Video Visit    Video Start Time: 2:45 PM  Video End Time: 3:05 PM    Originating Location (pt. Location): Home    Distant Location (provider location):  [facility]     Platform used for Video Visit: [employer]    During this virtual visit the patient is located in [place], patient " "verifies this as the location during the entirety of this visit.       New Weight Management Nutrition Consultation    Jose Francisco Sommers is a 31 year old male presents today for new weight management nutrition consultation.  Patient referred by Dr. Shoemaker on May 31, 2022.    Patient with Co-morbidities of obesity including:  Type II DM no  Renal Failure no  Sleep apnea no  Hypertension yes   Dyslipidemia no  Joint pain no  Back pain no  GERD no     Anthropometrics:  Estimated body mass index is 36.12 kg/m  as calculated from the following:    Height as of 12/1/20: 1.842 m (6' 0.5\").    Weight as of 2/2/22: 122.5 kg (270 lb).     Current weight (5/31/22): 275 lbs per pt    Medications for Weight Loss:  Naltrexone    NUTRITION HISTORY  Food allergies: none  Food intolerances: none  Supplements: MVI  Previous methods of diet modification for weight loss: calorie tracking    Pt lost 50 lbs about 2 years ago. Regained 60 lbs from taking anti-psychotics.   Tries not to eat after dinner. Struggling with water intake. Wants to switch up medications before tracking calories. Not ready to make any large changes.     Recent food recall:  Breakfast: skips  Lunch: fast food  Dinner: varies. Eats with parents  Snacks: rarely. Cereal, ramen  Beverages: water, diet soda, tea  Alcohol: none  Dining out: often.     Physical Activity:  Paces throughout the day     Nutrition Prescription  Recommended energy/nutrient modification.  1500 calories/day (per MD)    Nutrition Diagnosis  Food and nutrition related knowledge deficit r/t lack of prior exposure to calorie counting and nutrition education aeb pt unable to verbalize understanding of 1500 calorie/day diet for weight loss.    Obesity r/t long history of positive energy balance aeb BMI >30.    Nutrition Intervention  Materials/education provided on hypocaloric diet for weight loss. Discussed 1500 calorie/day diet, Volumetric eating to help satiety level on fewer calories; portion " "control and healthy food choices (Plate Method and Volumetrics handouts), 100 calorie snack choices, meal and snack planning and websites, sample meal plans     Patient demonstrates understanding.    Expected Engagement: good  Follow-Up Plans: calorie tracking     Nutrition Goals  1) 9\" Plate method (1/2 plate non-starchy vegetables/fruit, 1/4 plate lean protein, 1/4 plate whole grain starch - no more than 1/2 cup carb/meal)  2) Eat 3 meals per day  3) Consume 60-90 g protein per day  4) Drink 48-64 ounces of fluid per day  5) Increase physical activity as able    Protein Sources   [email or web address]     Carbohydrates  [email or web address]     Mindful Eating  [email or web address]     Summary of Volumetrics Eating Plan  [email or web address]     Follow-Up:  1 month, PRN    Time spent with patient: 20 minutes.  CHRISTINA VERA RD, LD            Again, thank you for allowing me to participate in the care of your patient.      Sincerely,    CHRISTINA VERA RD      "

## 2022-05-31 NOTE — PROGRESS NOTES
"Jose Francisco Sommers is a 31 year old male who is being evaluated via a billable video visit.      The patient has been notified of following:     \"This video visit will be conducted via a call between you and your physician/provider. We have found that certain health care needs can be provided without the need for an in-person physical exam.  This service lets us provide the care you need with a video conversation.  If a prescription is necessary we can send it directly to your pharmacy.  If lab work is needed we can place an order for that and you can then stop by our lab to have the test done at a later time.    Video visits are billed at different rates depending on your insurance coverage.  Please reach out to your insurance provider with any questions.    If during the course of the call the physician/provider feels a video visit is not appropriate, you will not be charged for this service.\"    Patient has given verbal consent for Video visit? Yes  How would you like to obtain your AVS? MyChart  If you are dropped from the video visit, the video invite should be resent to: Send to e-mail at: [email or web address]  Will anyone else be joining your video visit? No  {If patient encounters technical issues they should call [phone]      Video-Visit Details    Type of service:  Video Visit    Video Start Time: 2:45 PM  Video End Time: 3:05 PM    Originating Location (pt. Location): Home    Distant Location (provider location):  [facility] [place]     Platform used for Video Visit: [employer]    During this virtual visit the patient is located in [place], patient verifies this as the location during the entirety of this visit.       New Weight Management Nutrition Consultation    Jose Francisco Sommers is a 31 year old male presents today for new weight management nutrition consultation.  Patient referred by Dr. Shoemaker on May 31, 2022.    Patient with Co-morbidities of obesity " "including:  Type II DM no  Renal Failure no  Sleep apnea no  Hypertension yes   Dyslipidemia no  Joint pain no  Back pain no  GERD no     Anthropometrics:  Estimated body mass index is 36.12 kg/m  as calculated from the following:    Height as of 12/1/20: 1.842 m (6' 0.5\").    Weight as of 2/2/22: 122.5 kg (270 lb).     Current weight (5/31/22): 275 lbs per pt    Medications for Weight Loss:  Naltrexone    NUTRITION HISTORY  Food allergies: none  Food intolerances: none  Supplements: MVI  Previous methods of diet modification for weight loss: calorie tracking    Pt lost 50 lbs about 2 years ago. Regained 60 lbs from taking anti-psychotics.   Tries not to eat after dinner. Struggling with water intake. Wants to switch up medications before tracking calories. Not ready to make any large changes.     Recent food recall:  Breakfast: skips  Lunch: fast food  Dinner: varies. Eats with parents  Snacks: rarely. Cereal, ramen  Beverages: water, diet soda, tea  Alcohol: none  Dining out: often.     Physical Activity:  Paces throughout the day     Nutrition Prescription  Recommended energy/nutrient modification.  1500 calories/day (per MD)    Nutrition Diagnosis  Food and nutrition related knowledge deficit r/t lack of prior exposure to calorie counting and nutrition education aeb pt unable to verbalize understanding of 1500 calorie/day diet for weight loss.    Obesity r/t long history of positive energy balance aeb BMI >30.    Nutrition Intervention  Materials/education provided on hypocaloric diet for weight loss. Discussed 1500 calorie/day diet, Volumetric eating to help satiety level on fewer calories; portion control and healthy food choices (Plate Method and Volumetrics handouts), 100 calorie snack choices, meal and snack planning and websites, sample meal plans     Patient demonstrates understanding.    Expected Engagement: good  Follow-Up Plans: calorie tracking     Nutrition Goals  1) 9\" Plate method (1/2 plate " non-starchy vegetables/fruit, 1/4 plate lean protein, 1/4 plate whole grain starch - no more than 1/2 cup carb/meal)  2) Eat 3 meals per day  3) Consume 60-90 g protein per day  4) Drink 48-64 ounces of fluid per day  5) Increase physical activity as able    Protein Sources   [email or web address]     Carbohydrates  [email or web address]     Mindful Eating  [email or web address]     Summary of Volumetrics Eating Plan  [email or web address]     Follow-Up:  1 month, PRN    Time spent with patient: 20 minutes.  CHRISTINA VERA RD, LD

## 2022-05-31 NOTE — PROGRESS NOTES
"           New Comprehensive Interdisciplinary Medical Weight Management Consult      PATIENT:  Jose Francisco Sommers  MRN:         [id number]  :         1990  CAMILO:         2022    Dear Colleagues,    I had the pleasure of seeing your patient, Jose Francisco Sommers. Full intake/assessment was done to determine barriers to weight loss success and develop a treatment plan. Jose Francisco Sommers is a 31 year old male interested in treatment of medical problems associated with excess weight. He has a height of 6' .5\", a weight of 275 lbs 0 oz, and the calculated Body mass index is 36.78 kg/m .    ASSESSMENT/PLAN:  Jose Francisco is a patient with mature onset obesity without significant element of familial/genetic influence and with current health consequences. He does need aggressive weight loss plan due to Body mass index is 36.78 kg/m .and co-morbid conditions of HTN, hypertriglyceridemia. Jose Francisco Sommers Patient endorses the following eating, sleeping, and exercise habits (see survey below).    His problem is complicated by psychiatric condition, psychotropic medications, recent weight gain while taking psychotropics    His ability to lose weight is impacted by lack of education on nutrition and dietary needs, socioeconomic situation and medications associated with weight gain.    He has tried Vyvance, Adderall, Ritalin but had trouble sleeping, so he asked them to stop.  He has gained about 60 lbs recently which he attributes to olanzapine. He also quit smoking.    Mom is present during the visit today and asks about a newer formulation of combination of zyprexa and an opioid antagonist (samidorphan) - patient is not taking this - yet since it is not available.    PLAN:    Mental health/Medication barriers   Ancillary testing:  N/A. He may need monitoring of TSH, Free T4, PTH (parathyroid hormone) on a quarterly or as needed basis if he is on lithium, defer to  team.  Food Plan:  Volumetrics.   Activity Plan:   Walking " "program.  Supplementary:   team re medication options not associated with weight gain.   Medication:  The patient will begin medication in pursuit of improved medical status as influenced by body weight. He will start naltrexone. Patient was made aware that naltrexone is not approved for the treatment of obesity.  There is a mutual understanding of the goals and risks of this therapy. The patient is in agreement. He is educated on dosage regimen and possible side effects.    Patient Instructions:    Nice to talk with you today in virtual clinic!    For cravings, please start taking Naltrexone 50 mg tabs daily, Take it one to two hours prior to worst cravings. Start with 1/2 tab and if tolerated, on 3rd day, increase to full 50 mg tab daily.    Please see Lauren Turner Bloch, Pharm D re: medication mgmt. Some of your medications, as you mentioned, are associated with weight gain, fatigue, and cognitive impairment    1,500 calorie meal plan to lose 1 lbs weekly without exercise (based on REE calc of 1,992)  Ht 1.842 m (6' 0.5\")   Wt 124.7 kg (275 lb)   BMI 36.78 kg/m      Use meal replacements such as Tricia's meals, Lean Cuisines, Healthy Choice, Smart Ones, Weight Watchers Meals, and Slim Fast and Glucerna shakes and supplement with fresh fruits and vegetables    Penzey's spices can make your meals taste good    Check out Freshly, Purple Carrot, Home , Green , Blue Apron, Hungry Root     Please drink a lot of water daily. Most people typically need about 2 liters of water daily and more if they are exercising, have a large weight, or have a fever or illness.  Please keep a food journal of what you eat, calories in what you eat  Consider using applications for smart phones such as PetBoxPal  Focus on wet volumetrics, meaning, eat more foods that are high in water and fiber such as fruits and vegetables in order to get that full feeling. These are also good for your overall health as well.  Check out  " "Glory Myers from [facility] - she has cookbooks with low calorie volumetric recipes  Try Cooking Light recipes for low calorie meal preparation and planning    Start walking program working up to 4 miles daily       RTC: monthly with ancillary support staff, quarterly as needed w/ provider    Please use [facility] to schedule your appointment(s) or call [phone] to schedule in [facility]      Best Wishes,  Dr. Monique Shoemaker MD, MPH  Endocrinologist        He has the following co-morbidities:       5/26/2022   I have the following health issues associated with obesity: None of the above   I have the following symptoms associated with obesity: None of the above       Patient Goals 5/26/2022   I am interested in having a healthier weight to diminish current health problems: Yes   I am interested in having a healthier weight in order to prevent future health problems: Yes   I am interested in having a healthier weight in order to have a future surgery: No       Referring Provider 5/26/2022   Please name the provider who referred you to Medical Weight Management.  If you do not know, please answer: \"I Don't Know\". [facility]       Weight History 5/26/2022   How concerned are you about your weight? Very Concerned   Would you describe your weight gain as gradual? Yes   I became overweight: As an Adult   The following factors have contributed to my weight gain:  Mental Health Issues, Started on Medication that Caused Weight Gain, After Quitting Smoking, Eating Wrong Types of Food, Lack of Exercise   I have tried the following methods to lose weight: Watching Portions or Calories, Exercise, Medications   Please list the medications you have tried.  Metformin   My lowest weight since age 19 was: 190   My highest weight since age 19 was: 275   The most weight I have ever lost was: (lbs) 40   I have the following family history of obesity/being overweight:  I am the only one in my " immediate family who is overweight   Has anyone in your family had weight loss surgery? No   How has your weight changed over the last year?  Gained   How many pounds? Around 50       Diet Recall Review with Patient 5/26/2022   Do you typically eat breakfast? Yes   If you do eat breakfast, what types of food do you eat? Cereal   Do you typically eat lunch? Yes   If you do eat lunch, what types of food do you typically eat?  Mcdonalds   Do you typically eat supper? Yes   If you do eat supper, what types of food do you typically eat? Varies   Do you typically eat snacks? No   Do you like vegetables?  Yes   Do you drink water? Yes   How many glasses of juice do you drink in a typical day? 0   How many of glasses of milk do you drink in a typical day? 0   If you do drink milk, what type? Skim   How many 8oz glasses of sugar containing drinks such as Zak-Aid/sweet tea do you drink in a day? 0   How many cans/bottles of sugar pop/soda/tea/sports drinks do you drink in a day? 0   How many cans/bottles of diet pop/soda/tea or sports drink do you drink in a day? 0   How often do you have a drink of alcohol? Never       Eating Habits 5/26/2022   Generally, my meals include foods like these: bread, pasta, rice, potatoes, corn, crackers, sweet dessert, pop, or juice. A Few Times a Week   Generally, my meals include foods like these: fried meats, brats, burgers, french fries, pizza, cheese, chips, or ice cream. Everyday   Eat fast food (like McDonalds, Burger Konrad, Taco Bell). Everyday   Eat at a buffet or sit-down restaurant. Once a Week   Eat most of my meals in front of the TV or computer. Never   Often skip meals, eat at random times, have no regular eating times. A Few Times a Week   Rarely sit down for a meal but snack or graze throughout.  A Few Times a Week   Eat extra snacks between meals. A Few Times a Week   Eat most of my food at the end of the day. Less Than Weekly   Eat in the middle of the night or wake up at  night to eat. Never   Eat extra snacks to prevent or correct low blood sugar. Never   Eat to prevent acid reflux or stomach pain. Never   Worry about not having enough food to eat. Never   Have you been to the food shelf at least a few times this year? No   I eat when I am depressed. Never   I eat when I am stressed. A Few Times a Week   I eat when I am bored. Everyday   I eat when I am anxious. Never   I eat when I am happy or as a reward. Less Than Weekly   I feel hungry all the time even if I just have eaten. A Few Times a Week   Feeling full is important to me. Almost Everyday   I finish all the food on my plate even if I am already full. Almost Everyday   I can't resist eating delicious food or walk past the good food/smell. Less Than Weekly   I eat/snack without noticing that I am eating. Never   I eat when I am preparing the meal. Never   I eat more than usual when I see others eating. Never   I have trouble not eating sweets, ice cream, cookies, or chips if they are around the house. Almost Everyday   I think about food all day. Less Than Weekly   What foods, if any, do you crave? Sweets/Candy/Chocolate   Please list any other foods you crave? Restaurant food       Amount of Food 5/26/2022   I make myself vomit what I have eaten or use laxatives to get rid of food. Never   I eat a large amount of food, like a loaf of bread, a box of cookies, a pint/quart of ice cream, all at once. Weekly   I eat a large amount of food even when I am not hungry. Never   I eat rapidly. Never   I eat alone because I feel embarrassed and do not want others to see how much I have eaten. Never   I eat until I am uncomfortably full. Monthly   I feel bad, disgusted, or guilty after I overeat. Never   I make myself vomit what I have eaten or use laxatives to get rid of food. Never       Activity/Exercise History 5/26/2022   How much of a typical 12 hour day do you spend sitting? Less Than Half the Day   How much of a typical 12 hour  day do you spend lying down? Less Than Half the Day   How much of a typical day do you spend walking/standing? Half the Day   How many hours (not including work) do you spend on the TV/Video Games/Computer/Tablet/Phone? 1 Hour or Less   How many times a week are you active for the purpose of exercise? Once a Week   What keeps you from being more active? Other   How many total minutes do you spend doing some activity for the purpose of exercising when you exercise? More Than 30 Minutes       PAST MEDICAL HISTORY:  No past medical history on file.    Work/Social History Reviewed With Patient 5/26/2022   My employment status is: Unemployed   What is your marital status? Single   If in a relationship, is your significant other overweight? N/A   Do you have children? No   If you have children, are they overweight? N/A   Who do you live with?  Parents   Are they supportive of your health goals? Yes   Who does the food shopping?  Dad       Mental Health History Reviewed With Patient 5/26/2022   Have you ever been physically or sexually abused? No   If yes, do you feel that the abuse is affecting your weight? N/A   If yes, would you like to talk to a counselor about the abuse? N/A   How often in the past 2 weeks have you felt little interest or pleasure in doing things? For Several Days   Over the past 2 weeks how often have you felt down, depressed, or hopeless? Not at all       Sleep History Reviewed With Patient 5/26/2022   How many hours do you sleep at night? 10   Do you think that you snore loudly or has anybody ever heard you snore loudly (louder than talking or so loud it can be heard behind a shut door)? No   Has anyone seen or heard you stop breathing during your sleep? No   Do you often feel tired, fatigued, or sleepy during the day? No   Do you have a TV/Computer in your bedroom? Yes       MEDICATIONS:   Current Outpatient Medications   Medication Sig Dispense Refill     FLUoxetine (PROZAC) 20 MG capsule Take 1  capsule (20 mg) by mouth daily 30 capsule 1     lithium ER (LITHOBID) 300 MG CR tablet Take 4 tablets (1,200 mg) by mouth At Bedtime 120 tablet 2     OLANZapine (ZYPREXA) 15 MG tablet Take 2 tablets (30 mg) by mouth At Bedtime 60 tablet 1     propranolol ER (INDERAL LA) 60 MG 24 hr capsule Take 60 mg by mouth daily       triamcinolone (KENALOG) 0.1 % external cream Apply topically 2 times daily (Patient not taking: Reported on 5/31/2022) 30 g 2       ALLERGIES:   Allergies   Allergen Reactions     Sulfa Drugs Rash       PHYSICAL EXAM:  There were no vitals taken for this virtual visit.  Gen: well appearing, nad, pleasant and conversant  HEENT: EOMI  Neuro: A&O    LABS:    Lab Results   Component Value Date    A1C 5.1 05/10/2022    A1C 4.9 07/26/2021    A1C 4.7 06/01/2020    A1C 4.9 05/07/2019    A1C 5.0 12/06/2018    A1C 4.6 09/30/2016     Creatinine   Date Value Ref Range Status   05/10/2022 1.30 (H) 0.66 - 1.25 mg/dL Final   12/04/2020 1.11 0.66 - 1.25 mg/dL Final     TSH   Date Value Ref Range Status   05/10/2022 5.36 (H) 0.40 - 4.00 mU/L Final   12/04/2020 2.01 0.40 - 4.00 mU/L Final     LDL Cholesterol Calculated   Date Value Ref Range Status   07/26/2021   Final     Comment:     Cannot estimate LDL when triglyceride exceeds 400 mg/dL  Age 0-19 years:  Desirable: 0-110 mg/dL   Borderline high: 110-129 mg/dL   High: >= 130 mg/dL    Age 20 years and older:  Desirable: <100mg/dL  Above desirable: 100-129 mg/dL   Borderline high: 130-159 mg/dL   High: 160-189 mg/dL   Very high: >= 190 mg/dL   11/13/2020 21 <100 mg/dL Final     Comment:     Desirable:       <100 mg/dl     LDL Cholesterol Direct   Date Value Ref Range Status   07/26/2021 101 (H) <100 mg/dL Final     Comment:     Age 0-19 years:  Desirable: 0-110 mg/dL   Borderline high: 110-129 mg/dL   High: >= 130 mg/dL    Age 20 years and older:  Desirable: <100mg/dL  Above desirable: 100-129 mg/dL   Borderline high: 130-159 mg/dL   High: 160-189 mg/dL   Very high:  >= 190 mg/dL     Video: approximately 20 minutes      Total Time: 40 min spent on chart review, evaluation, management, counseling, education, & motivational interviewing on the day of encounter

## 2022-06-01 NOTE — PATIENT INSTRUCTIONS
"Hi Drew!    Follow-up with RD as needed    Thank you,    Meena Louis, RD, LD  If you would like to schedule or reschedule an appointment with the RD, please call [phone]    Nutrition Goals  1) 9\" Plate method (1/2 plate non-starchy vegetables/fruit, 1/4 plate lean protein, 1/4 plate whole grain starch - no more than 1/2 cup carb/meal)  2) Eat 3 meals per day  3) Consume 60-90 g protein per day  4) Drink 48-64 ounces of fluid per day  5) Increase physical activity as able    Protein Sources   [email or web address]     Carbohydrates  [email or web address]     Mindful Eating  [email or web address]     Summary of Volumetrics Eating Plan  [email or web address]       Interested in working with a health ? Health coaches work with you to improve your overall health and wellbeing. They look at the whole person, and may involve discussion of different areas of life, including, but not limited to the four pillars of health (sleep, exercise, nutrition, and stress management). Discuss with your care team if you would like to start working a health .    Health Coaching-3 Pack:    $99 for three health coaching visits    Visits may be done in person or via phone    Coaching is a partnership between the  and the client; Coaches do not prescribe or diagnose    Coaching helps inspire the client to reach his/her personal goals    COMPREHENSIVE WEIGHT MANAGEMENT PROGRAM  VIRTUAL SUPPORT GROUPS    For Support Group Information:      We offer support groups for patients who are working on weight loss and considering, preparing for or have had weight loss surgery.   There is no cost for this opportunity.  You are invited to attend the?Virtual Support Groups?provided by any of the following locations:    [facility] via [employer] Teams with Dorie Strong RN  2.   [facility] via [employer] with Roel Westbrook, PhD, LP  3.   Drums via [employer] with Lillian Roe RN  4.   " "[facility] via Microsoft Teams with Lillian Nichole [facility]    The following Support Group information can also be found on our website:  [email or web address]      [facility] Weight Loss Surgery Support Group    [facility] Weight Loss Surgery Support Group  The support group is a patient-lead forum that meets monthly to share experiences, encouragement and education. It is open to those who have had weight loss surgery, are scheduled for surgery, and those who are considering surgery.   WHEN: This group meets on the 3rd Wednesday of each month from 5:00PM - 6:00PM virtually using Microsoft Teams.   FACILITATOR: Led by Dorie Strong RD, LD, RN, the program's Clinical Coordinator.   TO REGISTER: Please contact the clinic via Keldeal or call the nurse line directly at [phone] to inform our staff that you would like an invite sent to you and to let us know the email you would like the invite sent to. Prior to the meeting, a link with directions on how to join the meeting will be sent to you.    2022 Meetings  January 19: \"Let's Talk\" a time for the group to share.  February 16: \"Let's Talk\" a time for the group to share.  March 16: Guest Speakers: Psychologists, Ceci Taylor, PhD,LP and Mary Gasca, PsyD,  April 20: Guest Speaker: Health , Trudy Dolan, CH,CHES, CPT  May 18: Guest Speaker: DietitianHugh, ALEJANDRA, LP  Fariha 15: \"Let's Talk\" a time for the group to share.  July 20: \"Let's Talk\" a time for the group to share.  August 17: TBA  September 21: TBA  October 19: Guest Speaker: Dr Sascha Domínguez MD Pulmonologist and Sleep Medicine Physician, \"Getting a Good Night's Sleep\".  November 16: TBA  December 21: TBA    [facility] Support Groups    Connections: Bariatric Care Support Group?  This is open to all [facility] (and those external to this program) pre- " "and post- operative bariatric surgery patients as well as their support system.   WHEN: This group meets the 2nd Tuesday of each month from 6:30 PM - 8:00 PM virtually using Microsoft Teams.   FACILITATOR: Led by Roel Westbrook, Ph.D who is a Licensed Psychologist with the [facility] Comprehensive Weight Management Program.   TO REGISTER: Please send an email to Roel Westbrook, Ph.D., LP at?[email or web address]?if you would like an invitation to the group and to learn about using Microsoft Teams.    2022 Meetings January 11: Ketty Higuera, PharmD, Pharmacy Resident at [facility], \"Medications and Bariatric Surgery\".  February 8: Open Forum  March 8  April 12  May 10  Fariha 14    Connections: Post-Operative Bariatric Surgery Support Group  This is a support group for [facility] bariatric patients (and those external to [facility]) who have had bariatric surgery and are at least 3 months post-surgery.  WHEN: This support group meets the 4th Wednesday of the month from 11:00 AM - 12:00 PM virtually using Microsoft Teams.   FACILITATOR: Led by Certified Bariatric Nurse, Lillian Roe RN.   TO REGISTER: Please send an email to Lillian at [email or web address] if you would like an invitation to the group and to learn about using Microsoft Teams.    2022 Meetings  January 26  February 23  March 23  April 27  May 25  Fariha 22    [facility] Healthy Lifestyle Virtual Support Group    Healthy Lifestyle Virtual Support Group?  This is 60 minutes of small group guided discussion, support and resources. All are welcome who want a healthy lifestyle.  WHEN: This group meets monthly on a Friday from 12:30 PM - 1:30 PM virtually using Microsoft Teams.   FACILITATOR: Led by National Board Certified Health and , Lillian Nichole [facility].   TO REGISTER: Please send an email to Lillian [email or web address] to receive monthly invites to the group or if " "you have any questions about having a health .  Prior to the meeting, a link with directions on how to join the meeting will be sent to you.    2022 Meetings  January 21: Alana Ramirez MS, RN, CIC, CBN, \"Healthy Habits\"  February 25: Open Forum  March 18: \"Setting Limits and Boundaries\"  April 29: Latisha Terrell RD, \"Meal Planning Made Easy\"  May 20: Open Forum  June: To be determined                "

## 2022-06-03 ENCOUNTER — VIRTUAL VISIT (OUTPATIENT)
Dept: PSYCHIATRY | Facility: CLINIC | Age: 32
End: 2022-06-03
Attending: PSYCHIATRY & NEUROLOGY
Payer: COMMERCIAL

## 2022-06-03 DIAGNOSIS — F25.0 SCHIZOAFFECTIVE DISORDER, BIPOLAR TYPE (H): Primary | ICD-10-CM

## 2022-06-03 PROCEDURE — 99214 OFFICE O/P EST MOD 30 MIN: CPT | Mod: GT | Performed by: STUDENT IN AN ORGANIZED HEALTH CARE EDUCATION/TRAINING PROGRAM

## 2022-06-03 ASSESSMENT — PATIENT HEALTH QUESTIONNAIRE - PHQ9
SUM OF ALL RESPONSES TO PHQ QUESTIONS 1-9: 11
SUM OF ALL RESPONSES TO PHQ QUESTIONS 1-9: 11
10. IF YOU CHECKED OFF ANY PROBLEMS, HOW DIFFICULT HAVE THESE PROBLEMS MADE IT FOR YOU TO DO YOUR WORK, TAKE CARE OF THINGS AT HOME, OR GET ALONG WITH OTHER PEOPLE: EXTREMELY DIFFICULT

## 2022-06-03 NOTE — PROGRESS NOTES
Jose Francisco Sommers is a 31 year old who has consented to receive services via billable video visit.      Pt will join video visit via: Web International English  If there are problems joining the visit, send backup video invite via: Text to preferred phone: [phone]      Originating Location (patient location): Patient's home  Distant Location (provider location): [facility] [place] [facility]    Will anyone else be joining the video visit? Mom    How would you prefer to obtain AVS?: [employer]  Answers for HPI/ROS submitted by the patient on 6/3/2022  If you checked off any problems, how difficult have these problems made it for you to do your work, take care of things at home, or get along with other people?: Extremely difficult  PHQ9 TOTAL SCORE: 11

## 2022-06-03 NOTE — PROGRESS NOTES
"VIDEO VISIT  Jose Francisco Sommers is a 30 year old patient who is being evaluated via a billable video visit.      The patient has been notified of following:   \"We have found that certain health care needs can be provided without the need for an in-person physical exam. This service lets us provide the care you need with a video conversation. If a prescription is necessary we can send it directly to your pharmacy. If lab work is needed we can place an order for that and you can then stop by our lab to have the test done at a later time. Insurers are generally covering virtual visits as they would in-office visits so billing should not be different than normal.  If for some reason you do get billed incorrectly, you should contact the billing office to correct it and that number is in the AVS .    Patient has given verbal consent for video visit?: Yes   How would you like to obtain your AVS?: [employer]  AVS SmartPhrase [PsychAVS] has been placed in 'Patient Instructions': Yes      Video- Visit Details  Type of service:  video visit for mental health treatment.  Time of service:    Date:  6/3/22    Video Start Time:  12:00PM        Video End Time:  12:30PM    Reason for video visit: COVID-19  Originating Site (patient location):  Patient's home  Distant Site (provider location):  [facility]  Mode of Communication:  Video Conference via [facility]  Psychiatry Clinic  MEDICAL PROGRESS NOTE     CARE TEAM:  PCP- Clinic Provider, Psychotherapist- None    Jose Francisco Sommers is a 30 year old who prefers the name Manuel and uses pronouns he, him.      DIAGNOSIS     1) Schizoaffective disorder, Bipolar type, most recent episode depressed  2) ADHD, predominantly inattentive type     ASSESSMENT     TODAY: Manuel reports that his recent decrease in Zyprexa was beneficial in terms of clarity of thinking. He and his mother both denied any concerns for current symptoms of lauren or " depression. Manuel reports that he noticed some improvement when Prozac started however he did have difficulty articulating what this improvement consisted of.     He and his mother continue to report that while Manuel is stable he has not returned to his previous baseline. His mother predominately notices this in terms of the quality/speed in his thinking. It is unclear at this time if he is having more difficulty thinking due to use of psychotropic medication or because of an underlying psychotic process however with reported improvement since lowering his dose of Zyprexa the former may be more likely. They were open to lowering his dose of Zyprexa again today, in the past he has been stable on 10-12.5mg and this may be a good target dose.    Discussed that Prozac may increase Propranolol levels (currently prescribed by PCP), Manuel denied any symptoms of orthostasis including dizziness or lightheadedness when standing. He expressed a desire to increase his Prozac again today, because of this interaction we recommended that Manuel discuss alternative medications for high blood pressure with his PCP (has upcoming appointment in June) or that we consider alternative antidepressants in the future.    Manuel was also interested in potentially starting Depakote as he would like to completely come off of Zyprexa in the future but would like to find an alternative agent with similar benefit. Discussed that weight gain is a potential side effect of Depakote and Manuel was not interested in any medications that may impact his weight. Discussed that many of the medications that we use to prevent lauren/psychosis can be associated with weight gain, however this does not necessarily mean he will experience this side effect. He has not tried Geodon in the past and this could be an option in the future, he also had a limited trial of Vraylar and did report an interest in retrying this in the future.    Manuel did meet with Weight Management  between appointments and was started on naltrexone. Discussed the importance of lifestyle modification in addition to medication today.    Future Considerations:  - topamax  - trial of ziprasidone or depakote   - retrial of cariprazine  - Metadate    MNPMP was not checked today     PLAN                                                                                                                1) Meds-  - Continue Lithium 1,200 mg HS  - Decrease olanzapine to 20mg HS  - Continue prozac 20mg daily    Prescribed by PCP  - Propranolol ER 60mg qD      2) Psychotherapy- none, encouraged    3) Next due-  Labs- AP labs completed 5/2022, Cr and TSH elevated (T4 wnl)  EKG- as needed  Rating scales- N/A    4) Referrals- none    5) Dispo- RTC with new resident       PERTINENT BACKGROUND                           [most recent eval 07/11/21]      Jose Francisco Sommers first experienced mental health issues in grade school and has received treatment for ADHD, depression, psychosis and substance use. Notably, this patient had FEP in 2015 in the setting of cannabis abuse and has been stable on olanzapine and lithium since that time. He has never lived independently and has only been slowly developing insight into his illness and past symptoms. Net Elementight testing was completed noting patient is a poor CYP2D6 metabolizer. Past records from Dr. Mike Powers were received in summer 2018 to assist with diagnostic clarity, however they were hand written notes that were illegible though accompanied by typed neuropsychology consult from [facility]. This documentation was unable to support a diagnosis of bipolar disorder at that time though noted potential Asperger's diagnosis and ADHD. Given that there has been evidence for psychotic signs and symptoms outside the context of mood episodes, a schizophrenia spectrum disorder such as schizoaffective disorder diagnosis seems most accurate. He has had two hospitalizations for decompensated psychosis  "in the setting of decreasing his olanzapine dose.    Psych pertinent item history includes psychosis [sxs include disorganization, possible catatonia], mutiple psychotropic trials, psych hosp (<3) and SUBSTANCE USE: alcohol and cannabis     SUBJECTIVE     - decided to start naltrexone  - appointment with new primary in June  - decrease in Zyprexa went fine, didn't notice much of a difference   - does feel like he is noticing a little more clear overall   - hoping to go lower on the olanzapine in the future    - hoping this will be helpful for weight gain   - in the past has done 12.5mg has been the \"sweet spot\"  - no symptoms concerning for lauren  - felt better after starting Prozac, doesn't feel like he can describe this accurately  - interested in depakote, wants to start this so it would be easier to go down on olanzapine    RECENT PSYCH ROS:   See HPI    Adverse Effects: increased appetite  Pertinent Negative Symptoms: No psychosis or lauren  Recent Substance Use:     Tobacco - see HPI  No alcohol or cannabis     SOCIAL HISTORY                                 pt reported     Social Hx:  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, 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     Partner/ - single  Children- None      Living situation- lives with parents in [place]  Social/ Spiritual Support- family, friends, Denominational hemalatha     Feels Safe at Home- yes   Legal- None       PSYCH and SUBSTANCE USE Critical Summary Points since July 2020 7/12/21 - started Seroquel taper  8/2/21 - continue Seroquel taper  9/13/21 - started Belsomra for sleep  10/8/21 - Vyvanse started  11/5/21 - Vyvanse discontinued, Adderall started  12/10/21 - Adderall discontinued, Ritalin started  1/14/22 - Ritalin and Belsomra discontinued by patient, Vraylar started, Zyprexa decreased  2/25/22 - Focalin started (patient did not end up taking)  3/15/22 - Vraylar discontinued, Zyprexa increased on his own accord between sessions to 20mg  - Zyprexa " "increased to 25mg between visits   4/22/22 - Zyprexa increased to 30mg  - Prozac 20mg started  5/20/22 - Zyprexa decreased to 25mg  6/3/22 - Zyprexa decreased to 20mg     PAST MED TRIALS     Prozac 20 mg - limited response, less irritable, first episode of \"rage\"  Zoloft  Wellbutrin - did not like    Risperdal up to 5 mg - some confusion, slow processing, withdrawn, ?\"catatonia\", panic attacks  Zyprexa 5 mg less anxious overall improvement  Lithium - calmer, overall better  Latuda 20 mg - severe fatigue and depression  Seroquel - weight gain, daytime drowsiness  Abilify ?25 mg - felt like he was stuck in his thoughts, t irritable and agitated on every day    Lamictal    Concerta 18 mg - not helpful for focus, no improvement in motivation, mom thought this helped with insight  Provigil 150 mg - triggered increased alcohol use (2010, did not happen with other retrials), last trial went well but got to a point where he felt like he didn't need it  Adderall 10-20 mg - improved concentration, but \"cold personality\" and perseveration, retrialed 2021 with initial benefit  Strattera  Intuniv    Synthroid 75 mcg - added to help with mood.  Gabapentin  Clonazepam 0.5-1.5 mg - calming, helped with sleep and \"catatonia\"  Xanax  Trazodone - worsened mood    Has not tried: Focalin, Geodon      MEDICAL HISTORY and ALLERGY     ALLERGIES: Sulfa drugs    Patient Active Problem List   Diagnosis     Schizoaffective disorder, bipolar type (H)     Hx of psychiatric care     Elevated liver enzymes     Fatty liver     High blood triglycerides     History of cannabis abuse     History of cocaine abuse (H)     Psychosis (H)     Elevated blood pressure reading without diagnosis of hypertension     Constipation     No past medical history on file.     MEDICAL REVIEW OF SYSTEMS   Contraception- not discussed    A comprehensive review of systems was performed and is negative other than noted in the HPI.     MEDICATIONS     Current Outpatient " Medications   Medication     FLUoxetine (PROZAC) 20 MG capsule     lithium ER (LITHOBID) 300 MG CR tablet     naltrexone (DEPADE/REVIA) 50 MG tablet     OLANZapine (ZYPREXA) 15 MG tablet     propranolol ER (INDERAL LA) 60 MG 24 hr capsule     triamcinolone (KENALOG) 0.1 % external cream     No current facility-administered medications for this visit.      VITALS   There were no vitals taken for this visit.    MENTAL STATUS EXAM     Alertness: alert   Appearance: appropriately groomed  Behavior/Demeanor: cooperative, with good eye contact   Speech: regular  Language: intact  Psychomotor: normal or unremarkable  Mood: description consistent with euthymia  Affect: restricted; congruent to: mood- yes, content- yes  Thought Process/Associations: unremarkable  Thought Content:  Reports none;    Perception:  Reports none;  Denies hallucinations  Insight: adequate to good  Judgment: good  Cognition: does  appear grossly intact; formal cognitive testing was not done  Gait and Station: N/A (telehealth)     LABS and DATA     PHQ9 TODAY = 11  PHQ 3/15/2022 4/22/2022 6/3/2022   PHQ-9 Total Score 12 9 11   Q9: Thoughts of better off dead/self-harm past 2 weeks Not at all Not at all Not at all       FERNANDO-7 SCORE 2/8/2021 12/10/2021 12/10/2021   Total Score 3 (minimal anxiety) - 10 (moderate anxiety)   Total Score 3 10 10       Recent Labs   Lab Test 05/10/22  0855 07/26/21  1339   * 85   A1C 5.1 4.9     Recent Labs   Lab Test 07/26/21  1339 11/13/20  0758   CHOL 247* 111   TRIG 554* 211*   * 20   HDL 42 49     Recent Labs   Lab Test 05/10/22  0855 07/26/21  1339   AST 31 36   * 68   ALKPHOS 81 70     Recent Labs   Lab Test 05/10/22  0855 07/26/21  1339 12/04/20  1414 11/13/20  0758 06/01/20  1339   WBC 7.8 8.0 11.0   < > 7.5   ANEU  --   --  8.0  --  4.8   HGB 15.1 14.7 14.3   < > 15.1    263 285   < > 265    < > = values in this interval not displayed.     Recent Labs   Lab Test 05/10/22  0855  07/26/21  1339 12/16/20  0718   LITHIUM 0.8 0.6 0.84     Recent Labs   Lab Test 05/10/22  0855 07/26/21  1339   CR 1.30* 1.12   GFRESTIMATED 76 88    138   POTASSIUM 3.4 3.7   ADONAY 9.5 9.4     Recent Labs   Lab Test 05/10/22  0855 07/26/21  1341   SG 1.010 1.015     Recent Labs   Lab Test 05/10/22  0855 07/26/21  1339   TSH 5.36* 2.22     Recent Labs   Lab Test 05/10/22  0855 07/26/21  1339 12/04/20  1414 11/13/20  0758 06/01/20  1339   WBC 7.8 8.0 11.0   < > 7.5   ANEU  --   --  8.0  --  4.8    < > = values in this interval not displayed.     ECG 11/20 QTc = 436ms     PSYCHOTROPIC DRUG INTERACTIONS     OLANZAPINE + LITHIUM: CNS depression    OLANZAPINE + LITHIUM Lithium may enhance the neurotoxic effect of Antipsychotic Agents.    OLANZAPINE: QT-prolonging     OLANZAPINE + PROZAC: Serotonergic     PROZAC + PROPRANOL: Orthostasis     MANAGEMENT:  Monitoring for adverse effects, routine vitals, routine labs and periodic EKGs     RISK STATEMENT for SAFETY     Jose Francisco Sommers did not appear to be an imminent safety risk to self or others.    TREATMENT RISK STATEMENT: The risks, benefits, alternatives and potential adverse effects have been discussed and are understood by the pt. The pt understands the risks of using street drugs or alcohol. There are no medical contraindications, the pt agrees to treatment with the ability to do so. The pt knows to call the clinic for any problems or to access emergency care if needed.  Medical and substance use concerns are documented above.  Psychotropic drug interaction check was done, including changes made today.    PROVIDER: Kristie Julian MD    Patient staffed with Dr. Ventura who will sign the note.  Supervisor is Dr. Tay.

## 2022-06-03 NOTE — PATIENT INSTRUCTIONS
**For crisis resources, please see the information at the end of this document**   Patient Education    Thank you for coming to the [facility] [place] [facility].     Lab Testing:  If you had lab testing today and your results are reassuring or normal they will be mailed to you or sent through Aquiris within 7 days. If the lab tests need quick action we will call you with the results. The phone number we will call with results is # [phone]. If this is not the best number please call our clinic and change the number.     Medication Refills:  If you need any refills please call your pharmacy and they will contact us. Our fax number for refills is [phone].   Three business days of notice are needed for general medication refill requests.   Five business days of notice are needed for controlled substance refill requests.   If you need to change to a different pharmacy, please contact the new pharmacy directly. The new pharmacy will help you get your medications transferred.     Contact Us:  Please call [phone] during business hours (8-5:00 M-F).   If you have medication related questions after clinic hours, or on the weekend, please call [phone].     Financial Assistance [phone]   Medical Records [phone]       MENTAL HEALTH CRISIS RESOURCES:  For a emergency help, please call 911 or go to the nearest Emergency Department.     Emergency Walk-In Options:   EmPATH Unit @ [facility] ([place]): [phone] - Specialized mental health emergency area designed to be calming  [facility] ([place]): [phone]  [facility] ([place]): [phone]  [facility]): [phone]    [facility] Crisis Information:   [place]: [phone]  [place]: [phone]  Fredi (FRIEDA) - Adult: [phone]     Child: [phone]  [place] - Adult: [phone]     Child: [phone]  [place]:  [phone]  List of all [facility] resources:   https://[place].gov/dhs/people-we-serve/adults/health-care/mental-health/resources/crisis-contacts.jsp    National Crisis Information:   Crisis Text Line: Text  [place]  to 598497  National Suicide Prevention Lifeline: 9-324-733-TALK (1-213.135.5981)       For online chat options, visit https://suicidepreventionlifeline.org/chat/  Poison Control Center: 4-065-493-5058  Trans Lifeline: 3-101-720-9026 - Hotline for transgender people of all ages  The Luis Project: 2-242-050-2200 - Hotline for LGBT youth     For Non-Emergency Support:   Fast Tracker: Mental Health & Substance Use Disorder Resources -   https://www.Language Systemsn.org/

## 2022-06-06 ENCOUNTER — TELEPHONE (OUTPATIENT)
Dept: ENDOCRINOLOGY | Facility: CLINIC | Age: 32
End: 2022-06-06
Payer: COMMERCIAL

## 2022-06-06 NOTE — TELEPHONE ENCOUNTER
MTM referral from: [facility] visit (referral by provider)    MTM referral outreach attempt #2 on June 6, 2022 at 1:48 PM      Outcome: Patient not reachable after several attempts, will route to MTM Pharmacist/Provider as an FYI.  MT scheduling number is [phone].  Thank you for the referral.    Skyler Cha, MTM coordinator

## 2022-06-13 ENCOUNTER — TELEPHONE (OUTPATIENT)
Dept: NEUROPSYCHOLOGY | Facility: CLINIC | Age: 32
End: 2022-06-13
Payer: COMMERCIAL

## 2022-06-13 NOTE — TELEPHONE ENCOUNTER
Disregard previous encounter (6/13/2022) from this writer. Pt can be seen by [facility]. Note sent to clinic coordinators to schedule.    Patricia Salvador  Psychometrist, [facility]

## 2022-06-26 ENCOUNTER — MYC MEDICAL ADVICE (OUTPATIENT)
Dept: PSYCHIATRY | Facility: CLINIC | Age: 32
End: 2022-06-26

## 2022-06-26 DIAGNOSIS — F25.0 SCHIZOAFFECTIVE DISORDER, BIPOLAR TYPE (H): ICD-10-CM

## 2022-06-27 NOTE — TELEPHONE ENCOUNTER
Last seen: 6/3/22  RTC: RTC with new resident  Cancel: none  No-show: none  Next appt: none     Medication requested: FLUoxetine (PROZAC) 20 MG capsule  Directions: Take 1 capsule (20 mg) by mouth daily - Oral  Qty: 30  Last refilled: 5/30/22     Medication refill approved per refill protocol, 30-day rx submitted to pharmacy.

## 2022-07-26 ENCOUNTER — MYC REFILL (OUTPATIENT)
Dept: PSYCHIATRY | Facility: CLINIC | Age: 32
End: 2022-07-26

## 2022-07-26 DIAGNOSIS — F25.0 SCHIZOAFFECTIVE DISORDER, BIPOLAR TYPE (H): ICD-10-CM

## 2022-07-27 NOTE — TELEPHONE ENCOUNTER
"Last Seen: 6/3/22  RTC: \"with new resident\" no specified timeframe  Cancel: 0  No-Show: 0  Next Appt: 8/4/22 w/ Dr. Santa    Incoming Refill From patient via SIVIt    Medication Requested: FLUoxetine (PROZAC) 20 MG capsule  Directions: Take 1 capsule (20 mg) by mouth daily  Qty: 30  Last Refill: 6/27/22 #30    Medication Refill Approved Per Refill Protocol       "

## 2022-08-04 ENCOUNTER — VIRTUAL VISIT (OUTPATIENT)
Dept: PSYCHIATRY | Facility: CLINIC | Age: 32
End: 2022-08-04
Attending: PSYCHIATRY & NEUROLOGY
Payer: COMMERCIAL

## 2022-08-04 DIAGNOSIS — F90.9 ATTENTION DEFICIT HYPERACTIVITY DISORDER (ADHD), UNSPECIFIED ADHD TYPE: ICD-10-CM

## 2022-08-04 DIAGNOSIS — F25.0 SCHIZOAFFECTIVE DISORDER, BIPOLAR TYPE (H): Primary | ICD-10-CM

## 2022-08-04 PROCEDURE — 90792 PSYCH DIAG EVAL W/MED SRVCS: CPT | Mod: GT | Performed by: STUDENT IN AN ORGANIZED HEALTH CARE EDUCATION/TRAINING PROGRAM

## 2022-08-04 RX ORDER — OLANZAPINE 5 MG/1
15 TABLET ORAL AT BEDTIME
Qty: 90 TABLET | Refills: 2 | Status: SHIPPED | OUTPATIENT
Start: 2022-08-04 | End: 2022-09-29

## 2022-08-04 RX ORDER — LITHIUM CARBONATE 300 MG/1
1200 TABLET, FILM COATED, EXTENDED RELEASE ORAL AT BEDTIME
Qty: 120 TABLET | Refills: 2 | Status: SHIPPED | OUTPATIENT
Start: 2022-08-04 | End: 2022-09-29

## 2022-08-04 NOTE — NURSING NOTE
Patient declined to complete qnrs over the phone with writer. Advised pt to complete themselves in [employer]art before visit.

## 2022-08-04 NOTE — PROGRESS NOTES
"Video- Visit Details  Type of service:  video visit for diagnostic assessment  Time of service:    Video Start Time:  3:06 PM        Video End Time:  4:00PM  Reason for video visit:  Patient has requested telehealth visit  Originating Site (patient location):  [facility] [place]   Location- Patient's home  Distant Site (provider location):  [facility]  Mode of Communication:  Video Conference via Namely       [facility]  Psychiatry Clinic  TRANSFER of CARE DIAGNOSTIC ASSESSMENT     CARE TEAM:  PCP- Clinic Provider    Psychotherapist- None   Jose Francisco Sommers is a 31 year old who uses the name Manuel and pronouns he, him, his.      DIAGNOSIS   1) Schizoaffective disorder, Bipolar type, most recent episode depressed  2) ADHD, predominantly inattentive type     ASSESSMENT   Manuel is doing well overall. He reports improvement in mood and mentation since decreasing olanzapine and quitting smoking. Given that he stopped smoking, his effective dose of olanzapine has increased given that smoking induced the metabolism of olanzapine. Manuel and mother report that his mental health has been stable and he is \"doing much better\". Will decrease olanzapine further to 15mg due to side effects and reported stability of psychosis. He lives with mother and father. Mother states that she will be monitoring Manuel for adverse effects during taper of medication, such as previous warning signs of insomnia or trouble with concentration/thinking. They stated that they would call the clinic if Manuel experiences any psychosis or lauren, additionally he was informed that he can take an extra 5mg olanzapine PRN for breakthrough psychosis. It appears most recent psychotic episode was precipitated by cannabis use and he no longer uses cannabis, and was advised to remain abstinent from the substance. Otherwise, he states that he is doing well, no SI/HI/SIB, no psychosis or delusions, no acute concerns.    [facility] review was " not needed today.     PLAN                                                                                                                1) Meds-  - Decrease olanzapine to 15mg at bedtime, prescribed 5mg tablets - take 3 at bedtime   -may take an additional pill for breakthrough psychosis, and call the clinic  - Continue Lithium 1200mg at bedtime  - Continue fluoxetine 20mg daily    2) Psychotherapy- recommended    3) Next due-  Labs- AP labs completed 5/2022, Cr and TSH elevated (T4 wnl)  EKG- as needed  Rating scales- N/A    4) Referrals-  none    5) Dispo- return to clinic in 4 weeks     PERTINENT BACKGROUND                                                    [most recent eval 08/04/22]    Jose Francisco Sommers first experienced mental health issues in grade school and has received treatment for ADHD, depression, psychosis and substance use. Notably, this patient had FEP in 2015 in the setting of cannabis abuse and has been stable on olanzapine and lithium since that time. He has never lived independently and has only been slowly developing insight into his illness and past symptoms. GeneSight testing was completed noting patient is a poor CYP2D6 metabolizer. Past records from Dr. Mike Powers were received in summer 2018 to assist with diagnostic clarity, however they were hand written notes that were illegible though accompanied by typed neuropsychology consult from [facility]. This documentation was unable to support a diagnosis of bipolar disorder at that time though noted potential Asperger's diagnosis and ADHD. Given that there has been evidence for psychotic signs and symptoms outside the context of mood episodes, a schizophrenia spectrum disorder such as schizoaffective disorder diagnosis seems most accurate. He has had two hospitalizations for decompensated psychosis in the setting of decreasing his olanzapine dose.     Psych pertinent item history includes psychosis [sxs include disorganization, possible  "catatonia], mutiple psychotropic trials, psych hosp (<3) and SUBSTANCE USE: alcohol and cannabis     SUBJECTIVE     Most recent history began 1 month ago when Manuel was last seen in clinic by Dr. Julian and olanzapine was decreased from 25mg to 20mg. Today he presents for transfer of care appointment accompanied by his mother, Ember:      -Manuel reports that he is  \"doing pretty good ... no complaints\"  -overall has \"a better quality of mood\"   -No SI or  HI  -He quit smoking about 3-4 months and feels that the current dose of Zyprexa is \"less necessary\"   -Reports that things that were difficult became less difficult with addition of fluoxetine and he stopped smoking  -Reports no psychosis or hallucinations, none since hospitalization  -anxiety pretty good overall, better in most ways, not a problem, no longer worries  -sleep is \"okay\", wakes up earlier and goes to bed earlier, gets 10-11 hours a night  -Medications:   -Prozac 20mg - helpful with \"quality of thinking\", he gets stuck less when thinking   -Lithium 1200mg   -Zyprexa 20mg - has caused weight gain, which he has trouble with.  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 Looking forward to [employer] in [place] in November.    Mother reports that Manuel is \"doing much better. She thinks it is appropriate to decrease the dose of olanzapine. She reports that she will be the first to know if Manuel has a recurrence of psychosis.    Recent Social History: see below    Recent Psych Symptoms:   Depression:  appetite changes and weight changes  Elevated:  distractibility   Psychosis:  none  Anxiety:  social anxiety  Trauma Related:  none  Sleep: stable, adequate  Other: N/A    Recent Substance Use:     -alcohol: No   -cannabis: No   -tobacco: " "No  -caffeine:  No   -opioids: No   Narcan Kit currrent: No   -other: none    Pertinent Negatives: No suicidal or violent ideation, self-injury, psychosis, hallucinations, delusions, lauren, hypomania, aggression and harmful substance use  Adverse Effects: weight gain     FAMILY and SOCIAL HISTORY                                 pt reported     Family Hx: Paternal grandfather: alcoholism; Maternal grandmother: \"long-standing mental health issues\"; Maternal Aunt: Anxiety when young; Maternal Aunt: Anxiety and depression      Social Hx:  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       Other - DUIs. Also In February 2014 the patient left for [place] to attend a [employer] tournament. He ended up in [place] and was arrested for shoplifting from [employer] (had \"intended to purchase\" a pair of jeans but became panicked when security approached him). Had a brief stay in [place], and afterwards, took him 5 days to return to [place] with parents frequently wiring him money. Had difficulty adhering to travel plans, seemed confused, and was communicating with parents via \"bizarre\" text messages (parents even filed a missing persons report at one point). He finally arrived home exhausted, not sharing much of events in [place], but slightly more consistent with baseline.     PAST PSYCHIATRIC HISTORY     SIB- None  Suicide Attempt [#, most recent]- None  Suicidal Ideation Hx- " "None     Violence/Aggression Hx- None  Psychosis Hx- Predominantly disorganization. Denies AVH but describes thought blocking and slowed cognition/difficulty speaking during prior psychotic episode.  First episode began in December 2014 - was spending great amounts of time reorganizing his room/house/garage, and even used a  on the kitchen floor.  Eating Disorder Hx- None  Other- None     Psych Hosp [#, most recent]- x3, most recent November 2020  Commitment- None  ECT- None  Outpatient Programs - None  Other - N/A      PAST MED TRIALS     Adderall 10-20 mg - improved concentration, but \"cold personality\" and perseveration  Prozac 20 mg - limited response, less irritable, first episode of \"rage\"  Seroquel 25-75 mg - helped with sleep and irritability  Concerta 18 mg - improved focus, no improvement in motivation  Provigil 150 mg - Stimulants \"almost killed him\" triggered alcohol binges  Abilify ?25 mg - OK when QOD, but irritable and agitated on QD  Clonazepam 0.5-1.5 mg - calming, helped with sleep and \"catatonia\"  Risperdal up to 5 mg - some confusion, slow processing, withdrawn, ?\"catatonia\", panic attacks  Zyprexa 5 mg less anxious overall improvement  Lithium - calmer, overall better  Latuda 20 mg - severe fatigue and depression  Synthroid 75 mcg - added to help with mood.  Brief trials with Strattera, Intuniv, Lamictal, Xanax, Zoloft  Naltrexone - had nightmares and sedation on 25mg, stopped after 2 nights     PAST SUBSTANCE USE HISTORY       Past Use- Alcohol- in high school  and Cannabis- in high school and prior to hospitalization (precipitated psychosis)  Treatment- #, most recent- [facility] at age 19 for cannabis, did not complete  Medical Consequences- no  Legal Consequences- DUI for drinking at ages 20 and 21  Other- no     MEDICAL HISTORY and ALLERGY     ALLERGIES: Sulfa drugs    Patient Active Problem List   Diagnosis     Schizoaffective disorder, bipolar type (H)     Hx of psychiatric care " "    Elevated liver enzymes     Fatty liver     High blood triglycerides     History of cannabis abuse     History of cocaine abuse (H)     Psychosis (H)     Elevated blood pressure reading without diagnosis of hypertension     Constipation        MEDICAL REVIEW OF SYSTEMS   Contraception-  Unknown     none in addition to that documented above     MEDICATIONS     Current Outpatient Medications   Medication Sig Dispense Refill     FLUoxetine (PROZAC) 20 MG capsule Take 1 capsule (20 mg) by mouth daily 30 capsule 0     lithium ER (LITHOBID) 300 MG CR tablet Take 4 tablets (1,200 mg) by mouth At Bedtime 120 tablet 2     OLANZapine (ZYPREXA) 20 MG tablet Take 1 tablet (20 mg) by mouth At Bedtime 30 tablet 1     naltrexone (DEPADE/REVIA) 50 MG tablet Take 1/2 tablet x 2 days  Time it one to two hours prior to worst cravings.  Then increase to one full tablet daily as instructed. 30 tablet 11     OLANZapine (ZYPREXA) 15 MG tablet Take 2 tablets (30 mg) by mouth At Bedtime 60 tablet 1     propranolol ER (INDERAL LA) 60 MG 24 hr capsule Take 60 mg by mouth daily       triamcinolone (KENALOG) 0.1 % external cream Apply topically 2 times daily (Patient not taking: Reported on 5/31/2022) 30 g 2      VITALS   There were no vitals taken for this visit.    MENTAL STATUS EXAM     Alertness: alert  and oriented  Appearance: well groomed  Behavior/Demeanor: cooperative and calm, with good  eye contact   Speech: increased latency of response and slowed  Language: intact and no obvious problem  Psychomotor: normal or unremarkable  Mood:  \"doing pretty good ... no complaints\"  Affect: restricted and guarded, reactive; congruent to: mood- no, content- no  Thought Process/Associations: linear, somewhat concrete, goal oriented  Thought Content:  Reports none;  Denies suicidal & violent ideation and delusions  Perception:  Reports none;  Denies auditory hallucinations and visual hallucinations  Insight: good  Judgment: good  Cognition: does "  appear grossly intact; formal cognitive testing was not done  Gait and Station: N/A (telehealth)     LABS and DATA     PHQ 3/15/2022 4/22/2022 6/3/2022   PHQ-9 Total Score 12 9 11   Q9: Thoughts of better off dead/self-harm past 2 weeks Not at all Not at all Not at all       Recent Labs   Lab Test 05/10/22  0855 07/26/21  1339   CR 1.30* 1.12   GFRESTIMATED 76 88     Recent Labs   Lab Test 05/10/22  0855 07/26/21  1339   AST 31 36   * 68   ALKPHOS 81 70       ECG 11/20 QTc = 436ms     PSYCHOTROPIC DRUG INTERACTIONS                                                       PSYCHCLINICDDI     OLANZAPINE + LITHIUM: CNS depression     OLANZAPINE + LITHIUM Lithium may enhance the neurotoxic effect of Antipsychotic Agents.     OLANZAPINE: QT-prolonging      OLANZAPINE + PROZAC: Serotonergic      PROZAC + PROPRANOL: Orthostasis      MANAGEMENT:  Monitoring for adverse effects, routine vitals, routine labs and periodic      RISK STATEMENT for SAFETY     Manuel did not appear to be an imminent safety risk to self or others.    TREATMENT RISK STATEMENT: The risks, benefits, alternatives and potential adverse effects have been discussed and are understood by the pt. The pt understands the risks of using street drugs or alcohol. There are no medical contraindications, the pt agrees to treatment with the ability to do so. The pt knows to call the clinic for any problems or to access emergency care if needed.  Medical and substance use concerns are documented above.  Psychotropic drug interaction check was done, including changes made today.     PROVIDER: Alberto Santa MD    Patient staffed in clinic with Dr. Chapman who will sign the note.  Supervisor is Dr. Tay.

## 2022-08-04 NOTE — PATIENT INSTRUCTIONS
It was nice meeting you today    Treatment Plan Today:     1) Medications-   - Decrease olanzapine to 15mg at bedtime,   prescribed 5mg tablets - take 3 at bedtime                -may take an additional pill for breakthrough psychosis, and call the clinic  - Continue Lithium 1200mg at bedtime  - Continue fluoxetine 20mg daily    2) Follow-up appt with Dr Santa in 4 weeks    3) Crisis numbers are below and clinic after hours number is [phone]     **For crisis resources, please see the information at the end of this document**   Patient Education    Thank you for coming to the [facility] [place] [facility].     Lab Testing:  If you had lab testing today and your results are reassuring or normal they will be mailed to you or sent through [employer] within 7 days. If the lab tests need quick action we will call you with the results. The phone number we will call with results is # [phone]. If this is not the best number please call our clinic and change the number.     Medication Refills:  If you need any refills please call your pharmacy and they will contact us. Our fax number for refills is [phone].   Three business days of notice are needed for general medication refill requests.   Five business days of notice are needed for controlled substance refill requests.   If you need to change to a different pharmacy, please contact the new pharmacy directly. The new pharmacy will help you get your medications transferred.     Contact Us:  Please call [phone] during business hours (8-5:00 M-F).   If you have medication related questions after clinic hours, or on the weekend, please call [phone].     Financial Assistance [phone]   Medical Records [phone]       MENTAL HEALTH CRISIS RESOURCES:  For a emergency help, please call 911 or go to the nearest Emergency Department.     Emergency Walk-In Options:   EmPATH Unit @ [facility]): [phone] -  Specialized mental health emergency area designed to be [facility]  [facility] ([place]): [phone]  [facility] ([place]): [phone]  [facility] ([place]): [phone]    [facility] Crisis Information:   [place]: [phone]  Ben: [phone]  Fredi (FRIEDA) - Adult: [phone]     Child: [phone]  Eugenio - Adult: [phone]     Child: [phone]  [place]: [phone]  List of all [employer] resources:   https://[place].gov/dhs/people-we-serve/adults/health-care/mental-health/resources/crisis-contacts.jsp    National Crisis Information:   Crisis Text Line: Text  [place]  to 122245  Suicide & Crisis Lifeline: 988  National Suicide Prevention Lifeline: 7-943-770-TALK (1-939.334.2768)       For online chat options, visit https://suicidepreventionlifeline.org/chat/  Poison Control Center: 1-453.603.8491  Trans Lifeline: 1-539.327.7552 - Hotline for transgender people of all ages  The Luis Project: 9-942-630-0819 - Hotline for LGBT youth     For Non-Emergency Support:   Fast Tracker: Mental Health & Substance Use Disorder Resources -   https://www.[employer]li[employer]optrack[employer]n.org/

## 2022-08-04 NOTE — PROGRESS NOTES
Jose Francisco Sommers is a 31 year old who has consented to receive services via billable video visit.      Pt will join video visit via: Quoc  If there are problems joining the visit, send backup video invite via: Send to preferred e-mail: [email or web address]      Originating Location (patient location): Patient's home  Distant Location (provider location): [facility] [place] [facility]    Will anyone else be joining the video visit? No

## 2022-09-25 ENCOUNTER — MYC REFILL (OUTPATIENT)
Dept: PSYCHIATRY | Facility: CLINIC | Age: 32
End: 2022-09-25

## 2022-09-25 DIAGNOSIS — F25.0 SCHIZOAFFECTIVE DISORDER, BIPOLAR TYPE (H): ICD-10-CM

## 2022-09-26 NOTE — TELEPHONE ENCOUNTER
Last seen: 08/04/2022  RTC: 4 weeks  Cancel: None  No-show: None  Next appt: 09/29/2022     Incoming refill from Patient via [employer]    Medication requested:   Pending Prescriptions:                       Disp   Refills    FLUoxetine (PROZAC) 20 MG capsule         30 cap*0            Sig: Take 1 capsule (20 mg) by mouth daily      From chart note:   - Continue fluoxetine 20mg daily     Medication refill approved per refill protocol - Per pharmacy - prescription was last filled  8/20 for 30 day supply.

## 2022-09-29 ENCOUNTER — VIRTUAL VISIT (OUTPATIENT)
Dept: PSYCHIATRY | Facility: CLINIC | Age: 32
End: 2022-09-29
Attending: PSYCHIATRY & NEUROLOGY
Payer: COMMERCIAL

## 2022-09-29 DIAGNOSIS — G47.00 INSOMNIA, UNSPECIFIED TYPE: Primary | ICD-10-CM

## 2022-09-29 DIAGNOSIS — F25.0 SCHIZOAFFECTIVE DISORDER, BIPOLAR TYPE (H): ICD-10-CM

## 2022-09-29 PROCEDURE — 99214 OFFICE O/P EST MOD 30 MIN: CPT | Mod: GT | Performed by: STUDENT IN AN ORGANIZED HEALTH CARE EDUCATION/TRAINING PROGRAM

## 2022-09-29 RX ORDER — OLANZAPINE 5 MG/1
15 TABLET ORAL AT BEDTIME
Qty: 90 TABLET | Refills: 2 | Status: SHIPPED | OUTPATIENT
Start: 2022-09-29 | End: 2022-11-10

## 2022-09-29 RX ORDER — LITHIUM CARBONATE 300 MG/1
1200 TABLET, FILM COATED, EXTENDED RELEASE ORAL AT BEDTIME
Qty: 120 TABLET | Refills: 2 | Status: SHIPPED | OUTPATIENT
Start: 2022-09-29 | End: 2022-11-10

## 2022-09-29 NOTE — PROGRESS NOTES
Jose Francisco Sommers is a 32 year old who has consented to receive services via billable video visit.      Pt will join video visit via: Quoc  If there are problems joining the visit, send backup video invite via: Send to preferred e-mail: [email or web address]      Originating Location (patient location): Patient's home  Distant Location (provider location): [facility] [place] [facility]    Will anyone else be joining the video visit? No

## 2022-09-29 NOTE — PATIENT INSTRUCTIONS
It was nice seeing you today    Treatment Plan Today:     1) Medications-   - Increase fluoxetine to 30mg x2 weeks and then 40mg daily thereafter  - Continue olanzapine 15mg at bedtime - prescribed 5mg tablets x3    -may take an additional pill for breakthrough psychosis, and call the clinic  - Continue Lithium 1200mg at bedtime    2) Follow-up appt with Dr Santa in 6 weeks    3) Crisis numbers are below and clinic after hours number is [phone]     **For crisis resources, please see the information at the end of this document**   Patient Education    Thank you for coming to the [facility] [place] [facility].     Lab Testing:  If you had lab testing today and your results are reassuring or normal they will be mailed to you or sent through [employer] within 7 days. If the lab tests need quick action we will call you with the results. The phone number we will call with results is # [phone]. If this is not the best number please call our clinic and change the number.     Medication Refills:  If you need any refills please call your pharmacy and they will contact us. Our fax number for refills is [phone].   Three business days of notice are needed for general medication refill requests.   Five business days of notice are needed for controlled substance refill requests.   If you need to change to a different pharmacy, please contact the new pharmacy directly. The new pharmacy will help you get your medications transferred.     Contact Us:  Please call [phone] during business hours (8-5:00 M-F).   If you have medication related questions after clinic hours, or on the weekend, please call [phone].     Financial Assistance [phone]   Medical Records [phone]       MENTAL HEALTH CRISIS RESOURCES:  For a emergency help, please call 911 or go to the nearest Emergency Department.     Emergency Walk-In Options:   EmPATH Unit @ [facility] ([place]):  [phone] - Specialized mental health emergency area designed to be cal[place]  [facility] ([place]): [phone]  [facility] ([place]): [phone]  [facility] ([place]): [phone]    [facility] Crisis Information:   Industry: [phone]  Ben: [phone]  Fredi (FRIEDA) - Adult: [phone]     Child: [phone]  Eugenio - Adult: [phone]     Child: [phone]  [place]: [phone]  List of all [facility] resources:   https://[place].gov/dhs/people-we-serve/adults/health-care/mental-health/resources/crisis-contacts.jsp    National Crisis Information:   Crisis Text Line: Text  [place]  to 103268  Suicide & Crisis Lifeline: 988  National Suicide Prevention Lifeline: 7-401-563-TALK (1-444.789.7136)       For online chat options, visit https://suicidepreventionlifeline.org/chat/  Poison Control Center: 1-162.419.5410  Trans Lifeline: 1-148.328.5125 - Hotline for transgender people of all ages  The Luis Project: 1-398.928.8795 - Hotline for LGBT youth     For Non-Emergency Support:   Fast Tracker: Mental Health & Substance Use Disorder Resources -   https://www.[employer].org/

## 2022-09-29 NOTE — PROGRESS NOTES
"Video- Visit Details  Type of service:  video visit for medication management  Time of service:    Video Start Time: 3:30 PM        Video End Time:  4:30PM  Reason for video visit:  Patient has requested telehealth visit  Originating Site (patient location):  [facility]   Location- Patient's home  Distant Site (provider location):  [facility]  Mode of Communication:  Video Conference via [employer]       [facility]  Psychiatry Clinic  MEDICAL PROGRESS NOTE     CARE TEAM:  PCP- Clinic Provider    Psychotherapist- None       Jose Francisco Sommers is a 32 year old who uses the name Manuel and pronouns he, him, his.      DIAGNOSIS   # Schizoaffective disorder, Bipolar type, most recent episode depressed  # ADHD, predominantly inattentive type  # Insomnia (history of delayed phase sleep disorder)         ASSESSMENT   Manuel is doing well overall. He reports an interval overall stability of mood and psychosis following the decrease in olanzapine from 20mg to 15mg at last visit. He noted a period of poor concentration and low energy during this time, but he feels he has returned to baseline. His main concerns today are sleep and improving mood. Manuel appears to have a concrete thought process and/or alexithymia, which makes it difficult to communicate what he subjectively feels. He is interested in increasing fluoxetine, since he has noted significant improvements since initiation of this medcation. Mother Ember concurs that they have seen a \"pleasant\" change in Manuel with the initiation of Prozac with improvement in mood, reactivity, social interaction, and function. Given that Manuel had benefit at 20mg fluoxetine, it would be reasonable to titrate fluoxetine to 40mg today. Discussed risks vs benefits of increasing this medication including adverse effects it could have on sleep and mental health stability. Mother states that he lives with parents and they would be first to know if something was " off; would call the clinic if adverse response, visit ED if safety concerns.    Other issues addressed:    -Schizoaffective disorder:  Manuel recently tapered olanzapine 20mg to 15mg and feels he is currently at baseline. He requests to make smaller changes moving forward, since historically he has had reemergence of symptoms at 10-15mg. Additionally, it would be appropriate to wait 2-4 month prior to next taper to ensure mental health stability. Manuel appears to have prominent negative symptoms and functional impairment. Further assessment is needed to distinguish negative symptoms vs depression. Suspect he is titrating fluoxetine to treat negative symptoms, which likely would not be responsive to antidepressants. As for function, Manuel continues to live with parents and does not have employment. He is hoping to start working part time soon and has someone from the community assisting with this.     -Insomnia  -history of delayed phase sleep disorder  Referral for sleep study was placed last year by previous provider. Unclear if he followed up with sleep medicine. There is a sleep study from 2011 in which Manuel describes similar symptoms and was diagnosed with delayed phase sleep disorder. It would be interesting to have an updated sleep study to determine if this problem persists and what treatment recommendations are. He previously tried Belsomra last year, but reports it was too strong and he wishes to re-trial at a lower dose.    Future considerations:  - referral to sleep medicine  - Belsomra for sleep  - decrease olanzapine to 12.5mg after period of 3-4 months stability (12/2022)      [place] review was not needed today.     PLAN                                                                                                                1) Meds-  - Increase fluoxetine to 40mg daily PO  - Continue olanzapine 15mg at bedtime, prescribed 5mg tablets - take 3 at bedtime   -may take an additional pill for breakthrough  psychosis, and call the clinic  - Continue Lithium 1200mg at bedtime     2) Psychotherapy- recommended    3) Next due-  Labs- AP labs completed 5/2022, Cr and TSH elevated (T4 wnl)  EKG- as needed  Rating scales- N/A    4) Referrals-  none    5) Dispo- return to clinic in 6 weeks     PERTINENT BACKGROUND                                                    [most recent eval 08/04/22]    Jose Francisco Sommers first experienced mental health issues in grade school and has received treatment for ADHD, depression, psychosis and substance use. Notably, this patient had FEP in 2015 in the setting of cannabis abuse and has been stable on olanzapine and lithium since that time. He has never lived independently and has only been slowly developing insight into his illness and past symptoms. GeneSight testing was completed noting patient is a poor CYP2D6 metabolizer. Past records from Dr. Mike Powers were received in summer 2018 to assist with diagnostic clarity, however they were hand written notes that were illegible though accompanied by typed neuropsychology consult from [facility]. This documentation was unable to support a diagnosis of bipolar disorder at that time though noted potential Asperger's diagnosis and ADHD. Given that there has been evidence for psychotic signs and symptoms outside the context of mood episodes, a schizophrenia spectrum disorder such as schizoaffective disorder diagnosis seems most accurate. He has had two hospitalizations for decompensated psychosis in the setting of decreasing his olanzapine dose.     Psych pertinent item history includes psychosis [sxs include disorganization, possible catatonia], mutiple psychotropic trials, psych hosp (<3) and SUBSTANCE USE: alcohol and cannabis     SUBJECTIVE   Manuel was last seen by this provider 4 weeks ago when olanzapine was decreased from 20mg to 15mg daily in the context of mental health stability and smoking cessation.    Today he reports that the decrease  "in olanzapine dose has gone well, he had reduced energy and concentration for a couple weeks, but has since returned to baseline. Overall, he reports that his mood is \"good\", although he also reports that he feels he's \"not getting as much out of my meds as I could\". Manuel has some difficulty verbalizing what exactly he means by this, but after discussion he is able to express that he felt a lot better after increasing Prozac in the past and wonders if a further increase would also beneficial since he is on a lower dose of 20mg. He also inquires about restarting Belsomra (suvorexant), which he previously trialed, but started at a higher dose and was overly sedated. Overall he remains concrete in thought process and fixed on interests through interview. Manuel reports that anxiety and depression have been \"not bad\"; sleep is variable - he sleeps for 10 hrs a night but wakes feeling tired, estimates 0.5 - 2 hours of quality sleep a night. Of note, completed a sleep study in 2011 with diagnosis of delayed phase sleep disorder. He also notes he has been watching TV a lot and has difficulty doing \"things that take effort\" - mentions that it is hard to sustain his drive. He is looking for employment, and has a community service assisting him, hoping to start part time work in the next month or two.    Near conclusion of visit, Manuel's mother Ember joins the appointment. She reports that there has been a \"clear difference\" since Manuel initiated fluoxetine, with improvements in mood and social interactions. She describes this as \"a very pleasant change\" and even their neighbors have noticed the improvement.    Recent Social History: no changes    Recent Psych Symptoms:   Depression:  low energy, insomnia, poor concentration /memory and indecisiveness  Elevated:  none  Psychosis:  none  Anxiety:  social anxiety  Trauma Related:  none  Sleep: dysregulation  Other: N/A    Recent Substance Use:     -alcohol: No   -cannabis: No " "  -tobacco: No  -caffeine:  No   -opioids: No   Narcan Kit currrent: No   -other: none    Pertinent Negatives: No suicidal or violent ideation, self-injury, psychosis, hallucinations, delusions, lauren, hypomania, aggression and harmful substance use  Adverse Effects: weight gain      PSYCH and SUBSTANCE USE Critical Summary Points since July 2022 8/04/22 - transfer of care. Olanzapine decreased 20mg to 15mg  9/29/22 - fluoxetine increased from 20mg to 40mg     PAST MED TRIALS     Adderall 10-20 mg - improved concentration, but \"cold personality\" and perseveration  Prozac 20 mg - limited response, less irritable, first episode of \"rage\"  Seroquel 25-75 mg - helped with sleep and irritability  Concerta 18 mg - improved focus, no improvement in motivation  Provigil 150 mg - Stimulants \"almost killed him\" triggered alcohol binges  Abilify ?25 mg - OK when QOD, but irritable and agitated on QD  Clonazepam 0.5-1.5 mg - calming, helped with sleep and \"catatonia\"  Risperdal up to 5 mg - some confusion, slow processing, withdrawn, ?\"catatonia\", panic attacks  Zyprexa 5 mg less anxious overall improvement  Lithium - calmer, overall better  Latuda 20 mg - severe fatigue and depression  Synthroid 75 mcg - added to help with mood.  Brief trials with Strattera, Intuniv, Lamictal, Xanax, Zoloft  Naltrexone - had nightmares and sedation on 25mg, stopped after 2 nights     MEDICAL HISTORY and ALLERGY     ALLERGIES: Sulfa drugs    Patient Active Problem List   Diagnosis     Schizoaffective disorder, bipolar type (H)     Hx of psychiatric care     Elevated liver enzymes     Fatty liver     High blood triglycerides     History of cannabis abuse     History of cocaine abuse (H)     Psychosis (H)     Elevated blood pressure reading without diagnosis of hypertension     Constipation        MEDICAL REVIEW OF SYSTEMS   Contraception-  Unknown     none in addition to that documented above     MEDICATIONS     Current Outpatient " "Medications   Medication Sig Dispense Refill     FLUoxetine (PROZAC) 20 MG capsule Take 2 capsules (40 mg) by mouth daily 30 capsule 2     lithium ER (LITHOBID) 300 MG CR tablet Take 4 tablets (1,200 mg) by mouth At Bedtime 120 tablet 2     OLANZapine (ZYPREXA) 5 MG tablet Take 3 tablets (15 mg) by mouth At Bedtime 90 tablet 2      VITALS   There were no vitals taken for this visit.     Pulse Readings from Last 3 Encounters:   02/02/22 55   12/20/20 76   11/25/20 58     Wt Readings from Last 3 Encounters:   05/31/22 124.7 kg (275 lb)   02/02/22 122.5 kg (270 lb)   12/20/20 97 kg (213 lb 12.8 oz)     BP Readings from Last 3 Encounters:   02/02/22 (!) 141/83   12/20/20 139/89   11/25/20 (!) 143/88        MENTAL STATUS EXAM     Alertness: alert  and oriented  Appearance: casually groomed  Behavior/Demeanor: cooperative and calm, with good  eye contact   Speech: increased latency of response and slowed  Language: intact and no obvious problem  Psychomotor: normal or unremarkable  Mood:  \"good... but not getting as much out of my meds as I could\"  Affect: flattened, reactive; congruent to: mood- no, content- no  Thought Process/Associations: linear, concrete, goal oriented  Thought Content:  Reports none;  Denies suicidal & violent ideation and delusions  Perception:  Reports none;  Denies auditory hallucinations and visual hallucinations  Insight: good  Judgment: good  Cognition: does  appear grossly intact; formal cognitive testing was not done  Gait and Station: N/A ([facility])     LABS and DATA     PHQ 3/15/2022 4/22/2022 6/3/2022   PHQ-9 Total Score 12 9 11   Q9: Thoughts of better off dead/self-harm past 2 weeks Not at all Not at all Not at all       Recent Labs   Lab Test 05/10/22  0855 07/26/21  1339   CR 1.30* 1.12   GFRESTIMATED 76 88     Recent Labs   Lab Test 05/10/22  0855 07/26/21  1339   AST 31 36   * 68   ALKPHOS 81 70       ECG 11/20 QTc = 436ms     PSYCHOTROPIC DRUG INTERACTIONS                  "                                      PSYCHCLINICDDI     OLANZAPINE + LITHIUM: CNS depression     OLANZAPINE + LITHIUM Lithium may enhance the neurotoxic effect of Antipsychotic Agents.     OLANZAPINE: QT-prolonging      OLANZAPINE + PROZAC: Serotonergic      PROZAC + PROPRANOL: Orthostasis      MANAGEMENT:  Monitoring for adverse effects, routine vitals, routine labs and periodic      RISK STATEMENT for SAFETY     Manuel did not appear to be an imminent safety risk to self or others.    TREATMENT RISK STATEMENT: The risks, benefits, alternatives and potential adverse effects have been discussed and are understood by the pt. The pt understands the risks of using street drugs or alcohol. There are no medical contraindications, the pt agrees to treatment with the ability to do so. The pt knows to call the clinic for any problems or to access emergency care if needed.  Medical and substance use concerns are documented above.  Psychotropic drug interaction check was done, including changes made today.     PROVIDER: Alberto Santa MD    Patient staffed in clinic with Dr. Burt who will sign the note.  Supervisor is Dr. Tay.      I directly participated in the patient interview with the resident and discussed the key portions of the service with the resident, including the mental status examination and developing the plan of care. I reviewed key portions of the history with the resident. I agree with the findings and plan as documented in this note.      Travon Burt MD  [facility]

## 2022-11-10 ENCOUNTER — VIRTUAL VISIT (OUTPATIENT)
Dept: PSYCHIATRY | Facility: CLINIC | Age: 32
End: 2022-11-10
Attending: PSYCHIATRY & NEUROLOGY
Payer: COMMERCIAL

## 2022-11-10 DIAGNOSIS — Z79.899 ENCOUNTER FOR LONG-TERM (CURRENT) USE OF MEDICATIONS: ICD-10-CM

## 2022-11-10 DIAGNOSIS — F25.0 SCHIZOAFFECTIVE DISORDER, BIPOLAR TYPE (H): Primary | ICD-10-CM

## 2022-11-10 PROCEDURE — 99214 OFFICE O/P EST MOD 30 MIN: CPT | Mod: GC | Performed by: STUDENT IN AN ORGANIZED HEALTH CARE EDUCATION/TRAINING PROGRAM

## 2022-11-10 RX ORDER — LITHIUM CARBONATE 300 MG/1
1200 TABLET, FILM COATED, EXTENDED RELEASE ORAL AT BEDTIME
Qty: 120 TABLET | Refills: 2 | Status: SHIPPED | OUTPATIENT
Start: 2022-11-10 | End: 2023-01-13

## 2022-11-10 RX ORDER — OLANZAPINE 5 MG/1
TABLET ORAL
Qty: 90 TABLET | Refills: 2 | Status: SHIPPED | OUTPATIENT
Start: 2022-11-10 | End: 2023-01-13

## 2022-11-10 RX ORDER — OLANZAPINE 5 MG/1
12.5 TABLET ORAL AT BEDTIME
Qty: 90 TABLET | Refills: 2 | Status: SHIPPED | OUTPATIENT
Start: 2022-11-10 | End: 2022-11-10

## 2022-11-10 ASSESSMENT — PATIENT HEALTH QUESTIONNAIRE - PHQ9
10. IF YOU CHECKED OFF ANY PROBLEMS, HOW DIFFICULT HAVE THESE PROBLEMS MADE IT FOR YOU TO DO YOUR WORK, TAKE CARE OF THINGS AT HOME, OR GET ALONG WITH OTHER PEOPLE: VERY DIFFICULT
SUM OF ALL RESPONSES TO PHQ QUESTIONS 1-9: 3
SUM OF ALL RESPONSES TO PHQ QUESTIONS 1-9: 3

## 2022-11-10 NOTE — PROGRESS NOTES
Video- Visit Details  Type of service:  video visit for medication management  Time of service:    Video Start Time: 3:50 PM        Video End Time:  4:50 PM  Reason for video visit:  Patient has requested telehealth visit  Originating Site (patient location):  [facility]   Location- Patient's home  Distant Site (provider location):  [facility]  Mode of Communication:  Video Conference via [employer]       [facility]iatry Clinic  MEDICAL PROGRESS NOTE     CARE TEAM:  PCP- Clinic Provider    Psychotherapist- None       Jose Francisco Sommers is a 32 year old who uses the name Manuel and pronouns he, him, his.      DIAGNOSIS   # Schizoaffective disorder, Bipolar type, most recent episode depressed  # ADHD, predominantly inattentive type  # Insomnia (history of delayed phase sleep disorder)     ASSESSMENT   Manuel is doing well overall. He reports minimal mental health symptoms since our last visit when fluoxetine was increased in the context of low mood and low motivation. Since that time, he reports no longer feeling depressed, has reduced anhedonia (enjoys watching TV more), and is looking for employment. Manuel reports having no symptoms of psychosis since reduction in olanzapine from 20mg to 15mg 3 months ago. He presents with a flat affect, but seems more reactive than previous. Manuel does have prominent negative symptoms and concrete thought process and alexithymia at baseline, which confounds his report of mood symptoms. He reports no safety concerns, no SI or HI.     Other issues addressed:    # Schizoaffective disorder vs. schizophrenia:  Manuel has a primary schizophrenia spectrum disorder with prominent negative symptoms. He is concrete at baseline, which could be due to psychotic illness or may be related to historic ASD diagnosis. Given functional impairment and negative symptoms, schizophrenia may be more likely. He reports no recurrence of psychosis since reduction to 15mg  olanzapine. Manuel reports bothersome side effects of sedation and weight gain. Given current period of stability, it would be appropriate to trial a modest reduction in dose to 12.5 mg daily, which Manuel and mother both desire. He will use his PRN 2.5-5mg olanzapine AND call the clinic if he has symptoms. Of note, he has experience decompensation when around 10-12mg, especially when using cigarette. Manuel is now aware of the interaction between smoking cigarette and olanzapine and states he will not smoke.     Manuel also has lithium as a mood stabilizer, which should be protective of any breakthrough mood symptoms during reduction in olanzapine. He/mother inquires about initiation of amiloride as a nephro-protective agent, given recent bump in creatinine to 1.3 (1.28 on recheck). Will refer to nephrology for recommendations/ assitance regaring use of this medication and overall recs re: renal function. Additionally, we will review the regimen to determine alternate meds may be a better fit. Manuel's mother did make the point that they always  return to olanzapine because it works, still--we will consider other options. Quick review of the meds listed below shows no previous trials of Saphris, cariprazine or Geodon.     # Weight gain  Likely related to olanzapine, since Manuel reports gaining 70-80 pounds since initiating the medication. Suspect lifestyle contributes as well, since a goal for employment is getting free food. Nutrition consult in the past had limited benefit. Naltrexone was too sedating. Meformin caused acid reflux. To address weight gain, we will continue taper of olanzapine to lowest effective dose (to 12.5mg today) and consider retrial of metformin in the future, pending nephrology input.    # Insomnia  # history of delayed phase sleep disorder  Referral for sleep study was placed last year by previous provider. Unclear if he followed up with sleep medicine. There is a sleep study from 2011 in which Manuel  "describes similar symptoms and was diagnosed with delayed phase sleep disorder. It would be interesting to have an updated sleep study to determine if this problem persists and what treatment recommendations are. He previously tried Belsomra last year, but reports it was too strong and he wishes to re-trial at a lower dose.     Future considerations:  - re-trial metformin for medication associated weight gain, topiramate may have cognitive side effects  - referral to sleep medicine  - Belsomra for sleep    [place]NPMP review was not needed today.     PLAN                                                                                                                1) Meds-  - Decrease olanzapine 12.5mg at bedtime, prescribed 5mg tablets - take 2.5 at bedtime  - Continue fluoxetine 40mg daily   - Take half or full olanzapine tablet (2.5-5mg) as needed for psychosis, lauren, or agitation, and call the clinic  - Continue Lithium 1200mg at bedtime    2) Psychotherapy- recommended    3) Next due-  Labs- AP labs completed 5/2022, Cr and TSH elevated (T4 wnl)  EKG- as needed  Rating scales- N/A    4) Referrals-  Medical Referral-Nephrology regarding potential initiation of amilioride    5) Dispo- return to clinic in 4 weeks     PERTINENT BACKGROUND                                                    [most recent eval 08/04/22]    Jose Francisco \"Manuel\"  first experienced mental health issues in grade school and has received treatment for ADHD, depression, psychosis, and substance use. Notably, this patient had FEP in 2015 in the setting of cannabis abuse and has been stable on olanzapine and lithium since that time. He has never lived independently and has only been slowly developing insight into his illness and past symptoms. GeneSight testing was completed noting patient is a poor CYP2D6 metabolizer. Past records from Dr. Mike Powers were received in summer 2018 to assist with diagnostic clarity, however they were hand written " "notes that were illegible though accompanied by typed neuropsychology consult from [facility]. This documentation was unable to support a diagnosis of bipolar disorder at that time though noted potential Asperger's diagnosis and ADHD. Given that there has been evidence for psychotic signs and symptoms outside the context of mood episodes, a schizophrenia spectrum disorder such as schizoaffective disorder diagnosis seems most accurate. He has had two hospitalizations for decompensated psychosis in the setting of decreasing his olanzapine dose.     Psych pertinent item history includes psychosis [sxs include disorganization, possible catatonia], mutiple psychotropic trials, psych hosp (<3) and SUBSTANCE USE: alcohol and cannabis     SUBJECTIVE   Manuel was last seen by this provider 4 weeks ago when fluoxetine was increased from 20 to 40mg daily due to low mood and motivation.     Today:  -Depression: reports \"I dont think I am very depressed\"  -Psychosis: reports that \"quality of thinking has improved\", feels less agitated throughout the day. Functioning better  -Energy: ok, \"fine\"   -Sleep: \"a lot\", 10-12 hours  -Enjoys: walk around/pacing, watching TV ([employer], [employer]) - likes it more than he used to   -[employer] - have to set it aside for the time being    -too hard to memorize prices  -Looking for work soon, food industry - wants free food  -no safety concerns  -Wondering about lowering olanzapine to 12.5mg   -Due to weight - gained 70-80 pounds   -weight management referral: tried it and not helpful    -had a medication (naltrexone) and was too sedating    -willing to try metformin again, caused acid reflux    -mother Ember was present for collateral:     -she reports that Manuel is doing \"totally fine\"   -he continues to improve at 12.5mg olanzapine; \"stable\" at 10mg but unstable under 10mg.  -Manuel feels he is stable at 12.5mg but then smokes cigarettes and decompensates   -quit smoking in the " "past few months, prompting desire to taper olanzapine.    Recent Social History: no changes    Recent Psych Symptoms:   Depression:  low energy, insomnia, poor concentration /memory and indecisiveness  Elevated:  none  Psychosis:  none  Anxiety:  social anxiety  Trauma Related:  none  Sleep: dysregulation  Other: N/A    Recent Substance Use:     -alcohol: No   -cannabis: No   -tobacco: No  -caffeine:  No   -opioids: No   Narcan Kit currrent: No   -other: none    Pertinent Negatives: No suicidal or violent ideation, self-injury, psychosis, hallucinations, delusions, lauren, hypomania, aggression and harmful substance use  Adverse Effects: weight gain      PSYCH and SUBSTANCE USE Critical Summary Points since July 2022 8/04/22 - transfer of care. Olanzapine decreased 20mg to 15mg  9/29/22 - fluoxetine increased from 20mg to 40mg   11/10/22 - decrease olanzapine to 12.5 from 15mg     PAST MED TRIALS     Adderall 10-20 mg - improved concentration, but \"cold personality\" and perseveration  Prozac 20 mg - limited response, less irritable, first episode of \"rage\"  Seroquel 25-75 mg - helped with sleep and irritability  Concerta 18 mg - improved focus, no improvement in motivation  Provigil 150 mg - Stimulants \"almost killed him\" triggered alcohol binges  Abilify ?25 mg - OK when QOD, but irritable and agitated on QD  Clonazepam 0.5-1.5 mg - calming, helped with sleep and \"catatonia\"  Risperdal up to 5 mg - some confusion, slow processing, withdrawn, ?\"catatonia\", panic attacks  Zyprexa 5 mg less anxious overall improvement  Lithium - calmer, overall better  Latuda 20 mg - severe fatigue and depression  Synthroid 75 mcg - added to help with mood.  Brief trials with Strattera, Intuniv, Lamictal, Xanax, Zoloft  Naltrexone - had nightmares and sedation on 25mg, stopped after 2 nights     MEDICAL HISTORY and ALLERGY     ALLERGIES: Sulfa drugs    Patient Active Problem List   Diagnosis     Schizoaffective disorder, bipolar " "type (H)     Hx of psychiatric care     Elevated liver enzymes     Fatty liver     High blood triglycerides     History of cannabis abuse     History of cocaine abuse (H)     Psychosis (H)     Elevated blood pressure reading without diagnosis of hypertension     Constipation        MEDICAL REVIEW OF SYSTEMS   Contraception-  Unknown     none in addition to that documented above     MEDICATIONS     Current Outpatient Medications   Medication Sig Dispense Refill     FLUoxetine (PROZAC) 20 MG capsule Take 2 capsules (40 mg) by mouth daily 30 capsule 2     lithium ER (LITHOBID) 300 MG CR tablet Take 4 tablets (1,200 mg) by mouth At Bedtime 120 tablet 2     OLANZapine (ZYPREXA) 5 MG tablet Take 3 tablets (15 mg) by mouth At Bedtime 90 tablet 2      VITALS   There were no vitals taken for this visit.     Pulse Readings from Last 3 Encounters:   02/02/22 55   12/20/20 76   11/25/20 58     Wt Readings from Last 3 Encounters:   05/31/22 124.7 kg (275 lb)   02/02/22 122.5 kg (270 lb)   12/20/20 97 kg (213 lb 12.8 oz)     BP Readings from Last 3 Encounters:   02/02/22 (!) 141/83   12/20/20 139/89   11/25/20 (!) 143/88      MENTAL STATUS EXAM     Alertness: alert  and oriented  Appearance: casually groomed  Behavior/Demeanor: cooperative and calm, with good  eye contact   Speech: increased latency of response and slowed  Language: intact and no obvious problem  Psychomotor: normal or unremarkable  Mood:  \"good... but not getting as much out of my meds as I could\"  Affect: flattened, reactive; congruent to: mood- no, content- no  Thought Process/Associations: linear, concrete, goal oriented  Thought Content:  Reports none;  Denies suicidal & violent ideation and delusions  Perception:  Reports none;  Denies auditory hallucinations and visual hallucinations  Insight: good  Judgment: good  Cognition: does  appear grossly intact; formal cognitive testing was not done  Gait and Station: N/A ([employer])     LABS and DATA     PHQ " 4/22/2022 6/3/2022 11/10/2022   PHQ-9 Total Score 9 11 3   Q9: Thoughts of better off dead/self-harm past 2 weeks Not at all Not at all Not at all       Recent Labs   Lab Test 05/10/22  0855 07/26/21  1339   CR 1.30* 1.12   GFRESTIMATED 76 88     Recent Labs   Lab Test 05/10/22  0855 07/26/21  1339   AST 31 36   * 68   ALKPHOS 81 70       ECG 11/20 QTc = 436ms     PSYCHOTROPIC DRUG INTERACTIONS                                                       PSYCHCLINICDDI     OLANZAPINE + LITHIUM: CNS depression     OLANZAPINE + LITHIUM Lithium may enhance the neurotoxic effect of Antipsychotic Agents.     OLANZAPINE: QT-prolonging      OLANZAPINE + PROZAC: Serotonergic      PROZAC + PROPRANOL: Orthostasis      MANAGEMENT:  Monitoring for adverse effects, routine vitals, routine labs and periodic      RISK STATEMENT for SAFETY     Manuel did not appear to be an imminent safety risk to self or others.    TREATMENT RISK STATEMENT: The risks, benefits, alternatives and potential adverse effects have been discussed and are understood by the pt. The pt understands the risks of using street drugs or alcohol. There are no medical contraindications, the pt agrees to treatment with the ability to do so. The pt knows to call the clinic for any problems or to access emergency care if needed.  Medical and substance use concerns are documented above.  Psychotropic drug interaction check was done, including changes made today.     PROVIDER: Alberto Sanat MD    Patient staffed in clinic with Dr. Ventura who will sign the note.  Supervisor is Dr. Tay.

## 2022-11-10 NOTE — PROGRESS NOTES
Jose Francisco Sommers is a 32 year old who is being evaluated via a billable video visit.      Pt will join video visit via: [employer]  If there are problems joining the visit, send backup video invite via: Text to preferred phone: [phone]    Reason for telehealth visit: Patient has requested telehealth visit    Originating location (patient location): Patient's home    Will anyone else be joining the visit? Mother

## 2022-11-10 NOTE — PATIENT INSTRUCTIONS
It was nice seeing you today    Treatment Plan Today:     1) Medications-  - Continue fluoxetine 40mg daily   - Decrease olanzapine 12.5mg at bedtime, prescribed 5mg tablets - take 2.5 at bedtime   - Take half or full olanzapine tablet (2.5-5mg) as needed for psychosis, lauren, or agitation, and call the clinic  - Continue Lithium 1200mg at bedtime    2) Follow-up appt with Dr Santa on 12/8/22 at 1:40PM    3) Labs have been ordered and can be completed at any [place] lab    4) A consult has been placed to Nephrology. If you don t hear from a representative within 2 business days, please call [phone].    5) and clinic after hours number is [phone]     **For crisis resources, please see the information at the end of this document**   Patient Education    Thank you for coming to the [facility] [facility].     Lab Testing:  If you had lab testing today and your results are reassuring or normal they will be mailed to you or sent through [email or web address] within 7 days. If the lab tests need quick action we will call you with the results. The phone number we will call with results is # [phone]. If this is not the best number please call our clinic and change the number.     Medication Refills:  If you need any refills please call your pharmacy and they will contact us. Our fax number for refills is [phone].   Three business days of notice are needed for general medication refill requests.   Five business days of notice are needed for controlled substance refill requests.   If you need to change to a different pharmacy, please contact the new pharmacy directly. The new pharmacy will help you get your medications transferred.     Contact Us:  Please call [phone] during business hours (8-5:00 M-F).   If you have medication related questions after clinic hours, or on the weekend, please call [phone].     Financial Assistance [phone]   Medical Records  [phone]       MENTAL HEALTH CRISIS RESOURCES:  For a emergency help, please call 911 or go to the nearest Emergency Department.     Emergency Walk-In Options:   Em[facility] Unit @ [facility] ([place]): [phone] - Specialized mental health emergency area designed to be calming  [facility] ([place]): [phone]  [facility] ([place]): [phone]  [facility] ([place]): [phone]    [facility] Crisis Information:   [place]: [phone]  [place]: [phone]  [place] ([place]) - Adult: [phone]     Child: [phone]  [place] - Adult: [phone]     Child: [phone]  [place]: [phone]  List of all [facility] resources:   https://[place].[facility]/dhs/people-we-serve/adults/health-care/mental-health/resources/crisis-contacts.jsp    National Crisis Information:   Crisis Text Line: Text  [place]  to 123435  Suicide & Crisis Lifeline: 988  National Suicide Prevention Lifeline: 1-519-380-TALK (1-165.174.8675)       For online chat options, visit https://suicidepreventionlifeline.org/chat/  Poison Control Center: 2-161-700-3982  Trans Lifeline: 1-202.106.1680 - Hotline for transgender people of all ages  The [employer]: 7-227-475-1488 - Hotline for LGBT youth     For Non-Emergency Support:   Fast Tracker: Mental Health & Substance Use Disorder Resources -   https://www.[employer].org/

## 2022-11-14 DIAGNOSIS — Z79.899 ENCOUNTER FOR LONG-TERM (CURRENT) USE OF MEDICATIONS: Primary | ICD-10-CM

## 2022-11-14 DIAGNOSIS — F25.0 SCHIZOAFFECTIVE DISORDER, BIPOLAR TYPE (H): ICD-10-CM

## 2022-11-14 PROCEDURE — 99207 E-CONSULT TO NEPHROLOGY (ADULT OUTPT PROVIDER TO SPECIALIST WRITTEN QUESTION & RESPONSE): CPT | Performed by: STUDENT IN AN ORGANIZED HEALTH CARE EDUCATION/TRAINING PROGRAM

## 2022-11-16 ENCOUNTER — E-CONSULT (OUTPATIENT)
Dept: MULTI SPECIALTY CLINIC | Facility: CLINIC | Age: 32
End: 2022-11-16
Payer: COMMERCIAL

## 2022-11-16 PROCEDURE — 99207 PR NO CHARGE LOS: CPT | Performed by: INTERNAL MEDICINE

## 2022-11-16 NOTE — PROGRESS NOTES
11/16/2022     E-Consult has been denied due to: Complexity of question, needs in-person referral.    Tommie Montes, DO

## 2022-11-21 ENCOUNTER — TELEPHONE (OUTPATIENT)
Dept: PSYCHIATRY | Facility: CLINIC | Age: 32
End: 2022-11-21

## 2022-11-21 NOTE — TELEPHONE ENCOUNTER
"----- Message from Alberto Santa MD sent at 11/16/2022 10:25 AM CST -----  Kavin Pham,    I placed a nephrology referral and e-consult for Manuel due to creatinine bump on labs and question of amiloride and/or metformin.    -the referral liason informed me that they tried to schedule and could not reach Manuel, sent him a letter.  -the e-consult person just got back to me that the question is too complex for e-consult, please schedule in person visit.   \"11/16/2022   E-Consult has been denied due to: Complexity of question, needs in-person referral.  Tommie Montes, DO\"    Could you reach out to Manuel at some point to make sure they schedule the appointment?    ThanksAlberto"

## 2022-11-21 NOTE — TELEPHONE ENCOUNTER
Writer attempted to call patient to help ensure patient is able to make appointment with nephrology. LVM requesting a call back at the main clinic number. Plan to give patient referral contact number: [phone]

## 2022-12-05 NOTE — TELEPHONE ENCOUNTER
Writer attempted to reach out to patient again regarding the nephrology consult. LVM requesting a call back at the main clinic number.

## 2022-12-08 ENCOUNTER — VIRTUAL VISIT (OUTPATIENT)
Dept: PSYCHIATRY | Facility: CLINIC | Age: 32
End: 2022-12-08
Attending: PSYCHIATRY & NEUROLOGY
Payer: COMMERCIAL

## 2022-12-08 DIAGNOSIS — R74.8 ELEVATED LIVER ENZYMES: ICD-10-CM

## 2022-12-08 DIAGNOSIS — K76.0 FATTY LIVER: ICD-10-CM

## 2022-12-08 DIAGNOSIS — F25.0 SCHIZOAFFECTIVE DISORDER, BIPOLAR TYPE (H): Primary | ICD-10-CM

## 2022-12-08 PROCEDURE — 99214 OFFICE O/P EST MOD 30 MIN: CPT | Mod: GT | Performed by: STUDENT IN AN ORGANIZED HEALTH CARE EDUCATION/TRAINING PROGRAM

## 2022-12-08 NOTE — PROGRESS NOTES
Video- Visit Details  Type of service:  video visit for medication management  Time of service:    Video Start Time: 1:45 PM        Video End Time:  2:25 PM  Reason for video visit:  Patient has requested telehealth visit  Originating Site (patient location):  [facility] [place]   Location- Patient's home  Distant Site (provider location):  [facility]  Mode of Communication:  Video Conference via [employer]       [facility]  Psychiatry Clinic  MEDICAL PROGRESS NOTE     CARE TEAM:  PCP- Clinic Provider    Psychotherapist- None       Jose Francisco Sommers is a 32 year old who uses the name Manuel and pronouns he, him, his.      DIAGNOSIS   # Schizoaffective disorder, Bipolar type, most recent episode depressed  # ADHD, predominantly inattentive type  # Insomnia (history of delayed phase sleep disorder)     ASSESSMENT   Manuel is doing well overall.  Today reports that the medication changes made over the past 2 months have been beneficial.  Since increasing the fluoxetine 2 months ago to 40 mg daily he has experienced an improvement in his thought process, and mother reports that he has been talking more.  Additionally since decreasing olanzapine from 15 mg to 12.5 mg at bedtime he has not noticed any change and has not needed to use as needed olanzapine.  He once again inquires about Depakote, stating that Dr. Lyons mentioned that he could potentially be on Depakote and lithium with no olanzapine.  Unclear if this is true, given that Manuel has a primary psychotic illness, and antipsychotics would be first-line treatment.  Regardless, Manuel will be referred to hepatology for their input, as he has elevated liver enzymes and Depakote could exacerbate any liver impairment.  Additionally, Manuel did not follow-up with nephrology after consult placed at last appointment re: renal health and lithium treatment.  He was reminded that nephrology requested he schedule an in person appointment for evaluation of  his kidney function.  Manuel and mother Ember reported that they will schedule this.  He has no acute concerns today.  No suicidal ideation.  No evidence of psychosis.  He appears to be at baseline.      # Schizoaffective disorder vs. schizophrenia:  Manuel has a primary schizophrenia spectrum disorder with prominent negative symptoms. He is concrete at baseline, which could be due to psychotic illness or may be related to historic ASD diagnosis. Given functional impairment and negative symptoms, schizophrenia may be more likely. Mood episodes have been present during a substantial portion of his active psychiatric disturbance. Manuel reported bothersome side effects of sedation and weight gain, which have improved since decrease from 15 mg to 12.5 mg at bedtime.  No breakthrough psychosis since decrease in olanzapine.  Hopefully the steroids will stabilize Manuel better than he has in the past, given that he is no longer smoking cigarettes.     #Lithium  #Kidney function  Manuel also has lithium as a mood stabilizer. He and mother inquired about initiation of amiloride as a nephro-protective agent, given recent bump in creatinine to 1.3 (1.28 on recheck).  Previously referred to nephrology for recommendations/ assitance regaring use of this medication and overall recs re: renal function.  Manuel did not schedule the in person appointment that he requested.  He and mother were reminded to schedule the appointment and stated that they would. We will consider other options. Quick review of the meds listed below shows no previous trials of Saphris, cariprazine or Geodon.    # Insomnia  # history of delayed phase sleep disorder  Referral for sleep study was placed last year by previous provider. Unclear if he followed up with sleep medicine. There is a sleep study from 2011 in which Manuel describes similar symptoms and was diagnosed with delayed phase sleep disorder. It would be interesting to have an updated sleep study to  "determine if this problem persists and what treatment recommendations are. He previously tried Belsomra last year, but reports it was too strong and he wishes to re-trial at a lower dose.     Future considerations:  -Saphris, cariprazine or Geodon  - re-trial metformin for medication associated weight gain, topiramate may have cognitive side effects  - referral to sleep medicine  - Belsomra for sleep    [place]NPMP review was not needed today.     PLAN                                                                                                                1) Meds-  - Continue olanzapine 12.5mg at bedtime, prescribed 5mg tablets - take 2.5 tablets at bedtime  - Continue fluoxetine 40mg daily   - Take half or full olanzapine tablet (2.5-5mg) as needed for psychosis, lauren, or agitation, and call the clinic  - Continue Lithium 1200mg at bedtime    2) Psychotherapy- recommended    3) Next due-  Labs- AP labs completed 5/2022, Cr and TSH elevated (T4 wnl)  EKG- as needed  Rating scales- N/A    4) Referrals-  Medical Referral-hepatology regarding initiation of Depakote  Previously referred to: Nephrology regarding potential initiation of amilioride    5) Dispo- return to clinic in 4 weeks, scheduled for 1/12/2023     PERTINENT BACKGROUND                                                    [most recent eval 08/04/22]    Jose Francisco \"Manuel\"  first experienced mental health issues in grade school and has received treatment for ADHD, depression, psychosis, and substance use. Notably, this patient had FEP in 2015 in the setting of cannabis abuse and has been stable on olanzapine and lithium since that time. He has never lived independently and has only been slowly developing insight into his illness and past symptoms. GeneSight testing was completed noting patient is a poor CYP2D6 metabolizer. Past records from Dr. Mike Powers were received in summer 2018 to assist with diagnostic clarity, however they were hand written notes " "that were illegible though accompanied by typed neuropsychology consult from [facility]. This documentation was unable to support a diagnosis of bipolar disorder at that time though noted potential Asperger's diagnosis and ADHD. Given that there has been evidence for psychotic signs and symptoms outside the context of mood episodes, a schizophrenia spectrum disorder such as schizoaffective disorder diagnosis seems most accurate. He has had two hospitalizations for decompensated psychosis in the setting of decreasing his olanzapine dose.     Psych pertinent item history includes psychosis [sxs include disorganization, possible catatonia], mutiple psychotropic trials, psych hosp (<3) and SUBSTANCE USE: alcohol and cannabis     SUBJECTIVE     Jose Francisco Sommers was last seen in clinic 4 weeks ago when olanzapine was decreased from 15mg to 12.5mg at bedtime.  There is joined today accompanied by his mother Ember provides collateral information during the interview.    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,     Recent Social History: no changes    Recent Psych Symptoms:   Depression:  low energy, insomnia, poor concentration /memory and indecisiveness  Elevated:  none  Psychosis:  none  Anxiety:  social anxiety  Trauma Related:  none  Sleep: dysregulation and Excessive  Other: N/A    Recent Substance Use:     -alcohol: No   -cannabis: No   -tobacco: No  -caffeine:  No   -opioids: No   Narcan Kit currrent: No   -other: " "none    Pertinent Negatives: No suicidal or violent ideation, self-injury, psychosis, hallucinations, delusions, lauren, hypomania, aggression and harmful substance use  Adverse Effects: weight gain      PSYCH and SUBSTANCE USE Critical Summary Points since July 2022 8/04/22 - transfer of care. Olanzapine decreased 20mg to 15mg  9/29/22 - fluoxetine increased from 20mg to 40mg   11/10/22 - decrease olanzapine to 12.5 from 15mg  12/8/22 - no med changes. Placed hepatology referral     PAST MED TRIALS     Adderall 10-20 mg - improved concentration, but \"cold personality\" and perseveration  Prozac 20 mg - limited response, less irritable, first episode of \"rage\"  Seroquel 25-75 mg - helped with sleep and irritability  Concerta 18 mg - improved focus, no improvement in motivation  Provigil 150 mg - Stimulants \"almost killed him\" triggered alcohol binges  Abilify ?25 mg - OK when QOD, but irritable and agitated on QD  Clonazepam 0.5-1.5 mg - calming, helped with sleep and \"catatonia\"  Risperdal up to 5 mg - some confusion, slow processing, withdrawn, ?\"catatonia\", panic attacks  Zyprexa 5 mg less anxious overall improvement  Lithium - calmer, overall better  Latuda 20 mg - severe fatigue and depression  Synthroid 75 mcg - added to help with mood.  Brief trials with Strattera, Intuniv, Lamictal, Xanax, Zoloft  Naltrexone - had nightmares and sedation on 25mg, stopped after 2 nights     MEDICAL HISTORY and ALLERGY     ALLERGIES: Sulfa drugs    Patient Active Problem List   Diagnosis     Schizoaffective disorder, bipolar type (H)     Hx of psychiatric care     Elevated liver enzymes     Fatty liver     High blood triglycerides     History of cannabis abuse     History of cocaine abuse (H)     Psychosis (H)     Elevated blood pressure reading without diagnosis of hypertension     Constipation        MEDICAL REVIEW OF SYSTEMS   Contraception-  Unknown     none in addition to that documented above     MEDICATIONS " "    Current Outpatient Medications   Medication Sig Dispense Refill     FLUoxetine (PROZAC) 20 MG capsule Take 2 capsules (40 mg) by mouth daily 30 capsule 2     lithium ER (LITHOBID) 300 MG CR tablet Take 4 tablets (1,200 mg) by mouth At Bedtime 120 tablet 2     OLANZapine (ZYPREXA) 5 MG tablet Take 2.5 tablets (12.5 mg) by mouth At Bedtime. May also take 0.5-1 tablets (2.5-5 mg) daily as needed ((for psychosis, lauren, or agitation)). 90 tablet 2      VITALS   There were no vitals taken for this visit.     Pulse Readings from Last 3 Encounters:   02/02/22 55   12/20/20 76   11/25/20 58     Wt Readings from Last 3 Encounters:   05/31/22 124.7 kg (275 lb)   02/02/22 122.5 kg (270 lb)   12/20/20 97 kg (213 lb 12.8 oz)     BP Readings from Last 3 Encounters:   02/02/22 (!) 141/83   12/20/20 139/89   11/25/20 (!) 143/88      MENTAL STATUS EXAM     Alertness: alert  and oriented  Appearance: casually groomed  Behavior/Demeanor: cooperative and calm, with good  eye contact   Speech: increased latency of response and slowed  Language: intact and no obvious problem  Psychomotor: normal or unremarkable  Mood:  \"Good\"  Affect: flattened, reactive; congruent to: mood- no, content- no  Thought Process/Associations: linear, concrete, goal oriented  Thought Content:  Reports none;  Denies suicidal & violent ideation and delusions  Perception:  Reports none;  Denies auditory hallucinations and visual hallucinations  Insight: adequate  Judgment: good  Cognition: does  appear grossly intact; formal cognitive testing was not done  Gait and Station: N/A ([facility])     LABS and DATA     PHQ 4/22/2022 6/3/2022 11/10/2022   PHQ-9 Total Score 9 11 3   Q9: Thoughts of better off dead/self-harm past 2 weeks Not at all Not at all Not at all       Recent Labs   Lab Test 05/10/22  0855 07/26/21  1339 12/16/20  0718   LITHIUM 0.8 0.6 0.84     Recent Labs   Lab Test 05/10/22  0855 07/26/21  1339   CR 1.30* 1.12   GFRESTIMATED 76 88    " 138   POTASSIUM 3.4 3.7   ADONAY 9.5 9.4     Recent Labs   Lab Test 05/10/22  0855 07/26/21  1341   SG 1.010 1.015     Recent Labs   Lab Test 05/10/22  0855 07/26/21  1339   TSH 5.36* 2.22       Recent Labs   Lab Test 05/10/22  0855 07/26/21  1339   * 85   A1C 5.1 4.9     Recent Labs   Lab Test 07/26/21  1339 11/13/20  0758   CHOL 247* 111   TRIG 554* 211*   * 20   HDL 42 49     Recent Labs   Lab Test 05/10/22  0855 07/26/21  1339   AST 31 36   * 68   ALKPHOS 81 70     Recent Labs   Lab Test 05/10/22  0855 07/26/21  1339 12/04/20  1414 11/13/20  0758 06/01/20  1339   WBC 7.8 8.0 11.0   < > 7.5   ANEU  --   --  8.0  --  4.8   HGB 15.1 14.7 14.3   < > 15.1    263 285   < > 265    < > = values in this interval not displayed.     ECG 11/20 QTc = 436ms     PSYCHOTROPIC DRUG INTERACTIONS                                                       PSYCHCLINICDDI     OLANZAPINE + LITHIUM: CNS depression     OLANZAPINE + LITHIUM Lithium may enhance the neurotoxic effect of Antipsychotic Agents.     OLANZAPINE: QT-prolonging      OLANZAPINE + PROZAC: Serotonergic      PROZAC + PROPRANOL: Orthostasis      MANAGEMENT:  Monitoring for adverse effects, routine vitals, routine labs and periodic      RISK STATEMENT for SAFETY     Manuel did not appear to be an imminent safety risk to self or others.    TREATMENT RISK STATEMENT: The risks, benefits, alternatives and potential adverse effects have been discussed and are understood by the pt. The pt understands the risks of using street drugs or alcohol. There are no medical contraindications, the pt agrees to treatment with the ability to do so. The pt knows to call the clinic for any problems or to access emergency care if needed.  Medical and substance use concerns are documented above.  Psychotropic drug interaction check was done, including changes made today.     PROVIDER: Alberto Santa MD    Patient staffed in clinic with Dr. Tay who will sign the note.   Supervisor is Dr. Tay.

## 2022-12-08 NOTE — PATIENT INSTRUCTIONS
It was nice seeing you today    Treatment Plan Today:     1) Medications-  - Continue olanzapine 12.5mg at bedtime, prescribed 5mg tablets - take 2.5 tablets at bedtime  - Continue fluoxetine 40mg daily   - Take half or full olanzapine tablet (2.5-5mg) as needed for psychosis, lauren, or agitation, and call the clinic  - Continue Lithium 1200mg at bedtime    2) Follow-up appt with Dr Santa as scheduled 1/12/23 at 1PM    3) Crisis numbers are below and clinic after hours number is [phone]     **For crisis resources, please see the information at the end of this document**   Patient Education    Thank you for coming to the [facility].     Lab Testing:  If you had lab testing today and your results are reassuring or normal they will be mailed to you or sent through [employer] within 7 days. If the lab tests need quick action we will call you with the results. The phone number we will call with results is # [phone]. If this is not the best number please call our clinic and change the number.     Medication Refills:  If you need any refills please call your pharmacy and they will contact us. Our fax number for refills is [phone].   Three business days of notice are needed for general medication refill requests.   Five business days of notice are needed for controlled substance refill requests.   If you need to change to a different pharmacy, please contact the new pharmacy directly. The new pharmacy will help you get your medications transferred.     Contact Us:  Please call [phone] during business hours (8-5:00 M-F).   If you have medication related questions after clinic hours, or on the weekend, please call [phone].     Financial Assistance [phone]   Medical Records [phone]       MENTAL HEALTH CRISIS RESOURCES:  For a emergency help, please call 911 or go to the nearest Emergency Department.     Emergency Walk-In Options:   EmPATH  Unit @ [facility] ([place]): [phone] - Specialized mental health emergency area designed to be calming  [facility] ([place]): [phone]  [facility] ([place]): [phone]  [facility] ([place]): [phone]    County Crisis Information:   [place]: [phone]  [place]: [phone]  [place] ([place]RIED[place]) - Adult: [phone]     Child: [phone]  [place] - Adult: [phone]     Child: [phone]  [place]: [phone]  List of all [facility] resources:   https://[place].[facility]/dhs/people-we-serve/adults/health-care/mental-health/resources/crisis-contacts.jsp    National Crisis Information:   Crisis Text Line: Text  [place]  to 450557  Suicide & Crisis Lifeline: 988  National Suicide Prevention Lifeline: 5-942-539-INRW (1-234.994.8234)       For online chat options, visit https://suicidepreventionlifeline.org/chat/  Poison Control Center: 1-892.705.2771  Trans Lifeline: 1-583.156.9394 - Hotline for transgender people of all ages  The [employer] Project: 9-665-577-9384 - Hotline for LGBT youth     For Non-Emergency Support:   Fast Tracker: Mental Health & Substance Use Disorder Resources -   https://www."[employer]n.org/

## 2022-12-08 NOTE — PROGRESS NOTES
Jose Francisco Sommers is a 32 year old who is being evaluated via a billable video visit.      Pt will join video visit via: [place]  If there are problems joining the visit, send backup video invite via: Send to preferred e-mail: [email or web address]    Reason for telehealth visit: Patient convenience (e.g. access to timely appointments / distance to available provider)    Originating location (patient location): Patient's home    Will anyone else be joining the visit? No

## 2022-12-21 NOTE — NURSING NOTE
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]       Treatment Diagnosis: Treatment Diagnosis: decreased R hip AROM, decreased R LE strength, decreased R lower leg sensation, and decreased balance which is preventing pt from returning to work and returning to normal function    Visit Information:  Insurance: Payor: Adam Olson / Plan: Alidanancy Weaver / Product Type: *No Product type* /   PT Visit Information  Onset Date:  (2022)  PT Insurance Information: [employer]  Total # of Visits Approved:  (eval only)  Total # of Visits to Date: 3  Plan of Care/Certification Expiration Date: 22  No Show: 0  Canceled Appointment: 1  Progress Note Counter: 3/6 ( units -)    Subjective Information:  Subjective: . No pain since last session. HEP Compliance:  [x] Good [] Fair [] Poor [] Reports not doing due to:    Pain Screening  Patient Currently in Pain: Denies    Treatment:  Exercises:  Exercises  Exercise 1: piriformis stretch 30 sec X 3 R/L  Exercise 2: hamstring stretch/gastroc stretch/ITB stretch/quad stretch 20 sec x 3  Exercise 4: clams w/ YTB 2 set x 15 reps  Exercise 6: step ups fwd/lat 6\" x20 ea leading with LLE  Exercise 7: SLS on foam escobar LE x30\" x3,  Exercise 8: 4-way vectors w/ slider 1 set x 10 reps escobar  Exercise 11: Bridge 1 set 5 sec holdx 20 reps ; H/L  set x 20 reps ; bridge w/  set x 20 reps        *Indicates exercise, modality, or manual techniques to be initiated when appropriate    Objective Measures:        Strength: [] NT  [] MMT completed:  Strength RLE  R Hip Flexion: 4+/5  R Hip Extension: 4/5, 4+/5  R Hip ABduction: 4/5, 4+/5                ROM: [] NT  [x] ROM measurements:             Assessment:    Body Structures, Functions, Activity Limitations Requiring Skilled Therapeutic Intervention: Decreased functional mobility , Decreased ROM, Decreased strength, Decreased sensation, Decreased balance  Assessment: Continue to progress current exercises wi focus on balance and LE strengthening. Initiated multiple stretching exercises to improve mobility. RLE strength much improved this session. States compliant with HEP. Good tolerance to treratment, no nausea reported. Treatment Diagnosis: Treatment Diagnosis: decreased R hip AROM, decreased R LE strength, decreased R lower leg sensation, and decreased balance which is preventing pt from returning to work and returning to normal function             Post-Pain Assessment:       Pain Rating (0-10 pain scale):   0/10   Location and pain description same as pre-treatment unless indicated. Action: [] NA   [x] Perform HEP  [] Meds as prescribed  [] Modalities as prescribed   [] Call Physician     GOALS   Patient Goal(s): Patient Goals : \"walking without a limp\"    Short Term Goals Completed by 1 wk Goal Status   STG 1 Pt will demonstrate improved R hip AROM for improved movement of R hip during functional activity. In progress   STG 2 Pt will demosntrate improved R LE strength >/= 4+/5 for return to normalized gait pattern. In progress       Long Term Goals Completed by 3 wks Goal Status   LTG 1 Pt will demonstrate indep and 100% compliance with HEP for return to PLOF. In progress   LTG 2 Pt will demonstrate amb community distances without gait deviations 100% of the time. In progress          Plan:  Frequency/Duration:     Pt to continue current HEP. See objective section for any therapeutic exercise changes, additions or modifications this date.     Therapy Time:      PT Individual Minutes  Time In: 1704  Time Out: 6851  Minutes: 40  Timed Code Treatment Minutes: 40 Minutes  Procedure Minutes:0    Timed Activity Minutes Units   Ther Ex 40 3     Electronically signed by Emperatriz Hull PTA on 12/21/22 at 12:04 PM EST

## 2023-01-10 ENCOUNTER — LAB (OUTPATIENT)
Dept: LAB | Facility: CLINIC | Age: 33
End: 2023-01-10
Payer: COMMERCIAL

## 2023-01-10 DIAGNOSIS — Z79.899 ENCOUNTER FOR LONG-TERM (CURRENT) USE OF MEDICATIONS: ICD-10-CM

## 2023-01-10 LAB
ALBUMIN SERPL BCG-MCNC: 4.6 G/DL (ref 3.5–5.2)
ALP SERPL-CCNC: 99 U/L (ref 40–129)
ALT SERPL W P-5'-P-CCNC: 187 U/L (ref 10–50)
ANION GAP SERPL CALCULATED.3IONS-SCNC: 12 MMOL/L (ref 7–15)
AST SERPL W P-5'-P-CCNC: 82 U/L (ref 10–50)
BASOPHILS # BLD AUTO: 0.1 10E3/UL (ref 0–0.2)
BASOPHILS NFR BLD AUTO: 1 %
BILIRUB SERPL-MCNC: 0.4 MG/DL
BUN SERPL-MCNC: 7.6 MG/DL (ref 6–20)
CALCIUM SERPL-MCNC: 10 MG/DL (ref 8.6–10)
CHLORIDE SERPL-SCNC: 106 MMOL/L (ref 98–107)
CHOLEST SERPL-MCNC: 210 MG/DL
CREAT SERPL-MCNC: 1.05 MG/DL (ref 0.67–1.17)
DEPRECATED HCO3 PLAS-SCNC: 21 MMOL/L (ref 22–29)
EOSINOPHIL # BLD AUTO: 0.3 10E3/UL (ref 0–0.7)
EOSINOPHIL NFR BLD AUTO: 5 %
ERYTHROCYTE [DISTWIDTH] IN BLOOD BY AUTOMATED COUNT: 13.2 % (ref 10–15)
GFR SERPL CREATININE-BSD FRML MDRD: >90 ML/MIN/1.73M2
GLUCOSE SERPL-MCNC: 97 MG/DL (ref 70–99)
HCT VFR BLD AUTO: 47.3 % (ref 40–53)
HDLC SERPL-MCNC: 31 MG/DL
HGB BLD-MCNC: 15.6 G/DL (ref 13.3–17.7)
LDLC SERPL CALC-MCNC: ABNORMAL MG/DL
LITHIUM SERPL-SCNC: 0.6 MMOL/L (ref 0.6–1.2)
LYMPHOCYTES # BLD AUTO: 1.8 10E3/UL (ref 0.8–5.3)
LYMPHOCYTES NFR BLD AUTO: 28 %
MCH RBC QN AUTO: 29.9 PG (ref 26.5–33)
MCHC RBC AUTO-ENTMCNC: 33 G/DL (ref 31.5–36.5)
MCV RBC AUTO: 91 FL (ref 78–100)
MONOCYTES # BLD AUTO: 0.5 10E3/UL (ref 0–1.3)
MONOCYTES NFR BLD AUTO: 7 %
NEUTROPHILS # BLD AUTO: 3.9 10E3/UL (ref 1.6–8.3)
NEUTROPHILS NFR BLD AUTO: 59 %
NONHDLC SERPL-MCNC: 179 MG/DL
PLATELET # BLD AUTO: 254 10E3/UL (ref 150–450)
POTASSIUM SERPL-SCNC: 4.2 MMOL/L (ref 3.4–5.3)
PROT SERPL-MCNC: 7.1 G/DL (ref 6.4–8.3)
RBC # BLD AUTO: 5.21 10E6/UL (ref 4.4–5.9)
SODIUM SERPL-SCNC: 139 MMOL/L (ref 136–145)
SP GR UR STRIP: 1.02 (ref 1–1.03)
TRIGL SERPL-MCNC: 532 MG/DL
TSH SERPL DL<=0.005 MIU/L-ACNC: 1.44 UIU/ML (ref 0.3–4.2)
WBC # BLD AUTO: 6.6 10E3/UL (ref 4–11)

## 2023-01-10 PROCEDURE — 81003 URINALYSIS AUTO W/O SCOPE: CPT

## 2023-01-10 PROCEDURE — 80061 LIPID PANEL: CPT

## 2023-01-10 PROCEDURE — 36415 COLL VENOUS BLD VENIPUNCTURE: CPT

## 2023-01-10 PROCEDURE — 80050 GENERAL HEALTH PANEL: CPT

## 2023-01-10 PROCEDURE — 80178 ASSAY OF LITHIUM: CPT

## 2023-01-10 PROCEDURE — 83721 ASSAY OF BLOOD LIPOPROTEIN: CPT

## 2023-01-11 LAB — LDLC SERPL DIRECT ASSAY-MCNC: 71 MG/DL

## 2023-01-12 ENCOUNTER — VIRTUAL VISIT (OUTPATIENT)
Dept: PSYCHIATRY | Facility: CLINIC | Age: 33
End: 2023-01-12
Attending: PSYCHIATRY & NEUROLOGY
Payer: COMMERCIAL

## 2023-01-12 DIAGNOSIS — F25.0 SCHIZOAFFECTIVE DISORDER, BIPOLAR TYPE (H): Primary | ICD-10-CM

## 2023-01-12 DIAGNOSIS — G47.00 INSOMNIA, UNSPECIFIED TYPE: ICD-10-CM

## 2023-01-12 PROCEDURE — 99214 OFFICE O/P EST MOD 30 MIN: CPT | Mod: GC | Performed by: STUDENT IN AN ORGANIZED HEALTH CARE EDUCATION/TRAINING PROGRAM

## 2023-01-12 ASSESSMENT — PATIENT HEALTH QUESTIONNAIRE - PHQ9
10. IF YOU CHECKED OFF ANY PROBLEMS, HOW DIFFICULT HAVE THESE PROBLEMS MADE IT FOR YOU TO DO YOUR WORK, TAKE CARE OF THINGS AT HOME, OR GET ALONG WITH OTHER PEOPLE: SOMEWHAT DIFFICULT
SUM OF ALL RESPONSES TO PHQ QUESTIONS 1-9: 9
SUM OF ALL RESPONSES TO PHQ QUESTIONS 1-9: 9

## 2023-01-12 NOTE — PATIENT INSTRUCTIONS
It was nice seeing you today    Treatment Plan Today:     1) Medications-  -Start Belsomra 10mg at bedtime  - Continue olanzapine 12.5mg at bedtime, prescribed 5mg tablets - take 2.5 tablets at bedtime  - Continue fluoxetine 40mg daily   - Take half or full olanzapine tablet (2.5-5mg) as needed for psychosis, lauren, or agitation, and call the clinic  - Continue Lithium 1200mg at bedtime    2) Follow-up appt with Dr Santa as scheduled 2/9/23 at 3PM    3) Please follow up with hepatology     4) I have placed a referral for sleep medicine. If you don't hear from a representative within 2 business days, please call [phone].    5) Crisis numbers are below and clinic after hours number is [phone]     **For crisis resources, please see the information at the end of this document**   Patient Education    Thank you for coming to the [facility] [place] [facility].     Lab Testing:  If you had lab testing today and your results are reassuring or normal they will be mailed to you or sent through [employer] within 7 days. If the lab tests need quick action we will call you with the results. The phone number we will call with results is # [phone]. If this is not the best number please call our clinic and change the number.     Medication Refills:  If you need any refills please call your pharmacy and they will contact us. Our fax number for refills is [phone].   Three business days of notice are needed for general medication refill requests.   Five business days of notice are needed for controlled substance refill requests.   If you need to change to a different pharmacy, please contact the new pharmacy directly. The new pharmacy will help you get your medications transferred.     Contact Us:  Please call [phone] during business hours (8-5:00 M-F).   If you have medication related questions after clinic hours, or on the weekend, please call [phone].     Financial  Assistance [phone]   Medical Records [phone]       MENTAL HEALTH CRISIS RESOURCES:  For a emergency help, please call 911 or go to the nearest Emergency Department.     Emergency Walk-In Options:   EmPATH Unit @ [facility] ([place]): [phone] - Specialized mental health emergency area designed to be calming  [facility] ([place]): [phone]  [facility] ([place]): [phone]  [facility] ([place]): [phone]    [facility] Crisis Information:   [place]: [phone]  [place]: [phone]  [place] ([place]) - Adult: [phone]     Child: [phone]  [place] - Adult: [phone]     Child: [phone]  [place]: [phone]  List of all [facility] resources:   https://[place].[facility]/dhs/people-we-serve/adults/health-care/mental-health/resources/crisis-contacts.jsp    National Crisis Information:   Crisis Text Line: Text  [place]  to 864936  Suicide & Crisis Lifeline: 988  National Suicide Prevention Lifeline: 2-524-100-TALK (1-547.773.2724)       For online chat options, visit https://suicidepreventionlifeline.org/chat/  Poison Control Center: 1-462.474.8278  Trans Lifeline: 5-004-885-6790 - Hotline for transgender people of all ages  The Luis Project: 8-870-106-9486 - Hotline for LGBT youth     For Non-Emergency Support:   Fast Tracker: Mental Health & Substance Use Disorder Resources -   https://www.[employer].org/

## 2023-01-12 NOTE — PROGRESS NOTES
"Video- Visit Details  Type of service:  video visit for medication management  Time of service:    Video Start Time: 1:00 PM        Video End Time:  1:50 PM  Reason for video visit:  Patient has requested telehealth visit  Originating Site (patient location):  [facility]   Location- Patient's home  Distant Site (provider location):  [facility]  Mode of Communication:  Video Conference via [employer]       [facility]  MEDICAL PROGRESS NOTE     CARE TEAM:  PCP- Clinic Provider    Psychotherapist- None       Jose Francisco Sommers is a 32 year old who uses the name Manuel and pronouns he, him, his.      DIAGNOSIS   # Schizoaffective disorder, Bipolar type, most recent episode depressed  # ADHD, predominantly inattentive type  # Insomnia (history of delayed phase sleep disorder)     ASSESSMENT   Manuel is doing well overall.  Today he reports that he is in a \"good\" mood and has minimal depression or anxiety.  Manuel has his baseline difficulty describing his subjective experience and he appears to have a somewhat flattened affect.  Both Manuel and his mother Ember, who was present for the visit, agree that he is doing well.  They report that they did not follow-up with hepatology or nephrology due to insurance coverage.  We reviewed recent lab work showing that creatinine had normalized, and liver enzymes have increased further over the past few months.  Manuel has a history of fatty liver diagnosed years ago, and they state that they will follow-up with her PCP about getting a referral for hepatologist at [facility].  With elevated liver enzymes, we cannot consider starting Depakote at this time - something that Manuel has requested previously. Manuel's main concern today is regarding sleep, feeling like he is getting adequate sleep but wakes groggy in the morning.  Manuel reports that previous trial of Belsomra was beneficial and requests to try this medication again.  Risks and " benefits were discussed with Manuel and his mother and shared decision making was used to decide on a trial of this medication until the next visit.   Although unsure if he is currently anxious, Manuel notes previous benefit from anxiolytic medications, and we can consider that for the future. Of note, Manuel is a poor CYP2D6 metabolizer, so he may benefit from a reduction in fluoxetine dose. We will keep this in mind for next visit so as to only make one change today.      # Schizoaffective disorder vs. schizophrenia:  Manuel has a primary schizophrenia spectrum disorder with prominent negative symptoms. No breakthrough psychosis since decrease in olanzapine 2 months ago.  Will continue current dose    #Lithium  #Kidney function  Manuel also has lithium as a mood stabilizer. He and mother previously inquired about initiation of amiloride as a nephro-protective agent, given recent bump in creatinine to 1.3 (1.28 on recheck).  Creatinine has since normalized. Manuel never followed up with nephrology referral.      # Insomnia  # history of delayed phase sleep disorder  There is a sleep study from 2011 in which Manuel describes similar symptoms and was diagnosed with delayed phase sleep disorder. Sleep appears to be his primary concern currently. Will refer to sleep medicine for a new sleep evaluation for LISA or other disorder. Additionally, Manuel reports previous benefit from Belsomra, which was prescribed by Dr. Lyons but Manuel self-discontinued last year. Will trials this medication again to determine if it is similarly beneficial at a lower dose, as described above.       Future considerations:  - Saphris, cariprazine or Geodon as alternative antipsychotics/mood stabilizers  - re-trial metformin for medication associated weight gain, topiramate may have cognitive side effects  - follow up on referral to sleep medicine, hepatology  - reduce fluoxetine given poor 2D6 metabolizer    MNPMP review was not needed today.     PLAN           "                                                                                                      1) Meds-  -Start Belsomra 10mg at bedtime  - Continue olanzapine 12.5mg at bedtime, prescribed 5mg tablets - take 2.5 tablets at bedtime  - Continue fluoxetine 40mg daily   - Take half or full olanzapine tablet (2.5-5mg) as needed for psychosis, lauren, or agitation, and call the clinic  - Continue Lithium 1200mg at bedtime    2) Psychotherapy- recommended    3) Next due-  Labs- AP and lithium labs completed 1/2023, Cr and TSH normalized, hyperlipidemia, LFT elevated  EKG- as needed  Rating scales- N/A    4) Referrals-  Sleep Assessment- Sleep medicine re-evaluation for delayed-sleep phase disorder, OSAPreviously referred to: Hepatology regarding initiation of Depakote  Previously referred to: Nephrology regarding potential initiation of amilioride    5) Dispo- Follow-up appt with Dr Santa as scheduled 2/9/23 at 3PM     PERTINENT BACKGROUND                                                    [most recent eval 08/04/22]    Jose Francisco \"Manuel\"  first experienced mental health issues in grade school and has received treatment for ADHD, depression, psychosis, and substance use. Notably, this patient had FEP in 2015 in the setting of cannabis abuse and has been stable on olanzapine and lithium since that time. He has never lived independently and has only been slowly developing insight into his illness and past symptoms. GeneSight testing was completed noting patient is a poor CYP2D6 metabolizer. Past records from Dr. Mike Powers were received in summer 2018 to assist with diagnostic clarity, however they were hand written notes that were illegible though accompanied by typed neuropsychology consult from [facility]. This documentation was unable to support a diagnosis of bipolar disorder at that time though noted potential Asperger's diagnosis and ADHD. Given that there has been evidence for psychotic signs and " "symptoms outside the context of mood episodes, a schizophrenia spectrum disorder such as schizoaffective disorder diagnosis seems most accurate. He has had two hospitalizations for decompensated psychosis in the setting of decreasing his olanzapine dose.     Psych pertinent item history includes psychosis [sxs include disorganization, possible catatonia], mutiple psychotropic trials, psych hosp (<3) and SUBSTANCE USE: alcohol and cannabis     SUBJECTIVE     Jose Francisco Sommers was last seen in clinic 5 weeks ago when olanzapine was continued at 12.5mg after previous reduction from 15mg to 12.5mg 11/10/22.  Manuel is joined today accompanied by his mother Ember provides collateral information during the interview.     -Updates:    -Manuel and mother are wondering about lab results - provided.    -Liver enzymes further elevated, Manuel reports known fatty liver dx years ago, wondering if Prozac could be contributing to LFT dysregulation   -never scheduled hepatology consult due to [place] out of network, will ask for referral from PCP at [facility].   -stopped doing [employer] cards about a month ago, too hard to keep track of prices now that Manuel is no longer playing   -thinking about work, not sure where he will apply  -Mood: \"good\"  -Depression: not depressed, reports low motivation, lack of focus and trouble finishing tasks  -Anxiety: \"not really anxious\", used klonopin previously but knows we dont prescribe that, buspar was helpful in the past  -SI/HI: none  -Sleep: ok, not feeling rested in AM, wake up feeling groggy, 10hrs, no napping - Belsomra was helpful in the past  -Psychosis: no hallucinations or thought disorder  -Substances: none  -Medications are going alright   -wondering about Belsomra for sleep, which was helpful but overly sedating at 20mg.    Pertinent Negatives: No suicidal or violent ideation, self-injury, psychosis, hallucinations, delusions, lauren, hypomania, aggression and harmful substance " "use  Adverse Effects: weight gain, sedation      PSYCH and SUBSTANCE USE Critical Summary Points since July 2022 8/04/22 - transfer of care. Olanzapine decreased 20mg to 15mg  9/29/22 - fluoxetine increased from 20mg to 40mg   11/10/22 - decrease olanzapine to 12.5 from 15mg  12/8/22 - no med changes. Placed hepatology referral  1/12/23 - started Belsomra for sleep, encouraged hepatology eval, placed sleep medicine referral.     PAST MED TRIALS     Adderall 10-20 mg - improved concentration, but \"cold personality\" and perseveration  Prozac 20 mg - limited response, less irritable, first episode of \"rage\"  Seroquel 25-75 mg - helped with sleep and irritability  Concerta 18 mg - improved focus, no improvement in motivation  Provigil 150 mg - Stimulants \"almost killed him\" triggered alcohol binges  Abilify ?25 mg - OK when QOD, but irritable and agitated on QD  Clonazepam 0.5-1.5 mg - calming, helped with sleep and \"catatonia\"  Risperdal up to 5 mg - some confusion, slow processing, withdrawn, ?\"catatonia\", panic attacks  Zyprexa 5 mg less anxious overall improvement  Lithium - calmer, overall better  Latuda 20 mg - severe fatigue and depression  Synthroid 75 mcg - added to help with mood.  Brief trials with Strattera, Intuniv, Lamictal, Xanax, Zoloft  Naltrexone - had nightmares and sedation on 25mg, stopped after 2 nights     MEDICAL HISTORY and ALLERGY     ALLERGIES: Sulfa drugs    Patient Active Problem List   Diagnosis     Schizoaffective disorder, bipolar type (H)     Hx of psychiatric care     Elevated liver enzymes     Fatty liver     High blood triglycerides     History of cannabis abuse     History of cocaine abuse (H)     Psychosis (H)     Elevated blood pressure reading without diagnosis of hypertension     Constipation        MEDICAL REVIEW OF SYSTEMS   Contraception-  Unknown     none in addition to that documented above     MEDICATIONS     Current Outpatient Medications   Medication Sig Dispense " "Refill     FLUoxetine (PROZAC) 20 MG capsule Take 2 capsules (40 mg) by mouth daily 30 capsule 2     lithium ER (LITHOBID) 300 MG CR tablet Take 4 tablets (1,200 mg) by mouth At Bedtime 120 tablet 2     OLANZapine (ZYPREXA) 5 MG tablet Take 2.5 tablets (12.5 mg) by mouth At Bedtime. May also take 0.5-1 tablets (2.5-5 mg) daily as needed ((for psychosis, lauren, or agitation)). 90 tablet 2      VITALS   There were no vitals taken for this visit.     Pulse Readings from Last 3 Encounters:   02/02/22 55   12/20/20 76   11/25/20 58     Wt Readings from Last 3 Encounters:   05/31/22 124.7 kg (275 lb)   02/02/22 122.5 kg (270 lb)   12/20/20 97 kg (213 lb 12.8 oz)     BP Readings from Last 3 Encounters:   02/02/22 (!) 141/83   12/20/20 139/89   11/25/20 (!) 143/88      MENTAL STATUS EXAM     Alertness: alert  and oriented  Appearance: casually groomed, appropriate  Behavior/Demeanor: cooperative and calm, with good  eye contact   Speech: increased latency of response and slowed, appears baseline  Language: intact and no obvious problem  Psychomotor: normal or unremarkable  Mood:  \"good\"  Affect: flattened, reactive; congruent to: mood- no, content- no  Thought Process/Associations: linear, concrete, goal oriented  Thought Content:  Reports none;  Denies suicidal & violent ideation and delusions  Perception:  Reports none;  Denies auditory hallucinations and visual hallucinations  Insight: adequate  Judgment: good  Cognition: does  appear grossly intact; formal cognitive testing was not done  Gait and Station: N/A ([facility])     LABS and DATA     PHQ 6/3/2022 11/10/2022 1/12/2023   PHQ-9 Total Score 11 3 9   Q9: Thoughts of better off dead/self-harm past 2 weeks Not at all Not at all Not at all       Recent Labs   Lab Test 01/10/23  1156 05/10/22  0855 07/26/21  1339   LITHIUM 0.6 0.8 0.6     Recent Labs   Lab Test 01/10/23  1156 05/10/22  0855   CR 1.05 1.30*   GFRESTIMATED >90 76    139   POTASSIUM 4.2 3.4   ADONAY " 10.0 9.5     Recent Labs   Lab Test 01/10/23  1156 05/10/22  0855   SG 1.020 1.010     Recent Labs   Lab Test 01/10/23  1156 05/10/22  0855   TSH 1.44 5.36*       Recent Labs   Lab Test 01/10/23  1156 05/10/22  0855 07/26/21  1339   GLC 97 110* 85   A1C  --  5.1 4.9     Recent Labs   Lab Test 01/10/23  1156 07/26/21  1339   CHOL 210* 247*   TRIG 532* 554*   LDL 71 101*   HDL 31* 42     Recent Labs   Lab Test 01/10/23  1156 05/10/22  0855   AST 82* 31   * 103*   ALKPHOS 99 81     Recent Labs   Lab Test 01/10/23  1156 05/10/22  0855 07/26/21  1339 12/04/20  1414 11/13/20  0758 06/01/20  1339   WBC 6.6 7.8   < > 11.0   < > 7.5   ANEU  --   --   --  8.0  --  4.8   HGB 15.6 15.1   < > 14.3   < > 15.1    233   < > 285   < > 265    < > = values in this interval not displayed.     ECG 11/20 QTc = 436ms     PSYCHOTROPIC DRUG INTERACTIONS                                                       PSYCHCLINICDDI     OLANZAPINE + LITHIUM: CNS depression     OLANZAPINE + LITHIUM Lithium may enhance the neurotoxic effect of Antipsychotic Agents.     OLANZAPINE: QT-prolonging      OLANZAPINE + PROZAC: Serotonergic      PROZAC + PROPRANOL: Orthostasis      MANAGEMENT:  Monitoring for adverse effects, routine vitals, routine labs and periodic      RISK STATEMENT for SAFETY     Manuel did not appear to be an imminent safety risk to self or others.    TREATMENT RISK STATEMENT: The risks, benefits, alternatives and potential adverse effects have been discussed and are understood by the pt. The pt understands the risks of using street drugs or alcohol. There are no medical contraindications, the pt agrees to treatment with the ability to do so. The pt knows to call the clinic for any problems or to access emergency care if needed.  Medical and substance use concerns are documented above.  Psychotropic drug interaction check was done, including changes made today.     PROVIDER: Alberot Santa MD    Patient staffed in clinic with  Dr. Ibrahim who will sign the note.  Supervisor is Dr. Tay.

## 2023-01-12 NOTE — NURSING NOTE
Patient denies any changes regarding medication and allergies and states all information entered during echeck-in remains accurate.    Danielle Galeano,  MANASA January 12, 2023 12:59 PM

## 2023-01-12 NOTE — PROGRESS NOTES
Jose Francisco Sommers is a 32 year old who is being evaluated via a billable video visit.      Pt will join video visit via: [employer]  If there are problems joining the visit, send backup video invite via: Send to preferred e-mail: [email or web address]    Reason for telehealth visit: Patient has requested telehealth visit    Originating location (patient location): Patient's home    Will anyone else be joining the visit? Yes: Mother will be joining in video visit with pt.. How would they like to receive their invitation? Text to cell phone: [phone]

## 2023-01-13 ENCOUNTER — TELEPHONE (OUTPATIENT)
Dept: PSYCHIATRY | Facility: CLINIC | Age: 33
End: 2023-01-13
Payer: COMMERCIAL

## 2023-01-13 RX ORDER — OLANZAPINE 5 MG/1
TABLET ORAL
Qty: 90 TABLET | Refills: 2 | Status: SHIPPED | OUTPATIENT
Start: 2023-01-13 | End: 2023-02-10

## 2023-01-13 RX ORDER — LITHIUM CARBONATE 300 MG/1
1200 TABLET, FILM COATED, EXTENDED RELEASE ORAL AT BEDTIME
Qty: 120 TABLET | Refills: 2 | Status: SHIPPED | OUTPATIENT
Start: 2023-01-13 | End: 2023-02-10

## 2023-01-13 NOTE — Clinical Note
I made a few adjustments and am forwarding you the letter. Please let me know if you have additional thoughts. I have never done one of these letters.    Alberto

## 2023-01-13 NOTE — Clinical Note
Kavin Dominguez,     Not sure it will work, but this is what I've been able to come up with. Please review and add to/edit appeal letter as appropriate.     Thanks!  Vero

## 2023-01-13 NOTE — LETTER
2023      RE: Jose Francisco Sommers  [address] [place] [address]         To Whom It May Concern,    I am writing on behalf of my patient, Jose Francisco Sommers,  1990 to request reconsideration of the decision to deny coverage of Suvorexant (BELSOMRA) 10 MG tablet for this patient. He has been prescribed Belsomra in the past and had a beneficial clinical response.    Mr Sommers has diagnoses of schizoaffective disorder-bipolar type and insomnia, and has reported that his biggest concern now is difficulty falling and staying asleep. He has a history of mental health hospitalizations when not sleeping well. There is concern that an inability to fall and stay asleep could worsen his bipolar symptoms and result in hospitalization. In addition to Belsomra, Manuel has also had trials of the following medications for sleep: zolpidem, gabapentin, trazodone, quetiapine. He notes that Belsomra was the most beneficial for his sleep and mental health.    Please consider covering Belsomra for this patient as it has been effective in the past and failure to cover this medication could result in decompensation up to and including hospitalization.       Sincerely,      Alberto Santa MD

## 2023-01-13 NOTE — TELEPHONE ENCOUNTER
Central Prior Authorization Team   Phone: [phone]      Sending to the provider as per DHS, the patient needs to have tried/failed at least two preferred alternatives: Eszopicole, Rozerem, Zaleplon, Zolpidem.  Does the provider want to change to covered item, YES or NO.  If No, please provider reason.

## 2023-01-16 ENCOUNTER — TELEPHONE (OUTPATIENT)
Dept: PSYCHIATRY | Facility: CLINIC | Age: 33
End: 2023-01-16

## 2023-01-16 NOTE — TELEPHONE ENCOUNTER
Central Prior Authorization Team   Phone: [phone]      PRIOR AUTHORIZATION DENIED    Medication: Suvorexant (BELSOMRA) 10 MG tablet     Denial Date: 1/16/2023    Denial Rational:   Case ID: [id number] Appeal supported: No   Note from payer: Denied. We are not approving your request because: - You have not tried and failed two preferred products. Documentation (in the form of pharmacy claims or medical chart documentation) must be provided validating that two preferred alternatives have been tried and failed for a sufficient period of time to allow for a positive treatment outcome, or that there are medical reasons to avoid use. Preferred products include: eszopiclone, Rozerem, zaleplon, and zolpidem (5 mg and 10 mg tablets). We realize this may not be the answer you expected. We made this decision based on: - Information from your provider - The [place] Department of Human Services Non-Preferred Drug Prior Authorization Criteria - Your [facility] Drug List coverage criteria for Belsomra        Appeal Information:

## 2023-01-18 NOTE — TELEPHONE ENCOUNTER
Medication Appeal Request    Please initiate an appeal for the requested medication: Suvorexant (BELSOMRA) 10 MG tablet     Has a letter of medical necessity been completed in EPIC?   Yes    Any additional lab values/information to include? No    Would you like to include any research articles? No              If yes please include the hyperlink(s) below or fax to    [phone] for Specialty/Retail               [phone] for Infusion/Clinic Administered.                Include the patients name and MRN on the fax cover sheet.

## 2023-01-18 NOTE — TELEPHONE ENCOUNTER
Central Prior Authorization Team   Phone: [phone]      Medication Appeal Initiation    We have initiated an appeal for the requested medication:  Medication: Suvorexant (BELSOMRA) 10 MG tablet - APPEAL INITIATED 01/18/2023  Appeal Start Date:  1/18/2023  Insurance Company: [employer] - Phone [phone] Fax [phone]  Comments:  Case ID: [id number]   [facility]  Member Services  MS: [id number]   [address]  [place] [address]  Phone: [phone] or [phone]  Fax: [phone]    Faxed via [facility]

## 2023-01-19 NOTE — TELEPHONE ENCOUNTER
Central Prior Authorization Team   Phone: [phone]      MEDICATION APPEAL APPROVED    Medication: Suvorexant (BELSOMRA) 10 MG tablet - APPEAL Approved   Authorization Effective Date: 12/19/2022  Authorization Expiration Date: 1/19/2024  Approved Dose/Quantity: 10MG AT UP TO 2 TABLETS PER DAY  Reference #: [id number]   Insurance Company: [employer] - Phone [phone] Fax [phone]  Expected CoPay:       CoPay Card Available: No    Foundation Assistance Needed:    Which Pharmacy is filling the prescription (Not needed for infusion/clinic administered): [id number] [place], [place] - [address]

## 2023-02-09 ENCOUNTER — VIRTUAL VISIT (OUTPATIENT)
Dept: PSYCHIATRY | Facility: CLINIC | Age: 33
End: 2023-02-09
Attending: PSYCHIATRY & NEUROLOGY
Payer: COMMERCIAL

## 2023-02-09 DIAGNOSIS — F25.0 SCHIZOAFFECTIVE DISORDER, BIPOLAR TYPE (H): Primary | ICD-10-CM

## 2023-02-09 DIAGNOSIS — Z79.899 ENCOUNTER FOR LONG-TERM (CURRENT) USE OF MEDICATIONS: ICD-10-CM

## 2023-02-09 DIAGNOSIS — G47.00 INSOMNIA, UNSPECIFIED TYPE: ICD-10-CM

## 2023-02-09 PROCEDURE — G0463 HOSPITAL OUTPT CLINIC VISIT: HCPCS | Mod: PN,GT | Performed by: STUDENT IN AN ORGANIZED HEALTH CARE EDUCATION/TRAINING PROGRAM

## 2023-02-09 PROCEDURE — 99214 OFFICE O/P EST MOD 30 MIN: CPT | Mod: GC | Performed by: STUDENT IN AN ORGANIZED HEALTH CARE EDUCATION/TRAINING PROGRAM

## 2023-02-09 NOTE — PROGRESS NOTES
Jose Francisco Sommers is a 32 year old who is being evaluated via a billable video visit.      Pt will join video visit via: [employer]  If there are problems joining the visit, send backup video invite via: Send to preferred e-mail: [email or web address]    Reason for telehealth visit: Patient has requested telehealth visit    Originating location (patient location): Patient's home    Will anyone else be joining the visit? No

## 2023-02-09 NOTE — PROGRESS NOTES
"Video- Visit Details  Type of service:  video visit for medication management  Time of service:    Video Start Time: 3:08 PM        Video End Time:  3:49 PM   Reason for video visit:  Patient has requested telehealth visit  Originating Site (patient location):  [facility]   Location- Patient's home  Distant Site (provider location):  [facility]  Mode of Communication:  Video Conference via [employer]       [facility]  Psychiatry Clinic  MEDICAL PROGRESS NOTE     CARE TEAM:  PCP- Clinic Provider    Psychotherapist- None       Jose Francisco Sommers is a 32 year old who uses the name Manuel and pronouns he, him, his.      DIAGNOSIS   # Probable Schizoaffective disorder, Bipolar type, most recent episode depressed  # ADHD, predominantly inattentive type  # Insomnia (history of delayed phase sleep disorder)     ASSESSMENT   Manule is doing well overall.  He reports an interval improvement in sleep since starting Belsomra at last visit. Sleep has been \"normalized\", and he is going to bed considerably earlier than before (10PM instead of 3AM) and waking refreshed. He presents with minimal mental health symptoms today, no depression, anxiety, or psychosis. Manuel's main concerns are \"thinking a lot\", and wishing to decrease fluoxetine due to liver concerns. Manuel he has trouble articulating his thinking concern, but specifies that it is NOT racing thoughts or related to lauren, and he reports that it previously improved with Klonopin. Suspect that this is anxiety and/or ADHD related with poor insight. He is interested in discussing an anxiolytic medication like buspirone at a future visit; it seems appropriate to wait on this to determine if it improves with consistently better sleep. Regarding LFTs, elevation of LFTs occurred after titration of fluoxetine (causation/correlation unclear), and reports a history of elevated LFTs that normalized after stopping gabapentin, thus wants to decrease " fluoxetine. Fluoxetine was increased a number of months ago due to anhedonia, and motivation/function has improved with better sleep, on Belsomra. There is no clinical reason preventing a decrease in fluoxetine, plus he is a poor CYP2D6 metabolizer, so it will be reduced to 20mg daily. Manuel was referred to  hepatology at last visit, but prefers to get referral from PCP at [facility] due to insurance. Will repeat LFTs soon.      # Schizoaffective disorder vs. previous consideration of schizophrenia:  Manuel has a primary schizophrenia spectrum disorder with prominent negative symptoms. No breakthrough psychosis since decrease in olanzapine 3 months ago.  Will continue current dose      # Insomnia  # history of delayed phase sleep disorder  As described above, sleep has improved since initiation of Belsomra. Also has been referred to sleep medicine at previous visit, scheduled for May 2023.     Future considerations:  - anxiolytic medication - buspirone  - Saphris, cariprazine or Geodon as alternative antipsychotics/mood stabilizers  - re-trial metformin for medication associated weight gain, topiramate may have cognitive side effects  - follow up on referral to sleep medicine, hepatology  - further reduce fluoxetine    [place] review was not needed today.     PLAN                                                                                                                1) Meds-  - Decrease fluoxetine to 20mg daily   - Continue Belsomra 10mg at bedtime  - Continue olanzapine 12.5mg at bedtime, prescribed 5mg tablets - take 2.5 tablets at bedtime  - Continue Lithium 1200mg at bedtime  - Take half or full olanzapine tablet (2.5-5mg) as needed for psychosis, lauren, or agitation, and call the clinic    2) Psychotherapy- recommended    3) Next due-  Labs- AP and lithium labs completed 1/2023, Cr and TSH normalized, hyperlipidemia, LFT elevated  Repeat LFTs soon  EKG- as needed  Rating scales- AIMS DUE AT NEXT IN  "PERSON VISIT    4) Referrals-  none      Previously referred to:   -Sleep medicine - scheduled for 05/25/23  -Hepatology regarding initiation of Depakote  Previously referred to:       5) Dispo- RTC in 4-8 weeks     PERTINENT BACKGROUND                                                    [most recent eval 08/04/22]    Jose Francisco \"Manuel\"  first experienced mental health issues in grade school and has received treatment for ADHD, depression, psychosis, and substance use. Notably, this patient had FEP in 2015 in the setting of cannabis abuse and has been stable on olanzapine and lithium since that time. He has never lived independently and has only been slowly developing insight into his illness and past symptoms. GeneSight testing was completed noting patient is a poor CYP2D6 metabolizer. Past records from Dr. Mike Powers were received in summer 2018 to assist with diagnostic clarity, however they were hand written notes that were illegible though accompanied by typed neuropsychology consult from [facility]. This documentation was unable to support a diagnosis of bipolar disorder at that time though noted potential Asperger's diagnosis and ADHD. Given that there has been evidence for psychotic signs and symptoms outside the context of mood episodes, a schizophrenia spectrum disorder such as schizoaffective disorder diagnosis seems most accurate. He has had two hospitalizations for decompensated psychosis in the setting of decreasing his olanzapine dose.     Psych pertinent item history includes psychosis [sxs include disorganization, possible catatonia], mutiple psychotropic trials, psych hosp (<3) and substance use: alcohol and cannabis     SUBJECTIVE   Jose Francisco Sommers was last seen in clinic 4 weeks ago when Belsomra was initiated for sleep.    -Updates:    -likes Belsomra   -thinking a lot - random unrelated thoughts,  \"sort of like spacing out ... and think\"    -been ongoing for years - Klonopin has helped in " "the past. Buspar helped in past with being \"present\"   -trouble clearing head    -not doing [employer]  -Social: trying to get a job, but otherwise feeling fine  -Mood: \"pretty okay\"  -Depression: \"dont feel particularly depressed\", feeling worn out from thinking a lot   -Anxiety: no racing thoughts  -SI/HI: none  -Sleep: sleeping much better on belsomra. Get a good night sleep. Feels \"normalized\". Falls asleep at 10pm instead of 3am.  -Psychosis: no   -Substances: none  -Therapy: \"dont need it right now\"  -Medications:   -Prozac - wants to decrease due to liver   -Olanzapine helps with sleep, helps with anxiety    Pertinent Negatives: No suicidal or violent ideation, self-injury, psychosis, hallucinations, delusions, lauren, hypomania, aggression and harmful substance use  Adverse Effects: weight gain, sedation      PSYCH and SUBSTANCE USE Critical Summary Points since July 2022 8/04/22 - transfer of care. Olanzapine decreased 20mg to 15mg  9/29/22 - fluoxetine increased from 20mg to 40mg   11/10/22 - decrease olanzapine to 12.5 from 15mg  12/8/22 - no med changes. Placed hepatology referral  1/12/23 - started Belsomra for sleep, encouraged hepatology eval, placed sleep medicine referral.  02/09/23 - decreased fluoxetine to 20mg     PAST MED TRIALS     Adderall 10-20 mg - improved concentration, but \"cold personality\" and perseveration  Prozac 20 mg - limited response, less irritable, first episode of \"rage\"  Seroquel 25-75 mg - helped with sleep and irritability  Concerta 18 mg - improved focus, no improvement in motivation  Provigil 150 mg - Stimulants \"almost killed him\" triggered alcohol binges  Abilify ?25 mg - OK when QOD, but irritable and agitated on QD  Clonazepam 0.5-1.5 mg - calming, helped with sleep and \"catatonia\"  Risperdal up to 5 mg - some confusion, slow processing, withdrawn, ?\"catatonia\", panic attacks  Zyprexa 5 mg less anxious overall improvement  Lithium - calmer, overall better  Latuda 20 " "mg - severe fatigue and depression  Synthroid 75 mcg - added to help with mood.  Brief trials with Strattera, Intuniv, Lamictal, Xanax, Zoloft  Naltrexone - had nightmares and sedation on 25mg, stopped after 2 nights     MEDICAL HISTORY and ALLERGY     ALLERGIES: Sulfa drugs    Patient Active Problem List   Diagnosis     Schizoaffective disorder, bipolar type (H)     Hx of psychiatric care     Elevated liver enzymes     Fatty liver     High blood triglycerides     History of cannabis abuse     History of cocaine abuse (H)     Psychosis (H)     Elevated blood pressure reading without diagnosis of hypertension     Constipation        MEDICATIONS     Current Outpatient Medications   Medication Sig Dispense Refill     FLUoxetine (PROZAC) 20 MG capsule Take 1 capsule (20 mg) by mouth daily 30 capsule 2     lithium ER (LITHOBID) 300 MG CR tablet Take 4 tablets (1,200 mg) by mouth At Bedtime 120 tablet 2     OLANZapine (ZYPREXA) 5 MG tablet Take 2.5 tablets (12.5 mg) by mouth At Bedtime. May also take 0.5-1 tablets (2.5-5 mg) daily as needed ((for psychosis, lauren, or agitation)). 90 tablet 2     Suvorexant (BELSOMRA) 10 MG tablet Take 1 tablet (10 mg) by mouth nightly as needed for sleep 30 tablet 0      VITALS   There were no vitals taken for this visit.     Pulse Readings from Last 3 Encounters:   02/02/22 55   12/20/20 76   11/25/20 58     Wt Readings from Last 3 Encounters:   05/31/22 124.7 kg (275 lb)   02/02/22 122.5 kg (270 lb)   12/20/20 97 kg (213 lb 12.8 oz)     BP Readings from Last 3 Encounters:   02/02/22 (!) 141/83   12/20/20 139/89   11/25/20 (!) 143/88      MENTAL STATUS EXAM     Alertness: alert  and oriented  Appearance: casually groomed, appropriate  Behavior/Demeanor: cooperative and calm, with good  eye contact   Speech: increased latency of response and slowed, appears baseline  Language: intact and no obvious problem  Psychomotor: normal or unremarkable  Mood:  \"pretty okay\"  Affect: less flat, more " reactive, more relaxed - improved from previous, reactive; congruent to: mood- yes, content- yes  Thought Process/Associations: linear, concrete, goal oriented  Thought Content:  Reports none;  Denies suicidal & violent ideation and delusions  Perception:  Reports none;  Denies auditory hallucinations and visual hallucinations  Insight: adequate  Judgment: good  Cognition: does  appear grossly intact; formal cognitive testing was not done  Gait and Station: N/A (telehealth)     LABS and DATA     PHQ 6/3/2022 11/10/2022 1/12/2023   PHQ-9 Total Score 11 3 9   Q9: Thoughts of better off dead/self-harm past 2 weeks Not at all Not at all Not at all       Recent Labs   Lab Test 01/10/23  1156 05/10/22  0855 07/26/21  1339   LITHIUM 0.6 0.8 0.6     Recent Labs   Lab Test 01/10/23  1156 05/10/22  0855   CR 1.05 1.30*   GFRESTIMATED >90 76    139   POTASSIUM 4.2 3.4   ADONAY 10.0 9.5     Recent Labs   Lab Test 01/10/23  1156 05/10/22  0855   SG 1.020 1.010     Recent Labs   Lab Test 01/10/23  1156 05/10/22  0855   TSH 1.44 5.36*       Recent Labs   Lab Test 01/10/23  1156 05/10/22  0855 07/26/21  1339   GLC 97 110* 85   A1C  --  5.1 4.9     Recent Labs   Lab Test 01/10/23  1156 07/26/21  1339   CHOL 210* 247*   TRIG 532* 554*   LDL 71 101*   HDL 31* 42     Recent Labs   Lab Test 01/10/23  1156 05/10/22  0855   AST 82* 31   * 103*   ALKPHOS 99 81     Recent Labs   Lab Test 01/10/23  1156 05/10/22  0855 07/26/21  1339 12/04/20  1414 11/13/20  0758 06/01/20  1339   WBC 6.6 7.8   < > 11.0   < > 7.5   ANEU  --   --   --  8.0  --  4.8   HGB 15.6 15.1   < > 14.3   < > 15.1    233   < > 285   < > 265    < > = values in this interval not displayed.     ECG 11/20 QTc = 436ms     PSYCHOTROPIC DRUG INTERACTIONS                                                       PSYCHCLINICDDI     OLANZAPINE + LITHIUM: CNS depression     OLANZAPINE + LITHIUM Lithium may enhance the neurotoxic effect of Antipsychotic  Agents.     OLANZAPINE: QT-prolonging      OLANZAPINE + PROZAC: Serotonergic      PROZAC + PROPRANOL: Orthostasis      MANAGEMENT:  Monitoring for adverse effects, routine vitals, routine labs and periodic      RISK STATEMENT for SAFETY     Manuel did not appear to be an imminent safety risk to self or others.    TREATMENT RISK STATEMENT: The risks, benefits, alternatives and potential adverse effects have been discussed and are understood by the pt. The pt understands the risks of using street drugs or alcohol. There are no medical contraindications, the pt agrees to treatment with the ability to do so. The pt knows to call the clinic for any problems or to access emergency care if needed.  Medical and substance use concerns are documented above.  Psychotropic drug interaction check was done, including changes made today.     PROVIDER: Alberto Santa MD    Patient staffed in clinic with Dr. Ventura who will sign the note.  Supervisor is Dr. Tay.

## 2023-02-09 NOTE — NURSING NOTE
Is the patient currently in the state of [place]? YES    Visit mode:VIDEO    If the visit is dropped, the patient can be reconnected by: VIDEO VISIT: Send to e-mail at: [email or web address]    Will anyone else be joining the visit? NO      How would you like to obtain your AVS? MyChart    Are changes needed to the allergy or medication list? no    Comments or concerns regarding today's visit: none    PHQ was not assigned, not done per department protocol    Smita Castillo VF

## 2023-02-09 NOTE — PATIENT INSTRUCTIONS
It was nice seeing you today    Treatment Plan Today:     1) Medications-  - Decrease fluoxetine to 20mg daily   - Continue Belsomra 10mg at bedtime  - Continue olanzapine 12.5mg at bedtime, prescribed 5mg tablets - take 2.5 tablets at bedtime  - Continue Lithium 1200mg at bedtime  - Take half or full olanzapine tablet (2.5-5mg) as needed for psychosis, lauren, or agitation, and call the clinic      2) Follow-up appt with Dr Santa in 4-6 weeks    3) Crisis numbers are below and clinic after hours number is [phone]     **For crisis resources, please see the information at the end of this document**   Patient Education    Thank you for coming to the [facility] [place] [facility].     Lab Testing:  If you had lab testing today and your results are reassuring or normal they will be mailed to you or sent through [employer] within 7 days. If the lab tests need quick action we will call you with the results. The phone number we will call with results is # [phone]. If this is not the best number please call our clinic and change the number.     Medication Refills:  If you need any refills please call your pharmacy and they will contact us. Our fax number for refills is [phone].   Three business days of notice are needed for general medication refill requests.   Five business days of notice are needed for controlled substance refill requests.   If you need to change to a different pharmacy, please contact the new pharmacy directly. The new pharmacy will help you get your medications transferred.     Contact Us:  Please call [phone] during business hours (8-5:00 M-F).   If you have medication related questions after clinic hours, or on the weekend, please call [phone].     Financial Assistance [phone]   Medical Records [phone]       MENTAL HEALTH CRISIS RESOURCES:  For a emergency help, please call 911 or go to the nearest Emergency Department.     Emergency  Walk-In Options:   EmPATH Unit @ [place] [facility] ([place]): [phone] - Specialized mental health emergency area designed to be calming  [facility] ([place]): [phone]  [facility] ([place]): [phone]  [facility] ([place]): [phone]    [facility] Crisis Information:   [place]: [phone]  [place]: [phone]  [place] (COPE) - Adult: [phone]     Child: [phone]  [place] - Adult: [phone]     Child: [phone]  [place]: [phone]  List of all [facility] resources:   [email or web address]/dhs/people-[email or web address]-serve/adults/health-care/mental-health/resources/crisis-contacts.jsp    National Crisis Information:   Crisis Text Line: Text  [place]  to 724779  Suicide & Crisis Lifeline: 988  National Suicide Prevention Lifeline: 6-944-351-TALK (1-951.620.8702)       For online chat options, visit https://suicidepreventionlifeline.org/chat/  Poison Control Center: 1-708.839.9782  Trans Lifeline: 1-607.170.5843 - Hotline for transgender people of all ages  The [employer]: 5-715-105-9745 - Hotline for LGBT youth     For Non-Emergency Support:   Fast Tracker: Mental Health & Substance Use Disorder Resources -   https://www.[email or web address].org/

## 2023-02-10 RX ORDER — OLANZAPINE 5 MG/1
TABLET ORAL
Qty: 90 TABLET | Refills: 2 | Status: SHIPPED | OUTPATIENT
Start: 2023-02-10 | End: 2023-06-12

## 2023-02-10 RX ORDER — LITHIUM CARBONATE 300 MG/1
1200 TABLET, FILM COATED, EXTENDED RELEASE ORAL AT BEDTIME
Qty: 120 TABLET | Refills: 2 | Status: SHIPPED | OUTPATIENT
Start: 2023-02-10 | End: 2023-06-12

## 2023-04-06 ENCOUNTER — OFFICE VISIT (OUTPATIENT)
Dept: PSYCHIATRY | Facility: CLINIC | Age: 33
End: 2023-04-06
Attending: PSYCHIATRY & NEUROLOGY
Payer: COMMERCIAL

## 2023-04-06 VITALS
SYSTOLIC BLOOD PRESSURE: 125 MMHG | HEART RATE: 76 BPM | WEIGHT: 295.6 LBS | BODY MASS INDEX: 39.54 KG/M2 | DIASTOLIC BLOOD PRESSURE: 82 MMHG

## 2023-04-06 DIAGNOSIS — Z79.899 ENCOUNTER FOR LONG-TERM (CURRENT) USE OF MEDICATIONS: ICD-10-CM

## 2023-04-06 DIAGNOSIS — F41.9 ANXIETY DISORDER, UNSPECIFIED TYPE: Primary | ICD-10-CM

## 2023-04-06 DIAGNOSIS — E66.01 MORBID OBESITY (H): ICD-10-CM

## 2023-04-06 DIAGNOSIS — G47.00 INSOMNIA, UNSPECIFIED TYPE: ICD-10-CM

## 2023-04-06 PROCEDURE — 99214 OFFICE O/P EST MOD 30 MIN: CPT | Mod: GC | Performed by: STUDENT IN AN ORGANIZED HEALTH CARE EDUCATION/TRAINING PROGRAM

## 2023-04-06 PROCEDURE — G0463 HOSPITAL OUTPT CLINIC VISIT: HCPCS | Performed by: STUDENT IN AN ORGANIZED HEALTH CARE EDUCATION/TRAINING PROGRAM

## 2023-04-06 RX ORDER — BUSPIRONE HYDROCHLORIDE 5 MG/1
5 TABLET ORAL 2 TIMES DAILY
Qty: 60 TABLET | Refills: 1 | Status: SHIPPED | OUTPATIENT
Start: 2023-04-06 | End: 2023-05-25 | Stop reason: ALTCHOICE

## 2023-04-06 ASSESSMENT — PATIENT HEALTH QUESTIONNAIRE - PHQ9
10. IF YOU CHECKED OFF ANY PROBLEMS, HOW DIFFICULT HAVE THESE PROBLEMS MADE IT FOR YOU TO DO YOUR WORK, TAKE CARE OF THINGS AT HOME, OR GET ALONG WITH OTHER PEOPLE: VERY DIFFICULT
SUM OF ALL RESPONSES TO PHQ QUESTIONS 1-9: 6
SUM OF ALL RESPONSES TO PHQ QUESTIONS 1-9: 6

## 2023-04-06 ASSESSMENT — PAIN SCALES - GENERAL: PAINLEVEL: NO PAIN (0)

## 2023-04-06 NOTE — PROGRESS NOTES
"     [facility]  Psychiatry Clinic  MEDICAL PROGRESS NOTE     CARE TEAM:  PCP- Clinic Provider    Psychotherapist- None       Jose Francisco Sommers is a 32 year old who uses the name Manuel and pronouns he, him, his.      DIAGNOSIS   # Probable Schizoaffective disorder, Bipolar type, most recent episode depressed  # ADHD, predominantly inattentive type  # Insomnia (history of delayed phase sleep disorder)     ASSESSMENT   Manuel is doing well overall.  Manuel comes in person with his mother Ember. This is his first in person appointment since prior to the pandemic. He reports that he is doing well. Mother agrees. Manuel reports minimal depression, sleeping well since initiation of Belsomra, no hallucinations. His main concern today is anxiety, and he would like to start an anxiolytic medication. States that he has had benefit in the past from Klonopin and other benzos. Discussed longterm inefficacy of this type of medication for his anxiety, and alternative options. Manuel inquires about buspirone, as he found this helpful in the past; he had liver issues with gabapentin previously. Risk and benefits discussed and Manuel requests to start at a high dose. Shared decision making used to start at 5mg BID to minimize adverse reaction to it. Manuel reports that he would like to increase lithium to improve his \"quality of thought\". We agree to obtain a trough lithium level to ensure his value is optimal for mood coverage (0.7-0.9). There are no signs of lauren recently, and his anxiety/racing thoughts are better explained by anxiety; however it is possible that they are related to persistent mood symptoms. Mother brings up clozapine as a potential alternative to olanzapine and lithium to improve disorganized thought process and act as mood stabilizer. We discuss clozapine in general, and Manuel requests to have a referral to [facility] clinic with Dr. Burt on next meeting. No acute safety concerns were identified or " "suspected.    # Schizoaffective disorder vs. previous consideration of schizophrenia:  Manuel has a primary schizophrenia spectrum disorder with prominent negative symptoms. No breakthrough psychosis since decrease in olanzapine 5 months ago.  Will continue current dose    # Insomnia  # history of delayed phase sleep disorder  As described above, sleep has improved since initiation of Belsomra. Also has been referred to sleep medicine at previous visit, scheduled for May 2023.     Future considerations:  - titrate buspirone anxiolytic medication  - Clozapine, Saphris, cariprazine or Geodon as alternative antipsychotics/mood stabilizers  - re-trial metformin for medication associated weight gain, topiramate may have cognitive side effects  - follow up on referral to sleep medicine, hepatology  - further reduce fluoxetine    [place] review was not needed today.     PLAN                                                                                                                1) Meds-   - Start buspirone 5mg BID  - Continue fluoxetine to 20mg daily   - Continue Belsomra 10mg at bedtime  - Continue olanzapine 12.5mg at bedtime, prescribed 5mg tablets - take 2.5 tablets at bedtime  - Continue Lithium 1200mg at bedtime  - Take half or full olanzapine tablet (2.5-5mg) as needed for psychosis, lauren, or agitation, and call the clinic    2) Psychotherapy- recommended    3) Next due-  Labs- AP and lithium labs completed 1/2023, Cr and TSH normalized, hyperlipidemia, LFT elevated  Repeat LFTs soon  EKG- as needed  Rating scales- AIMS completed today. AIMS = 0.    4) Referrals-  refer for a [facility] eval      Previously referred to:   -Sleep medicine - scheduled for 05/25/23  -Hepatology regarding initiation of Depakote  Previously referred to:     5) Dispo- RTC in 4-8 weeks     PERTINENT BACKGROUND                                                    [most recent eval 08/04/22]    Jose Francisco \"Manuel\"  first experienced mental " "health issues in grade school and has received treatment for ADHD, depression, psychosis, and substance use. Notably, this patient had FEP in 2015 in the setting of cannabis abuse and has been stable on olanzapine and lithium since that time. He has never lived independently and has only been slowly developing insight into his illness and past symptoms. GeneSight testing was completed noting patient is a poor CYP2D6 metabolizer. Past records from Dr. Mike Powers were received in summer 2018 to assist with diagnostic clarity, however they were hand written notes that were illegible though accompanied by typed neuropsychology consult from [facility]. This documentation was unable to support a diagnosis of bipolar disorder at that time though noted potential Asperger's diagnosis and ADHD. Given that there has been evidence for psychotic signs and symptoms outside the context of mood episodes, a schizophrenia spectrum disorder such as schizoaffective disorder diagnosis seems most accurate. He has had two hospitalizations for decompensated psychosis in the setting of decreasing his olanzapine dose.    Has \"no interest whatsoever\" in therapy. Please retrain from asking about it.     Psych pertinent item history includes psychosis [sxs include disorganization, possible catatonia], mutiple psychotropic trials, psych hosp (<3) and substance use: alcohol and cannabis     SUBJECTIVE   Jose Francisco Sommers was last seen in clinic 2 months ago when medications were continued    -Updates:    -main concern is anxiety   Cyst and stones  found in kidney (will follow up with urology); fatty liver.  -Social: interested in working  -Mood: doing \"fine\"  -Depression: no  -Anxiety: ruminations on anything and everything, pacing/restlessness, social anxiety (talk to anyone outside family members)   -Klonopin helped a lot in the past  -SI/HI: none  -Sleep: sleeping well, 11-12 hours, wakes rested  -Psychosis: low \"quality of thought\", no " "hallucinations  -Substances: none  -Therapy: \"no interest whatsoever\". Does not want to be asked this anymore  -Medications:   -Belsomra has helped   -Prozac helps with cognitive flexibility   -Olanzapine is working   -Seroquel: not doing much   -Mother inquires about clozapine      Pertinent Negatives: No suicidal or violent ideation, self-injury, psychosis, hallucinations, delusions, lauren, hypomania, aggression and harmful substance use  Adverse Effects: weight gain, sedation      PSYCH and SUBSTANCE USE Critical Summary Points since July 2022 8/04/22 - transfer of care. Olanzapine decreased 20mg to 15mg  9/29/22 - fluoxetine increased from 20mg to 40mg   11/10/22 - decrease olanzapine to 12.5 from 15mg  12/8/22 - no med changes. Placed hepatology referral  1/12/23 - started Belsomra for sleep, encouraged hepatology eval, placed sleep medicine referral.  02/09/23 - decreased fluoxetine to 20mg  04/06/23 - start buspar 5mg BID     PAST MED TRIALS     Adderall 10-20 mg - improved concentration, but \"cold personality\" and perseveration  Prozac 20 mg - limited response, less irritable, first episode of \"rage\"  Seroquel 25-75 mg - helped with sleep and irritability  Concerta 18 mg - improved focus, no improvement in motivation  Provigil 150 mg - Stimulants \"almost killed him\" triggered alcohol binges  Abilify ?25 mg - OK when QOD, but irritable and agitated on QD  Clonazepam 0.5-1.5 mg - calming, helped with sleep and \"catatonia\"  Risperdal up to 5 mg - some confusion, slow processing, withdrawn, ?\"catatonia\", panic attacks -dont try that  Zyprexa 5 mg less anxious overall improvement  Lithium - calmer, overall better  Latuda 20 mg - severe fatigue and depression  Synthroid 75 mcg - added to help with mood.  Brief trials with Strattera, Intuniv, Lamictal, Xanax, Zoloft  Naltrexone - had nightmares and sedation on 25mg, stopped after 2 nights     MEDICAL HISTORY and ALLERGY     ALLERGIES: Sulfa drugs    Patient " "Active Problem List   Diagnosis     Schizoaffective disorder, bipolar type (H)     Hx of psychiatric care     Elevated liver enzymes     Fatty liver     High blood triglycerides     History of cannabis abuse     History of cocaine abuse (H)     Psychosis (H)     Elevated blood pressure reading without diagnosis of hypertension     Constipation        MEDICATIONS     Current Outpatient Medications   Medication Sig Dispense Refill     FLUoxetine (PROZAC) 20 MG capsule Take 1 capsule (20 mg) by mouth daily 30 capsule 2     lithium ER (LITHOBID) 300 MG CR tablet Take 4 tablets (1,200 mg) by mouth At Bedtime 120 tablet 2     OLANZapine (ZYPREXA) 5 MG tablet Take 2.5 tablets (12.5 mg) by mouth At Bedtime. May also take 0.5-1 tablets (2.5-5 mg) daily as needed ((for psychosis, lauren, or agitation)). 90 tablet 2     Suvorexant (BELSOMRA) 10 MG tablet Take 1 tablet (10 mg) by mouth nightly as needed for sleep 30 tablet 0      VITALS   /82 (BP Location: Left arm, Patient Position: Sitting, Cuff Size: Adult Large)   Pulse 76   Wt 134.1 kg (295 lb 9.6 oz)   BMI 39.54 kg/m       Pulse Readings from Last 3 Encounters:   04/06/23 76   02/02/22 55   12/20/20 76     Wt Readings from Last 3 Encounters:   04/06/23 134.1 kg (295 lb 9.6 oz)   05/31/22 124.7 kg (275 lb)   02/02/22 122.5 kg (270 lb)     BP Readings from Last 3 Encounters:   04/06/23 125/82   02/02/22 (!) 141/83   12/20/20 139/89      MENTAL STATUS EXAM     Alertness: alert  and oriented  Appearance: casually groomed, appropriate  Behavior/Demeanor: cooperative and calm, with good  eye contact   Speech: increased latency of response and slowed, appears baseline  Language: intact and no obvious problem  Psychomotor: normal or unremarkable  Mood:  \"fine\"  Affect: more relaxed and reactive - improved ; congruent to: mood- yes, content- yes  Thought Process/Associations: linear, concrete, goal oriented  Thought Content:  Reports none;  Denies suicidal & violent " ideation and delusions  Perception:  Reports none;  Denies auditory hallucinations and visual hallucinations  Insight: adequate  Judgment: good  Cognition: does  appear grossly intact; formal cognitive testing was not done  Gait and Station: unremarkable     LABS and DATA   Abnormal Involuntary Movement (AIMS) Assessment   FACIAL AND ORAL MOVEMENTS  Muscles of Facial Expression: None  Lips and Perioral Area: None  Jaw: None  Tounge: None  EXTREMITY MOVEMENTS  Upper Extremities: None  Lower Extremities: None  TRUNK MOVEMENTS  Trunk Movements: None  GLOBAL STATUS  Severity of Abnormal Movements: None  Incapacitation due to abnormal movements: None  Patient's Awareness of Abnormal Movements: No Awareness  Total AIMS Score: 0  DENTAL STATUS  Current Problems with Teeth or Dentures: No  Are dentures worn?: No  Edentia?: No          11/10/2022     3:10 PM 1/12/2023    12:55 PM 4/6/2023     3:27 PM   PHQ   PHQ-9 Total Score 3 9 6   Q9: Thoughts of better off dead/self-harm past 2 weeks Not at all Not at all Not at all       Recent Labs   Lab Test 01/10/23  1156 05/10/22  0855 07/26/21  1339   LITHIUM 0.6 0.8 0.6     Recent Labs   Lab Test 01/10/23  1156 05/10/22  0855   CR 1.05 1.30*   GFRESTIMATED >90 76    139   POTASSIUM 4.2 3.4   ADONAY 10.0 9.5     Recent Labs   Lab Test 01/10/23  1156 05/10/22  0855   SG 1.020 1.010     Recent Labs   Lab Test 01/10/23  1156 05/10/22  0855   TSH 1.44 5.36*       Recent Labs   Lab Test 01/10/23  1156 05/10/22  0855 07/26/21  1339   GLC 97 110* 85   A1C  --  5.1 4.9     Recent Labs   Lab Test 01/10/23  1156 07/26/21  1339   CHOL 210* 247*   TRIG 532* 554*   LDL 71 101*   HDL 31* 42     Recent Labs   Lab Test 01/10/23  1156 05/10/22  0855   AST 82* 31   * 103*   ALKPHOS 99 81     Recent Labs   Lab Test 01/10/23  1156 05/10/22  0855 07/26/21  1339 12/04/20  1414 11/13/20  0758 06/01/20  1339   WBC 6.6 7.8   < > 11.0   < > 7.5   ANEU  --   --   --  8.0  --  4.8   HGB 15.6 15.1    < > 14.3   < > 15.1    233   < > 285   < > 265    < > = values in this interval not displayed.     ECG 11/20 QTc = 436ms     PSYCHOTROPIC DRUG INTERACTIONS                                                       PSYCHCLINICDDI     OLANZAPINE + LITHIUM: CNS depression     OLANZAPINE + LITHIUM Lithium may enhance the neurotoxic effect of Antipsychotic Agents.     OLANZAPINE: QT-prolonging      OLANZAPINE + PROZAC: Serotonergic      PROZAC + PROPRANOL: Orthostasis      MANAGEMENT:  Monitoring for adverse effects, routine vitals, routine labs and periodic      RISK STATEMENT for SAFETY     Manuel did not appear to be an imminent safety risk to self or others.    TREATMENT RISK STATEMENT: The risks, benefits, alternatives and potential adverse effects have been discussed and are understood by the pt. The pt understands the risks of using street drugs or alcohol. There are no medical contraindications, the pt agrees to treatment with the ability to do so. The pt knows to call the clinic for any problems or to access emergency care if needed.  Medical and substance use concerns are documented above.  Psychotropic drug interaction check was done, including changes made today.     PROVIDER: Alberto Santa MD    Patient staffed in clinic with Dr. Ventura who will sign the note.  Supervisor is Dr. Tay.

## 2023-04-06 NOTE — PATIENT INSTRUCTIONS
It was nice seeing you today    Treatment Plan Today:     1) Medications-    2) Follow-up appt with Dr Santa in     3) Crisis numbers are below and clinic after hours number is [phone]     Serotonin Syndrome Symptoms:  confusion   tremor  severe headache  sweats  fever   funny feeling in muscles  need to pace / restlessness   severe nausea, diarrhea    OR    Serotonin Syndrome Symptoms:   -neuromuscular abnormalities (hyperreflexia, tremor, and ataxia)  -autonomic instability               (tachycardia, diaphoresis, and hyperthermia)  -gastrointestinal symptoms      (nausea, vomiting, diarrhea)  -mental status changes            (agitation and confusion)

## 2023-04-13 ASSESSMENT — ABNORMAL INVOLUNTARY MOVEMENT SCALE (AIMS)
PATIENT_WEARS_DENTURES: NO
TONGUE: 0
AIMS_PATIENT_AWARENESS: NO AWARENESS
EDENTIA: NO
CURRENT_PROBLEMS_TEETH_DENTURES: NO

## 2023-04-15 ENCOUNTER — HEALTH MAINTENANCE LETTER (OUTPATIENT)
Age: 33
End: 2023-04-15

## 2023-04-27 ENCOUNTER — OFFICE VISIT (OUTPATIENT)
Dept: PSYCHIATRY | Facility: CLINIC | Age: 33
End: 2023-04-27
Attending: PSYCHIATRY & NEUROLOGY
Payer: COMMERCIAL

## 2023-04-27 VITALS
BODY MASS INDEX: 38.98 KG/M2 | DIASTOLIC BLOOD PRESSURE: 90 MMHG | HEART RATE: 61 BPM | WEIGHT: 291.4 LBS | SYSTOLIC BLOOD PRESSURE: 137 MMHG

## 2023-04-27 DIAGNOSIS — F25.0 SCHIZOAFFECTIVE DISORDER, BIPOLAR TYPE (H): Primary | ICD-10-CM

## 2023-04-27 PROCEDURE — G0463 HOSPITAL OUTPT CLINIC VISIT: HCPCS | Performed by: STUDENT IN AN ORGANIZED HEALTH CARE EDUCATION/TRAINING PROGRAM

## 2023-04-27 PROCEDURE — 99214 OFFICE O/P EST MOD 30 MIN: CPT | Mod: GC | Performed by: STUDENT IN AN ORGANIZED HEALTH CARE EDUCATION/TRAINING PROGRAM

## 2023-04-27 RX ORDER — LAMOTRIGINE 25 MG/1
TABLET ORAL
Qty: 42 TABLET | Refills: 0 | Status: SHIPPED | OUTPATIENT
Start: 2023-04-27 | End: 2023-05-25

## 2023-04-27 ASSESSMENT — PAIN SCALES - GENERAL: PAINLEVEL: NO PAIN (0)

## 2023-04-27 NOTE — PROGRESS NOTES
"     [facility]  Psychiatry Clinic  MEDICAL PROGRESS NOTE     CARE TEAM:  PCP- Clinic Provider    Psychotherapist- None       Jose Francisco Sommers is a 32 year old who uses the name Manuel and pronouns he, him, his.      DIAGNOSIS   # Schizoaffective disorder, Bipolar type, most recent episode depressed   -rule out bipolar disorder type 1  # ADHD, predominantly inattentive type  # Insomnia (history of delayed phase sleep disorder)     ASSESSMENT   Manuel is doing well overall.  He comes in person today with his mother Ember for an evaluation and discussion with Dr. Burt regarding clozapine. Ember reports that she personally knows people who have been helped with clozapine and wanted Manuel to learn more about it, not look back in 10 years wishing they had learned more about the medication.     Manuel has been stable on olanzapine for a number of years, but has had trouble returning to his previous level of functioning. Would like to get a job and live independently. He reports no positive symptoms - no hallucinations or delusions. Manuel and Ember report that he has only experienced the positive symptoms of psychosis during times of lauren. His main mental health concern right now is \"inefficient thinking\". He has trouble describing how his thinking is inefficient; however, this seems to be limiting his functional abilities to live and work independently. Manuel would like to apply for jobs, but has no work experience and feels that he struggles with interviews.      Diagnostically, it is unclear if Manuel presents with more of a bipolar spectrum illness vs schizoaffective disorder. Mother's report of lauren when decreasing olanzapine could be consistent with either disorder. Given concern for bipolar and side effect of weight gain, shared decision making was used to initiate lamotrigine today, as it has less sedation and metabolic effects compared to antipsychotic medications. Risks and benefits of " lamotrigine were discussed including the risk of life threatening rash (Issac Haseeb Syndrome); additionally guidance was given to call the clinic or seek evaluation in the ED for any new rash while on this medication.    Regarding clozapine, this medication would have similar side effects as those for olanzapine, as well as risk of constipation, sialorrhea, and others. There is potentially benefit for negative symptoms if those are contributed to by behavioral Parkinsonism from olanzapine, or if he is concealing some delusions or hallucinations. We may consider this in the future, but for now, the risks likely outweigh the benefit, and lamotrigine should be the next medication trial.      # Schizoaffective disorder vs. previous consideration of schizophrenia:  Manuel has a primary schizophrenia spectrum disorder with prominent negative symptoms. No breakthrough psychosis since decrease in olanzapine 5 months ago.  Will continue current dose. Manuel did not have his lithium level drawn after missing a dose last week. We will check lithium level to ensure optimal coverage of symptoms. Lamotrigine initiated as described above. If lamotrigine is effective, we can ideally decrease olanzapine and/or fluoxetine further.    # Insomnia  # history of delayed phase sleep disorder  As described above, sleep has improved since initiation of Belsomra. Also has been referred to sleep medicine at previous visit.     Future considerations:  - further titration of lamotrigine  - titrate buspirone anxiolytic medication  - Clozapine, Saphris, cariprazine or Geodon as alternative antipsychotics/mood stabilizers  - re-trial metformin for medication associated weight gain, topiramate may have cognitive side effects. May consider Ozempic/semaglutide  - follow up on referral to sleep medicine, hepatology  - further reduce fluoxetine    [place] review was not needed today.     PLAN                                                                  "                                               1) Meds-   - Start lamotrigine titration    -25mg daily for 2 weeks    -then 25mg twice a day for 2 weeks  - Continue buspirone 5mg BID  - Continue fluoxetine to 20mg daily   - Continue Belsomra 10mg at bedtime  - Continue olanzapine 12.5mg at bedtime, prescribed 5mg tablets - take 2.5 tablets at bedtime  - Continue Lithium 1200mg at bedtime  - Take half or full olanzapine tablet (2.5-5mg) as needed for psychosis, lauren, or agitation, and call the clinic    2) Psychotherapy- recommended    3) Next due-  Labs- AP and lithium labs completed 1/2023, Cr and TSH normalized, hyperlipidemia, LFT elevated  Repeat LFTs soon  EKG- as needed  Rating scales- AIMS completed 4/6/23. AIMS = 0.    4) Referrals-  none      Previously referred to:   -TRUDY owen for clozapine assessment on 4/6/23  -Sleep medicine - scheduled for 05/25/23  -Hepatology regarding initiation of Depakote    5) Dispo- RTC in 4 weeks     PERTINENT BACKGROUND                                                    [most recent eval 08/04/22]    Jose Francisco \"Manuel\"  first experienced mental health issues in grade school and has received treatment for ADHD, depression, psychosis, and substance use. Notably, this patient had FEP in 2015 in the setting of cannabis abuse and has been stable on olanzapine and lithium since that time. He has never lived independently and has only been slowly developing insight into his illness and past symptoms. GeneSight testing was completed noting patient is a poor CYP2D6 metabolizer. Past records from Dr. Mike Powers were received in summer 2018 to assist with diagnostic clarity, however they were hand written notes that were illegible though accompanied by typed neuropsychology consult from [facility]. This documentation was unable to support a diagnosis of bipolar disorder at that time though noted potential Asperger's diagnosis and ADHD. Given that there has been evidence for " "psychotic signs and symptoms outside the context of mood episodes, a schizophrenia spectrum disorder such as schizoaffective disorder diagnosis seems most accurate. He has had two hospitalizations for decompensated psychosis in the setting of decreasing his olanzapine dose.    Has \"no interest whatsoever\" in therapy. Please retrain from asking about it.     Psych pertinent item history includes psychosis [sxs include disorganization, possible catatonia], mutiple psychotropic trials, psych hosp (<3) and substance use: alcohol and cannabis     SUBJECTIVE   Jose Francisco Sommers was last seen in clinic 3 weeks ago when referral was Manuel was referred to [facility] clinic for evaluation and discussion of clozapine.    -Updates:    -did not get lithium level due to Manuel missing a lithium dose about a week ago   -feels that buspar is working okay, but mentions that it slows his cognition when taken during day time  -Social: no changes. Living at home. Not employed - feels like \"I need to make more effort to find a job\"  -Mood: \"pretty good\"  -Depression: \"I dont feel depressed\". No anhedonia - enjoys getting back into --[facility]  -Anxiety: has a \"hard time relaxing\", has been pacing around more  -SI/HI: none  -Sleep: sleeping well in general    -about 12 hours a night   -had trouble sleeping last night due to watching a horror movie  -Energy: \"fine\"  -Exercise: \"not anymore\", would like to restart soon.  -Psychosis: \"some disorganized thinking\" (also refers to as \"inefficient thinking\", no delusions, AH, or VH. No symptoms of lauren  -Substances: none  -Medications:   -olanzapine: effective but causes sedation and weight gain   -fluoxetine: helps with decreasing worries   -buspirone: makes him feel more relaxed, reports cognitive slowing with AM dose, prefers to only take before bed.   -modafinil: took 50mg, at 100mg Manuel binged alcohol a number of years ago       Pertinent Negatives: No suicidal or violent ideation, self-injury, " "psychosis, hallucinations, delusions, lauren, hypomania, aggression and harmful substance use  Adverse Effects: weight gain, sedation, cognitive slowing?      PSYCH and SUBSTANCE USE Critical Summary Points since July 2022 8/04/22 - transfer of care. Olanzapine decreased 20mg to 15mg  9/29/22 - fluoxetine increased from 20mg to 40mg   11/10/22 - decrease olanzapine to 12.5 from 15mg  12/8/22 - no med changes. Placed hepatology referral  1/12/23 - started Belsomra for sleep, encouraged hepatology eval, placed sleep medicine referral.  02/09/23 - decreased fluoxetine to 20mg  04/06/23 - start buspar 5mg BID  04/27/23 - start lamotrigine     PAST MED TRIALS     Adderall 10-20 mg - improved concentration, but \"cold personality\" and perseveration  Prozac 20 mg - limited response, less irritable, first episode of \"rage\"  Seroquel 25-75 mg - helped with sleep and irritability  Concerta 18 mg - improved focus, no improvement in motivation  Provigil 150 mg (up to 400) - Stimulants \"almost killed him\" triggered alcohol binges  Abilify ?25 mg - OK when QOD, but irritable and agitated on QD  Clonazepam 0.5-1.5 mg - calming, helped with sleep and \"catatonia\"  Risperdal up to 5 mg - some confusion, slow processing, withdrawn, ?\"catatonia\", panic attacks -dont try that  Zyprexa 5 mg less anxious overall improvement  Lithium - calmer, overall better  Latuda 20 mg - severe fatigue and depression  Synthroid 75 mcg - added to help with mood.  Brief trials with Strattera, Intuniv, Lamictal, Xanax, Zoloft  Naltrexone - had nightmares and sedation on 25mg, stopped after 2 nights     MEDICAL HISTORY and ALLERGY     ALLERGIES: Sulfa antibiotics    Patient Active Problem List   Diagnosis     Schizoaffective disorder, bipolar type (H)     Hx of psychiatric care     Elevated liver enzymes     Fatty liver     High blood triglycerides     History of cannabis abuse     History of cocaine abuse (H)     Psychosis (H)     Elevated blood pressure " "reading without diagnosis of hypertension     Constipation     Morbid obesity (H)        MEDICATIONS     Current Outpatient Medications   Medication Sig Dispense Refill     busPIRone (BUSPAR) 5 MG tablet Take 1 tablet (5 mg) by mouth 2 times daily 60 tablet 1     FLUoxetine (PROZAC) 20 MG capsule Take 1 capsule (20 mg) by mouth daily 30 capsule 2     lithium ER (LITHOBID) 300 MG CR tablet Take 4 tablets (1,200 mg) by mouth At Bedtime 120 tablet 2     OLANZapine (ZYPREXA) 5 MG tablet Take 2.5 tablets (12.5 mg) by mouth At Bedtime. May also take 0.5-1 tablets (2.5-5 mg) daily as needed ((for psychosis, lauren, or agitation)). 90 tablet 2     Suvorexant (BELSOMRA) 10 MG tablet Take 1 tablet (10 mg) by mouth nightly as needed for sleep 30 tablet 1      VITALS   BP (!) 137/90 (BP Location: Left arm, Patient Position: Sitting, Cuff Size: Adult Large)   Pulse 61   Wt 132.2 kg (291 lb 6.4 oz)   BMI 38.98 kg/m       Pulse Readings from Last 3 Encounters:   04/27/23 61   04/06/23 76   02/02/22 55     Wt Readings from Last 3 Encounters:   04/27/23 132.2 kg (291 lb 6.4 oz)   04/06/23 134.1 kg (295 lb 9.6 oz)   05/31/22 124.7 kg (275 lb)     BP Readings from Last 3 Encounters:   04/27/23 (!) 137/90   04/06/23 125/82   02/02/22 (!) 141/83      MENTAL STATUS EXAM     Alertness: alert  and oriented  Appearance: casually groomed, appropriate  Behavior/Demeanor: cooperative and calm, with good  eye contact   Speech: increased latency of response and slowed, appears baseline  Language: intact and no obvious problem  Psychomotor: normal or unremarkable  Mood:  \"pretty good\" and \"inefficient thinking\"  Affect: relaxed and reactive - improved from previous meetings; congruent to: mood- yes, content- yes  Thought Process/Associations: linear, concrete, goal oriented  Thought Content:  Reports none;  Denies suicidal & violent ideation and delusions  Perception:  Reports none;  Denies auditory hallucinations and visual " hallucinations  Insight: adequate  Judgment: good  Cognition: does  appear grossly intact; formal cognitive testing was not done  Gait and Station: unremarkable     LABS and DATA     Recent Labs   Lab Test 01/10/23  1156 05/10/22  0855 07/26/21  1339   LITHIUM 0.6 0.8 0.6     Recent Labs   Lab Test 01/10/23  1156 05/10/22  0855   CR 1.05 1.30*   GFRESTIMATED >90 76    139   POTASSIUM 4.2 3.4   ADONAY 10.0 9.5     Recent Labs   Lab Test 01/10/23  1156 05/10/22  0855   SG 1.020 1.010     Recent Labs   Lab Test 01/10/23  1156 05/10/22  0855   TSH 1.44 5.36*       Recent Labs   Lab Test 01/10/23  1156 05/10/22  0855 07/26/21  1339   GLC 97 110* 85   A1C  --  5.1 4.9     Recent Labs   Lab Test 01/10/23  1156 07/26/21  1339   CHOL 210* 247*   TRIG 532* 554*   LDL 71 101*   HDL 31* 42     Recent Labs   Lab Test 01/10/23  1156 05/10/22  0855   AST 82* 31   * 103*   ALKPHOS 99 81     Recent Labs   Lab Test 01/10/23  1156 05/10/22  0855 07/26/21  1339 12/04/20  1414 11/13/20  0758 06/01/20  1339   WBC 6.6 7.8   < > 11.0   < > 7.5   ANEU  --   --   --  8.0  --  4.8   HGB 15.6 15.1   < > 14.3   < > 15.1    233   < > 285   < > 265    < > = values in this interval not displayed.     ECG 11/20 QTc = 436ms     PSYCHOTROPIC DRUG INTERACTIONS                                                       PSYCHCLINICDDI     OLANZAPINE + LITHIUM, Belsomra, lamotrigine: CNS depression     OLANZAPINE + LITHIUM Lithium may enhance the neurotoxic effect of Antipsychotic Agents.     OLANZAPINE: QT-prolonging      OLANZAPINE + PROZAC, buspirone, lithium: Serotonergic      PROZAC + PROPRANOL: Orthostasis      MANAGEMENT:  Monitoring for adverse effects, routine vitals, routine labs and periodic      RISK STATEMENT for SAFETY     Manuel did not appear to be an imminent safety risk to self or others.    TREATMENT RISK STATEMENT: The risks, benefits, alternatives and potential adverse effects have been discussed and are understood by the  pt. The pt understands the risks of using street drugs or alcohol. There are no medical contraindications, the pt agrees to treatment with the ability to do so. The pt knows to call the clinic for any problems or to access emergency care if needed.  Medical and substance use concerns are documented above.  Psychotropic drug interaction check was done, including changes made today.     PROVIDER: Alberto Santa MD    Patient staffed in clinic with Dr. Burt who will sign the note.  Supervisor is Dr. Tay.    I directly participated in the patient interview with the resident and discussed the key portions of the service with the resident, including the mental status examination and developing the plan of care. I reviewed key portions of the history with the resident. I agree with the findings and plan as documented in this note.      Travon Burt MD  [facility] Psychiatry    Patient has high level of complexity that includes:  -1 or more chronic illnesses with severe exacerbation, progression or side effects from treatment  -Review of prior external note, review of results from unique test, ordering of unique test, and or assessment requiring independent historian (3 of these--review past records and testing, extended discussion with pt and mother re: R/B/A or clozapine vs. OLZ)  -High risk of morbidity from additional diagnostic testing or treatment (added risk of clozapine side effects, REMS)

## 2023-04-27 NOTE — PATIENT INSTRUCTIONS
It was nice seeing you today    Treatment Plan Today:     1) Medications-   - Start lamotrigine titration    -25mg daily for 2 weeks    -then 25mg twice a day for 2 weeks  - Continue buspirone 5mg BID  - Continue fluoxetine to 20mg daily   - Continue Belsomra 10mg at bedtime  - Continue olanzapine 12.5mg at bedtime, prescribed 5mg tablets - take 2.5 tablets at bedtime  - Continue Lithium 1200mg at bedtime  - Take half or full olanzapine tablet (2.5-5mg) as needed for psychosis, lauren, or agitation, and call the clinic    2) Follow-up appt with Dr Santa 5/25/22    3) Crisis numbers are below and clinic after hours number is [phone]         **For crisis resources, please see the information at the end of this document**   Patient Education    Thank you for coming to the [facility] [place] [facility].     Lab Testing:  If you had lab testing today and your results are reassuring or normal they will be mailed to you or sent through [employer] within 7 days. If the lab tests need quick action we will call you with the results. The phone number we will call with results is # [phone]. If this is not the best number please call our clinic and change the number.     Medication Refills:  If you need any refills please call your pharmacy and they will contact us. Our fax number for refills is [phone].   Three business days of notice are needed for general medication refill requests.   Five business days of notice are needed for controlled substance refill requests.   If you need to change to a different pharmacy, please contact the new pharmacy directly. The new pharmacy will help you get your medications transferred.     Contact Us:  Please call [phone] during business hours (8-5:00 M-F).   If you have medication related questions after clinic hours, or on the weekend, please call [phone].     Financial Assistance [phone]   Medical Records [phone]       MENTAL  HEALTH CRISIS RESOURCES:  For a emergency help, please call 911 or go to the nearest Emergency Department.     Emergency Walk-In Options:   Em[facility] Unit @ [place] [facility] ([place]): [phone] - Specialized mental health emergency area designed to be calming   Health [facility] ([place]): [phone]  [facility] ([place]): [phone]  [facility]): [phone]    [facility] Crisis Information:   [place]: [phone]  [place]: [phone]  [place] ([place]) - Adult: [phone]     Child: [phone]  [place] - Adult: [phone]     Child: [phone]  [place]: [phone]  List of all [facility] resources:   https://[place].[facility]/dhs/people-we-serve/adults/health-care/mental-health/resources/crisis-contacts.jsp    National Crisis Information:   Crisis Text Line: Text  [place]  to 043072  Suicide & Crisis Lifeline: 988  National Suicide Prevention Lifeline: 8-761-667-TALK (1-935.372.3993)       For online chat options, visit https://suicidepreventionlifeline.org/chat/  Poison Control Center: 9-780-827-3618  Trans Lifeline: 1-356.677.2935 - Hotline for transgender people of all ages  The [employer]: 9-949-546-8364 - Hotline for LGBT youth     For Non-Emergency Support:   Fast Tracker: Mental Health & Substance Use Disorder Resources -   https://www.[place].comckTrumpITn.org/

## 2023-04-28 ENCOUNTER — TRANSFERRED RECORDS (OUTPATIENT)
Dept: HEALTH INFORMATION MANAGEMENT | Facility: CLINIC | Age: 33
End: 2023-04-28
Payer: COMMERCIAL

## 2023-05-01 ENCOUNTER — TELEPHONE (OUTPATIENT)
Dept: PSYCHIATRY | Facility: CLINIC | Age: 33
End: 2023-05-01
Payer: COMMERCIAL

## 2023-05-01 NOTE — TELEPHONE ENCOUNTER
Received a 3 page summary of patient's treatment history from patient's mother. Summary faxed to scanning and being held in nurse triage until scanning is confirmed.

## 2023-05-11 ENCOUNTER — TELEPHONE (OUTPATIENT)
Dept: PSYCHIATRY | Facility: CLINIC | Age: 33
End: 2023-05-11
Payer: COMMERCIAL

## 2023-05-11 NOTE — TELEPHONE ENCOUNTER
Writer called to check in with patient since he has started lamotrigine. Patient states that he likes the medication, but reports he has tinea versicolor and noticed his patches of discoloration have spread out more since starting the medication. Patient denies any other medication side effects.     Patient states he is currently only taking lamotrigine 25 mg at this time, and was planning to increase to 25 mg BID tomorrow.       Routed to provider for review.

## 2023-05-11 NOTE — TELEPHONE ENCOUNTER
Writer spoke with provider, Dr. Santa, who advised that patient continue 25 mg of lamotrigine daily at this time and provider will call patient tomorrow to discuss further.     Writer called patient to update and patient verbalized understanding. Patient states he is available all day, but that afternoon would be best for a call.

## 2023-05-12 NOTE — TELEPHONE ENCOUNTER
Spoke with Manuel regarding his rash while on lamotrigine. He describes his rash as his typical tinea versicolor rash that has been expanding a bit since starting lamotrigine. This is a recurrent chronic condition and he has a cream that can help with it. Reports that the rash is orange in color and located on the back of his knees, thighs, and lateral chest -- no rash on face, mouth, or genital regions. No tenderness to palpation. No systemic symptoms of fever, chills, flu-like symptoms, change in appetite, easy bruising, or difficulty breathing.    Recommended that Manuel take a photo of rash for his personal reference to determine if it is changing in size. Risks and benefits discussed, and share decision making was used to continue the current dose of lamotrigine for 2 additional weeks until next appointment. Encouraged Manuel to seek medical attention at ED or urgent care if rash worsens, becomes painful, or experiencing any systemic symptoms reviewed. Confirmed appointment scheduled for 5/25/23. This plan of action was developed with the attending psychiatrist of the day.      Alberto Santa MD, PhD  PGY-3 Psychiatry Resident

## 2023-05-25 ENCOUNTER — VIRTUAL VISIT (OUTPATIENT)
Dept: PSYCHIATRY | Facility: CLINIC | Age: 33
End: 2023-05-25
Attending: PSYCHIATRY & NEUROLOGY
Payer: COMMERCIAL

## 2023-05-25 ENCOUNTER — VIRTUAL VISIT (OUTPATIENT)
Dept: SLEEP MEDICINE | Facility: CLINIC | Age: 33
End: 2023-05-25
Payer: COMMERCIAL

## 2023-05-25 VITALS — BODY MASS INDEX: 24.12 KG/M2 | WEIGHT: 182 LBS | HEIGHT: 73 IN

## 2023-05-25 DIAGNOSIS — G47.30 SLEEP-RELATED BREATHING DISORDER: Primary | ICD-10-CM

## 2023-05-25 DIAGNOSIS — F90.9 ATTENTION DEFICIT HYPERACTIVITY DISORDER (ADHD), UNSPECIFIED ADHD TYPE: ICD-10-CM

## 2023-05-25 DIAGNOSIS — F25.0 SCHIZOAFFECTIVE DISORDER, BIPOLAR TYPE (H): Primary | ICD-10-CM

## 2023-05-25 DIAGNOSIS — Z79.899 ENCOUNTER FOR LONG-TERM (CURRENT) USE OF MEDICATIONS: ICD-10-CM

## 2023-05-25 DIAGNOSIS — G47.00 INSOMNIA, UNSPECIFIED TYPE: ICD-10-CM

## 2023-05-25 PROCEDURE — 99205 OFFICE O/P NEW HI 60 MIN: CPT | Mod: VID | Performed by: NURSE PRACTITIONER

## 2023-05-25 PROCEDURE — 99214 OFFICE O/P EST MOD 30 MIN: CPT | Mod: VID | Performed by: STUDENT IN AN ORGANIZED HEALTH CARE EDUCATION/TRAINING PROGRAM

## 2023-05-25 RX ORDER — ATORVASTATIN CALCIUM 20 MG/1
TABLET, FILM COATED ORAL
COMMUNITY
Start: 2023-05-23

## 2023-05-25 RX ORDER — LAMOTRIGINE 25 MG/1
TABLET ORAL
Qty: 84 TABLET | Refills: 0 | Status: SHIPPED | OUTPATIENT
Start: 2023-05-25 | End: 2023-06-12

## 2023-05-25 ASSESSMENT — PATIENT HEALTH QUESTIONNAIRE - PHQ9
SUM OF ALL RESPONSES TO PHQ QUESTIONS 1-9: 9
10. IF YOU CHECKED OFF ANY PROBLEMS, HOW DIFFICULT HAVE THESE PROBLEMS MADE IT FOR YOU TO DO YOUR WORK, TAKE CARE OF THINGS AT HOME, OR GET ALONG WITH OTHER PEOPLE: SOMEWHAT DIFFICULT
SUM OF ALL RESPONSES TO PHQ QUESTIONS 1-9: 9

## 2023-05-25 ASSESSMENT — SLEEP AND FATIGUE QUESTIONNAIRES
HOW LIKELY ARE YOU TO NOD OFF OR FALL ASLEEP WHEN YOU ARE A PASSENGER IN A CAR FOR AN HOUR WITHOUT A BREAK: SLIGHT CHANCE OF DOZING
HOW LIKELY ARE YOU TO NOD OFF OR FALL ASLEEP WHILE SITTING QUIETLY AFTER LUNCH WITHOUT ALCOHOL: WOULD NEVER DOZE
HOW LIKELY ARE YOU TO NOD OFF OR FALL ASLEEP WHILE SITTING AND TALKING TO SOMEONE: WOULD NEVER DOZE
HOW LIKELY ARE YOU TO NOD OFF OR FALL ASLEEP IN A CAR, WHILE STOPPED FOR A FEW MINUTES IN TRAFFIC: WOULD NEVER DOZE
HOW LIKELY ARE YOU TO NOD OFF OR FALL ASLEEP WHILE LYING DOWN TO REST IN THE AFTERNOON WHEN CIRCUMSTANCES PERMIT: SLIGHT CHANCE OF DOZING
HOW LIKELY ARE YOU TO NOD OFF OR FALL ASLEEP WHILE SITTING INACTIVE IN A PUBLIC PLACE: WOULD NEVER DOZE
HOW LIKELY ARE YOU TO NOD OFF OR FALL ASLEEP WHILE WATCHING TV: WOULD NEVER DOZE
HOW LIKELY ARE YOU TO NOD OFF OR FALL ASLEEP WHILE SITTING AND READING: WOULD NEVER DOZE

## 2023-05-25 ASSESSMENT — PAIN SCALES - GENERAL: PAINLEVEL: NO PAIN (0)

## 2023-05-25 NOTE — PROGRESS NOTES
Virtual Visit Details    Type of service:  Video Visit   Video Start Time: 1400  Video End Time:1445    Originating Location (pt. Location): Home  Distant Location (provider location):  Off-site  Platform used for Video Visit: Ramen

## 2023-05-25 NOTE — PROGRESS NOTES
"Virtual Visit Details    Type of service:  Video Visit   Video Start Time: 3:00 PM  Video End Time:3:53 PM    Originating Location (pt. Location): Home  Distant Location (provider location):  On-site  Platform used for Video Visit: [facility]  Psychiatry Clinic  MEDICAL PROGRESS NOTE     CARE TEAM:  PCP- Clinic Provider    Psychotherapist- None       Jose Francisco Sommers is a 32 year old who uses the name Manuel and pronouns he, him, his.      DIAGNOSIS   # Schizoaffective disorder, Bipolar type, most recent episode depressed   -rule out bipolar disorder type 1  # ADHD, predominantly inattentive type  # Insomnia (history of delayed phase sleep disorder)     ASSESSMENT   Manuel is doing well overall. He was last seen in clinic one month ago for a SPARC evaluation with Dr. Burt in which lamotrigine was initiated with future consideration of clozapine. Since starting lamotrigine, Manuel reports he is able to \"laugh more\" and is feeling more reactive and motivated. He paused lamotrigine titration at 25mg due to recurrence of tinea versicolor rash and has continued that dose until today. The rash is improving since be started his previous cream treatment. Manuel is interested in increasing lamotrigine, which seems appropriate given improvement in rash with no red flag symptoms. Shared decision making is used to titrate lamotrigine to 25mg BID today for 2 weeks before increase to 100mg HS. Given his positive reaction to 25mg lamotrigine, ideally additional benefit will be obtained at higher/more therapeutic doses. Behavioral activation was encouraged during this period of increased motivation, and Manuel said he would try to go out more and play [employer].    Manuel has experienced an improvement in anxiety since starting lamotrigine and shared decision making is used to discontinue 5mg BID buspirone. If he continues to feel well with lamotrigine, it would be appropriate to discuss a " decrease/discontinuation of fluoxetine in the near future. Manuel is still interested in replacing olanzapine with a different antipsychotic, such as clozapine, once lamotrigine is at a therapeutic dose. We scheduled follow up in 2.5 weeks to check in during the titration process prior to resident transfer. Manuel has no SI or HI today - no safety concerns are endorsed or suspected.    # Schizoaffective disorder vs. previous consideration of schizophrenia:  Manuel appears to have a primary schizophrenia spectrum disorder with prominent negative symptoms. No breakthrough psychosis since decrease in olanzapine 6 months ago.  Will continue current dose.     # Insomnia  # history of delayed phase sleep disorder  Following with sleep medicine for evaluation of LISA. Follow up scheduled in September     Future considerations:  - further titration of lamotrigine  - taper/discontinue fluoxetine  - Clozapine, Saphris, cariprazine or Geodon as alternative antipsychotics/mood stabilizers  - re-trial metformin for medication associated weight gain, topiramate may have cognitive side effects. May consider Ozempic/semaglutide  - follow up on referral to sleep medicine, hepatology  - further reduce fluoxetine    [place]NPMP was checked today:  Indicates taking controlled medication as prescribed.     PLAN                                                                                                                1) Meds-   - Continue lamotrigine titration    -25mg twice a day for 2 weeks    -then increase to 100mg at bedtime  - Discontinue buspirone 5mg BID  - Continue fluoxetine to 20mg daily   - Continue Belsomra 10mg at bedtime  - Continue olanzapine 12.5mg at bedtime, prescribed 5mg tablets - take 2.5 tablets at bedtime  - Continue Lithium 1200mg at bedtime  - Take half or full olanzapine tablet (2.5-5mg) as needed for psychosis, lauren, or agitation, and call the clinic    2) Psychotherapy- recommended    3) Next due-  Labs- AP and  "lithium labs completed 1/2023, Cr and TSH normalized, hyperlipidemia, LFT elevated  Repeat LFTs soon  EKG- as needed  Rating scales- AIMS completed 4/6/23. AIMS = 0.    4) Referrals-  none      Previously referred to:   -[facility]ie for clozapine assessment on 4/6/23  -Sleep medicine - scheduled for 05/25/23  -Hepatology regarding initiation of Depakote    5) Dispo- RTC in 2-3 weeks     PERTINENT BACKGROUND                                                    [most recent eval 08/04/22]    Jose Francisco \"Manuel\"  first experienced mental health issues in grade school and has received treatment for ADHD, depression, psychosis, and substance use. Notably, this patient had FEP in 2015 in the setting of cannabis abuse and has been stable on olanzapine and lithium since that time. He has never lived independently and has only been slowly developing insight into his illness and past symptoms. GeneSight testing was completed noting patient is a poor CYP2D6 metabolizer. Past records from Dr. Mike Powers were received in summer 2018 to assist with diagnostic clarity, however they were hand written notes that were illegible though accompanied by typed neuropsychology consult from [facility]. This documentation was unable to support a diagnosis of bipolar disorder at that time though noted potential Asperger's diagnosis and ADHD. Given that there has been evidence for psychotic signs and symptoms outside the context of mood episodes, a schizophrenia spectrum disorder such as schizoaffective disorder diagnosis seems most accurate. He has had two hospitalizations for decompensated psychosis in the setting of decreasing his olanzapine dose.    Has \"no interest whatsoever\" in therapy. Please refrain from asking about it.     Psych pertinent item history includes psychosis [sxs include disorganization, possible catatonia], mutiple psychotropic trials, psych hosp (<3) and substance use: alcohol and cannabis     SUBJECTIVE   Jose Francisco SANTANA " " was last seen in clinic 4 weeks ago when lamotrigine was initiated, he experienced a worsening of .    -Updates:    -the rash he had is the \"same rash\" he has had for years, gotten better with cream   -kidney: cyst on kidney and 2 stones, will follow up with repeat scan in 6 months   -sleep doctor recommended overnight LISA analysis   -same rash he has had for years   -likes lamotrigine: it makes me laugh   -would like to stop buspar and go up on lamotrigine, stop prozac   -Social: no changes  -Mood: \"laughing more\"  -Depression: feeling okay right now, could probably be better   -Anxiety: \"mild anxiety but not worrying\" about it  -SI/HI: no  -Sleep: saw Dr. Vaughn today - hypersomnia and wakes up groggy  -Psychosis: no  -Substances: no, smokes occasional black and mild giant cigarette  -Medications:   -clozapine - still interested in trying this   -lamotrigine - likes the changes thus far   -fluoxetine: feels like not as much matter to him, feels \"indifferent\" to more things     Pertinent Negatives: No suicidal or violent ideation, self-injury, psychosis, hallucinations, delusions, lauren, hypomania, aggression and harmful substance use  Adverse Effects: weight gain, sedation, cognitive slowing?      PSYCH and SUBSTANCE USE Critical Summary Points since July 2022 8/04/22 - transfer of care. Olanzapine decreased 20mg to 15mg  9/29/22 - fluoxetine increased from 20mg to 40mg   11/10/22 - decrease olanzapine to 12.5 from 15mg  12/8/22 - no med changes. Placed hepatology referral  1/12/23 - started Belsomra for sleep, encouraged hepatology eval, placed sleep medicine referral.  02/09/23 - decreased fluoxetine to 20mg  04/06/23 - start buspar 5mg BID  04/27/23 - start lamotrigine     PAST MED TRIALS     Adderall 10-20 mg - improved concentration, but \"cold personality\" and perseveration  Prozac 20 mg - limited response, less irritable, first episode of \"rage\"  Seroquel 25-75 mg - helped with sleep and " "irritability  Concerta 18 mg - improved focus, no improvement in motivation  Provigil 150 mg (up to 400) - Stimulants \"almost killed him\" triggered alcohol binges  Abilify ?25 mg - OK when QOD, but irritable and agitated on QD  Clonazepam 0.5-1.5 mg - calming, helped with sleep and \"catatonia\"  Risperdal up to 5 mg - some confusion, slow processing, withdrawn, ?\"catatonia\", panic attacks -dont try that  Zyprexa 5 mg less anxious overall improvement  Lithium - calmer, overall better  Latuda 20 mg - severe fatigue and depression  Synthroid 75 mcg - added to help with mood.  Brief trials with Strattera, Intuniv, Lamictal, Xanax, Zoloft  Naltrexone - had nightmares and sedation on 25mg, stopped after 2 nights     MEDICAL HISTORY and ALLERGY     ALLERGIES: Sulfa antibiotics    Patient Active Problem List   Diagnosis     Schizoaffective disorder, bipolar type (H)     Hx of psychiatric care     Elevated liver enzymes     Fatty liver     High blood triglycerides     History of cannabis abuse     History of cocaine abuse (H)     Psychosis (H)     Elevated blood pressure reading without diagnosis of hypertension     Constipation     Morbid obesity (H)     Schizoaffective disorder (H)        MEDICATIONS     Current Outpatient Medications   Medication Sig Dispense Refill     atorvastatin (LIPITOR) 20 MG tablet TAKE ONE TABLET BY MOUTH ONE TIME DAILY*       busPIRone (BUSPAR) 5 MG tablet Take 1 tablet (5 mg) by mouth 2 times daily 60 tablet 1     FLUoxetine (PROZAC) 20 MG capsule Take 1 capsule (20 mg) by mouth daily 30 capsule 2     lamoTRIgine (LAMICTAL) 25 MG tablet Take 1 tablet (25 mg) by mouth daily for 14 days, THEN 1 tablet (25 mg) 2 times daily for 14 days. 42 tablet 0     lithium ER (LITHOBID) 300 MG CR tablet Take 4 tablets (1,200 mg) by mouth At Bedtime 120 tablet 2     OLANZapine (ZYPREXA) 5 MG tablet Take 2.5 tablets (12.5 mg) by mouth At Bedtime. May also take 0.5-1 tablets (2.5-5 mg) daily as needed ((for " "psychosis, lauren, or agitation)). 90 tablet 2     Suvorexant (BELSOMRA) 10 MG tablet Take 1 tablet (10 mg) by mouth nightly as needed for sleep 30 tablet 1      VITALS   There were no vitals taken for this visit.     Pulse Readings from Last 3 Encounters:   04/27/23 61   04/06/23 76   02/02/22 55     Wt Readings from Last 3 Encounters:   05/25/23 82.6 kg (182 lb)   04/27/23 132.2 kg (291 lb 6.4 oz)   04/06/23 134.1 kg (295 lb 9.6 oz)     BP Readings from Last 3 Encounters:   04/27/23 (!) 137/90   04/06/23 125/82   02/02/22 (!) 141/83      MENTAL STATUS EXAM     Alertness: alert  and oriented  Appearance: casually groomed, appropriate  Behavior/Demeanor: cooperative and calm, with good  eye contact   Speech: increased latency of response and slowed, appears baseline  Language: intact and no obvious problem  Psychomotor: normal or unremarkable  Mood:  \"pretty good\" and \"inefficient thinking\"  Affect: relaxed and reactive - improved from previous meetings; congruent to: mood- yes, content- yes  Thought Process/Associations: linear, concrete, goal oriented  Thought Content:  Reports none;  Denies suicidal & violent ideation and delusions  Perception:  Reports none;  Denies auditory hallucinations and visual hallucinations  Insight: adequate  Judgment: good  Cognition: does  appear grossly intact; formal cognitive testing was not done  Gait and Station: unremarkable     LABS and DATA     Recent Labs   Lab Test 01/10/23  1156 05/10/22  0855 07/26/21  1339   LITHIUM 0.6 0.8 0.6     Recent Labs   Lab Test 01/10/23  1156 05/10/22  0855   CR 1.05 1.30*   GFRESTIMATED >90 76    139   POTASSIUM 4.2 3.4   ADONAY 10.0 9.5     Recent Labs   Lab Test 01/10/23  1156 05/10/22  0855   SG 1.020 1.010     Recent Labs   Lab Test 01/10/23  1156 05/10/22  0855   TSH 1.44 5.36*       Recent Labs   Lab Test 01/10/23  1156 05/10/22  0855 07/26/21  1339   GLC 97 110* 85   A1C  --  5.1 4.9     Recent Labs   Lab Test 01/10/23  1156 " 07/26/21  1339   CHOL 210* 247*   TRIG 532* 554*   LDL 71 101*   HDL 31* 42     Recent Labs   Lab Test 01/10/23  1156 05/10/22  0855   AST 82* 31   * 103*   ALKPHOS 99 81     Recent Labs   Lab Test 01/10/23  1156 05/10/22  0855 07/26/21  1339 12/04/20  1414 11/13/20  0758 06/01/20  1339   WBC 6.6 7.8   < > 11.0   < > 7.5   ANEU  --   --   --  8.0  --  4.8   HGB 15.6 15.1   < > 14.3   < > 15.1    233   < > 285   < > 265    < > = values in this interval not displayed.     ECG 11/20 QTc = 436ms     PSYCHOTROPIC DRUG INTERACTIONS                                                       PSYCHCLINICDDI     CNS depression: OLANZAPINE + LITHIUM, Belsomra, lamotrigine     OLANZAPINE + LITHIUM: Lithium may enhance the neurotoxic effect of Antipsychotic Agents.     QT-prolonging: OLANZAPINE      Serotonergic: OLANZAPINE + PROZAC, lithium        MANAGEMENT:  Monitoring for adverse effects, routine vitals, routine labs and periodic      RISK STATEMENT for SAFETY     Manuel did not appear to be an imminent safety risk to self or others.    TREATMENT RISK STATEMENT: The risks, benefits, alternatives and potential adverse effects have been discussed and are understood by the pt. The pt understands the risks of using street drugs or alcohol. There are no medical contraindications, the pt agrees to treatment with the ability to do so. The pt knows to call the clinic for any problems or to access emergency care if needed.  Medical and substance use concerns are documented above.  Psychotropic drug interaction check was done, including changes made today.     PROVIDER: Alberto Santa MD    Patient staffed in clinic with Dr. Burt who will sign the note.  Supervisor is Dr. Tay.      I directly participated in the patient interview with the resident and discussed the key portions of the service with the resident, including the mental status examination and developing the plan of care. I reviewed key portions of the history  with the resident. I agree with the findings and plan as documented in this note.      Travon Burt MD  [facility]

## 2023-05-25 NOTE — PROGRESS NOTES
Outpatient Sleep Medicine Consultation:      Name: Jose Francisco Sommers MRN# [id number]   Age: 32 year old YOB: 1990     Date of Consultation: May 25, 2023  Consultation is requested by: Alberto Santa.   Primary care provider: Provider, Clinic       Reason for Sleep Consult:     Jose Francisco Sommers is sent by Alberto Santa found for a sleep consultation regarding insomnia    Patient s Reason for visit  Jose Francisco Sommers main reason for visit: Feeling bad when I wake up  Patient states problem(s) started: Last 5 to 10 years  Jose Francisco Sommers's goals for this visit: To reschedule for later once some of my meds have been changed           Assessment and Plan:     Summary Sleep Diagnoses:  Related breathing disorder  Insomnia, sleep initiation  Nonrestorative sleep  Delayed sleep phase disorder      Comorbid Diagnoses:  ADHD  Depression  Psychosis  Substance use (alcohol, cannabis)  Schizoaffective disorder, bipolar type  History of psychiatric care  Fatty liver  Hypertriglyceridemia  History of cocaine abuse  Psychosis  Elevated blood pressure without diagnosis of hypertension  Morbid obesity        Summary Recommendations:  Orders Placed This Encounter   Procedures     Comprehensive Sleep Study   1.  Recommend patient undergo comprehensive sleep study.  Patient STOP-BANG score is 4/8 indicating an elevated pretest probability for obstructive sleep apnea.  2.  Recommend patient follow-up in clinic with his mother approximately 2 weeks after completion of sleep study with a virtual or in person visit to discuss results as well as to determine next steps in plan of care.  Possibility of referral to cognitive behavioral therapist for insomnia was discussed at today's visit and will be turned to and will be determined at our next visit.  3.  Recommend patient review sleep hygiene practices for any methods of improvement that he may improve upon to reduce sleep disturbance.  4.  Recommend structured weight management  program to reduce BMI closer to 30.0.      Summary Counselin.  Discussed pathophysiology of central as well as obstructive sleep apnea.  2.  Discussed implications of untreated sleep apnea  3.  Discussed PSG  4.  Discussed methods of treatment of sleep apnea including CPAP therapy as well as mandibular advancement device, surgical options.      Medical Decision-making:   Educational materials provided in instructions    Total time spent reviewing medical records, history and physical examination, review of previous testing and interpretation as well as documentation on this date 60 minutes    CC:  Alberto Santa         History of Present Illness:     Jose Francisco Sommers is a pleasant 32-year-old male who presents to the sleep medicine clinic today upon the advice of his psychiatric provider, Alberto Santa.  Manuel presents with his mother, Ember.  Manuel lives at home with his mother.  He does not work.  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    Manuel is very concerned today that we are seeing him prior an appointment later today when medication changes will be made which may affect his sleep. Manuel asked that we delay this appointment for approximately 6 months so he may reap the benefits of any medication adjustments prior to meeting.  While I agreed that his sleep may be improved by potential medication adjustments, ruling out sleep apnea may prove to be a fruitful endeavor as well.    His mother stated that he had begun snoring after he had gained weight subsequent to being started on one of his psychiatric medications. Manuel does not feel rested in the morning. Manuel states that he has had sleep issues for the past 7-8 years.    Manuel has a complex past medical and psychiatric history notable for fatty liver disease, class III obesity, hypertension, hypertriglyceridemia, schizoaffective disorder, bipolar type, history of psychiatric care, psychosis, elevated liver enzymes, history of  cannabis abuse, history of cocaine abuse, has been trialed on multiple sedative hypnotics, and stimulants attempting to find the most appropriate medications for him.     Most recently, he has had success with Belsomra 10 mg prior to sleep, and even that produces a 120-minute sleep latency time.  He states that he is in bed for 10-12 hours and sleeps for the majority of that time.  If he does not take the Belsomra he may not sleep at all during the night and that is largely because of that the olanzapine that he takes. Later today Manuel has a psychiatric appointmentand.  Both he and his mother are hopeful that medication adjustments with his buspirone as well as the fluoxetine may take place which potentially can add to his restfulness.      In addition to insomnia, patient does have a delayed sleep phase cycle retires to sleep at approximately midnight and awakening at noon-2 PM.  He has an approximate 2 hours sleep latency time.  His last caffeine use is at 5 PM.  Manuel does have questionable sleep hygiene practices.  And he does lack insight into his own sleep behaviors.    Plans for to include a formal in laboratory polysomnographic study as well as a follow-up appointment in the clinic to review his results as well as to develop a plan of care going forward.      Past Sleep Evaluations: None    SLEEP-WAKE SCHEDULE:     Work/School Days: Patient goes to school/work: No   Usually gets into bed at Midnight  Takes patient about 2 hours to fall asleep  Has trouble falling asleep Daily nights per week  Wakes up in the middle of the night None times.  Wakes up due to    He has trouble falling back asleep   times a week.   It usually takes   to get back to sleep  Patient is usually up at Noon to 2pm  Uses alarm: No    Weekends/Non-work Days/All Other Days:  Usually gets into bed at     Takes patient about   to fall asleep  Patient is usually up at    Uses alarm:      Sleep Need  Patient gets  10 hours sleep on  average   Patient thinks he needs about 10 hours sleep    Jose Francisco Sommers prefers to sleep in this position(s): Side   Patient states they do the following activities in bed: Read;Eat;Watch TV    Naps  Patient takes a purposeful nap Rarely times a week and naps are usually   in duration  He feels better after a nap:    He dozes off unintentionally   days per week  Patient has had a driving accident or near-miss due to sleepiness/drowsiness: No      SLEEP DISRUPTIONS:    Breathing/Snoring  Patient snores:No mother acknowledges that since patient has gained weight, he snores  Other people complain about his snoring: No  Patient has been told he stops breathing in his sleep:No  He has issues with the following:      Movement:  Patient gets pain, discomfort, with an urge to move:  No  It happens when he is resting:  No  It happens more at night:  No  Patient has been told he kicks his legs at night:  No     Behaviors in Sleep:  Jose Francisco Sommers has experienced the following behaviors while sleeping:    He has experienced sudden muscle weakness during the day:        Is there anything else you would like your sleep provider to know:        CAFFEINE AND OTHER SUBSTANCES:    Patient consumes caffeinated beverages per day:  A few  Last caffeine use is usually: 5pm  List of any prescribed or over the counter stimulants that patient takes:   Adderall, Concerta, Provigil, Ritalin, Vyvanse  List of any prescribed or over the counter sleep medication patient takes: Belsomra  List of previous sleep medications that patient has tried: Restoral, Seroquel clonazepam, buspirone  Patient drinks alcohol to help them sleep: No  Patient drinks alcohol near bedtime: No    Family History:  Patient has a family member been diagnosed with a sleep disorder: No            SCALES:    EPWORTH SLEEPINESS SCALE         5/25/2023     1:55 PM    [place] Sleepiness Scale ( LENCHO Conklin  [id number]<br>ESS - [place]/English - Final version - 21 Nov 07 - [facility]  [facility] [place].)   Sitting and reading Would never doze   Watching TV Would never doze   Sitting, inactive in a public place (e.g. a theatre or a meeting) Would never doze   As a passenger in a car for an hour without a break Slight chance of dozing   Lying down to rest in the afternoon when circumstances permit Slight chance of dozing   Sitting and talking to someone Would never doze   Sitting quietly after a lunch without alcohol Would never doze   In a car, while stopped for a few minutes in traffic Would never doze   [place] Score (MC) 2   [place] Score (Sleep) 2         INSOMNIA SEVERITY INDEX (GER)          5/25/2023     1:43 PM   Insomnia Severity Index (GER)   Difficulty falling asleep 3   Difficulty staying asleep 0   Problems waking up too early 0   How SATISFIED/DISSATISFIED are you with your CURRENT sleep pattern? 3   How NOTICEABLE to others do you think your sleep problem is in terms of impairing the quality of your life? 3   How WORRIED/DISTRESSED are you about your current sleep problem? 2   To what extent do you consider your sleep problem to INTERFERE with your daily functioning (e.g. daytime fatigue, mood, ability to function at work/daily chores, concentration, memory, mood, etc.) CURRENTLY? 3   GER Total Score 14       Guidelines for Scoring/Interpretation:  Total score categories:  0-7 = No clinically significant insomnia   8-14 = Subthreshold insomnia   15-21 = Clinical insomnia (moderate severity)  22-28 = Clinical insomnia (severe)  Used via courtesy of www.[employer].va.gov with permission from Prabhakar Helms PhD., [facility]      STOP BANG         5/25/2023     1:56 PM   STOP BANG Questionnaire (  2008, the American Society of Anesthesiologists, Inc. [employer]   1. Snoring - Do you snore loudly (louder than talking or loud enough to be heard through closed doors)? No   2. Tired - Do you often feel tired, fatigued, or sleepy during daytime? No   3.  "Observed - Has anyone observed you stop breathing during your sleep? No   4. Blood pressure - Do you have or are you being treated for high blood pressure? No   5. BMI - BMI more than 35 kg/m2? Yes   6. Age - Age over 50 yr old? No   7. Neck circumference - Neck circumference greater than 40 cm? No   8. Gender - Gender male? Yes   STOP BANG Score (MC): 1 (Low risk of LISA)         GAD7        12/10/2021     9:54 AM   FERNANDO-7    1. Feeling nervous, anxious, or on edge 3    3   2. Not being able to stop or control worrying 0    0   3. Worrying too much about different things 0    0   4. Trouble relaxing 3    3   5. Being so restless that it is hard to sit still 1    1   6. Becoming easily annoyed or irritable 0    0   7. Feeling afraid, as if something awful might happen 3    3   FERNANDO-7 Total Score 10    10         CAGE-AID        2/18/2021     2:00 PM   CAGE-AID Flowsheet   Have you ever felt you should Cut down on your drinking or drug use? 1   Have people Annoyed you by criticizing your drinking or drug use? 1   Have you ever felt bad or Guilty about your drinking or drug use? 1   Have you ever had a drink or used drugs first thing in the morning to steady your nerves or to get rid of a hangover? (Eye opener) 0   CAGE-AID SCORE 3       CAGE-AID reprinted with permission from the [place] Medical Journal, MEGHAN [place]. and KIRTI Eubanks, \"Conjoint screening questionnaires for alcohol and drug abuse\" [place] Medical Journal 94: 135-140, 1995.      PATIENT HEALTH QUESTIONNAIRE-9 (PHQ - 9)        4/6/2023     3:27 PM   PHQ-9 ([employer])   1.  Little interest or pleasure in doing things 1   2.  Feeling down, depressed, or hopeless 1   3.  Trouble falling or staying asleep, or sleeping too much 1   4.  Feeling tired or having little energy 1   5.  Poor appetite or overeating 1   6.  Feeling bad about yourself - or that you are a failure or have let yourself or your family down 0   7.  Trouble concentrating on things, such as " reading the newspaper or watching television 1   8.  Moving or speaking so slowly that other people could have noticed. Or the opposite - being so fidgety or restless that you have been moving around a lot more than usual 0   9.  Thoughts that you would be better off dead, or of hurting yourself in some way 0   PHQ-9 Total Score 6   6.  Feeling bad about yourself 0   7.  Trouble concentrating 1   8.  Moving slowly or restless 0   9.  Suicidal or self-harm thoughts 0   1.  Little interest or pleasure in doing things Several days   2.  Feeling down, depressed, or hopeless Several days   3.  Trouble falling or staying asleep, or sleeping too much Several days   4.  Feeling tired or having little energy Several days   5.  Poor appetite or overeating Several days   6.  Feeling bad about yourself Not at all   7.  Trouble concentrating Several days   8.  Moving slowly or restless Not at all   9.  Suicidal or self-harm thoughts Not at all   PHQ-9 via [facility] TOTAL SCORE-----> 6 (Mild depression)   Difficulty at work, home, or with people Very difficult       Developed by Emili Nelson, Ana Rosa Vernon, Moe Munoz and colleagues, with an educational adriana from [employer] No permission required to reproduce, translate, display or distribute.        Allergies:    Allergies   Allergen Reactions     Sulfa Antibiotics Rash       Medications:    Current Outpatient Medications   Medication Sig Dispense Refill     atorvastatin (LIPITOR) 20 MG tablet TAKE ONE TABLET BY MOUTH ONE TIME DAILY*       busPIRone (BUSPAR) 5 MG tablet Take 1 tablet (5 mg) by mouth 2 times daily 60 tablet 1     FLUoxetine (PROZAC) 20 MG capsule Take 1 capsule (20 mg) by mouth daily 30 capsule 2     lamoTRIgine (LAMICTAL) 25 MG tablet Take 1 tablet (25 mg) by mouth daily for 14 days, THEN 1 tablet (25 mg) 2 times daily for 14 days. 42 tablet 0     lithium ER (LITHOBID) 300 MG CR tablet Take 4 tablets (1,200 mg) by mouth At Bedtime 120 tablet 2  "    OLANZapine (ZYPREXA) 5 MG tablet Take 2.5 tablets (12.5 mg) by mouth At Bedtime. May also take 0.5-1 tablets (2.5-5 mg) daily as needed ((for psychosis, lauren, or agitation)). 90 tablet 2     Suvorexant (BELSOMRA) 10 MG tablet Take 1 tablet (10 mg) by mouth nightly as needed for sleep 30 tablet 1       Problem List:  Patient Active Problem List    Diagnosis Date Noted     Morbid obesity (H) 04/06/2023     Priority: Medium     Elevated blood pressure reading without diagnosis of hypertension 12/21/2020     Priority: Medium     Constipation 12/21/2020     Priority: Medium     Psychosis (H) 11/12/2020     Priority: Medium     History of cannabis abuse 03/11/2018     Priority: Medium     History of cocaine abuse (H) 03/11/2018     Priority: Medium     High blood triglycerides 01/11/2018     Priority: Medium     Fatty liver 12/22/2017     Priority: Medium     See US Results        Elevated liver enzymes 11/07/2017     Priority: Medium     Hx of psychiatric care 07/10/2016     Priority: Medium     Adderall 10-20 mg - improved concentration, but \"cold personality\" and perseveration  Prozac 20 mg - limited response, less irritable, first episode of \"rage\"  Seroquel 25-75 mg - helped with sleep and irritability  Concerta 18 mg - improved focus, no improvement in motivation  Provigil 150 mg - Stimulants \"almost killed him\" triggered alcohol binges  Abilify ?25 mg - OK when QOD, but irritable and agitated on QD  Clonazepam 0.5-1.5 mg - calming, helped with sleep and \"catatonia\"  Risperdal up to 5 mg - some confusion, slow processing, withdrawn, ?\"catatonia\", panic attacks  Zyprexa 5 mg less anxious overall improvement  Lithium - calmer, overall better  Latuda 20 mg - severe fatigue and depression  Synthroid 75 mcg - added to help with mood.  Brief trials with Strattera, Intuniv, Lamictal, Xanax, Zoloft    7/2016- changed lithium from 600 mg BID to 1200 mg qhs for sleep and sedation  8/2016- decreased Zyprexa from 10 mg to " 5 mg qhs- due to weight gain  9/2016- decreased Buspar from 10 mg to 5 mg qhs due to dizziness  1/2017- self-discontinued Buspar  2/2017- increased Zyprexa to 10 mg qhs from 5 mg, started trazodone for sleep. Reports he had tried melatonin but it made him feel sick  3/2017- increased Zyprexa from 10 mg to 15 mg qhs           Schizoaffective disorder, bipolar type (H) 10/17/2015     Priority: Medium     Schizoaffective disorder (H) 05/26/2015     Priority: Medium        Past Medical/Surgical History:  No past medical history on file.  No past surgical history on file.    Social History:  Social History     Socioeconomic History     Marital status: Single     Spouse name: Not on file     Number of children: Not on file     Years of education: Not on file     Highest education level: Not on file   Occupational History     Not on file   Tobacco Use     Smoking status: Some Days     Packs/day: 0.00     Types: Cigarettes     Start date: 11/22/2017     Smokeless tobacco: Never   Vaping Use     Vaping status: Not on file   Substance and Sexual Activity     Alcohol use: No     Alcohol/week: 0.0 standard drinks of alcohol     Drug use: No     Sexual activity: Not Currently   Other Topics Concern     Parent/sibling w/ CABG, MI or angioplasty before 65F 55M? Not Asked   Social History Narrative     Not on file     Social Determinants of Health     Financial Resource Strain: Not on file   Food Insecurity: Not on file   Transportation Needs: Not on file   Physical Activity: Not on file   Stress: Not on file   Social Connections: Not on file   Intimate Partner Violence: Not on file   Housing Stability: Not on file       Family History:  Family History   Problem Relation Age of Onset     Diabetes Maternal Grandfather        Review of Systems:  A complete review of systems reviewed by me is negative with the exeption of what has been mentioned in the history of present illness.  In the last TWO WEEKS have you experienced any of the  "following symptoms?  Fevers: No  Night Sweats: No  Weight Gain: No  Pain at Night: No  Double Vision: No  Changes in Vision: No  Difficulty Breathing through Nose: No  Sore Throat in Morning: No  Dry Mouth in the Morning: No  Shortness of Breath Lying Flat: No  Shortness of Breath With Activity: No  Awakening with Shortness of Breath: No  Increased Cough: No  Heart Racing at Night: No  Swelling in Feet or Legs: No  Diarrhea at Night: No  Heartburn at Night: No  Urinating More than Once at Night: No  Losing Control of Urine at Night: No  Joint Pains at Night: No  Headaches in Morning: No  Weakness in Arms or Legs: No  Depressed Mood: Yes  Anxiety: Yes     Physical Examination:  Vitals: Ht 1.842 m (6' 0.5\")   Wt 82.6 kg (182 lb)   BMI 24.34 kg/m    BMI= Body mass index is 24.34 kg/m .       Physical Exam  Vitals reviewed.   Constitutional:       General: He is awake.      Appearance: Normal appearance. He is well-developed, well-groomed and overweight.   HENT:      Head: Normocephalic and atraumatic.      Right Ear: External ear normal.      Left Ear: External ear normal.      Nose: Nose normal.      Mouth/Throat:      Mouth: Mucous membranes are moist.      Pharynx: Oropharynx is clear.   Eyes:      Conjunctiva/sclera: Conjunctivae normal.   Pulmonary:      Effort: Pulmonary effort is normal.   Musculoskeletal:         General: Normal range of motion.      Cervical back: Normal range of motion and neck supple.   Neurological:      General: No focal deficit present.      Mental Status: He is alert and oriented to person, place, and time.   Psychiatric:         Attention and Perception: Attention normal.         Mood and Affect: Mood and affect normal.         Speech: Speech normal.         Behavior: Behavior normal. Behavior is cooperative.         Thought Content: Thought content normal.         Physical examination is limited by the nature of a virtual visit         Data: All pertinent previous laboratory data " reviewed     Recent Labs   Lab Test 01/10/23  1156 05/10/22  0855    139   POTASSIUM 4.2 3.4   CHLORIDE 106 107   CO2 21* 27   ANIONGAP 12 5   GLC 97 110*   BUN 7.6 16   CR 1.05 1.30*   ADONAY 10.0 9.5       Recent Labs   Lab Test 01/10/23  1156   WBC 6.6   RBC 5.21   HGB 15.6   HCT 47.3   MCV 91   MCH 29.9   MCHC 33.0   RDW 13.2          Recent Labs   Lab Test 01/10/23  1156   PROTTOTAL 7.1   ALBUMIN 4.6   BILITOTAL 0.4   ALKPHOS 99   AST 82*   *       TSH   Date Value   01/10/2023 1.44 uIU/mL   05/10/2022 5.36 mU/L (H)   07/26/2021 2.22 mU/L   12/04/2020 2.01 mU/L   11/13/2020 1.21 mU/L       Amphetamine Qual Urine (no units)   Date Value   11/30/2020 Negative     Barbiturates Qual Urine (no units)   Date Value   11/30/2020 Negative     Benzodiazepine Qual Urine (no units)   Date Value   11/30/2020 Negative     Cannabinoids Qual Urine (no units)   Date Value   11/30/2020 Negative     Cocaine Qual Urine (no units)   Date Value   11/30/2020 Negative     Opiates Qualitative Urine (no units)   Date Value   11/30/2020 Negative       No results found for: IRONSAT, TC01679, ARTHUR    No results found for: PH, PHARTERIAL, PO2, [id number], SAT, PCO2, HCO3, BASEEXCESS, CAROLINE, BEB    @LABRCNTIPR(phv:4,pco2v:4,po2v:4,hco3v:4,aggie:4,o2per:4)@    Echocardiology: No results found for this or any previous visit (from the past 4320 hour(s)).    Chest x-ray: No results found for this or any previous visit from the past 365 days.      Chest CT: No results found for this or any previous visit from the past 365 days.      PFT: Most Recent Breeze Pulmonary Function Testing    No results found for: 20001  No results found for: 20002  No results found for: 20003  No results found for: 20015  No results found for: 20016  No results found for: 20027  No results found for: 20028  No results found for: 20029  No results found for: 20079  No results found for: 20080  No results found for: 20081  No results found for: 20335  No  results found for: 20105  No results found for: 20053  No results found for: 20054  No results found for: 20055      Susana Posadas, MARIAH CNP 5/25/2023   Sleep Medicine      This note was written with the assistance of the Dragon voice-dictation technology software. The final document, although reviewed, may contain errors. For corrections, please contact the office.

## 2023-05-25 NOTE — PATIENT INSTRUCTIONS
It was nice seeing you today    Treatment Plan Today:     1) Medications-   - Continue lamotrigine titration    -25mg twice a day for 2 weeks    -then increase to 100mg at bedtime  - Discontinue buspirone 5mg BID  - Continue fluoxetine to 20mg daily   - Continue Belsomra 10mg at bedtime  - Continue olanzapine 12.5mg at bedtime, prescribed 5mg tablets - take 2.5 tablets at bedtime  - Continue Lithium 1200mg at bedtime  - Take half or full olanzapine tablet (2.5-5mg) as needed for psychosis, lauren, or agitation, and call the clinic    2) Follow-up appt with Dr Santa 6/12/23 at 10AM    3) Crisis numbers are below and clinic after hours number is [phone]     **For crisis resources, please see the information at the end of this document**   Patient Education    Thank you for coming to the [facility].     Lab Testing:  If you had lab testing today and your results are reassuring or normal they will be mailed to you or sent through [employer] within 7 days. If the lab tests need quick action we will call you with the results. The phone number we will call with results is # [phone]. If this is not the best number please call our clinic and change the number.     Medication Refills:  If you need any refills please call your pharmacy and they will contact us. Our fax number for refills is [phone].   Three business days of notice are needed for general medication refill requests.   Five business days of notice are needed for controlled substance refill requests.   If you need to change to a different pharmacy, please contact the new pharmacy directly. The new pharmacy will help you get your medications transferred.     Contact Us:  Please call [phone] during business hours (8-5:00 M-F).   If you have medication related questions after clinic hours, or on the weekend, please call [phone].     Financial Assistance [phone]   Medical Records  [phone]       MENTAL HEALTH CRISIS RESOURCES:  For a emergency help, please call 911 or go to the nearest Emergency Department.     Emergency Walk-In Options:   Em[facility] Unit @ [facility] ([place]): [phone] - Specialized mental health emergency area designed to be calming  [facility] ([place]): [phone]  [facility] ([place]): [phone]  [facility] ([place]): [phone]    [facility] Crisis Information:   [place]: [phone]  [place]: [phone]  [place] (COPE) - Adult: [phone]     Child: [phone]  [place] - Adult: [phone]     Child: [phone]  [place]: [phone]  List of all [facility] resources:   https://[place].[email or web address]/dhs/people-we-serve/adults/health-care/mental-health/resources/crisis-contacts.jsp    National Crisis Information:   Crisis Text Line: Text  [place]  to 665799  Suicide & Crisis Lifeline: 988  National Suicide Prevention Lifeline: 5-044-324-TALK (1-330.837.3133)       For online chat options, visit https://suicidepreventionlifeline.org/chat/  Poison Control Center: 2-669-435-8792  Trans Lifeline: 1-512.116.6202 - Hotline for transgender people of all ages  [employer]: 5-174-452-6288 - Hotline for LGBT youth     For Non-Emergency Support:   Fast Tracker: Mental Health & Substance Use Disorder Resources -   https://[email or web address].[email or web address]/

## 2023-05-25 NOTE — NURSING NOTE
Is the patient currently in the state of [place]? YES    Visit mode:VIDEO    If the visit is dropped, the patient can be reconnected by: VIDEO VISIT: Send to e-mail at: [email or web address]    Will anyone else be joining the visit? NO      How would you like to obtain your AVS? MyChart    Are changes needed to the allergy or medication list? NO    Reason for visit: Consult    Has patient had flu shot for current/most recent flu season? If so, when? Yes: 10/14/2022

## 2023-05-25 NOTE — NURSING NOTE
Is the patient currently in the state of [place]? YES    Visit mode:VIDEO    If the visit is dropped, the patient can be reconnected by: VIDEO VISIT: Text to cell phone: [phone]    Will anyone else be joining the visit? NO      How would you like to obtain your AVS? MyChart    Are changes needed to the allergy or medication list? NO    Reason for visit: RECHECK

## 2023-05-26 NOTE — PATIENT INSTRUCTIONS
"Medicare and Medicaid patients starting on CPAP or biPAP on or after 5/12/2023  Medicare patients should schedule an IN PERSON CLINIC appointment to provide your CPAP/biPAP usage data to a provider within 90 days of starting CPAP  Virtual visits are no longer allowed for this visit for Medicare                MY TREATMENT INFORMATION FOR SLEEP APNEA-  Jose Francisco Sommers    DOCTOR : MARIAH Rasmussen CNP    Am I having a sleep study at a sleep center?  --->Due to normal delays, you will be contacted within 2-4 weeks to schedule    Am I having a home sleep study?  --->Watch the video for the device you are using:    -/drop off device-   https://www.[employer].com/watch?v=yGGFBdELGhk    -Disposable device sent out require phone/computer application-   https://www.[employer].com/watch?v=BCce_vbiwxE      Frequently asked questions:  1. What is Obstructive Sleep Apnea (LISA)? LISA is the most common type of sleep apnea. Apnea means, \"without breath.\"  Apnea is most often caused by narrowing or collapse of the upper airway as muscles relax during sleep.   Almost everyone has occasional apneas. Most people with sleep apnea have had brief interruptions at night frequently for many years.  The severity of sleep apnea is related to how frequent and severe the events are.   2. What are the consequences of LISA? Symptoms include: feeling sleepy during the day, snoring loudly, gasping or stopping of breathing, trouble sleeping, and occasionally morning headaches or heartburn at night.  Sleepiness can be serious and even increase the risk of falling asleep while driving. Other health consequences may include development of high blood pressure and other cardiovascular disease in persons who are susceptible. Untreated LISA  can contribute to heart disease, stroke and diabetes.   3. What are the treatment options? In most situations, sleep apnea is a lifelong disease that must be managed with daily therapy. Medications are not " effective for sleep apnea and surgery is generally not considered until other therapies have been tried. Your treatment is your choice . Continuous Positive Airway (CPAP) works right away and is the therapy that is effective in nearly everyone. An oral device to hold your jaw forward is usually the next most reliable option. Other options include postioning devices (to keep you off your back), weight loss, and surgery including a tongue pacing device. There is more detail about some of these options below.  4. Are my sleep studies covered by insurance? Although we will request verification of coverage, we advise you also check in advance of the study to ensure there is coverage.    Important tips for those choosing CPAP and similar devices  For new devices, sign up for device STANTON to monitor your device for your followup visits  We encourage you to utilize the [employer] stanton or website (myAir web (resmed.com) ) to monitor your therapy progress and share the data with your healthcare team when you discuss your sleep apnea.                                                    Know your equipment:  CPAP is continuous positive airway pressure that prevents obstructive sleep apnea by keeping the throat from collapsing while you are sleeping. In most cases, the device is  smart  and can slowly self-adjusts if your throat collapses and keeps a record every day of how well you are treated-this information is available to you and your care team.  BPAP is bilevel positive airway pressure that keeps your throat open and also assists each breath with a pressure boost to maintain adequate breathing.  Special kinds of BPAP are used in patients who have inadequate breathing from lung or heart disease. In most cases, the device is  smart  and can slowly self-adjusts to assist breathing. Like CPAP, the device keeps a record of how well you are treated.  Your mask is your connection to the device. You get to choose what feels most  comfortable and the staff will help to make sure if fits. Here: are some examples of the different masks that are available:       Key points to remember on your journey with sleep apnea:  Sleep study.  PAP devices often need to be adjusted during a sleep study to show that they are effective and adjusted right.  Good tips to remember: Try wearing just the mask during a quiet time during the day so your body adapts to wearing it. A humidifier is recommended for comfort in most cases to prevent drying of your nose and throat. Allergy medication from your provider may help you if you are having nasal congestion.  Getting settled-in. It takes more than one night for most of us to get used to wearing a mask. Try wearing just the mask during a quiet time during the day so your body adapts to wearing it. A humidifier is recommended for comfort in most cases. Our team will work with you carefully on the first day and will be in contact within 4 days and again at 2 and 4 weeks for advice and remote device adjustments. Your therapy is evaluated by the device each day.   Use it every night. The more you are able to sleep naturally for 7-8 hours, the more likely you will have good sleep and to prevent health risks or symptoms from sleep apnea. Even if you use it 4 hours it helps. Occasionally all of us are unable to use a medical therapy, in sleep apnea, it is not dangerous to miss one night.   Communicate. Call our skilled team on the number provided on the first day if your visit for problems that make it difficult to wear the device. Over 2 out of 3 patients can learn to wear the device long-term with help from our team. Remember to call our team or your sleep providers if you are unable to wear the device as we may have other solutions for those who cannot adapt to mask CPAP therapy. It is recommended that you sleep your sleep provider within the first 3 months and yearly after that if you are not having problems.   Use it  for your health. We encourage use of CPAP masks during daytime quiet periods to allow your face and brain to adapt to the sensation of CPAP so that it will be a more natural sensation to awaken to at night or during naps. This can be very useful during the first few weeks or months of adapting to CPAP though it does not help medically to wear CPAP during wakefulness and  should not be used as a strategy just to meet guidelines.  Take care of your equipment. Make sure you clean your mask and tubing using directions every day and that your filter and mask are replaced as recommended or if they are not working.     BESIDES CPAP, WHAT OTHER THERAPIES ARE THERE?    Positioning Device  Positioning devices are generally used when sleep apnea is mild and only occurs on your back.This example shows a pillow that straps around the waist. It may be appropriate for those whose sleep study shows milder sleep apnea that occurs primarily when lying flat on one's back. Preliminary studies have shown benefit but effectiveness at home may need to be verified by a home sleep test. These devices are generally not covered by medical insurance.  Examples of devices that maintain sleeping on the back to prevent snoring and mild sleep apnea.    Belt type body positioner  http://Qwaya[employer]/    Electronic reminder  http://nightshifttherapy.com/            Oral Appliance  What is oral appliance therapy?  An oral appliance device fits on your teeth at night like a retainer used after having braces. The device is made by a specialized dentist and requires several visits over 1-2 months before a manufactured device is made to fit your teeth and is adjusted to prevent your sleep apnea. Once an oral device is working properly, snoring should be improved. A home sleep test may be recommended at that time if to determine whether the sleep apnea is adequately treated.       Some things to remember:  -Oral devices are often, but not always, covered  by your medical insurance. Be sure to check with your insurance provider.   -If you are referred for oral therapy, you will be given a list of specialized dentists to consider or you may choose to visit the Web site of the American Academy of Dental Sleep Medicine  -Oral devices are less likely to work if you have severe sleep apnea or are extremely overweight.     More detailed information  An oral appliance is a small acrylic device that fits over the upper and lower teeth  (similar to a retainer or a mouth guard). This device slightly moves jaw forward, which moves the base of the tongue forward, opens the airway, improves breathing for effective treat snoring and obstructive sleep apnea in perhaps 7 out of 10 people .  The best working devices are custom-made by a dental device  after a mold is made of the teeth 1, 2, 3.  When is an oral appliance indicated?  Oral appliance therapy is recommended as a first-line treatment for patients with primary snoring, mild sleep apnea, and for patients with moderate sleep apnea who prefer appliance therapy to use of CPAP4, 5. Severity of sleep apnea is determined by sleep testing and is based on the number of respiratory events per hour of sleep.   How successful is oral appliance therapy?  The success rate of oral appliance therapy in patients with mild sleep apnea is 75-80% while in patients with moderate sleep apnea it is 50-70%. The chance of success in patients with severe sleep apnea is 40-50%. The research also shows that oral appliances have a beneficial effect on the cardiovascular health of LISA patients at the same magnitude as CPAP therapy7.  Oral appliances should be a second-line treatment in cases of severe sleep apnea, but if not completely successful then a combination therapy utilizing CPAP plus oral appliance therapy may be effective. Oral appliances tend to be effective in a broad range of patients although studies show that the patients who  have the highest success are females, younger patients, those with milder disease, and less severe obesity. 3, 6.   Finding a dentist that practices dental sleep medicine  Specific training is available through the American Academy of Dental Sleep Medicine for dentists interested in working in the field of sleep. To find a dentist who is educated in the field of sleep and the use of oral appliances, near you, visit the Web site of the American Academy of Dental Sleep Medicine.    References  1. Gracy, et al. Objectively measured vs self-reported compliance during oral appliance therapy for sleep-disordered breathing. Chest 2013; 144(5): 0102-1817.  2. Abelardo, et al. Objective measurement of compliance during oral appliance therapy for sleep-disordered breathing. Thorax 2013; 68(1): 91-96.  3. Judi et al. Mandibular advancement devices in 620 men and women with LISA and snoring: tolerability and predictors of treatment success. Chest 2004; 125: 4046-1152.  4. Margaret, et al. Oral appliances for snoring and LISA: a review. Sleep 2006; 29: 244-262.  5. Janet et al. Oral appliance treatment for LISA: an update. J Clin Sleep Med 2014; 10(2): 215-227.  6. Jeff et al. Predictors of OSAH treatment outcome. J Dent Res 2007; 86: 9597-2317.      Weight Loss:    Weight loss is a long-term strategy that may improve sleep apnea in some patients.    Weight management is a personal decision and the decision should be based on your interest and the potential benefits.  If you are interested in exploring weight loss strategies, the following discussion covers the impact on weight loss on sleep apnea and the approaches that may be successful.    Being overweight does not necessarily mean you will have health consequences.  Those who have BMI over 35 or over 27 with existing medical conditions carries greater risk.   Weight loss decreases severity of sleep apnea in most people with obesity. For those with mild  obesity who have developed snoring with weight gain, even 15-30 pound weight loss can improve and occasionally eliminate sleep apnea.  Structured and life-long dietary and health habits are necessary to lose weight and keep healthier weight levels.     Though there may be significant health benefits from weight loss, long-term weight loss is very difficult to achieve- studies show success with dietary management in less than 10% of people. In addition, substantial weight loss may require years of dietary control and may be difficult if patients have severe obesity. In these cases, surgical management may be considered.  Finally, older individuals who have tolerated obesity without health complications may be less likely to benefit from weight loss strategies.      [unfilled]    Surgery:    Surgery for obstructive sleep apnea is considered generally only when other therapies fail to work. Surgery may be discussed with you if you are having a difficult time tolerating CPAP and or when there is an abnormal structure that requires surgical correction.  Nose and throat surgeries often enlarge the airway to prevent collapse.  Most of these surgeries create pain for 1-2 weeks and up to half of the most common surgeries are not effective throughout life.  You should carefully discuss the benefits and drawbacks to surgery with your sleep provider and surgeon to determine if it is the best solution for you.   More information  Surgery for LISA is directed at areas that are responsible for narrowing or complete obstruction of the airway during sleep.  There are a wide range of procedures available to enlarge and/or stabilize the airway to prevent blockage of breathing in the three major areas where it can occur: the palate, tongue, and nasal regions.  Successful surgical treatment depends on the accurate identification of the factors responsible for obstructive sleep apnea in each person.  A personalized approach is required  because there is no single treatment that works well for everyone.  Because of anatomic variation, consultation with an examination by a sleep surgeon is a critical first step in determining what surgical options are best for each patient.  In some cases, examination during sedation may be recommended in order to guide the selection of procedures.  Patients will be counseled about risks and benefits as well as the typical recovery course after surgery. Surgery is typically not a cure for a person s LISA.  However, surgery will often significantly improve one s LISA severity (termed  success rate ).  Even in the absence of a cure, surgery will decrease the cardiovascular risk associated with OSA7; improve overall quality of life8 (sleepiness, functionality, sleep quality, etc).      Palate Procedures:  Patients with LISA often have narrowing of their airway in the region of their tonsils and uvula.  The goals of palate procedures are to widen the airway in this region as well as to help the tissues resist collapse.  Modern palate procedure techniques focus on tissue conservation and soft tissue rearrangement, rather than tissue removal.  Often the uvula is preserved in this procedure. Residual sleep apnea is common in patient after pharyngoplasty with an average reduction in sleep apnea events of 33%2.      Tongue Procedures:  ExamWhile patients are awake, the muscles that surround the throat are active and keep this region open for breathing. These muscles relax during sleep, allowing the tongue and other structures to collapse and block breathing.  There are several different tongue procedures available.  Selection of a tongue base procedure depends on characteristics seen on physical exam.  Generally, procedures are aimed at removing bulky tissues in this area or preventing the back of the tongue from falling back during sleep.  Success rates for tongue surgery range from 50-62%3.    Hypoglossal Nerve  Stimulation:  Hypoglossal nerve stimulation has recently received approval from the [place] Food and Drug Administration for the treatment of obstructive sleep apnea.  This is based on research showing that the system was safe and effective in treating sleep apnea6.  Results showed that the median AHI score decreased 68%, from 29.3 to 9.0. This therapy uses an implant system that senses breathing patterns and delivers mild stimulation to airway muscles, which keeps the airway open during sleep.  The system consists of three fully implanted components: a small generator (similar in size to a pacemaker), a breathing sensor, and a stimulation lead.  Using a small handheld remote, a patient turns the therapy on before bed and off upon awakening.    Candidates for this device must be greater than 18 years of age, have moderate to severe LISA (AHI between 15-65), BMI less than 35, have tried CPAP/oral appliance for at least 8 weeks without success, and have appropriate upper airway anatomy (determined by a sleep endoscopy performed by Dr. Gerald Salazar).    Hypoglossal Nerve Stimulation Pathway:    The sleep surgeon s office will work with the patient through the insurance prior-authorization process (including communications and appeals).    Nasal Procedures:  Nasal obstruction can interfere with nasal breathing during the day and night.  Studies have shown that relief of nasal obstruction can improve the ability of some patients to tolerate positive airway pressure therapy for obstructive sleep apnea1.  Treatment options include medications such as nasal saline, topical corticosteroid and antihistamine sprays, and oral medications such as antihistamines or decongestants. Non-surgical treatments can include external nasal dilators for selected patients. If these are not successful by themselves, surgery can improve the nasal airway either alone or in combination with these other options.      Combination  Procedures:  Combination of surgical procedures and other treatments may be recommended, particularly if patients have more than one area of narrowing or persistent positional disease.  The success rate of combination surgery ranges from 66-80%2,3.    References  Latrice SANTANA. The Role of the Nose in Snoring and Obstructive Sleep Apnoea: An Update.  Eur Arch Otorhinolaryngol. 2011; 268: 1365-73.   Kaelyn SM; Lee Ann JA; Becky JR; Pallanch JF; Kaitlin MB; Jose SG; Marquis COLIN. Surgical modifications of the upper airway for obstructive sleep apnea in adults: a systematic review and meta-analysis. SLEEP 2010;33(10):2710-4972. Lynn BLAIR. Hypopharyngeal surgery in obstructive sleep apnea: an evidence-based medicine review.  Arch Otolaryngol Head Neck Surg. 2006 Feb;132(2):206-13.  Rakesh YH1, Daniel Y, Howie HAMMAD. The efficacy of anatomically based multilevel surgery for obstructive sleep apnea. Otolaryngol Head Neck Surg. 2003 Oct;129(4):327-35.  Kezirian E, Goldberg A. Hypopharyngeal Surgery in Obstructive Sleep Apnea: An Evidence-Based Medicine Review. Arch Otolaryngol Head Neck Surg. 2006 Feb;132(2):206-13.  Margret PJ et al. Upper-Airway Stimulation for Obstructive Sleep Apnea.  N Engl J Med. 2014 Jan 9;370(2):139-49.  Estrada Y et al. Increased Incidence of Cardiovascular Disease in Middle-aged Men with Obstructive Sleep Apnea. Am J Respir Crit Care Med; 2002 166: 159-165  Corey EM et al. Studying Life Effects and Effectiveness of Palatopharyngoplasty (SLEEP) study: Subjective Outcomes of Isolated Uvulopalatopharyngoplasty. Otolaryngol Head Neck Surg. 2011; 144: 623-631.            Remember to Drive Safe... Drive Alive     Sleep health profoundly affects your health, mood, and your safety.  Thirty three percent of the population (one in three of us) is not getting enough sleep and many have a sleep disorder. Not getting enough sleep or having an untreated / undertreated sleep condition may make us sleepy without even  knowing it. In fact, our driving could be dramatically impaired due to our sleep health. As your provider, here are some things I would like you to know about driving:     Here are some warning signs for impairment and dangerous drowsy driving:              -Having been awake more than 16 hours               -Looking tired               -Eyelid drooping              -Head nodding (it could be too late at this point)              -Driving for more than 30 minutes     Some things you could do to make the driving safer if you are experiencing some drowsiness:              -Stop driving and rest              -Call for transportation              -Make sure your sleep disorder is adequately treated     Some things that have been shown NOT to work when experiencing drowsiness while driving:              -Turning on the radio              -Opening windows              -Eating any  distracting  /  entertaining  foods (e.g., sunflower seeds, candy, or any other)              -Talking on the phone      Your decision may not only impact your life, but also the life of others. Please, remember to drive safe for yourself and all of us.

## 2023-05-31 DIAGNOSIS — F25.0 SCHIZOAFFECTIVE DISORDER, BIPOLAR TYPE (H): ICD-10-CM

## 2023-06-02 NOTE — TELEPHONE ENCOUNTER
Medication requested: FLUoxetine HCl Oral Capsule 20 MG  Last refilled: 2/10/23  Qty: 30/2      Last seen: 5/25/23  RTC: 2-3 weeks  Cancel: 0  No-show: 0  Next appt: 6/12/23    Refill decision:   Refill pended and routed to the provider for review/determination due to   Last note is not signed

## 2023-06-12 ENCOUNTER — VIRTUAL VISIT (OUTPATIENT)
Dept: PSYCHIATRY | Facility: CLINIC | Age: 33
End: 2023-06-12
Attending: PSYCHIATRY & NEUROLOGY
Payer: COMMERCIAL

## 2023-06-12 DIAGNOSIS — F25.0 SCHIZOAFFECTIVE DISORDER, BIPOLAR TYPE (H): ICD-10-CM

## 2023-06-12 DIAGNOSIS — G47.00 INSOMNIA, UNSPECIFIED TYPE: ICD-10-CM

## 2023-06-12 DIAGNOSIS — Z79.899 ENCOUNTER FOR LONG-TERM (CURRENT) USE OF MEDICATIONS: Primary | ICD-10-CM

## 2023-06-12 PROCEDURE — 99214 OFFICE O/P EST MOD 30 MIN: CPT | Mod: GC | Performed by: STUDENT IN AN ORGANIZED HEALTH CARE EDUCATION/TRAINING PROGRAM

## 2023-06-12 RX ORDER — LITHIUM CARBONATE 300 MG/1
1200 TABLET, FILM COATED, EXTENDED RELEASE ORAL AT BEDTIME
Qty: 120 TABLET | Refills: 2 | Status: SHIPPED | OUTPATIENT
Start: 2023-06-12 | End: 2023-08-14

## 2023-06-12 RX ORDER — OLANZAPINE 5 MG/1
TABLET ORAL
Qty: 90 TABLET | Refills: 2 | Status: SHIPPED | OUTPATIENT
Start: 2023-06-12 | End: 2023-08-14

## 2023-06-12 RX ORDER — LAMOTRIGINE 100 MG/1
100 TABLET ORAL AT BEDTIME
Qty: 30 TABLET | Refills: 2 | Status: SHIPPED | OUTPATIENT
Start: 2023-06-12 | End: 2023-08-14

## 2023-06-12 NOTE — PATIENT INSTRUCTIONS
It was nice seeing you today    Treatment Plan Today:     1) Medications-   - Continue lamotrigine titration 100mg  - Continue fluoxetine to 20mg daily -- decrease to 10mg in 2 weeks  - Continue Belsomra 10mg at bedtime  - Continue olanzapine 12.5mg at bedtime, prescribed 5mg tablets - take 2.5 tablets at bedtime  - Continue Lithium 1200mg at bedtime  - Take half or full olanzapine tablet (2.5-5mg) as needed for psychosis, lauren, or agitation, and call the clinic    2) Follow-up appt with new resident in 6 weeks    3) Crisis numbers are below and clinic after hours number is [phone]     **For crisis resources, please see the information at the end of this document**   Patient Education    Thank you for coming to the [facility].     Lab Testing:  If you had lab testing today and your results are reassuring or normal they will be mailed to you or sent through v2 Ratings within 7 days. If the lab tests need quick action we will call you with the results. The phone number we will call with results is # [phone]. If this is not the best number please call our clinic and change the number.     Medication Refills:  If you need any refills please call your pharmacy and they will contact us. Our fax number for refills is [phone].   Three business days of notice are needed for general medication refill requests.   Five business days of notice are needed for controlled substance refill requests.   If you need to change to a different pharmacy, please contact the new pharmacy directly. The new pharmacy will help you get your medications transferred.     Contact Us:  Please call [phone] during business hours (8-5:00 M-F).   If you have medication related questions after clinic hours, or on the weekend, please call [phone].     Financial Assistance [phone]   Medical Records [phone]       MENTAL [facility] CRISIS RESOURCES:  For a emergency help, please  call 911 or go to the nearest Emergency Department.     Emergency Walk-In Options:   EmPATH Unit @ [facility] ([place]): [phone] - Specialized mental health emergency area designed to be calming  [facility] ([place]): [phone]  [facility] ([place]): [phone]  [facility] ([place]): [phone]    [facility] Crisis Information:   [place]: [phone]  [place]: [phone]  [place] (COPE) - Adult: [phone]     Child: [phone]  [place] - Adult: [phone]     Child: [phone]  [place]: [phone]  List of all [facility] resources:   [email or web address]/people-[email or web address]/adults/health-care/mental-health/resources/crisis-contacts.jsp    National Crisis Information:   Crisis Text Line: Text  [place]  to 225082  Suicide & Crisis Lifeline: 988  National Suicide Prevention Lifeline: 5-415-507-TALK (1-505.673.1505)       For online chat options, visit https://suicidepreventionlifeline.org/chat/  Poison Control Center: 1-263.576.4257  Trans Lifeline: 1-838.829.5228 - Hotline for transgender people of all ages  The [employer] Project: 9-817-977-8050 - Hotline for LGBT youth     For Non-Emergency Support:   Fast Tracker: Mental Health & Substance Use Disorder Resources -   https://www.BrakeQuotes.comckWalk [place].org/

## 2023-06-12 NOTE — NURSING NOTE
Is the patient currently in the state of [place]? YES    Visit mode:VIDEO    If the visit is dropped, the patient can be reconnected by: VIDEO VISIT: Text to cell phone: [phone]    Will anyone else be joining the visit? NO      How would you like to obtain your AVS? MyChart    Are changes needed to the allergy or medication list? NO    PHQ was not assigned, not done per department protocol.    Reason for visit: RECHJELANI Castillo VF

## 2023-06-12 NOTE — PROGRESS NOTES
Virtual Visit Details    Type of service:  Video Visit   Video Start Time: 10:18 AM  Video End Time: 11:06 AM    Originating Location (pt. Location): Home  Distant Location (provider location):  On-site  Platform used for Video Visit: [facility]ry [facility]  MEDICAL PROGRESS NOTE     CARE TEAM:  PCP- Clinic Provider    Psychotherapist- None       Jose Francisco Sommers is a 32 year old who uses the name Manuel and pronouns he, him, his.      DIAGNOSIS   # Schizoaffective disorder, Bipolar type, most recent episode depressed, in remission without current psychotic features   -rule out bipolar disorder type 1  # ADHD, predominantly inattentive type  # Insomnia (history of delayed phase sleep disorder)     ASSESSMENT   Manuel is doing well overall. He has been followed closely in clinic for initiation of lamotrigine, and is currently at 100mg HS for 4 days. Manuel reports minimal mental health symptoms with little anxiety, no depression, no psychosis, and no safety concerns. He continues to present as more appropriately reactive and happy since starting lamotrigine. No side effects, no new rashes since starting the medication.  Discussed behavioral activation, and Manuel has been helping around the house more, doing yard work, and walking the dog; he would likely benefit from engaging in previously pleasurable activities such as playing [employer] card game. Manuel is interested in decreasing fluoxetine to simplify polypharmacy. Shared decision making is used to continue medications without change today to provide a period of medication stability, and then decrease fluoxetine to 10mg in 2 weeks. Manuel can then discuss full discontinuation of fluoxetine at transfer of care visit with new resident if doing well at 10mg. Risks and benefits of decreasing this medication were discussed, and Manuel will reach out to the clinic if experiencing an increase if depression, anxiety or any new symptoms  or safety concerns.    Long term, Manuel is still interested in replacing olanzapine with a different antipsychotic, such as clozapine, once lamotrigine is at a therapeutic dose.    # Schizoaffective disorder vs. previous consideration of schizophrenia:  Manuel appears to have a primary schizophrenia spectrum disorder with prominent negative symptoms. No breakthrough psychosis since decrease in olanzapine 7 months ago.  Will continue current dose.     # Insomnia  # history of delayed phase sleep disorder  Following with sleep medicine for evaluation of LISA. Follow up scheduled in September     Future considerations:  - further titration of lamotrigine  - taper/discontinue fluoxetine, per Manuel's interest  - Clozapine, Saphris, cariprazine or Geodon as alternative antipsychotics/mood stabilizers  - re-trial metformin for medication associated weight gain, topiramate may have cognitive side effects. May consider Ozempic/semaglutide  - follow up on referral to sleep medicine, hepatology    [place] was [place] today:  Indicates taking controlled medication as prescribed.     PLAN                                                                                                                1) Meds-   - Continue lamotrigine titration 100mg  - Continue fluoxetine to 20mg daily for now with plan to decrease dose to 10mg daily starting in 2 weeks  - Continue Belsomra 10mg at bedtime as needed for sleep  - Continue olanzapine 12.5mg at bedtime, prescribed 5mg tablets - take 2.5 tablets at bedtime  - Continue Lithium 1200mg at bedtime  - Take half or full olanzapine tablet (2.5-5mg) as needed for psychosis, lauren, or agitation, and call the clinic    2) Psychotherapy- not interested    3) Next due-  Labs- AP and lithium labs completed 1/2023, Cr and TSH normalized, hyperlipidemia, LFT elevated  Repeat LFTs and lithium labs soon  EKG- as needed  Rating scales- AIMS completed 4/6/23. AIMS = 0.    4) Referrals-  none      Previously  "referred to:   -[facility] eval for clozapine assessment on 4/6/23  -Sleep medicine - scheduled for 05/25/23  -Hepatology regarding initiation of Depakote    5) Dispo- RTC in 2-3 weeks     PERTINENT BACKGROUND                                                    [most recent eval 08/04/22]    Jose Francisco \"Manuel\"  first experienced mental health issues in grade school and has received treatment for ADHD, depression, psychosis, and substance use. Notably, this patient had FEP in 2015 in the setting of cannabis abuse and has been stable on olanzapine and lithium since that time. He has never lived independently and has only been slowly developing insight into his illness and past symptoms. GeneSight testing was completed noting patient is a poor CYP2D6 metabolizer. Past records from Dr. Mike Powers were received in summer 2018 to assist with diagnostic clarity, however they were hand written notes that were illegible though accompanied by typed neuropsychology consult from [facility]. This documentation was unable to support a diagnosis of bipolar disorder at that time though noted potential Asperger's diagnosis and ADHD. Given that there has been evidence for psychotic signs and symptoms outside the context of mood episodes, a schizophrenia spectrum disorder such as schizoaffective disorder diagnosis seems most accurate. He has had two hospitalizations for decompensated psychosis in the setting of decreasing his olanzapine dose.    Has \"no interest whatsoever\" in therapy. Please refrain from asking about it.     Psych pertinent item history includes psychosis [sxs include disorganization, possible catatonia], mutiple psychotropic trials, psych hosp (<3) and substance use: alcohol and cannabis     SUBJECTIVE   Jose Francisco Sommers was last seen in clinic 2 weeks ago when lamotrigine titration was continued    -Updates:    -now on 100mg for a few days. Increased on Thursday.    -thinking is easier, Ember thinking heis doing " "better, tolerating 100mg well    -no side effects   -behavioral activation: not doing much of that. Walks dog    -energy is about the same  -Social: spending time with parents, \"not in a rush\" to do social activities.  -Mood: \"pretty good\"  -Depression: no   -Anxiety: \"not bad\"  -SI/HI: no  -Sleep: \"pretty good\", getting better on lamotrigine, gets 10-12 hours overnight. Good number for him. Belsomra helps him sleep \"longer than a normal person\"  -Psychosis: no hallucinations  -Substances:  no  -Medications:   -10mg in 2 weeks, then stop a month later if doing okay   -come off Belsomra down the road    -labs: lithium labs, A1c       Pertinent Negatives: No suicidal or violent ideation, self-injury, psychosis, hallucinations, delusions, lauren, hypomania, aggression and harmful substance use  Adverse Effects: weight gain, sedation, cognitive slowing?      PSYCH and SUBSTANCE USE Critical Summary Points since July 2022 8/04/22 - transfer of care. Olanzapine decreased 20mg to 15mg  9/29/22 - fluoxetine increased from 20mg to 40mg   11/10/22 - decrease olanzapine to 12.5 from 15mg  12/8/22 - no med changes. Placed hepatology referral  1/12/23 - started Belsomra for sleep, encouraged hepatology eval, placed sleep medicine referral.  02/09/23 - decreased fluoxetine to 20mg  04/06/23 - start buspar 5mg BID  04/27/23 - start lamotrigine  05/25/23 - continue lamotrigine titration to 50mg daily and increase to 100mg in 2 weeks  06/12/23 - no changes today, plan to decrease fluoxetine to 10mg in 2 weeks      PAST MED TRIALS     Adderall 10-20 mg - improved concentration, but \"cold personality\" and perseveration  Prozac 20 mg - limited response, less irritable, first episode of \"rage\"  Seroquel 25-75 mg - helped with sleep and irritability  Concerta 18 mg - improved focus, no improvement in motivation  Provigil 150 mg (up to 400) - Stimulants \"almost killed him\" triggered alcohol binges  Abilify ?25 mg - OK when QOD, but " "irritable and agitated on QD  Clonazepam 0.5-1.5 mg - calming, helped with sleep and \"catatonia\"  Risperdal up to 5 mg - some confusion, slow processing, withdrawn, ?\"catatonia\", panic attacks -dont try that  Zyprexa 5 mg less anxious overall improvement  Lithium - calmer, overall better  Latuda 20 mg - severe fatigue and depression  Synthroid 75 mcg - added to help with mood.  Brief trials with Strattera, Intuniv, Lamictal, Xanax, Zoloft  Naltrexone - had nightmares and sedation on 25mg, stopped after 2 nights     MEDICAL HISTORY and ALLERGY     ALLERGIES: Sulfa antibiotics    Patient Active Problem List   Diagnosis     Schizoaffective disorder, bipolar type (H)     Hx of psychiatric care     Elevated liver enzymes     Fatty liver     High blood triglycerides     History of cannabis abuse     History of cocaine abuse (H)     Psychosis (H)     Elevated blood pressure reading without diagnosis of hypertension     Constipation     Morbid obesity (H)     Schizoaffective disorder (H)        MEDICATIONS     Current Outpatient Medications   Medication Sig Dispense Refill     atorvastatin (LIPITOR) 20 MG tablet TAKE ONE TABLET BY MOUTH ONE TIME DAILY*       FLUoxetine (PROZAC) 20 MG capsule Take 1 capsule (20 mg) by mouth daily 30 capsule 0     lamoTRIgine (LAMICTAL) 25 MG tablet Take 1 tablet (25 mg) by mouth 2 times daily for 14 days, THEN 4 tablets (100 mg) At Bedtime for 14 days. 84 tablet 0     lithium ER (LITHOBID) 300 MG CR tablet Take 4 tablets (1,200 mg) by mouth At Bedtime 120 tablet 2     OLANZapine (ZYPREXA) 5 MG tablet Take 2.5 tablets (12.5 mg) by mouth At Bedtime. May also take 0.5-1 tablets (2.5-5 mg) daily as needed ((for psychosis, lauren, or agitation)). 90 tablet 2     Suvorexant (BELSOMRA) 10 MG tablet Take 1 tablet (10 mg) by mouth nightly as needed for sleep 30 tablet 1      VITALS   There were no vitals taken for this visit.     Pulse Readings from Last 3 Encounters:   04/27/23 61   04/06/23 76 " "  02/02/22 55     Wt Readings from Last 3 Encounters:   05/25/23 82.6 kg (182 lb)   04/27/23 132.2 kg (291 lb 6.4 oz)   04/06/23 134.1 kg (295 lb 9.6 oz)     BP Readings from Last 3 Encounters:   04/27/23 (!) 137/90   04/06/23 125/82   02/02/22 (!) 141/83      MENTAL STATUS EXAM     Alertness: alert  and oriented  Appearance: casually groomed, appropriate  Behavior/Demeanor: cooperative and calm, with good  eye contact   Speech: increased latency of response and slowed, appears baseline - improved from previous  Language: intact and no obvious problem  Psychomotor: normal or unremarkable  Mood:  \"pretty good\"  Affect: relaxed and reactive, calm - improved from previous meetings; congruent to: mood- yes, content- yes  Thought Process/Associations: linear, concrete, goal oriented, more logical and less concrete  Thought Content:  Reports none;  Denies suicidal & violent ideation and delusions  Perception:  Reports none;  Denies auditory hallucinations and visual hallucinations  Insight: adequate  Judgment: good  Cognition: does  appear grossly intact; formal cognitive testing was not done  Gait and Station: N/A ([facility])     LABS and DATA     Recent Labs   Lab Test 01/10/23  1156 05/10/22  0855 07/26/21  1339   LITHIUM 0.6 0.8 0.6     Recent Labs   Lab Test 01/10/23  1156 05/10/22  0855   CR 1.05 1.30*   GFRESTIMATED >90 76    139   POTASSIUM 4.2 3.4   ADONAY 10.0 9.5     Recent Labs   Lab Test 01/10/23  1156 05/10/22  0855   SG 1.020 1.010     Recent Labs   Lab Test 01/10/23  1156 05/10/22  0855   TSH 1.44 5.36*       Recent Labs   Lab Test 01/10/23  1156 05/10/22  0855 07/26/21  1339   GLC 97 110* 85   A1C  --  5.1 4.9     Recent Labs   Lab Test 01/10/23  1156 07/26/21  1339   CHOL 210* 247*   TRIG 532* 554*   LDL 71 101*   HDL 31* 42     Recent Labs   Lab Test 01/10/23  1156 05/10/22  0855   AST 82* 31   * 103*   ALKPHOS 99 81     Recent Labs   Lab Test 01/10/23  1156 05/10/22  0855 07/26/21  1339 " 12/04/20  1414 11/13/20  0758 06/01/20  1339   WBC 6.6 7.8   < > 11.0   < > 7.5   ANEU  --   --   --  8.0  --  4.8   HGB 15.6 15.1   < > 14.3   < > 15.1    233   < > 285   < > 265    < > = values in this interval not displayed.     ECG 11/20 QTc = 436ms     PSYCHOTROPIC DRUG INTERACTIONS                                                       PSYCHCLINICDDI     CNS depression: OLANZAPINE + LITHIUM, Belsomra, lamotrigine     OLANZAPINE + LITHIUM: Lithium may enhance the neurotoxic effect of Antipsychotic Agents.     QT-prolonging: OLANZAPINE      Serotonergic: OLANZAPINE + PROZAC, lithium        MANAGEMENT:  Monitoring for adverse effects, routine vitals, routine labs and periodic      RISK STATEMENT for SAFETY     Manuel did not appear to be an imminent safety risk to self or others.    TREATMENT RISK STATEMENT: The risks, benefits, alternatives and potential adverse effects have been discussed and are understood by the pt. The pt understands the risks of using street drugs or alcohol. There are no medical contraindications, the pt agrees to treatment with the ability to do so. The pt knows to call the clinic for any problems or to access emergency care if needed.  Medical and substance use concerns are documented above.  Psychotropic drug interaction check was done, including changes made today.     PROVIDER: Alberto Santa MD    Patient staffed in clinic with Dr. Tay who will sign the note.  Supervisor is Dr. Tay.

## 2023-08-10 ENCOUNTER — MYC MEDICAL ADVICE (OUTPATIENT)
Dept: PSYCHIATRY | Facility: CLINIC | Age: 33
End: 2023-08-10
Payer: COMMERCIAL

## 2023-08-14 ENCOUNTER — VIRTUAL VISIT (OUTPATIENT)
Dept: PSYCHIATRY | Facility: CLINIC | Age: 33
End: 2023-08-14
Attending: STUDENT IN AN ORGANIZED HEALTH CARE EDUCATION/TRAINING PROGRAM
Payer: COMMERCIAL

## 2023-08-14 DIAGNOSIS — F25.0 SCHIZOAFFECTIVE DISORDER, BIPOLAR TYPE (H): Primary | ICD-10-CM

## 2023-08-14 PROCEDURE — 90833 PSYTX W PT W E/M 30 MIN: CPT | Mod: GC

## 2023-08-14 PROCEDURE — 99214 OFFICE O/P EST MOD 30 MIN: CPT | Mod: GC

## 2023-08-14 RX ORDER — LAMOTRIGINE 100 MG/1
150 TABLET ORAL AT BEDTIME
Qty: 30 TABLET | Refills: 2 | Status: SHIPPED | OUTPATIENT
Start: 2023-08-14 | End: 2023-09-01

## 2023-08-14 RX ORDER — LITHIUM CARBONATE 300 MG/1
1200 TABLET, FILM COATED, EXTENDED RELEASE ORAL AT BEDTIME
Qty: 120 TABLET | Refills: 2 | Status: SHIPPED | OUTPATIENT
Start: 2023-08-14 | End: 2023-12-04

## 2023-08-14 RX ORDER — OLANZAPINE 5 MG/1
TABLET ORAL
Qty: 90 TABLET | Refills: 2 | Status: SHIPPED | OUTPATIENT
Start: 2023-08-14 | End: 2023-11-27

## 2023-08-14 ASSESSMENT — PAIN SCALES - GENERAL: PAINLEVEL: NO PAIN (0)

## 2023-08-14 ASSESSMENT — PATIENT HEALTH QUESTIONNAIRE - PHQ9
SUM OF ALL RESPONSES TO PHQ QUESTIONS 1-9: 6
10. IF YOU CHECKED OFF ANY PROBLEMS, HOW DIFFICULT HAVE THESE PROBLEMS MADE IT FOR YOU TO DO YOUR WORK, TAKE CARE OF THINGS AT HOME, OR GET ALONG WITH OTHER PEOPLE: SOMEWHAT DIFFICULT
SUM OF ALL RESPONSES TO PHQ QUESTIONS 1-9: 6

## 2023-08-14 NOTE — PROGRESS NOTES
Video- Visit Details  Type of service:  video visit for medication management  Time of service:  Video Start Time:  13:04 PM       Video End Time:  14:02 PM  Reason for video visit:  Patient has requested telehealth visit  Originating Site (patient location):  [facility]- [place]   Location- Patient's home  Distant Site (provider location):  [facility]  Mode of Communication:  Video Conference via [employer]       [facility]  Psychiatry Clinic  TRANSFER of CARE DIAGNOSTIC ASSESSMENT     CARE TEAM:  PCP- Clinic Provider    Psychotherapist- None   Jose Francisco Sommers is a 32 year old who uses the name Manuel and pronouns he, him, his.      DIAGNOSIS   # Schizoaffective disorder, Bipolar type, most recent episode depressed, in remission without current psychotic features               -rule out bipolar disorder type 1  # ADHD, predominantly inattentive type  # Insomnia (history of delayed phase sleep disorder)     ASSESSMENT   Manuel is doing well overall.     # Schizoaffective disorder vs. previous consideration of schizophrenia:  Manuel appears to have a primary schizophrenia spectrum disorder with prominent negative symptoms. No breakthrough psychosis since decrease in olanzapine 8 months ago.  Will continue current dose.     He has been followed closely in clinic for initiation of lamotrigine, and is currently at 100mg HS for over a month. Manuel reports minimal mental health symptoms with little anxiety, no depression, no psychosis, and no safety concerns. He continues to present as more appropriately reactive and happy since starting lamotrigine. No side effects, no new rashes since starting the medication. Manuel has stopped taking fluoxetine since the last visit and is interested in increasing lamotrigine. Risks and benefits of increasing this medication were discussed, and Manuel will reach out to the clinic for any new symptoms or safety concerns. Today we planned to increase the lamotrigine to  150 mg and reach out to Manuel in 2 weeks and at that point would increase the dose to 200 mg if tolerating well.     Discussed behavioral activation, and Manuel has been helping around the house more, doing yard work, and walking the dog; he would likely benefit from engaging in previously pleasurable activities such as playing [employer] card game.     Long term, Manuel is still interested in replacing olanzapine with a different antipsychotic, such as clozapine, once lamotrigine is at a therapeutic dose.     # Insomnia  # history of delayed phase sleep disorder  Today he stated that he has tried Belsomra 20 mg for a week and he has noticed significant improvement on this dose (falling asleep easier and waking up more refreshed), although reports good response with 10 mg too. We decided not to change the dose today as we are increasing lamotrigine.  Following with sleep medicine for evaluation of LISA. Follow up scheduled in September. Upcoming sleep study in Oct.     Future Considerations:  - further titration of lamotrigine  - Clozapine, Saphris, cariprazine or Geodon as alternative antipsychotics/mood stabilizers  - re-trial metformin for medication associated weight gain, topiramate may have cognitive side effects. May consider Ozempic/semaglutide  - follow up on referral to sleep medicine, hepatology    Psychotropic Drug Interactions:  [PSYCHCLINICDDI]  ADDITIVE SEROTONERGIC: olanzapine, lithium  ADDITIVE CNS/RESPIRATORY DEPRESSION: belsomra, lamotrigine, olanzapine  Lithium may enhance the neurotoxic effect of Antipsychotic Agents.  Management: routine monitoring    [place]: indicates that controlled prescriptions have been filled as prescribed    Risk Statements:   Treatment Risk- Risks, benefits, alternatives and potential adverse effects have been discussed and are understood.  Safety Risk-Manuel did not appear to be an imminent safety risk to self or others.      PLAN                                 "                                                                                1) Meds-    - Increase lamotrigine to 150 mg daily  - Continue Belsomra 10mg at bedtime as needed for sleep  - Continue olanzapine 12.5mg at bedtime, prescribed 5mg tablets - take 2.5 tablets at bedtime  - Continue Lithium 1200mg at bedtime  - Take half or full olanzapine tablet (2.5-5mg) as needed for psychosis, lauren, or agitation & call clinic    2) Psychotherapy- not interested    3) Next due-  Labs- AP and lithium labs completed 1/2023, Cr and TSH normalized, hyperlipidemia, LFT elevated   Repeat LFTs and lithium labs soon   EKG- Routine monitoring is not indicated for current psychotropic medication regimen   Rating scales- AIMS completed 4/6/23. AIMS = 0.    4) Referrals- has upcoming sleep study in October  none     Previously referred to:   -TRUDY eval for clozapine assessment on 4/6/23  -Sleep medicine - scheduled for 05/25/23  -Hepatology regarding initiation of Depakote    5) Dispo- RTC in Oct, clinic outreach in 3 weeks      PERTINENT BACKGROUND                                                    [most recent eval 08/04/22]     Jose Francisco \"Manuel\"  first experienced mental health issues in grade school and has received treatment for ADHD, depression, psychosis, and substance use. Notably, this patient had FEP in 2015 and has been stable on olanzapine and lithium since that time. He has never lived independently and has only been slowly developing insight into his illness and past symptoms. GeneSight testing was completed noting patient is a poor CYP2D6 metabolizer. Past records from Dr. Mike Powers were received in summer 2018 to assist with diagnostic clarity, however they were hand written notes that were illegible though accompanied by typed neuropsychology consult from [facility]. This documentation was unable to support a diagnosis of bipolar disorder at that time though noted potential Asperger's diagnosis and ADHD. " "Given that there has been evidence for psychotic signs and symptoms outside the context of mood episodes, a schizophrenia spectrum disorder such as schizoaffective disorder diagnosis seems most accurate. He has had two hospitalizations for decompensated psychosis in the setting of decreasing his olanzapine dose.    Has \"no interest whatsoever\" in therapy. Please refrain from asking about it.    Psych pertinent item history includes psychosis [sxs include disorganization, possible catatonia], mutiple psychotropic trials, psych hosp (<3) and Substance Use: alcohol and cannabis     SUBJECTIVE     -  works better than prozac in addressing depression  - Mood is pretty good, and better compared to the past   - He is thinking better but it's hard to describe  - No side effects  [place] and that [place] new   - Anxiety: don't feel a lot   - Sleep: is taking 10 mg belsamra for a month, tried 20 mg for a bout a week while on lamotrigine and - noticed he feels more rested when he wakes up,   - On 20 sleep was significantly better, fell asleep faster  - Would like increase in the dose of belsomra,   -SI/HI: no  -Psychosis: no hallucinations  -Substances:  no    Social Hx: see below    Pertinent Negatives: No suicidal or violent ideation, self-injury, psychosis, hallucinations, delusions, lauren, hypomania, aggression and harmful substance use  Adverse Effects: weight gain, sedation, cognitive slowing?     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       Other - DUIs. Also In February 2014 the patient left for [place] to attend a [email or web address] tournament. He ended up in [place] and was arrested for shoplifting from [facility] (had \"intended to purchase\" a pair of jeans but became panicked when security approached him). Had a brief stay in [place], and afterwards, took him 5 days to return to [place] with parents frequently wiring him money. Had difficulty adhering to travel plans, seemed confused, and was communicating with parents via \"bizarre\" text messages (parents even filed a missing persons report at one point). He finally arrived home exhausted, not sharing much of events in [place], but slightly more consistent with baseline.     PAST PSYCHIATRIC HISTORY     SIB- None  Suicide Attempt [#, most recent]- None  Suicidal Ideation Hx- None     Violence/Aggression Hx- None  Psychosis Hx- Predominantly disorganization. Denies AVH but describes thought blocking and slowed cognition/difficulty speaking during prior psychotic episode.  First episode began in December 2014 - was spending great amounts of time reorganizing his room/house/garage, and even used a  on the kitchen floor.  Eating Disorder Hx- None  Other- None     Psych Hosp [#, most recent]- x3, most recent November 2020  Commitment- None  ECT- None  Outpatient Programs - None  Other - N/A     PSYCH and SUBSTANCE USE Critical Summary Points since July 2022 8/04/22 - transfer of care. Olanzapine decreased 20mg to 15mg  9/29/22 - fluoxetine increased from 20mg to 40mg   11/10/22 - decrease olanzapine to 12.5 from 15mg  12/8/22 - no med " "changes. Placed hepatology referral  1/12/23 - started Belsomra for sleep, encouraged hepatology eval, placed sleep medicine referral.  02/09/23 - decreased fluoxetine to 20mg  04/06/23 - start buspar 5mg BID  04/27/23 - start lamotrigine  05/25/23 - continue lamotrigine titration to 50mg daily and increase to 100mg in 2 weeks  06/12/23 - no changes today, plan to decrease fluoxetine to 10mg in 2 weeks   08/14/23 - increased the dose of lamotrigine to 150 mg, reach out in 3 weeks and can further increase to 200     PAST MED TRIALS     Adderall 10-20 mg - improved concentration, but \"cold personality\" and perseveration  Prozac 20 mg - limited response, less irritable, first episode of \"rage\"  Seroquel 25-75 mg - helped with sleep and irritability  Concerta 18 mg - improved focus, no improvement in motivation  Provigil 150 mg (up to 400) - Stimulants \"almost killed him\" triggered alcohol binges  Abilify ?25 mg - OK when QOD, but irritable and agitated on QD  Clonazepam 0.5-1.5 mg - calming, helped with sleep and \"catatonia\"  Risperdal up to 5 mg - some confusion, slow processing, withdrawn, ?\"catatonia\", panic attacks -dont try that  Zyprexa 5 mg less anxious overall improvement  Lithium - calmer, overall better  Latuda 20 mg - severe fatigue and depression  Synthroid 75 mcg - added to help with mood.  Brief trials with Strattera, Intuniv, Lamictal, Xanax, Zoloft  Naltrexone - had nightmares and sedation on 25mg, stopped after 2 nights     PAST SUBSTANCE USE HISTORY     Past Use- Alcohol- in high school  and Cannabis- in high school and prior to hospitalization (precipitated psychosis)  Treatment- #, most recent- [facility] at age 19 for cannabis, did not complete  Medical Consequences- no  Legal Consequences- DUI for drinking at ages 20 and 21  Other- no     MEDICAL HISTORY and ALLERGY     ALLERGIES: Sulfa antibiotics    Patient Active Problem List   Diagnosis    Schizoaffective disorder, bipolar type (H)    Hx of " "psychiatric care    Elevated liver enzymes    Fatty liver    High blood triglycerides    History of cannabis abuse    History of cocaine abuse (H)    Psychosis (H)    Elevated blood pressure reading without diagnosis of hypertension    Constipation    Morbid obesity (H)    Schizoaffective disorder (H)      MEDICAL REVIEW OF SYSTEMS   Contraception-  Unknown     none in addition to that documented above     MEDICATIONS     Current Outpatient Medications   Medication Sig Dispense Refill    atorvastatin (LIPITOR) 20 MG tablet TAKE ONE TABLET BY MOUTH ONE TIME DAILY*      FLUoxetine (PROZAC) 20 MG capsule Take 1 capsule (20 mg) by mouth daily 30 capsule 0    lamoTRIgine (LAMICTAL) 100 MG tablet Take 1 tablet (100 mg) by mouth At Bedtime 30 tablet 2    lithium ER (LITHOBID) 300 MG CR tablet Take 4 tablets (1,200 mg) by mouth At Bedtime 120 tablet 2    OLANZapine (ZYPREXA) 5 MG tablet Take 2.5 tablets (12.5 mg) by mouth At Bedtime. May also take 0.5-1 tablets (2.5-5 mg) daily as needed ((for psychosis, lauren, or agitation)). 90 tablet 2    Suvorexant (BELSOMRA) 10 MG tablet Take 1 tablet (10 mg) by mouth nightly as needed for sleep 30 tablet 2      VITALS   There were no vitals taken for this visit.   Pulse Readings from Last 3 Encounters:   04/27/23 61   04/06/23 76   02/02/22 55     Wt Readings from Last 3 Encounters:   05/25/23 82.6 kg (182 lb)   04/27/23 132.2 kg (291 lb 6.4 oz)   04/06/23 134.1 kg (295 lb 9.6 oz)     BP Readings from Last 3 Encounters:   04/27/23 (!) 137/90   04/06/23 125/82   02/02/22 (!) 141/83      MENTAL STATUS EXAM     Alertness: alert  and oriented  Appearance: well groomed  Behavior/Demeanor: cooperative and calm, with good  eye contact   Speech: increased latency of response and slowed  Language: intact and no obvious problem  Psychomotor: normal or unremarkable  Mood:   \"pretty good\"  Affect: restricted and guarded, reactive; congruent to: mood- no, content- no  Thought Process/Associations: "  linear, somewhat concrete, goal oriented  Thought Content:  Reports none;  Denies suicidal & violent ideation and delusions  Perception:  Reports none;  Denies auditory hallucinations and visual hallucinations  Insight: good  Judgment: good  Cognition: does  appear grossly intact; formal cognitive testing was not done  Gait and Station: N/A (telehealth)     LABS and DATA         1/12/2023    12:55 PM 4/6/2023     3:27 PM 5/25/2023     2:37 PM   PHQ   PHQ-9 Total Score 9 6 9   Q9: Thoughts of better off dead/self-harm past 2 weeks Not at all Not at all Not at all     Liver/Kidney Function, TSH Metabolic Blood counts   Recent Labs   Lab Test 01/10/23  1156 05/10/22  0855   AST 82* 31   * 103*   ALKPHOS 99 81   CR 1.05 1.30*     Recent Labs   Lab Test 01/10/23  1156   TSH 1.44    Recent Labs   Lab Test 01/10/23  1156   CHOL 210*   TRIG 532*   LDL 71   HDL 31*     Recent Labs   Lab Test 05/10/22  0855   A1C 5.1     Recent Labs   Lab Test 01/10/23  1156   GLC 97    Recent Labs   Lab Test 01/10/23  1156   WBC 6.6   HGB 15.6   HCT 47.3   MCV 91            PROVIDER: Darlene Landa MD    Level of Medical Decision Making:   - At least 1 chronic problem that is not stable  - Engaged in prescription drug management during visit (discussed any medication benefits, side effects, alternatives, etc.)    Psychiatry Individual Psychotherapy Note   Psychotherapy start time - 1:23 PM  Psychotherapy end time - 1:49 PM  Date last reviewed with patient - 08/14/23  Subjective: This supportive psychotherapy session addressed issues related to goals of therapy and current psychosocial stressors. Patient's reaction: Action in the context of mental status appropriate for ambulatory setting.    Interactive complexity indicated? No  Plan: RTC in timeframe noted above  Psychotherapy services during this visit included myself and the patient.   Treatment Plan      SYMPTOMS; PROBLEMS   MEASURABLE GOALS;    FUNCTIONAL  IMPROVEMENT / GAINS INTERVENTIONS DISCHARGE CRITERIA   Psychosis: negative symptoms (avolition, affective flattening, anhedonia, alogia, apathy, ...)   exercise for 20 minutes 3-7 days a week  Supportive / psychodynamic marked symptom improvement     Patient staffed in clinic with Dr. Ventura who will sign the note.  Supervisor is Dr. Carrasquillo.

## 2023-08-14 NOTE — PATIENT INSTRUCTIONS
**For crisis resources, please see the information at the end of this document**   Patient Education    Thank you for coming to the [facility] [place] [facility].     Lab Testing:  If you had lab testing today and your results are reassuring or normal they will be mailed to you or sent through [employer] within 7 days. If the lab tests need quick action we will call you with the results. The phone number we will call with results is # [phone]. If this is not the best number please call our clinic and change the number.     Medication Refills:  If you need any refills please call your pharmacy and they will contact us. Our fax number for refills is [phone].   Three business days of notice are needed for general medication refill requests.   Five business days of notice are needed for controlled substance refill requests.   If you need to change to a different pharmacy, please contact the new pharmacy directly. The new pharmacy will help you get your medications transferred.     Contact Us:  Please call [phone] during business hours (8-5:00 M-F).   If you have medication related questions after clinic hours, or on the weekend, please call [phone].     Financial Assistance [phone]   Medical Records [phone]       MENTAL HEALTH CRISIS RESOURCES:  For a emergency help, please call 911 or go to the nearest Emergency Department.     Emergency Walk-In Options:   Em[facility] Unit @ [place] [facility] ([place]): [phone] - Specialized mental health emergency area designed to be calming  [facility] ([place]): [phone]  [facility] ([place]): [phone]  [facility]): [phone]    [facility] Crisis Information:   [place]: [phone]  [place]: [phone]  Fredi (FRIEDA) - Adult: [phone]     Child: [phone]  [place] - Adult: [phone]     Child: [phone]  [place]:  [phone]  List of all [facility] resources:   https://[place].gov/dhs/people-we-serve/adults/health-care/mental-health/resources/crisis-contacts.jsp    National Crisis Information:   Crisis Text Line: Text  [place]  to 873817  Suicide & Crisis Lifeline: 988  National Suicide Prevention Lifeline: 4-295-042-TALK (1-103.204.6835)       For online chat options, visit https://suicidepreventionlifeline.org/chat/  Poison Control Center: 2-907-964-9744  Trans Lifeline: 0-155-946-5041 - Hotline for transgender people of all ages  The [employer]: 7-107-976-6349 - Hotline for LGBT youth     For Non-Emergency Support:   Fast Tracker: Mental Health & Substance Use Disorder Resources -   https://www.[employer].org/

## 2023-08-14 NOTE — NURSING NOTE
Is the patient currently in the state of [place]? YES    Visit mode:VIDEO    If the visit is dropped, the patient can be reconnected by: VIDEO VISIT: Text to cell phone: [phone]    Will anyone else be joining the visit? NO      How would you like to obtain your AVS? MyChart    Are changes needed to the allergy or medication list? YES: Pt no longer takes Fluoxetine 20 mg. Please remove from med list.    Reason for visit: RECHECK    Patient will complete questionnaires prior to joining video.    PELON Dubon on 8/14/2023 at 12:40 PM

## 2023-08-29 ENCOUNTER — MYC MEDICAL ADVICE (OUTPATIENT)
Dept: PSYCHIATRY | Facility: CLINIC | Age: 33
End: 2023-08-29
Payer: COMMERCIAL

## 2023-08-29 DIAGNOSIS — F25.0 SCHIZOAFFECTIVE DISORDER, BIPOLAR TYPE (H): ICD-10-CM

## 2023-09-01 NOTE — TELEPHONE ENCOUNTER
Spoke with patient and he feels like the medication has been helpful and he would like to go up to 200 mg as planned. No adverse effects. He does have enough to start this now of the 100 mg tabs. He will need a refill next week.

## 2023-09-03 RX ORDER — LAMOTRIGINE 200 MG/1
200 TABLET ORAL AT BEDTIME
Qty: 30 TABLET | Refills: 0 | Status: SHIPPED | OUTPATIENT
Start: 2023-09-03 | End: 2023-10-10

## 2023-09-05 ASSESSMENT — SLEEP AND FATIGUE QUESTIONNAIRES
HOW LIKELY ARE YOU TO NOD OFF OR FALL ASLEEP WHILE SITTING AND TALKING TO SOMEONE: WOULD NEVER DOZE
HOW LIKELY ARE YOU TO NOD OFF OR FALL ASLEEP WHEN YOU ARE A PASSENGER IN A CAR FOR AN HOUR WITHOUT A BREAK: WOULD NEVER DOZE
HOW LIKELY ARE YOU TO NOD OFF OR FALL ASLEEP WHILE SITTING INACTIVE IN A PUBLIC PLACE: WOULD NEVER DOZE
HOW LIKELY ARE YOU TO NOD OFF OR FALL ASLEEP WHILE LYING DOWN TO REST IN THE AFTERNOON WHEN CIRCUMSTANCES PERMIT: MODERATE CHANCE OF DOZING
HOW LIKELY ARE YOU TO NOD OFF OR FALL ASLEEP WHILE SITTING AND READING: WOULD NEVER DOZE
HOW LIKELY ARE YOU TO NOD OFF OR FALL ASLEEP IN A CAR, WHILE STOPPED FOR A FEW MINUTES IN TRAFFIC: WOULD NEVER DOZE
HOW LIKELY ARE YOU TO NOD OFF OR FALL ASLEEP WHILE WATCHING TV: WOULD NEVER DOZE
HOW LIKELY ARE YOU TO NOD OFF OR FALL ASLEEP WHILE SITTING QUIETLY AFTER LUNCH WITHOUT ALCOHOL: WOULD NEVER DOZE

## 2023-09-06 ENCOUNTER — THERAPY VISIT (OUTPATIENT)
Dept: SLEEP MEDICINE | Facility: CLINIC | Age: 33
End: 2023-09-06
Attending: NURSE PRACTITIONER
Payer: COMMERCIAL

## 2023-09-06 DIAGNOSIS — G47.30 SLEEP-RELATED BREATHING DISORDER: ICD-10-CM

## 2023-09-06 PROCEDURE — 95810 POLYSOM 6/> YRS 4/> PARAM: CPT | Performed by: INTERNAL MEDICINE

## 2023-09-07 NOTE — PATIENT INSTRUCTIONS
[facility]    1. Your sleep study will be reviewed by a sleep physician within the next few days.     2. Please follow up in the sleep clinic as scheduled, or, make an appointment with your sleep provider to be seen within two weeks to discuss the results of the sleep study.    3. If you have any questions or problems with your treatment plan, please contact your sleep clinic provider at [phone] to further manage your condition.    4. Please review your attached medication list, and, at your follow-up appointment advise your sleep clinic provider about any changes.    5. Go to [email or web address] for more information about your sleep problems.    MARIA DE JESUS PASCUAL, RPSGT  September 7, 2023

## 2023-09-07 NOTE — PROGRESS NOTES
Diagnostic PSG completed per provider order.  Patient did not meet criteria for PAP therapy per provider order .

## 2023-09-12 LAB — SLPCOMP: NORMAL

## 2023-09-19 ENCOUNTER — LAB (OUTPATIENT)
Dept: LAB | Facility: CLINIC | Age: 33
End: 2023-09-19
Payer: COMMERCIAL

## 2023-09-19 DIAGNOSIS — Z79.899 ENCOUNTER FOR LONG-TERM (CURRENT) USE OF MEDICATIONS: ICD-10-CM

## 2023-09-19 LAB
ANION GAP SERPL CALCULATED.3IONS-SCNC: 12 MMOL/L (ref 7–15)
BUN SERPL-MCNC: 12.3 MG/DL (ref 6–20)
CALCIUM SERPL-MCNC: 9.8 MG/DL (ref 8.6–10)
CHLORIDE SERPL-SCNC: 105 MMOL/L (ref 98–107)
CREAT SERPL-MCNC: 1.18 MG/DL (ref 0.67–1.17)
DEPRECATED HCO3 PLAS-SCNC: 24 MMOL/L (ref 22–29)
EGFRCR SERPLBLD CKD-EPI 2021: 85 ML/MIN/1.73M2
GLUCOSE SERPL-MCNC: 97 MG/DL (ref 70–99)
HBA1C MFR BLD: 5.1 % (ref 0–5.6)
LITHIUM SERPL-SCNC: 0.64 MMOL/L (ref 0.6–1.2)
POTASSIUM SERPL-SCNC: 4.3 MMOL/L (ref 3.4–5.3)
SODIUM SERPL-SCNC: 141 MMOL/L (ref 136–145)

## 2023-09-19 PROCEDURE — 36415 COLL VENOUS BLD VENIPUNCTURE: CPT

## 2023-09-19 PROCEDURE — 80178 ASSAY OF LITHIUM: CPT

## 2023-09-19 PROCEDURE — 83036 HEMOGLOBIN GLYCOSYLATED A1C: CPT

## 2023-09-19 PROCEDURE — 80048 BASIC METABOLIC PNL TOTAL CA: CPT

## 2023-09-27 NOTE — PROGRESS NOTES
"Sleep Study Follow-Up Visit:    Date on this visit: 9/28/2023    Jose Francisco Sommers comes in today for follow-up of his sleep study done on 09/06/2023 at the [facility] for snoring, insomnia, delayed sleep phase, and nonrestorative sleep.          These findings were reviewed with patient.     Past medical/surgical history, family history, social history, medications and allergies were reviewed.      Problem List:  Patient Active Problem List    Diagnosis Date Noted    Morbid obesity (H) 04/06/2023     Priority: Medium    Elevated blood pressure reading without diagnosis of hypertension 12/21/2020     Priority: Medium    Constipation 12/21/2020     Priority: Medium    Psychosis (H) 11/12/2020     Priority: Medium    History of cannabis abuse 03/11/2018     Priority: Medium    History of cocaine abuse (H) 03/11/2018     Priority: Medium    High blood triglycerides 01/11/2018     Priority: Medium    Fatty liver 12/22/2017     Priority: Medium     See US Results       Elevated liver enzymes 11/07/2017     Priority: Medium    Hx of psychiatric care 07/10/2016     Priority: Medium     Adderall 10-20 mg - improved concentration, but \"cold personality\" and perseveration  Prozac 20 mg - limited response, less irritable, first episode of \"rage\"  Seroquel 25-75 mg - helped with sleep and irritability  Concerta 18 mg - improved focus, no improvement in motivation  Provigil 150 mg - Stimulants \"almost killed him\" triggered alcohol binges  Abilify ?25 mg - OK when QOD, but irritable and agitated on QD  Clonazepam 0.5-1.5 mg - calming, helped with sleep and \"catatonia\"  Risperdal up to 5 mg - some confusion, slow processing, withdrawn, ?\"catatonia\", panic attacks  Zyprexa 5 mg less anxious overall improvement  Lithium - calmer, overall better  Latuda 20 mg - severe fatigue and depression  Synthroid 75 mcg - added to help with mood.  Brief trials with Strattera, Intuniv, Lamictal, Xanax, Zoloft    7/2016- " changed lithium from 600 mg BID to 1200 mg qhs for sleep and sedation  8/2016- decreased Zyprexa from 10 mg to 5 mg qhs- due to weight gain  9/2016- decreased Buspar from 10 mg to 5 mg qhs due to dizziness  1/2017- self-discontinued Buspar  2/2017- increased Zyprexa to 10 mg qhs from 5 mg, started trazodone for sleep. Reports he had tried melatonin but it made him feel sick  3/2017- increased Zyprexa from 10 mg to 15 mg qhs          Schizoaffective disorder, bipolar type (H) 10/17/2015     Priority: Medium    Schizoaffective disorder (H) 05/26/2015     Priority: Medium        Impression/Plan:  (G47.33) LISA (obstructive sleep apnea) (primary encounter diagnosis)  Comment: Manuel presents to the sleep clinic accompanied by his mother, Ember, for review of his polysomnogram done on 09/06/2023. He has a copy of his results in his hand. The results of his sleep study were reviewed in depth. Informed Manuel he has severe obstructive sleep apnea without hypoxemia. We did discuss that the absence of REM during this study may result in underestimation of severity of sleep apnea. We reviewed treatment options for severe LISA including CPAP, oral appliance therapy, and weight management. Manuel would like to trial CPAP. If he is unable to tolerate CPAP, he would be interested in the oral appliance. Weight loss is difficult as he has gained weight due to his medications.  Plan: Comprehensive DME  An order was placed for Auto-PAP 5-15 cm H2O. We reviewed compliance goals, mask exchange policy, and patient was enrolled in [facility]. We will discuss optimization of his sleep schedule at a follow-up visit. We could consider CBT-I in the future.    He will follow up with me in about 3 month(s).     Total time spent reviewing medical records, history and physical examination, review of previous testing and interpretation as well as documentation on this date: 33 minutes    MARIAH Mccain CNP 9/28/23    CC: Provider, Clinic

## 2023-09-28 ENCOUNTER — VIRTUAL VISIT (OUTPATIENT)
Dept: SLEEP MEDICINE | Facility: CLINIC | Age: 33
End: 2023-09-28
Payer: COMMERCIAL

## 2023-09-28 VITALS — BODY MASS INDEX: 37.25 KG/M2 | WEIGHT: 275 LBS | HEIGHT: 72 IN

## 2023-09-28 DIAGNOSIS — G47.33 OSA (OBSTRUCTIVE SLEEP APNEA): Primary | ICD-10-CM

## 2023-09-28 PROCEDURE — 99214 OFFICE O/P EST MOD 30 MIN: CPT | Mod: VID

## 2023-09-28 ASSESSMENT — PAIN SCALES - GENERAL: PAINLEVEL: NO PAIN (0)

## 2023-09-28 ASSESSMENT — SLEEP AND FATIGUE QUESTIONNAIRES
HOW LIKELY ARE YOU TO NOD OFF OR FALL ASLEEP WHILE SITTING INACTIVE IN A PUBLIC PLACE: WOULD NEVER DOZE
HOW LIKELY ARE YOU TO NOD OFF OR FALL ASLEEP WHILE LYING DOWN TO REST IN THE AFTERNOON WHEN CIRCUMSTANCES PERMIT: MODERATE CHANCE OF DOZING
HOW LIKELY ARE YOU TO NOD OFF OR FALL ASLEEP WHILE SITTING AND READING: WOULD NEVER DOZE
HOW LIKELY ARE YOU TO NOD OFF OR FALL ASLEEP IN A CAR, WHILE STOPPED FOR A FEW MINUTES IN TRAFFIC: WOULD NEVER DOZE
HOW LIKELY ARE YOU TO NOD OFF OR FALL ASLEEP WHEN YOU ARE A PASSENGER IN A CAR FOR AN HOUR WITHOUT A BREAK: WOULD NEVER DOZE
HOW LIKELY ARE YOU TO NOD OFF OR FALL ASLEEP WHILE SITTING AND TALKING TO SOMEONE: WOULD NEVER DOZE
HOW LIKELY ARE YOU TO NOD OFF OR FALL ASLEEP WHILE WATCHING TV: SLIGHT CHANCE OF DOZING
HOW LIKELY ARE YOU TO NOD OFF OR FALL ASLEEP WHILE SITTING QUIETLY AFTER LUNCH WITHOUT ALCOHOL: WOULD NEVER DOZE

## 2023-09-28 NOTE — NURSING NOTE
Is the patient currently in the state of [place]? YES    Visit mode:VIDEO    If the visit is dropped, the patient can be reconnected by: VIDEO VISIT: Send to e-mail at: [email or web address]    Will anyone else be joining the visit? NO  (If patient encounters technical issues they should call [phone]    How would you like to obtain your AVS? MyChart    Are changes needed to the allergy or medication list? No    Reason for visit: RECHECK    Britany GOEL    Has patient had flu shot for current/most recent flu season? If so, when? Yes: 10/2022

## 2023-09-28 NOTE — PATIENT INSTRUCTIONS
CPAP machines are often rent-to-own over 3-12 months depending on your insurance. During the first 3 months, insurances will often want to see at least 4 hours of use on 70% of nights over a 30 day period. Ideally, you would wear it whenever sleeping, but 4 hours is where health benefits really start.     If you don't like the first mask you get, you can exchange it once in the first month for free. Otherwise, insurance will cover new supplies about every 3 months. They will give you paperwork explaining how often to clean and replace parts when you get the machine.    We have people called our sleep therapy management team who will be checking in on you a few times in the first month. They can access data from your machine and help troubleshoot any problems you may have.    Either I or whoever is available will see you back about 2-3 months after getting started on the CPAP. That appointment will be made once you get the CPAP.    MARIAH Mccain CNP    Contact information for [facility] company:     [facility] Tel: [phone]    [facility] should reach out to you within 1 week to get you scheduled for an appointment to  your CPAP. If you do not hear from them within 7 business days, you can give them a call at the number above.

## 2023-09-28 NOTE — PROGRESS NOTES
Virtual Visit Details    Type of service: Video Visit   Video Start Time: 3:53 PM  Video End Time: 4:21 PM  Originating Location (pt. Location): Home    Distant Location (provider location): Off-site  Platform used for Video Visit: Quoc

## 2023-10-02 ENCOUNTER — VIRTUAL VISIT (OUTPATIENT)
Dept: PSYCHIATRY | Facility: CLINIC | Age: 33
End: 2023-10-02
Attending: STUDENT IN AN ORGANIZED HEALTH CARE EDUCATION/TRAINING PROGRAM
Payer: COMMERCIAL

## 2023-10-02 DIAGNOSIS — Z79.899 ENCOUNTER FOR LONG-TERM (CURRENT) USE OF MEDICATIONS: ICD-10-CM

## 2023-10-02 DIAGNOSIS — F25.0 SCHIZOAFFECTIVE DISORDER, BIPOLAR TYPE (H): Primary | ICD-10-CM

## 2023-10-02 PROCEDURE — 90833 PSYTX W PT W E/M 30 MIN: CPT | Mod: VID

## 2023-10-02 PROCEDURE — 99214 OFFICE O/P EST MOD 30 MIN: CPT | Mod: VID

## 2023-10-02 RX ORDER — CLOZAPINE 25 MG/1
25 TABLET ORAL AT BEDTIME
Qty: 30 TABLET | Refills: 1 | Status: SHIPPED | OUTPATIENT
Start: 2023-10-02 | End: 2023-11-27

## 2023-10-02 ASSESSMENT — PATIENT HEALTH QUESTIONNAIRE - PHQ9
10. IF YOU CHECKED OFF ANY PROBLEMS, HOW DIFFICULT HAVE THESE PROBLEMS MADE IT FOR YOU TO DO YOUR WORK, TAKE CARE OF THINGS AT HOME, OR GET ALONG WITH OTHER PEOPLE: VERY DIFFICULT
SUM OF ALL RESPONSES TO PHQ QUESTIONS 1-9: 7
SUM OF ALL RESPONSES TO PHQ QUESTIONS 1-9: 7

## 2023-10-02 NOTE — Clinical Note
Hi,  I am starting Manuel on clozapine. I will order baseline labs. I asked him to start taking 25 mg after we see the result. Could you please check and see if he has done labs next week?  If yes could you do another outreach call 1 week after he started clozapine and see if he is doing well increase the dose of clozapine to 50 and decrease olanzapine to 7.5 mg?   If no, please make an outreach call and follow up on what his plan is to do labs.  Hopefully I can see him 1-2 weeks after you call him to further do titration.   Thanks, Darlene

## 2023-10-02 NOTE — PROGRESS NOTES
[facility]  Psychiatry Clinic  MEDICAL PROGRESS NOTE     CARE TEAM:  PCP- Clinic Provider    Psychotherapist- None   Jose Francisco Sommers is a 33 year old who uses the name Manuel and pronouns he, him, his.      DIAGNOSIS     # Schizoaffective disorder, Bipolar type, most recent episode depressed, in remission without current psychotic features               -rule out bipolar disorder type 1  # ADHD, predominantly inattentive type  # Insomnia (history of delayed phase sleep disorder)    severe obstructive sleep apnea without hypoxemia      ASSESSMENT     Manuel is doing well overall.     # Schizoaffective disorder vs. previous consideration of schizophrenia:  Manuel appears to have a primary schizophrenia spectrum disorder with prominent negative symptoms. No breakthrough psychosis since decrease in olanzapine.    He has been followed closely in clinic for initiation of lamotrigine, and is currently at 200 mg HS for near a month. Manuel reports minimal mental health symptoms with little anxiety, no depression, no psychosis, and no safety concerns. He continues to present as more appropriately reactive and happy since starting lamotrigine.     He only complains of slow thought processing. Previously it was discussed with Dr. Burt that after he is on a stable dose of lamotrigine he can switch from olanzapine to clozapine to potentially help with his thought process and be able to have more independence and have a job. Today Manuel is interested in replacing olanzapine with clozapine.    I reviewed risks and considerations and blood monitoring and we decided to start clozapine 25 mg at bedtime after baseline labs and do a clinic outreach 1 week after and at that time if not having any side effects and doing well increasing the dose of clozapine to 50 mg and decreasing the dose of olanzapine to 7.5 mg.       # Insomnia  # history of delayed phase sleep disorder  Today he stated that he has stopped  "Belsomra after the sleep study. Recent sleep study showed severe LISA. He will be using CPAP soon.    Future Considerations:  - further titration of lamotrigine  - re-trial metformin for medication associated weight gain, topiramate may have cognitive side effects. May consider Ozempic/semaglutide    Psychotropic Drug Interactions:  [PSYCHCLINICDDI]  ADDITIVE SEROTONERGIC: olanzapine, lithium  ADDITIVE CNS/RESPIRATORY DEPRESSION: belsomra, lamotrigine, olanzapine  Lithium may enhance the neurotoxic effect of Antipsychotic Agents.  Management: routine monitoring    [place] was [place] today: indicates that controlled prescriptions have been filled as prescribed    Risk Statements:   Treatment Risk- Risks, benefits, alternatives and potential adverse effects have been discussed and are understood.  Safety Risk-Manuel did not appear to be an imminent safety risk to self or others.      PLAN                                                                                                                1) Meds-  - Continue lamotrigine 200 mg daily  - Continue olanzapine 10 mg at bedtime  - Start Clozapine 25 mg after doing baseline labs    2) Psychotherapy- not interested    3) Next due-  Vitals - weekly until dose stable x 2wks  Labs- 9/19/23: lithium  0.64, HbA1C: 5.1  CBC, CMP, Lipids ordered today  Standing CBC order placed   EKG- Routine monitoring is not indicated for current psychotropic medication regimen   Rating scales- AIMS completed 4/6/23. AIMS = 0.    4) Referrals- none  none     Previously referred to:   -TRUDY owen for clozapine assessment on 4/6/23  -Sleep medicine - scheduled for 05/25/23  -Hepatology regarding initiation of Depakote    5) Dispo- RTC in 3-4 weeks, outreach call in a week after starting clozapine     PERTINENT BACKGROUND                                                    [most recent eval 08/04/22]     Jose Francisco \"Manuel\"  first experienced mental health issues in grade school and has received " "treatment for ADHD, depression, psychosis, and substance use. Notably, this patient had FEP in 2015 and has been stable on olanzapine and lithium since that time. He has never lived independently and has only been slowly developing insight into his illness and past symptoms. GeneSight testing was completed noting patient is a poor CYP2D6 metabolizer. Past records from Dr. Mike Powers were received in summer 2018 to assist with diagnostic clarity, however they were hand written notes that were illegible though accompanied by typed neuropsychology consult from [facility]. This documentation was unable to support a diagnosis of bipolar disorder at that time though noted potential Asperger's diagnosis and ADHD. Given that there has been evidence for psychotic signs and symptoms outside the context of mood episodes, a schizophrenia spectrum disorder such as schizoaffective disorder diagnosis seems most accurate. He has had two hospitalizations for decompensated psychosis in the setting of decreasing his olanzapine dose.    Has \"no interest whatsoever\" in therapy. Please refrain from asking about it.    Psych pertinent item history includes psychosis [sxs include disorganization, possible catatonia], mutiple psychotropic trials, psych hosp (<3) and Substance Use: alcohol and cannabis     SUBJECTIVE     - Since last visit has been feeling well  - Had sleep study done, needs a CPAP machine  - Stopped taking belsomra a few weeks ago, at 10 mg it was not really helping  - may want to consider increase belsomra in the future but wants to try CPAP  - Mood has been pretty good.  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      Other - DUIs. Also In February 2014 the patient left for [place] to attend a [employer] tournament. He ended up in [place] and was arrested for shoplifting from [employer] (had \"intended to purchase\" a pair of jeans but became panicked when security approached him). Had a brief stay in nursing home, and afterwards, took him 5 days to return to [place] with parents frequently wiring him money. Had difficulty adhering to travel plans, seemed confused, and was communicating with parents via \"bizarre\" text messages (parents even filed a missing persons report at one point). He finally arrived home exhausted, not sharing much of events in [place], but slightly more consistent with baseline.    Pertinent Negatives: No suicidal or violent ideation, self-injury, psychosis, hallucinations, delusions, lauren, hypomania, aggression and harmful substance use  Adverse Effects: weight gain, sedation, cognitive slowing?      PSYCH and SUBSTANCE USE Critical Summary Points since July 2022 08/14/23 - increased the dose of lamotrigine to 150 mg, reach out in 3 weeks and can further increase to 200  10/2/23 - started clozapine 25 mg      PAST MED TRIALS     Adderall 10-20 mg - improved concentration, but \"cold personality\" and perseveration  Prozac 20 mg - limited response, less irritable, first episode of \"rage\"  Seroquel 25-75 mg - helped with sleep and irritability  Concerta 18 mg - improved focus, no " "improvement in motivation  Provigil 150 mg (up to 400) - Stimulants \"almost killed him\" triggered alcohol binges  Abilify ?25 mg - OK when QOD, but irritable and agitated on QD  Clonazepam 0.5-1.5 mg - calming, helped with sleep and \"catatonia\"  Risperdal up to 5 mg - some confusion, slow processing, withdrawn, ?\"catatonia\", panic attacks -dont try that  Zyprexa 5 mg less anxious overall improvement  Lithium - calmer, overall better  Latuda 20 mg - severe fatigue and depression  Synthroid 75 mcg - added to help with mood.  Brief trials with Strattera, Intuniv, Lamictal, Xanax, Zoloft  Naltrexone - had nightmares and sedation on 25mg, stopped after 2 nights     MEDICAL HISTORY and ALLERGY     ALLERGIES: Sulfa antibiotics    Patient Active Problem List   Diagnosis    Schizoaffective disorder, bipolar type (H)    Hx of psychiatric care    Elevated liver enzymes    Fatty liver    High blood triglycerides    History of cannabis abuse    History of cocaine abuse (H)    Psychosis (H)    Elevated blood pressure reading without diagnosis of hypertension    Constipation    Morbid obesity (H)    Schizoaffective disorder (H)      MEDICAL REVIEW OF SYSTEMS   Contraception-  Unknown     none in addition to that documented above     MEDICATIONS     Current Outpatient Medications   Medication Sig Dispense Refill    atorvastatin (LIPITOR) 20 MG tablet TAKE ONE TABLET BY MOUTH ONE TIME DAILY*      lamoTRIgine (LAMICTAL) 200 MG tablet Take 1 tablet (200 mg) by mouth At Bedtime 30 tablet 0    lithium ER (LITHOBID) 300 MG CR tablet Take 4 tablets (1,200 mg) by mouth At Bedtime 120 tablet 2    OLANZapine (ZYPREXA) 5 MG tablet Take 2.5 tablets (12.5 mg) by mouth At Bedtime. May also take 0.5-1 tablets (2.5-5 mg) daily as needed ((for psychosis, lauren, or agitation)). 90 tablet 2      VITALS   There were no vitals taken for this visit.   Pulse Readings from Last 3 Encounters:   04/27/23 61   04/06/23 76   02/02/22 55     Wt Readings from " "Last 3 Encounters:   09/28/23 124.7 kg (275 lb)   05/25/23 82.6 kg (182 lb)   04/27/23 132.2 kg (291 lb 6.4 oz)     BP Readings from Last 3 Encounters:   04/27/23 (!) 137/90   04/06/23 125/82   02/02/22 (!) 141/83      MENTAL STATUS EXAM     Alertness: alert  and oriented  Appearance: well groomed  Behavior/Demeanor: cooperative and calm, with good  eye contact   Speech: increased latency of response and slowed  Language: intact and no obvious problem  Psychomotor: normal or unremarkable  Mood:   \"pretty good\"  Affect: restricted and guarded, reactive; congruent to: mood- no, content- no  Thought Process/Associations:  linear, somewhat concrete, goal oriented  Thought Content:  Reports none;  Denies suicidal & violent ideation and delusions  Perception:  Reports none;  Denies auditory hallucinations and visual hallucinations  Insight: good  Judgment: good  Cognition: does  appear grossly intact; formal cognitive testing was not done  [facility]     LABS and DATA         5/25/2023     2:37 PM 8/14/2023    12:48 PM 10/2/2023     2:54 PM   PHQ   PHQ-9 Total Score 9 6 7   Q9: Thoughts of better off dead/self-harm past 2 weeks Not at all Not at all Not at all     Liver/Kidney Function, TSH Metabolic Blood counts   Recent Labs   Lab Test 09/19/23  1144 01/10/23  1156   AST  --  82*   ALT  --  187*   ALKPHOS  --  99   CR 1.18* 1.05     Recent Labs   Lab Test 01/10/23  1156   TSH 1.44    Recent Labs   Lab Test 01/10/23  1156   CHOL 210*   TRIG 532*   LDL 71   HDL 31*     Recent Labs   Lab Test 09/19/23  1144   A1C 5.1     Recent Labs   Lab Test 09/19/23  1144   GLC 97    Recent Labs   Lab Test 01/10/23  1156   WBC 6.6   HGB 15.6   HCT 47.3   MCV 91            PROVIDER: Darlene Landa MD    Level of Medical Decision Making:   - At least 1 chronic problem that is not stable  - Engaged in prescription drug management during visit (discussed any medication benefits, side effects, " alternatives, etc.)    Psychiatry Individual Psychotherapy Note   Psychotherapy start time - 15:23   Psychotherapy end time - 15:49   Date last reviewed with patient - 08/14/23  Subjective: This supportive psychotherapy session addressed issues related to goals of therapy and current psychosocial stressors. Patient's reaction: Action in the context of mental status appropriate for ambulatory setting.    Interactive complexity indicated? No  Plan: RTC in timeframe noted above  Psychotherapy services during this visit included myself and the patient.   Treatment Plan      SYMPTOMS; PROBLEMS   MEASURABLE GOALS;    FUNCTIONAL IMPROVEMENT / GAINS INTERVENTIONS DISCHARGE CRITERIA   Psychosis: negative symptoms (avolition, affective flattening, anhedonia, alogia, apathy, ...)   exercise for 20 minutes 3-7 days a week  Supportive / psychodynamic marked symptom improvement     Patient staffed in clinic with Dr. Julio who will sign the note.  Supervisor is Dr. Carrasquillo.

## 2023-10-02 NOTE — PATIENT INSTRUCTIONS
**For crisis resources, please see the information at the end of this document**   Patient Education    Thank you for coming to the [facility] [place] [facility].     Lab Testing:  If you had lab testing today and your results are reassuring or normal they will be mailed to you or sent through [employer] within 7 days. If the lab tests need quick action we will call you with the results. The phone number we will call with results is # [phone]. If this is not the best number please call our clinic and change the number.     Medication Refills:  If you need any refills please call your pharmacy and they will contact us. Our fax number for refills is [phone].   Three business days of notice are needed for general medication refill requests.   Five business days of notice are needed for controlled substance refill requests.   If you need to change to a different pharmacy, please contact the new pharmacy directly. The new pharmacy will help you get your medications transferred.     Contact Us:  Please call [phone] during business hours (8-5:00 M-F).   If you have medication related questions after clinic hours, or on the weekend, please call [phone].     Financial Assistance [phone]   Medical Records [phone]       MENTAL HEALTH CRISIS RESOURCES:  For a emergency help, please call 911 or go to the nearest Emergency Department.     Emergency Walk-In Options:   EmPATH Unit @ [facility] ([place]): [phone] - Specialized mental health emergency area designed to be calming  [facility] ([place]): [phone]  [facility] ([place]): [phone]  [facility]): [phone]    [facility] Crisis Information:   [place]: [phone]  [place]: [phone]  [place] (FRIEDA) - Adult: [phone]     Child: [phone]  [place] - Adult: [phone]     Child: [phone]  [place]:  [phone]  List of all [facility] resources:   https://[place].gov/dhs/people-we-serve/adults/health-care/mental-health/resources/crisis-contacts.jsp    National Crisis Information:   Crisis Text Line: Text  [place]  to 769378  Suicide & Crisis Lifeline: 988  National Suicide Prevention Lifeline: 8-657-689-TALK (1-130.311.4959)       For online chat options, visit https://suicidepreventionlifeline.org/chat/  Poison Control Center: 4-833-655-5262  Trans Lifeline: 5-176-667-6496 - Hotline for transgender people of all ages  The [employer] Project: 7-855-534-2250 - Hotline for LGBT youth     For Non-Emergency Support:   Fast Tracker: Mental Health & Substance Use Disorder Resources -   https://www.[employer].org/

## 2023-10-02 NOTE — NURSING NOTE
Is the patient currently in the state of [place]? YES    Visit mode:VIDEO    If the visit is dropped, the patient can be reconnected by: VIDEO VISIT: Text to cell phone:   Telephone Information:   Mobile [phone]       Will anyone else be joining the visit? Yes, mom will be part of visit  (If patient encounters technical issues they should call [phone] :[phone])    How would you like to obtain your AVS? MyChart    Are changes needed to the allergy or medication list? No    Reason for visit: RECHECK    Nurys GOEL

## 2023-10-02 NOTE — PROGRESS NOTES
Virtual Visit Details    Type of service:  Video Visit   Video Start Time:  3:08 PM  Video End Time: 4:05 PM    Originating Location (pt. Location): Home    Distant Location (provider location):  On-site  Platform used for Video Visit: Quoc

## 2023-10-03 ENCOUNTER — LAB (OUTPATIENT)
Dept: LAB | Facility: CLINIC | Age: 33
End: 2023-10-03
Payer: COMMERCIAL

## 2023-10-03 DIAGNOSIS — Z79.899 ENCOUNTER FOR LONG-TERM (CURRENT) USE OF MEDICATIONS: ICD-10-CM

## 2023-10-03 LAB
BASO+EOS+MONOS # BLD AUTO: NORMAL 10*3/UL
BASO+EOS+MONOS NFR BLD AUTO: NORMAL %
BASOPHILS # BLD AUTO: 0.1 10E3/UL (ref 0–0.2)
BASOPHILS NFR BLD AUTO: 1 %
EOSINOPHIL # BLD AUTO: 0.4 10E3/UL (ref 0–0.7)
EOSINOPHIL NFR BLD AUTO: 5 %
ERYTHROCYTE [DISTWIDTH] IN BLOOD BY AUTOMATED COUNT: 12.2 % (ref 10–15)
HCT VFR BLD AUTO: 46.1 % (ref 40–53)
HGB BLD-MCNC: 15.2 G/DL (ref 13.3–17.7)
IMM GRANULOCYTES # BLD: 0.1 10E3/UL
IMM GRANULOCYTES NFR BLD: 1 %
LYMPHOCYTES # BLD AUTO: 2.2 10E3/UL (ref 0.8–5.3)
LYMPHOCYTES NFR BLD AUTO: 25 %
MCH RBC QN AUTO: 29.1 PG (ref 26.5–33)
MCHC RBC AUTO-ENTMCNC: 33 G/DL (ref 31.5–36.5)
MCV RBC AUTO: 88 FL (ref 78–100)
MONOCYTES # BLD AUTO: 0.6 10E3/UL (ref 0–1.3)
MONOCYTES NFR BLD AUTO: 7 %
NEUTROPHILS # BLD AUTO: 5.4 10E3/UL (ref 1.6–8.3)
NEUTROPHILS NFR BLD AUTO: 62 %
PLATELET # BLD AUTO: 255 10E3/UL (ref 150–450)
RBC # BLD AUTO: 5.23 10E6/UL (ref 4.4–5.9)
WBC # BLD AUTO: 8.8 10E3/UL (ref 4–11)

## 2023-10-03 PROCEDURE — 80061 LIPID PANEL: CPT

## 2023-10-03 PROCEDURE — 36415 COLL VENOUS BLD VENIPUNCTURE: CPT

## 2023-10-03 PROCEDURE — 80053 COMPREHEN METABOLIC PANEL: CPT

## 2023-10-03 PROCEDURE — 85025 COMPLETE CBC W/AUTO DIFF WBC: CPT

## 2023-10-04 ENCOUNTER — TELEPHONE (OUTPATIENT)
Dept: PSYCHIATRY | Facility: CLINIC | Age: 33
End: 2023-10-04
Payer: COMMERCIAL

## 2023-10-04 LAB
ALBUMIN SERPL BCG-MCNC: 4.7 G/DL (ref 3.5–5.2)
ALP SERPL-CCNC: 81 U/L (ref 40–129)
ALT SERPL W P-5'-P-CCNC: 99 U/L (ref 0–70)
ANION GAP SERPL CALCULATED.3IONS-SCNC: 10 MMOL/L (ref 7–15)
AST SERPL W P-5'-P-CCNC: 47 U/L (ref 0–45)
BILIRUB SERPL-MCNC: 0.6 MG/DL
BUN SERPL-MCNC: 7.9 MG/DL (ref 6–20)
CALCIUM SERPL-MCNC: 9.9 MG/DL (ref 8.6–10)
CHLORIDE SERPL-SCNC: 105 MMOL/L (ref 98–107)
CHOLEST SERPL-MCNC: 132 MG/DL
CREAT SERPL-MCNC: 1.28 MG/DL (ref 0.67–1.17)
DEPRECATED HCO3 PLAS-SCNC: 27 MMOL/L (ref 22–29)
EGFRCR SERPLBLD CKD-EPI 2021: 76 ML/MIN/1.73M2
GLUCOSE SERPL-MCNC: 88 MG/DL (ref 70–99)
HDLC SERPL-MCNC: 33 MG/DL
LDLC SERPL CALC-MCNC: 27 MG/DL
NONHDLC SERPL-MCNC: 99 MG/DL
POTASSIUM SERPL-SCNC: 4.3 MMOL/L (ref 3.4–5.3)
PROT SERPL-MCNC: 7.2 G/DL (ref 6.4–8.3)
SODIUM SERPL-SCNC: 142 MMOL/L (ref 135–145)
TRIGL SERPL-MCNC: 362 MG/DL

## 2023-10-04 NOTE — TELEPHONE ENCOUNTER
[facility] Call Center    Phone Message    May a detailed message be left on voicemail: yes     Reason for Call: Other: Medication Question for provider     Patient stated the current plan with provider is to take 25mg of cloZAPine (CLOZARIL) for a week, then to take 50mg after that. Patient wants to let provider know they would instead like to start with 12.5mg for a week, then take 25mg for a week, then move up to 50mg.    Action Taken: Message routed to:  Other: [facility] Nurse pool    Travel Screening: Not Applicable

## 2023-10-04 NOTE — TELEPHONE ENCOUNTER
Patient called to requested his clozapine be titrated as follows:    12.5mg for a week, then take 25mg for a week, then move up to 50mg.     Writer spoke with provider, Dr. Thomas, who is okay with this plan. Writer called patient to update. Patient verbalized understanding and denies any other questions or concerns at this time.

## 2023-10-05 ENCOUNTER — MYC MEDICAL ADVICE (OUTPATIENT)
Dept: PSYCHIATRY | Facility: CLINIC | Age: 33
End: 2023-10-05
Payer: COMMERCIAL

## 2023-10-08 DIAGNOSIS — F25.0 SCHIZOAFFECTIVE DISORDER, BIPOLAR TYPE (H): ICD-10-CM

## 2023-10-09 ENCOUNTER — MYC MEDICAL ADVICE (OUTPATIENT)
Dept: PSYCHIATRY | Facility: CLINIC | Age: 33
End: 2023-10-09
Payer: COMMERCIAL

## 2023-10-10 RX ORDER — LAMOTRIGINE 200 MG/1
200 TABLET ORAL
Qty: 30 TABLET | Refills: 0 | Status: SHIPPED | OUTPATIENT
Start: 2023-10-10 | End: 2023-11-13

## 2023-10-10 NOTE — TELEPHONE ENCOUNTER
Medication requested:  lamoTRIgine Oral Tablet 200 MG   Last refilled: 9/3/23  Qty: 30/0      Last seen: 10/2/23  Continue lamotrigine 200 mg daily   RTC: 3-4 weeks  Cancel: 0  No-show: 0  Next appt: 11/27/23    Refill decision:      Refilled for 30 days per protocol.

## 2023-10-19 ENCOUNTER — DOCUMENTATION ONLY (OUTPATIENT)
Dept: SLEEP MEDICINE | Facility: CLINIC | Age: 33
End: 2023-10-19
Payer: COMMERCIAL

## 2023-10-19 DIAGNOSIS — G47.33 OSA (OBSTRUCTIVE SLEEP APNEA): Primary | ICD-10-CM

## 2023-10-19 NOTE — PROGRESS NOTES
Patient was offered choice of vendor and chose [facility].  Patient Jose Francisco Sommers was set up at [facility] on October 19, 2023. Patient received a Resmed Airsense 11 Pressures were set at  5-15 cm H2O.   Patient s ramp is 5 cm H2O for Auto and FLEX/EPR is 2.  Patient received a Resmed Mask name: AIRFIT [id number]  Nasal mask size Medium, heated tubing and heated humidifier.  Patient has the following compliance requirements: usage only.    Andie Raphael

## 2023-10-23 ENCOUNTER — DOCUMENTATION ONLY (OUTPATIENT)
Dept: SLEEP MEDICINE | Facility: CLINIC | Age: 33
End: 2023-10-23
Payer: COMMERCIAL

## 2023-10-23 NOTE — PROGRESS NOTES
3 day Sleep therapy management telephone visit    Diagnostic AHI: 31.0  HST    Confirmed with patient at time of call- N/A Patient is still interested in STM service       Message left for patient to return call    Order settings:  CPAP MIN CPAP MAX   5 cm H2O 15 cm H2O       Device settings:  CPAP MIN CPAP MAX EPR RESMED SOFT RESPONSE SETTING   5.0 cm  H20 15.0 cm  H20 TWO OFF       Compliance 100 %    Assessment: Nightly usage over four hours      Action plan: Patient to have 14 day STM visit. Patient has a follow up visit scheduled:   no and not required by insurance    Replacement device: No  STM ordered by provider: Yes     Total time spent on accessing and  interpreting remote patient PAP therapy data  10 minutes    Total time spent counseling, coaching  and reviewing PAP therapy data with patient  1 minutes    [id number] no

## 2023-10-24 ENCOUNTER — MYC MEDICAL ADVICE (OUTPATIENT)
Dept: PSYCHIATRY | Facility: CLINIC | Age: 33
End: 2023-10-24

## 2023-10-24 ENCOUNTER — LAB (OUTPATIENT)
Dept: LAB | Facility: CLINIC | Age: 33
End: 2023-10-24
Payer: COMMERCIAL

## 2023-10-24 ENCOUNTER — ALLIED HEALTH/NURSE VISIT (OUTPATIENT)
Dept: PSYCHIATRY | Facility: CLINIC | Age: 33
End: 2023-10-24
Payer: COMMERCIAL

## 2023-10-24 VITALS
WEIGHT: 290.2 LBS | BODY MASS INDEX: 39.36 KG/M2 | HEART RATE: 76 BPM | DIASTOLIC BLOOD PRESSURE: 85 MMHG | SYSTOLIC BLOOD PRESSURE: 122 MMHG

## 2023-10-24 DIAGNOSIS — Z79.899 ENCOUNTER FOR LONG-TERM (CURRENT) USE OF MEDICATIONS: ICD-10-CM

## 2023-10-24 DIAGNOSIS — F25.0 SCHIZOAFFECTIVE DISORDER, BIPOLAR TYPE (H): Primary | ICD-10-CM

## 2023-10-24 LAB
BASOPHILS # BLD AUTO: 0.1 10E3/UL (ref 0–0.2)
BASOPHILS NFR BLD AUTO: 1 %
EOSINOPHIL # BLD AUTO: 0.4 10E3/UL (ref 0–0.7)
EOSINOPHIL NFR BLD AUTO: 4 %
ERYTHROCYTE [DISTWIDTH] IN BLOOD BY AUTOMATED COUNT: 12.8 % (ref 10–15)
HCT VFR BLD AUTO: 46 % (ref 40–53)
HGB BLD-MCNC: 15.3 G/DL (ref 13.3–17.7)
IMM GRANULOCYTES # BLD: 0.2 10E3/UL
IMM GRANULOCYTES NFR BLD: 2 %
LYMPHOCYTES # BLD AUTO: 2.2 10E3/UL (ref 0.8–5.3)
LYMPHOCYTES NFR BLD AUTO: 24 %
MCH RBC QN AUTO: 29.8 PG (ref 26.5–33)
MCHC RBC AUTO-ENTMCNC: 33.3 G/DL (ref 31.5–36.5)
MCV RBC AUTO: 90 FL (ref 78–100)
MONOCYTES # BLD AUTO: 0.6 10E3/UL (ref 0–1.3)
MONOCYTES NFR BLD AUTO: 7 %
NEUTROPHILS # BLD AUTO: 5.6 10E3/UL (ref 1.6–8.3)
NEUTROPHILS NFR BLD AUTO: 62 %
NRBC # BLD AUTO: 0 10E3/UL
NRBC BLD AUTO-RTO: 0 /100
PLATELET # BLD AUTO: 272 10E3/UL (ref 150–450)
RBC # BLD AUTO: 5.14 10E6/UL (ref 4.4–5.9)
WBC # BLD AUTO: 9.1 10E3/UL (ref 4–11)

## 2023-10-24 PROCEDURE — 85025 COMPLETE CBC W/AUTO DIFF WBC: CPT

## 2023-10-24 PROCEDURE — 36415 COLL VENOUS BLD VENIPUNCTURE: CPT

## 2023-10-24 NOTE — TELEPHONE ENCOUNTER
Patient presented to clinic for vitals. He stats that he really likes the clozapine so far. He has had no side effects. He does not need any refills at this time. This past week he has been on:    Week 1   clozapine 12.5 mg.   olanzapine 10 mg     His plan:   Week 2   clozapine 25 mg.   olanzapine 10 mg     Week 3  clozapine 37.5 mg.   olanzapine 10 mg     Week 4  clozapine 50 mg.   olanzapine 7.5 mg     He and his mom feel like a slower titration would be better. Message sent to provider with update.

## 2023-10-24 NOTE — NURSING NOTE
Chief Complaint   Patient presents with    RECHECK     Vitals Schizoaffective disorder     - Alfredito Moss, Visit Facilitator

## 2023-10-31 ENCOUNTER — LAB (OUTPATIENT)
Dept: LAB | Facility: CLINIC | Age: 33
End: 2023-10-31
Payer: COMMERCIAL

## 2023-10-31 DIAGNOSIS — Z79.899 ENCOUNTER FOR LONG-TERM (CURRENT) USE OF MEDICATIONS: ICD-10-CM

## 2023-10-31 LAB
BASOPHILS # BLD AUTO: 0.1 10E3/UL (ref 0–0.2)
BASOPHILS NFR BLD AUTO: 1 %
EOSINOPHIL # BLD AUTO: 0.4 10E3/UL (ref 0–0.7)
EOSINOPHIL NFR BLD AUTO: 5 %
ERYTHROCYTE [DISTWIDTH] IN BLOOD BY AUTOMATED COUNT: 12.7 % (ref 10–15)
HCT VFR BLD AUTO: 47.1 % (ref 40–53)
HGB BLD-MCNC: 15.1 G/DL (ref 13.3–17.7)
IMM GRANULOCYTES # BLD: 0.1 10E3/UL
IMM GRANULOCYTES NFR BLD: 1 %
LYMPHOCYTES # BLD AUTO: 2.1 10E3/UL (ref 0.8–5.3)
LYMPHOCYTES NFR BLD AUTO: 24 %
MCH RBC QN AUTO: 28.9 PG (ref 26.5–33)
MCHC RBC AUTO-ENTMCNC: 32.1 G/DL (ref 31.5–36.5)
MCV RBC AUTO: 90 FL (ref 78–100)
MONOCYTES # BLD AUTO: 0.7 10E3/UL (ref 0–1.3)
MONOCYTES NFR BLD AUTO: 8 %
NEUTROPHILS # BLD AUTO: 5.1 10E3/UL (ref 1.6–8.3)
NEUTROPHILS NFR BLD AUTO: 60 %
PLATELET # BLD AUTO: 271 10E3/UL (ref 150–450)
RBC # BLD AUTO: 5.23 10E6/UL (ref 4.4–5.9)
WBC # BLD AUTO: 8.6 10E3/UL (ref 4–11)

## 2023-10-31 PROCEDURE — 36415 COLL VENOUS BLD VENIPUNCTURE: CPT

## 2023-10-31 PROCEDURE — 85025 COMPLETE CBC W/AUTO DIFF WBC: CPT

## 2023-11-06 ENCOUNTER — TELEPHONE (OUTPATIENT)
Dept: PSYCHIATRY | Facility: CLINIC | Age: 33
End: 2023-11-06
Payer: COMMERCIAL

## 2023-11-06 NOTE — TELEPHONE ENCOUNTER
10/24/23 For clozapine monitoring purposes:   Current dose: per the 10/24 MyChart encounter, 12.5 mg daily  Laboratory Monitoring: weekly    -Clozapine REMS guidelines recommend monitoring ANC only.    -Regular monitoring can continue until ANC <1.5   -Per these guidelines ANC is WNL at 5.6  -Will notify Dr. Thomas  -Clozapine REMs updated    10/31/23 For clozapine monitoring purposes:   Current dose: per the 10/24 MyChart encounter, 25 mg daily  Laboratory Monitoring: weekly    -Clozapine REMS guidelines recommend monitoring ANC only.    -Regular monitoring can continue until ANC <1.5   -Per these guidelines ANC is WNL at 5.1  -Will notify Dr. Thomas  -Clozapine REMs updated    Henna Modi RN on 11/6/2023 at 8:06 AM

## 2023-11-07 ENCOUNTER — ALLIED HEALTH/NURSE VISIT (OUTPATIENT)
Dept: PSYCHIATRY | Facility: CLINIC | Age: 33
End: 2023-11-07
Payer: COMMERCIAL

## 2023-11-07 ENCOUNTER — LAB (OUTPATIENT)
Dept: LAB | Facility: CLINIC | Age: 33
End: 2023-11-07
Payer: COMMERCIAL

## 2023-11-07 ENCOUNTER — DOCUMENTATION ONLY (OUTPATIENT)
Dept: SLEEP MEDICINE | Facility: CLINIC | Age: 33
End: 2023-11-07

## 2023-11-07 ENCOUNTER — DOCUMENTATION ONLY (OUTPATIENT)
Dept: SLEEP MEDICINE | Facility: CLINIC | Age: 33
End: 2023-11-07
Payer: COMMERCIAL

## 2023-11-07 VITALS
DIASTOLIC BLOOD PRESSURE: 84 MMHG | SYSTOLIC BLOOD PRESSURE: 126 MMHG | HEART RATE: 66 BPM | BODY MASS INDEX: 40.43 KG/M2 | WEIGHT: 298.1 LBS

## 2023-11-07 DIAGNOSIS — F25.0 SCHIZOAFFECTIVE DISORDER, BIPOLAR TYPE (H): Primary | ICD-10-CM

## 2023-11-07 DIAGNOSIS — Z79.899 ENCOUNTER FOR LONG-TERM (CURRENT) USE OF MEDICATIONS: ICD-10-CM

## 2023-11-07 DIAGNOSIS — G47.33 OSA (OBSTRUCTIVE SLEEP APNEA): Primary | ICD-10-CM

## 2023-11-07 LAB
BASOPHILS # BLD AUTO: 0.1 10E3/UL (ref 0–0.2)
BASOPHILS NFR BLD AUTO: 1 %
EOSINOPHIL # BLD AUTO: 0.4 10E3/UL (ref 0–0.7)
EOSINOPHIL NFR BLD AUTO: 5 %
ERYTHROCYTE [DISTWIDTH] IN BLOOD BY AUTOMATED COUNT: 12.8 % (ref 10–15)
HCT VFR BLD AUTO: 46.5 % (ref 40–53)
HGB BLD-MCNC: 15.1 G/DL (ref 13.3–17.7)
IMM GRANULOCYTES # BLD: 0.1 10E3/UL
IMM GRANULOCYTES NFR BLD: 1 %
LYMPHOCYTES # BLD AUTO: 2.2 10E3/UL (ref 0.8–5.3)
LYMPHOCYTES NFR BLD AUTO: 25 %
MCH RBC QN AUTO: 29.3 PG (ref 26.5–33)
MCHC RBC AUTO-ENTMCNC: 32.5 G/DL (ref 31.5–36.5)
MCV RBC AUTO: 90 FL (ref 78–100)
MONOCYTES # BLD AUTO: 0.7 10E3/UL (ref 0–1.3)
MONOCYTES NFR BLD AUTO: 8 %
NEUTROPHILS # BLD AUTO: 5.4 10E3/UL (ref 1.6–8.3)
NEUTROPHILS NFR BLD AUTO: 60 %
NRBC # BLD AUTO: 0 10E3/UL
NRBC BLD AUTO-RTO: 0 /100
PLATELET # BLD AUTO: 283 10E3/UL (ref 150–450)
RBC # BLD AUTO: 5.15 10E6/UL (ref 4.4–5.9)
WBC # BLD AUTO: 8.9 10E3/UL (ref 4–11)

## 2023-11-07 PROCEDURE — 85025 COMPLETE CBC W/AUTO DIFF WBC: CPT

## 2023-11-07 PROCEDURE — 36415 COLL VENOUS BLD VENIPUNCTURE: CPT

## 2023-11-07 NOTE — PROGRESS NOTES
14  DAY STM VISIT    Diagnostic AHI: 31.0  PSG    Message left for patient to return call     Assessment: Pt not meeting objective benchmarks for compliance       Action plan: waiting for patient to return call.  and pt to have 30 day STM visit.      Device type: Auto-CPAP    PAP settings: CPAP min 5.0 cm  H20       CPAP max 15.0 cm  H20      95th% pressure 10.2 cm  H20        RESMED EPR level Setting: TWO    RESMED Soft response setting:  OFF    Mask type:  Per patient choice    Objective measures: 14 day rolling measures      Compliance  14 %      Leak  12.67  lpm  last  upload      AHI 0.61   last  upload      Average number of minutes 77      Objective measure goal  Compliance   Goal >70%  Leak   Goal < 24 lpm  AHI  Goal < 5  Usage  Goal >240        Total time spent on accessing and interpreting remote patient PAP therapy data  10 minutes    Total time spent counseling, coaching  and reviewing PAP therapy data with patient  1 minutes    [id number]  [id number]  no (3 day STM)

## 2023-11-07 NOTE — PROGRESS NOTES
Patient came to [facility] for mask fitting appointment on November 7, 2023. Patient requested to switch masks because exhalation port issues. Discussed the following masks: Vitera, F20, Mihir FFM. Patient selected a Resmed Mask name: F20 Full Face mask size Large.

## 2023-11-12 DIAGNOSIS — F25.0 SCHIZOAFFECTIVE DISORDER, BIPOLAR TYPE (H): ICD-10-CM

## 2023-11-13 ENCOUNTER — TELEPHONE (OUTPATIENT)
Dept: PSYCHIATRY | Facility: CLINIC | Age: 33
End: 2023-11-13
Payer: COMMERCIAL

## 2023-11-13 RX ORDER — LAMOTRIGINE 200 MG/1
200 TABLET ORAL AT BEDTIME
Qty: 30 TABLET | Refills: 0 | Status: SHIPPED | OUTPATIENT
Start: 2023-11-13 | End: 2023-11-27

## 2023-11-13 NOTE — TELEPHONE ENCOUNTER
Medication requested: lamoTRIgine (LAMICTAL) 200 MG tablet   Last refilled: 10/10/23  Qty: 30      Last seen: 10/2/23  RTC: 3-4 weeks  Cancel: 0  10/30/23 wt list but didn't work out   No-show: 0  Next appt: 11/27/23    Refill decision:   Refilled for 30 days per protocol.

## 2023-11-13 NOTE — TELEPHONE ENCOUNTER
Received fax from below . Writer called [facility] and spoke to Betsy. She said that they are not authorized to fill the Clozapine. She said there's a form that we need to fill.                      Belia Tobar on 11/13/2023 at 9:40 AM

## 2023-11-14 ENCOUNTER — LAB (OUTPATIENT)
Dept: LAB | Facility: CLINIC | Age: 33
End: 2023-11-14
Payer: COMMERCIAL

## 2023-11-14 DIAGNOSIS — Z79.899 ENCOUNTER FOR LONG-TERM (CURRENT) USE OF MEDICATIONS: ICD-10-CM

## 2023-11-14 LAB
BASOPHILS # BLD AUTO: 0.1 10E3/UL (ref 0–0.2)
BASOPHILS NFR BLD AUTO: 1 %
EOSINOPHIL # BLD AUTO: 0.3 10E3/UL (ref 0–0.7)
EOSINOPHIL NFR BLD AUTO: 4 %
ERYTHROCYTE [DISTWIDTH] IN BLOOD BY AUTOMATED COUNT: 12.4 % (ref 10–15)
HCT VFR BLD AUTO: 45.8 % (ref 40–53)
HGB BLD-MCNC: 14.9 G/DL (ref 13.3–17.7)
IMM GRANULOCYTES # BLD: 0.1 10E3/UL
IMM GRANULOCYTES NFR BLD: 1 %
LYMPHOCYTES # BLD AUTO: 2 10E3/UL (ref 0.8–5.3)
LYMPHOCYTES NFR BLD AUTO: 25 %
MCH RBC QN AUTO: 28.9 PG (ref 26.5–33)
MCHC RBC AUTO-ENTMCNC: 32.5 G/DL (ref 31.5–36.5)
MCV RBC AUTO: 89 FL (ref 78–100)
MONOCYTES # BLD AUTO: 0.4 10E3/UL (ref 0–1.3)
MONOCYTES NFR BLD AUTO: 5 %
NEUTROPHILS # BLD AUTO: 5.2 10E3/UL (ref 1.6–8.3)
NEUTROPHILS NFR BLD AUTO: 65 %
PLATELET # BLD AUTO: 265 10E3/UL (ref 150–450)
RBC # BLD AUTO: 5.15 10E6/UL (ref 4.4–5.9)
WBC # BLD AUTO: 8 10E3/UL (ref 4–11)

## 2023-11-14 PROCEDURE — 36415 COLL VENOUS BLD VENIPUNCTURE: CPT

## 2023-11-14 PROCEDURE — 85025 COMPLETE CBC W/AUTO DIFF WBC: CPT

## 2023-11-20 ENCOUNTER — TELEPHONE (OUTPATIENT)
Dept: PSYCHIATRY | Facility: CLINIC | Age: 33
End: 2023-11-20
Payer: COMMERCIAL

## 2023-11-20 NOTE — TELEPHONE ENCOUNTER
For clozapine monitoring purposes:   Current dose: 25 mg QHS  Laboratory Monitoring: weekly    -Clozapine REMS guidelines recommend monitoring ANC only.    -Regular monitoring can continue until ANC <1.5   -Per these guidelines ANC is WNL at 5.2  -Will notify Dr. Thomas  -Clozapine REMS updated    Henna Modi RN on 11/20/2023 at 9:17 AM

## 2023-11-21 ENCOUNTER — ALLIED HEALTH/NURSE VISIT (OUTPATIENT)
Dept: PSYCHIATRY | Facility: CLINIC | Age: 33
End: 2023-11-21
Payer: COMMERCIAL

## 2023-11-21 ENCOUNTER — LAB (OUTPATIENT)
Dept: LAB | Facility: CLINIC | Age: 33
End: 2023-11-21
Payer: COMMERCIAL

## 2023-11-21 DIAGNOSIS — Z79.899 ENCOUNTER FOR LONG-TERM (CURRENT) USE OF MEDICATIONS: ICD-10-CM

## 2023-11-21 DIAGNOSIS — F25.0 SCHIZOAFFECTIVE DISORDER, BIPOLAR TYPE (H): Primary | ICD-10-CM

## 2023-11-21 LAB
BASOPHILS # BLD AUTO: 0.1 10E3/UL (ref 0–0.2)
BASOPHILS NFR BLD AUTO: 1 %
EOSINOPHIL # BLD AUTO: 0.3 10E3/UL (ref 0–0.7)
EOSINOPHIL NFR BLD AUTO: 4 %
ERYTHROCYTE [DISTWIDTH] IN BLOOD BY AUTOMATED COUNT: 12.6 % (ref 10–15)
HCT VFR BLD AUTO: 46.2 % (ref 40–53)
HGB BLD-MCNC: 15.1 G/DL (ref 13.3–17.7)
IMM GRANULOCYTES # BLD: 0.1 10E3/UL
IMM GRANULOCYTES NFR BLD: 1 %
LYMPHOCYTES # BLD AUTO: 2.1 10E3/UL (ref 0.8–5.3)
LYMPHOCYTES NFR BLD AUTO: 26 %
MCH RBC QN AUTO: 29.7 PG (ref 26.5–33)
MCHC RBC AUTO-ENTMCNC: 32.7 G/DL (ref 31.5–36.5)
MCV RBC AUTO: 91 FL (ref 78–100)
MONOCYTES # BLD AUTO: 0.6 10E3/UL (ref 0–1.3)
MONOCYTES NFR BLD AUTO: 8 %
NEUTROPHILS # BLD AUTO: 4.8 10E3/UL (ref 1.6–8.3)
NEUTROPHILS NFR BLD AUTO: 60 %
NRBC # BLD AUTO: 0 10E3/UL
NRBC BLD AUTO-RTO: 0 /100
PLATELET # BLD AUTO: 267 10E3/UL (ref 150–450)
RBC # BLD AUTO: 5.09 10E6/UL (ref 4.4–5.9)
WBC # BLD AUTO: 8 10E3/UL (ref 4–11)

## 2023-11-21 PROCEDURE — 85025 COMPLETE CBC W/AUTO DIFF WBC: CPT

## 2023-11-21 PROCEDURE — 36415 COLL VENOUS BLD VENIPUNCTURE: CPT

## 2023-11-21 NOTE — NURSING NOTE
Chief Complaint   Patient presents with    RECHECK     Vital checks            Belia Tobar on 11/21/2023 at 12:58 PM

## 2023-11-25 DIAGNOSIS — F25.0 SCHIZOAFFECTIVE DISORDER, BIPOLAR TYPE (H): ICD-10-CM

## 2023-11-25 RX ORDER — CLOZAPINE 25 MG/1
25 TABLET ORAL AT BEDTIME
Qty: 30 TABLET | Refills: 1 | Status: CANCELLED | OUTPATIENT
Start: 2023-11-25

## 2023-11-27 ENCOUNTER — VIRTUAL VISIT (OUTPATIENT)
Dept: PSYCHIATRY | Facility: CLINIC | Age: 33
End: 2023-11-27
Attending: PSYCHIATRY & NEUROLOGY
Payer: COMMERCIAL

## 2023-11-27 DIAGNOSIS — F25.0 SCHIZOAFFECTIVE DISORDER, BIPOLAR TYPE (H): Primary | ICD-10-CM

## 2023-11-27 DIAGNOSIS — F90.9 ATTENTION DEFICIT HYPERACTIVITY DISORDER (ADHD), UNSPECIFIED ADHD TYPE: ICD-10-CM

## 2023-11-27 PROCEDURE — 99214 OFFICE O/P EST MOD 30 MIN: CPT | Mod: VID

## 2023-11-27 PROCEDURE — 90833 PSYTX W PT W E/M 30 MIN: CPT | Mod: VID

## 2023-11-27 RX ORDER — OLANZAPINE 5 MG/1
TABLET ORAL
Qty: 30 TABLET | Refills: 1 | Status: SHIPPED | OUTPATIENT
Start: 2023-11-27 | End: 2024-02-05

## 2023-11-27 RX ORDER — LAMOTRIGINE 200 MG/1
200 TABLET ORAL AT BEDTIME
Qty: 30 TABLET | Refills: 1 | Status: SHIPPED | OUTPATIENT
Start: 2023-11-27 | End: 2024-01-08

## 2023-11-27 RX ORDER — CLOZAPINE 25 MG/1
25 TABLET ORAL AT BEDTIME
Qty: 30 TABLET | Refills: 0 | Status: SHIPPED | OUTPATIENT
Start: 2023-11-27 | End: 2024-02-05

## 2023-11-27 RX ORDER — CLOZAPINE 50 MG/1
50 TABLET ORAL AT BEDTIME
Qty: 30 TABLET | Refills: 1 | Status: SHIPPED | OUTPATIENT
Start: 2023-11-27 | End: 2024-02-05

## 2023-11-27 ASSESSMENT — PATIENT HEALTH QUESTIONNAIRE - PHQ9
SUM OF ALL RESPONSES TO PHQ QUESTIONS 1-9: 8
SUM OF ALL RESPONSES TO PHQ QUESTIONS 1-9: 8
10. IF YOU CHECKED OFF ANY PROBLEMS, HOW DIFFICULT HAVE THESE PROBLEMS MADE IT FOR YOU TO DO YOUR WORK, TAKE CARE OF THINGS AT HOME, OR GET ALONG WITH OTHER PEOPLE: SOMEWHAT DIFFICULT

## 2023-11-27 ASSESSMENT — PAIN SCALES - GENERAL: PAINLEVEL: NO PAIN (0)

## 2023-11-27 NOTE — NURSING NOTE
Is the patient currently in the state of [place]? YES    Visit mode:VIDEO    If the visit is dropped, the patient can be reconnected by: VIDEO VISIT: Text to cell phone:   Telephone Information:   Mobile [phone]       Will anyone else be joining the visit? NO  (If patient encounters technical issues they should call [phone])    How would you like to obtain your AVS? MyChart    Are changes needed to the allergy or medication list? No    Reason for visit: CATRACHITO GOEL

## 2023-11-27 NOTE — PROGRESS NOTES
Virtual Visit Details    Type of service:  Video Visit   Video Start Time:  14:00  Video End Time: 14:25    Originating Location (pt. Location): Home    Distant Location (provider location):  On-site  Platform used for Video Visit: [employer]    Answers submitted by the patient for this visit:  Patient Health Questionnaire (Submitted on 11/27/2023)  If you checked off any problems, how difficult have these problems made it for you to do your work, take care of things at home, or get along with other people?: Somewhat difficult  PHQ9 TOTAL SCORE: 8

## 2023-11-27 NOTE — PATIENT INSTRUCTIONS
**For crisis resources, please see the information at the end of this document**   Patient Education    Thank you for coming to the [facility].     Lab Testing:  If you had lab testing today and your results are reassuring or normal they will be mailed to you or sent through ZEB within 7 days. If the lab tests need quick action we will call you with the results. The phone number we will call with results is # [phone]. If this is not the best number please call our clinic and change the number.     Medication Refills:  If you need any refills please call your pharmacy and they will contact us. Our fax number for refills is [phone].   Three business days of notice are needed for general medication refill requests.   Five business days of notice are needed for controlled substance refill requests.   If you need to change to a different pharmacy, please contact the new pharmacy directly. The new pharmacy will help you get your medications transferred.     Contact Us:  Please call [phone] during business hours (8-5:00 M-F).   If you have medication related questions after clinic hours, or on the weekend, please call [phone].     Financial Assistance [phone]   Medical Records [phone]       MENTAL HEALTH CRISIS RESOURCES:  For a emergency help, please call 911 or go to the nearest Emergency Department.     Emergency Walk-In Options:   EmPATH Unit @ [facility] ([place]): [phone] - Specialized mental health emergency area designed to be calming  [facility] ([place]): [phone]  [facility] ([place]): [phone]  [facility]): [phone]    [facility] Crisis Information:   [place]: [phone]  [place]: [phone]  Fredi (FRIEDA) - Adult: [phone]     Child: [phone]  [place] - Adult: [phone]     Child: [phone]  [place]:  [phone]  List of all [facility] resources:   https://[place].gov/dhs/people-we-serve/adults/health-care/mental-health/resources/crisis-contacts.jsp    National Crisis Information:   Crisis Text Line: Text  [place]  to 884789  Suicide & Crisis Lifeline: 988  National Suicide Prevention Lifeline: 1-591-723-TALK (1-722.776.2381)       For online chat options, visit https://suicidepreventionlifeline.org/chat/  Poison Control Center: 2-914-164-4791  Trans Lifeline: 8-407-920-2928 - Hotline for transgender people of all ages  The [employer]: 1-666-450-6911 - Hotline for LGBT youth     For Non-Emergency Support:   Fast Tracker: Mental Health & Substance Use Disorder Resources -   https://www.[employer].org/

## 2023-11-27 NOTE — PROGRESS NOTES
[facility]  Psychiatry Clinic  MEDICAL PROGRESS NOTE     CARE TEAM:  PCP- Clinic Provider    Psychotherapist- None   Jose Francisco Sommers is a 33 year old who uses the name Manuel and pronouns he, him, his.      DIAGNOSIS     # Schizoaffective disorder, Bipolar type, most recent episode depressed, in remission without current psychotic features               -rule out bipolar disorder type 1  # ADHD, predominantly inattentive type  # Insomnia (history of delayed phase sleep disorder)    severe obstructive sleep apnea without hypoxemia - not tolerating CPAP      ASSESSMENT     Manuel is doing well overall.     # Schizoaffective disorder  Manuel appears to have a primary schizophrenia spectrum disorder with prominent negative symptoms. Depression improved with lamotrigine 200 mg. Manuel reports minimal mental health symptoms with little anxiety, no depression, no psychosis, and no safety concerns. He continues to present as more appropriately reactive and happy since starting lamotrigine.     Last visit we started cross tapering from olanzapine to clozapine. Currently on 25 mg clozapine and 7.5 mg olanzapine. Reports improvement in cognitive abilities and is able to read books easier. Will continue the cross taper. Will start metformin for the metabolic side effects. Previously was on 1000 mg bid and stopped due to heartburn. Is willing to do another trial. In case it caused side effects we can try other medications.    # Insomnia  # history of delayed phase sleep disorder  # LISA  Continues to report initial insomnia. Not tolerating CPAP and decided not to use it.    Future Considerations:  - further titration of lamotrigine  - re-trial metformin for medication associated weight gain, topiramate may have cognitive side effects. May consider Ozempic/semaglutide    Psychotropic Drug Interactions:  [PSYCHCLINICDDI]  ADDITIVE SEROTONERGIC: olanzapine, lithium  ADDITIVE CNS/RESPIRATORY DEPRESSION:  "lamotrigine, olanzapine, clozapine  Lithium may enhance the neurotoxic effect of Antipsychotic Agents.  Management: routine monitoring in the process of cross tapering from olanzapine to clozapine    [place] was checked today: indicates that controlled prescriptions have been filled as prescribed    Risk Statements:   Treatment Risk- Risks, benefits, alternatives and potential adverse effects have been discussed and are understood.  Safety Risk-Manuel did not appear to be an imminent safety risk to self or others.      PLAN                                                                                                                1) Meds-  - Continue lamotrigine 200 mg daily  - Continue olanzapine 7.5 mg at bedtime  - Increase Clozapine to 75 mg   - Start Metformin 500mg BID    2) Psychotherapy- not interested    3) Next due-  Vitals - weekly until dose stable x 2wks  Labs- 9/19/23: lithium  0.64, HbA1C: 5.1  AST ALT slightly elevated,   on 10/3/23  Standing CBC order placed   EKG- Routine monitoring is not indicated for current psychotropic medication regimen   Rating scales- AIMS completed 4/6/23. AIMS = 0.    4) Referrals- none  none     Previously referred to:   -SPARC eval for clozapine assessment on 4/6/23  -Hepatology regarding initiation of Depakote    5) Dispo- RTC in 4-6 weeks      PERTINENT BACKGROUND                                                    [most recent eval 08/04/22]     Jose Francisco \"Manuel\"  first experienced mental health issues in grade school and has received treatment for ADHD, depression, psychosis, and substance use. Notably, this patient had FEP in 2015 and has been stable on olanzapine and lithium since that time. He has never lived independently and has only been slowly developing insight into his illness and past symptoms. GeneSight testing was completed noting patient is a poor CYP2D6 metabolizer. Past records from Dr. Mike Powers were received in summer 2018 to assist with " "diagnostic clarity, however they were hand written notes that were illegible though accompanied by typed neuropsychology consult from [facility]. This documentation was unable to support a diagnosis of bipolar disorder at that time though noted potential Asperger's diagnosis and ADHD. Given that there has been evidence for psychotic signs and symptoms outside the context of mood episodes, a schizophrenia spectrum disorder such as schizoaffective disorder diagnosis seems most accurate. He has had two hospitalizations for decompensated psychosis in the setting of decreasing his olanzapine dose.    Has \"no interest whatsoever\" in therapy. Please refrain from asking about it.    Psych pertinent item history includes psychosis [sxs include disorganization, possible catatonia], mutiple psychotropic trials, psych hosp (<3) and Substance Use: alcohol and cannabis     SUBJECTIVE     Since last visit:  After starting clozapine he has quit smoking the same day  Clozapine really affected craving   Ususally after smoking for a month he f    50 clozapine 7.5 olanzapine     Mood is good, It cross his mind to go up on lamotrigine once or twice  Feels some of original effects stopped like laughing more   Feels like his mood is a lot better , this has been the best combination so far     Sleep:   Ok, wakes up around noon, goes to bed at midnight, takes at least and hour to sleep  Recently got a CPAP device, finds it too uncomfortable   Sleep is different     Has noticed improvements in the way he approaches things in terms of execution  Reading is better       A week before starting clozapine had a nightmare, when wakes up eats food  Still working on how to resolve that  Ggets heartburn from clozapine, with metformin the heartburn is worse, 1000 mg bid       Social Hx:   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      Other - DUIs. Also In February 2014 the patient left for [place] to attend a [employer]! tournament. He ended up in [place] and was arrested for shoplifting from [facility] (had \"intended to purchase\" a pair of jeans but became panicked when security approached him). Had a brief stay in detention, and afterwards, took him 5 days to return to [place] with parents frequently wiring him money. Had difficulty adhering to travel plans, seemed confused, and was communicating with parents via \"bizarre\" text messages (parents even filed a missing persons report at one point). He finally arrived home exhausted, not sharing much of events in [place], but slightly more consistent with baseline.    Pertinent Negatives: No suicidal or violent ideation, self-injury, psychosis, hallucinations, delusions, lauren, hypomania, aggression and harmful substance use  Adverse Effects: weight gain, sedation, cognitive slowing?      PSYCH and SUBSTANCE USE Critical Summary Points since July 2022 08/14/23 - increased the dose of lamotrigine to 150 mg, reach out in 3 weeks and can further increase to 200  10/2/23 - started clozapine 25 mg decrease zyprexa to 7.5 plan to go to 50  11/28/23 - increase clozapine to 75 and continue zyprexa 7.5       PAST MED TRIALS     Adderall 10-20 mg - improved concentration, but \"cold personality\" and perseveration  Prozac 20 mg - limited response, less irritable, first episode of \"rage\"  Seroquel 25-75 mg - helped with sleep and irritability  Concerta 18 mg - " "improved focus, no improvement in motivation  Provigil 150 mg (up to 400) - Stimulants \"almost killed him\" triggered alcohol binges  Abilify ?25 mg - OK when QOD, but irritable and agitated on QD  Clonazepam 0.5-1.5 mg - calming, helped with sleep and \"catatonia\"  Risperdal up to 5 mg - some confusion, slow processing, withdrawn, ?\"catatonia\", panic attacks -dont try that  Zyprexa 5 mg less anxious overall improvement  Lithium - calmer, overall better  Latuda 20 mg - severe fatigue and depression  Synthroid 75 mcg - added to help with mood.  Brief trials with Strattera, Intuniv, Lamictal, Xanax, Zoloft  Naltrexone - had nightmares and sedation on 25mg, stopped after 2 nights     MEDICAL HISTORY and ALLERGY     ALLERGIES: Sulfa antibiotics    Patient Active Problem List   Diagnosis    Schizoaffective disorder, bipolar type (H)    Hx of psychiatric care    Elevated liver enzymes    Fatty liver    High blood triglycerides    History of cannabis abuse    History of cocaine abuse (H)    Psychosis (H)    Elevated blood pressure reading without diagnosis of hypertension    Constipation    Morbid obesity (H)    Schizoaffective disorder (H)      MEDICAL REVIEW OF SYSTEMS   Contraception-  Unknown     none in addition to that documented above     MEDICATIONS     Current Outpatient Medications   Medication Sig Dispense Refill    atorvastatin (LIPITOR) 20 MG tablet TAKE ONE TABLET BY MOUTH ONE TIME DAILY*      cloZAPine (CLOZARIL) 25 MG tablet Take 1 tablet (25 mg) by mouth At Bedtime 30 tablet 1    lamoTRIgine (LAMICTAL) 200 MG tablet Take 1 tablet (200 mg) by mouth at bedtime 30 tablet 0    lithium ER (LITHOBID) 300 MG CR tablet Take 4 tablets (1,200 mg) by mouth At Bedtime 120 tablet 2    OLANZapine (ZYPREXA) 5 MG tablet Take 2.5 tablets (12.5 mg) by mouth At Bedtime. May also take 0.5-1 tablets (2.5-5 mg) daily as needed ((for psychosis, lauren, or agitation)). 90 tablet 2      VITALS   There were no vitals taken for this " "visit.   Pulse Readings from Last 3 Encounters:   11/07/23 66   10/24/23 76   04/27/23 61     Wt Readings from Last 3 Encounters:   11/07/23 135.2 kg (298 lb 1.6 oz)   10/24/23 131.6 kg (290 lb 3.2 oz)   09/28/23 124.7 kg (275 lb)     BP Readings from Last 3 Encounters:   11/07/23 126/84   10/24/23 122/85   04/27/23 (!) 137/90      MENTAL STATUS EXAM     Alertness: alert  and oriented  Appearance: well groomed  Behavior/Demeanor: cooperative and calm, with good  eye contact   Speech: normal and regular rate and rhythm  Language: intact and no obvious problem  Psychomotor: normal or unremarkable  Mood:   \"pretty good\"  Affect: restricted, reactive; congruent to: mood- no, content- no  Thought Process/Associations:  linear, somewhat concrete, goal oriented  Thought Content:  Reports none;  Denies suicidal & violent ideation and delusions  Perception:  Reports none;  Denies auditory hallucinations and visual hallucinations  Insight: good  Judgment: good  Cognition: does  appear grossly intact; formal cognitive testing was not done  Gait and Station: N/A ([facility])     LABS and DATA         8/14/2023    12:48 PM 10/2/2023     2:54 PM 11/27/2023     1:30 PM   PHQ   PHQ-9 Total Score 6 7 8   Q9: Thoughts of better off dead/self-harm past 2 weeks Not at all Not at all Not at all     Liver/Kidney Function, TSH Metabolic Blood counts   Recent Labs   Lab Test 10/03/23  1227 09/19/23  1144   AST 47*  --    ALT 99*  --    ALKPHOS 81  --    CR 1.28* 1.18*     Recent Labs   Lab Test 01/10/23  1156   TSH 1.44    Recent Labs   Lab Test 10/03/23  1227   CHOL 132   TRIG 362*   LDL 27   HDL 33*     Recent Labs   Lab Test 09/19/23  1144   A1C 5.1     Recent Labs   Lab Test 10/03/23  1227   GLC 88    Recent Labs   Lab Test 11/21/23  1205   WBC 8.0   HGB 15.1   HCT 46.2   MCV 91            PROVIDER: Darlene Landa MD    Level of Medical Decision Making:   - At least 1 chronic problem that is not stable  - Engaged in " prescription drug management during visit (discussed any medication benefits, side effects, alternatives, etc.)    Psychiatry Individual Psychotherapy Note   Psychotherapy start time - 14:03  Psychotherapy end time - 14:25  Date last reviewed with patient - 08/14/23  Subjective: This supportive psychotherapy session addressed issues related to goals of therapy and current psychosocial stressors. Patient's reaction: Action in the context of mental status appropriate for ambulatory setting.    Interactive complexity indicated? No  Plan: RTC in timeframe noted above  Psychotherapy services during this visit included myself and the patient.   Treatment Plan      SYMPTOMS; PROBLEMS   MEASURABLE GOALS;    FUNCTIONAL IMPROVEMENT / GAINS INTERVENTIONS DISCHARGE CRITERIA   Psychosis: negative symptoms (avolition, affective flattening, anhedonia, alogia, apathy, ...)   exercise for 20 minutes 3-7 days a week  Supportive / psychodynamic marked symptom improvement     Patient staffed in clinic with Dr. Julio who will sign the note.  Supervisor is Dr. Carrasquillo.

## 2023-11-28 ENCOUNTER — LAB (OUTPATIENT)
Dept: LAB | Facility: CLINIC | Age: 33
End: 2023-11-28
Payer: COMMERCIAL

## 2023-11-28 DIAGNOSIS — Z79.899 ENCOUNTER FOR LONG-TERM (CURRENT) USE OF MEDICATIONS: ICD-10-CM

## 2023-11-28 LAB
BASOPHILS # BLD AUTO: 0.1 10E3/UL (ref 0–0.2)
BASOPHILS NFR BLD AUTO: 1 %
EOSINOPHIL # BLD AUTO: 0.4 10E3/UL (ref 0–0.7)
EOSINOPHIL NFR BLD AUTO: 5 %
ERYTHROCYTE [DISTWIDTH] IN BLOOD BY AUTOMATED COUNT: 12.2 % (ref 10–15)
HCT VFR BLD AUTO: 45.6 % (ref 40–53)
HGB BLD-MCNC: 14.6 G/DL (ref 13.3–17.7)
IMM GRANULOCYTES # BLD: 0.1 10E3/UL
IMM GRANULOCYTES NFR BLD: 1 %
LYMPHOCYTES # BLD AUTO: 2.6 10E3/UL (ref 0.8–5.3)
LYMPHOCYTES NFR BLD AUTO: 30 %
MCH RBC QN AUTO: 28.8 PG (ref 26.5–33)
MCHC RBC AUTO-ENTMCNC: 32 G/DL (ref 31.5–36.5)
MCV RBC AUTO: 90 FL (ref 78–100)
MONOCYTES # BLD AUTO: 0.7 10E3/UL (ref 0–1.3)
MONOCYTES NFR BLD AUTO: 8 %
NEUTROPHILS # BLD AUTO: 4.9 10E3/UL (ref 1.6–8.3)
NEUTROPHILS NFR BLD AUTO: 56 %
PLATELET # BLD AUTO: 266 10E3/UL (ref 150–450)
RBC # BLD AUTO: 5.07 10E6/UL (ref 4.4–5.9)
WBC # BLD AUTO: 8.7 10E3/UL (ref 4–11)

## 2023-11-28 PROCEDURE — 85025 COMPLETE CBC W/AUTO DIFF WBC: CPT

## 2023-11-28 PROCEDURE — 36415 COLL VENOUS BLD VENIPUNCTURE: CPT

## 2023-11-30 DIAGNOSIS — F25.0 SCHIZOAFFECTIVE DISORDER, BIPOLAR TYPE (H): ICD-10-CM

## 2023-12-04 ENCOUNTER — MYC REFILL (OUTPATIENT)
Dept: PSYCHIATRY | Facility: CLINIC | Age: 33
End: 2023-12-04
Payer: COMMERCIAL

## 2023-12-04 DIAGNOSIS — F25.0 SCHIZOAFFECTIVE DISORDER, BIPOLAR TYPE (H): ICD-10-CM

## 2023-12-04 RX ORDER — LITHIUM CARBONATE 300 MG/1
TABLET, FILM COATED, EXTENDED RELEASE ORAL
Qty: 120 TABLET | Refills: 0 | Status: SHIPPED | OUTPATIENT
Start: 2023-12-04 | End: 2024-01-08

## 2023-12-04 RX ORDER — LITHIUM CARBONATE 300 MG/1
TABLET, FILM COATED, EXTENDED RELEASE ORAL
Qty: 120 TABLET | Refills: 0 | Status: CANCELLED | OUTPATIENT
Start: 2023-12-04

## 2023-12-04 NOTE — TELEPHONE ENCOUNTER
Medication requested: Lithium Carbonate ER Oral Tablet Extended Release 300 MG   Last refilled: 10-30-23  Qty: 120      Last seen: 11-27-23  RTC: 4-6 weeks  Cancel: 0  No-show: 0  Next appt: none    Last clinic note:  1) Meds-  - Continue lamotrigine 200 mg daily  - Continue olanzapine 7.5 mg at bedtime  - Increase Clozapine to 75 mg   - Start Metformin 500mg BID        Refill decision:   FYI to clinic;  last clinic note, med not in plan        Scheduling has been notified to contact the pt for appointment.      small bowel obstruction

## 2023-12-05 ENCOUNTER — LAB (OUTPATIENT)
Dept: LAB | Facility: CLINIC | Age: 33
End: 2023-12-05
Payer: COMMERCIAL

## 2023-12-05 ENCOUNTER — ALLIED HEALTH/NURSE VISIT (OUTPATIENT)
Dept: PSYCHIATRY | Facility: CLINIC | Age: 33
End: 2023-12-05
Payer: COMMERCIAL

## 2023-12-05 VITALS
SYSTOLIC BLOOD PRESSURE: 126 MMHG | WEIGHT: 300.8 LBS | HEART RATE: 88 BPM | DIASTOLIC BLOOD PRESSURE: 87 MMHG | BODY MASS INDEX: 40.8 KG/M2

## 2023-12-05 DIAGNOSIS — F25.0 SCHIZOAFFECTIVE DISORDER, BIPOLAR TYPE (H): Primary | ICD-10-CM

## 2023-12-05 DIAGNOSIS — Z79.899 ENCOUNTER FOR LONG-TERM (CURRENT) USE OF MEDICATIONS: ICD-10-CM

## 2023-12-05 LAB
BASOPHILS # BLD AUTO: 0.1 10E3/UL (ref 0–0.2)
BASOPHILS NFR BLD AUTO: 2 %
EOSINOPHIL # BLD AUTO: 0.3 10E3/UL (ref 0–0.7)
EOSINOPHIL NFR BLD AUTO: 6 %
ERYTHROCYTE [DISTWIDTH] IN BLOOD BY AUTOMATED COUNT: 12.6 % (ref 10–15)
HCT VFR BLD AUTO: 46.1 % (ref 40–53)
HGB BLD-MCNC: 15.1 G/DL (ref 13.3–17.7)
IMM GRANULOCYTES # BLD: 0.1 10E3/UL
IMM GRANULOCYTES NFR BLD: 1 %
LYMPHOCYTES # BLD AUTO: 1.4 10E3/UL (ref 0.8–5.3)
LYMPHOCYTES NFR BLD AUTO: 27 %
MCH RBC QN AUTO: 29.1 PG (ref 26.5–33)
MCHC RBC AUTO-ENTMCNC: 32.8 G/DL (ref 31.5–36.5)
MCV RBC AUTO: 89 FL (ref 78–100)
MONOCYTES # BLD AUTO: 0.7 10E3/UL (ref 0–1.3)
MONOCYTES NFR BLD AUTO: 14 %
NEUTROPHILS # BLD AUTO: 2.5 10E3/UL (ref 1.6–8.3)
NEUTROPHILS NFR BLD AUTO: 50 %
NRBC # BLD AUTO: 0 10E3/UL
NRBC BLD AUTO-RTO: 0 /100
PLATELET # BLD AUTO: 202 10E3/UL (ref 150–450)
RBC # BLD AUTO: 5.19 10E6/UL (ref 4.4–5.9)
WBC # BLD AUTO: 5 10E3/UL (ref 4–11)

## 2023-12-05 PROCEDURE — 85025 COMPLETE CBC W/AUTO DIFF WBC: CPT

## 2023-12-05 PROCEDURE — 36415 COLL VENOUS BLD VENIPUNCTURE: CPT

## 2023-12-12 ENCOUNTER — LAB (OUTPATIENT)
Dept: LAB | Facility: CLINIC | Age: 33
End: 2023-12-12
Payer: COMMERCIAL

## 2023-12-12 DIAGNOSIS — Z79.899 ENCOUNTER FOR LONG-TERM (CURRENT) USE OF MEDICATIONS: ICD-10-CM

## 2023-12-12 LAB
BASOPHILS # BLD AUTO: 0.1 10E3/UL (ref 0–0.2)
BASOPHILS NFR BLD AUTO: 1 %
EOSINOPHIL # BLD AUTO: 0.3 10E3/UL (ref 0–0.7)
EOSINOPHIL NFR BLD AUTO: 4 %
ERYTHROCYTE [DISTWIDTH] IN BLOOD BY AUTOMATED COUNT: 12.3 % (ref 10–15)
HCT VFR BLD AUTO: 46.9 % (ref 40–53)
HGB BLD-MCNC: 14.8 G/DL (ref 13.3–17.7)
IMM GRANULOCYTES # BLD: 0.1 10E3/UL
IMM GRANULOCYTES NFR BLD: 1 %
LYMPHOCYTES # BLD AUTO: 2.4 10E3/UL (ref 0.8–5.3)
LYMPHOCYTES NFR BLD AUTO: 27 %
MCH RBC QN AUTO: 28.1 PG (ref 26.5–33)
MCHC RBC AUTO-ENTMCNC: 31.6 G/DL (ref 31.5–36.5)
MCV RBC AUTO: 89 FL (ref 78–100)
MONOCYTES # BLD AUTO: 0.5 10E3/UL (ref 0–1.3)
MONOCYTES NFR BLD AUTO: 6 %
NEUTROPHILS # BLD AUTO: 5.3 10E3/UL (ref 1.6–8.3)
NEUTROPHILS NFR BLD AUTO: 61 %
PLATELET # BLD AUTO: 294 10E3/UL (ref 150–450)
RBC # BLD AUTO: 5.26 10E6/UL (ref 4.4–5.9)
WBC # BLD AUTO: 8.7 10E3/UL (ref 4–11)

## 2023-12-12 PROCEDURE — 85025 COMPLETE CBC W/AUTO DIFF WBC: CPT

## 2023-12-12 PROCEDURE — 36415 COLL VENOUS BLD VENIPUNCTURE: CPT

## 2023-12-19 ENCOUNTER — LAB (OUTPATIENT)
Dept: LAB | Facility: CLINIC | Age: 33
End: 2023-12-19
Payer: COMMERCIAL

## 2023-12-19 ENCOUNTER — ALLIED HEALTH/NURSE VISIT (OUTPATIENT)
Dept: PSYCHIATRY | Facility: CLINIC | Age: 33
End: 2023-12-19
Payer: COMMERCIAL

## 2023-12-19 VITALS
BODY MASS INDEX: 41.12 KG/M2 | DIASTOLIC BLOOD PRESSURE: 86 MMHG | SYSTOLIC BLOOD PRESSURE: 133 MMHG | HEART RATE: 83 BPM | WEIGHT: 303.2 LBS

## 2023-12-19 DIAGNOSIS — Z79.899 ENCOUNTER FOR LONG-TERM (CURRENT) USE OF MEDICATIONS: Primary | ICD-10-CM

## 2023-12-19 DIAGNOSIS — Z79.899 ENCOUNTER FOR LONG-TERM (CURRENT) USE OF MEDICATIONS: ICD-10-CM

## 2023-12-19 LAB
BASOPHILS # BLD AUTO: 0.1 10E3/UL (ref 0–0.2)
BASOPHILS NFR BLD AUTO: 1 %
EOSINOPHIL # BLD AUTO: 0.3 10E3/UL (ref 0–0.7)
EOSINOPHIL NFR BLD AUTO: 4 %
ERYTHROCYTE [DISTWIDTH] IN BLOOD BY AUTOMATED COUNT: 12.3 % (ref 10–15)
HCT VFR BLD AUTO: 47.1 % (ref 40–53)
HGB BLD-MCNC: 15.4 G/DL (ref 13.3–17.7)
IMM GRANULOCYTES # BLD: 0.1 10E3/UL
IMM GRANULOCYTES NFR BLD: 1 %
LYMPHOCYTES # BLD AUTO: 2.1 10E3/UL (ref 0.8–5.3)
LYMPHOCYTES NFR BLD AUTO: 28 %
MCH RBC QN AUTO: 29.1 PG (ref 26.5–33)
MCHC RBC AUTO-ENTMCNC: 32.7 G/DL (ref 31.5–36.5)
MCV RBC AUTO: 89 FL (ref 78–100)
MONOCYTES # BLD AUTO: 0.5 10E3/UL (ref 0–1.3)
MONOCYTES NFR BLD AUTO: 7 %
NEUTROPHILS # BLD AUTO: 4.5 10E3/UL (ref 1.6–8.3)
NEUTROPHILS NFR BLD AUTO: 59 %
NRBC # BLD AUTO: 0 10E3/UL
NRBC BLD AUTO-RTO: 0 /100
PLATELET # BLD AUTO: 260 10E3/UL (ref 150–450)
RBC # BLD AUTO: 5.29 10E6/UL (ref 4.4–5.9)
WBC # BLD AUTO: 7.6 10E3/UL (ref 4–11)

## 2023-12-19 PROCEDURE — 36415 COLL VENOUS BLD VENIPUNCTURE: CPT

## 2023-12-19 PROCEDURE — 85025 COMPLETE CBC W/AUTO DIFF WBC: CPT

## 2023-12-19 ASSESSMENT — PAIN SCALES - GENERAL: PAINLEVEL: NO PAIN (0)

## 2023-12-19 NOTE — NURSING NOTE
Chief Complaint   Patient presents with    RECHECK     Vitals Signs     - Alfredito Moss, Visit Facilitator

## 2023-12-20 ENCOUNTER — MYC MEDICAL ADVICE (OUTPATIENT)
Dept: PSYCHIATRY | Facility: CLINIC | Age: 33
End: 2023-12-20
Payer: COMMERCIAL

## 2023-12-20 DIAGNOSIS — F29 SCHIZOPHRENIA SPECTRUM DISORDER WITH PSYCHOTIC DISORDER TYPE NOT YET DETERMINED (H): Primary | ICD-10-CM

## 2023-12-21 ENCOUNTER — TELEPHONE (OUTPATIENT)
Dept: PSYCHIATRY | Facility: CLINIC | Age: 33
End: 2023-12-21
Payer: COMMERCIAL

## 2023-12-21 RX ORDER — CLOZAPINE 50 MG/1
50 TABLET ORAL DAILY
Qty: 60 TABLET | Refills: 1 | Status: SHIPPED | OUTPATIENT
Start: 2023-12-21 | End: 2023-12-21

## 2023-12-21 RX ORDER — CLOZAPINE 25 MG/1
25 TABLET ORAL DAILY
Qty: 60 TABLET | Refills: 1 | Status: SHIPPED | OUTPATIENT
Start: 2023-12-21 | End: 2024-02-05

## 2023-12-21 RX ORDER — CLOZAPINE 100 MG/1
100 TABLET ORAL DAILY
Qty: 60 TABLET | Refills: 1 | Status: SHIPPED | OUTPATIENT
Start: 2023-12-21 | End: 2024-02-05

## 2023-12-21 NOTE — TELEPHONE ENCOUNTER
[facility] Call Center    Phone Message    May a detailed message be left on voicemail: yes     Reason for Call: Other: Pharmacy is requesting the patient's REMs ID.     Action Taken: Message routed to:  Other: P PSYCHIATRY NURSE-P    Travel Screening: Not Applicable

## 2023-12-21 NOTE — TELEPHONE ENCOUNTER
Patient's lab results and PSF updated in REMS. Writer returned call to pharmacy and provided patient's REMS ID.

## 2023-12-26 ENCOUNTER — LAB (OUTPATIENT)
Dept: LAB | Facility: CLINIC | Age: 33
End: 2023-12-26
Payer: COMMERCIAL

## 2023-12-26 DIAGNOSIS — Z79.899 ENCOUNTER FOR LONG-TERM (CURRENT) USE OF MEDICATIONS: ICD-10-CM

## 2023-12-26 LAB
BASOPHILS # BLD AUTO: 0.1 10E3/UL (ref 0–0.2)
BASOPHILS NFR BLD AUTO: 1 %
EOSINOPHIL # BLD AUTO: 0.3 10E3/UL (ref 0–0.7)
EOSINOPHIL NFR BLD AUTO: 4 %
ERYTHROCYTE [DISTWIDTH] IN BLOOD BY AUTOMATED COUNT: 12.5 % (ref 10–15)
HCT VFR BLD AUTO: 47 % (ref 40–53)
HGB BLD-MCNC: 15.4 G/DL (ref 13.3–17.7)
IMM GRANULOCYTES # BLD: 0.1 10E3/UL
IMM GRANULOCYTES NFR BLD: 1 %
LYMPHOCYTES # BLD AUTO: 2.2 10E3/UL (ref 0.8–5.3)
LYMPHOCYTES NFR BLD AUTO: 28 %
MCH RBC QN AUTO: 28.7 PG (ref 26.5–33)
MCHC RBC AUTO-ENTMCNC: 32.8 G/DL (ref 31.5–36.5)
MCV RBC AUTO: 88 FL (ref 78–100)
MONOCYTES # BLD AUTO: 0.6 10E3/UL (ref 0–1.3)
MONOCYTES NFR BLD AUTO: 7 %
NEUTROPHILS # BLD AUTO: 4.8 10E3/UL (ref 1.6–8.3)
NEUTROPHILS NFR BLD AUTO: 60 %
PLATELET # BLD AUTO: 253 10E3/UL (ref 150–450)
RBC # BLD AUTO: 5.36 10E6/UL (ref 4.4–5.9)
WBC # BLD AUTO: 8 10E3/UL (ref 4–11)

## 2023-12-26 PROCEDURE — 36415 COLL VENOUS BLD VENIPUNCTURE: CPT

## 2023-12-26 PROCEDURE — 85025 COMPLETE CBC W/AUTO DIFF WBC: CPT

## 2024-01-02 ENCOUNTER — LAB (OUTPATIENT)
Dept: LAB | Facility: CLINIC | Age: 34
End: 2024-01-02
Payer: COMMERCIAL

## 2024-01-02 ENCOUNTER — ALLIED HEALTH/NURSE VISIT (OUTPATIENT)
Dept: PSYCHIATRY | Facility: CLINIC | Age: 34
End: 2024-01-02
Payer: COMMERCIAL

## 2024-01-02 VITALS — HEART RATE: 88 BPM | DIASTOLIC BLOOD PRESSURE: 86 MMHG | SYSTOLIC BLOOD PRESSURE: 127 MMHG

## 2024-01-02 DIAGNOSIS — Z79.899 ENCOUNTER FOR LONG-TERM (CURRENT) USE OF MEDICATIONS: ICD-10-CM

## 2024-01-02 DIAGNOSIS — Z79.899 ENCOUNTER FOR LONG-TERM (CURRENT) USE OF MEDICATIONS: Primary | ICD-10-CM

## 2024-01-02 LAB
BASOPHILS # BLD AUTO: 0.1 10E3/UL (ref 0–0.2)
BASOPHILS NFR BLD AUTO: 1 %
EOSINOPHIL # BLD AUTO: 0.5 10E3/UL (ref 0–0.7)
EOSINOPHIL NFR BLD AUTO: 5 %
ERYTHROCYTE [DISTWIDTH] IN BLOOD BY AUTOMATED COUNT: 13 % (ref 10–15)
HCT VFR BLD AUTO: 47.3 % (ref 40–53)
HGB BLD-MCNC: 15.3 G/DL (ref 13.3–17.7)
IMM GRANULOCYTES # BLD: 0.1 10E3/UL
IMM GRANULOCYTES NFR BLD: 1 %
LYMPHOCYTES # BLD AUTO: 2.2 10E3/UL (ref 0.8–5.3)
LYMPHOCYTES NFR BLD AUTO: 26 %
MCH RBC QN AUTO: 28.9 PG (ref 26.5–33)
MCHC RBC AUTO-ENTMCNC: 32.3 G/DL (ref 31.5–36.5)
MCV RBC AUTO: 89 FL (ref 78–100)
MONOCYTES # BLD AUTO: 0.6 10E3/UL (ref 0–1.3)
MONOCYTES NFR BLD AUTO: 7 %
NEUTROPHILS # BLD AUTO: 5.1 10E3/UL (ref 1.6–8.3)
NEUTROPHILS NFR BLD AUTO: 60 %
NRBC # BLD AUTO: 0 10E3/UL
NRBC BLD AUTO-RTO: 0 /100
PLATELET # BLD AUTO: 237 10E3/UL (ref 150–450)
RBC # BLD AUTO: 5.3 10E6/UL (ref 4.4–5.9)
WBC # BLD AUTO: 8.5 10E3/UL (ref 4–11)

## 2024-01-02 PROCEDURE — 36415 COLL VENOUS BLD VENIPUNCTURE: CPT

## 2024-01-02 PROCEDURE — 85025 COMPLETE CBC W/AUTO DIFF WBC: CPT

## 2024-01-02 ASSESSMENT — PAIN SCALES - GENERAL: PAINLEVEL: NO PAIN (0)

## 2024-01-02 NOTE — NURSING NOTE
Chief Complaint   Patient presents with    RECHECK     Vital Signs     Pt declined wt    - Alfredito Moss, Visit Facilitator

## 2024-01-08 ENCOUNTER — VIRTUAL VISIT (OUTPATIENT)
Dept: PSYCHIATRY | Facility: CLINIC | Age: 34
End: 2024-01-08
Attending: STUDENT IN AN ORGANIZED HEALTH CARE EDUCATION/TRAINING PROGRAM
Payer: COMMERCIAL

## 2024-01-08 VITALS — BODY MASS INDEX: 40.63 KG/M2 | HEIGHT: 72 IN | WEIGHT: 300 LBS

## 2024-01-08 DIAGNOSIS — F25.0 SCHIZOAFFECTIVE DISORDER, BIPOLAR TYPE (H): ICD-10-CM

## 2024-01-08 PROCEDURE — 99214 OFFICE O/P EST MOD 30 MIN: CPT | Mod: 95

## 2024-01-08 PROCEDURE — 90833 PSYTX W PT W E/M 30 MIN: CPT | Mod: 95

## 2024-01-08 RX ORDER — LITHIUM CARBONATE 300 MG/1
TABLET, FILM COATED, EXTENDED RELEASE ORAL
Qty: 120 TABLET | Refills: 0 | Status: SHIPPED | OUTPATIENT
Start: 2024-01-08 | End: 2024-02-01

## 2024-01-08 RX ORDER — LAMOTRIGINE 200 MG/1
200 TABLET ORAL AT BEDTIME
Qty: 30 TABLET | Refills: 1 | Status: SHIPPED | OUTPATIENT
Start: 2024-01-08 | End: 2024-02-01

## 2024-01-08 RX ORDER — CLOZAPINE 200 MG/1
200 TABLET ORAL DAILY
Qty: 30 TABLET | Refills: 2 | Status: SHIPPED | OUTPATIENT
Start: 2024-01-15 | End: 2024-02-05

## 2024-01-08 ASSESSMENT — PATIENT HEALTH QUESTIONNAIRE - PHQ9
10. IF YOU CHECKED OFF ANY PROBLEMS, HOW DIFFICULT HAVE THESE PROBLEMS MADE IT FOR YOU TO DO YOUR WORK, TAKE CARE OF THINGS AT HOME, OR GET ALONG WITH OTHER PEOPLE: VERY DIFFICULT
SUM OF ALL RESPONSES TO PHQ QUESTIONS 1-9: 11
SUM OF ALL RESPONSES TO PHQ QUESTIONS 1-9: 11

## 2024-01-08 ASSESSMENT — PAIN SCALES - GENERAL: PAINLEVEL: NO PAIN (0)

## 2024-01-08 NOTE — NURSING NOTE
Is the patient currently in the state of [place]? YES    Visit mode:VIDEO    If the visit is dropped, the patient can be reconnected by: VIDEO VISIT: Text to cell phone:   Telephone Information:   Mobile [phone]       Will anyone else be joining the visit? NO  (If patient encounters technical issues they should call [phone]    How would you like to obtain your AVS? MyChart    Are changes needed to the allergy or medication list? No    Reason for visit: RECHECK    Monica GOEL

## 2024-01-08 NOTE — PROGRESS NOTES
[facility]  Psychiatry Clinic  MEDICAL PROGRESS NOTE     CARE TEAM:  PCP- Clinic Provider    Psychotherapist- None   Jose Francisco Sommers is a 33 year old who uses the name Manuel and pronouns he, him, his.      DIAGNOSIS     # Schizoaffective disorder, Bipolar type, most recent episode depressed, in remission without current psychotic features               -rule out bipolar disorder type 1  # ADHD, predominantly inattentive type  # Insomnia (history of delayed phase sleep disorder)    severe obstructive sleep apnea without hypoxemia - not tolerating CPAP      ASSESSMENT     Manuel is doing well overall.     # Schizoaffective disorder  Manuel appears to have a primary schizophrenia spectrum disorder with prominent negative symptoms. Depression improved with lamotrigine 200 mg. Manuel reports minimal mental health symptoms with little anxiety, no depression, no psychosis, and no safety concerns. He continues to present as more appropriately reactive and happy since starting lamotrigine.     Currently cross tapering from olanzapine to clozapine. Tomorrow would increase to 175 mg clozapine and 2.5 mg olanzapine. Will continue the cross taper. Is on metformin for the metabolic side effects but hasn't found much benefit. Interested in weight management referral.     # Insomnia  # history of delayed phase sleep disorder  # LISA  Continues to report initial insomnia due to feeling stuffed in nose and finding breathing difficult. Is planning to see PCP for this.    Future Considerations:  - further titration of lamotrigine  - for medication associated weight gain, topiramate may have cognitive side effects. May consider Ozempic/semaglutide    Psychotropic Drug Interactions:  [PSYCHCLINICDDI]  ADDITIVE SEROTONERGIC: olanzapine, lithium  ADDITIVE CNS/RESPIRATORY DEPRESSION: lamotrigine, olanzapine, clozapine  Lithium may enhance the neurotoxic effect of Antipsychotic Agents.  Management: routine monitoring  "in the process of cross tapering from olanzapine to clozapine    [place] today: indicates that controlled prescriptions have been filled as prescribed    Risk Statements:   Treatment Risk- Risks, benefits, alternatives and potential adverse effects have been discussed and are understood.  Safety Risk-Manuel did not appear to be an imminent safety risk to self or others.      PLAN                                                                                                                1) Meds-  - Continue lamotrigine 200 mg daily  - Olanzapine 2.5 mg at bedtime, a week after tomorrow stop  - Clozapine 175 mg, a week after tomorrow increase to 200 mg   - Continue lithium 1200 mg at bedtime  - Continue Metformin 500mg BID    2) Psychotherapy- not interested    3) Next due-  Vitals - weekly until dose stable x 2wks  Labs- 9/19/23: lithium  0.64, HbA1C: 5.1  Clozapine level to be done 5 days after 200 mg   AST ALT slightly elevated,   on 10/3/23  Standing CBC order placed   EKG- Routine monitoring is not indicated for current psychotropic medication regimen   Rating scales- AIMS completed 4/6/23. AIMS = 0.    4) Referrals-  Weight Management- medical     Previously referred to:   -[facility]ARC eval for clozapine assessment on 4/6/23  -Hepatology regarding initiation of Depakote    5) Dispo- RTC in 6-8 weeks      PERTINENT BACKGROUND                                                    [most recent eval 08/04/22]     Jose Francisco \"Manuel\"  first experienced mental health issues in grade school and has received treatment for ADHD, depression, psychosis, and substance use. Notably, this patient had FEP in 2015 and has been stable on olanzapine and lithium since that time. He has never lived independently and has only been slowly developing insight into his illness and past symptoms. CAXA testing was completed noting patient is a poor CYP2D6 metabolizer. Past records from Dr. Mike Powers were received in summer " "2018 to assist with diagnostic clarity, however they were hand written notes that were illegible though accompanied by typed neuropsychology consult from [facility]. This documentation was unable to support a diagnosis of bipolar disorder at that time though noted potential Asperger's diagnosis and ADHD. Given that there has been evidence for psychotic signs and symptoms outside the context of mood episodes, a schizophrenia spectrum disorder such as schizoaffective disorder diagnosis seems most accurate. He has had two hospitalizations for decompensated psychosis in the setting of decreasing his olanzapine dose.    Has \"no interest whatsoever\" in therapy. Please refrain from asking about it.    Psych pertinent item history includes psychosis [sxs include disorganization, possible catatonia], mutiple psychotropic trials, psych hosp (<3) and Substance Use: alcohol and cannabis     SUBJECTIVE     Since last visit:  Things have been pretty good    Tomorrow would go up to 175 of clozapine and is on 2.5 of olanzapine     Some unrelated issues   Still finds it difficult at night to breath with nose   Feels like his nose is clogged, sounds congested, as if the airway is shuts  Last month at least 5 times woke up with damp pillow, that doesn't     May wake up at 6 am and not fall back asleep due to breathing issues.  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      Other - DUIs. Also In February 2014 the patient left for [place] to attend a [employer] tournament. He ended up in [place] and was arrested for shoplifting from [employer] (had \"intended to purchase\" a pair of jeans but became panicked when security approached him). Had a brief stay in [place], and afterwards, took him 5 days to return to [place] with parents frequently wiring him money. Had difficulty adhering to travel plans, seemed confused, and was communicating with parents via \"bizarre\" text messages (parents even filed a missing persons report at one point). He finally arrived home exhausted, not sharing much of events in [place], but slightly more consistent with baseline.    Pertinent Negatives: No suicidal or violent ideation, self-injury, psychosis, hallucinations, delusions, lauren, hypomania, aggression and harmful substance use  Adverse Effects: weight gain, sedation, cognitive slowing?      PSYCH and SUBSTANCE USE Critical Summary Points since July 2022 08/14/23 - increased the dose of lamotrigine to 150 mg, reach out in 3 weeks and can further increase to 200  10/2/23 - started clozapine 25 mg decrease zyprexa to 7.5 plan to go to 50  11/28/23 - increase clozapine to 75 and continue zyprexa 7.5  1/8/23 - plan to raise clozapine 175 olanzapine 2.5 tomorrow       PAST MED TRIALS     Adderall 10-20 mg - improved concentration, but \"cold personality\" and perseveration  Prozac 20 mg - limited response, less irritable, first episode of \"rage\"  Seroquel 25-75 mg - helped with sleep and irritability  Concerta 18 mg - improved focus, no improvement in motivation  Provigil 150 mg (up to 400) - Stimulants \"almost killed him\" triggered alcohol binges  Abilify " "?25 mg - OK when QOD, but irritable and agitated on QD  Clonazepam 0.5-1.5 mg - calming, helped with sleep and \"catatonia\"  Risperdal up to 5 mg - some confusion, slow processing, withdrawn, ?\"catatonia\", panic attacks -dont try that  Zyprexa 5 mg less anxious overall improvement  Lithium - calmer, overall better  Latuda 20 mg - severe fatigue and depression  Synthroid 75 mcg - added to help with mood.  Brief trials with Strattera, Intuniv, Lamictal, Xanax, Zoloft  Naltrexone - had nightmares and sedation on 25mg, stopped after 2 nights     MEDICAL HISTORY and ALLERGY     ALLERGIES: Sulfa antibiotics    Patient Active Problem List   Diagnosis    Schizoaffective disorder, bipolar type (H)    Hx of psychiatric care    Elevated liver enzymes    Fatty liver    High blood triglycerides    History of cannabis abuse    History of cocaine abuse (H)    Psychosis (H)    Elevated blood pressure reading without diagnosis of hypertension    Constipation    Morbid obesity (H)    Schizoaffective disorder (H)      MEDICAL REVIEW OF SYSTEMS   Contraception-  Unknown     none in addition to that documented above     MEDICATIONS     Current Outpatient Medications   Medication Sig Dispense Refill    atorvastatin (LIPITOR) 20 MG tablet TAKE ONE TABLET BY MOUTH ONE TIME DAILY*      cloZAPine (CLOZARIL) 100 MG tablet Take 1 tablet (100 mg) by mouth daily 60 tablet 1    cloZAPine (CLOZARIL) 25 MG tablet Take 1 tablet (25 mg) by mouth daily 60 tablet 1    cloZAPine (CLOZARIL) 25 MG tablet Take 1 tablet (25 mg) by mouth at bedtime Week 1 and 2: 50 mg in addition to 25 mg for a total daily dose of 75 mg, From week 3: two tablets of 50 mg for a total daily dose of 100 mg 30 tablet 0    cloZAPine (CLOZARIL) 50 MG tablet Take 1 tablet (50 mg) by mouth at bedtime Week 1 and 2: 50 mg in addition to 25 mg for a total daily dose of 75 mg, From week 3: two tablets of 50 mg for a total daily dose of 100 mg 30 tablet 1    lamoTRIgine (LAMICTAL) 200 MG " "tablet Take 1 tablet (200 mg) by mouth at bedtime 30 tablet 1    lithium ER (LITHOBID) 300 MG CR tablet TAKE FOUR TABLETS BY MOUTH AT BEDTIME 120 tablet 0    metFORMIN (GLUCOPHAGE) 500 MG tablet Take 1 tablet (500 mg) by mouth 2 times daily (with meals) 60 tablet 1    OLANZapine (ZYPREXA) 5 MG tablet Take 1.5 tablets (7.5 mg) by mouth at bedtime for 14 days, THEN 1 tablet (5 mg) at bedtime for 14 days. 30 tablet 1      VITALS   There were no vitals taken for this visit.   Pulse Readings from Last 3 Encounters:   01/02/24 88   12/19/23 83   12/05/23 88     Wt Readings from Last 3 Encounters:   12/19/23 137.5 kg (303 lb 3.2 oz)   12/05/23 136.4 kg (300 lb 12.8 oz)   11/07/23 135.2 kg (298 lb 1.6 oz)     BP Readings from Last 3 Encounters:   01/02/24 127/86   12/19/23 133/86   12/05/23 126/87      MENTAL STATUS EXAM     Alertness: alert  and oriented  Appearance: well groomed  Behavior/Demeanor: cooperative and calm, with good  eye contact   Speech: normal and regular rate and rhythm  Language: intact and no obvious problem  Psychomotor: normal or unremarkable  Mood:   \"pretty good\"  Affect: restricted, reactive; congruent to: mood- no, content- no  Thought Process/Associations:  linear, somewhat concrete, goal oriented  Thought Content:  Reports none;  Denies suicidal & violent ideation and delusions  Perception:  Reports none;  Denies auditory hallucinations and visual hallucinations  Insight: good  Judgment: good  Cognition: does  appear grossly intact; formal cognitive testing was not done  Gait and Station: N/A ([facility])     LABS and DATA         8/14/2023    12:48 PM 10/2/2023     2:54 PM 11/27/2023     1:30 PM   PHQ   PHQ-9 Total Score 6 7 8   Q9: Thoughts of better off dead/self-harm past 2 weeks Not at all Not at all Not at all     Liver/Kidney Function, TSH Metabolic Blood counts   Recent Labs   Lab Test 10/03/23  1227 09/19/23  1144   AST 47*  --    ALT 99*  --    ALKPHOS 81  --    CR 1.28* 1.18* "     Recent Labs   Lab Test 01/10/23  1156   TSH 1.44    Recent Labs   Lab Test 10/03/23  1227   CHOL 132   TRIG 362*   LDL 27   HDL 33*     Recent Labs   Lab Test 09/19/23  1144   A1C 5.1     Recent Labs   Lab Test 10/03/23  1227   GLC 88    Recent Labs   Lab Test 01/02/24  1249   WBC 8.5   HGB 15.3   HCT 47.3   MCV 89            PROVIDER: Darlene Thomas MD    Level of Medical Decision Making:   - At least 1 chronic problem that is not stable  - Engaged in prescription drug management during visit (discussed any medication benefits, side effects, alternatives, etc.)    Psychiatry Individual Psychotherapy Note   Psychotherapy start time - 15:06  Psychotherapy end time - 15:30  Date last reviewed with patient - 08/14/23  Subjective: This supportive psychotherapy session addressed issues related to goals of therapy and current psychosocial stressors. Patient's reaction: Action in the context of mental status appropriate for ambulatory setting.    Interactive complexity indicated? No  Plan: RTC in timeframe noted above  Psychotherapy services during this visit included myself and the patient.   Treatment Plan      SYMPTOMS; PROBLEMS   MEASURABLE GOALS;    FUNCTIONAL IMPROVEMENT / GAINS INTERVENTIONS DISCHARGE CRITERIA   Psychosis: negative symptoms (avolition, affective flattening, anhedonia, alogia, apathy, ...)   exercise for 20 minutes 3-7 days a week  Supportive / psychodynamic marked symptom improvement     Patient staffed in clinic with Dr. Julio who will sign the note.  Supervisor is Dr. Carrasquillo.

## 2024-01-08 NOTE — PATIENT INSTRUCTIONS
**For crisis resources, please see the information at the end of this document**   Patient Education    Thank you for coming to the [facility] [place] CLINIC.     Lab Testing:  If you had lab testing today and your results are reassuring or normal they will be mailed to you or sent through [employer] within 7 days. If the lab tests need quick action we will call you with the results. The phone number we will call with results is # [phone]. If this is not the best number please call our clinic and change the number.     Medication Refills:  If you need any refills please call your pharmacy and they will contact us. Our fax number for refills is [phone].   Three business days of notice are needed for general medication refill requests.   Five business days of notice are needed for controlled substance refill requests.   If you need to change to a different pharmacy, please contact the new pharmacy directly. The new pharmacy will help you get your medications transferred.     Contact Us:  Please call [phone] during business hours (8-5:00 M-F).   If you have medication related questions after clinic hours, or on the weekend, please call [phone].     Financial Assistance [phone]   Medical Records [phone]       MENTAL HEALTH CRISIS RESOURCES:  For a emergency help, please call 911 or go to the nearest Emergency Department.     Emergency Walk-In Options:   EmPATH Unit @ [facility] ([place]): [phone] - Specialized mental health emergency area designed to be calming  [facility] ([place]): [phone]  [facility] ([place]): [phone]  [facility]): [phone]    [facility] Crisis Information:   [place]: [phone]  Ben: [phone]  Fredi (FRIEDA) - Adult: [phone]     Child: [phone]  Eugen[place] - Adult: [phone]     Child: [phone]  [place]:  [phone]  List of all [facility] resources:   https://[place].gov/dhs/people-we-serve/adults/health-care/mental-health/resources/crisis-contacts.jsp    National Crisis Information:   Crisis Text Line: Text  [place]  to 106421  Suicide & Crisis Lifeline: 988  National Suicide Prevention Lifeline: 0-403-454-TALK (1-126.400.7328)       For online chat options, visit https://suicidepreventionlifeline.org/chat/  Poison Control Center: 7-255-642-1933  Trans Lifeline: 5-030-723-9815 - Hotline for transgender people of all ages  The Luis Project: 4-370-676-8877 - Hotline for LGBT youth     For Non-Emergency Support:   Fast Tracker: Mental Health & Substance Use Disorder Resources -   https://www.[employer].org/

## 2024-01-08 NOTE — PROGRESS NOTES
Provider note locked.    Virtual Visit Details    Type of service:  Video Visit   Video Start Time:  3:06  Video End Time: 3:39    Originating Location (pt. Location): Home    Distant Location (provider location):  On-site  Platform used for Video Visit: [employer]  Answers submitted by the patient for this visit:  Patient Health Questionnaire (Submitted on 1/8/2024)  If you checked off any problems, how difficult have these problems made it for you to do your work, take care of things at home, or get along with other people?: Very difficult  PHQ9 TOTAL SCORE: 11

## 2024-01-09 ENCOUNTER — LAB (OUTPATIENT)
Dept: LAB | Facility: CLINIC | Age: 34
End: 2024-01-09
Payer: COMMERCIAL

## 2024-01-09 DIAGNOSIS — Z79.899 ENCOUNTER FOR LONG-TERM (CURRENT) USE OF MEDICATIONS: ICD-10-CM

## 2024-01-09 DIAGNOSIS — F25.0 SCHIZOAFFECTIVE DISORDER, BIPOLAR TYPE (H): ICD-10-CM

## 2024-01-09 DIAGNOSIS — F29 SCHIZOPHRENIA SPECTRUM DISORDER WITH PSYCHOTIC DISORDER TYPE NOT YET DETERMINED (H): Primary | ICD-10-CM

## 2024-01-09 LAB
BASOPHILS # BLD AUTO: 0.1 10E3/UL (ref 0–0.2)
BASOPHILS NFR BLD AUTO: 1 %
EOSINOPHIL # BLD AUTO: 0.3 10E3/UL (ref 0–0.7)
EOSINOPHIL NFR BLD AUTO: 4 %
ERYTHROCYTE [DISTWIDTH] IN BLOOD BY AUTOMATED COUNT: 12.7 % (ref 10–15)
HCT VFR BLD AUTO: 46.4 % (ref 40–53)
HGB BLD-MCNC: 14.7 G/DL (ref 13.3–17.7)
IMM GRANULOCYTES # BLD: 0.1 10E3/UL
IMM GRANULOCYTES NFR BLD: 1 %
LYMPHOCYTES # BLD AUTO: 2.2 10E3/UL (ref 0.8–5.3)
LYMPHOCYTES NFR BLD AUTO: 31 %
MCH RBC QN AUTO: 28 PG (ref 26.5–33)
MCHC RBC AUTO-ENTMCNC: 31.7 G/DL (ref 31.5–36.5)
MCV RBC AUTO: 88 FL (ref 78–100)
MONOCYTES # BLD AUTO: 0.5 10E3/UL (ref 0–1.3)
MONOCYTES NFR BLD AUTO: 6 %
NEUTROPHILS # BLD AUTO: 4.2 10E3/UL (ref 1.6–8.3)
NEUTROPHILS NFR BLD AUTO: 58 %
PLATELET # BLD AUTO: 268 10E3/UL (ref 150–450)
RBC # BLD AUTO: 5.25 10E6/UL (ref 4.4–5.9)
WBC # BLD AUTO: 7.3 10E3/UL (ref 4–11)

## 2024-01-09 PROCEDURE — 36415 COLL VENOUS BLD VENIPUNCTURE: CPT

## 2024-01-09 PROCEDURE — 85025 COMPLETE CBC W/AUTO DIFF WBC: CPT

## 2024-01-16 ENCOUNTER — ALLIED HEALTH/NURSE VISIT (OUTPATIENT)
Dept: PSYCHIATRY | Facility: CLINIC | Age: 34
End: 2024-01-16
Payer: COMMERCIAL

## 2024-01-16 ENCOUNTER — LAB (OUTPATIENT)
Dept: LAB | Facility: CLINIC | Age: 34
End: 2024-01-16
Payer: COMMERCIAL

## 2024-01-16 VITALS
BODY MASS INDEX: 41.47 KG/M2 | SYSTOLIC BLOOD PRESSURE: 139 MMHG | HEART RATE: 85 BPM | DIASTOLIC BLOOD PRESSURE: 90 MMHG | WEIGHT: 305.8 LBS

## 2024-01-16 DIAGNOSIS — Z79.899 ENCOUNTER FOR LONG-TERM (CURRENT) USE OF MEDICATIONS: Primary | ICD-10-CM

## 2024-01-16 DIAGNOSIS — Z79.899 ENCOUNTER FOR LONG-TERM (CURRENT) USE OF MEDICATIONS: ICD-10-CM

## 2024-01-16 LAB
BASOPHILS # BLD AUTO: 0.1 10E3/UL (ref 0–0.2)
BASOPHILS NFR BLD AUTO: 1 %
EOSINOPHIL # BLD AUTO: 0.3 10E3/UL (ref 0–0.7)
EOSINOPHIL NFR BLD AUTO: 4 %
ERYTHROCYTE [DISTWIDTH] IN BLOOD BY AUTOMATED COUNT: 12.9 % (ref 10–15)
HCT VFR BLD AUTO: 47.5 % (ref 40–53)
HGB BLD-MCNC: 15 G/DL (ref 13.3–17.7)
IMM GRANULOCYTES # BLD: 0 10E3/UL
IMM GRANULOCYTES NFR BLD: 1 %
LYMPHOCYTES # BLD AUTO: 2 10E3/UL (ref 0.8–5.3)
LYMPHOCYTES NFR BLD AUTO: 29 %
MCH RBC QN AUTO: 28.5 PG (ref 26.5–33)
MCHC RBC AUTO-ENTMCNC: 31.6 G/DL (ref 31.5–36.5)
MCV RBC AUTO: 90 FL (ref 78–100)
MONOCYTES # BLD AUTO: 0.5 10E3/UL (ref 0–1.3)
MONOCYTES NFR BLD AUTO: 6 %
NEUTROPHILS # BLD AUTO: 4.2 10E3/UL (ref 1.6–8.3)
NEUTROPHILS NFR BLD AUTO: 59 %
NRBC # BLD AUTO: 0 10E3/UL
NRBC BLD AUTO-RTO: 0 /100
PLATELET # BLD AUTO: 250 10E3/UL (ref 150–450)
RBC # BLD AUTO: 5.26 10E6/UL (ref 4.4–5.9)
WBC # BLD AUTO: 7 10E3/UL (ref 4–11)

## 2024-01-16 PROCEDURE — 36415 COLL VENOUS BLD VENIPUNCTURE: CPT

## 2024-01-16 PROCEDURE — 85025 COMPLETE CBC W/AUTO DIFF WBC: CPT

## 2024-01-23 ENCOUNTER — LAB (OUTPATIENT)
Dept: LAB | Facility: CLINIC | Age: 34
End: 2024-01-23
Payer: COMMERCIAL

## 2024-01-23 DIAGNOSIS — F29 SCHIZOPHRENIA SPECTRUM DISORDER WITH PSYCHOTIC DISORDER TYPE NOT YET DETERMINED (H): ICD-10-CM

## 2024-01-23 DIAGNOSIS — Z79.899 ENCOUNTER FOR LONG-TERM (CURRENT) USE OF MEDICATIONS: ICD-10-CM

## 2024-01-23 LAB
BASOPHILS # BLD AUTO: 0.1 10E3/UL (ref 0–0.2)
BASOPHILS NFR BLD AUTO: 1 %
EOSINOPHIL # BLD AUTO: 0.3 10E3/UL (ref 0–0.7)
EOSINOPHIL NFR BLD AUTO: 3 %
ERYTHROCYTE [DISTWIDTH] IN BLOOD BY AUTOMATED COUNT: 12.6 % (ref 10–15)
HCT VFR BLD AUTO: 47 % (ref 40–53)
HGB BLD-MCNC: 15 G/DL (ref 13.3–17.7)
IMM GRANULOCYTES # BLD: 0 10E3/UL
IMM GRANULOCYTES NFR BLD: 0 %
LYMPHOCYTES # BLD AUTO: 2.3 10E3/UL (ref 0.8–5.3)
LYMPHOCYTES NFR BLD AUTO: 31 %
MCH RBC QN AUTO: 28.1 PG (ref 26.5–33)
MCHC RBC AUTO-ENTMCNC: 31.9 G/DL (ref 31.5–36.5)
MCV RBC AUTO: 88 FL (ref 78–100)
MONOCYTES # BLD AUTO: 0.6 10E3/UL (ref 0–1.3)
MONOCYTES NFR BLD AUTO: 8 %
NEUTROPHILS # BLD AUTO: 4.2 10E3/UL (ref 1.6–8.3)
NEUTROPHILS NFR BLD AUTO: 56 %
PLATELET # BLD AUTO: 240 10E3/UL (ref 150–450)
RBC # BLD AUTO: 5.34 10E6/UL (ref 4.4–5.9)
WBC # BLD AUTO: 7.4 10E3/UL (ref 4–11)

## 2024-01-23 PROCEDURE — 85025 COMPLETE CBC W/AUTO DIFF WBC: CPT

## 2024-01-23 PROCEDURE — 36415 COLL VENOUS BLD VENIPUNCTURE: CPT

## 2024-01-23 PROCEDURE — 99000 SPECIMEN HANDLING OFFICE-LAB: CPT

## 2024-01-23 PROCEDURE — 80159 DRUG ASSAY CLOZAPINE: CPT | Mod: 90

## 2024-01-24 ENCOUNTER — MYC MEDICAL ADVICE (OUTPATIENT)
Dept: PSYCHIATRY | Facility: CLINIC | Age: 34
End: 2024-01-24
Payer: COMMERCIAL

## 2024-01-27 LAB
CLOZAPINE SERPL-MCNC: 194 NG/ML
CLOZAPINE+NOR SERPL-MCNC: 342 NG/ML
CNO SERPL-MCNC: <100 NG/ML
NORCLOZAPINE SERPL-MCNC: 148 NG/ML

## 2024-01-29 NOTE — RESULT ENCOUNTER NOTE
Hi Drew,     Your clozapine level is 148 and total clozapine and metabolite levels is 342. I would recommend continuing clozapine for 8 weeks on this dose. It may be the case that you find benefit and we won't need to increase it further.     Darlene Thomsa MD

## 2024-01-30 ENCOUNTER — ALLIED HEALTH/NURSE VISIT (OUTPATIENT)
Dept: PSYCHIATRY | Facility: CLINIC | Age: 34
End: 2024-01-30
Payer: COMMERCIAL

## 2024-01-30 ENCOUNTER — LAB (OUTPATIENT)
Dept: LAB | Facility: CLINIC | Age: 34
End: 2024-01-30
Payer: COMMERCIAL

## 2024-01-30 VITALS
HEART RATE: 81 BPM | BODY MASS INDEX: 41.01 KG/M2 | DIASTOLIC BLOOD PRESSURE: 89 MMHG | WEIGHT: 302.4 LBS | SYSTOLIC BLOOD PRESSURE: 139 MMHG

## 2024-01-30 DIAGNOSIS — Z79.899 ENCOUNTER FOR LONG-TERM (CURRENT) USE OF MEDICATIONS: ICD-10-CM

## 2024-01-30 DIAGNOSIS — Z79.899 ENCOUNTER FOR LONG-TERM (CURRENT) USE OF MEDICATIONS: Primary | ICD-10-CM

## 2024-01-30 LAB
BASOPHILS # BLD AUTO: 0.1 10E3/UL (ref 0–0.2)
BASOPHILS NFR BLD AUTO: 1 %
EOSINOPHIL # BLD AUTO: 0.3 10E3/UL (ref 0–0.7)
EOSINOPHIL NFR BLD AUTO: 4 %
ERYTHROCYTE [DISTWIDTH] IN BLOOD BY AUTOMATED COUNT: 13 % (ref 10–15)
HCT VFR BLD AUTO: 48.9 % (ref 40–53)
HGB BLD-MCNC: 15.9 G/DL (ref 13.3–17.7)
IMM GRANULOCYTES # BLD: 0 10E3/UL
IMM GRANULOCYTES NFR BLD: 1 %
LYMPHOCYTES # BLD AUTO: 2 10E3/UL (ref 0.8–5.3)
LYMPHOCYTES NFR BLD AUTO: 29 %
MCH RBC QN AUTO: 28.8 PG (ref 26.5–33)
MCHC RBC AUTO-ENTMCNC: 32.5 G/DL (ref 31.5–36.5)
MCV RBC AUTO: 88 FL (ref 78–100)
MONOCYTES # BLD AUTO: 0.5 10E3/UL (ref 0–1.3)
MONOCYTES NFR BLD AUTO: 7 %
NEUTROPHILS # BLD AUTO: 4 10E3/UL (ref 1.6–8.3)
NEUTROPHILS NFR BLD AUTO: 58 %
NRBC # BLD AUTO: 0 10E3/UL
NRBC BLD AUTO-RTO: 0 /100
PLATELET # BLD AUTO: 243 10E3/UL (ref 150–450)
RBC # BLD AUTO: 5.53 10E6/UL (ref 4.4–5.9)
WBC # BLD AUTO: 6.9 10E3/UL (ref 4–11)

## 2024-01-30 PROCEDURE — 36415 COLL VENOUS BLD VENIPUNCTURE: CPT

## 2024-01-30 PROCEDURE — 85025 COMPLETE CBC W/AUTO DIFF WBC: CPT

## 2024-01-30 NOTE — NURSING NOTE
Chief Complaint   Patient presents with    vital check      Vitals check for Clozapine          Belia Tobar on 1/30/2024 at 2:06 PM

## 2024-02-01 ENCOUNTER — TELEPHONE (OUTPATIENT)
Dept: PSYCHIATRY | Facility: CLINIC | Age: 34
End: 2024-02-01
Payer: COMMERCIAL

## 2024-02-01 DIAGNOSIS — F25.0 SCHIZOAFFECTIVE DISORDER, BIPOLAR TYPE (H): ICD-10-CM

## 2024-02-01 RX ORDER — LITHIUM CARBONATE 300 MG/1
TABLET, FILM COATED, EXTENDED RELEASE ORAL
Qty: 120 TABLET | Refills: 2 | Status: SHIPPED | OUTPATIENT
Start: 2024-02-01 | End: 2024-02-05

## 2024-02-01 RX ORDER — LITHIUM CARBONATE 300 MG/1
TABLET, FILM COATED, EXTENDED RELEASE ORAL
Qty: 120 TABLET | Refills: 0 | Status: SHIPPED | OUTPATIENT
Start: 2024-02-01 | End: 2024-02-01

## 2024-02-01 RX ORDER — LAMOTRIGINE 200 MG/1
200 TABLET ORAL AT BEDTIME
Qty: 30 TABLET | Refills: 2 | Status: SHIPPED | OUTPATIENT
Start: 2024-02-01 | End: 2024-02-05

## 2024-02-01 NOTE — TELEPHONE ENCOUNTER
Received incoming call from patient's pharmacy. Pharmacist states that they have been having to advance patient constantly on his medications as he doesn't realize he needs a refill before he is out of medication. They will advance him some and take it out of the refill once it is approved. They state they are no longer able to keep up with this and are hoping provider can send in multiple refills to help ensure patient does not continue to run out of medications.    Pharmacist states that patient is currently at the pharmacy looking for his lithium and that he is out as of today and pharmacy is out of this prescription as they had advanced him 32 tablets last month. Writer sent lithium refill per protocol in a separate encounter. Pharmacy is also asking for refills of patient's other medications.    1) Meds-  - Continue lamotrigine 200 mg daily  - Olanzapine 2.5 mg at bedtime, a week after tomorrow stop  - Clozapine 175 mg, a week after tomorrow increase to 200 mg   - Continue lithium 1200 mg at bedtime  - Continue Metformin 500mg BID

## 2024-02-01 NOTE — TELEPHONE ENCOUNTER
Last seen: 01/08/2024  RTC: 6-8 weeks   Cancel: None  No-show: None  Next appt: 02/05/2024       Medication requested:   Pending Prescriptions:                       Disp   Refills    lithium ER (LITHOBID) 300 MG CR tablet    120 ta*0            Sig: TAKE FOUR TABLETS BY MOUTH AT BEDTIME      From chart note:   - Continue lithium 1200 mg at bedtime      Medication refill approved per refill protocol.

## 2024-02-05 ENCOUNTER — VIRTUAL VISIT (OUTPATIENT)
Dept: PSYCHIATRY | Facility: CLINIC | Age: 34
End: 2024-02-05
Attending: STUDENT IN AN ORGANIZED HEALTH CARE EDUCATION/TRAINING PROGRAM
Payer: COMMERCIAL

## 2024-02-05 DIAGNOSIS — K11.7 DROOLING: Primary | ICD-10-CM

## 2024-02-05 DIAGNOSIS — F25.0 SCHIZOAFFECTIVE DISORDER, BIPOLAR TYPE (H): ICD-10-CM

## 2024-02-05 PROCEDURE — 90833 PSYTX W PT W E/M 30 MIN: CPT | Mod: 95

## 2024-02-05 PROCEDURE — 99214 OFFICE O/P EST MOD 30 MIN: CPT | Mod: 95

## 2024-02-05 RX ORDER — IPRATROPIUM BROMIDE 42 UG/1
2 SPRAY, METERED NASAL
Qty: 15 ML | Refills: 1 | Status: SHIPPED | OUTPATIENT
Start: 2024-02-05 | End: 2024-02-29

## 2024-02-05 RX ORDER — CLOZAPINE 200 MG/1
200 TABLET ORAL DAILY
Qty: 30 TABLET | Refills: 1 | Status: SHIPPED | OUTPATIENT
Start: 2024-02-05 | End: 2024-02-29 | Stop reason: SINTOL

## 2024-02-05 RX ORDER — LAMOTRIGINE 200 MG/1
200 TABLET ORAL AT BEDTIME
Qty: 30 TABLET | Refills: 2 | Status: SHIPPED | OUTPATIENT
Start: 2024-02-05 | End: 2024-04-01

## 2024-02-05 RX ORDER — LITHIUM CARBONATE 300 MG/1
TABLET, FILM COATED, EXTENDED RELEASE ORAL
Qty: 120 TABLET | Refills: 2 | Status: SHIPPED | OUTPATIENT
Start: 2024-02-05 | End: 2024-04-01

## 2024-02-05 ASSESSMENT — PAIN SCALES - GENERAL: PAINLEVEL: NO PAIN (0)

## 2024-02-05 NOTE — PATIENT INSTRUCTIONS
**For crisis resources, please see the information at the end of this document**   Patient Education    Thank you for coming to the [facility] [place] CLIN[facility].     Lab Testing:  If you had lab testing today and your results are reassuring or normal they will be mailed to you or sent through [employer] within 7 days. If the lab tests need quick action we will call you with the results. The phone number we will call with results is # [phone]. If this is not the best number please call our clinic and change the number.     Medication Refills:  If you need any refills please call your pharmacy and they will contact us. Our fax number for refills is [phone].   Three business days of notice are needed for general medication refill requests.   Five business days of notice are needed for controlled substance refill requests.   If you need to change to a different pharmacy, please contact the new pharmacy directly. The new pharmacy will help you get your medications transferred.     Contact Us:  Please call [phone] during business hours (8-5:00 M-F).   If you have medication related questions after clinic hours, or on the weekend, please call [phone].     Financial Assistance [phone]   Medical Records [phone]       MENTAL HEALTH CRISIS RESOURCES:  For a emergency help, please call 911 or go to the nearest Emergency Department.     Emergency Walk-In Options:   EmPATH Unit @ [facility] ([place]): [phone] - Specialized mental health emergency area designed to be calming  [facility] ([place]): [phone]  [facility] ([place]): [phone]  [facility]): [phone]    [facility] Crisis Information:   [place]: [phone]  [place]: [phone]  Fredi (FRIEDA) - Adult: [phone]     Child: [phone]  Eugenio - Adult: [phone]     Child: [phone]  [place]:  [phone]  List of all [facility] resources:   https://[place].gov/dhs/people-we-serve/adults/health-care/mental-health/resources/crisis-contacts.jsp    National Crisis Information:   Crisis Text Line: Text  [place]  to 684211  Suicide & Crisis Lifeline: 988  National Suicide Prevention Lifeline: 4-971-798-TALK (1-319.526.2184)       For online chat options, visit https://suicidepreventionlifeline.org/chat/  Poison Control Center: 1-530-551-7842  Trans Lifeline: 9-993-633-1678 - Hotline for transgender people of all ages  The Luis Project: 2-996-535-2583 - Hotline for LGBT youth     For Non-Emergency Support:   Fast Tracker: Mental Health & Substance Use Disorder Resources -   https://www.[employer].org/

## 2024-02-05 NOTE — NURSING NOTE
Is the patient currently in the state of [place]? YES    Visit mode:VIDEO    If the visit is dropped, the patient can be reconnected by: VIDEO VISIT: Send to e-mail at: [email or web address]    Will anyone else be joining the visit? YES: How would they like to receive their invitation? Text to cell phone: JAVID and Send to e-mail: NA  (If patient encounters technical issues they should call [phone])    How would you like to obtain your AVS? MyChart    Are changes needed to the allergy or medication list? Pt stated no changes to allergies and Pt stated no med changes  PT currently taking 200mg clozapine but is unsure if that will change.    Reason for visit: RECHECK    Drea GOEL
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
## 2024-02-06 ENCOUNTER — LAB (OUTPATIENT)
Dept: LAB | Facility: CLINIC | Age: 34
End: 2024-02-06
Payer: COMMERCIAL

## 2024-02-06 DIAGNOSIS — Z79.899 ENCOUNTER FOR LONG-TERM (CURRENT) USE OF MEDICATIONS: ICD-10-CM

## 2024-02-06 LAB
BASOPHILS # BLD AUTO: 0.1 10E3/UL (ref 0–0.2)
BASOPHILS NFR BLD AUTO: 1 %
EOSINOPHIL # BLD AUTO: 0.2 10E3/UL (ref 0–0.7)
EOSINOPHIL NFR BLD AUTO: 4 %
ERYTHROCYTE [DISTWIDTH] IN BLOOD BY AUTOMATED COUNT: 12.9 % (ref 10–15)
HCT VFR BLD AUTO: 47.5 % (ref 40–53)
HGB BLD-MCNC: 15.3 G/DL (ref 13.3–17.7)
IMM GRANULOCYTES # BLD: 0 10E3/UL
IMM GRANULOCYTES NFR BLD: 0 %
LYMPHOCYTES # BLD AUTO: 1.7 10E3/UL (ref 0.8–5.3)
LYMPHOCYTES NFR BLD AUTO: 25 %
MCH RBC QN AUTO: 28.2 PG (ref 26.5–33)
MCHC RBC AUTO-ENTMCNC: 32.2 G/DL (ref 31.5–36.5)
MCV RBC AUTO: 88 FL (ref 78–100)
MONOCYTES # BLD AUTO: 0.4 10E3/UL (ref 0–1.3)
MONOCYTES NFR BLD AUTO: 6 %
NEUTROPHILS # BLD AUTO: 4.4 10E3/UL (ref 1.6–8.3)
NEUTROPHILS NFR BLD AUTO: 65 %
PLATELET # BLD AUTO: 248 10E3/UL (ref 150–450)
RBC # BLD AUTO: 5.43 10E6/UL (ref 4.4–5.9)
WBC # BLD AUTO: 6.8 10E3/UL (ref 4–11)

## 2024-02-06 PROCEDURE — 36415 COLL VENOUS BLD VENIPUNCTURE: CPT

## 2024-02-06 PROCEDURE — 85025 COMPLETE CBC W/AUTO DIFF WBC: CPT

## 2024-02-13 ENCOUNTER — LAB (OUTPATIENT)
Dept: LAB | Facility: CLINIC | Age: 34
End: 2024-02-13
Payer: COMMERCIAL

## 2024-02-13 ENCOUNTER — ALLIED HEALTH/NURSE VISIT (OUTPATIENT)
Dept: PSYCHIATRY | Facility: CLINIC | Age: 34
End: 2024-02-13
Payer: COMMERCIAL

## 2024-02-13 VITALS — HEART RATE: 87 BPM | SYSTOLIC BLOOD PRESSURE: 133 MMHG | DIASTOLIC BLOOD PRESSURE: 86 MMHG

## 2024-02-13 DIAGNOSIS — Z79.899 ENCOUNTER FOR LONG-TERM (CURRENT) USE OF MEDICATIONS: ICD-10-CM

## 2024-02-13 DIAGNOSIS — Z79.899 ENCOUNTER FOR LONG-TERM (CURRENT) USE OF MEDICATIONS: Primary | ICD-10-CM

## 2024-02-13 LAB
BASOPHILS # BLD AUTO: 0.1 10E3/UL (ref 0–0.2)
BASOPHILS NFR BLD AUTO: 1 %
EOSINOPHIL # BLD AUTO: 0.3 10E3/UL (ref 0–0.7)
EOSINOPHIL NFR BLD AUTO: 4 %
ERYTHROCYTE [DISTWIDTH] IN BLOOD BY AUTOMATED COUNT: 13.2 % (ref 10–15)
HCT VFR BLD AUTO: 48.7 % (ref 40–53)
HGB BLD-MCNC: 15.8 G/DL (ref 13.3–17.7)
IMM GRANULOCYTES # BLD: 0 10E3/UL
IMM GRANULOCYTES NFR BLD: 1 %
LYMPHOCYTES # BLD AUTO: 1.8 10E3/UL (ref 0.8–5.3)
LYMPHOCYTES NFR BLD AUTO: 28 %
MCH RBC QN AUTO: 28.6 PG (ref 26.5–33)
MCHC RBC AUTO-ENTMCNC: 32.4 G/DL (ref 31.5–36.5)
MCV RBC AUTO: 88 FL (ref 78–100)
MONOCYTES # BLD AUTO: 0.5 10E3/UL (ref 0–1.3)
MONOCYTES NFR BLD AUTO: 7 %
NEUTROPHILS # BLD AUTO: 4 10E3/UL (ref 1.6–8.3)
NEUTROPHILS NFR BLD AUTO: 59 %
NRBC # BLD AUTO: 0 10E3/UL
NRBC BLD AUTO-RTO: 0 /100
PLATELET # BLD AUTO: 260 10E3/UL (ref 150–450)
RBC # BLD AUTO: 5.52 10E6/UL (ref 4.4–5.9)
WBC # BLD AUTO: 6.6 10E3/UL (ref 4–11)

## 2024-02-13 PROCEDURE — 36415 COLL VENOUS BLD VENIPUNCTURE: CPT

## 2024-02-13 PROCEDURE — 85041 AUTOMATED RBC COUNT: CPT

## 2024-02-20 ENCOUNTER — LAB (OUTPATIENT)
Dept: LAB | Facility: CLINIC | Age: 34
End: 2024-02-20
Payer: COMMERCIAL

## 2024-02-20 DIAGNOSIS — Z79.899 ENCOUNTER FOR LONG-TERM (CURRENT) USE OF MEDICATIONS: ICD-10-CM

## 2024-02-20 LAB
BASOPHILS # BLD AUTO: 0.1 10E3/UL (ref 0–0.2)
BASOPHILS NFR BLD AUTO: 1 %
EOSINOPHIL # BLD AUTO: 0.3 10E3/UL (ref 0–0.7)
EOSINOPHIL NFR BLD AUTO: 4 %
ERYTHROCYTE [DISTWIDTH] IN BLOOD BY AUTOMATED COUNT: 12.8 % (ref 10–15)
HCT VFR BLD AUTO: 44.7 % (ref 40–53)
HGB BLD-MCNC: 15.1 G/DL (ref 13.3–17.7)
IMM GRANULOCYTES # BLD: 0 10E3/UL
IMM GRANULOCYTES NFR BLD: 1 %
LYMPHOCYTES # BLD AUTO: 1.9 10E3/UL (ref 0.8–5.3)
LYMPHOCYTES NFR BLD AUTO: 26 %
MCH RBC QN AUTO: 28 PG (ref 26.5–33)
MCHC RBC AUTO-ENTMCNC: 33.8 G/DL (ref 31.5–36.5)
MCV RBC AUTO: 83 FL (ref 78–100)
MONOCYTES # BLD AUTO: 0.4 10E3/UL (ref 0–1.3)
MONOCYTES NFR BLD AUTO: 6 %
NEUTROPHILS # BLD AUTO: 4.5 10E3/UL (ref 1.6–8.3)
NEUTROPHILS NFR BLD AUTO: 62 %
PLATELET # BLD AUTO: 223 10E3/UL (ref 150–450)
RBC # BLD AUTO: 5.4 10E6/UL (ref 4.4–5.9)
WBC # BLD AUTO: 7.2 10E3/UL (ref 4–11)

## 2024-02-20 PROCEDURE — 36415 COLL VENOUS BLD VENIPUNCTURE: CPT

## 2024-02-20 PROCEDURE — 85025 COMPLETE CBC W/AUTO DIFF WBC: CPT

## 2024-02-26 ENCOUNTER — MYC MEDICAL ADVICE (OUTPATIENT)
Dept: PSYCHIATRY | Facility: CLINIC | Age: 34
End: 2024-02-26
Payer: COMMERCIAL

## 2024-02-29 ENCOUNTER — VIRTUAL VISIT (OUTPATIENT)
Dept: PSYCHIATRY | Facility: CLINIC | Age: 34
End: 2024-02-29
Attending: STUDENT IN AN ORGANIZED HEALTH CARE EDUCATION/TRAINING PROGRAM
Payer: COMMERCIAL

## 2024-02-29 ENCOUNTER — TELEPHONE (OUTPATIENT)
Dept: PSYCHIATRY | Facility: CLINIC | Age: 34
End: 2024-02-29
Payer: COMMERCIAL

## 2024-02-29 DIAGNOSIS — F29 SCHIZOPHRENIA SPECTRUM DISORDER WITH PSYCHOTIC DISORDER TYPE NOT YET DETERMINED (H): ICD-10-CM

## 2024-02-29 DIAGNOSIS — F90.9 ATTENTION DEFICIT HYPERACTIVITY DISORDER (ADHD), UNSPECIFIED ADHD TYPE: ICD-10-CM

## 2024-02-29 DIAGNOSIS — Z79.899 ENCOUNTER FOR LONG-TERM (CURRENT) USE OF MEDICATIONS: ICD-10-CM

## 2024-02-29 DIAGNOSIS — F25.0 SCHIZOAFFECTIVE DISORDER, BIPOLAR TYPE (H): Primary | ICD-10-CM

## 2024-02-29 PROCEDURE — 90833 PSYTX W PT W E/M 30 MIN: CPT | Mod: 95

## 2024-02-29 PROCEDURE — 99214 OFFICE O/P EST MOD 30 MIN: CPT | Mod: 95

## 2024-02-29 RX ORDER — OLANZAPINE 20 MG/1
20 TABLET ORAL AT BEDTIME
Qty: 30 TABLET | Refills: 2 | Status: SHIPPED | OUTPATIENT
Start: 2024-02-29 | End: 2024-04-01

## 2024-02-29 ASSESSMENT — PAIN SCALES - GENERAL: PAINLEVEL: NO PAIN (0)

## 2024-02-29 NOTE — PROGRESS NOTES
Virtual Visit Details    Type of service:  Video Visit   Video Start Time:  11:08  Video End Time: 11:38    Originating Location (pt. Location): Home    Distant Location (provider location):  On-site  Platform used for Video Visit: Joosy    Answers submitted by the patient for this visit:  Patient Health Questionnaire (Submitted on 2/29/2024)  If you checked off any problems, how difficult have these problems made it for you to do your work, take care of things at home, or get along with other people?: Somewhat difficult  PHQ9 TOTAL SCORE: 6         [facility]  MEDICAL PROGRESS NOTE     CARE TEAM:  PCP- Clinic Provider    Psychotherapist- None   Jose Francisco Sommers is a 33 year old who uses the name Manuel and pronouns he, him, his.      DIAGNOSIS     # Schizoaffective disorder, Bipolar type, most recent episode depressed, in remission without current psychotic features               -rule out bipolar disorder type 1  # ADHD, predominantly inattentive type  # Insomnia (history of delayed phase sleep disorder)    severe obstructive sleep apnea without hypoxemia - not tolerating CPAP      ASSESSMENT     Manuel is doing well overall.     # Schizoaffective disorder  Manuel appears to have a primary schizophrenia spectrum disorder with prominent negative symptoms. Depression improved with lamotrigine 200 mg. Manuel reports minimal mental health symptoms with little anxiety, no depression, and no safety concerns.    In the past few months we cross tapered olanzapine with clozapine and he was on 200 mg of clozapine for more than a month. However, he had side effect of nasal congestion that would affect his sleep, worsening of acid reflux to the point of vomiting, and drooling and in between visits stopped clozapine and the next day started olanzapine 20 mg.     With clozapine mom reported that delusions had improved significantly. (The example for delusion that he continued to have on  "clozapine was that he saw a car 3 times in a day and found it weird.) Manuel says while on clozapine, he experienced improved in his ability to think.     With the change from clozapine to olanzapine Mom says it seems like he is a little \"in his head.\" Manuel says he has been finding things more funny and may laugh to himself but no symptoms concerning for lauren. No change today d/t recent med switch back to olanzapine. Will discuss options next time, consider MTM to help with that discussion     Future Considerations:  - Cross taper to Vraylar or Geodon or other explore other options  -consider MTM for discussion of antipsychotic options  - for medication associated weight gain, topiramate may have cognitive side effects. May consider Ozempic/semaglutide    Psychotropic Drug Interactions:  [PSYCHCLINICDDI]  ADDITIVE SEROTONERGIC: olanzapine, lithium  ADDITIVE CNS/RESPIRATORY DEPRESSION: lamotrigine, olanzapine  Lithium may enhance the neurotoxic effect of Antipsychotic Agents.  Management: routine monitoring in the process of cross tapering from olanzapine to clozapine    [place] was [place] today: indicates that controlled prescriptions have been filled as prescribed    Risk Statements:   Treatment Risk- Risks, benefits, alternatives and potential adverse effects have been discussed and are understood.  Safety Risk-Manuel did not appear to be an imminent safety risk to self or others.      PLAN                                                                                                                1) Meds-  - Continue olanzapine 20 mg daily  - Continue lamotrigine 200 mg daily  - Continue lithium 1200 mg at bedtime  - Continue Metformin 500mg BID    2) Psychotherapy- not interested    3) Next due-  Labs- 9/19/23: lithium  0.64, HbA1C: 5.1, lithium level and lithium monitoring lab ordered today  1/23/24 AST ALT slightly elevated,   on 10/3/23  EKG- Routine monitoring is not indicated for current psychotropic " "medication regimen   Rating scales- AIMS completed 4/6/23. AIMS = 0.    4) Referrals-  Weight Management- medical     Previously referred to:   -Hepatology regarding initiation of Depakote    5) Dispo- RTC in 6-8 weeks      PERTINENT BACKGROUND                                                    [most recent eval 08/04/22]     Jose Francisco \"Manuel\"  first experienced mental health issues in grade school and has received treatment for ADHD, depression, psychosis, and substance use. Notably, this patient had FEP in 2015 and has been stable on olanzapine and lithium since that time. He has never lived independently and has only been slowly developing insight into his illness and past symptoms. GeneSight testing was completed noting patient is a poor CYP2D6 metabolizer. Past records from Dr. Mike Powers were received in summer 2018 to assist with diagnostic clarity, however they were hand written notes that were illegible though accompanied by typed neuropsychology consult from [facility]. This documentation was unable to support a diagnosis of bipolar disorder at that time though noted potential Asperger's diagnosis and ADHD. Given that there has been evidence for psychotic signs and symptoms outside the context of mood episodes, a schizophrenia spectrum disorder such as schizoaffective disorder diagnosis seems most accurate. He has had two hospitalizations for decompensated psychosis in the setting of decreasing his olanzapine dose.    Has \"no interest whatsoever\" in therapy. Please refrain from asking about it.    Psych pertinent item history includes psychosis [sxs include disorganization, possible catatonia], mutiple psychotropic trials, psych hosp (<3) and Substance Use: alcohol and cannabis     SUBJECTIVE     Since last visit:    With clozapine when lied down, couldn't breath through the nose for most of the time. Also had a lot of acid reflux, waking up gaging and drooling. Even vomited and it was difficult for " "him so stopped clozapine Sunday night. Then all symptoms all went away. Breathing problem stopped.     Monday went back on Olanzapine, tried 20 mg of olanzapine for every day   It feels like his brain hasn't adjusted yet but feels it's the best choice. Ability to think was improving with clozapine and that has decreased.    Now mood is good, lamotrigine working \"maybe better than it should\"  Has a \"very good mood\".  Switch back to olanzapine feels like he laughs more than he should. For example finds the music funny and would laugh.    Mom says it seems like he is a little \"in his head.\"    Anxiety:denies  Sleep: better.     Quit smoking the day starting clozapine for a month then restarted.  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      Other - DUIs. Also In February 2014 the patient left for [place] to attend a [employer] tournament. He ended up in [place] and was arrested for shoplifting from [employer] (had \"intended to purchase\" a pair of jeans but became panicked when security approached him). Had a brief stay in [place], and afterwards, took him 5 days to return to [place] with parents frequently wiring him money. Had difficulty adhering to travel plans, seemed confused, and was communicating with parents via \"bizarre\" text messages (parents even filed a missing persons " "report at one point). He finally arrived home exhausted, not sharing much of events in [place], but slightly more consistent with baseline.    Pertinent Negatives: No suicidal or violent ideation, self-injury, psychosis, hallucinations, delusions, lauren, hypomania, aggression and harmful substance use  Adverse Effects: none       PSYCH and SUBSTANCE USE Critical Summary Points since July 2022 08/14/23 - increased the dose of lamotrigine to 150 mg, reach out in 3 weeks and can further increase to 200  10/2/23 - started clozapine 25 mg decrease zyprexa to 7.5 plan to go to 50  11/28/23 - increase clozapine to 75 and continue zyprexa 7.5  1/8/23 - plan to raise clozapine 175 olanzapine 2.5 tomorrow  2/5/24 - continue clozapine 200 mg  In between visits stopped clozapine and started olanzapine 20 mg   2/29/24 - no med changes       PAST MED TRIALS     Adderall 10-20 mg - improved concentration, but \"cold personality\" and perseveration  Prozac 20 mg - limited response, less irritable, first episode of \"rage\"  Seroquel 25-75 mg - helped with sleep and irritability  Concerta 18 mg - improved focus, no improvement in motivation  Provigil 150 mg (up to 400) - Stimulants \"almost killed him\" triggered alcohol binges  Abilify ?25 mg - OK when QOD, but irritable and agitated on QD  Clonazepam 0.5-1.5 mg - calming, helped with sleep and \"catatonia\"  Risperdal up to 5 mg - some confusion, slow processing, withdrawn, ?\"catatonia\", panic attacks -dont try that  Zyprexa 5 mg less anxious overall improvement  Lithium - calmer, overall better  Latuda 20 mg - severe fatigue and depression  Synthroid 75 mcg - added to help with mood.  Brief trials with Strattera, Intuniv, Lamictal, Xanax, Zoloft  Naltrexone - had nightmares and sedation on 25mg, stopped after 2 nights  Vraylar briefly  Seroquel - nasal congestion  Clozapine - late 2023 to Feb 2024:  nasal congestion and worsening of acid reflux. with clozapine delusions had improved " significantly. improved in his ability to think.           MEDICAL HISTORY and ALLERGY     ALLERGIES: Sulfa antibiotics    Patient Active Problem List   Diagnosis    Schizoaffective disorder, bipolar type (H)    Hx of psychiatric care    Elevated liver enzymes    Fatty liver    High blood triglycerides    History of cannabis abuse    History of cocaine abuse (H)    Psychosis (H)    Elevated blood pressure reading without diagnosis of hypertension    Constipation    Morbid obesity (H)    Schizoaffective disorder (H)      MEDICAL REVIEW OF SYSTEMS   Contraception-  Unknown     none in addition to that documented above     MEDICATIONS     Current Outpatient Medications   Medication Sig Dispense Refill    atorvastatin (LIPITOR) 20 MG tablet TAKE ONE TABLET BY MOUTH ONE TIME DAILY*      cloZAPine (CLOZARIL) 200 MG tablet Take 1 tablet (200 mg) by mouth daily 30 tablet 1    ipratropium (ATROVENT) 0.06 % nasal spray Spray 2 sprays into both nostrils nightly as needed for rhinitis or other (drooling) 15 mL 1    lamoTRIgine (LAMICTAL) 200 MG tablet Take 1 tablet (200 mg) by mouth at bedtime 30 tablet 2    lithium ER (LITHOBID) 300 MG CR tablet TAKE FOUR TABLETS BY MOUTH AT BEDTIME 120 tablet 2    metFORMIN (GLUCOPHAGE) 500 MG tablet Take 1 tablet (500 mg) by mouth 2 times daily (with meals) 60 tablet 2      VITALS   There were no vitals taken for this visit.   Pulse Readings from Last 3 Encounters:   02/13/24 87   01/30/24 81   01/16/24 85     Wt Readings from Last 3 Encounters:   01/30/24 137.2 kg (302 lb 6.4 oz)   01/16/24 138.7 kg (305 lb 12.8 oz)   01/08/24 136.1 kg (300 lb)     BP Readings from Last 3 Encounters:   02/13/24 133/86   01/30/24 139/89   01/16/24 (!) 139/90      MENTAL STATUS EXAM     Alertness: alert  and oriented  Appearance: well groomed  Behavior/Demeanor: cooperative and calm, with good  eye contact   Speech: normal and regular rate and rhythm  Language: intact and no obvious problem  Psychomotor:  "normal or unremarkable  Mood:  \"very good\"  Affect: restricted, reactive; congruent to: mood- yes, content- yes  Thought Process/Associations:  linear, somewhat concrete, goal oriented  Thought Content:  Reports none;  Denies suicidal & violent ideation and delusions  Perception:  Reports none;  Denies auditory hallucinations and visual hallucinations  Insight: good  Judgment: good  Cognition: does  appear grossly intact; formal cognitive testing was not done  Gait and Station: N/A (telehealth)     LABS and DATA         11/27/2023     1:30 PM 1/8/2024     2:35 PM 2/29/2024    10:14 AM   PHQ   PHQ-9 Total Score 8 11 6   Q9: Thoughts of better off dead/self-harm past 2 weeks Not at all Not at all Not at all     Liver/Kidney Function, TSH Metabolic Blood counts   Recent Labs   Lab Test 10/03/23  1227 09/19/23  1144   AST 47*  --    ALT 99*  --    ALKPHOS 81  --    CR 1.28* 1.18*     Recent Labs   Lab Test 01/10/23  1156   TSH 1.44    Recent Labs   Lab Test 10/03/23  1227   CHOL 132   TRIG 362*   LDL 27   HDL 33*     Recent Labs   Lab Test 09/19/23  1144   A1C 5.1     Recent Labs   Lab Test 10/03/23  1227   GLC 88    Recent Labs   Lab Test 02/20/24  1511   WBC 7.2   HGB 15.1   HCT 44.7   MCV 83            LITHIUM LABS     [level, renal, SG, TSH, WBC]    q6 mo  Recent Labs   Lab Test 09/19/23  1144 01/10/23  1156 05/10/22  0855 07/26/21  1339   LITHIUM 0.64 0.6 0.8 0.6     Recent Labs   Lab Test 10/03/23  1227 09/19/23  1144   CR 1.28* 1.18*   GFRESTIMATED 76 85     Recent Labs   Lab Test 01/10/23  1156 05/10/22  0855   SG 1.020 1.010     Recent Labs   Lab Test 01/10/23  1156 05/10/22  0855   TSH 1.44 5.36*     Recent Labs   Lab Test 02/20/24  1511 02/13/24  1320   WBC 7.2 6.6       PROVIDER: Darlene Thomas MD    Level of Medical Decision Making:   - At least 1 chronic problem that is not stable  - Engaged in prescription drug management during visit (discussed any medication benefits, side effects, " alternatives, etc.)    Psychiatry Individual Psychotherapy Note   Psychotherapy start time - 11:10  Psychotherapy end time - 11:30  Date last reviewed with patient - 2/5/24  Subjective: This supportive psychotherapy session addressed issues related to goals of therapy and current psychosocial stressors. Patient's reaction: Action in the context of mental status appropriate for ambulatory setting.    Interactive complexity indicated? No  Plan: RTC in timeframe noted above  Psychotherapy services during this visit included myself and the patient.   Treatment Plan      SYMPTOMS; PROBLEMS   MEASURABLE GOALS;    FUNCTIONAL IMPROVEMENT / GAINS INTERVENTIONS DISCHARGE CRITERIA   Psychosis: negative symptoms (avolition, affective flattening, anhedonia, alogia, apathy, ...)   exercise for 20 minutes 3-7 days a week  Supportive / psychodynamic marked symptom improvement     Patient staffed in clinic with Dr. Ventura who will sign the note.  Supervisor is Dr. Carrasquillo.

## 2024-02-29 NOTE — NURSING NOTE
Is the patient currently in the state of [place]? YES    Visit mode:VIDEO    If the visit is dropped, the patient can be reconnected by: VIDEO VISIT: Text to cell phone:   Telephone Information:   Mobile [phone]       Will anyone else be joining the visit? NO  (If patient encounters technical issues they should call [phone]    How would you like to obtain your AVS? MyChart    Are changes needed to the allergy or medication list? No    Reason for visit: CATRACHITO GOEL

## 2024-02-29 NOTE — TELEPHONE ENCOUNTER
----- Message from Darlene Landa MD sent at 2/29/2024 11:54 AM CST -----  I just sent olanzapine 20 mg for Manuel to [facility] and the order was failed. I know this problem with [facility] pharmacy that they want paper prescription. Could you explore the options with Manuel? I can send it to another pharmacy.    Thanks,  Darlene    Follow Up:  Writer called [id number] - [place], [place] - [address] to call in olanzapine prescription.

## 2024-02-29 NOTE — PATIENT INSTRUCTIONS
**For crisis resources, please see the information at the end of this document**   Patient Education    Thank you for coming to the [facility] [place] [facility].     Lab Testing:  If you had lab testing today and your results are reassuring or normal they will be mailed to you or sent through [email or web address] within 7 days. If the lab tests need quick action we will call you with the results. The phone number we will call with results is # [phone]. If this is not the best number please call our clinic and change the number.     Medication Refills:  If you need any refills please call your pharmacy and they will contact us. Our fax number for refills is [phone].   Three business days of notice are needed for general medication refill requests.   Five business days of notice are needed for controlled substance refill requests.   If you need to change to a different pharmacy, please contact the new pharmacy directly. The new pharmacy will help you get your medications transferred.     Contact Us:  Please call [phone] during business hours (8-5:00 M-F).   If you have medication related questions after clinic hours, or on the weekend, please call [phone].     Financial Assistance [phone]   Medical Records [phone]       MENTAL HEALTH CRISIS RESOURCES:  For a emergency help, please call 911 or go to the nearest Emergency Department.     Emergency Walk-In Options:   EmPATH Unit @ [facility] ([place]): [phone] - Specialized mental health emergency area designed to be calming  [facility] ([place]): [phone]  [facility] ([place]): [phone]  [facility]): [phone]    [facility] Crisis Information:   [place]: [phone]  [place]: [phone]  Fredi (FRIEDA) - Adult: [phone]     Child: [phone]  [place] - Adult: [phone]     Child: [phone]  [place]:  [phone]  List of all [facility] resources:   https://[place].gov/dhs/people-we-serve/adults/health-care/mental-health/resources/crisis-contacts.jsp    National Crisis Information:   Crisis Text Line: Text  [place]  to 735750  Suicide & Crisis Lifeline: 988  National Suicide Prevention Lifeline: 7-852-811-TALK (1-784.280.6299)       For online chat options, visit https://suicidepreventionlifeline.org/chat/  Poison Control Center: 9-626-049-8331  Trans Lifeline: 4-244-961-4449 - Hotline for transgender people of all ages  The [employer]: 7-372-080-8874 - Hotline for LGBT youth     For Non-Emergency Support:   Fast Tracker: Mental Health & Substance Use Disorder Resources -   https://www.[employer].org/

## 2024-03-22 ENCOUNTER — TELEPHONE (OUTPATIENT)
Dept: PSYCHIATRY | Facility: CLINIC | Age: 34
End: 2024-03-22
Payer: COMMERCIAL

## 2024-03-22 NOTE — TELEPHONE ENCOUNTER
Patient is still on clozapine REMs and was overdue for his weekly ANC. Upon chart review, clozapine was discontinued in February. Writer placed a phone call to clozapine REMs to inform their team that he has discontinued the clozapine. The staff member made note of this and gathered the essential details. She confirmed this writer can now remove the patient from Dr. Thomas's patient panel, but he will still be in the clozapine REMs system.    Henna Modi RN on 3/22/2024 at 10:51 AM

## 2024-04-01 ENCOUNTER — VIRTUAL VISIT (OUTPATIENT)
Dept: PSYCHIATRY | Facility: CLINIC | Age: 34
End: 2024-04-01
Attending: STUDENT IN AN ORGANIZED HEALTH CARE EDUCATION/TRAINING PROGRAM
Payer: COMMERCIAL

## 2024-04-01 DIAGNOSIS — Z79.899 ENCOUNTER FOR LONG-TERM (CURRENT) USE OF MEDICATIONS: Primary | ICD-10-CM

## 2024-04-01 DIAGNOSIS — F25.0 SCHIZOAFFECTIVE DISORDER, BIPOLAR TYPE (H): ICD-10-CM

## 2024-04-01 PROCEDURE — 99214 OFFICE O/P EST MOD 30 MIN: CPT | Mod: 95

## 2024-04-01 PROCEDURE — 90833 PSYTX W PT W E/M 30 MIN: CPT | Mod: 95

## 2024-04-01 RX ORDER — LITHIUM CARBONATE 300 MG/1
TABLET, FILM COATED, EXTENDED RELEASE ORAL
Qty: 120 TABLET | Refills: 1 | Status: SHIPPED | OUTPATIENT
Start: 2024-04-01 | End: 2024-05-26

## 2024-04-01 RX ORDER — OLANZAPINE 20 MG/1
20 TABLET ORAL AT BEDTIME
Qty: 30 TABLET | Refills: 1 | Status: SHIPPED | OUTPATIENT
Start: 2024-04-01 | End: 2024-06-02

## 2024-04-01 RX ORDER — LAMOTRIGINE 25 MG/1
50 TABLET ORAL DAILY
Qty: 60 TABLET | Refills: 1 | Status: SHIPPED | OUTPATIENT
Start: 2024-04-01 | End: 2024-05-26

## 2024-04-01 RX ORDER — LAMOTRIGINE 200 MG/1
200 TABLET ORAL AT BEDTIME
Qty: 30 TABLET | Refills: 2 | Status: SHIPPED | OUTPATIENT
Start: 2024-04-01 | End: 2024-06-17

## 2024-04-01 ASSESSMENT — PATIENT HEALTH QUESTIONNAIRE - PHQ9
SUM OF ALL RESPONSES TO PHQ QUESTIONS 1-9: 8
10. IF YOU CHECKED OFF ANY PROBLEMS, HOW DIFFICULT HAVE THESE PROBLEMS MADE IT FOR YOU TO DO YOUR WORK, TAKE CARE OF THINGS AT HOME, OR GET ALONG WITH OTHER PEOPLE: SOMEWHAT DIFFICULT
SUM OF ALL RESPONSES TO PHQ QUESTIONS 1-9: 8

## 2024-04-01 NOTE — NURSING NOTE
Is the patient currently in the state of [place]? YES    Visit mode:VIDEO    If the visit is dropped, the patient can be reconnected by: VIDEO VISIT: Text to cell phone:   Telephone Information:   Mobile [phone]       Will anyone else be joining the visit? Yes, mother joining patient  (If patient encounters technical issues they should call [phone]    How would you like to obtain your AVS? MyChart    Are changes needed to the allergy or medication list? No      Reason for visit: RECHECK    Nurys GOEL

## 2024-04-01 NOTE — PROGRESS NOTES
Virtual Visit Details    Type of service:  Video Visit   Video Start Time:  3:03  Video End Time: 3:34    Originating Location (pt. Location): Home    Distant Location (provider location):  On-site  Platform used for Video Visit: [facility]  Psychiatry Clinic  MEDICAL PROGRESS NOTE     CARE TEAM:  PCP- Clinic Provider    Psychotherapist- None   Jose Francisco Sommers is a 33 year old who uses the name Manuel and pronouns he, him, his.      DIAGNOSIS     # Schizoaffective disorder, Bipolar type, most recent episode depressed, in remission without current psychotic features               -rule out bipolar disorder type 1  # ADHD, predominantly inattentive type  # Insomnia (history of delayed phase sleep disorder)    severe obstructive sleep apnea without hypoxemia - not tolerating CPAP      ASSESSMENT     # Schizoaffective disorder  Overall feeling stable and no concerning symptoms. Today he requested an increase in the dose of lamotrigine as he has found it helpful in addressing depressive symptoms. No other concern or complains.     Future Considerations:  - Cross taper to Vraylar or Geodon or other explore other options  -consider MTM for discussion of antipsychotic options  - for medication associated weight gain, topiramate may have cognitive side effects.    Psychotropic Drug Interactions:  [PSYCHCLINICDDI]  ADDITIVE SEROTONERGIC: olanzapine, lithium  ADDITIVE CNS/RESPIRATORY DEPRESSION: lamotrigine, olanzapine  Lithium may enhance the neurotoxic effect of Antipsychotic Agents.  Management: routine monitoring in the process of cross tapering from olanzapine to clozapine    [place] today: indicates that controlled prescriptions have been filled as prescribed    Risk Statements:   Treatment Risk- Risks, benefits, alternatives and potential adverse effects have been discussed and are understood.  Safety Risk-Manuel did not appear to be an imminent safety risk to self or  "others.      PLAN                                                                                                                1) Meds-  - Continue olanzapine 20 mg daily  - Increase lamotrigine to 250 mg daily  - Continue lithium 1200 mg at bedtime    2) Psychotherapy- not interested    3) Next due-  Labs- 9/19/23: lithium  0.64, HbA1C: 5.1, lithium level and lithium monitoring lab ordered last visit, not done yet, reordered today  1/23/24 AST ALT slightly elevated,   on 10/3/23  EKG- Routine monitoring is not indicated for current psychotropic medication regimen   Rating scales- AIMS completed 4/6/23. AIMS = 0.    4) Referrals-  none     Previously referred to:   -Hepatology regarding initiation of Depakote  - weight management    5) Dispo- RTC in 8 weeks      PERTINENT BACKGROUND                                                    [most recent eval 08/04/22]     Jose Francisco \"Manuel\"  first experienced mental health issues in grade school and has received treatment for ADHD, depression, psychosis, and substance use. Notably, this patient had FEP in 2015 and has been stable on olanzapine and lithium since that time. He has never lived independently and has only been slowly developing insight into his illness and past symptoms. GeneSight testing was completed noting patient is a poor CYP2D6 metabolizer. Past records from Dr. Mike Powers were received in summer 2018 to assist with diagnostic clarity, however they were hand written notes that were illegible though accompanied by typed neuropsychology consult from [facility]. This documentation was unable to support a diagnosis of bipolar disorder at that time though noted potential Asperger's diagnosis and ADHD. Given that there has been evidence for psychotic signs and symptoms outside the context of mood episodes, a schizophrenia spectrum disorder such as schizoaffective disorder diagnosis seems most accurate. He has had two hospitalizations for " "decompensated psychosis in the setting of decreasing his olanzapine dose.    Has \"no interest whatsoever\" in therapy. Please refrain from asking about it.    Psych pertinent item history includes psychosis [sxs include disorganization, possible catatonia], mutiple psychotropic trials, psych hosp (<3) and Substance Use: alcohol and cannabis     SUBJECTIVE     Since last visit:    The olanzapine hs been good, he denies problems.  It increases the appetite so does clozapine.   A new medication got approved for weight gain but backed up.     Mood: good  Doesn't want to change any other medications    Has been taking lamotrigine for a few month. Since starting olanzapine side effects of laughing has decreased.     Lamotrigine helped with depression a lot. Has a pretty decent mood. Talking much more since starting lamotrigine.     Anxiety: Usually has a hard time stopping thinking, it was helping him significantly.     Brain is not very quiet. Doesn't have easy time stopping thinking, there is no spaces in between. Continuous thoughts that don't stop, in the past benzos have helped.     Sleep: ok, falls asleep within a few hours. Way better compared to clozapine.      Smoking 1 cigarette a day (longer than a cigarette).       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      Other - DUIs. Also In February 2014 the patient left for [place] to attend a " "[employer] tournament. He ended up in [place] and was arrested for shoplifting from [employer] (had \"intended to purchase\" a pair of jeans but became panicked when security approached him). Had a brief stay in [place], and afterwards, took him 5 days to return to [place] with parents frequently wiring him money. Had difficulty adhering to travel plans, seemed confused, and was communicating with parents via \"bizarre\" text messages (parents even filed a missing persons report at one point). He finally arrived home exhausted, not sharing much of events in [place], but slightly more consistent with baseline.    Pertinent Negatives: No suicidal or violent ideation, self-injury, psychosis, hallucinations, delusions, lauren, hypomania, aggression and harmful substance use  Adverse Effects: none       PSYCH and SUBSTANCE USE Critical Summary Points since July 2022 08/14/23 - increased the dose of lamotrigine to 150 mg, reach out in 3 weeks and can further increase to 200  10/2/23 - started clozapine 25 mg decrease zyprexa to 7.5 plan to go to 50  11/28/23 - increase clozapine to 75 and continue zyprexa 7.5  1/8/23 - plan to raise clozapine 175 olanzapine 2.5 tomorrow  2/5/24 - continue clozapine 200 mg  In between visits stopped clozapine and started olanzapine 20 mg   2/29/24 - no med changes  4/1/24 - increased lamotrigine to 250 mg        PAST MED TRIALS     Adderall 10-20 mg - improved concentration, but \"cold personality\" and perseveration  Prozac 20 mg - limited response, less irritable, first episode of \"rage\"  Seroquel 25-75 mg - helped with sleep and irritability  Concerta 18 mg - improved focus, no improvement in motivation  Provigil 150 mg (up to 400) - Stimulants \"almost killed him\" triggered alcohol binges  Abilify ?25 mg - OK when QOD, but irritable and agitated on QD  Clonazepam 0.5-1.5 mg - calming, helped with sleep and \"catatonia\"  Risperdal up to 5 mg - some confusion, slow processing, withdrawn, ?\"catatonia\", " panic attacks -dont try that  Zyprexa 5 mg less anxious overall improvement  Lithium - calmer, overall better  Latuda 20 mg - severe fatigue and depression  Synthroid 75 mcg - added to help with mood.  Brief trials with Strattera, Intuniv, Lamictal, Xanax, Zoloft  Naltrexone - had nightmares and sedation on 25mg, stopped after 2 nights  Vraylar briefly  Seroquel - nasal congestion  Clozapine - late 2023 to Feb 2024:  nasal congestion and worsening of acid reflux. with clozapine delusions had improved significantly. improved in his ability to think.           MEDICAL HISTORY and ALLERGY     ALLERGIES: Sulfa antibiotics    Patient Active Problem List   Diagnosis     Schizoaffective disorder, bipolar type (H)     Hx of psychiatric care     Elevated liver enzymes     Fatty liver     High blood triglycerides     History of cannabis abuse     History of cocaine abuse (H)     Psychosis (H)     Elevated blood pressure reading without diagnosis of hypertension     Constipation     Morbid obesity (H)     Schizoaffective disorder (H)      MEDICAL REVIEW OF SYSTEMS   Contraception-  Unknown     none in addition to that documented above     MEDICATIONS     Current Outpatient Medications   Medication Sig Dispense Refill     atorvastatin (LIPITOR) 20 MG tablet TAKE ONE TABLET BY MOUTH ONE TIME DAILY*       lamoTRIgine (LAMICTAL) 200 MG tablet Take 1 tablet (200 mg) by mouth at bedtime 30 tablet 2     lithium ER (LITHOBID) 300 MG CR tablet TAKE FOUR TABLETS BY MOUTH AT BEDTIME 120 tablet 2     metFORMIN (GLUCOPHAGE) 500 MG tablet Take 1 tablet (500 mg) by mouth 2 times daily (with meals) 60 tablet 2     OLANZapine (ZYPREXA) 20 MG tablet Take 1 tablet (20 mg) by mouth at bedtime 30 tablet 2      VITALS   There were no vitals taken for this visit.   Pulse Readings from Last 3 Encounters:   02/13/24 87   01/30/24 81   01/16/24 85     Wt Readings from Last 3 Encounters:   01/30/24 137.2 kg (302 lb 6.4 oz)   01/16/24 138.7 kg (305 lb  12.8 oz)   01/08/24 136.1 kg (300 lb)     BP Readings from Last 3 Encounters:   02/13/24 133/86   01/30/24 139/89   01/16/24 (!) 139/90      MENTAL STATUS EXAM     Alertness: alert  and oriented  Appearance: well groomed  Behavior/Demeanor: cooperative and calm, with good  eye contact   Speech: normal and regular rate and rhythm  Language: intact and no obvious problem  Psychomotor: normal or unremarkable  Mood: description consistent with euthymia  Affect: restricted, reactive; congruent to: mood- yes, content- yes  Thought Process/Associations:  linear, somewhat concrete, goal oriented  Thought Content:  Reports none;  Denies suicidal & violent ideation and delusions  Perception:  Reports none;  Denies auditory hallucinations and visual hallucinations  Insight: good  Judgment: good  Cognition: does  appear grossly intact; formal cognitive testing was not done  Gait and Station: N/A ([facility])     LABS and DATA         1/8/2024     2:35 PM 2/29/2024    10:14 AM 4/1/2024     2:10 PM   PHQ   PHQ-9 Total Score 11 6 8   Q9: Thoughts of better off dead/self-harm past 2 weeks Not at all Not at all Not at all     Liver/Kidney Function, TSH Metabolic Blood counts   Recent Labs   Lab Test 10/03/23  1227 09/19/23  1144   AST 47*  --    ALT 99*  --    ALKPHOS 81  --    CR 1.28* 1.18*     Recent Labs   Lab Test 01/10/23  1156   TSH 1.44    Recent Labs   Lab Test 10/03/23  1227   CHOL 132   TRIG 362*   LDL 27   HDL 33*     Recent Labs   Lab Test 09/19/23  1144   A1C 5.1     Recent Labs   Lab Test 10/03/23  1227   GLC 88    Recent Labs   Lab Test 02/20/24  1511   WBC 7.2   HGB 15.1   HCT 44.7   MCV 83            LITHIUM LABS     [level, renal, SG, TSH, WBC]    q6 mo  Recent Labs   Lab Test 09/19/23  1144 01/10/23  1156 05/10/22  0855 07/26/21  1339   LITHIUM 0.64 0.6 0.8 0.6     Recent Labs   Lab Test 10/03/23  1227 09/19/23  1144   CR 1.28* 1.18*   GFRESTIMATED 76 85     Recent Labs   Lab Test 01/10/23  1156  05/10/22  0855   SG 1.020 1.010     Recent Labs   Lab Test 01/10/23  1156 05/10/22  0855   TSH 1.44 5.36*     Recent Labs   Lab Test 02/20/24  1511 02/13/24  1320   WBC 7.2 6.6       PROVIDER: Darlene Thomas MD    Level of Medical Decision Making:   - At least 1 chronic problem that is not stable  - Engaged in prescription drug management during visit (discussed any medication benefits, side effects, alternatives, etc.)    Psychiatry Individual Psychotherapy Note   Psychotherapy start time - 3:03  Psychotherapy end time - 3:23  Date last reviewed with patient - 2/5/24  Subjective: This supportive psychotherapy session addressed issues related to goals of therapy and current psychosocial stressors. Patient's reaction: Action in the context of mental status appropriate for ambulatory setting.    Interactive complexity indicated? No  Plan: RTC in timeframe noted above  Psychotherapy services during this visit included myself and the patient.   Treatment Plan      SYMPTOMS; PROBLEMS   MEASURABLE GOALS;    FUNCTIONAL IMPROVEMENT / GAINS INTERVENTIONS DISCHARGE CRITERIA   Psychosis: negative symptoms (avolition, affective flattening, anhedonia, alogia, apathy, ...)   exercise for 20 minutes 3-7 days a week  Supportive / psychodynamic marked symptom improvement     Patient staffed in clinic with Dr. Julio who will sign the note.  Supervisor is Dr. Carrasquillo.

## 2024-04-03 NOTE — PATIENT INSTRUCTIONS
**For crisis resources, please see the information at the end of this document**   Patient Education    Thank you for coming to the [facility] [place] [facility].     Lab Testing:  If you had lab testing today and your results are reassuring or normal they will be mailed to you or sent through [facility] within 7 days. If the lab tests need quick action we will call you with the results. The phone number we will call with results is # [phone]. If this is not the best number please call our clinic and change the number.     Medication Refills:  If you need any refills please call your pharmacy and they will contact us. Our fax number for refills is [phone].   Three business days of notice are needed for general medication refill requests.   Five business days of notice are needed for controlled substance refill requests.   If you need to change to a different pharmacy, please contact the new pharmacy directly. The new pharmacy will help you get your medications transferred.     Contact Us:  Please call [phone] during business hours (8-5:00 M-F).   If you have medication related questions after clinic hours, or on the weekend, please call [phone].     Financial Assistance [phone]   Medical Records [phone]       MENTAL HEALTH CRISIS RESOURCES:  For a emergency help, please call 911 or go to the nearest Emergency Department.     Emergency Walk-In Options:   Em[facility] Unit @ [facility] ([place]): [phone] - Specialized mental health emergency area designed to be calming  [facility] ([place]): [phone]  [facility] ([place]): [phone]  [facility]): [phone]    [facility] Crisis Information:   [place]: [phone]  [place]: [phone]  Fredi (FRIEDA) - Adult: [phone]     Child: [phone]  [place] - Adult: [phone]     Child: [phone]  [place]:  [phone]  List of all [facility] resources:   https://[place].gov/dhs/people-we-serve/adults/health-care/mental-health/resources/crisis-contacts.jsp    National Crisis Information:   Crisis Text Line: Text  [place]  to 522172  Suicide & Crisis Lifeline: 988  National Suicide Prevention Lifeline: 8-798-622-TALK (1-152.260.4741)       For online chat options, visit https://suicidepreventionlifeline.org/chat/  Poison Control Center: 6-859-503-1067  Trans Lifeline: 0-083-706-1445 - Hotline for transgender people of all ages  The [employer]: 1-642-729-1366 - Hotline for LGBT youth     For Non-Emergency Support:   Fast Tracker: Mental Health & Substance Use Disorder Resources -   https://www.[facility].org/

## 2024-04-27 ENCOUNTER — MYC REFILL (OUTPATIENT)
Dept: PSYCHIATRY | Facility: CLINIC | Age: 34
End: 2024-04-27
Payer: COMMERCIAL

## 2024-04-27 DIAGNOSIS — F25.0 SCHIZOAFFECTIVE DISORDER, BIPOLAR TYPE (H): ICD-10-CM

## 2024-04-27 RX ORDER — LAMOTRIGINE 25 MG/1
50 TABLET ORAL DAILY
Qty: 60 TABLET | Refills: 1 | Status: CANCELLED | OUTPATIENT
Start: 2024-04-27

## 2024-05-24 ENCOUNTER — LAB (OUTPATIENT)
Dept: LAB | Facility: CLINIC | Age: 34
End: 2024-05-24
Payer: COMMERCIAL

## 2024-05-24 DIAGNOSIS — Z79.899 ENCOUNTER FOR LONG-TERM (CURRENT) USE OF MEDICATIONS: ICD-10-CM

## 2024-05-24 DIAGNOSIS — F25.0 SCHIZOAFFECTIVE DISORDER, BIPOLAR TYPE (H): ICD-10-CM

## 2024-05-24 LAB
ALBUMIN UR-MCNC: NEGATIVE MG/DL
APPEARANCE UR: CLEAR
BACTERIA #/AREA URNS HPF: ABNORMAL /HPF
BILIRUB UR QL STRIP: NEGATIVE
COLOR UR AUTO: YELLOW
GLUCOSE UR STRIP-MCNC: NEGATIVE MG/DL
HGB UR QL STRIP: NEGATIVE
KETONES UR STRIP-MCNC: NEGATIVE MG/DL
LEUKOCYTE ESTERASE UR QL STRIP: NEGATIVE
LITHIUM SERPL-SCNC: 0.7 MMOL/L (ref 0.6–1.2)
NITRATE UR QL: NEGATIVE
PH UR STRIP: 7 [PH] (ref 5–7)
RBC #/AREA URNS AUTO: ABNORMAL /HPF
SP GR UR STRIP: 1.02 (ref 1–1.03)
SP GR UR STRIP: 1.02 (ref 1–1.03)
SQUAMOUS #/AREA URNS AUTO: ABNORMAL /LPF
UROBILINOGEN UR STRIP-ACNC: 0.2 E.U./DL
WBC #/AREA URNS AUTO: ABNORMAL /HPF

## 2024-05-24 PROCEDURE — 84443 ASSAY THYROID STIM HORMONE: CPT

## 2024-05-24 PROCEDURE — 81001 URINALYSIS AUTO W/SCOPE: CPT

## 2024-05-24 PROCEDURE — 80048 BASIC METABOLIC PNL TOTAL CA: CPT

## 2024-05-24 PROCEDURE — 36415 COLL VENOUS BLD VENIPUNCTURE: CPT

## 2024-05-24 PROCEDURE — 80178 ASSAY OF LITHIUM: CPT

## 2024-05-24 PROCEDURE — 81003 URINALYSIS AUTO W/O SCOPE: CPT | Mod: 59

## 2024-05-25 LAB
ANION GAP SERPL CALCULATED.3IONS-SCNC: 11 MMOL/L (ref 7–15)
BUN SERPL-MCNC: 13.5 MG/DL (ref 6–20)
CALCIUM SERPL-MCNC: 11.1 MG/DL (ref 8.6–10)
CHLORIDE SERPL-SCNC: 105 MMOL/L (ref 98–107)
CREAT SERPL-MCNC: 1.42 MG/DL (ref 0.67–1.17)
DEPRECATED HCO3 PLAS-SCNC: 26 MMOL/L (ref 22–29)
EGFRCR SERPLBLD CKD-EPI 2021: 67 ML/MIN/1.73M2
GLUCOSE SERPL-MCNC: 85 MG/DL (ref 70–99)
POTASSIUM SERPL-SCNC: 4.2 MMOL/L (ref 3.4–5.3)
SODIUM SERPL-SCNC: 142 MMOL/L (ref 135–145)
TSH SERPL DL<=0.005 MIU/L-ACNC: 3.14 UIU/ML (ref 0.3–4.2)

## 2024-05-26 ENCOUNTER — MYC REFILL (OUTPATIENT)
Dept: PSYCHIATRY | Facility: CLINIC | Age: 34
End: 2024-05-26
Payer: COMMERCIAL

## 2024-05-26 DIAGNOSIS — F25.0 SCHIZOAFFECTIVE DISORDER, BIPOLAR TYPE (H): ICD-10-CM

## 2024-05-28 RX ORDER — LITHIUM CARBONATE 300 MG/1
TABLET, FILM COATED, EXTENDED RELEASE ORAL
Qty: 120 TABLET | Refills: 0 | Status: SHIPPED | OUTPATIENT
Start: 2024-05-28 | End: 2024-06-17

## 2024-05-28 RX ORDER — LAMOTRIGINE 25 MG/1
50 TABLET ORAL DAILY
Qty: 60 TABLET | Refills: 0 | Status: SHIPPED | OUTPATIENT
Start: 2024-05-28 | End: 2024-06-17

## 2024-05-28 NOTE — TELEPHONE ENCOUNTER
Last seen: 4/01/24  RTC: 8 weeks    Cancel: none  No-show: none  Next appt: 6/17/24     Incoming refill from Patient via [employer]    Medication requested:   Pending Prescriptions:                       Disp   Refills    lamoTRIgine (LAMICTAL) 25 MG tablet       60 tab*1            Sig: Take 2 tablets (50 mg) by mouth daily In addition           to 200 mg for a total daily dose of 250 mg.    lithium ER (LITHOBID) 300 MG CR tablet    120 ta*1            Sig: TAKE FOUR TABLETS BY MOUTH AT BEDTIME      From chart note:   1) Meds-  - Continue olanzapine 20 mg daily  - Increase lamotrigine to 250 mg daily  - Continue lithium 1200 mg at bedtime     Medication refill approved per refill protocol.

## 2024-06-02 ENCOUNTER — MYC REFILL (OUTPATIENT)
Dept: PSYCHIATRY | Facility: CLINIC | Age: 34
End: 2024-06-02
Payer: COMMERCIAL

## 2024-06-02 DIAGNOSIS — F25.0 SCHIZOAFFECTIVE DISORDER, BIPOLAR TYPE (H): ICD-10-CM

## 2024-06-03 RX ORDER — OLANZAPINE 20 MG/1
20 TABLET ORAL AT BEDTIME
Qty: 30 TABLET | Refills: 0 | Status: SHIPPED | OUTPATIENT
Start: 2024-06-03 | End: 2024-06-17

## 2024-06-03 NOTE — TELEPHONE ENCOUNTER
Last seen: 04/01/2024  RTC: 8 weeks  Cancel: 0  No-show: 0  Next appt: 06/17/2024     Incoming refill from Patient via [employer]    Medication requested:   Pending Prescriptions:                       Disp   Refills    OLANZapine (ZYPREXA) 20 MG tablet         30 tab*1            Sig: Take 1 tablet (20 mg) by mouth at bedtime      From chart note:   - Continue olanzapine 20 mg daily      Medication refill approved per refill protocol.     Medication unable to be refilled by RN due to criteria not met as indicated.                 []Eligibility - not seen in the last year              []Supervision - no future appointment              []Compliance - no shows, cancellations or lapse in therapy              []Verification - order discrepancy              []Controlled medication              []Medication not included in policy              []90-day supply request              []Other:

## 2024-06-08 ENCOUNTER — MYC REFILL (OUTPATIENT)
Dept: PSYCHIATRY | Facility: CLINIC | Age: 34
End: 2024-06-08
Payer: COMMERCIAL

## 2024-06-08 DIAGNOSIS — F25.0 SCHIZOAFFECTIVE DISORDER, BIPOLAR TYPE (H): ICD-10-CM

## 2024-06-08 RX ORDER — LAMOTRIGINE 200 MG/1
200 TABLET ORAL AT BEDTIME
Qty: 30 TABLET | Refills: 2 | Status: CANCELLED | OUTPATIENT
Start: 2024-06-08

## 2024-06-16 ENCOUNTER — HEALTH MAINTENANCE LETTER (OUTPATIENT)
Age: 34
End: 2024-06-16

## 2024-06-17 ENCOUNTER — VIRTUAL VISIT (OUTPATIENT)
Dept: PSYCHIATRY | Facility: CLINIC | Age: 34
End: 2024-06-17
Attending: STUDENT IN AN ORGANIZED HEALTH CARE EDUCATION/TRAINING PROGRAM
Payer: COMMERCIAL

## 2024-06-17 DIAGNOSIS — F25.0 SCHIZOAFFECTIVE DISORDER, BIPOLAR TYPE (H): ICD-10-CM

## 2024-06-17 PROCEDURE — 99214 OFFICE O/P EST MOD 30 MIN: CPT | Mod: 95

## 2024-06-17 PROCEDURE — 90833 PSYTX W PT W E/M 30 MIN: CPT | Mod: 95

## 2024-06-17 RX ORDER — OLANZAPINE 20 MG/1
20 TABLET ORAL AT BEDTIME
Qty: 30 TABLET | Refills: 1 | Status: SHIPPED | OUTPATIENT
Start: 2024-06-17 | End: 2024-09-04

## 2024-06-17 RX ORDER — LAMOTRIGINE 100 MG/1
100 TABLET ORAL DAILY
Qty: 30 TABLET | Refills: 2 | Status: SHIPPED | OUTPATIENT
Start: 2024-06-17 | End: 2024-06-28

## 2024-06-17 RX ORDER — LAMOTRIGINE 200 MG/1
200 TABLET ORAL AT BEDTIME
Qty: 30 TABLET | Refills: 2 | Status: SHIPPED | OUTPATIENT
Start: 2024-06-17 | End: 2024-06-28

## 2024-06-17 RX ORDER — LITHIUM CARBONATE 300 MG/1
TABLET, FILM COATED, EXTENDED RELEASE ORAL
Qty: 120 TABLET | Refills: 1 | Status: SHIPPED | OUTPATIENT
Start: 2024-06-17 | End: 2024-08-25

## 2024-06-17 ASSESSMENT — PATIENT HEALTH QUESTIONNAIRE - PHQ9
SUM OF ALL RESPONSES TO PHQ QUESTIONS 1-9: 4
SUM OF ALL RESPONSES TO PHQ QUESTIONS 1-9: 4
10. IF YOU CHECKED OFF ANY PROBLEMS, HOW DIFFICULT HAVE THESE PROBLEMS MADE IT FOR YOU TO DO YOUR WORK, TAKE CARE OF THINGS AT HOME, OR GET ALONG WITH OTHER PEOPLE: SOMEWHAT DIFFICULT

## 2024-06-17 NOTE — NURSING NOTE
Is the patient currently in the state of [place]? YES    Visit mode:VIDEO    If the visit is dropped, the patient can be reconnected by: VIDEO VISIT: Text to cell phone:   Telephone Information:   Mobile [phone]       Will anyone else be joining the visit? NO  (If patient encounters technical issues they should call [phone] :[phone])    How would you like to obtain your AVS? MyChart    Are changes needed to the allergy or medication list? Pt stated no changes to allergies and Pt stated no med changes    Are refills needed on medications prescribed by this physician? NO    Reason for visit: CATRACHITO TAYLORF

## 2024-06-17 NOTE — PROGRESS NOTES
Virtual Visit Details    Type of service:  Video Visit   Video Start Time:  3:04  Video End Time: 3:33    Originating Location (pt. Location): Home    Distant Location (provider location):  On-site  Platform used for Video Visit: [facility]  Psychiatry Clinic  MEDICAL PROGRESS NOTE     CARE TEAM:  PCP- Clinic Provider    Psychotherapist- None   Jose Francisco Sommers is a 33 year old who uses the name Manuel and pronouns he, him, his.      DIAGNOSIS     # Schizoaffective disorder, Bipolar type, most recent episode depressed, in remission without current psychotic features               -rule out bipolar disorder type 1  # ADHD, predominantly inattentive type  # Insomnia (history of delayed phase sleep disorder)    severe obstructive sleep apnea without hypoxemia - not tolerating CPAP      ASSESSMENT     # Schizoaffective disorder  Overall feeling stable and no concerning symptoms. Today he requested for further increase in the lamotrigine dose as he was feeling more happy on a combination of clozapine and lamotrigine but feels the dose with combination of lamotrigine and olanzapine might be low. Recommended that he can do a trial but if didn't notice improvement should taper down to 200 since he didn't notice any change from 200 to 250 mg.     Future Considerations:  Tapering lamotrigine to 200 mg     Psychotropic Drug Interactions:  [PSYCHCLINICDDI]  ADDITIVE SEROTONERGIC: olanzapine, lithium  ADDITIVE CNS/RESPIRATORY DEPRESSION: lamotrigine, olanzapine  Lithium may enhance the neurotoxic effect of Antipsychotic Agents.  Management: routine monitoring in the process of cross tapering from olanzapine to clozapine    [place] was [place] today: indicates that controlled prescriptions have been filled as prescribed    Risk Statements:   Treatment Risk- Risks, benefits, alternatives and potential adverse effects have been discussed and are understood.  Safety Risk-Manuel did not appear to  "be an imminent safety risk to self or others.      PLAN                                                                                                                1) Meds-  - Continue olanzapine 20 mg daily  - Increase lamotrigine to 300 mg daily  - Continue lithium 1200 mg at bedtime    Weight management   4/24 started Zepbound    2) Psychotherapy- not interested    3) Next due-  Labs- next due 5/25. lithium  0.7 lithium level and lithium monitoring lab done 5/24  EKG- Routine monitoring is not indicated for current psychotropic medication regimen   Rating scales- AIMS completed 4/6/23. AIMS = 0.    4) Referrals-  none     Previously referred to:   -Hepatology regarding initiation of Depakote  - weight management    5) Dispo- RTC in 8 weeks      PERTINENT BACKGROUND                                                    [most recent eval 08/04/22]     Jose Francisco \"Manuel\"  first experienced mental health issues in grade school and has received treatment for ADHD, depression, psychosis, and substance use. Notably, this patient had FEP in 2015 and has been stable on olanzapine and lithium since that time. He has never lived independently and has only been slowly developing insight into his illness and past symptoms. GeneSight testing was completed noting patient is a poor CYP2D6 metabolizer. Past records from Dr. Mike Powers were received in summer 2018 to assist with diagnostic clarity, however they were hand written notes that were illegible though accompanied by typed neuropsychology consult from [facility]. This documentation was unable to support a diagnosis of bipolar disorder at that time though noted potential Asperger's diagnosis and ADHD. Given that there has been evidence for psychotic signs and symptoms outside the context of mood episodes, a schizophrenia spectrum disorder such as schizoaffective disorder diagnosis seems most accurate. He has had two hospitalizations for decompensated psychosis in the " "setting of decreasing his olanzapine dose.    Has \"no interest whatsoever\" in therapy. Please refrain from asking about it.    Psych pertinent item history includes psychosis [sxs include disorganization, possible catatonia], mutiple psychotropic trials, psych hosp (<3) and Substance Use: alcohol and cannabis     SUBJECTIVE     Since last visit:    2 months ago started medication weight loss and has lost 30 Ibs and has also quit smoking and it's going great  Ambition for chores and activity is a lot higher, got everything done that he needed to do around the house.   With being on weight management medication doesn't find olanzapine an issue anymore.     Interested in changing lamotrigine, states it works differently since switching from clozapine to olanzapine    Thinking going down to 200 or up to 300 mg.   His goal is to feel more happy.     Mood: pretty good, doesn't think he has a bad mood, things meds work well.     Sleep: pretty good, falling asleep the same time every day    Anxiety: breathing exercises really help with not having a lot of thought.     Has quit smoking.       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      Other - DUIs. Also In February 2014 the patient left for [place] to attend a earthmine tournament. He ended up in [place] and was arrested for shoplifting from [employer] (had \"intended to purchase\" a pair of jeans but " "became panicked when security approached him). Had a brief stay in [place], and afterwards, took him 5 days to return to [place] with parents frequently wiring him money. Had difficulty adhering to travel plans, seemed confused, and was communicating with parents via \"bizarre\" text messages (parents even filed a missing persons report at one point). He finally arrived home exhausted, not sharing much of events in [place], but slightly more consistent with baseline.    Pertinent Negatives: No suicidal or violent ideation, self-injury, psychosis, hallucinations, delusions, lauren, hypomania, aggression and harmful substance use  Adverse Effects: none       PSYCH and SUBSTANCE USE Critical Summary Points since July 2022 08/14/23 - increased the dose of lamotrigine to 150 mg, reach out in 3 weeks and can further increase to 200  10/2/23 - started clozapine 25 mg decrease zyprexa to 7.5 plan to go to 50  11/28/23 - increase clozapine to 75 and continue zyprexa 7.5  1/8/23 - plan to raise clozapine 175 olanzapine 2.5 tomorrow  2/5/24 - continue clozapine 200 mg  In between visits stopped clozapine and started olanzapine 20 mg   2/29/24 - no med changes  4/1/24 - increased lamotrigine to 250 mg   6/17/24 - increased lamotrigine to 300 mg       PAST MED TRIALS     Adderall 10-20 mg - improved concentration, but \"cold personality\" and perseveration  Prozac 20 mg - limited response, less irritable, first episode of \"rage\"  Seroquel 25-75 mg - helped with sleep and irritability  Concerta 18 mg - improved focus, no improvement in motivation  Provigil 150 mg (up to 400) - Stimulants \"almost killed him\" triggered alcohol binges  Abilify ?25 mg - OK when QOD, but irritable and agitated on QD  Clonazepam 0.5-1.5 mg - calming, helped with sleep and \"catatonia\"  Risperdal up to 5 mg - some confusion, slow processing, withdrawn, ?\"catatonia\", panic attacks -dont try that  Zyprexa 5 mg less anxious overall improvement  Lithium - calmer, " overall better  Latuda 20 mg - severe fatigue and depression  Synthroid 75 mcg - added to help with mood.  Brief trials with Strattera, Intuniv, Lamictal, Xanax, Zoloft  Naltrexone - had nightmares and sedation on 25mg, stopped after 2 nights  Vraylar briefly  Seroquel - nasal congestion  Clozapine - late 2023 to Feb 2024:  nasal congestion and worsening of acid reflux. with clozapine delusions had improved significantly. improved in his ability to think.           MEDICAL HISTORY and ALLERGY     ALLERGIES: Sulfa antibiotics    Patient Active Problem List   Diagnosis    Schizoaffective disorder, bipolar type (H)    Hx of psychiatric care    Elevated liver enzymes    Fatty liver    High blood triglycerides    History of cannabis abuse    History of cocaine abuse (H)    Psychosis (H)    Elevated blood pressure reading without diagnosis of hypertension    Constipation    Morbid obesity (H)    Schizoaffective disorder (H)      MEDICAL REVIEW OF SYSTEMS   Contraception-  Unknown     none in addition to that documented above     MEDICATIONS     Current Outpatient Medications   Medication Sig Dispense Refill    atorvastatin (LIPITOR) 20 MG tablet TAKE ONE TABLET BY MOUTH ONE TIME DAILY*      lamoTRIgine (LAMICTAL) 200 MG tablet Take 1 tablet (200 mg) by mouth at bedtime In addition to 50 mg for a total daily dose of 250 mg. 30 tablet 2    lamoTRIgine (LAMICTAL) 25 MG tablet Take 2 tablets (50 mg) by mouth daily In addition to 200 mg for a total daily dose of 250 mg. 60 tablet 0    lithium ER (LITHOBID) 300 MG CR tablet TAKE FOUR TABLETS BY MOUTH AT BEDTIME 120 tablet 0    metFORMIN (GLUCOPHAGE) 500 MG tablet Take 1 tablet (500 mg) by mouth 2 times daily (with meals) 60 tablet 2    OLANZapine (ZYPREXA) 20 MG tablet Take 1 tablet (20 mg) by mouth at bedtime 30 tablet 0      VITALS   There were no vitals taken for this visit.   Pulse Readings from Last 3 Encounters:   02/13/24 87   01/30/24 81   01/16/24 85     Wt Readings  from Last 3 Encounters:   01/30/24 137.2 kg (302 lb 6.4 oz)   01/16/24 138.7 kg (305 lb 12.8 oz)   01/08/24 136.1 kg (300 lb)     BP Readings from Last 3 Encounters:   02/13/24 133/86   01/30/24 139/89   01/16/24 (!) 139/90      MENTAL STATUS EXAM     Alertness: alert  and oriented  Appearance: well groomed  Behavior/Demeanor: cooperative and calm, with good  eye contact   Speech: normal and regular rate and rhythm  Language: intact and no obvious problem  Psychomotor: normal or unremarkable  Mood: description consistent with euthymia  Affect: restricted, reactive; congruent to: mood- yes, content- yes  Thought Process/Associations:  linear, somewhat concrete, goal oriented  Thought Content:  Reports none;  Denies suicidal & violent ideation and delusions  Perception:  Reports none;  Denies auditory hallucinations and visual hallucinations  Insight: good  Judgment: good  Cognition: does  appear grossly intact; formal cognitive testing was not done  Gait and Station: N/A ([facility])     LABS and DATA         2/29/2024    10:14 AM 4/1/2024     2:10 PM 6/17/2024     2:31 PM   PHQ   PHQ-9 Total Score 6 8 4   Q9: Thoughts of better off dead/self-harm past 2 weeks Not at all Not at all Not at all     Liver/Kidney Function, TSH Metabolic Blood counts   Recent Labs   Lab Test 05/24/24  1104 10/03/23  1227   AST  --  47*   ALT  --  99*   ALKPHOS  --  81   CR 1.42* 1.28*     Recent Labs   Lab Test 05/24/24  1104   TSH 3.14    Recent Labs   Lab Test 10/03/23  1227   CHOL 132   TRIG 362*   LDL 27   HDL 33*     Recent Labs   Lab Test 09/19/23  1144   A1C 5.1     Recent Labs   Lab Test 05/24/24  1104   GLC 85    Recent Labs   Lab Test 02/20/24  1511   WBC 7.2   HGB 15.1   HCT 44.7   MCV 83            LITHIUM LABS     [level, renal, SG, TSH, WBC]    q6 mo  Recent Labs   Lab Test 05/24/24  1104 09/19/23  1144 01/10/23  1156 05/10/22  0855   LITHIUM 0.70 0.64 0.6 0.8     Recent Labs   Lab Test 05/24/24  1104 10/03/23  1228    CR 1.42* 1.28*   GFRESTIMATED 67 76     Recent Labs   Lab Test 05/24/24  1105 01/10/23  1156   SG 1.020  1.020 1.020     Recent Labs   Lab Test 05/24/24  1104 01/10/23  1156   TSH 3.14 1.44     Recent Labs   Lab Test 02/20/24  1511 02/13/24  1320   WBC 7.2 6.6       PROVIDER: Darlene Thomas MD    Level of Medical Decision Making:   - At least 1 chronic problem that is not stable  - Engaged in prescription drug management during visit (discussed any medication benefits, side effects, alternatives, etc.)    Psychiatry Individual Psychotherapy Note   Psychotherapy start time - 3:05  Psychotherapy end time - 3:25  Date last reviewed with patient - 2/5/24  Subjective: This supportive psychotherapy session addressed issues related to goals of therapy and current psychosocial stressors. Patient's reaction: Action in the context of mental status appropriate for ambulatory setting.    Interactive complexity indicated? No  Plan: RTC in timeframe noted above  Psychotherapy services during this visit included myself and the patient.   Treatment Plan      SYMPTOMS; PROBLEMS   MEASURABLE GOALS;    FUNCTIONAL IMPROVEMENT / GAINS INTERVENTIONS DISCHARGE CRITERIA   Psychosis: negative symptoms (avolition, affective flattening, anhedonia, alogia, apathy, ...)   exercise for 20 minutes 3-7 days a week  Supportive / psychodynamic marked symptom improvement     Patient staffed in clinic with Dr. Finn who will sign the note.  Supervisor is Dr. Carrasquillo.      TELEHEALTH ATTENDING ATTESTATION  Following the ACGME guidelines on telemedicine and direct supervision, I was concurrently participating in and/or monitoring the patient care through appropriate telecommunication technology - including participation during a portion of the video session that involved clinical data collection and treatment planning discussion.  I discussed the key portions of the service with the resident, including history, presentation, the mental status  examination and developing the plan of care. I agree with the findings and plan as documented in this note.  Mihai Finn MD

## 2024-06-27 ENCOUNTER — MYC MEDICAL ADVICE (OUTPATIENT)
Dept: PSYCHIATRY | Facility: CLINIC | Age: 34
End: 2024-06-27
Payer: COMMERCIAL

## 2024-06-27 DIAGNOSIS — F25.0 SCHIZOAFFECTIVE DISORDER, BIPOLAR TYPE (H): ICD-10-CM

## 2024-06-28 RX ORDER — LAMOTRIGINE 200 MG/1
TABLET ORAL
Qty: 30 TABLET | Refills: 2 | Status: SHIPPED | OUTPATIENT
Start: 2024-06-28 | End: 2024-09-04

## 2024-06-28 RX ORDER — LAMOTRIGINE 25 MG/1
TABLET ORAL
Qty: 14 TABLET | Refills: 0 | Status: SHIPPED | OUTPATIENT
Start: 2024-06-28 | End: 2024-09-04

## 2024-08-25 ENCOUNTER — MYC REFILL (OUTPATIENT)
Dept: PSYCHIATRY | Facility: CLINIC | Age: 34
End: 2024-08-25
Payer: COMMERCIAL

## 2024-08-25 DIAGNOSIS — F25.0 SCHIZOAFFECTIVE DISORDER, BIPOLAR TYPE (H): ICD-10-CM

## 2024-08-26 RX ORDER — LITHIUM CARBONATE 300 MG/1
TABLET, FILM COATED, EXTENDED RELEASE ORAL
Qty: 120 TABLET | Refills: 0 | Status: SHIPPED | OUTPATIENT
Start: 2024-08-26 | End: 2024-09-04

## 2024-08-26 NOTE — TELEPHONE ENCOUNTER
Last seen: 06/17/2024  RTC: 8 weeks  Cancel: None  No-show: None  Next appt: 08/30/2024       Medication requested:   Pending Prescriptions:                       Disp   Refills    lithium ER (LITHOBID) 300 MG CR tablet    120 ta*1            Sig: TAKE FOUR TABLETS BY MOUTH AT BEDTIME        From chart note:   - Continue lithium 1200 mg at bedtime        Medication refill approved per refill protocol. Per dispense report, last refilled 30 day supply on 7/27.

## 2024-08-30 ENCOUNTER — VIRTUAL VISIT (OUTPATIENT)
Dept: PSYCHIATRY | Facility: CLINIC | Age: 34
End: 2024-08-30
Attending: STUDENT IN AN ORGANIZED HEALTH CARE EDUCATION/TRAINING PROGRAM
Payer: COMMERCIAL

## 2024-08-30 DIAGNOSIS — Z79.899 ENCOUNTER FOR LONG-TERM (CURRENT) USE OF MEDICATIONS: ICD-10-CM

## 2024-08-30 DIAGNOSIS — F90.9 ATTENTION DEFICIT HYPERACTIVITY DISORDER (ADHD), UNSPECIFIED ADHD TYPE: ICD-10-CM

## 2024-08-30 DIAGNOSIS — F25.0 SCHIZOAFFECTIVE DISORDER, BIPOLAR TYPE (H): Primary | ICD-10-CM

## 2024-08-30 DIAGNOSIS — G47.00 INSOMNIA, UNSPECIFIED TYPE: ICD-10-CM

## 2024-08-30 PROCEDURE — 90792 PSYCH DIAG EVAL W/MED SRVCS: CPT | Mod: 95

## 2024-08-30 ASSESSMENT — PATIENT HEALTH QUESTIONNAIRE - PHQ9
SUM OF ALL RESPONSES TO PHQ QUESTIONS 1-9: 7
SUM OF ALL RESPONSES TO PHQ QUESTIONS 1-9: 7
10. IF YOU CHECKED OFF ANY PROBLEMS, HOW DIFFICULT HAVE THESE PROBLEMS MADE IT FOR YOU TO DO YOUR WORK, TAKE CARE OF THINGS AT HOME, OR GET ALONG WITH OTHER PEOPLE: SOMEWHAT DIFFICULT

## 2024-08-30 NOTE — PROGRESS NOTES
"   [facility]  General Clinic Team  TRANSFER of CARE DIAGNOSTIC ASSESSMENT       CARE TEAM:    PCP- Clinic Provider  Therapist- Karolyn    Manuel is a 33 year old who uses the pronouns he, him, his.                   Chief Concern    \"Want help with ADHD\"                Assessment & Plan     # Schizoaffective disorder, Bipolar type, most recent episode depressed, in remission without current psychotic features               -rule out bipolar disorder type 1  # ADHD, predominantly inattentive type  # Insomnia (history of delayed phase sleep disorder)     Medical:  severe obstructive sleep apnea without hypoxemia - not tolerating CPAP     Manuel Sommers is a 33 year old M with previous psychiatric diagnoses of Schizoaffective disorder in remission, ADHD inattentive type, and insomnia with pertinent medical diagnoses of severe LISA but not tolerating CPAP machine currently. He presents for transfer of care visit from Dr. Thomas.     Today, patient stated that mental health overall has been pretty good though he does still identify concerns with ADHD including lack of motivation, low energy, and inattention. He says she would like to retrial modafinil since he feels it may have been more helpful than he thought. However, due to elevated Cr 1.42, we discussed repeat Lithium and Cr check. If Cr is still elevated, plan to discuss with PCP about elevated Cr to discuss next steps. Once stabilized considerations for modafinil would be considered. Patient expressed understanding. Denies safety concerns at this time. We discussed risks of untreated LISA and he said he spoke about it with his sleep medicine provider. Since he did not tolerate the masks, they recommended dental implants which he declined since it would keep his mouth open. Instead, he was recommended to lose weight which he has been doing with Zepbound and has noted good effects with it.    " "  Future Considerations:  Consider starting modafinil if Cr stabilizes  Taper Lithium if Cr still elevated    Psychotropic Drug Interactions:    ADDITIVE SEROTONERGIC: olanzapine, lithium  ADDITIVE CNS/RESPIRATORY DEPRESSION: lamotrigine, olanzapine  Lithium may enhance the neurotoxic effect of Antipsychotic Agents.  Management: routine monitoring     [place]NPMP was not checked today: not using controlled substances    Risk Statements:   Treatment Risk: Risks, benefits, alternatives and potential adverse effects have been discussed and are understood.   Safety Risk: Manuel did not appear to be an imminent safety risk to self or others.    PLAN    1) Medications:   - Continue olanzapine 20 mg daily  - Continue lamotrigine to 200 mg daily  - Continue lithium 1200 mg at bedtime    Weight management   Zepbound    2) Psychotherapy: Recommended. Patient declined.     3) Next due:  Labs: Lithium, CMP, and TSH due in 1 week     EKG: Routine monitoring is not indicated for current psychotropic medication regimen   Rating scales: AIMS: next due when in person    4) Referrals: none    5) Follow-up: Return to clinic in 3 months                   Pertinent Background    Copied from chart review.    Jose Francisco \"Manuel\"  first experienced mental health issues in grade school and has received treatment for ADHD, depression, psychosis, and substance use. Notably, this patient had FEP in 2015 and has been stable on olanzapine and lithium since that time. He has never lived independently and has only been slowly developing insight into his illness and past symptoms. GeneSight testing was completed noting patient is a poor CYP2D6 metabolizer. Past records from Dr. Mike Powers were received in summer 2018 to assist with diagnostic clarity, however they were hand written notes that were illegible though accompanied by typed neuropsychology consult from [facility]. This documentation was unable to support a diagnosis of bipolar disorder at " "that time though noted potential Asperger's diagnosis and ADHD. Given that there has been evidence for psychotic signs and symptoms outside the context of mood episodes, a schizophrenia spectrum disorder such as schizoaffective disorder diagnosis seems most accurate. He has had two hospitalizations for decompensated psychosis in the setting of decreasing his olanzapine dose.     Has \"no interest whatsoever\" in therapy. Please refrain from asking about it.     Psych pertinent item history includes psychosis [sxs include disorganization, possible catatonia], multiple psychotropic trials, psych hosp (<3) and Substance Use: alcohol and cannabis                   History of Present Illness     Since last visit, patient reports his Lamictal increase has been poor so after his last appointment, they discussed tapering down to 200mg. With this dose, things have been doing really good.     Thought he could use modafinil to help with energy and ADHD.    Did use Adderall but he was drinking alcohol at the same time. It was a bad mix. Tried Adderall again a couple years ago and didn't like the effects.     Gained 70lbs since previous hospitalization. With Zepbound he's been able to lose weight. Hoping modafinil will help with weight loss.Weighs 270lbs. Wants to go down to 230.     Goals: Wants to find a job. Feeling     Mood: \"Pretty well\" but he would like to achieve more things. Trouble with reading and weight loss and wants to get a job but feels optimistic. Energy is good  Sleep: Pretty decent. Goes to bed around 10pm to midnight and waking up around noon. LISA severe. Has not tolerated CPAP machine.   Appetite: Eating habits are good. Able to eat enough. Weight loss on medication has been great. Has helped limit snacking.   Medications: Is taking Lamictal. Moved up to 300mg but didn't achieve the benefits he wanted so he went down to 200mg mood. His mood worsened slightly but doesn't want more of the Lamictal. Overall " "thinks it's helping with mood. Would be okay going down to 15mg on Zyprexa.  Was laughing more on Lamictal. Went away when dose was lowered.   SI/SIB: No  Psychosis: No. Hospitalized 4 years ago and 5 years before that. They thought he was \"pre-schizophrenic\". Decreases in Olanzapine are what triggered psychosis.     ADHD: Feels Provigil helped with finishing tasks and going to the gym. He tried it a couple times in the past but he said it stopped working. Mom thinks it was an error of judgement. He's now not finishing tasks, low motivation, hard to focus on reading books.     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 Limited finances.      Partner/ - single  Children- None      Living situation- lives with parents in [place]  Social/ Spiritual Support- family, friends, Bahai hemalatha   Legal: DUIs. Also In February 2014 the patient left for [place] to attend a [employer] tournament. He ended up in [place] and was arrested for shoplifting from [facility] (had \"intended to purchase\" a pair of jeans but became panicked when security approached him). Had a brief stay in detention, and afterwards, took him 5 days to return to [place] with parents frequently wiring him money. Had difficulty adhering to travel plans, seemed confused, and was communicating with parents via \"bizarre\" text messages (parents even filed a missing persons report at one point). He finally arrived home exhausted, not sharing much of events in [place], but slightly more consistent with baseline.     Pertinent Substance Use:  Alcohol: No   Cannabis: No  Tobacco: Quit when he started Zepbound 3 months ago.   Caffeine:  Sodas more regularly   Opioids: No   Narcan Kit current: N/A  Other substances: No     Medical Review of Systems:   A comprehensive review of systems was performed and is negative other than noted above.                  Physical Exam  (Vitals Only)    There were no vitals taken for " "this visit.  Pulse Readings from Last 3 Encounters:   24 87   24 81   24 85     Wt Readings from Last 3 Encounters:   24 137.2 kg (302 lb 6.4 oz)   24 138.7 kg (305 lb 12.8 oz)   24 136.1 kg (300 lb)     BP Readings from Last 3 Encounters:   24 133/86   24 139/89   24 (!) 139/90                      Mental Status Exam    Alertness: alert  and oriented  Appearance: well groomed  Behavior/Demeanor: cooperative, pleasant, and calm, with good  eye contact   Speech: regular rate and rhythm  Language: intact  Psychomotor: normal or unremarkable  Mood:  \"pretty good\"  Affect: full range; congruent to: mood- yes, content- yes  Thought Process/Associations: unremarkable  Thought Content:  Reports none;  Denies suicidal & violent ideation and delusions  Perception:  Reports none;  Denies auditory hallucinations and visual hallucinations  Insight: excellent  Judgment: excellent  Cognition: does  appear grossly intact; formal cognitive testing was not done  Gait and Station: N/A (telehealth)                  Family History     Paternal grandfather: alcoholism; Maternal grandmother: \"long-standing mental health issues\". She  of Alzheimer's; Maternal Aunt: Anxiety and depression      No suicides.                 Past Psychiatric History            SIB- None  Suicide Attempt [#, most recent]- None  Suicidal Ideation Hx- None     Violence/Aggression Hx- None  Psychosis Hx- Predominantly disorganization. Denies AVH but describes thought blocking and slowed cognition/difficulty speaking during prior psychotic episode.  First episode began in 2014 - was spending great amounts of time reorganizing his room/house/garage, and even used a  on the kitchen floor.  Eating Disorder Hx- None  Other- None     Psych Hosp [#, most recent]- x3, most recent 2020  Commitment- None  ECT- None  Outpatient Programs - None  Other - N/A     SUBSTANCE USE HISTORY " "  Past Use- Alcohol- in high school  and Cannabis- in high school and prior to hospitalization (precipitated psychosis)  Treatment- #, most recent- [facility] at age 19 for cannabis, did not complete  Medical Consequences- no  Legal Consequences- DUI for drinking at ages 20 and 21  Other- no                  Past Psychotropic Medication Trials         Medication Max Dose (mg) Dates / Duration Helpful? DC Reason / Adverse Effects?   Adderall  10-20 mg   improved concentration, but \"cold personality\" and perseveration   Prozac  20 mg   limited response, less irritable, first episode of \"rage\"   Seroquel  25-75 mg   helped with sleep and irritability. nasal congestion   Concerta  18 mg   improved focus, no improvement in motivation   Provigil  150 mg (up to 400)   Stimulants \"almost killed him\" triggered alcohol binges   Abilify  25 mg   OK when QOD, but irritable and agitated on QD   Clonazepam  0.5-1.5 mg   calming, helped with sleep and \"catatonia\"   Risperdal  5 mg   some confusion, slow processing, withdrawn, ?\"catatonia\", panic attacks -dont try that   Zyprexa     anxious overall improvement   Lithium     calmer, overall better   Latuda  20 mg   severe fatigue and depression   Synthroid  75 mcg   added to help with mood   Naltrexone     had nightmares and sedation on 25mg, stopped after 2 nights   Clozapine     late 2023 to Feb 2024:  nasal congestion and worsening of acid reflux. with clozapine delusions had improved significantly. improved in his ability to think.     Brief trials with Strattera, Intuniv, Lamictal, Xanax, Zoloft, Vraylar  Belsomra found to help him get to deeper sleep.                 Past Medical History     Neurologic Hx [head injury, seizures, etc]: None    Patient Active Problem List   Diagnosis    Schizoaffective disorder, bipolar type (H)    Hx of psychiatric care    Elevated liver enzymes    Fatty liver    High blood triglycerides    History of cannabis abuse    History of cocaine abuse " (H)    Psychosis (H)    Elevated blood pressure reading without diagnosis of hypertension    Constipation    Morbid obesity (H)    Schizoaffective disorder (H)                     Allergies     Sulfa antibiotics                Medications     Current Outpatient Medications   Medication Sig Dispense Refill    atorvastatin (LIPITOR) 20 MG tablet TAKE ONE TABLET BY MOUTH ONE TIME DAILY*      lamoTRIgine (LAMICTAL) 200 MG tablet Week 1 and 2 take 200 mg in addition to 50 mg for a total daily dose of 250 mg, from week 3 only take 200 mg. 30 tablet 2    lamoTRIgine (LAMICTAL) 25 MG tablet Week 1 and 2 take 200 mg in addition to two tablets of 25 mg for a total daily dose of 250 mg, from week 3 only take 200 mg tablet. 14 tablet 0    lithium ER (LITHOBID) 300 MG CR tablet TAKE FOUR TABLETS BY MOUTH AT BEDTIME 120 tablet 0    OLANZapine (ZYPREXA) 20 MG tablet Take 1 tablet (20 mg) by mouth at bedtime 30 tablet 1                     Data         10/2/2023     2:58 PM 1/8/2024     2:37 PM 6/17/2024     2:33 PM   PROMIS-10 Total Score w/o Sub Scores   PROMIS TOTAL - SUBSCORES 25 28 28         7/24/2015     1:00 PM 2/18/2021     2:00 PM   CAGE-AID Total Score   Total Score 0 3         2/29/2024    10:14 AM 4/1/2024     2:10 PM 6/17/2024     2:31 PM   PHQ-9 SCORE   PHQ-9 Total Score MyChart 6 (Mild depression) 8 (Mild depression) 4 (Minimal depression)   PHQ-9 Total Score 6 8 4         2/8/2021    10:03 AM 12/10/2021     9:54 AM   FERNANDO-7 SCORE   Total Score 3 (minimal anxiety) 10 (moderate anxiety)   Total Score 3 10    10       Liver/Kidney Function, TSH Metabolic Blood counts   Recent Labs   Lab Test 05/24/24  1104 10/03/23  1227   AST  --  47*   ALT  --  99*   ALKPHOS  --  81   CR 1.42* 1.28*     Recent Labs   Lab Test 05/24/24  1104   TSH 3.14    Recent Labs   Lab Test 10/03/23  1227   CHOL 132   TRIG 362*   LDL 27   HDL 33*     Recent Labs   Lab Test 09/19/23  1144   A1C 5.1     Recent Labs   Lab Test 05/24/24  1104   GLC 85     Recent Labs   Lab Test 02/20/24  1511   WBC 7.2   HGB 15.1   HCT 44.7   MCV 83                  PROVIDER: Shravan Dinero DO    Patient staffed in clinic with Dr. Dye who will sign the note.  Supervisor is Dr. Julio.

## 2024-08-30 NOTE — PATIENT INSTRUCTIONS
Thank you for your visit today.      Treatment Plan Today:      1) Medications:   -No medication changes.   -Do blood draw next week to check Li and kidneys and electrolytes  -Depending on levels, we can start Modafinil or speak to PCP on next steps.     2) Follow-up appointment with Dr Dinero in about 3 months     3) In the case of an emergency, crisis numbers are below and clinic after hours number is [phone].         **For crisis resources, please see the information at the end of this document**   Patient Education    Thank you for coming to the [facility] [place] [facility].     Lab Testing:  If you had lab testing today and your results are reassuring or normal they will be mailed to you or sent through Friend[employer] within 7 days. If the lab tests need quick action we will call you with the results. The phone number we will call with results is # [phone]. If this is not the best number please call our clinic and change the number.     Medication Refills:  If you need any refills please call your pharmacy and they will contact us. Our fax number for refills is [phone].   Three business days of notice are needed for general medication refill requests.   Five business days of notice are needed for controlled substance refill requests.   If you need to change to a different pharmacy, please contact the new pharmacy directly. The new pharmacy will help you get your medications transferred.     Contact Us:  Please call [phone] during business hours (8-5:00 M-F).   If you have medication related questions after clinic hours, or on the weekend, please call [phone].     Financial Assistance [phone]   Medical Records [phone]       MENTAL HEALTH CRISIS RESOURCES:  For a emergency help, please call 911 or go to the nearest Emergency Department.     Emergency Walk-In Options:   EmPATH Unit @ [facility] ([place]): [phone] - Specialized mental health  emergency area designed to be calming  [facility] ([place]): [phone]  [facility] ([place]): [phone]  [facility] ([place]): [phone]    [facility] Crisis Information:   [place]: [phone]  [place]: [phone]  [place] (FRIEDA) - Adult: [phone]     Child: [phone]  [place] - Adult: [phone]     Child: [phone]  [place]: [phone]  List of all [facility] resources:   https://[place].gov/dhs/people-we-serve/adults/health-care/mental-health/resources/crisis-contacts.jsp    National Crisis Information:   Crisis Text Line: Text  [place]  to 703170  Suicide & Crisis Lifeline: 988  National Suicide Prevention Lifeline: 9-272-626-TALK (1-655.308.2850)       For online chat options, visit https://suicidepreventionlifeline.org/chat/  Poison Control Center: 1-673.191.6648  Trans Lifeline: 1-363.469.1160 - Hotline for transgender people of all ages  The Luis Project: 7-726-530-3357 - Hotline for LGBT youth     For Non-Emergency Support:   Fast Tracker: Mental Health & Substance Use Disorder Resources -   https://www.[employer]iaBilln.org/

## 2024-09-04 RX ORDER — LAMOTRIGINE 200 MG/1
200 TABLET ORAL DAILY
Qty: 30 TABLET | Refills: 3 | Status: SHIPPED | OUTPATIENT
Start: 2024-09-04

## 2024-09-04 RX ORDER — LITHIUM CARBONATE 300 MG/1
1200 TABLET, FILM COATED, EXTENDED RELEASE ORAL AT BEDTIME
Qty: 120 TABLET | Refills: 3 | Status: SHIPPED | OUTPATIENT
Start: 2024-09-04

## 2024-09-04 RX ORDER — OLANZAPINE 20 MG/1
20 TABLET ORAL AT BEDTIME
Qty: 30 TABLET | Refills: 3 | Status: SHIPPED | OUTPATIENT
Start: 2024-09-04

## 2024-09-06 ENCOUNTER — LAB (OUTPATIENT)
Dept: LAB | Facility: CLINIC | Age: 34
End: 2024-09-06
Payer: COMMERCIAL

## 2024-09-06 DIAGNOSIS — Z79.899 ENCOUNTER FOR LONG-TERM (CURRENT) USE OF MEDICATIONS: ICD-10-CM

## 2024-09-06 LAB — LITHIUM SERPL-SCNC: 0.75 MMOL/L (ref 0.6–1.2)

## 2024-09-06 PROCEDURE — 36415 COLL VENOUS BLD VENIPUNCTURE: CPT

## 2024-09-06 PROCEDURE — 80178 ASSAY OF LITHIUM: CPT

## 2024-09-06 PROCEDURE — 80053 COMPREHEN METABOLIC PANEL: CPT

## 2024-09-06 PROCEDURE — 84443 ASSAY THYROID STIM HORMONE: CPT

## 2024-09-07 LAB
ALBUMIN SERPL BCG-MCNC: 4.8 G/DL (ref 3.5–5.2)
ALP SERPL-CCNC: 87 U/L (ref 40–150)
ALT SERPL W P-5'-P-CCNC: 60 U/L (ref 0–70)
ANION GAP SERPL CALCULATED.3IONS-SCNC: 11 MMOL/L (ref 7–15)
AST SERPL W P-5'-P-CCNC: 32 U/L (ref 0–45)
BILIRUB SERPL-MCNC: 0.7 MG/DL
BUN SERPL-MCNC: 15.1 MG/DL (ref 6–20)
CALCIUM SERPL-MCNC: 10 MG/DL (ref 8.8–10.4)
CHLORIDE SERPL-SCNC: 105 MMOL/L (ref 98–107)
CREAT SERPL-MCNC: 1.45 MG/DL (ref 0.67–1.17)
EGFRCR SERPLBLD CKD-EPI 2021: 66 ML/MIN/1.73M2
GLUCOSE SERPL-MCNC: 89 MG/DL (ref 70–99)
HCO3 SERPL-SCNC: 25 MMOL/L (ref 22–29)
POTASSIUM SERPL-SCNC: 4.3 MMOL/L (ref 3.4–5.3)
PROT SERPL-MCNC: 7.4 G/DL (ref 6.4–8.3)
SODIUM SERPL-SCNC: 141 MMOL/L (ref 135–145)
TSH SERPL DL<=0.005 MIU/L-ACNC: 3.91 UIU/ML (ref 0.3–4.2)

## 2024-11-06 NOTE — TELEPHONE ENCOUNTER
Kristie Julian MD [place], [place], RN  Phone Number: [phone]     He can try 15mg. Maximum daily dose is 20mg.     Would you be able to ask him if he scheduled a sleep study? I sent him a Chalkboardt message with the phone number after our last appointment.     Kristie Peters      No Change Improving Improving No Change No Change No Change No Change No Change No Change Improving No Change Improving Improving Improving No Change Improving No Change Improving Improving No Change No Change No Change Improving No Change No Change Improving No Change No Change

## 2024-12-06 ENCOUNTER — VIRTUAL VISIT (OUTPATIENT)
Dept: PSYCHIATRY | Facility: CLINIC | Age: 34
End: 2024-12-06
Attending: STUDENT IN AN ORGANIZED HEALTH CARE EDUCATION/TRAINING PROGRAM
Payer: COMMERCIAL

## 2024-12-06 DIAGNOSIS — F90.9 ATTENTION DEFICIT HYPERACTIVITY DISORDER (ADHD), UNSPECIFIED ADHD TYPE: ICD-10-CM

## 2024-12-06 DIAGNOSIS — F25.0 SCHIZOAFFECTIVE DISORDER, BIPOLAR TYPE (H): Primary | ICD-10-CM

## 2024-12-06 DIAGNOSIS — Z79.899 ENCOUNTER FOR LONG-TERM (CURRENT) USE OF MEDICATIONS: ICD-10-CM

## 2024-12-06 DIAGNOSIS — G47.00 INSOMNIA, UNSPECIFIED TYPE: ICD-10-CM

## 2024-12-06 PROCEDURE — 90836 PSYTX W PT W E/M 45 MIN: CPT | Mod: 95

## 2024-12-06 PROCEDURE — G2211 COMPLEX E/M VISIT ADD ON: HCPCS | Mod: 95

## 2024-12-06 PROCEDURE — 99214 OFFICE O/P EST MOD 30 MIN: CPT | Mod: 95

## 2024-12-06 RX ORDER — OLANZAPINE 20 MG/1
20 TABLET ORAL AT BEDTIME
Qty: 30 TABLET | Refills: 1 | Status: SHIPPED | OUTPATIENT
Start: 2024-12-06

## 2024-12-06 RX ORDER — LAMOTRIGINE 200 MG/1
200 TABLET ORAL DAILY
Qty: 30 TABLET | Refills: 1 | Status: SHIPPED | OUTPATIENT
Start: 2024-12-06

## 2024-12-06 RX ORDER — LITHIUM CARBONATE 600 MG/1
1200 CAPSULE ORAL AT BEDTIME
Qty: 60 CAPSULE | Refills: 1 | Status: SHIPPED | OUTPATIENT
Start: 2024-12-06

## 2024-12-06 ASSESSMENT — PATIENT HEALTH QUESTIONNAIRE - PHQ9
10. IF YOU CHECKED OFF ANY PROBLEMS, HOW DIFFICULT HAVE THESE PROBLEMS MADE IT FOR YOU TO DO YOUR WORK, TAKE CARE OF THINGS AT HOME, OR GET ALONG WITH OTHER PEOPLE: SOMEWHAT DIFFICULT
SUM OF ALL RESPONSES TO PHQ QUESTIONS 1-9: 1
SUM OF ALL RESPONSES TO PHQ QUESTIONS 1-9: 1

## 2024-12-06 ASSESSMENT — PAIN SCALES - GENERAL: PAINLEVEL_OUTOF10: NO PAIN (0)

## 2024-12-06 NOTE — NURSING NOTE
Current patient location: [address] [place] [address]    Is the patient currently in the state of [place]? YES    Visit mode:VIDEO    If the visit is dropped, the patient can be reconnected by:VIDEO VISIT: Text to cell phone:   Telephone Information:   Mobile [phone]       Will anyone else be joining the visit? NO  (If patient encounters technical issues they should call [phone]    Are changes needed to the allergy or medication list? No    Are refills needed on medications prescribed by this physician? Discuss with provider    Rooming Documentation:  Questionnaire(s) completed    Reason for visit: CATRACHITO GOEL

## 2024-12-06 NOTE — PROGRESS NOTES
[facility]  MEDICAL PROGRESS NOTE  General Clinic Team       CARE TEAM:    PCP- Clinic Provider  Therapist- None    Manuel is a 34 year old who uses the pronouns he, him, his.                   Assessment & Plan   {Must include bolded diagnoses. Assessment can be narrative or per problem:277270}  # Schizoaffective disorder, Bipolar type, in remission without current psychotic features               -rule out bipolar disorder type 1  # ADHD, predominantly inattentive type  # Insomnia (history of delayed phase sleep disorder)     Medical:  severe obstructive sleep apnea without hypoxemia - not tolerating CPAP     Manuel Sommers is a 33 year old M with previous psychiatric diagnoses of Schizoaffective disorder in remission, ADHD inattentive type, and insomnia with pertinent medical diagnoses of severe LISA but not tolerating CPAP machine currently presenting for follow-up.     Today, Manuel presents with mother and they both report that overall Manuel has been doing well without concerning mood symptoms such as depression, elevated mood, or anxiety. They are meeting with nephrology to discuss further about his elevated Cr levels. We counseled Manuel and mother that we could try switching his Lithium from CR to IR as data does support less kidney impairment which he was agreeable to. He preferred taking the dose all at bedtime rather than splitting it. We discussed getting a repeat level to assess for any changes. We counseled them on the risks with Manuel restarting modafinil which he has been on a couple times. Prior notes indicate patient went off of his modafinil because he thought it was not helpful.  This was around the time that he was experiencing mood instability from coming off of his Zyprexa.  We identified that since being off modafinil, Manuel has had 4 years of stability with elevation in mood there is possible consideration retrialing risk  compensating him further discussions will need to be had about the future.  If he were to retry it we would want to start a low dose of 50 mg today.  There is strong evidence that some of his lack of motivation could be due to his obstructive sleep apnea which she has not been adherent to and did not tolerate.  He declined wanting to follow up with sleep medicine as he had a terrible experience using CPAP and even though we discussed that there are other modalities that she sleep apnea he declined wanting to do so at this time there was also a report from prior notes for he said stimulants may have contributed to alcohol binges.  Manuel, we discussed that due to his making medication changes and as risking destabilization changing his lithium formulation, we would hold off on starting modafinil at follow-up after his appointment with and see if any dose adjustments will need to be made with his lithium he denies safety concerns at this time.  We also discussed using a light box in order to help with motivation and mood and to help with wakefulness.  He is agreeable to trying this we discussed there are the r/b/a therapy in somebody who has a history of bipolar diagnosis.      Future Considerations:  Further conversations with starting modafinil. Start at 50mg for tolerability.   Taper Lithium if Cr still elevated or per nephrology's suggestions    Psychotropic Drug Interactions:  {[PSYCHCLINICDDI]:915098}    ADDITIVE SEROTONERGIC: olanzapine, lithium  ADDITIVE CNS/RESPIRATORY DEPRESSION: lamotrigine, olanzapine  Lithium may enhance the neurotoxic effect of Antipsychotic Agents.  Management: routine monitoring    [place] was not checked today: not using controlled substances    Risk Statements:   Treatment Risk: Risks, benefits, alternatives and potential adverse effects have been discussed and are understood.   Safety Risk: Manuel did not appear to be an imminent safety risk to self or others.    PLAN    1) Medications:  "  - Continue olanzapine 20 mg daily  - Continue lamotrigine to 200 mg daily  - Continue lithium 1200 mg at bedtime    Weight management   Zepbound    2) Psychotherapy: Recommended. Patient declined.     3) Next due:  Labs: SGA labs next due; Lithium level ordered. C   EKG: Routine monitoring is not indicated for current psychotropic medication regimen   Rating scales: AIMS: next in person visit    4) Referrals: None    5) Follow-up: Return to clinic in 4 weeks                     Interval History   Since last visit, Manuel checked in with PCP and nephrology. They did additional testing and showed Cr was still elevated at 1.4. They did an e-consult with nephrology who stated if it remained elevated, that psychiatry consider alternative medications and if not can continue until he follows up with Nephrology on 12/17.     Mood: Mood is good. Denies depressed or elevated mood states.   Psychosis: Denies.   Sleep: Sleep around 10-11pm-11am. Waking up a couple hours earlier. Will count sheep. Not using electronics when winding down.     Appetite:  - Zepbound helping with weight loss  - Previously weighed, 270lbs. Wants to get down to 230.   - Dieting and losing weight.   - 263lbs currently.   Medications:  - ->1000 to help with idney protection .Stop that due to reflex. Now on omeprazole.     Therapy:  Substances:   ADHD: Provigil helped. Tried in the past but it stopped working. Mom thinks it was an error in judgement.        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      Partner/ - single  Children- None      Living situation- lives with parents in [place]  Social/ Spiritual Support- family, friends, [employer]   Legal: DUIs. Also In February 2014 the patient left for [place] to attend a [employer] tournament. He ended up in [place] and was arrested for shoplifting from [employer] (had \"intended to purchase\" a pair of jeans but " "became panicked when security approached him). Had a brief stay in long term, and afterwards, took him 5 days to return to [place] with parents frequently wiring him money. Had difficulty adhering to travel plans, seemed confused, and was communicating with parents via \"bizarre\" text messages (parents even filed a missing persons report at one point). He finally arrived home exhausted, not sharing much of events in [place], but slightly more consistent with baseline.       Pertinent Substance Use:  [Last updated 12/06/24]  Alcohol: No   Cannabis: No  Tobacco: No   Caffeine:  No. Recently stopped  Opioids: No   Narcan Kit current: N/A  Other substances: No     Medical Review of Systems / Med sfx:   GI: Heart burn. Using omeprazole  Ongoing physical restlessness. Ongoing since he was young.                 Summary Points of Current Care  08/30/2024: No changed. Recheck Cr level and recommended he touch base with PCP  12/6/2024:Changed Lithium from 1200mg CR to 1200mg IR for                   Physical Exam  (Vitals Only)    There were no vitals taken for this visit.  Pulse Readings from Last 3 Encounters:   02/13/24 87   01/30/24 81   01/16/24 85     Wt Readings from Last 3 Encounters:   01/30/24 137.2 kg (302 lb 6.4 oz)   01/16/24 138.7 kg (305 lb 12.8 oz)   01/08/24 136.1 kg (300 lb)     BP Readings from Last 3 Encounters:   02/13/24 133/86   01/30/24 139/89   01/16/24 (!) 139/90                      Mental Status Exam    Alertness: alert  and oriented  Appearance: well groomed  Behavior/Demeanor: cooperative, pleasant, and calm, with good  eye contact   Speech: regular rate and rhythm  Language: intact  Psychomotor: normal or unremarkable  Mood:  \"pretty good\"  Affect: restricted; congruent to: mood- yes, content- yes  Thought Process/Associations: unremarkable  Thought Content:  Reports none;  Denies suicidal & violent ideation and delusions  Perception:  Reports none;  Denies auditory hallucinations and visual " "hallucinations  Insight: excellent  Judgment: excellent  Cognition: does  appear grossly intact; formal cognitive testing was not done  Gait and Station: N/A (telehealth)                  Past Psychotropic Medication Trials     Medication Max Dose (mg) Dates / Duration Helpful? DC Reason / Adverse Effects?   Adderall  10-20 mg     improved concentration, but \"cold personality\" and perseveration   Prozac  20 mg     limited response, less irritable, first episode of \"rage\"   Seroquel  25-75 mg     helped with sleep and irritability. nasal congestion   Concerta  18 mg     improved focus, no improvement in motivation   Provigil  150 mg (up to 400)     Stimulants \"almost killed him\" triggered alcohol binges   Abilify  25 mg     OK when QOD, but irritable and agitated on QD   Clonazepam  0.5-1.5 mg     calming, helped with sleep and \"catatonia\"   Risperdal  5 mg     some confusion, slow processing, withdrawn, ?\"catatonia\", panic attacks -dont try that   Zyprexa        anxious overall improvement   Lithium        calmer, overall better   Latuda  20 mg     severe fatigue and depression   Synthroid  75 mcg     added to help with mood   Naltrexone        had nightmares and sedation on 25mg, stopped after 2 nights   Clozapine        late 2023 to Feb 2024:  nasal congestion and worsening of acid reflux. with clozapine delusions had improved significantly. improved in his ability to think.      Brief trials with Strattera, Intuniv, Lamictal, Xanax, Zoloft, Vraylar  Belsomra found to help him get to deeper sleep.                   Past Medical History     Patient Active Problem List   Diagnosis    Schizoaffective disorder, bipolar type (H)    Hx of psychiatric care    Elevated liver enzymes    Fatty liver    High blood triglycerides    History of cannabis abuse    History of cocaine abuse (H)    Psychosis (H)    Elevated blood pressure reading without diagnosis of hypertension    Constipation    Morbid obesity (H)    " Schizoaffective disorder (H)                     Medications     Current Outpatient Medications   Medication Sig Dispense Refill    atorvastatin (LIPITOR) 20 MG tablet TAKE ONE TABLET BY MOUTH ONE TIME DAILY*      lamoTRIgine (LAMICTAL) 200 MG tablet Take 1 tablet (200 mg) by mouth daily. 30 tablet 3    lithium ER (LITHOBID) 300 MG CR tablet Take 4 tablets (1,200 mg) by mouth at bedtime. 120 tablet 3    OLANZapine (ZYPREXA) 20 MG tablet Take 1 tablet (20 mg) by mouth at bedtime. 30 tablet 3                     Data         10/2/2023     2:58 PM 1/8/2024     2:37 PM 6/17/2024     2:33 PM   PROMIS-10 Total Score w/o Sub Scores   PROMIS TOTAL - SUBSCORES 25 28 28         4/1/2024     2:10 PM 6/17/2024     2:31 PM 8/30/2024     1:32 PM   PHQ-9 SCORE   PHQ-9 Total Score MyChart 8 (Mild depression) 4 (Minimal depression) 7 (Mild depression)   PHQ-9 Total Score 8 4 7         2/8/2021    10:03 AM 12/10/2021     9:54 AM   FERNANDO-7 SCORE   Total Score 3 (minimal anxiety) 10 (moderate anxiety)   Total Score 3 10    10       Liver/Kidney Function, TSH Metabolic Blood counts   Recent Labs   Lab Test 09/06/24  1216 05/24/24  1104   AST 32  --    ALT 60  --    ALKPHOS 87  --    CR 1.45* 1.42*     Recent Labs   Lab Test 09/06/24  1216   TSH 3.91    Recent Labs   Lab Test 10/03/23  1227   CHOL 132   TRIG 362*   LDL 27   HDL 33*     Recent Labs   Lab Test 09/19/23  1144   A1C 5.1     Recent Labs   Lab Test 09/06/24  1216   GLC 89    Recent Labs   Lab Test 02/20/24  1511   WBC 7.2   HGB 15.1   HCT 44.7   MCV 83            {Only keep ECG results >1 yr old if QTc>460ms:822894}  ECG 11/20 QTc = 436ms      Psychiatry Individual Psychotherapy Note   Psychotherapy start time - 2:30pm  Psychotherapy end time - 3:10pm  Date treatment plan last reviewed with patient - 12/06/24  Subjective: This supportive psychotherapy session addressed issues related to goals of therapy and current psychosocial stressors. Patient's reaction: Preparatory  in the context of mental status appropriate for ambulatory setting.    Interactive complexity indicated? No  Plan: RTC in timeframe noted above  Psychotherapy services during this visit included myself and the patient.   Treatment Plan      SYMPTOMS; PROBLEMS   MEASURABLE GOALS;    FUNCTIONAL IMPROVEMENT / GAINS INTERVENTIONS DISCHARGE CRITERIA   ADHD: difficulty paying attention    Increase ability to focus Supportive / psychodynamic marked symptom improvement, symptom resolution, and reduced visit frequency   {*Time billed for psychotherapy must be excluded from E/M time based billing*  Add on therapy codes-  85198 - 16-37 minutes  05890 - 38-52 minutes  28403 - 53+ minutes  *This blue text will disappear automatically when note is signed, no need to delete*:395808}      Level of Medical Decision Making:   - At least 1 chronic problem that is not stable  - Engaged in prescription drug management during visit (discussed any medication benefits, side effects, alternatives, etc.)  {   Must meet 2 out of 3 of the above MDM elements to bill at the specified level     For help more info about elements / criteria, click here-> MDM Help Grid     **No need to delete blue text, it disappears when note is signed**       Use the following statement and the  add on code if providing longitudinal care for a chronic diagnosis.   :639437}  The longitudinal plan of care for the diagnosis(es)/condition(s) as documented were addressed during this visit. Due to the added complexity in care, I will continue to support Manuel in the subsequent management and with ongoing continuity of care.    PROVIDER: Shravan Dinero DO    Patient staffed in clinic with Dr. Tay who will sign the note.  Supervisor is Dr. Julio.

## 2024-12-06 NOTE — PROGRESS NOTES
Virtual Visit Details    Type of service:  Video Visit   Video Start Time:  2:30pm  Video End Time: 3:52pm    Originating Location (pt. Location): Home    Distant Location (provider location):  On-site  Platform used for Video Visit: [employer]

## 2024-12-06 NOTE — PATIENT INSTRUCTIONS
Thank you for your visit today.      Treatment Plan Today:      1) Medications:   - Change Lithium 1200mg CR at bedtime to Lithium 1200mg IR. Please repeat Lithium level one week after the change.     2) Follow-up appointment with Dr Dinero in about 4 weeks  Follow up with Nephrologist about NAC and it's use for kidney protection.      3) In the case of an emergency, crisis numbers are below and clinic after hours number is [phone].      **For crisis resources, please see the information at the end of this document**   Patient Education    Thank you for coming to the [facility] [place] [facility].     Lab Testing:  If you had lab testing today and your results are reassuring or normal they will be mailed to you or sent through [employer] within 7 days. If the lab tests need quick action we will call you with the results. The phone number we will call with results is # [phone]. If this is not the best number please call our clinic and change the number.     Medication Refills:  If you need any refills please call your pharmacy and they will contact us. Our fax number for refills is [phone].   Three business days of notice are needed for general medication refill requests.   Five business days of notice are needed for controlled substance refill requests.   If you need to change to a different pharmacy, please contact the new pharmacy directly. The new pharmacy will help you get your medications transferred.     Contact Us:  Please call [phone] during business hours (8-5:00 M-F).   If you have medication related questions after clinic hours, or on the weekend, please call [phone].     Financial Assistance [phone]   Medical Records [phone]       MENTAL HEALTH CRISIS RESOURCES:  For a emergency help, please call 911 or go to the nearest Emergency Department.     Emergency Walk-In Options:   Em[facility] Unit @ [facility]): [phone] -  Specialized mental health emergency area designed to be [facility]  [facility] ([place]): [phone]  [facility] ([place]): [phone]  [facility] ([place]): [phone]    [facility] Crisis Information:   T[place]ani: [phone]  Ben: [phone]  Fredi (FRIEDA) - Adult: [phone]     Child: [phone]  [place]ugen[place] - Adult: [phone]     Child: [phone]  [place]: [phone]  List of all [facility] resources:   https://[place].gov/dhs/people-we-serve/adults/health-care/mental-health/resources/crisis-contacts.jsp    National Crisis Information:   Crisis Text Line: Text  [place]  to 568202  Suicide & Crisis Lifeline: 988  National Suicide Prevention Lifeline: 9-249-226-TALK (1-237.605.8333)       For online chat options, visit https://suicidepreventionlifeline.org/chat/  Poison Control Center: 1-350.839.8350  Trans Lifeline: 1-543.106.1411 - Hotline for transgender people of all ages  [employer]: 1-513-993-1937 - Hotline for LGBT youth     For Non-Emergency Support:   Fast Tracker: Mental Health & Substance Use Disorder Resources -   https://www.[employer].org/

## 2025-01-02 NOTE — TELEPHONE ENCOUNTER
Consult Note     Patient Identification:  Name:  Ana Maradiaga  Age:  60 y.o.  Sex:  female  :  1964  MRN:  [id number]   Visit Number:  [id number]  Primary Care Physician:  Val Purcell PA     Subjective     Chief complaint:   Chief Complaint   Patient presents with    Weakness - Generalized    Blood in Urine       History of presenting illness:     Patient is a 60 y.o. female with past medical history significant for anemia, CKD, atrial fibrillation, hypertension, gout and CVA that presented to the [facility] for complaints of hematuria. Wound care consulted to evaluate bilateral lower extremities. She reports she has had lymphedema for several years now but has not undergone any therapy or treatment. She states she has dealt with recurrent cellulitis which led to hospitalization. She currently does not appear to have cellulitis but has a purple/red discoloration that she reports has been chronic after her last recurrence of cellulitis. Denies pain or open wounds. No fever or chills. The patients nurse, Dia, was at bedside during her exam.     24: The patient was seen while resting in bed. She denies any new issues. Bilateral lower extremities are showing improvement with less dry skin. Edema continues to be present. She denies fever or chills.     ---------------------------------------------------------------------------------------------------------------------   Review of Systems:  Review of Systems   Constitutional: Negative.    Respiratory: Negative.     Cardiovascular:  Positive for leg swelling.   Gastrointestinal: Negative.    Musculoskeletal:  Positive for gait problem.   Skin: Negative.         ---------------------------------------------------------------------------------------------------------------------   Past Medical History:   Diagnosis Date    Anemia     Arrhythmia     Afib    Arthritis     Atrial fibrillation 2018    Bronchitis      Central Prior Authorization Team   Phone: [phone]      PRIOR AUTHORIZATION DENIED - epa    Medication: modafinil (PROVIGIL) 100 MG tablet - epa denied    Denial Date: 4/12/2018    Denial Rational: diagnosis is not covered    Insurance phone #: [phone]      Appeal Information:  will document when receive denial letter - rep refaxing     Chronic kidney disease 2018    Gout     Hypertension     Stroke November 2021     History reviewed. No pertinent surgical history.  Family History   Problem Relation Age of Onset    Heart disease Mother     Stroke Mother     Hypertension Father     Arthritis Father     Hypertension Sister     Breast cancer Paternal Cousin      Social History     Socioeconomic History    Marital status: Single   Tobacco Use    Smoking status: Never    Smokeless tobacco: Never   Vaping Use    Vaping status: Never Used   Substance and Sexual Activity    Alcohol use: Never    Drug use: Never    Sexual activity: Not Currently     Partners: Male     ---------------------------------------------------------------------------------------------------------------------   Allergies:  Vancomycin, Cefepime, and Cephalosporins  ---------------------------------------------------------------------------------------------------------------------   Medications below are reported home medications pulling from within the system; at this time, these medications have not been reconciled unless otherwise specified and are in the verification process for further verifcation as current home medications.    Prior to Admission Medications       Prescriptions Last Dose Informant Patient Reported? Taking?    amLODIPine (NORVASC) 5 MG tablet Past Week Self, Other No Yes    TAKE 1 TABLET BY MOUTH DAILY    Aspirin Low Dose 81 MG EC tablet 12/19/2024 Self, Other Yes Yes    Take 1 tablet by mouth Daily.    atorvastatin (LIPITOR) 40 MG tablet Past Week Self, Other Yes Yes    Take 1 tablet by mouth every night at bedtime.    bumetanide (BUMEX) 1 MG tablet 12/19/2024 Self, Other Yes Yes    Take 2 tablets by mouth Daily.    carvedilol (COREG) 6.25 MG tablet 12/19/2024 Self, Other No Yes    TAKE 1 TABLET BY MOUTH EVERY 12 HOURS    Eliquis 5 MG tablet tablet 12/19/2024 Self, Other No Yes    Take 1 tablet by mouth Every 12 (Twelve) Hours.    flecainide (TAMBOCOR) 100 MG  tablet 12/19/2024 Self, Other No Yes    TAKE 1 TABLET BY MOUTH TWICE A DAY    levothyroxine (SYNTHROID, LEVOTHROID) 125 MCG tablet 12/19/2024 Self, Other Yes Yes    Take 1 tablet by mouth Daily.    magnesium oxide (MAG-OX) 400 MG tablet 12/19/2024 Self Yes Yes    Take 1 tablet by mouth Daily.    Tirzepatide (Mounjaro) 5 MG/0.5ML solution auto-injector 12/13/2024 Self, Other No No    Inject 0.5 mL under the skin into the appropriate area as directed 1 (One) Time Per Week.    vitamin B-12 (CYANOCOBALAMIN) 1000 MCG tablet 12/19/2024 Self Yes Yes    Take 1 tablet by mouth Daily.    vitamin D3 125 MCG (5000 UT) capsule capsule 12/19/2024 Self Yes Yes    Take 1 capsule by mouth Daily.          ---------------------------------------------------------------------------------------------------------------------    Objective     Hospital Scheduled Meds:  ammonium lactate, 1 Application, Topical, Daily  apixaban, 5 mg, Oral, Q12H  aspirin, 81 mg, Oral, Daily  atorvastatin, 40 mg, Oral, Nightly  flecainide, 100 mg, Oral, BID  folic acid, 1 mg, Oral, Daily  hydrocortisone, 10 mg, Oral, Daily  hydrocortisone, 15 mg, Oral, Daily With Breakfast  hydrocortisone, 1 Application, Topical, Q8H  levothyroxine, 125 mcg, Oral, Daily  magnesium oxide, 400 mg, Oral, Daily  nystatin, , Topical, Q12H  sodium chloride, 10 mL, Intravenous, Q12H  Tirzepatide, 0.5 mL, Subcutaneous, Weekly  vitamin B-12, 1,000 mcg, Oral, Daily  vitamin D3, 5,000 Units, Oral, Daily           Current listed hospital scheduled medications may not yet reflect those currently placed in orders that are signed and held, awaiting patient's arrival to floor/unit.    ---------------------------------------------------------------------------------------------------------------------   Vital Signs:  Temp:  [97.6 °F (36.4 °C)-98.6 °F (37 °C)] 98.3 °F (36.8 °C)  Heart Rate:  [] 79  Resp:  [16-20] 20  BP: (123-159)/(62-82) 147/76  No data found.  SpO2 Percentage     12/31/24 1835 12/31/24 2229 01/01/25 0211   SpO2: 98% 98% 98%        on   ;   Device (Oxygen Therapy): room air    Body mass index is 56.25 kg/m².  Wt Readings from Last 3 Encounters:   12/19/24 (!) 153 kg (338 lb)   10/24/24 116 kg (256 lb 6.4 oz)   10/11/24 (!) 164 kg (362 lb)       ---------------------------------------------------------------------------------------------------------------------   Physical Exam:    B/L lower extremities with evidence of edema, dryness and discoloration. No significant warmth or erythema noted.      Assessment & Plan      Assessment     Lymphedema  Xerosis     Plan:      B/L lower extremities showing improvement.     ISABEL values within normal limits.  No evidence of peripheral arterial disease. Recommend to continue to apply ammonium lactate lotion followed by application of unna boots to be changed every 3 days initially to assess if patient will tolerate.      She would benefit from outpatient lymphedema treatment as well.      Ema Paris PA-C  [facility]    01/02/25  10:43 EST

## 2025-01-28 ENCOUNTER — LAB (OUTPATIENT)
Dept: LAB | Facility: CLINIC | Age: 35
End: 2025-01-28
Payer: COMMERCIAL

## 2025-01-28 DIAGNOSIS — Z79.899 ENCOUNTER FOR LONG-TERM (CURRENT) USE OF MEDICATIONS: ICD-10-CM

## 2025-01-28 LAB — LITHIUM SERPL-SCNC: 0.61 MMOL/L (ref 0.6–1.2)

## 2025-01-28 PROCEDURE — 36415 COLL VENOUS BLD VENIPUNCTURE: CPT

## 2025-01-28 PROCEDURE — 80178 ASSAY OF LITHIUM: CPT

## 2025-02-03 ENCOUNTER — VIRTUAL VISIT (OUTPATIENT)
Dept: PSYCHIATRY | Facility: CLINIC | Age: 35
End: 2025-02-03
Attending: STUDENT IN AN ORGANIZED HEALTH CARE EDUCATION/TRAINING PROGRAM
Payer: COMMERCIAL

## 2025-02-03 DIAGNOSIS — G47.00 INSOMNIA, UNSPECIFIED TYPE: ICD-10-CM

## 2025-02-03 DIAGNOSIS — Z79.899 ENCOUNTER FOR LONG-TERM (CURRENT) USE OF MEDICATIONS: ICD-10-CM

## 2025-02-03 DIAGNOSIS — F90.0 ATTENTION DEFICIT HYPERACTIVITY DISORDER (ADHD), PREDOMINANTLY INATTENTIVE TYPE: ICD-10-CM

## 2025-02-03 DIAGNOSIS — F25.0 SCHIZOAFFECTIVE DISORDER, BIPOLAR TYPE (H): Primary | ICD-10-CM

## 2025-02-03 PROCEDURE — 90838 PSYTX W PT W E/M 60 MIN: CPT | Mod: 95

## 2025-02-03 PROCEDURE — G2211 COMPLEX E/M VISIT ADD ON: HCPCS | Mod: 95

## 2025-02-03 PROCEDURE — 98006 SYNCH AUDIO-VIDEO EST MOD 30: CPT | Mod: GC

## 2025-02-03 RX ORDER — LITHIUM CARBONATE 300 MG/1
1200 TABLET, FILM COATED, EXTENDED RELEASE ORAL AT BEDTIME
Qty: 120 TABLET | Refills: 1 | Status: SHIPPED | OUTPATIENT
Start: 2025-02-03

## 2025-02-03 RX ORDER — OLANZAPINE 20 MG/1
20 TABLET ORAL AT BEDTIME
Qty: 30 TABLET | Refills: 1 | Status: SHIPPED | OUTPATIENT
Start: 2025-02-03

## 2025-02-03 RX ORDER — DIVALPROEX SODIUM 250 MG/1
TABLET, FILM COATED, EXTENDED RELEASE ORAL
Qty: 87 TABLET | Refills: 0 | Status: SHIPPED | OUTPATIENT
Start: 2025-02-10 | End: 2025-03-27

## 2025-02-03 RX ORDER — LAMOTRIGINE 150 MG/1
150 TABLET ORAL AT BEDTIME
Qty: 30 TABLET | Refills: 1 | Status: SHIPPED | OUTPATIENT
Start: 2025-02-10

## 2025-02-03 ASSESSMENT — PATIENT HEALTH QUESTIONNAIRE - PHQ9
SUM OF ALL RESPONSES TO PHQ QUESTIONS 1-9: 4
10. IF YOU CHECKED OFF ANY PROBLEMS, HOW DIFFICULT HAVE THESE PROBLEMS MADE IT FOR YOU TO DO YOUR WORK, TAKE CARE OF THINGS AT HOME, OR GET ALONG WITH OTHER PEOPLE: SOMEWHAT DIFFICULT
SUM OF ALL RESPONSES TO PHQ QUESTIONS 1-9: 4

## 2025-02-03 NOTE — NURSING NOTE
Current patient location: [address] [place] [address]    Is the patient currently in the state of [place]? YES    Visit mode: VIDEO    If the visit is dropped, the patient can be reconnected by:VIDEO VISIT: Text to cell phone:   Telephone Information:   Mobile [phone]       Will anyone else be joining the visit? NO  (If patient encounters technical issues they should call [phone]    Are changes needed to the allergy or medication list? Pt stated no changes to allergies and Pt stated no med changes    Are refills needed on medications prescribed by this physician? NO    Rooming Documentation:  Questionnaire(s) completed    Reason for visit: CATRACHITO Silvestre F

## 2025-02-03 NOTE — PROGRESS NOTES
J0k,   [facility]  MEDICAL PROGRESS NOTE  General Clinic Team       CARE TEAM:    PCP- Clinic Provider  Therapist- None    Manuel is a 34 year old who uses the pronouns he, him, his.                   Assessment & Plan     # Schizoaffective disorder, Bipolar type, in remission without current psychotic features               -rule out bipolar disorder type 1  # ADHD, predominantly inattentive type  # Insomnia (history of delayed phase sleep disorder)     Medical:  severe obstructive sleep apnea without hypoxemia - not tolerating CPAP     Manuel Sommers is a 34 year old M with previous psychiatric diagnoses of Schizoaffective disorder in remission, ADHD inattentive type, and insomnia with pertinent medical diagnoses of severe LISA but not tolerating CPAP machine currently presenting for follow-up.     Today, Manuel presents with a restricted affect, unchanged from prior visits, but describes mood as euthymic with no concerns for depression or hypomania/lauren. Due to concerns with elevated Cr his Nephrologist recommended we look into alternative agents to his lithium. We counseled Manuel on the r/b/a of using Depakote for mood stabilization instead and he expressed interest in switching from lithium to this medication. Until he's fully transitioned, Manuel wished to transition back from IR to CR form of lithium due to better tolerability. Lithium level on 1/28/2025 was 0.61 mmol/L, reflecting combination with amiloride that was started by Nephrologist, Dr. Conklin, on 12/17/2024.  See Dr. Conklin' 12/17/2024 note for details re: management of Manuel's CKD 3.  For now we will have Manuel switch from Lithium IR to CR and remain on his other medications for a week. Then, we will have him reduce Lamotrigine from 200mg to 150mg nightly and start Depakote ER 250mg nightly due to the interactions between the two medications. After a few days, Manuel will increase Depakote  from 250mg to 500mg nightly and do a blood draw after a week. Manuel and mom expressed understanding. He denied safety concerns.     Future Considerations:  Encourage use of light box.   Goal max dose of Lamictal is 100mg due to interactions with Depakote. Decrease at next appointment.   Titrate off Lithium and optimize Depakote  Further conversations with discussing LISA treatments with sleep medicine. If needed can use modafinil and start at 50mg for tolerability but may want to defer to sleep med.      Psychotropic Drug Interactions:    ADDITIVE SEROTONERGIC: olanzapine, lithium  ADDITIVE CNS/RESPIRATORY DEPRESSION: lamotrigine, olanzapine  Valproate Products may enhance the adverse/toxic effect of LamoTRIgine. Valproate Products may increase the serum concentration of LamoTRIgine.    Lithium may increase the serum concentration of Valproate Products.    Lithium may enhance the neurotoxic effect of Antipsychotic Agents.  Management: routine monitoring    [place] was not [place] today: not using controlled substances    Risk Statements:   Treatment Risk: Risks, benefits, alternatives and potential adverse effects have been discussed and are understood.   Safety Risk: Manuel did not appear to be an imminent safety risk to self or others. Reviewed safety plan and resources.    PLAN    1) Medications:   - Switch lithium IR 1200mg to CR 1200mg at bedtime tonight (anticipate start of taper down toward discontinuation at next session)  - In 1 week, start Depakote ER 250mg nightly for three days then increase to 500mg nightly  - In 1 week, reduce lamotrigine from 200 mg to 150mg nightly  - Continue olanzapine 20 mg daily    Weight management   Zepbound    2) Psychotherapy: Recommended. Patient declined.     3) Next due:  Labs:   VPA (level, Plts, LFTs): Labs ordered to be drawn 1 week after increasing Depakote to 500mg nightly  Lithium (level, TSH, Cr): Due after addition of amiloride and formulation change. Labs ordered  SGA  labs due 3/2025; ordered.    EKG: Routine monitoring is not indicated for current psychotropic medication regimen   Rating scales: AIMS: next in person visit    4) Referrals: None    5) Follow-up: Return to clinic in 4 weeks                     Interval History   Last visit, we discussed switching Manuel from Lithium CR to IR to reduce load on the kidneys. We counseled patient on importance of using CPAP machine which Manuel declined. He stated he wanted modafinil to help with wakefulness but per previous notes, he stated stimulants almost killed him and it led to frequent alcohol binges. We instead recommend using light therapy to help with wakefulness.     Since last visit,    Updates/Stressors:  - Had appointment with Nephrologist, Dr. Conklin, and she recommended stopping Lithium due to worsening Cr levels.   - Manuel wants to discuss Depakote.   - Prefers Lithium CR to IR. Evenings aren't as good. Feels medication wears off towards the end of the day. Vague on symptoms but he feeling dull and less engaged. Sleeping in a bit later.    Mood: Good. Denies depression and anxiety. No elevated mood symptoms/state/episode.  Psychosis: Denies.   Sleep: Takes a while to sleep and waking up later. Takes a few hours to fall asleep. Goes to bed around 11pm and wakes up around noon and 2pm. Before Lithium change, was waking up by 11am-12pm. Waking up later is new. Initially falling asleep is no better or worse.   Appetite: Losing weight. Lost 5lbs. Intentionally. On Zepbound.   Medications:  Adherent.   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. Limited finances.      Partner/ - single  Children- None      Living situation- lives with parents in [place]  Social/ Spiritual Support- family, friends, [employer]ism hemalatha   Legal: DUIs. Also In February 2014 the patient left for [place]  "to attend a [employer] tournament. He ended up in [place] and was arrested for shoplifting from [employer] (had \"intended to purchase\" a pair of jeans but became panicked when security approached him). [place] a [place] stay in halfway, and afterwards, took him 5 days to return to [place] with parents frequently wiring him money. Had difficulty adhering to travel plans, seemed confused, and was communicating with parents via \"bizarre\" text messages (parents even filed a missing persons report at one point). He finally arrived home exhausted, not sharing much of events in [place], but slightly more consistent with baseline.       Pertinent Substance Use:  [Last updated 12/06/24]  Alcohol: No   Cannabis: No  Tobacco: No   Caffeine:  No. Recently stopped  Opioids: No   Narcan Kit current: N/A  Other substances: No     Medical Review of Systems / Med sfx:   GI: Heart burn. Using omeprazole  Ongoing physical restlessness. Ongoing since he was young.   Denies SE with change in Lithium.                 Summary Points of Current Care  08/30/2024: No changes. Recheck Cr level and recommended he touch base with PCP  12/6/2024: Changed Lithium from 1200mg CR to 1200mg IR for reducing load on kidneys. Patient to recheck Li level after a week.   02/03/2025: Change Lithium IR 1200mg to Lithium CR 1200mg nightly. After 1 week, lower Lamotrigine to 150mg and start Depakote 250mg every night. If tolerated, increase Depakote to 500mg nightly after 3 days. After a week, complete lab draw.                   Physical Exam  (Vitals Only)    There were no vitals taken for this visit.  Pulse Readings from Last 3 Encounters:   02/13/24 87   01/30/24 81   01/16/24 85     Wt Readings from Last 3 Encounters:   01/30/24 137.2 kg (302 lb 6.4 oz)   01/16/24 138.7 kg (305 lb 12.8 oz)   01/08/24 136.1 kg (300 lb)     BP Readings from Last 3 Encounters:   02/13/24 133/86   01/30/24 139/89   01/16/24 (!) 139/90                      Mental Status Exam    Alertness: " "alert  and oriented  Appearance: well groomed  Behavior/Demeanor: cooperative, pleasant, and calm, with good  eye contact   Speech: regular rate and rhythm  Language: intact  Psychomotor: normal or unremarkable  Mood:  \"good\"  Affect: restricted; congruent to: mood- yes, content- yes  Thought Process/Associations: unremarkable  Thought Content:  Reports none;  Denies suicidal & violent ideation and delusions  Perception:  Reports none;  Denies auditory hallucinations and visual hallucinations  Insight: excellent  Judgment: excellent  Cognition: does  appear grossly intact; formal cognitive testing was not done  Gait and Station: N/A (telehealth)                  Past Psychotropic Medication Trials     Medication Max Dose (mg) Dates / Duration Helpful? DC Reason / Adverse Effects?   Adderall  10-20 mg     improved concentration, but \"cold personality\" and perseveration   Prozac  20 mg     limited response, less irritable, first episode of \"rage\"   Seroquel  25-75 mg     helped with sleep and irritability. nasal congestion   Concerta  18 mg     improved focus, no improvement in motivation   Provigil  150 mg (up to 400)     Per REBEL Santa MD's 6/12/2023 note, \"Provigil 150 mg (up to 400) - Stimulants \"almost killed him\" triggered alcohol binges \".   Abilify  25 mg     OK when QOD, but irritable and agitated on QD   Clonazepam  0.5-1.5 mg     calming, helped with sleep and \"catatonia\"   Risperdal  5 mg     some confusion, slow processing, withdrawn, ?\"catatonia\", panic attacks -dont try that   Zyprexa        anxious overall improvement   Lithium        calmer, overall better   Latuda  20 mg     severe fatigue and depression   Synthroid  75 mcg     added to help with mood   Naltrexone        had nightmares and sedation on 25mg, stopped after 2 nights   Clozapine        late 2023 to Feb 2024:  nasal congestion and worsening of acid reflux. with clozapine delusions had improved significantly. improved in his ability to " think.      Brief trials with Strattera, Intuniv, Lamictal, Xanax, Zoloft, Vraylar  Belsomra found to help him get to deeper sleep.                   Past Medical History     Patient Active Problem List   Diagnosis    Schizoaffective disorder, bipolar type (H)    Hx of psychiatric care    Elevated liver enzymes    Fatty liver    High blood triglycerides    History of cannabis abuse    History of cocaine abuse (H)    Psychosis (H)    Elevated blood pressure reading without diagnosis of hypertension    Constipation    Morbid obesity (H)    Schizoaffective disorder (H)     See 12/17/2024 Nephrology Office Visit note by HELIO Conklin MD, for details re: management recommendations/treatment plan for his Chronic kidney disease 3.                Medications     Current Outpatient Medications   Medication Sig Dispense Refill    atorvastatin (LIPITOR) 20 MG tablet TAKE ONE TABLET BY MOUTH ONE TIME DAILY*      lamoTRIgine (LAMICTAL) 200 MG tablet Take 1 tablet (200 mg) by mouth daily. 30 tablet 1    lithium (ESKALITH) 600 MG capsule Take 2 capsules (1,200 mg) by mouth at bedtime. 60 capsule 1    OLANZapine (ZYPREXA) 20 MG tablet Take 1 tablet (20 mg) by mouth at bedtime. 30 tablet 1     Amiloride 5mg po qday prescribed by Dr. Conklin on 12/17/2024.                Data         1/8/2024     2:37 PM 6/17/2024     2:33 PM 12/6/2024     1:58 PM   PROMIS-10 Total Score w/o Sub Scores   PROMIS TOTAL - SUBSCORES 28 28 29        Patient-reported         6/17/2024     2:31 PM 8/30/2024     1:32 PM 12/6/2024     1:56 PM   PHQ-9 SCORE   PHQ-9 Total Score MyChart 4 (Minimal depression) 7 (Mild depression) 1 (Minimal depression)   PHQ-9 Total Score 4 7 1        Patient-reported         2/8/2021    10:03 AM 12/10/2021     9:54 AM   FERNANDO-7 SCORE   Total Score 3 (minimal anxiety) 10 (moderate anxiety)   Total Score 3 10    10       Liver/Kidney Function, TSH Metabolic Blood counts   Recent Labs   Lab Test 09/06/24  1216 05/24/24  1104   AST 32  --     ALT 60  --    ALKPHOS 87  --    CR 1.45* 1.42*     Recent Labs   Lab Test 09/06/24  1216   TSH 3.91    Recent Labs   Lab Test 10/03/23  1227   CHOL 132   TRIG 362*   LDL 27   HDL 33*     Recent Labs   Lab Test 09/19/23  1144   A1C 5.1     Recent Labs   Lab Test 09/06/24  1216   GLC 89    Recent Labs   Lab Test 02/20/24  1511   WBC 7.2   HGB 15.1   HCT 44.7   MCV 83              Date  Lithium level  Current dose  Comments   9/6/2024 0.75  CR drawn at 12:16pm   01/28/2025 0.61  CR switched to IR drawn at 10:28AM                    ECG 11/20 QTc = 436ms      Psychiatry Individual Psychotherapy Note   Psychotherapy start time - 3:25pm  Psychotherapy end time - 4:41pm  Date treatment plan last reviewed with patient - 12/06/24  Subjective: This supportive psychotherapy session addressed issues related to goals of therapy and current psychosocial stressors. Patient's reaction: Preparatory in the context of mental status appropriate for ambulatory setting.    Interactive complexity indicated? No  Plan: RTC in timeframe noted above  Psychotherapy services during this visit included myself and the patient.   Treatment Plan      SYMPTOMS; PROBLEMS   MEASURABLE GOALS;    FUNCTIONAL IMPROVEMENT / GAINS INTERVENTIONS DISCHARGE CRITERIA   ADHD: difficulty paying attention    Increase ability to focus Supportive / psychodynamic marked symptom improvement, symptom resolution, and reduced visit frequency         Level of Medical Decision Making:   - At least 1 chronic problem that is not stable  - Engaged in prescription drug management during visit (discussed any medication benefits, side effects, alternatives, etc.)    The longitudinal plan of care for the diagnosis(es)/condition(s) as documented were addressed during this visit. Due to the added complexity in care, I will continue to support Manuel in the subsequent management and with ongoing continuity of care.    PROVIDER: Shravan Dinero, DO    Patient staffed in clinic  with Dr. Tay who will sign the note.  Supervisor is Dr. Julio.

## 2025-02-03 NOTE — PATIENT INSTRUCTIONS
Thank you for your visit today.      Treatment Plan Today:      1) Medications:   -Change Lithium IR 1200mg to Lithium CR 1200mg nightly tonight.   -After 1 week, lower Lamotrigine to 150mg with new prescription and please start Depakote 250mg every night. If tolerated, increase Depakote to 500mg nightly after 3 days. After you're on this dose of Depakote for a week, please do lab draw.     2) Follow-up appointment with Dr Dinero in about 4 weeks in person     3) In the case of an emergency, crisis numbers are below and clinic after hours number is [phone].         **For crisis resources, please see the information at the end of this document**   Patient Education    Thank you for coming to the [facility] [place] [facility].     Lab Testing:  If you had lab testing today and your results are reassuring or normal they will be mailed to you or sent through [employer] within 7 days. If the lab tests need quick action we will call you with the results. The phone number we will call with results is # [phone]. If this is not the best number please call our clinic and change the number.     Medication Refills:  If you need any refills please call your pharmacy and they will contact us. Our fax number for refills is [phone].   Three business days of notice are needed for general medication refill requests.   Five business days of notice are needed for controlled substance refill requests.   If you need to change to a different pharmacy, please contact the new pharmacy directly. The new pharmacy will help you get your medications transferred.     Contact Us:  Please call [phone] during business hours (8-5:00 M-F).   If you have medication related questions after clinic hours, or on the weekend, please call [phone].     Financial Assistance [phone]   Medical Records [phone]       [facility] CRISIS RESOURCES:  For a emergency help, please call [phone] or go to  the nearest Emergency Department.     Emergency Walk-In Options:   EmPATH Unit @ [facility] ([place]): [phone] - Specialized mental health emergency area designed to be calming  [facility] ([place]): [phone]  [facility] ([place]): [phone]  [facility] ([place]): [phone]    [facility] Crisis Information:   [place]: [phone]  [place]: [phone]  [place] (COPE) - Adult: [phone]     Child: [phone]  [place] - Adult: [phone]     Child: [phone]  [place]: [phone]  List of all [facility] resources:   https://[place].[facility]/dhs/people-we-serve/adults/health-care/mental-health/resources/crisis-contacts.jsp    National Crisis Information:   Crisis Text Line: Text  [place]  to 224581  Suicide & Crisis Lifeline: 988  National Suicide Prevention Lifeline: 3-059-776-TALK (1-714.204.6715)       For online chat options, visit https://suicidepreventionlifeline.org/chat/  Poison Control Center: 1-983.426.6054  Trans Lifeline: 1-444.165.7070 - Hotline for transgender people of all ages  [employer]: 5-367-452-6078 - Hotline for LGBT youth     For Non-Emergency Support:   Fast Tracker: Mental Health & Substance Use Disorder Resources -   https://www.[employer].org/

## 2025-02-03 NOTE — PROGRESS NOTES
Virtual Visit Details    Type of service:  Video Visit   Video Start Time:  3:25pm  Video End Time: 4:14pm    Originating Location (pt. Location): Home    Distant Location (provider location):  On-site  Platform used for Video Visit: Quoc

## 2025-04-10 NOTE — PROGRESS NOTES
April 10, 2025      Nikko Madrid Jr.   [address]  [place] [address]     Dear Nikko Madrid Jr.:     Our office has made three attempts to reach you to schedule an appointment with Lorne Rodriges MD. Because your well-being is important to us, we would like to discuss your care. Please call the department of [facility] [address] at your earliest convenience to schedule your appointment or allow us to answer any questions you may have.      Thank you,     Lorne Rodriges MD  [facility] [address]  [address] [place] [id number] [phone]        12/01/20 0523   Patient Belongings   Did you bring any home meds/supplements to the hospital?  No   Patient Belongings remains with patient;locker;sent to security per site process   Patient Belongings Remaining with Patient clothing   Patient Belongings Put in Hospital Secure Location (Security or Locker, etc.) cash/credit card;cell phone/electronics;clothing;shoes;wallet   Belongings Search Yes   Clothing Search Yes   Second Staff Thong LAUGHLIN        Pt belongings remaining with pt:    Boxer shorts       Pt belongings kept in locker:    Face mask  Loafers/slippers  Shoes w/ laces  Extra shoe lace  Socks  Blue jeans  White t-shirt  Black t-shirt  Coat  Cell phone  Headphones  Headphone connection cable  Cigarettes  2 lighters  Wallet w/ state ID and membership/insurance cards       Pt valuables sent to security in envelope #[id number]:    [employer] debit [phone]  505.00 dollars cash    A               Admission:  I am responsible for any personal items that are not sent to the safe or pharmacy.  [facility] is not responsible for loss, theft or damage of any property in my possession.    Signature:  _________________________________ Date: _______  Time: _____                                              Staff Signature:  ____________________________ Date: ________  Time: _____      2nd Staff person, if patient is unable/unwilling to sign:    Signature: ________________________________ Date: ________  Time: _____     Discharge:  [facility] has returned all of my personal belongings:    Signature: _________________________________ Date: ________  Time: _____                                          Staff Signature:  ____________________________ Date: ________  Time: _____

## 2025-04-14 DIAGNOSIS — F25.0 SCHIZOAFFECTIVE DISORDER, BIPOLAR TYPE (H): ICD-10-CM

## 2025-04-15 RX ORDER — OLANZAPINE 20 MG/1
20 TABLET, FILM COATED ORAL AT BEDTIME
Qty: 30 TABLET | Refills: 0 | Status: SHIPPED | OUTPATIENT
Start: 2025-04-15

## 2025-04-15 RX ORDER — LAMOTRIGINE 150 MG/1
150 TABLET ORAL AT BEDTIME
Qty: 30 TABLET | Refills: 0 | Status: SHIPPED | OUTPATIENT
Start: 2025-04-15

## 2025-04-15 NOTE — TELEPHONE ENCOUNTER
"Date of Last Office Visit: 2/3/25  RTC: 4 weeks  No shows: 0  Cancellations:0   Date of Next Office Visit: 4/28/25  ------------------------------    Last Script Details::    lamoTRIgine (LAMICTAL) 150 MG tablet 30 tablet 1 2/10/2025     OLANZapine (ZYPREXA) 20 MG tablet 30 tablet 1 2/3/2025     ------------------------------  From last visit note:   \" In 1 week, reduce lamotrigine from 200 mg to 150mg nightly  - Continue olanzapine 20 mg daily\"        Refill Decision:    [x]Medication refilled per  Medication Refill in Ambulatory Care  policy.      Warnings Override History for lamoTRIgine (LAMICTAL) 150 MG tablet [[id number]]    Overridden by Shravan Dinero DO on Feb 3, 2025 5:28 PM   Drug-Drug   1. VALPROIC ACID / LAMOTRIGINE [Level: Major] [Reason: Will monitor drug levels/drug effects ]   Other Orders: divalproex sodium extended-release (DEPAKOTE ER) 250 MG 24 hr tablet           Request from pharmacy:  Requested Prescriptions   Pending Prescriptions Disp Refills    OLANZapine (ZYPREXA) 20 MG tablet [Pharmacy Med Name: OLANZapine Oral Tablet 20 MG] 30 tablet 0     Sig: Take 1 tablet (20 mg) by mouth at bedtime.       Antipsychotic Medications Failed - 4/15/2025 12:23 PM        Failed - Most recent blood pressure under 140/90 in past 12 months        Failed - Lipid panel on file within the past 12 months     Recent Labs   Lab Test 10/03/23  1227   CHOL 132   TRIG 362*   HDL 33*   LDL 27   NHDL 99               Failed - Heart Rate on file within past 12 months     Pulse Readings from Last 3 Encounters:   02/13/24 87   01/30/24 81   01/16/24 85               Passed - Patient is 12 years of age or older        Passed - A1c or Glucose on file in past 12 months     Recent Labs   Lab Test 09/06/24  1216 10/03/23  1227 09/19/23  1144   GLC 89   < > 97   A1C  --   --  5.1    < > = values in this interval not displayed.       Please review patients last 3 weights. If a weight gain of >10 lbs exists, you may refill the " prescription once after instructing the patient to schedule an appointment within the next 30 days.    Wt Readings from Last 3 Encounters:   01/30/24 137.2 kg (302 lb 6.4 oz)   01/16/24 138.7 kg (305 lb 12.8 oz)   01/08/24 136.1 kg (300 lb)             Passed - Medication is active on med list and the sig matches. RN to manually verify dose and sig if red X/fail.     If the protocol passes (green check), you do not need to verify med dose and sig.    A prescription matches if they are the same clinical intention.    For Example: once daily and every morning are the same.    The protocol can not identify upper and lower case letters as matching and will fail.     For Example: Take 1 tablet (50 mg) by mouth daily     TAKE 1 TABLET (50 MG) BY MOUTH DAILY    For all fails (red x), verify dose and sig.    If the refill does match what is on file, the RN can still proceed to approve the refill request.       If they do not match, route to the appropriate provider.             Passed - Recent (12 month) or future (90 days) visit with authorizing provider s specialty     The patient must have completed an in-person or virtual visit within the past 12 months or has a future visit scheduled within the next 90 days with the authorizing provider s specialty.  Urgent care and e-visits do not qualify as an office visit for this protocol.          Passed - Medication indicated for associated diagnosis     Medication is associated with one or more of the following diagnoses:             Agitation            Autistic disorder            Bipolar disorder            Chemotherapy-induced nausea and vomiting            Delirium            Depression            Schizophrenia             Mood disorder              lamoTRIgine (LAMICTAL) 150 MG tablet [Pharmacy Med Name: lamoTRIgine Oral Tablet 150 MG] 30 tablet 0     Sig: Take 1 tablet (150 mg) by mouth at bedtime. Start at the same time as Depakote.       Anti-Seizure Meds Protocol   Failed - 4/15/2025 12:23 PM        Failed - Review Authorizing provider's last note.      Refer to last progress notes: confirm request is for original authorizing provider (cannot be through other providers).          Failed - Medication indicated for associated diagnosis     Medication is associated with one or more of the following diagnoses:     Bipolar   Dementia   Depression   Epilepsy   Migraine   Seizure   Trigeminal Neuralgia   Cyclothymia          Passed - Normal ALT or AST on file in past 26 months     Recent Labs   Lab Test 09/06/24  1216   ALT 60     Recent Labs   Lab Test 09/06/24  1216   AST 32             Passed - Medication is active on med list and the sig matches. RN to manually verify dose and sig if red X/fail.     If the protocol passes (green check), you do not need to verify med dose and sig.    A prescription matches if they are the same clinical intention.    For Example: once daily and every morning are the same.    The protocol can not identify upper and lower case letters as matching and will fail.     For Example: Take 1 tablet (50 mg) by mouth daily     TAKE 1 TABLET (50 MG) BY MOUTH DAILY    For all fails (red x), verify dose and sig.    If the refill does match what is on file, the RN can still proceed to approve the refill request.       If they do not match, route to the appropriate provider.             Passed - Has GFR on file in past 12 months and most recent value is normal        Passed - Recent (12 mo) or future (90 days) visit within the authorizing provider's specialty     The patient must have completed an in-person or virtual visit within the past 12 months or has a future visit scheduled within the next 90 days with the authorizing provider s specialty.  Urgent care and e-visits do not qualify as an office visit for this protocol.

## 2025-04-20 DIAGNOSIS — F25.0 SCHIZOAFFECTIVE DISORDER, BIPOLAR TYPE (H): ICD-10-CM

## 2025-04-21 RX ORDER — LITHIUM CARBONATE 300 MG/1
1200 TABLET, FILM COATED, EXTENDED RELEASE ORAL AT BEDTIME
Qty: 120 TABLET | Refills: 0 | Status: SHIPPED | OUTPATIENT
Start: 2025-04-21

## 2025-04-21 NOTE — TELEPHONE ENCOUNTER
"Date of Last Office Visit: 2/3/25  RTC: 4 weeks  No shows: 0  Cancellations: 0  Date of Next Office Visit: 4/28/25  ------------------------------    Last Script Details::    lithium ER (LITHOBID) 300 MG CR tablet 120 tablet 1 2/3/2025     ------------------------------  From last visit note:   \"Switch lithium IR 1200mg to CR 1200mg at bedtime tonight (anticipate start of taper down toward discontinuation at next session) \"        Refill Decision:    [x]Medication refilled per  Medication Refill in Ambulatory Care  policy.           Request from pharmacy:  Requested Prescriptions   Pending Prescriptions Disp Refills    lithium ER (LITHOBID) 300 MG CR tablet [Pharmacy Med Name: [place] Carbonate ER Oral Tablet Extended Release 300 MG] 120 tablet 0     Sig: Take 4 tablets (1,200 mg) by mouth at bedtime.       There is no refill protocol information for this order                  "

## 2025-04-27 ASSESSMENT — PATIENT HEALTH QUESTIONNAIRE - PHQ9
10. IF YOU CHECKED OFF ANY PROBLEMS, HOW DIFFICULT HAVE THESE PROBLEMS MADE IT FOR YOU TO DO YOUR WORK, TAKE CARE OF THINGS AT HOME, OR GET ALONG WITH OTHER PEOPLE: NOT DIFFICULT AT ALL
SUM OF ALL RESPONSES TO PHQ QUESTIONS 1-9: 4
SUM OF ALL RESPONSES TO PHQ QUESTIONS 1-9: 4

## 2025-04-28 ENCOUNTER — OFFICE VISIT (OUTPATIENT)
Dept: PSYCHIATRY | Facility: CLINIC | Age: 35
End: 2025-04-28
Attending: STUDENT IN AN ORGANIZED HEALTH CARE EDUCATION/TRAINING PROGRAM
Payer: COMMERCIAL

## 2025-04-28 VITALS
SYSTOLIC BLOOD PRESSURE: 129 MMHG | BODY MASS INDEX: 36.32 KG/M2 | DIASTOLIC BLOOD PRESSURE: 82 MMHG | HEART RATE: 70 BPM | WEIGHT: 267.8 LBS

## 2025-04-28 DIAGNOSIS — F25.0 SCHIZOAFFECTIVE DISORDER, BIPOLAR TYPE (H): ICD-10-CM

## 2025-04-28 PROCEDURE — G0463 HOSPITAL OUTPT CLINIC VISIT: HCPCS

## 2025-04-28 RX ORDER — OLANZAPINE 15 MG/1
15 TABLET, FILM COATED ORAL AT BEDTIME
Qty: 30 TABLET | Refills: 1 | Status: SHIPPED | OUTPATIENT
Start: 2025-04-28

## 2025-04-28 RX ORDER — LITHIUM CARBONATE 300 MG/1
1200 TABLET, FILM COATED, EXTENDED RELEASE ORAL AT BEDTIME
Qty: 120 TABLET | Refills: 1 | Status: SHIPPED | OUTPATIENT
Start: 2025-04-28

## 2025-04-28 RX ORDER — LAMOTRIGINE 200 MG/1
200 TABLET ORAL AT BEDTIME
Qty: 30 TABLET | Refills: 1 | Status: SHIPPED | OUTPATIENT
Start: 2025-04-28

## 2025-04-28 RX ORDER — TIRZEPATIDE 15 MG/.5ML
15 INJECTION, SOLUTION SUBCUTANEOUS WEEKLY
COMMUNITY
Start: 2025-04-01

## 2025-04-28 ASSESSMENT — PAIN SCALES - GENERAL: PAINLEVEL_OUTOF10: NO PAIN (0)

## 2025-04-28 NOTE — PROGRESS NOTES
J0k,   [facility]  MEDICAL PROGRESS NOTE  General Clinic Team       CARE TEAM:    PCP- Clinic Provider  Therapist- None    Manuel is a 34 year old who uses the pronouns he, him, his.                   Assessment & Plan     # Schizoaffective disorder, Bipolar type, in remission without current psychotic features               -rule out bipolar disorder type 1  # ADHD, predominantly inattentive type  # Insomnia (history of delayed phase sleep disorder)     Medical:  severe obstructive sleep apnea without hypoxemia - not tolerating CPAP     Manuel Sommers is a 34 year old M with previous psychiatric diagnoses of Schizoaffective disorder in remission, ADHD inattentive type, and insomnia with pertinent medical diagnoses of severe LISA but not tolerating CPAP machine currently presenting for follow-up.     Today, Manuel presents with his mother Ember in person today.  He noted a dip in his mood since going down on the lamotrigine but no concerns for psychosis or hypomania/lauren.  He did not tolerate the Depakote  mg nightly so he self-discontinued it in the interim.  He did feel that the Depakote 250 mg nightly was better for him and so there is a consideration to retrial this in the future but to titrate up slowly.  He noted that today his major concerns were to decrease olanzapine and to discuss other options for mood stabilizers and antipsychotics.  He noted gaining weight after being off of Zepbound for the past 6 weeks and while the medication helped him lose weight on olanzapine, he did note that his weight loss had stabilized. He wishes to go down on the medication today to see if it helps to reduce his appetite and weight. Consideration to continue decreasing his olanzapine as tolerated in the future.  Per mom, lowest effective dose in the past has been between 10 to 12.5 mg daily.  If appetite concerns still are present at the lower end or  he starts having worsening psychosis, a future consideration would be paliperidone or asenapine. We reviewed various antipsychotics that Manuel has been on in the past and have updated his previous medication trials chart today.  He has been on risperidone in the past and that led to significant adverse effects likely because it is metabolized primarily by CYP2D6 which he is a poor metabolizer.  Paliperidone, while a metabolite of risperidone and would need to be cautiously monitored, is minimally metabolized by CYP2D6 and is mostly renally excreted.      Due to his kidney concerns on lithium and per nephrology's recommendations to trial another mood stabilizer, we also reviewed retrialing Depakote in the future at 250 mg daily and continuing at that dose for a longer period of time for tolerability before increasing.  The limiting step to starting this medication again would be that it would require going down on his lamotrigine due to the drug interactions between the two which he was hesitant on doing. Max recommended dose of lamotrigine while on Depakote is 100mg daily. Trileptal and Tegretol are also other options to consider though would need careful monitoring of drug-drug interactions.    For now, we agreed to decrease his olanzapine from 20 mg to 15 mg nightly.  We will increase his lamotrigine from 150 mg to 200 mg daily.  His nephrologist stated that his creatinine level has gone down a bit since being on the amiloride and so there is not an urgent need to get him off the lithium though should be done as soon as he is able.  We reviewed the resident transition with Manuel and mom today and that this will most likely be our last session together.  We discussed due to the medication changes we would place him on the wait list and will have him follow up with MTM in order to go over tolerability of the medication changes and also consideration for other medication options. MTM referral placed today. Manuel was  reminded to complete lithium and SGA labs before the transition. Manuel was agreeable to this plan.  We also reviewed calling crisis, calling 911, or going to the nearest emergency department for any imminent safety concerns.  Manuel was in agreement with this plan.  He denied any safety concerns today.    Future Considerations:  Continue tapering olanzapine as tolerated (minimal effective dose is around 10-12.5mg daily)  Restart Depakote to take the place of lithium but go slower. Start at 250mg for 2 weeks to a month before increasing. Would need to go down on lamotrigine however (max recommended dose would be 100mg while on Depakote)  Consider asenapine instead of olanzapine if appetite and weight gain is still a concern.   Can try paliperidone. He didn't tolerate risperidone and had significant side effects so would need to be monitored carefully. He is a poor CYP2D6 metabolizer which likely explains why he didn't tolerate Risperdal. Paliperidone in contrast is mostly renally excreted and is minimally metabolized by CYP2D6.  Could try Lybalvi though he already trialed a generic olanzapine-samidorphan combination before it was on the market and didn't tolerate it.   Further conversations with discussing LISA treatments with sleep medicine. If needed can use modafinil and start at 50mg for tolerability but may want to defer to sleep med.      Psychotropic Drug Interactions:    ADDITIVE SEROTONERGIC: olanzapine, lithium  ADDITIVE CNS/RESPIRATORY DEPRESSION: lamotrigine, olanzapine  Lithium may enhance the neurotoxic effect of Antipsychotic Agents.  Management: routine monitoring    [place] was not checked today: not using controlled substances    Risk Statements:   Treatment Risk: Risks, benefits, alternatives and potential adverse effects have been discussed and are understood.   Safety Risk: Manuel did not appear to be an imminent safety risk to self or others. Reviewed safety plan and resources.    PLAN    1)  Medications:   - Discontinue Depakote ER 500mg nightly ( Manuel stopped taking in the interim).  - Increase lamotrigine from 150mg nightly to 200mg nightly.   - Decrease olanzapine 20 mg daily to 15mg daily  - Continue taking lithium CR 1200mg at bedtime tonight     Weight management   Zepbound    2) Psychotherapy: Recommended. Patient declined.     3) Next due:  Labs:   Lithium (level, TSH, Cr): Due now. Labs previously ordered. (Previous lithium lab on 2/3 was still on IR)  SGA labs due now. Previously ordered.    EKG: Routine monitoring is not indicated for current psychotropic medication regimen   Rating scales: AIMS: next in person visit    4) Referrals: Placed MTM referral    5) Follow-up: Return to clinic in 4 weeks.  Placed on the wait list. Will likely follow-up with next psychiatry resident.                  Interval History   Today Manuel presents in person with his mom Ember    Since last visit,    Updates/Stressors:  - Labs not done. Still need Li, TSH, CMP, Lipid, A1c and CBC completed. Reminded to do.   - Currently, Manuel stopped Depakote. Liked the lower dose. Felt very cloudy on the 500mg and stopped it.   - Wants to aim for 15mg on the olanzapine. Was out of Zepbound for 6 weeks and gained weight from missing it and being on the olanzapine. Just picked up a new prescription of Zepbound this week  - Wants to consider other for antipsychotics and mood stabilizers    Mood: Felt discouraged about the Zepbound.  Since being on the lamotrigine at the lower dose, he's noticed a drop in his mood.   Psychosis/Nanette: No  Sleep: Waking up at noon for months. Still takes a few hours to fall asleep. 11pm-12am but doesn't fall asleep until 2am. Spends a lot of time restless. All throughout the day. Wakes up around noon. No issues staying asleep.  Appetite: Zepbound out for 6 weeks. Did lose 50-55lbs on Zepbound.  He gained some of the weight back.  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 Limited finances.      Partner/ - single  Children- None      Living situation- lives with parents in [place]  Social/ Spiritual Support- family, friends, Jainism hemalatha   Legal: DUIs. Also In February 2014 the patient left for [place] to attend a [employer] tournament. He ended up in [place] and was arrested for shoplifting from [facility] (had \"intended to purchase\" a pair of jeans but became panicked when security approached him). Had a brief stay in [place], and afterwards, took him 5 days to return to [place] with parents frequently wiring him money. Had difficulty adhering to travel plans, seemed confused, and was communicating with parents via \"bizarre\" text messages (parents even filed a missing persons report at one point). He finally arrived home exhausted, not sharing much of events in [place], but slightly more consistent with baseline.       Pertinent Substance Use:  [Last updated 04/28/2025]  Alcohol: No   Cannabis: No  Tobacco: No   Caffeine:  No. Recently stopped  Opioids: No   Narcan Kit current: N/A  Other substances: No     Medical Review of Systems / Med sfx:   GI: Heart burn but it's Zepbound related so he hasn't in a while.   Ongoing physical restlessness. Ongoing for years.                 Summary Points of Current Care  08/30/2024: No changes. Recheck Cr level and recommended he touch base with PCP  12/6/2024: Changed Lithium from 1200mg CR to 1200mg IR for reducing load on kidneys. Patient to recheck Li level after a week.   02/03/2025: Change Lithium IR 1200mg to Lithium CR 1200mg nightly. After 1 week, lower Lamotrigine to 150mg and start Depakote 250mg every night. If tolerated, increase Depakote to 500mg nightly after 3 days. After a week, complete lab draw.   04/28/2025: Discontinue " "Depakote 500 mg daily.  Decreased olanzapine from 20 mg to 15 mg daily.  Increase lamotrigine from 150 mg daily to 200 mg daily.                Physical Exam  (Vitals Only)    There were no vitals taken for this visit.  Pulse Readings from Last 3 Encounters:   02/13/24 87   01/30/24 81   01/16/24 85     Wt Readings from Last 3 Encounters:   01/30/24 137.2 kg (302 lb 6.4 oz)   01/16/24 138.7 kg (305 lb 12.8 oz)   01/08/24 136.1 kg (300 lb)     BP Readings from Last 3 Encounters:   02/13/24 133/86   01/30/24 139/89   01/16/24 (!) 139/90                      Mental Status Exam    Alertness: alert  and oriented  Appearance: well groomed  Behavior/Demeanor: cooperative, pleasant, and calm, with good  eye contact   Speech: regular rate and rhythm  Language: intact  Psychomotor: normal or unremarkable  Mood:  \"dip in mood\"  Affect: restricted; congruent to: mood- yes, content- yes  Thought Process/Associations: unremarkable  Thought Content:  Reports none;  Denies suicidal & violent ideation and delusions  Perception:  Reports none;  Denies auditory hallucinations and visual hallucinations  Insight: excellent  Judgment: excellent  Cognition: does  appear grossly intact; formal cognitive testing was not done  Gait and Station: unremarkable                  Past Psychotropic Medication Trials     Medication Max Dose (mg) Dates / Duration Helpful? DC Reason / Adverse Effects?   Adderall  10-20 mg     improved concentration, but \"cold personality\" and perseveration   Prozac  20 mg     limited response, less irritable, first episode of \"rage\"   Seroquel  25-75 mg     helped with sleep and irritability. nasal congestion   Concerta  18 mg     improved focus, no improvement in motivation   Provigil  150 mg (up to 400)     Per REBEL Santa MD's 6/12/2023 note, \"Provigil 150 mg (up to 400) - Stimulants \"almost killed him\" triggered alcohol binges \".   Abilify  2.5 mg     OK when QOD, but irritable and agitated on QD   Clonazepam  " "0.5-1.5 mg     calming, helped with sleep and \"catatonia\"   Risperdal  5 mg     some confusion, slow processing, withdrawn, ?\"catatonia\", panic attacks -  don't try that   Zyprexa  20      anxious overall improvement   Lithium        calmer, overall better   Latuda  20 mg     severe fatigue and depression. Caused nightmares  x2   Synthroid  75 mcg     added to help with mood   Naltrexone        had nightmares and sedation on 25mg, stopped after 2 nights   Clozapine  200      late 2023 to Feb 2024:  nasal congestion and worsening of acid reflux. with clozapine delusions had improved significantly. improved in his ability to think.   Depakote 500  Did help.  Fogginess and cloudiness.     Propranolol 60mg      Vraylar    EPS   Olanzapine+  Samidorphan (Before Lybalvi was on the market)  +5 years ago  Samidorphan led to feelings of sluggishness      Brief trials with Strattera, Intuniv, Lamictal, Xanax, Zoloft, Vraylar  Belsomra found to help him get to deeper sleep.     **POOR CYP2D6 METABOLIZER**                  Past Medical History     Patient Active Problem List   Diagnosis    Schizoaffective disorder, bipolar type (H)    Hx of psychiatric care    Elevated liver enzymes    Fatty liver    High blood triglycerides    History of cannabis abuse    History of cocaine abuse (H)    Psychosis (H)    Elevated blood pressure reading without diagnosis of hypertension    Constipation    Morbid obesity (H)    Schizoaffective disorder (H)     See 12/17/2024 Nephrology Office Visit note by HELIO Conklin MD, for details re: management recommendations/treatment plan for his Chronic kidney disease 3.                Medications     Current Outpatient Medications   Medication Sig Dispense Refill    atorvastatin (LIPITOR) 20 MG tablet TAKE ONE TABLET BY MOUTH ONE TIME DAILY*      divalproex sodium extended-release (DEPAKOTE ER) 250 MG 24 hr tablet Take 1 tablet (250 mg) by mouth at bedtime for 3 days, THEN 2 tablets (500 mg) at bedtime. " Please start this with reduced Lamotrigine at 150mg nightly.. 87 tablet 0    lamoTRIgine (LAMICTAL) 150 MG tablet Take 1 tablet (150 mg) by mouth at bedtime. Start at the same time as Depakote. 30 tablet 0    lithium ER (LITHOBID) 300 MG CR tablet Take 4 tablets (1,200 mg) by mouth at bedtime. 120 tablet 0    OLANZapine (ZYPREXA) 20 MG tablet Take 1 tablet (20 mg) by mouth at bedtime. 30 tablet 0     Amiloride 5mg po qday prescribed by Dr. Conklin on 12/17/2024.                Data         1/8/2024     2:37 PM 6/17/2024     2:33 PM 12/6/2024     1:58 PM   PROMIS-10 Total Score w/o Sub Scores   PROMIS TOTAL - SUBSCORES 28 28 29        Patient-reported         8/30/2024     1:32 PM 12/6/2024     1:56 PM 2/3/2025     2:35 PM   PHQ-9 SCORE   PHQ-9 Total Score MyChart 7 (Mild depression) 1 (Minimal depression) 4 (Minimal depression)   PHQ-9 Total Score 7 1  4        Patient-reported         2/8/2021    10:03 AM 12/10/2021     9:54 AM   FERNANDO-7 SCORE   Total Score 3 (minimal anxiety) 10 (moderate anxiety)   Total Score 3 10    10       Liver/Kidney Function, TSH Metabolic Blood counts   Recent Labs   Lab Test 09/06/24  1216 05/24/24  1104   AST 32  --    ALT 60  --    ALKPHOS 87  --    CR 1.45* 1.42*     Recent Labs   Lab Test 09/06/24  1216   TSH 3.91    Recent Labs   Lab Test 10/03/23  1227   CHOL 132   TRIG 362*   LDL 27   HDL 33*     Recent Labs   Lab Test 09/19/23  1144   A1C 5.1     Recent Labs   Lab Test 09/06/24  1216   GLC 89    Recent Labs   Lab Test 02/20/24  1511   WBC 7.2   HGB 15.1   HCT 44.7   MCV 83              Date  Lithium level  Current dose  Comments   9/6/2024 0.75  CR drawn at 12:16pm   01/28/2025 0.61  CR switched to IR drawn at 10:28AM   02/04/2025 0.6  CR drawn at 1:16PM              ECG 11/20 QTc = 436ms      Psychiatry Individual Psychotherapy Note   Psychotherapy start time - 1:31pm  Psychotherapy end time - 2:30pm  Date treatment plan last reviewed with patient - 04/28/25  Subjective: This  supportive psychotherapy session addressed issues related to goals of therapy and current psychosocial stressors. Patient's reaction: Preparatory in the context of mental status appropriate for ambulatory setting.    Interactive complexity indicated? No  Plan: RTC in timeframe noted above  Psychotherapy services during this visit included myself and the patient.   Treatment Plan      SYMPTOMS; PROBLEMS   MEASURABLE GOALS;    FUNCTIONAL IMPROVEMENT / GAINS INTERVENTIONS DISCHARGE CRITERIA   ADHD: difficulty paying attention    Increase ability to focus Supportive / psychodynamic marked symptom improvement, symptom resolution, and reduced visit frequency         Level of Medical Decision Making:   - At least 1 chronic problem that is not stable  - Engaged in prescription drug management during visit (discussed any medication benefits, side effects, alternatives, etc.)    The longitudinal plan of care for the diagnosis(es)/condition(s) as documented were addressed during this visit. Due to the added complexity in care, I will continue to support Manuel in the subsequent management and with ongoing continuity of care.    PROVIDER: Shravan Dinero DO    Patient staffed in clinic with Dr. Tay who will sign the note.  Supervisor is Dr. Julio.

## 2025-04-28 NOTE — NURSING NOTE
Chief Complaint   Patient presents with    Recheck Medication     Schizoaffective disorder, bipolar type     - Alfredito Moss, Visit Facilitator

## 2025-04-28 NOTE — PATIENT INSTRUCTIONS
Thank you for your visit today.      Treatment Plan Today:      1) Medications:   -Increase lamotrigine from 150mg to 200mg nightly  -Decrease olanzapine from 20mg to 15mg daily.     2) Follow-up appointment with Dr Dinero in about 4 weeks  Placed referral for MTM. You may also schedule the appointment by calling [phone] or toll-free at [phone].  Please complete lab work at your earliest convenience.      3) In the case of an emergency, crisis numbers are below and clinic after hours number is [phone].    **For crisis resources, please see the information at the end of this document**   Patient Education    Thank you for coming to the [facility].     Lab Testing:  If you had lab testing today and your results are reassuring or normal they will be mailed to you or sent through [employer] within 7 days. If the lab tests need quick action we will call you with the results. The phone number we will call with results is # [phone]. If this is not the best number please call our clinic and change the number.     Medication Refills:  If you need any refills please call your pharmacy and they will contact us. Our fax number for refills is [phone].   Three business days of notice are needed for general medication refill requests.   Five business days of notice are needed for controlled substance refill requests.   If you need to change to a different pharmacy, please contact the new pharmacy directly. The new pharmacy will help you get your medications transferred.     Contact Us:  Please call [phone] during business hours (8-5:00 M-F).   If you have medication related questions after clinic hours, or on the weekend, please call [phone].     Financial Assistance [phone]   Medical Records [phone]       MENTAL HEALTH CRISIS RESOURCES:  For a emergency help, please call 211 or go to the nearest Emergency Department.     Emergency  Walk-In Options:   EmPATH Unit @ [facility] ([place]): [phone] - Specialized mental health emergency area designed to be calming  [facility] ([place]): [phone]  [facility] ([place]): [phone]  [facility] ([place]): [phone]    [facility] Crisis Information:   [place]: [phone]  [place]: [phone]  [place] (COPE) - Adult: [phone]     Child: [phone]  [place] - Adult: [phone]     Child: [phone]  [place]: [phone]  List of all [facility] resources:   https://[email or web address]/dhs/people-we-serve/adults/health-care/mental-health/resources/crisis-contacts.jsp    National Crisis Information:   Crisis Text Line: Text  [place]  to 270441  Suicide & Crisis Lifeline: 988  National Suicide Prevention Lifeline: 1-286-862-TALK (1-685.696.6433)       For online chat options, visit https://suicidepreventionlifeline.org/chat/  Poison Control Center: 1-650.639.8121  Trans Lifeline: 1-995.727.7724 - Hotline for transgender people of all ages  The [employer] Project: 2-478-918-5768 - Hotline for LGBT youth     For Non-Emergency Support:   Fast Tracker: Mental Health & Substance Use Disorder Resources -   https://www.[employer].org/

## 2025-04-30 ENCOUNTER — TELEPHONE (OUTPATIENT)
Dept: PHARMACY | Facility: OTHER | Age: 35
End: 2025-04-30
Payer: COMMERCIAL

## 2025-04-30 NOTE — TELEPHONE ENCOUNTER
MTM referral from: [facility] clinic visit (referral by provider)    MTM referral outreach attempt #2 on April 30, 2025 at 11:22 AM      Outcome: Left Message    Use HP MA for the carrier/Plan on the flowsheet      Elevaate Message Sent    Magaly Auguste CMA  MTM

## 2025-05-22 NOTE — MR AVS SNAPSHOT
After Visit Summary   3/8/2018    Jose Francsico Sommers    MRN: [id number]           Patient Information     Date Of Birth          1990        Visit Information        Provider Department      3/8/2018 1:55 PM Zbigniew Bailey MD [facility]        Today's Diagnoses     Schizoaffective disorder, bipolar type (H)    -  1    Insomnia, unspecified type        Counseling and coordination of care        History of cocaine abuse        History of cannabis abuse           Follow-ups after your visit        Your next 10 appointments already scheduled     Apr 12, 2018  2:25 PM CDT   Adult Med Follow UP with Zbigniew Bailey MD   Psychiatry Clinic ([facility])    [address]  [address] [id number] [phone]              Who to contact     Please call your clinic at [phone] to:    Ask questions about your health    Make or cancel appointments    Discuss your medicines    Learn about your test results    Speak to your doctor            Additional Information About Your Visit        MyChart Information     [employer] gives you secure access to your electronic health record. If you see a primary care provider, you can also send messages to your care team and make appointments. If you have questions, please call your primary care clinic.  If you do not have a primary care provider, please call [phone] and they will assist you.      [employer] is an electronic gateway that provides easy, online access to your medical records. With [employer], you can request a clinic appointment, read your test results, renew a prescription or communicate with your care team.     To access your existing account, please contact your [facility] or call [phone] for assistance.        Care EveryWhere ID     This is your Care EveryWhere ID. This could be used by other organizations to access your [facility]  Patient to clinic for D tap vaccine   Consent form signed by mother   Verified medication, dose and expiration by Erinn MEJIA RN   Administered vaccination in right Vastus Lateralis  Patient left office in stable condition    records  [id number]        Your Vitals Were     Pulse BMI (Body Mass Index)                92 33.4 kg/m2           Blood Pressure from Last 3 Encounters:   03/08/18 134/83   02/01/18 136/87   01/11/18 132/84    Weight from Last 3 Encounters:   03/08/18 114 kg (251 lb 6.4 oz)   02/01/18 114 kg (251 lb 6.4 oz)   01/11/18 114.8 kg (253 lb)              Today, you had the following     No orders found for display         Today's Medication Changes          These changes are accurate as of 3/8/18 11:59 PM.  If you have any questions, ask your nurse or doctor.               Stop taking these medicines if you haven't already. Please contact your care team if you have questions.     buPROPion 100 MG 12 hr tablet   Commonly known as:  WELLBUTRIN SR   Stopped by:  Zbigniew Bailey MD                    Primary Care Provider    Clinic Provider       No address on file        Equal Access to Services     [facility]: Hadii maria ines Castro, waaxda luacacia, qaybta kaalmada minerva, chandan flores . So [facility] [phone].    ATENCIÓN: Si habla español, tiene a jacob disposición servicios gratuitos de asistencia lingüística. Llame al [phone].    We comply with applicable federal civil rights laws and [place] laws. We do not discriminate on the basis of race, color, national origin, age, disability, sex, sexual orientation, or gender identity.            Thank you!     Thank you for choosing PSYCHIATRY CLINIC  for your care. Our goal is always to provide you with excellent care. Hearing back from our patients is one way we can continue to improve our services. Please take a few minutes to complete the written survey that you may receive in the mail after your visit with us. Thank you!             Your Updated Medication List - Protect others around you: Learn how to safely use, store and throw away your medicines at www.disposemymeds.org.          This list is accurate as of 3/8/18  11:59 PM.  Always use your most recent med list.                   Brand Name Dispense Instructions for use Diagnosis    lithium 600 MG capsule     60 capsule    Take 2 capsules (1,200 mg) by mouth At Bedtime    Psychosis, unspecified psychosis type       metFORMIN 500 MG tablet    GLUCOPHAGE    90 tablet    Take 1000 mg (2 tablet) with breakfast and 500 mg with dinner.    Schizoaffective disorder, bipolar type (H)       OLANZapine 20 MG tablet    zyPREXA    30 tablet    Take 1 tablet (20 mg) by mouth At Bedtime    Psychosis, unspecified psychosis type

## 2025-06-09 ENCOUNTER — VIRTUAL VISIT (OUTPATIENT)
Dept: PHARMACY | Facility: CLINIC | Age: 35
End: 2025-06-09
Payer: COMMERCIAL

## 2025-06-09 DIAGNOSIS — E78.1 HIGH BLOOD TRIGLYCERIDES: ICD-10-CM

## 2025-06-09 DIAGNOSIS — E66.01 MORBID OBESITY (H): ICD-10-CM

## 2025-06-09 DIAGNOSIS — F25.0 SCHIZOAFFECTIVE DISORDER, BIPOLAR TYPE (H): Primary | ICD-10-CM

## 2025-06-09 PROCEDURE — 99605 MTMS BY PHARM NP 15 MIN: CPT | Mod: 95 | Performed by: PHARMACIST

## 2025-06-09 RX ORDER — AMILORIDE HYDROCHLORIDE 5 MG/1
10 TABLET ORAL DAILY
COMMUNITY

## 2025-06-09 RX ORDER — OMEPRAZOLE 20 MG/1
20 CAPSULE, DELAYED RELEASE ORAL PRN
COMMUNITY

## 2025-06-09 NOTE — PATIENT INSTRUCTIONS
"Recommendations from today's MTM visit:                                                      No medication changes recommended today.     Follow-up: new resident in July    It was great speaking with you today.  I value your experience and would be very thankful for your time in providing feedback in our clinic survey. In the next few days, you may receive an email or text message from [facility] [employer] with a link to a survey related to your  clinical pharmacist.\"     To schedule another MTM appointment, please call the clinic directly or you may call the MTM scheduling line at [phone] or toll-free at [phone].     My Clinical Pharmacist's contact information:                                                      Please feel free to contact me with any questions or concerns you have.      Renee Rivera, PharmD, BCPP  Medication Therapy Management Pharmacist  [facility]    "

## 2025-06-09 NOTE — PROGRESS NOTES
Medication Therapy Management (MTM) Encounter    ASSESSMENT:                              Mental Health   Schizoaffective Disorder bipolar type; rule out bipolar 1:   Tolerating olanzapine dose reduction and lamotrigine dose increase well.  No worsening mood or psychosis symptoms and some improvement in sedation and energy.  Some improvement in increased appetite which could be related to restarting Zepbound or olanzapine dose reduction.  Answered some of mom's questions regarding potential medication options; they will follow-up with incoming resident to discuss further.     Weight Management   Improving - back on zepbound    Hyperlipidemia   Stable     PLAN:                            No medication changes recommended today.     Follow-up: new resident in July    SUBJECTIVE/OBJECTIVE:                          Manuel Sommers is a 34 year old male seen for an initial visit. He was referred to me from psychiatry. Patient was accompanied by mom.     Reason for visit: Please check in with patient regarding medication changes. Olanzapine dose reduction    Allergies/ADRs: Reviewed in chart  Past Medical History: Reviewed in chart  Tobacco: He reports that he quit smoking about 7 years ago. His smoking use included cigarettes. He has never been exposed to tobacco smoke. He has never used smokeless tobacco.  Alcohol: reviewed in chart    Medication Adherence/Access: no issues reported.    Mental Health   Schizoaffective Disorder bipolar type; rule out bipolar 1  Olanzapine 15mg daily  Lamotrigine 200mg daily  Lithium 1200mg daily    Most recent psychiatry visit 4/28/25; olanzapine reduced from 20mg to 15mg and lamotrigine increased from 150mg to 200mg.    Today, patient reports:   - improvement with olanzapine dose reduction;  feeling less sedating and more active  - no negative impact from dose reduction; denies worsening mood/psychosis symptoms  - was off zepbound for a couple weeks due to insurance/PA issue. Now back on -  it was restarted around the same time olanzapine dose was reduced - appetite has decreased in a good way  - recently tried depakote but led to fogginess - tried due to nephrology preferring patient to come off lithium  - saphris, invega, haldol were mentioned as potential alternatives to olanzapine. Patient has many past med trials - thoroughly detailed in Dr. Dinero's most recent note.      Future Considerations (per psychiatry note):  Continue tapering olanzapine as tolerated (minimal effective dose is around 10-12.5mg daily)  Restart Depakote to take the place of lithium but go slower. Start at 250mg for 2 weeks to a month before increasing. Would need to go down on lamotrigine however (max recommended dose would be 100mg while on Depakote)  Consider asenapine instead of olanzapine if appetite and weight gain is still a concern.   Can try paliperidone. He didn't tolerate risperidone and had significant side effects so would need to be monitored carefully. He is a poor CYP2D6 metabolizer which likely explains why he didn't tolerate Risperdal. Paliperidone in contrast is mostly renally excreted and is minimally metabolized by CYP2D6.  Could try Lybalvi though he already trialed a generic olanzapine-samidorphan combination before it was on the market and didn't tolerate it.   Further conversations with discussing LISA treatments with sleep medicine. If needed can use modafinil and start at 50mg for tolerability but may want to defer to sleep med.           Weight Management   Zepbound 15 mg once weekly  Prescribed outside [facility]. Patient reports has been quite helpful; noticed increased appetite when off it recently     Current weight today: 0 lbs 0 oz    Wt Readings from Last 4 Encounters:   04/28/25 267 lb 12.8 oz (121.5 kg)   01/30/24 302 lb 6.4 oz (137.2 kg)   01/16/24 305 lb 12.8 oz (138.7 kg)   01/08/24 300 lb (136.1 kg)     Estimated body mass index is 36.32 kg/m  as calculated from the following:    Height as  of 1/8/24: 6' (1.829 m).    Weight as of 4/28/25: 267 lb 12.8 oz (121.5 kg).        Hyperlipidemia   atorvastatin 20mg daily  Patient reports no significant myalgias or other side effects.            Today's Vitals: There were no vitals taken for this visit.  ----------------      I spent 45 minutes with this patient today.     A summary of these recommendations was sent via [employer].    Brijesh HernandezD, BCPP  Medication Therapy Management Pharmacist  [facility]      Telemedicine Visit Details  The patient's medications can be safely assessed via a telemedicine encounter.  Type of service:  Video Conference via [employer]  Originating Location (pt. Location): Home    Distant Location (provider location):  Off-site  Start Time: 2:44 PM  End Time: 330 PM     Medication Therapy Recommendations  No medication therapy recommendations to display

## 2025-06-09 NOTE — Clinical Note
MTM bridge visit. Doing well with recent dose adjustments. No med changes needed today.   Thank you!  Renee

## 2025-06-21 ENCOUNTER — HEALTH MAINTENANCE LETTER (OUTPATIENT)
Age: 35
End: 2025-06-21

## 2025-07-01 ENCOUNTER — MYC REFILL (OUTPATIENT)
Dept: PSYCHIATRY | Facility: CLINIC | Age: 35
End: 2025-07-01
Payer: COMMERCIAL

## 2025-07-01 DIAGNOSIS — F25.0 SCHIZOAFFECTIVE DISORDER, BIPOLAR TYPE (H): ICD-10-CM

## 2025-07-02 RX ORDER — LAMOTRIGINE 200 MG/1
200 TABLET ORAL AT BEDTIME
Qty: 30 TABLET | Refills: 0 | Status: SHIPPED | OUTPATIENT
Start: 2025-07-02

## 2025-07-02 RX ORDER — OLANZAPINE 15 MG/1
15 TABLET, FILM COATED ORAL AT BEDTIME
Qty: 30 TABLET | Refills: 0 | Status: SHIPPED | OUTPATIENT
Start: 2025-07-02

## 2025-07-18 ENCOUNTER — MYC REFILL (OUTPATIENT)
Dept: PSYCHIATRY | Facility: CLINIC | Age: 35
End: 2025-07-18
Payer: COMMERCIAL

## 2025-07-18 DIAGNOSIS — F25.0 SCHIZOAFFECTIVE DISORDER, BIPOLAR TYPE (H): ICD-10-CM

## 2025-07-21 RX ORDER — LITHIUM CARBONATE 300 MG/1
1200 TABLET, FILM COATED, EXTENDED RELEASE ORAL AT BEDTIME
Qty: 56 TABLET | Refills: 0 | Status: SHIPPED | OUTPATIENT
Start: 2025-07-21 | End: 2025-08-04

## 2025-07-21 NOTE — TELEPHONE ENCOUNTER
Outside med rec indicates last refill was dispensed on 6/17/25. Patient meets refill protocol. 30 d/s pended and routed to new provider, Dr. Dagher for approval. Future appt is scheduled for 7/31/25.    Henna Modi RN on 7/21/2025 at 8:48 AM

## 2025-08-18 ENCOUNTER — LAB (OUTPATIENT)
Dept: LAB | Facility: CLINIC | Age: 35
End: 2025-08-18
Payer: COMMERCIAL

## 2025-08-18 DIAGNOSIS — Z79.899 ENCOUNTER FOR LONG-TERM (CURRENT) USE OF MEDICATIONS: ICD-10-CM

## 2025-08-18 LAB
ALBUMIN SERPL BCG-MCNC: 5 G/DL (ref 3.5–5.2)
ALP SERPL-CCNC: 68 U/L (ref 40–150)
ALT SERPL W P-5'-P-CCNC: 56 U/L (ref 0–70)
ANION GAP SERPL CALCULATED.3IONS-SCNC: 10 MMOL/L (ref 7–15)
AST SERPL W P-5'-P-CCNC: 35 U/L (ref 0–45)
BASOPHILS # BLD AUTO: 0.05 10E3/UL (ref 0–0.2)
BASOPHILS NFR BLD AUTO: 0.7 %
BILIRUB SERPL-MCNC: 0.8 MG/DL
BUN SERPL-MCNC: 14.3 MG/DL (ref 6–20)
CALCIUM SERPL-MCNC: 10.6 MG/DL (ref 8.8–10.4)
CHLORIDE SERPL-SCNC: 107 MMOL/L (ref 98–107)
CHOLEST SERPL-MCNC: 100 MG/DL
CREAT SERPL-MCNC: 1.5 MG/DL (ref 0.67–1.17)
EGFRCR SERPLBLD CKD-EPI 2021: 63 ML/MIN/1.73M2
EOSINOPHIL # BLD AUTO: 0.27 10E3/UL (ref 0–0.7)
EOSINOPHIL NFR BLD AUTO: 3.5 %
ERYTHROCYTE [DISTWIDTH] IN BLOOD BY AUTOMATED COUNT: 12.4 % (ref 10–15)
EST. AVERAGE GLUCOSE BLD GHB EST-MCNC: 85 MG/DL
FASTING STATUS PATIENT QL REPORTED: YES
FASTING STATUS PATIENT QL REPORTED: YES
GLUCOSE SERPL-MCNC: 95 MG/DL (ref 70–99)
HBA1C MFR BLD: 4.6 % (ref 0–5.6)
HCO3 SERPL-SCNC: 26 MMOL/L (ref 22–29)
HCT VFR BLD AUTO: 45.5 % (ref 40–53)
HDLC SERPL-MCNC: 37 MG/DL
HGB BLD-MCNC: 14.8 G/DL (ref 13.3–17.7)
IMM GRANULOCYTES # BLD: <0.04 10E3/UL
IMM GRANULOCYTES NFR BLD: 0.1 %
LDLC SERPL CALC-MCNC: 22 MG/DL
LITHIUM SERPL-SCNC: 0.84 MMOL/L (ref 0.6–1.2)
LYMPHOCYTES # BLD AUTO: 1.9 10E3/UL (ref 0.8–5.3)
LYMPHOCYTES NFR BLD AUTO: 24.9 %
MCH RBC QN AUTO: 28.9 PG (ref 26.5–33)
MCHC RBC AUTO-ENTMCNC: 32.5 G/DL (ref 31.5–36.5)
MCV RBC AUTO: 88.9 FL (ref 78–100)
MONOCYTES # BLD AUTO: 0.46 10E3/UL (ref 0–1.3)
MONOCYTES NFR BLD AUTO: 6 %
NEUTROPHILS # BLD AUTO: 4.93 10E3/UL (ref 1.6–8.3)
NEUTROPHILS NFR BLD AUTO: 64.8 %
NONHDLC SERPL-MCNC: 63 MG/DL
PLATELET # BLD AUTO: 226 10E3/UL (ref 150–450)
POTASSIUM SERPL-SCNC: 4.8 MMOL/L (ref 3.4–5.3)
PROT SERPL-MCNC: 7.6 G/DL (ref 6.4–8.3)
RBC # BLD AUTO: 5.12 10E6/UL (ref 4.4–5.9)
SODIUM SERPL-SCNC: 143 MMOL/L (ref 135–145)
TRIGL SERPL-MCNC: 206 MG/DL
TSH SERPL DL<=0.005 MIU/L-ACNC: 2.61 UIU/ML (ref 0.3–4.2)
WBC # BLD AUTO: 7.62 10E3/UL (ref 4–11)

## 2025-08-18 PROCEDURE — 80053 COMPREHEN METABOLIC PANEL: CPT

## 2025-08-18 PROCEDURE — 83036 HEMOGLOBIN GLYCOSYLATED A1C: CPT

## 2025-08-18 PROCEDURE — 84443 ASSAY THYROID STIM HORMONE: CPT

## 2025-08-18 PROCEDURE — 80178 ASSAY OF LITHIUM: CPT

## 2025-08-18 PROCEDURE — 36415 COLL VENOUS BLD VENIPUNCTURE: CPT

## 2025-08-18 PROCEDURE — 80061 LIPID PANEL: CPT

## 2025-08-18 PROCEDURE — 85025 COMPLETE CBC W/AUTO DIFF WBC: CPT

## 2025-08-28 ENCOUNTER — VIRTUAL VISIT (OUTPATIENT)
Dept: PSYCHIATRY | Facility: CLINIC | Age: 35
End: 2025-08-28
Attending: PSYCHIATRY & NEUROLOGY
Payer: COMMERCIAL

## 2025-08-28 VITALS — HEIGHT: 73 IN | BODY MASS INDEX: 32.47 KG/M2 | WEIGHT: 245 LBS

## 2025-08-28 DIAGNOSIS — F25.0 SCHIZOAFFECTIVE DISORDER, BIPOLAR TYPE (H): ICD-10-CM

## 2025-08-28 DIAGNOSIS — F90.0 ATTENTION DEFICIT HYPERACTIVITY DISORDER (ADHD), PREDOMINANTLY INATTENTIVE TYPE: Primary | ICD-10-CM

## 2025-08-28 DIAGNOSIS — G47.00 INSOMNIA, UNSPECIFIED TYPE: ICD-10-CM

## 2025-08-28 RX ORDER — LAMOTRIGINE 200 MG/1
200 TABLET ORAL AT BEDTIME
Qty: 30 TABLET | Refills: 1 | Status: SHIPPED | OUTPATIENT
Start: 2025-08-28

## 2025-08-28 RX ORDER — LITHIUM CARBONATE 450 MG
450 TABLET, EXTENDED RELEASE ORAL AT BEDTIME
Qty: 30 TABLET | Refills: 1 | Status: CANCELLED | OUTPATIENT
Start: 2025-08-28

## 2025-08-28 RX ORDER — LITHIUM CARBONATE 300 MG/1
900 TABLET, FILM COATED, EXTENDED RELEASE ORAL AT BEDTIME
Qty: 90 TABLET | Refills: 1 | Status: SHIPPED | OUTPATIENT
Start: 2025-08-28

## 2025-08-28 RX ORDER — OLANZAPINE 15 MG/1
15 TABLET, FILM COATED ORAL AT BEDTIME
Qty: 30 TABLET | Refills: 1 | Status: SHIPPED | OUTPATIENT
Start: 2025-08-28